# Patient Record
Sex: MALE | Race: WHITE | NOT HISPANIC OR LATINO | URBAN - METROPOLITAN AREA
[De-identification: names, ages, dates, MRNs, and addresses within clinical notes are randomized per-mention and may not be internally consistent; named-entity substitution may affect disease eponyms.]

---

## 2021-01-15 PROBLEM — Z00.00 ENCOUNTER FOR PREVENTIVE HEALTH EXAMINATION: Status: ACTIVE | Noted: 2021-01-15

## 2021-01-18 ENCOUNTER — OUTPATIENT (OUTPATIENT)
Dept: OUTPATIENT SERVICES | Facility: HOSPITAL | Age: 81
LOS: 1 days | End: 2021-01-18
Payer: MEDICARE

## 2021-01-18 ENCOUNTER — APPOINTMENT (OUTPATIENT)
Dept: ULTRASOUND IMAGING | Facility: HOSPITAL | Age: 81
End: 2021-01-18
Payer: MEDICARE

## 2021-01-18 DIAGNOSIS — Z00.8 ENCOUNTER FOR OTHER GENERAL EXAMINATION: ICD-10-CM

## 2021-01-18 PROCEDURE — 76770 US EXAM ABDO BACK WALL COMP: CPT | Mod: 26

## 2021-01-18 PROCEDURE — 76770 US EXAM ABDO BACK WALL COMP: CPT

## 2021-01-28 ENCOUNTER — OUTPATIENT (OUTPATIENT)
Dept: OUTPATIENT SERVICES | Facility: HOSPITAL | Age: 81
LOS: 1 days | End: 2021-01-28
Payer: MEDICARE

## 2021-01-28 ENCOUNTER — APPOINTMENT (OUTPATIENT)
Dept: CT IMAGING | Facility: HOSPITAL | Age: 81
End: 2021-01-28
Payer: MEDICARE

## 2021-01-28 DIAGNOSIS — Z00.8 ENCOUNTER FOR OTHER GENERAL EXAMINATION: ICD-10-CM

## 2021-01-28 PROCEDURE — 74176 CT ABD & PELVIS W/O CONTRAST: CPT | Mod: 26,MH

## 2021-01-28 PROCEDURE — 74176 CT ABD & PELVIS W/O CONTRAST: CPT

## 2023-02-16 ENCOUNTER — LABORATORY RESULT (OUTPATIENT)
Age: 83
End: 2023-02-16

## 2023-02-17 ENCOUNTER — INPATIENT (INPATIENT)
Facility: HOSPITAL | Age: 83
LOS: 6 days | Discharge: ANOTHER HOSPITAL | DRG: 871 | End: 2023-02-24
Attending: INTERNAL MEDICINE | Admitting: INTERNAL MEDICINE
Payer: MEDICARE

## 2023-02-17 VITALS — DIASTOLIC BLOOD PRESSURE: 117 MMHG | HEART RATE: 93 BPM | TEMPERATURE: 96 F | SYSTOLIC BLOOD PRESSURE: 147 MMHG

## 2023-02-17 DIAGNOSIS — J96.02 ACUTE RESPIRATORY FAILURE WITH HYPERCAPNIA: ICD-10-CM

## 2023-02-17 DIAGNOSIS — J96.01 ACUTE RESPIRATORY FAILURE WITH HYPOXIA: ICD-10-CM

## 2023-02-17 DIAGNOSIS — I10 ESSENTIAL (PRIMARY) HYPERTENSION: ICD-10-CM

## 2023-02-17 DIAGNOSIS — I48.91 UNSPECIFIED ATRIAL FIBRILLATION: ICD-10-CM

## 2023-02-17 DIAGNOSIS — Z29.9 ENCOUNTER FOR PROPHYLACTIC MEASURES, UNSPECIFIED: ICD-10-CM

## 2023-02-17 DIAGNOSIS — R33.9 RETENTION OF URINE, UNSPECIFIED: ICD-10-CM

## 2023-02-17 LAB
ALBUMIN SERPL ELPH-MCNC: 1.5 G/DL — LOW (ref 3.3–5)
ALP SERPL-CCNC: 106 U/L — SIGNIFICANT CHANGE UP (ref 40–120)
ALT FLD-CCNC: 71 U/L — HIGH (ref 10–45)
ANION GAP SERPL CALC-SCNC: 2 MMOL/L — LOW (ref 5–17)
ANISOCYTOSIS BLD QL: SLIGHT — SIGNIFICANT CHANGE UP
APTT BLD: 33.5 SEC — SIGNIFICANT CHANGE UP (ref 27.5–35.5)
AST SERPL-CCNC: 45 U/L — HIGH (ref 10–40)
BASE EXCESS BLDA CALC-SCNC: 5.5 MMOL/L — HIGH (ref -2–3)
BASOPHILS # BLD AUTO: 0 K/UL — SIGNIFICANT CHANGE UP (ref 0–0.2)
BASOPHILS NFR BLD AUTO: 0 % — SIGNIFICANT CHANGE UP (ref 0–2)
BILIRUB SERPL-MCNC: 0.6 MG/DL — SIGNIFICANT CHANGE UP (ref 0.2–1.2)
BLD GP AB SCN SERPL QL: SIGNIFICANT CHANGE UP
BUN SERPL-MCNC: 23 MG/DL — SIGNIFICANT CHANGE UP (ref 7–23)
CALCIUM SERPL-MCNC: 9 MG/DL — SIGNIFICANT CHANGE UP (ref 8.4–10.5)
CHLORIDE SERPL-SCNC: 101 MMOL/L — SIGNIFICANT CHANGE UP (ref 96–108)
CO2 BLDA-SCNC: 36 MMOL/L — HIGH (ref 19–24)
CO2 SERPL-SCNC: 34 MMOL/L — HIGH (ref 22–31)
CREAT SERPL-MCNC: 0.77 MG/DL — SIGNIFICANT CHANGE UP (ref 0.5–1.3)
EGFR: 89 ML/MIN/1.73M2 — SIGNIFICANT CHANGE UP
EOSINOPHIL # BLD AUTO: 0 K/UL — SIGNIFICANT CHANGE UP (ref 0–0.5)
EOSINOPHIL NFR BLD AUTO: 0 % — SIGNIFICANT CHANGE UP (ref 0–6)
FLUAV AG NPH QL: SIGNIFICANT CHANGE UP
FLUBV AG NPH QL: SIGNIFICANT CHANGE UP
GAS PNL BLDA: SIGNIFICANT CHANGE UP
GLUCOSE BLDC GLUCOMTR-MCNC: 126 MG/DL — HIGH (ref 70–99)
GLUCOSE SERPL-MCNC: 104 MG/DL — HIGH (ref 70–99)
HCO3 BLDA-SCNC: 33 MMOL/L — HIGH (ref 21–28)
HCT VFR BLD CALC: 42.9 % — SIGNIFICANT CHANGE UP (ref 39–50)
HGB BLD-MCNC: 12.9 G/DL — LOW (ref 13–17)
HOROWITZ INDEX BLDA+IHG-RTO: 100 — SIGNIFICANT CHANGE UP
INR BLD: 1.59 RATIO — HIGH (ref 0.88–1.16)
LACTATE SERPL-SCNC: 2 MMOL/L — SIGNIFICANT CHANGE UP (ref 0.7–2)
LDH SERPL L TO P-CCNC: 318 U/L — HIGH (ref 50–242)
LYMPHOCYTES # BLD AUTO: 1.5 K/UL — SIGNIFICANT CHANGE UP (ref 1–3.3)
LYMPHOCYTES # BLD AUTO: 4 % — LOW (ref 13–44)
MANUAL SMEAR VERIFICATION: SIGNIFICANT CHANGE UP
MCHC RBC-ENTMCNC: 28.5 PG — SIGNIFICANT CHANGE UP (ref 27–34)
MCHC RBC-ENTMCNC: 30.1 GM/DL — LOW (ref 32–36)
MCV RBC AUTO: 94.9 FL — SIGNIFICANT CHANGE UP (ref 80–100)
MONOCYTES # BLD AUTO: 2.62 K/UL — HIGH (ref 0–0.9)
MONOCYTES NFR BLD AUTO: 7 % — SIGNIFICANT CHANGE UP (ref 2–14)
MYELOCYTES NFR BLD: 1 % — HIGH (ref 0–0)
NEUTROPHILS # BLD AUTO: 32.95 K/UL — HIGH (ref 1.8–7.4)
NEUTROPHILS NFR BLD AUTO: 88 % — HIGH (ref 43–77)
NRBC # BLD: 0 /100 — SIGNIFICANT CHANGE UP (ref 0–0)
NT-PROBNP SERPL-SCNC: 1960 PG/ML — HIGH (ref 0–300)
PCO2 BLDA: 81 MMHG — CRITICAL HIGH (ref 35–48)
PH BLDA: 7.22 — LOW (ref 7.35–7.45)
PLAT MORPH BLD: NORMAL — SIGNIFICANT CHANGE UP
PLATELET # BLD AUTO: 318 K/UL — SIGNIFICANT CHANGE UP (ref 150–400)
PO2 BLDA: 164 MMHG — HIGH (ref 83–108)
POTASSIUM SERPL-MCNC: 5.4 MMOL/L — HIGH (ref 3.5–5.3)
POTASSIUM SERPL-SCNC: 5.4 MMOL/L — HIGH (ref 3.5–5.3)
PROT SERPL-MCNC: 6.2 G/DL — SIGNIFICANT CHANGE UP (ref 6–8.3)
PROTHROM AB SERPL-ACNC: 18.5 SEC — HIGH (ref 10.5–13.4)
RBC # BLD: 4.52 M/UL — SIGNIFICANT CHANGE UP (ref 4.2–5.8)
RBC # FLD: 17.2 % — HIGH (ref 10.3–14.5)
RBC BLD AUTO: ABNORMAL
ROULEAUX BLD QL SMEAR: PRESENT
RSV RNA NPH QL NAA+NON-PROBE: SIGNIFICANT CHANGE UP
SAO2 % BLDA: 98.9 % — HIGH (ref 94–98)
SARS-COV-2 RNA SPEC QL NAA+PROBE: SIGNIFICANT CHANGE UP
SODIUM SERPL-SCNC: 137 MMOL/L — SIGNIFICANT CHANGE UP (ref 135–145)
TRIGL SERPL-MCNC: 67 MG/DL — SIGNIFICANT CHANGE UP
TROPONIN I, HIGH SENSITIVITY RESULT: 20.5 NG/L — SIGNIFICANT CHANGE UP
WBC # BLD: 37.44 K/UL — HIGH (ref 3.8–10.5)
WBC # FLD AUTO: 37.44 K/UL — HIGH (ref 3.8–10.5)

## 2023-02-17 PROCEDURE — 71275 CT ANGIOGRAPHY CHEST: CPT | Mod: 26,MA

## 2023-02-17 PROCEDURE — 71045 X-RAY EXAM CHEST 1 VIEW: CPT | Mod: 26,76

## 2023-02-17 PROCEDURE — 93010 ELECTROCARDIOGRAM REPORT: CPT

## 2023-02-17 PROCEDURE — 70450 CT HEAD/BRAIN W/O DYE: CPT | Mod: 26,MA

## 2023-02-17 PROCEDURE — 99223 1ST HOSP IP/OBS HIGH 75: CPT

## 2023-02-17 PROCEDURE — 75635 CT ANGIO ABDOMINAL ARTERIES: CPT | Mod: 26,MA

## 2023-02-17 PROCEDURE — 99291 CRITICAL CARE FIRST HOUR: CPT | Mod: FS,CS

## 2023-02-17 RX ORDER — SIMVASTATIN 20 MG/1
40 TABLET, FILM COATED ORAL AT BEDTIME
Refills: 0 | Status: DISCONTINUED | OUTPATIENT
Start: 2023-02-17 | End: 2023-02-24

## 2023-02-17 RX ORDER — SODIUM CHLORIDE 9 MG/ML
500 INJECTION, SOLUTION INTRAVENOUS ONCE
Refills: 0 | Status: COMPLETED | OUTPATIENT
Start: 2023-02-17 | End: 2023-02-17

## 2023-02-17 RX ORDER — CEFEPIME 1 G/1
2000 INJECTION, POWDER, FOR SOLUTION INTRAMUSCULAR; INTRAVENOUS EVERY 8 HOURS
Refills: 0 | Status: COMPLETED | OUTPATIENT
Start: 2023-02-17 | End: 2023-02-22

## 2023-02-17 RX ORDER — CHLORHEXIDINE GLUCONATE 213 G/1000ML
1 SOLUTION TOPICAL
Refills: 0 | Status: DISCONTINUED | OUTPATIENT
Start: 2023-02-17 | End: 2023-02-24

## 2023-02-17 RX ORDER — CHLORHEXIDINE GLUCONATE 213 G/1000ML
15 SOLUTION TOPICAL EVERY 12 HOURS
Refills: 0 | Status: DISCONTINUED | OUTPATIENT
Start: 2023-02-17 | End: 2023-02-19

## 2023-02-17 RX ORDER — APIXABAN 2.5 MG/1
5 TABLET, FILM COATED ORAL
Refills: 0 | Status: DISCONTINUED | OUTPATIENT
Start: 2023-02-17 | End: 2023-02-17

## 2023-02-17 RX ORDER — FENTANYL CITRATE 50 UG/ML
50 INJECTION INTRAVENOUS EVERY 4 HOURS
Refills: 0 | Status: DISCONTINUED | OUTPATIENT
Start: 2023-02-17 | End: 2023-02-19

## 2023-02-17 RX ORDER — CEFEPIME 1 G/1
2000 INJECTION, POWDER, FOR SOLUTION INTRAMUSCULAR; INTRAVENOUS ONCE
Refills: 0 | Status: COMPLETED | OUTPATIENT
Start: 2023-02-17 | End: 2023-02-17

## 2023-02-17 RX ORDER — IPRATROPIUM BROMIDE 0.2 MG/ML
2 SOLUTION, NON-ORAL INHALATION EVERY 6 HOURS
Refills: 0 | Status: DISCONTINUED | OUTPATIENT
Start: 2023-02-17 | End: 2023-02-19

## 2023-02-17 RX ORDER — SODIUM CHLORIDE 9 MG/ML
1000 INJECTION, SOLUTION INTRAVENOUS
Refills: 0 | Status: DISCONTINUED | OUTPATIENT
Start: 2023-02-17 | End: 2023-02-19

## 2023-02-17 RX ORDER — ALBUTEROL 90 UG/1
2 AEROSOL, METERED ORAL EVERY 6 HOURS
Refills: 0 | Status: DISCONTINUED | OUTPATIENT
Start: 2023-02-17 | End: 2023-02-19

## 2023-02-17 RX ORDER — FENTANYL CITRATE 50 UG/ML
25 INJECTION INTRAVENOUS ONCE
Refills: 0 | Status: DISCONTINUED | OUTPATIENT
Start: 2023-02-17 | End: 2023-02-17

## 2023-02-17 RX ORDER — ALBUMIN HUMAN 25 %
100 VIAL (ML) INTRAVENOUS ONCE
Refills: 0 | Status: COMPLETED | OUTPATIENT
Start: 2023-02-17 | End: 2023-02-17

## 2023-02-17 RX ORDER — PROPOFOL 10 MG/ML
10 INJECTION, EMULSION INTRAVENOUS
Qty: 500 | Refills: 0 | Status: DISCONTINUED | OUTPATIENT
Start: 2023-02-17 | End: 2023-02-19

## 2023-02-17 RX ORDER — VANCOMYCIN HCL 1 G
1000 VIAL (EA) INTRAVENOUS ONCE
Refills: 0 | Status: COMPLETED | OUTPATIENT
Start: 2023-02-17 | End: 2023-02-17

## 2023-02-17 RX ORDER — PHENYLEPHRINE HYDROCHLORIDE 10 MG/ML
0.1 INJECTION INTRAVENOUS
Qty: 40 | Refills: 0 | Status: DISCONTINUED | OUTPATIENT
Start: 2023-02-17 | End: 2023-02-19

## 2023-02-17 RX ORDER — SODIUM CHLORIDE 9 MG/ML
2000 INJECTION INTRAMUSCULAR; INTRAVENOUS; SUBCUTANEOUS ONCE
Refills: 0 | Status: COMPLETED | OUTPATIENT
Start: 2023-02-17 | End: 2023-02-17

## 2023-02-17 RX ORDER — DEXMEDETOMIDINE HYDROCHLORIDE IN 0.9% SODIUM CHLORIDE 4 UG/ML
0.5 INJECTION INTRAVENOUS
Qty: 400 | Refills: 0 | Status: DISCONTINUED | OUTPATIENT
Start: 2023-02-17 | End: 2023-02-19

## 2023-02-17 RX ADMIN — Medication 250 MILLIGRAM(S): at 10:58

## 2023-02-17 RX ADMIN — SIMVASTATIN 40 MILLIGRAM(S): 20 TABLET, FILM COATED ORAL at 21:32

## 2023-02-17 RX ADMIN — DEXMEDETOMIDINE HYDROCHLORIDE IN 0.9% SODIUM CHLORIDE 11.6 MICROGRAM(S)/KG/HR: 4 INJECTION INTRAVENOUS at 21:33

## 2023-02-17 RX ADMIN — FENTANYL CITRATE 25 MICROGRAM(S): 50 INJECTION INTRAVENOUS at 15:00

## 2023-02-17 RX ADMIN — CEFEPIME 2000 MILLIGRAM(S): 1 INJECTION, POWDER, FOR SOLUTION INTRAMUSCULAR; INTRAVENOUS at 11:01

## 2023-02-17 RX ADMIN — FENTANYL CITRATE 50 MICROGRAM(S): 50 INJECTION INTRAVENOUS at 18:25

## 2023-02-17 RX ADMIN — FENTANYL CITRATE 50 MICROGRAM(S): 50 INJECTION INTRAVENOUS at 17:52

## 2023-02-17 RX ADMIN — Medication 2 PUFF(S): at 21:18

## 2023-02-17 RX ADMIN — PHENYLEPHRINE HYDROCHLORIDE 3.48 MICROGRAM(S)/KG/MIN: 10 INJECTION INTRAVENOUS at 16:51

## 2023-02-17 RX ADMIN — SODIUM CHLORIDE 500 MILLILITER(S): 9 INJECTION, SOLUTION INTRAVENOUS at 15:10

## 2023-02-17 RX ADMIN — Medication 50 MILLILITER(S): at 20:14

## 2023-02-17 RX ADMIN — PROPOFOL 5.25 MICROGRAM(S)/KG/MIN: 10 INJECTION, EMULSION INTRAVENOUS at 22:09

## 2023-02-17 RX ADMIN — SODIUM CHLORIDE 2000 MILLILITER(S): 9 INJECTION INTRAMUSCULAR; INTRAVENOUS; SUBCUTANEOUS at 10:48

## 2023-02-17 RX ADMIN — SODIUM CHLORIDE 125 MILLILITER(S): 9 INJECTION, SOLUTION INTRAVENOUS at 23:15

## 2023-02-17 RX ADMIN — PROPOFOL 5.25 MICROGRAM(S)/KG/MIN: 10 INJECTION, EMULSION INTRAVENOUS at 10:50

## 2023-02-17 RX ADMIN — FENTANYL CITRATE 50 MICROGRAM(S): 50 INJECTION INTRAVENOUS at 22:09

## 2023-02-17 RX ADMIN — FENTANYL CITRATE 50 MICROGRAM(S): 50 INJECTION INTRAVENOUS at 22:39

## 2023-02-17 RX ADMIN — CEFEPIME 100 MILLIGRAM(S): 1 INJECTION, POWDER, FOR SOLUTION INTRAMUSCULAR; INTRAVENOUS at 10:47

## 2023-02-17 RX ADMIN — CEFEPIME 100 MILLIGRAM(S): 1 INJECTION, POWDER, FOR SOLUTION INTRAMUSCULAR; INTRAVENOUS at 21:32

## 2023-02-17 RX ADMIN — Medication 40 MILLIGRAM(S): at 18:36

## 2023-02-17 RX ADMIN — FENTANYL CITRATE 25 MICROGRAM(S): 50 INJECTION INTRAVENOUS at 15:42

## 2023-02-17 RX ADMIN — CHLORHEXIDINE GLUCONATE 15 MILLILITER(S): 213 SOLUTION TOPICAL at 17:53

## 2023-02-17 RX ADMIN — Medication 40 MILLIGRAM(S): at 23:15

## 2023-02-17 RX ADMIN — ALBUTEROL 2 PUFF(S): 90 AEROSOL, METERED ORAL at 21:18

## 2023-02-17 NOTE — H&P ADULT - PROBLEM SELECTOR PLAN 1
With large right sided pleural effusion, now status post pigtail placement with >1L drainage.  Continue mechanical ventilation overnight, wean as tolerated in AM.  Monitor pigtail output. Follow up on fluid cytology, cultures and chemistries.  Start antibiotics for possible underlying pneumonia with recent admission to [facility], steroids. Significance of leukocytosis uncertain in the setting of possible underlying malignancy, no lactate on evaluation.  Has low dose vasopressor requirement, suspect sedation related.

## 2023-02-17 NOTE — H&P ADULT - PROBLEM SELECTOR PLAN 2
Rate controlled, hold AC until AM.  Continue cardiac monitoring.  Check echocardiogram with elevated BNP as family states it was normal at [facility] last week.

## 2023-02-17 NOTE — ED PROVIDER NOTE - PROGRESS NOTE DETAILS
Handy DO: I personally saw the patient with the PA, and completed the key components of the history and physical exam. I then discussed the management plan with the PA.    Handy MCCRAY: patient had originally be activated as code stroke but further hx confirms that patient with resp. distress at home-intubated by EMS; delay in CT scans as patient needed to be stabilized before going to CT. no TPA as last seen last night at 9pm. Handy MCCRAY: I spoke with patient's wife, sonAlok Horvath and daughter, Judith; patient is being worked up for hx concerning for lymphoma; recent hospitalization to [place] 2 weeks ago; off eliquis x yesterday for outpatient biospy; patient last seen normal at 9pm last night; found at 9am this morning with increase work of breathing and intubated in field for airway protection; originally activated as code stroke for EMS hx that was seen at 8:30AM normal but on further questioning from family that is not correct. I spoke to daughter- Judith who is a physician- FULL CODE at this time. Updated on plan of care; patient to be admitted to ICU and team at bedside who spoke to family at bedside and updated.

## 2023-02-17 NOTE — H&P ADULT - NSHPSOCIALHISTORY_GEN_ALL_CORE
Lives in private home with wife, nearby family active in patient care  No tobacco use for more than 60 years  No history of ETOH abuse, illicit drug use, or substance abuse

## 2023-02-17 NOTE — H&P ADULT - PROBLEM SELECTOR PLAN 4
Patient with chronic urinary retention, self catheterizes at home.  Choi now in place, continue to monitor urine output

## 2023-02-17 NOTE — H&P ADULT - CRITICAL CARE ATTENDING COMMENT
patient seen and examined    Assessment  acute hypoxic / hypercarbic respiratory failure  Recurrent Right pleural effusion  underlying malignancy suspected- enlarged lymph nodes  suspected septic shock     Plan  Chest tube drainage  mechanical ventilation  iv antibiotics  pressors to maintain bp  GI/dvt ppx  when stable surgery eval to remove lymph node

## 2023-02-17 NOTE — CONSULT NOTE ADULT - SUBJECTIVE AND OBJECTIVE BOX
ARIK DUMONT,  82y Male  MRN: [id number]  ATTENDING: Dr. Toño Crisostomo      HPI:      PAST MEDICAL & SURGICAL HISTORY:  Atrial fibrillation  Hypertension  Urinary retention  No significant past surgical history    MEDICATION:  albumin human 25% IVPB 100 milliLiter(s) IV Intermittent once  albuterol    90 MICROgram(s) HFA Inhaler 2 Puff(s) Inhalation every 6 hours  cefepime   IVPB 2000 milliGRAM(s) IV Intermittent every 8 hours  chlorhexidine 0.12% Liquid 15 milliLiter(s) Oral Mucosa every 12 hours  chlorhexidine 2% Cloths 1 Application(s) Topical <User Schedule>  dexMEDEtomidine Infusion 0.5 MICROgram(s)/kG/Hr IV Continuous <Continuous>  ipratropium 17 MICROgram(s) HFA Inhaler 2 Puff(s) Inhalation every 6 hours  lactated ringers. 1000 milliLiter(s) IV Continuous <Continuous>  methylPREDNISolone sodium succinate Injectable 40 milliGRAM(s) IV Push every 6 hours  phenylephrine    Infusion 0.1 MICROgram(s)/kG/Min IV Continuous <Continuous>  propofol Infusion 10 MICROgram(s)/kG/Min IV Continuous <Continuous>  simvastatin 40 milliGRAM(s) Oral at bedtime    ALLERGIES:  No Known Allergies    FAMILY HISTORY:  Reviewed, non-contributory: [ ]   Maternal-  Paternal-    SOCIAL HISTORY:  Tobacco: YES [ ]  ; NO [ ]; Former smoker [ ]  Alcohol:   YES [ ]  ; NO [ ]; Social alcohol user [ ]  Occupation/ marital status/ children:    REVIEW SYSTEMS:  Constitutional: no fever, chills, night sweats, no weight loss  HEENT: denies visual changes; no oral ulcers, dysphagia, no epistaxis;   Respiratory: no dyspnea , wheezing, cough, hemoptysis  Cardiovascular: denies acute chest pain, palpitations  GI: no loss of appetite, dark stools, or abdominal tenderness / pain; no change in bowel habits.  Musculoskeletal: no new back pain, bone/ joint pain ,no extremity swelling  Integumentary: denies pruritus; no skin rash, bruises, no  suspicious skin lesions  Neurologic: denies peripheral numbness, no dizziness, no gait problems.  Heme: no reported easy bruisability; no lymph node enlargement    VITALS:  T(C): 36.9, Max: 36.9 (02-17-23 @ 16:00)  T(F): 98.5, Max: 98.5 (02-17-23 @ 16:00)  HR: 67 (64 - 95)  BP: 95/68 (68/58 - 147/117)  SpO2: 100% (89% - 100%)    PHYSICAL EXAM:  Constitutional: alert, well developed  HEENT: normocephalic, anicteric sclerae, no mucositis or thrush  Respiratory: bilateral clear to auscultation anteriorly  Cardiovascular : S1, S2 regular, rhythmic, no murmurs, gallops or rubs  Abdomen: soft, distended, + normoactive BS, no palpable HS- megaly  Extremities: no tenderness;  -c/c/e, pulses equal bilaterally  Integumentary: no rashes, scars, or lesions suggestive of malignancy  Neurologic: no gross focal deficits    LABS:  (02-17) WBC: 37.44 K/uL,Hemoglobin: 12.9 g/dL, Hematocrit: 42.9 %,  Platelet: 318 K/uL  (02-17) Na: 137 mmol/L ; K: 5.4 mmol/L ; BUN: 23 mg/dL ; Cr: 0.77 mg/dL.  PT/INR - ( 17 Feb 2023 10:05 )   PT: 18.5 sec;   INR: 1.59 ratio    PTT - ( 17 Feb 2023 10:05 )  PTT:33.5 sec    RADIOLOGY:    ACC: [id number] EXAM:  CT ANGIO CHEST AORTA WAWIC   ORDERED BY: MO ROGER     ACC: [id number] EXAM:  CT ANGIO ABD AOR W RUN(W)AW IC   ORDERED BY:   MO ROGER     PROCEDURE DATE:  02/17/2023          INTERPRETATION:  CLINICAL INFORMATION: History of fall.    COMPARISON: CT abdomen and pelvis dated 01/28/2021.    CONTRAST/COMPLICATIONS:  IV Contrast: Omnipaque 350  90 cc administered   10 cc discarded  Oral Contrast: NONE  Complications: None reported at time of study completion    PROCEDURE:  CT Angiography of the Chest, Abdomen and Pelvis.  Precontrast imaging was performed through the chest followed by arterial   phase imaging of the chest, abdomen and pelvis.  Sagittal and coronal reformats were performed as well as 3D (MIP)  reconstructions.    FINDINGS:  CHEST:  LUNGS AND LARGE AIRWAYS: Endotracheal tube in satisfactory position.   Evidence of compression atelectasis involving the dependent right lung.   Atelectasis/infiltrate involving the right middle lobe. Nodular opacities   involving the right upper lobe. These may be related to airspace disease.   Subsegmental atelectasis dependent left lower lobe. Evidence of bilateral   septal lines. These may be related to pulmonary edema. Possibility of   lymphangitic carcinomatosis is also considered.  PLEURA: Moderate right pleural effusion.  VESSELS: No intramural hematoma or a dissection flap involving the   thoracic aorta. Unremarkable arch vessels. Atherosclerotic calcification   including the coronary arteries.  HEART: Borderline cardiomegaly. No pericardial effusion.  MEDIASTINUM AND SHANTELLE: Moderate mediastinal and bilateral hilar   lymphadenopathy. As a reference, a right precarinal node measures 3.9 cm   in short axis (3:39).  CHEST WALL AND LOWER NECK: Within normal limits.    ABDOMEN AND PELVIS:  LIVER: 4 cm sized left hepatic cyst.  BILE DUCTS: Normal caliber.  GALLBLADDER: Within normal limits.  SPLEEN: Within normal limits.  PANCREAS: Nonspecific lobulated contour of the tail of the pancreas.  ADRENALS: Within normal limits.  KIDNEYS/URETERS: Relatively smaller right kidney with cortical thinning.   No evidence of hydronephrosis.    BLADDER: Under distended, unremarkable.  REPRODUCTIVE ORGANS: Prostate is enlarged.    BOWEL: No bowel obstruction. Appendix is normal. Uncomplicated sigmoid   diverticula.  PERITONEUM: No ascites.  VESSELS: Atherosclerotic changes.  RETROPERITONEUM/LYMPH NODES: No lymphadenopathy.  ABDOMINAL WALL: Within normal limits.  BONES: Degenerative changes.    IMPRESSION:  No evidence of aortic dissection or any other posttraumatic abnormality.  Mediastinal and hilar lymphadenopathy, right pleural effusion, possible   right upper lobe airspace opacities and evidence of septal lines.    Additional findings as above.                   ARIK DUMONT,  82y Male  MRN: [id number]  ATTENDING: Dr. Toño Crisostomo      HPI:  82M, PMHx HTN, atrial fibrillation, urinary retention, admitted at [facility] with symptoms of progressive shortness of breath, which rapidly turned into respiratory failure requiring intubation.  Reportedly patient recently evaluated at [facility] for a large pleural effusion, s/p thoracentesis; cytopathology negative for malignancy reportedly.  Patient also had a supraclavicular lymph node biopsy for reportedly lymphadenopathy; pathology inconclusive.  Presently in ICU, mechanically ventilated but weanable.  CT chest consistent with mediastinal and hilar lymphadenopathy, right pleural effusion, possible right upper lobe airspace opacities and evidence of septal lines. No evidence of aortic dissection or any other posttraumatic abnormality.  Oncology consultation requested regarding lymphadenopathy and large pleural effusion.    PAST MEDICAL & SURGICAL HISTORY:  Atrial fibrillation  Hypertension  Urinary retention  No significant past surgical history    MEDICATION:  albumin human 25% IVPB 100 milliLiter(s) IV Intermittent once  albuterol    90 MICROgram(s) HFA Inhaler 2 Puff(s) Inhalation every 6 hours  cefepime   IVPB 2000 milliGRAM(s) IV Intermittent every 8 hours  chlorhexidine 0.12% Liquid 15 milliLiter(s) Oral Mucosa every 12 hours  chlorhexidine 2% Cloths 1 Application(s) Topical <User Schedule>  dexMEDEtomidine Infusion 0.5 MICROgram(s)/kG/Hr IV Continuous <Continuous>  ipratropium 17 MICROgram(s) HFA Inhaler 2 Puff(s) Inhalation every 6 hours  lactated ringers. 1000 milliLiter(s) IV Continuous <Continuous>  methylPREDNISolone sodium succinate Injectable 40 milliGRAM(s) IV Push every 6 hours  phenylephrine    Infusion 0.1 MICROgram(s)/kG/Min IV Continuous <Continuous>  propofol Infusion 10 MICROgram(s)/kG/Min IV Continuous <Continuous>  simvastatin 40 milliGRAM(s) Oral at bedtime    ALLERGIES:  No Known Allergies    FAMILY HISTORY:  Reviewed, non-contributory: [ ]   Maternal-  Paternal-    SOCIAL HISTORY:  Tobacco: YES [ ]  ; NO [ ]; Former smoker [ ]  Alcohol:   YES [ ]  ; NO [ ]; Social alcohol user [ ]  Occupation/ marital status/ children:    REVIEW SYSTEMS:  Currently intubated and sedated, cannot elicit further history from patient.      VITALS:  T(C): 36.9, Max: 36.9 (02-17-23 @ 16:00)  T(F): 98.5, Max: 98.5 (02-17-23 @ 16:00)  HR: 67 (64 - 95)  BP: 95/68 (68/58 - 147/117)  SpO2: 100% (89% - 100%)    PHYSICAL EXAM:  Constitutional: intubated, sedated, well developed  HEENT: normocephalic, anicteric sclerae, no mucositis or thrush  Respiratory: bilateral clear to auscultation anteriorly  Cardiovascular : S1, S2 regular, rhythmic, no murmurs, gallops or rubs  Abdomen: soft, distended, + normoactive BS, no palpable HS- megaly  Extremities: no tenderness;  -c/c/e, pulses equal bilaterally  Integumentary: no rashes, scars, or lesions suggestive of malignancy  Neurologic: no gross focal deficits    LABS:  (02-17) WBC: 37.44 K/uL,Hemoglobin: 12.9 g/dL, Hematocrit: 42.9 %,  Platelet: 318 K/uL  (02-17) Na: 137 mmol/L ; K: 5.4 mmol/L ; BUN: 23 mg/dL ; Cr: 0.77 mg/dL.  PT/INR - ( 17 Feb 2023 10:05 )   PT: 18.5 sec;   INR: 1.59 ratio    PTT - ( 17 Feb 2023 10:05 )  PTT:33.5 sec    RADIOLOGY:    ACC: [id number] EXAM:  CT ANGIO CHEST AORTA WAWIC   ORDERED BY: MO ROGER     ACC: [id number] EXAM:  CT ANGIO ABD AOR W RUN(W)AW IC   ORDERED BY:   MO ROGER     PROCEDURE DATE:  02/17/2023          INTERPRETATION:  CLINICAL INFORMATION: History of fall.    COMPARISON: CT abdomen and pelvis dated 01/28/2021.    CONTRAST/COMPLICATIONS:  IV Contrast: Omnipaque 350  90 cc administered   10 cc discarded  Oral Contrast: NONE  Complications: None reported at time of study completion    PROCEDURE:  CT Angiography of the Chest, Abdomen and Pelvis.  Precontrast imaging was performed through the chest followed by arterial   phase imaging of the chest, abdomen and pelvis.  Sagittal and coronal reformats were performed as well as 3D (MIP)  reconstructions.    FINDINGS:  CHEST:  LUNGS AND LARGE AIRWAYS: Endotracheal tube in satisfactory position.   Evidence of compression atelectasis involving the dependent right lung.   Atelectasis/infiltrate involving the right middle lobe. Nodular opacities   involving the right upper lobe. These may be related to airspace disease.   Subsegmental atelectasis dependent left lower lobe. Evidence of bilateral   septal lines. These may be related to pulmonary edema. Possibility of   lymphangitic carcinomatosis is also considered.  PLEURA: Moderate right pleural effusion.  VESSELS: No intramural hematoma or a dissection flap involving the   thoracic aorta. Unremarkable arch vessels. Atherosclerotic calcification   including the coronary arteries.  HEART: Borderline cardiomegaly. No pericardial effusion.  MEDIASTINUM AND SHANTELLE: Moderate mediastinal and bilateral hilar   lymphadenopathy. As a reference, a right precarinal node measures 3.9 cm   in short axis (3:39).  CHEST WALL AND LOWER NECK: Within normal limits.    ABDOMEN AND PELVIS:  LIVER: 4 cm sized left hepatic cyst.  BILE DUCTS: Normal caliber.  GALLBLADDER: Within normal limits.  SPLEEN: Within normal limits.  PANCREAS: Nonspecific lobulated contour of the tail of the pancreas.  ADRENALS: Within normal limits.  KIDNEYS/URETERS: Relatively smaller right kidney with cortical thinning.   No evidence of hydronephrosis.    BLADDER: Under distended, unremarkable.  REPRODUCTIVE ORGANS: Prostate is enlarged.    BOWEL: No bowel obstruction. Appendix is normal. Uncomplicated sigmoid   diverticula.  PERITONEUM: No ascites.  VESSELS: Atherosclerotic changes.  RETROPERITONEUM/LYMPH NODES: No lymphadenopathy.  ABDOMINAL WALL: Within normal limits.  BONES: Degenerative changes.    IMPRESSION:  No evidence of aortic dissection or any other posttraumatic abnormality.  Mediastinal and hilar lymphadenopathy, right pleural effusion, possible   right upper lobe airspace opacities and evidence of septal lines.    Additional findings as above.

## 2023-02-17 NOTE — PATIENT PROFILE ADULT - FALL HARM RISK - HARM RISK INTERVENTIONS

## 2023-02-17 NOTE — H&P ADULT - ASSESSMENT
82 year old male with PMH atrial fibrillation, HTN and chronic urinary retention presenting with acute hypercarbic respiratory failure

## 2023-02-17 NOTE — H&P ADULT - NSHPPHYSICALEXAM_GEN_ALL_CORE
Vital Signs Last 24 Hrs  T(C): 36.9 (17 Feb 2023 16:00), Max: 36.9 (17 Feb 2023 16:00)  T(F): 98.5 (17 Feb 2023 16:00), Max: 98.5 (17 Feb 2023 16:00)  HR: 67 (17 Feb 2023 18:30) (64 - 95)  BP: 95/68 (17 Feb 2023 18:30) (68/58 - 147/117)  BP(mean): 79 (17 Feb 2023 18:30) (60 - 116)  RR: 26 (17 Feb 2023 18:30) (10 - 30)  SpO2: 100% (17 Feb 2023 18:30) (89% - 100%)  Mode: AC/ CMV (Assist Control/ Continuous Mandatory Ventilation)  RR (machine): 26  TV (machine): 500  FiO2: 80  PEEP: 5  ITime: 1  MAP: 16  PIP: 26    Physical Exam  Gen:  WN/WD Male resting in bed, orally intubated and sedated  ENT:  NC/AT, no JVD noted.   Thorax:  Symmetric, no retractions  Lung:  Wheezing noted throughout, breath sounds diminished on left  CV:  S1, S2. RRR  Abd:  Soft, NT/ND.  BS normoactive, no masses to palp  Extrem:  Cool and mottled, DP/radial pulses +2  Neuro:  Withdraws all extremities to painful stimuli   Skin:  Intact

## 2023-02-17 NOTE — ED ADULT NURSE NOTE - SUICIDE SCREENING QUESTION 2
range of motion is not limited. +TTP to left rhomboid and let trapezius muscle. No spinal dmitry TTP. Patient unable to complete

## 2023-02-17 NOTE — ED PROVIDER NOTE - PHYSICAL EXAMINATION
General:     ill appearing  Eyes: PERRL  Head:     NC/AT, intubated  Neck:     trachea midline  Lungs:     no breath sounds right lung  CVS:     irregualr  Abd:     +bowel sounds  Ext:   poor cap refill  Neuro: biting tube

## 2023-02-17 NOTE — H&P ADULT - NSHPLABSRESULTS_GEN_ALL_CORE
< from: CT Angio Chest Aorta w/wo IV Cont (02.17.23 @ 10:36) >      ACC: [id number] EXAM:  CT ANGIO CHEST AORTA WAWIC   ORDERED BY: MO ROGER     ACC: [id number] EXAM:  CT ANGIO ABD AOR W RUN(W)AW IC   ORDERED BY:   MO ROGER     PROCEDURE DATE:  02/17/2023          INTERPRETATION:  CLINICAL INFORMATION: History of fall.    COMPARISON: CT abdomen and pelvis dated 01/28/2021.    CONTRAST/COMPLICATIONS:  IV Contrast: Omnipaque 350  90 cc administered   10 cc discarded  Oral Contrast: NONE  Complications: None reported at time of study completion    PROCEDURE:  CT Angiography of the Chest, Abdomen and Pelvis.  Precontrast imaging was performed through the chest followed by arterial   phase imaging of the chest, abdomen and pelvis.  Sagittal and coronal reformats were performed as well as 3D (MIP)  reconstructions.    FINDINGS:  CHEST:  LUNGS AND LARGE AIRWAYS: Endotracheal tube in satisfactory position.   Evidence of compression atelectasis involving the dependent right lung.   Atelectasis/infiltrate involving the right middle lobe. Nodular opacities   involving the right upper lobe. These may be related to airspace disease.   Subsegmental atelectasis dependent left lower lobe. Evidence of bilateral   septal lines. These may be related to pulmonary edema. Possibility of   lymphangitic carcinomatosis is also considered.  PLEURA: Moderate right pleural effusion.  VESSELS: No intramural hematoma or a dissection flap involving the   thoracic aorta. Unremarkable arch vessels. Atherosclerotic calcification   including the coronary arteries.  HEART: Borderline cardiomegaly. No pericardial effusion.  MEDIASTINUM AND SHANTELLE: Moderate mediastinal and bilateral hilar   lymphadenopathy. As a reference, a right precarinal node measures 3.9 cm   in short axis (3:39).  CHEST WALL AND LOWER NECK: Within normal limits.    ABDOMEN AND PELVIS:  LIVER: 4 cm sized left hepatic cyst.  BILE DUCTS: Normal caliber.  GALLBLADDER: Within normal limits.  SPLEEN: Within normal limits.  PANCREAS: Nonspecific lobulated contour of the tail of the pancreas.  ADRENALS: Within normal limits.  KIDNEYS/URETERS: Relatively smaller right kidney with cortical thinning.   No evidence of hydronephrosis.    BLADDER: Under distended, unremarkable.  REPRODUCTIVE ORGANS: Prostate is enlarged.    BOWEL: No bowel obstruction. Appendix is normal. Uncomplicated sigmoid   diverticula.  PERITONEUM: No ascites.  VESSELS: Atherosclerotic changes.  RETROPERITONEUM/LYMPH NODES: No lymphadenopathy.  ABDOMINAL WALL: Within normal limits.  BONES: Degenerative changes.    IMPRESSION:  No evidence of aortic dissection or any other posttraumatic abnormality.  Mediastinal and hilar lymphadenopathy, right pleural effusion, possible   right upper lobe airspace opacities and evidence of septal lines.    Additional findings as above.        --- End of Report ---            LORNA SANZ MD; Attending Radiologist  This document has been electronically signed. Feb 17 2023 12:17PM    < end of copied text >

## 2023-02-17 NOTE — ED PROVIDER NOTE - OBJECTIVE STATEMENT
pt 81 yo m brought in by EMS intubated after receiving etomidate. as per family pt went to bed at 9pm. at 9am they tried to wake him and he wouldn't wake up. when ems arrived he had a pulse but agonal respirations and was intubated. 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 stopped his eliquis yesterday for future procedure

## 2023-02-17 NOTE — PROGRESS NOTE ADULT - SUBJECTIVE AND OBJECTIVE BOX
Patient is a 82y old  Male who presents with a chief complaint of Change in mental status (17 Feb 2023 19:39)      BRIEF HOSPITAL COURSE:     -Admitted to  ICU with acute hypercapneic resp. failure, required intubation and full vent support. In setting of recently found/diagnosed supraclavicular lymph node and frequent large volume right sided pleural effusions. Patient has also developed a pressor requirment showing development of septic shock  -has right pigtail in place with ~1000 mL's of straw colored output     Events last 24 hours:   - remains on full vent support  -remains on vasopressor    PAST MEDICAL & SURGICAL HISTORY:  Atrial fibrillation      Hypertension      Urinary retention      No significant past surgical history          Review of Systems:  -unable to obtain, as patient intubated and sedated       Medications:  cefepime   IVPB 2000 milliGRAM(s) IV Intermittent every 8 hours    phenylephrine    Infusion 0.1 MICROgram(s)/kG/Min IV Continuous <Continuous>    albuterol    90 MICROgram(s) HFA Inhaler 2 Puff(s) Inhalation every 6 hours  ipratropium 17 MICROgram(s) HFA Inhaler 2 Puff(s) Inhalation every 6 hours    dexMEDEtomidine Infusion 0.5 MICROgram(s)/kG/Hr IV Continuous <Continuous>  fentaNYL    Injectable 50 MICROGram(s) IV Push every 4 hours PRN  propofol Infusion 10 MICROgram(s)/kG/Min IV Continuous <Continuous>            methylPREDNISolone sodium succinate Injectable 40 milliGRAM(s) IV Push every 6 hours  simvastatin 40 milliGRAM(s) Oral at bedtime    lactated ringers. 1000 milliLiter(s) IV Continuous <Continuous>      chlorhexidine 0.12% Liquid 15 milliLiter(s) Oral Mucosa every 12 hours  chlorhexidine 2% Cloths 1 Application(s) Topical <User Schedule>        Mode: AC/ CMV (Assist Control/ Continuous Mandatory Ventilation)  RR (machine): 26  TV (machine): 500  FiO2: 80  PEEP: 5  ITime: 1  MAP: 16  PIP: 26      ICU Vital Signs Last 24 Hrs  T(C): 36.9 (17 Feb 2023 16:00), Max: 36.9 (17 Feb 2023 16:00)  T(F): 98.5 (17 Feb 2023 16:00), Max: 98.5 (17 Feb 2023 16:00)  HR: 67 (17 Feb 2023 18:30) (64 - 95)  BP: 95/68 (17 Feb 2023 18:30) (68/58 - 147/117)  BP(mean): 79 (17 Feb 2023 18:30) (60 - 116)  RR: 26 (17 Feb 2023 18:30) (10 - 30)  SpO2: 100% (17 Feb 2023 18:30) (89% - 100%)    O2 Parameters below as of 17 Feb 2023 11:30  Patient On (Oxygen Delivery Method): ventilator    O2 Concentration (%): 80        ABG - ( 17 Feb 2023 10:48 )  pH, Arterial: 7.22  pH, Blood: x     /  pCO2: 81    /  pO2: 164   / HCO3: 33    / Base Excess: 5.5   /  SaO2: 98.9                I&O's Detail    17 Feb 2023 07:01  -  17 Feb 2023 20:18  --------------------------------------------------------  IN:    Dexmedetomidine: 55.8 mL    Lactated Ringers: 500 mL    Lactated Ringers Bolus: 500 mL    Phenylephrine: 12.2 mL    Propofol: 6.5 mL    Sodium Chloride 0.9% Bolus: 2000 mL  Total IN: 3074.5 mL    OUT:    Chest Tube (mL): 1525 mL    Indwelling Catheter - Urethral (mL): 810 mL  Total OUT: 2335 mL    Total NET: 739.5 mL            LABS:                        12.9   37.44 )-----------( 318      ( 17 Feb 2023 10:05 )             42.9     02-17    137  |  101  |  23  ----------------------------<  104<H>  5.4<H>   |  34<H>  |  0.77    Ca    9.0      17 Feb 2023 14:40    TPro  6.2  /  Alb  1.5<L>  /  TBili  0.6  /  DBili  x   /  AST  45<H>  /  ALT  71<H>  /  AlkPhos  106  02-17          CAPILLARY BLOOD GLUCOSE      POCT Blood Glucose.: 183 mg/dL (17 Feb 2023 10:01)    PT/INR - ( 17 Feb 2023 10:05 )   PT: 18.5 sec;   INR: 1.59 ratio         PTT - ( 17 Feb 2023 10:05 )  PTT:33.5 sec    CULTURES:      Physical Examination:    General: No acute distress.  Intubated, sedated, compliant with vent    HEENT: Pupils equal, reactive to light.  Symmetric.    PULM: diminished at bases bilaterally right more than left, no significant sputum production    CVS: (+) S1/S2, irregularly irregular, no MRG     ABD: Soft, nondistended, nontender, normoactive bowel sounds, no masses    EXT: No edema, nontender    SKIN: Warm and well perfused, no rashes noted.    RADIOLOGY: ***    CRITICAL CARE TIME SPENT: 35 minutes of critical care time spent providing medical care for patient's acute illness/conditions that impairs at least one vital organ system and/or poses a high risk of imminent or life threatening deterioration in the patient's condition. It includes time spent evaluating and treating the patient's acute illness as well as time spent reviewing labs, radiology, discussing goals of care with patient and/or patient's family, and discussing the case with a multidisciplinary team, including the eICU, in an effort to prevent further life threatening deterioration or end organ damage. This time is independent of any procedures performed.

## 2023-02-17 NOTE — H&P ADULT - PROBLEM SELECTOR PLAN 5
SDC's, consider full AC with afib in AM  Protonix 40mg NGT daily for GI  Bedrest, start TF tomorrow if not extubated  Full Code Status

## 2023-02-17 NOTE — ED ADULT NURSE NOTE - OBJECTIVE STATEMENT
Pt presents to ED from home for unresponsiveness. Pt found by aide at home unresponsive in bed, pt was having SOB last night and went to sleep at 9pm. On EMS arrival, pt agonal breathing, intubated in field with 7.5 ETT, initially 28 @ lip, retracted by Dr Murrell to 23 @ lip. Pt cool to touch, rectal temp of 94.5, ten hugger initiated. Pt placed on cardiac monitor. Pt with recent thoracentesis and elevated WBC, r/o lymphoma. Normal baseline mental status is A&Ox4 and ambulatory. Pt presents to ED from home for unresponsiveness. Pt found by aide at home unresponsive in bed, pt was having SOB last night and went to sleep at 9pm. On EMS arrival, pt agonal breathing, intubated in field with 7.5 ETT, initially 28 @ lip, retracted by Dr Murrell to 23 @ lip. Pt cool to touch, rectal temp of 94.5, ten hugger initiated. Pt placed on cardiac monitor. Pt with recent thoracentesis and elevated WBC, r/o lymphoma. Normal baseline mental status is A&Ox4 and ambulatory. Pt self straight caths at home.

## 2023-02-17 NOTE — H&P ADULT - HISTORY OF PRESENT ILLNESS
82 year old male with PMH atrial fibrillation, HTN and chronic urinary retention who presented today to [facility] from home.  Per family patient has been progressively SOB over the past few days and was last in his usual state of health last night.  This morning patient wife thought patient had "overslept," when she went to check on him found him agonally breathing and activated EMS.  Intubated in the field and brought to ED.  No reported fever, chills, chest pain, weakness, dizziness, nausea, or vomiting.    Of note, patient recently found to have supraclavicular lymph node enlargement with associated leukocytosis, is being evaluated for lymphoma.  Patient also recently admitted to [facility] where he was noted to have large pleural effusion that was drained.  According to family lymph node biopsy was inconclusive, had thoracentesis which revealed no infection or malignancy.  Since discharge from [facility] has been oxygen dependant at home which is also a new development.    Patient currently intubated and sedated, cannot elicit further history from patient.

## 2023-02-17 NOTE — PROVIDER CONTACT NOTE (EICU) - ASSESSMENT
79/55, HR 83, RR 26, 100%  Precedex on hold.  Patient has gotten 2L fluid bolus already and is currently getting LR @ 125cc/hr

## 2023-02-18 DIAGNOSIS — R59.1 GENERALIZED ENLARGED LYMPH NODES: ICD-10-CM

## 2023-02-18 LAB
ALBUMIN FLD-MCNC: 1.6 G/DL — SIGNIFICANT CHANGE UP
ALBUMIN SERPL ELPH-MCNC: 1.8 G/DL — LOW (ref 3.3–5)
ALP SERPL-CCNC: 97 U/L — SIGNIFICANT CHANGE UP (ref 40–120)
ALT FLD-CCNC: 56 U/L — HIGH (ref 10–45)
ANION GAP SERPL CALC-SCNC: 6 MMOL/L — SIGNIFICANT CHANGE UP (ref 5–17)
AST SERPL-CCNC: 32 U/L — SIGNIFICANT CHANGE UP (ref 10–40)
BASE EXCESS BLDA CALC-SCNC: 3.2 MMOL/L — HIGH (ref -2–3)
BASE EXCESS BLDA CALC-SCNC: 7.4 MMOL/L — HIGH (ref -2–3)
BASOPHILS # BLD AUTO: 0.05 K/UL — SIGNIFICANT CHANGE UP (ref 0–0.2)
BASOPHILS NFR BLD AUTO: 0.2 % — SIGNIFICANT CHANGE UP (ref 0–2)
BILIRUB SERPL-MCNC: 0.7 MG/DL — SIGNIFICANT CHANGE UP (ref 0.2–1.2)
BLOOD GAS COMMENTS ARTERIAL: SIGNIFICANT CHANGE UP
BUN SERPL-MCNC: 20 MG/DL — SIGNIFICANT CHANGE UP (ref 7–23)
CALCIUM SERPL-MCNC: 8.8 MG/DL — SIGNIFICANT CHANGE UP (ref 8.4–10.5)
CHLORIDE SERPL-SCNC: 103 MMOL/L — SIGNIFICANT CHANGE UP (ref 96–108)
CO2 BLDA-SCNC: 31 MMOL/L — HIGH (ref 19–24)
CO2 BLDA-SCNC: 32 MMOL/L — HIGH (ref 19–24)
CO2 SERPL-SCNC: 29 MMOL/L — SIGNIFICANT CHANGE UP (ref 22–31)
CREAT SERPL-MCNC: 0.76 MG/DL — SIGNIFICANT CHANGE UP (ref 0.5–1.3)
EGFR: 89 ML/MIN/1.73M2 — SIGNIFICANT CHANGE UP
EOSINOPHIL # BLD AUTO: 0 K/UL — SIGNIFICANT CHANGE UP (ref 0–0.5)
EOSINOPHIL NFR BLD AUTO: 0 % — SIGNIFICANT CHANGE UP (ref 0–6)
GAS PNL BLDA: SIGNIFICANT CHANGE UP
GAS PNL BLDA: SIGNIFICANT CHANGE UP
GLUCOSE FLD-MCNC: 115 MG/DL — SIGNIFICANT CHANGE UP
GLUCOSE SERPL-MCNC: 164 MG/DL — HIGH (ref 70–99)
GRAM STN FLD: SIGNIFICANT CHANGE UP
HCO3 BLDA-SCNC: 30 MMOL/L — HIGH (ref 21–28)
HCO3 BLDA-SCNC: 30 MMOL/L — HIGH (ref 21–28)
HCT VFR BLD CALC: 36.3 % — LOW (ref 39–50)
HGB BLD-MCNC: 11.7 G/DL — LOW (ref 13–17)
HOROWITZ INDEX BLDA+IHG-RTO: 40 — SIGNIFICANT CHANGE UP
HOROWITZ INDEX BLDA+IHG-RTO: 60 — SIGNIFICANT CHANGE UP
IMM GRANULOCYTES NFR BLD AUTO: 1.2 % — HIGH (ref 0–0.9)
LDH SERPL L TO P-CCNC: 162 U/L — SIGNIFICANT CHANGE UP
LYMPHOCYTES # BLD AUTO: 0.79 K/UL — LOW (ref 1–3.3)
LYMPHOCYTES # BLD AUTO: 3.9 % — LOW (ref 13–44)
MAGNESIUM SERPL-MCNC: 1.5 MG/DL — LOW (ref 1.6–2.6)
MCHC RBC-ENTMCNC: 28.6 PG — SIGNIFICANT CHANGE UP (ref 27–34)
MCHC RBC-ENTMCNC: 32.2 GM/DL — SIGNIFICANT CHANGE UP (ref 32–36)
MCV RBC AUTO: 88.8 FL — SIGNIFICANT CHANGE UP (ref 80–100)
MONOCYTES # BLD AUTO: 0.53 K/UL — SIGNIFICANT CHANGE UP (ref 0–0.9)
MONOCYTES NFR BLD AUTO: 2.6 % — SIGNIFICANT CHANGE UP (ref 2–14)
NEUTROPHILS # BLD AUTO: 18.67 K/UL — HIGH (ref 1.8–7.4)
NEUTROPHILS NFR BLD AUTO: 92.1 % — HIGH (ref 43–77)
NRBC # BLD: 0 /100 WBCS — SIGNIFICANT CHANGE UP (ref 0–0)
NT-PROBNP SERPL-SCNC: 1116 PG/ML — HIGH (ref 0–300)
PCO2 BLDA: 36 MMHG — HIGH (ref 32–35)
PCO2 BLDA: 62 MMHG — HIGH (ref 35–48)
PH BLDA: 7.29 — LOW (ref 7.35–7.45)
PH BLDA: 7.53 — HIGH (ref 7.35–7.45)
PH FLD: 7.6 — SIGNIFICANT CHANGE UP
PHOSPHATE SERPL-MCNC: 2.3 MG/DL — LOW (ref 2.5–4.5)
PLATELET # BLD AUTO: 236 K/UL — SIGNIFICANT CHANGE UP (ref 150–400)
PO2 BLDA: 105 MMHG — SIGNIFICANT CHANGE UP (ref 83–108)
PO2 BLDA: 66 MMHG — LOW (ref 83–108)
POTASSIUM SERPL-MCNC: 4.6 MMOL/L — SIGNIFICANT CHANGE UP (ref 3.5–5.3)
POTASSIUM SERPL-SCNC: 4.6 MMOL/L — SIGNIFICANT CHANGE UP (ref 3.5–5.3)
PROT FLD-MCNC: 3.7 G/DL — SIGNIFICANT CHANGE UP
PROT SERPL-MCNC: 6 G/DL — SIGNIFICANT CHANGE UP (ref 6–8.3)
RBC # BLD: 4.09 M/UL — LOW (ref 4.2–5.8)
RBC # FLD: 17.6 % — HIGH (ref 10.3–14.5)
SAO2 % BLDA: 90.8 % — LOW (ref 94–98)
SAO2 % BLDA: 99 % — HIGH (ref 94–98)
SODIUM SERPL-SCNC: 138 MMOL/L — SIGNIFICANT CHANGE UP (ref 135–145)
SPECIMEN SOURCE: SIGNIFICANT CHANGE UP
TROPONIN I, HIGH SENSITIVITY RESULT: 14.3 NG/L — SIGNIFICANT CHANGE UP
WBC # BLD: 20.28 K/UL — HIGH (ref 3.8–10.5)
WBC # FLD AUTO: 20.28 K/UL — HIGH (ref 3.8–10.5)

## 2023-02-18 PROCEDURE — 93306 TTE W/DOPPLER COMPLETE: CPT | Mod: 26

## 2023-02-18 PROCEDURE — 71045 X-RAY EXAM CHEST 1 VIEW: CPT | Mod: 26

## 2023-02-18 RX ORDER — MAGNESIUM SULFATE 500 MG/ML
1 VIAL (ML) INJECTION ONCE
Refills: 0 | Status: COMPLETED | OUTPATIENT
Start: 2023-02-18 | End: 2023-02-18

## 2023-02-18 RX ORDER — ACETAMINOPHEN 500 MG
650 TABLET ORAL ONCE
Refills: 0 | Status: COMPLETED | OUTPATIENT
Start: 2023-02-18 | End: 2023-02-18

## 2023-02-18 RX ORDER — POTASSIUM PHOSPHATE, MONOBASIC POTASSIUM PHOSPHATE, DIBASIC 236; 224 MG/ML; MG/ML
15 INJECTION, SOLUTION INTRAVENOUS ONCE
Refills: 0 | Status: COMPLETED | OUTPATIENT
Start: 2023-02-18 | End: 2023-02-18

## 2023-02-18 RX ORDER — APIXABAN 2.5 MG/1
5 TABLET, FILM COATED ORAL EVERY 12 HOURS
Refills: 0 | Status: DISCONTINUED | OUTPATIENT
Start: 2023-02-18 | End: 2023-02-22

## 2023-02-18 RX ADMIN — Medication 40 MILLIGRAM(S): at 18:18

## 2023-02-18 RX ADMIN — SIMVASTATIN 40 MILLIGRAM(S): 20 TABLET, FILM COATED ORAL at 21:12

## 2023-02-18 RX ADMIN — CHLORHEXIDINE GLUCONATE 1 APPLICATION(S): 213 SOLUTION TOPICAL at 05:48

## 2023-02-18 RX ADMIN — CHLORHEXIDINE GLUCONATE 15 MILLILITER(S): 213 SOLUTION TOPICAL at 05:47

## 2023-02-18 RX ADMIN — Medication 2 PUFF(S): at 09:34

## 2023-02-18 RX ADMIN — Medication 2 PUFF(S): at 16:05

## 2023-02-18 RX ADMIN — CHLORHEXIDINE GLUCONATE 15 MILLILITER(S): 213 SOLUTION TOPICAL at 18:18

## 2023-02-18 RX ADMIN — Medication 2 PUFF(S): at 21:53

## 2023-02-18 RX ADMIN — ALBUTEROL 2 PUFF(S): 90 AEROSOL, METERED ORAL at 09:34

## 2023-02-18 RX ADMIN — APIXABAN 5 MILLIGRAM(S): 2.5 TABLET, FILM COATED ORAL at 18:18

## 2023-02-18 RX ADMIN — Medication 100 GRAM(S): at 11:23

## 2023-02-18 RX ADMIN — Medication 650 MILLIGRAM(S): at 00:58

## 2023-02-18 RX ADMIN — DEXMEDETOMIDINE HYDROCHLORIDE IN 0.9% SODIUM CHLORIDE 11.6 MICROGRAM(S)/KG/HR: 4 INJECTION INTRAVENOUS at 03:44

## 2023-02-18 RX ADMIN — Medication 650 MILLIGRAM(S): at 00:28

## 2023-02-18 RX ADMIN — ALBUTEROL 2 PUFF(S): 90 AEROSOL, METERED ORAL at 04:12

## 2023-02-18 RX ADMIN — Medication 40 MILLIGRAM(S): at 05:48

## 2023-02-18 RX ADMIN — ALBUTEROL 2 PUFF(S): 90 AEROSOL, METERED ORAL at 21:52

## 2023-02-18 RX ADMIN — PROPOFOL 5.25 MICROGRAM(S)/KG/MIN: 10 INJECTION, EMULSION INTRAVENOUS at 15:09

## 2023-02-18 RX ADMIN — ALBUTEROL 2 PUFF(S): 90 AEROSOL, METERED ORAL at 16:04

## 2023-02-18 RX ADMIN — CEFEPIME 100 MILLIGRAM(S): 1 INJECTION, POWDER, FOR SOLUTION INTRAMUSCULAR; INTRAVENOUS at 15:09

## 2023-02-18 RX ADMIN — CEFEPIME 100 MILLIGRAM(S): 1 INJECTION, POWDER, FOR SOLUTION INTRAMUSCULAR; INTRAVENOUS at 21:12

## 2023-02-18 RX ADMIN — Medication 2 PUFF(S): at 04:12

## 2023-02-18 RX ADMIN — POTASSIUM PHOSPHATE, MONOBASIC POTASSIUM PHOSPHATE, DIBASIC 62.5 MILLIMOLE(S): 236; 224 INJECTION, SOLUTION INTRAVENOUS at 11:22

## 2023-02-18 RX ADMIN — Medication 40 MILLIGRAM(S): at 11:39

## 2023-02-18 RX ADMIN — CEFEPIME 100 MILLIGRAM(S): 1 INJECTION, POWDER, FOR SOLUTION INTRAMUSCULAR; INTRAVENOUS at 05:47

## 2023-02-18 NOTE — DIETITIAN INITIAL EVALUATION ADULT - PERTINENT LABORATORY DATA
02-18    138  |  103  |  20  ----------------------------<  164<H>  4.6   |  29  |  0.76    Ca    8.8      18 Feb 2023 05:00  Phos  2.3     02-18  Mg     1.5     02-18    TPro  6.0  /  Alb  1.8<L>  /  TBili  0.7  /  DBili  x   /  AST  32  /  ALT  56<H>  /  AlkPhos  97  02-18  POCT Blood Glucose.: 126 mg/dL (02-17-23 @ 23:10)

## 2023-02-18 NOTE — DIETITIAN INITIAL EVALUATION ADULT - NS FNS DIET ORDER
Diet, NPO with Tube Feed:   Tube Feeding Modality: Nasogastric  Jevity 1.5 Rahul  Total Volume for 24 Hours (mL): 1200  Continuous  Starting Tube Feed Rate {mL per Hour}: 30  Increase Tube Feed Rate by (mL): 5     Every 6 hours  Until Goal Tube Feed Rate (mL per Hour): 50  Tube Feed Duration (in Hours): 24  Tube Feed Start Time: 15:45  Free Water Flush  Bolus   Total Volume per Flush (mL): 200   Frequency: Every 8 Hours   Total Daily Volume of Flush (mL): 600    Start Time: 13:30 (02-18-23 @ 13:42)

## 2023-02-18 NOTE — DIETITIAN INITIAL EVALUATION ADULT - NSFNSGIIOFT_GEN_A_CORE
02-17-23 @ 07:01  -  02-18-23 @ 07:00  --------------------------------------------------------  OUT:    Chest Tube (mL): 1650 mL  Total OUT: 1650 mL    Total NET: -1650 mL

## 2023-02-18 NOTE — DIETITIAN NUTRITION RISK NOTIFICATION - TREATMENT: THE FOLLOWING DIET HAS BEEN RECOMMENDED
Diet, NPO with Tube Feed:   Tube Feeding Modality: Nasogastric  Jevity 1.5 Rahul  Total Volume for 24 Hours (mL): 1200  Continuous  Starting Tube Feed Rate {mL per Hour}: 30  Increase Tube Feed Rate by (mL): 5     Every 6 hours  Until Goal Tube Feed Rate (mL per Hour): 50  Tube Feed Duration (in Hours): 24  Tube Feed Start Time: 15:45  Free Water Flush  Bolus   Total Volume per Flush (mL): 200   Frequency: Every 8 Hours   Total Daily Volume of Flush (mL): 600    Start Time: 13:30 (02-18-23 @ 13:42) [Active]

## 2023-02-18 NOTE — DIETITIAN INITIAL EVALUATION ADULT - PERTINENT MEDS FT
MEDICATIONS  (STANDING):  albuterol    90 MICROgram(s) HFA Inhaler 2 Puff(s) Inhalation every 6 hours  apixaban 5 milliGRAM(s) Oral every 12 hours  cefepime   IVPB 2000 milliGRAM(s) IV Intermittent every 8 hours  chlorhexidine 0.12% Liquid 15 milliLiter(s) Oral Mucosa every 12 hours  chlorhexidine 2% Cloths 1 Application(s) Topical <User Schedule>  dexMEDEtomidine Infusion 0.5 MICROgram(s)/kG/Hr (11.6 mL/Hr) IV Continuous <Continuous>  ipratropium 17 MICROgram(s) HFA Inhaler 2 Puff(s) Inhalation every 6 hours  lactated ringers. 1000 milliLiter(s) (125 mL/Hr) IV Continuous <Continuous>  methylPREDNISolone sodium succinate Injectable 40 milliGRAM(s) IV Push every 6 hours  phenylephrine    Infusion 0.1 MICROgram(s)/kG/Min (3.48 mL/Hr) IV Continuous <Continuous>  propofol Infusion 10 MICROgram(s)/kG/Min (5.25 mL/Hr) IV Continuous <Continuous>  simvastatin 40 milliGRAM(s) Oral at bedtime    MEDICATIONS  (PRN):  fentaNYL    Injectable 50 MICROGram(s) IV Push every 4 hours PRN Moderate Pain (4 - 6)

## 2023-02-18 NOTE — DIETITIAN INITIAL EVALUATION ADULT - ADD RECOMMEND
1. Jevity 1.5 @ 75cc/hr x 18 hrs (10a-4a) as able   2. If extubated, swallow evaluation to assess for appropriate texture

## 2023-02-18 NOTE — PROVIDER CONTACT NOTE (EICU) - ACTION/TREATMENT ORDERED:
E-alerted by bedside RN pt for tylenol order for fevers, order placed as requested
Phenylephrine ordered for MAP>65. Further management as per primary team.

## 2023-02-18 NOTE — PROGRESS NOTE ADULT - SUBJECTIVE AND OBJECTIVE BOX
Follow-up Critical Care Progress Note  Chief Complaint : Acute respiratory failure with hypoxia      patient seen and examined  currently on sedation vacation  alert, agitated  started on cpap appears to be tolerating  no secretions           Allergies :No Known Allergies      PAST MEDICAL & SURGICAL HISTORY:  Atrial fibrillation    Hypertension    Urinary retention    No significant past surgical history        Medications:  MEDICATIONS  (STANDING):  albuterol    90 MICROgram(s) HFA Inhaler 2 Puff(s) Inhalation every 6 hours  cefepime   IVPB 2000 milliGRAM(s) IV Intermittent every 8 hours  chlorhexidine 0.12% Liquid 15 milliLiter(s) Oral Mucosa every 12 hours  chlorhexidine 2% Cloths 1 Application(s) Topical <User Schedule>  dexMEDEtomidine Infusion 0.5 MICROgram(s)/kG/Hr (11.6 mL/Hr) IV Continuous <Continuous>  ipratropium 17 MICROgram(s) HFA Inhaler 2 Puff(s) Inhalation every 6 hours  lactated ringers. 1000 milliLiter(s) (125 mL/Hr) IV Continuous <Continuous>  methylPREDNISolone sodium succinate Injectable 40 milliGRAM(s) IV Push every 6 hours  phenylephrine    Infusion 0.1 MICROgram(s)/kG/Min (3.48 mL/Hr) IV Continuous <Continuous>  propofol Infusion 10 MICROgram(s)/kG/Min (5.25 mL/Hr) IV Continuous <Continuous>  simvastatin 40 milliGRAM(s) Oral at bedtime    MEDICATIONS  (PRN):  fentaNYL    Injectable 50 MICROGram(s) IV Push every 4 hours PRN Moderate Pain (4 - 6)      Antibiotics History  cefepime   IVPB 2000 milliGRAM(s) IV Intermittent every 8 hours, 02-17-23 @ 17:05  cefepime   IVPB 2000 milliGRAM(s) IV Intermittent once, 02-17-23 @ 10:24, Stop order after: 1 Doses  vancomycin  IVPB. 1000 milliGRAM(s) IV Intermittent once, 02-17-23 @ 10:24, Stop order after: 1 Doses         COVID  02-17-23 @ 10:50  COVID -   NotDetec      COVID Biomarkers    02-17-23 @ 16:25 ESR --  ---  CRP --  ---  DDimer  --   ---   <H>   ---   Ferritin --            Trend Cardiac Enzymes  02-17-23 @ 14:40  [id number]-CPKMM/Trop I - -- - --  - --  -  --  /  20.5    Trend BNP  02-17-23 @ 10:05   -  1960<H>    Procalcitonin Trend    WBC Trend  02-17-23 @ 10:05   -  37.44<H>    H/H Trend  02-17-23 @ 10:05   -   12.9<L>/ 42.9    Stool Occult Blood    Platelet Trend  02-17-23 @ 10:05   -  318    Trend Sodium  02-17-23 @ 14:40   -  137    Trend Potassium  02-17-23 @ 14:40   -  5.4<H>    Trend Bun/Cr  02-17-23 @ 14:40  BUN/CR -  23 / 0.77    Lactic Acid Trend  02-17-23 @ 10:50   -   2.0    ABG Trend  02-18-23 @ 04:50   - 7.53<H>/36<H>/105/99.0<H>  02-17-23 @ 10:48   - 7.22<L>/81<HH>/164<H>/98.9<H>    Trend AST/ALT/ALK Phos/Bili  02-17-23 @ 14:40   45<H>/71<H>/106/0.6       Albumin Trend  02-17-23 @ 14:40   -   1.5<L>      PTT - PT - INR Trend  02-17-23 @ 10:05   -   33.5 - 18.5<H> - 1.59<H>    Glucose Trend  02-17-23 @ 23:10   -  -- -- 126<H>  02-17-23 @ 14:40   -  -- 104<H> --  02-17-23 @ 10:01   -  -- -- 183<H>        LABS:                        12.9   37.44 )-----------( 318      ( 17 Feb 2023 10:05 )             42.9     02-17    137  |  101  |  23  ----------------------------<  104<H>  5.4<H>   |  34<H>  |  0.77    Ca    9.0      17 Feb 2023 14:40    TPro  6.2  /  Alb  1.5<L>  /  TBili  0.6  /  DBili  x   /  AST  45<H>  /  ALT  71<H>  /  AlkPhos  106  02-17  Lights Criteria :    Trend LDH- Fluid  02-17-23 @ 14:20  162 -- --      Trend LDH - Serum  02-17-23 @ 16:25  318<H> [50 - 242]    _____________________  Trend Protein - Fluid   02-17-23 @ 14:20  3.7  --  --      Trend Protein - Serum  02-17-23 @ 14:40  6.2 [6.0 - 8.3]      _____________________ Exudative  The effusion is exudative if one of the Light’s criteria has been met:   1)  Pleural fluid protein/serum protein ratio > 0.5.   2)  Pleural fluid LDH/serum LDH ratio greater than > 0.6.   3) Pleural fluid LDH level greater than 2/3 of upper limit of normal LDH level in serum.  _____________________             CULTURES: (if applicable)    Culture - Body Fluid with Gram Stain (collected 02-17-23 @ 14:20)  Source: .Body Fluid Other, Pleural Fluid  Gram Stain (02-18-23 @ 02:54):    polymorphonuclear leukocytes seen    No organisms seen    by cytocentrifuge    Culture - Fungal, Body Fluid (collected 02-17-23 @ 14:20)  Source: Pleural Fl Pleural Fluid  Preliminary Report (02-18-23 @ 07:29):    Testing in progress     Am cxr pending      VITALS:  T(C): 37 (02-18-23 @ 05:00), Max: 38.2 (02-17-23 @ 23:00)  T(F): 98.6 (02-18-23 @ 05:00), Max: 100.8 (02-17-23 @ 23:00)  HR: 48 (02-18-23 @ 06:00) (48 - 105)  BP: 86/63 (02-18-23 @ 06:00) (68/58 - 153/80)  BP(mean): 72 (02-18-23 @ 06:00) (60 - 116)  ABP: --  ABP(mean): --  RR: 20 (02-18-23 @ 06:00) (10 - 31)  SpO2: 99% (02-18-23 @ 06:00) (89% - 100%)  CVP(mm Hg): --  CVP(cm H2O): --    Ins and Outs     02-17-23 @ 07:01  -  02-18-23 @ 07:00  --------------------------------------------------------  IN: 4775.8 mL / OUT: 3300 mL / NET: 1475.8 mL        Height (cm): 177.8 (02-17-23 @ 12:00)  Weight (kg): 92.9 (02-17-23 @ 12:00)  BMI (kg/m2): 29.4 (02-17-23 @ 12:00)    Device: Avea, Mode: AC/ CMV (Assist Control/ Continuous Mandatory Ventilation), RR (machine): 20, RR (patient): 20, TV (machine): 480, TV (patient): 440, FiO2: 60, PEEP: 5, ITime: 1, MAP: 14, PIP: 27    I&O's Detail    17 Feb 2023 07:01  -  18 Feb 2023 07:00  --------------------------------------------------------  IN:    Dexmedetomidine: 192.6 mL    Lactated Ringers: 2000 mL    Lactated Ringers Bolus: 500 mL    Phenylephrine: 12.2 mL    Propofol: 71 mL    Sodium Chloride 0.9% Bolus: 2000 mL  Total IN: 4775.8 mL    OUT:    Chest Tube (mL): 1650 mL    Indwelling Catheter - Urethral (mL): 1650 mL  Total OUT: 3300 mL    Total NET: 1475.8 mL

## 2023-02-18 NOTE — DIETITIAN INITIAL EVALUATION ADULT - OTHER INFO
82 year old male with PMH atrial fibrillation, HTN and chronic urinary retention presenting with acute hypercarbic respiratory failure.   Pt remains intubated, sedated. +pressor support. UBW ~211lbs, current weight 203.7lbs. Mild muscle wasting/fat loss observed in orbital/temporal regions. NGT right nare observed. Plan to initiate EN. Current order Jevity 1.5 @ 30 with goal goal of 50cc/hr x 24 hrs to provide 1800kcals, 76g protein, 912ml H2O. Would suggest transition to volume based feeds as able: Jevity 1.5 @ 75cc/hr x 18 hrs (10a -4a) to provide 2,025 kcals, 85g protein, 1,026ml H2O.

## 2023-02-19 LAB
ANION GAP SERPL CALC-SCNC: 5 MMOL/L — SIGNIFICANT CHANGE UP (ref 5–17)
BASE EXCESS BLDA CALC-SCNC: 7.1 MMOL/L — HIGH (ref -2–3)
BASE EXCESS BLDA CALC-SCNC: 7.8 MMOL/L — HIGH (ref -2–3)
BUN SERPL-MCNC: 24 MG/DL — HIGH (ref 7–23)
CALCIUM SERPL-MCNC: 8.8 MG/DL — SIGNIFICANT CHANGE UP (ref 8.4–10.5)
CHLORIDE SERPL-SCNC: 104 MMOL/L — SIGNIFICANT CHANGE UP (ref 96–108)
CO2 BLDA-SCNC: 33 MMOL/L — HIGH (ref 19–24)
CO2 BLDA-SCNC: 34 MMOL/L — HIGH (ref 19–24)
CO2 SERPL-SCNC: 30 MMOL/L — SIGNIFICANT CHANGE UP (ref 22–31)
CREAT SERPL-MCNC: 0.78 MG/DL — SIGNIFICANT CHANGE UP (ref 0.5–1.3)
EGFR: 88 ML/MIN/1.73M2 — SIGNIFICANT CHANGE UP
GAS PNL BLDA: SIGNIFICANT CHANGE UP
GAS PNL BLDA: SIGNIFICANT CHANGE UP
GLUCOSE SERPL-MCNC: 191 MG/DL — HIGH (ref 70–99)
HCO3 BLDA-SCNC: 31 MMOL/L — HIGH (ref 21–28)
HCO3 BLDA-SCNC: 32 MMOL/L — HIGH (ref 21–28)
HCT VFR BLD CALC: 36.4 % — LOW (ref 39–50)
HGB BLD-MCNC: 11.9 G/DL — LOW (ref 13–17)
HOROWITZ INDEX BLDA+IHG-RTO: 40 — SIGNIFICANT CHANGE UP
HOROWITZ INDEX BLDA+IHG-RTO: 40 — SIGNIFICANT CHANGE UP
MAGNESIUM SERPL-MCNC: 1.7 MG/DL — SIGNIFICANT CHANGE UP (ref 1.6–2.6)
MCHC RBC-ENTMCNC: 28.1 PG — SIGNIFICANT CHANGE UP (ref 27–34)
MCHC RBC-ENTMCNC: 32.7 GM/DL — SIGNIFICANT CHANGE UP (ref 32–36)
MCV RBC AUTO: 85.8 FL — SIGNIFICANT CHANGE UP (ref 80–100)
NRBC # BLD: 0 /100 WBCS — SIGNIFICANT CHANGE UP (ref 0–0)
PCO2 BLDA: 42 MMHG — SIGNIFICANT CHANGE UP (ref 35–48)
PCO2 BLDA: 56 MMHG — HIGH (ref 35–48)
PH BLDA: 7.37 — SIGNIFICANT CHANGE UP (ref 7.35–7.45)
PH BLDA: 7.48 — HIGH (ref 7.35–7.45)
PHOSPHATE SERPL-MCNC: 2.7 MG/DL — SIGNIFICANT CHANGE UP (ref 2.5–4.5)
PLATELET # BLD AUTO: 281 K/UL — SIGNIFICANT CHANGE UP (ref 150–400)
PO2 BLDA: 106 MMHG — SIGNIFICANT CHANGE UP (ref 83–108)
PO2 BLDA: 87 MMHG — SIGNIFICANT CHANGE UP (ref 83–108)
POTASSIUM SERPL-MCNC: 4.1 MMOL/L — SIGNIFICANT CHANGE UP (ref 3.5–5.3)
POTASSIUM SERPL-SCNC: 4.1 MMOL/L — SIGNIFICANT CHANGE UP (ref 3.5–5.3)
RBC # BLD: 4.24 M/UL — SIGNIFICANT CHANGE UP (ref 4.2–5.8)
RBC # FLD: 18 % — HIGH (ref 10.3–14.5)
SAO2 % BLDA: 97.7 % — SIGNIFICANT CHANGE UP (ref 94–98)
SAO2 % BLDA: 98.2 % — HIGH (ref 94–98)
SODIUM SERPL-SCNC: 139 MMOL/L — SIGNIFICANT CHANGE UP (ref 135–145)
WBC # BLD: 25.25 K/UL — HIGH (ref 3.8–10.5)
WBC # FLD AUTO: 25.25 K/UL — HIGH (ref 3.8–10.5)

## 2023-02-19 PROCEDURE — 99222 1ST HOSP IP/OBS MODERATE 55: CPT

## 2023-02-19 RX ORDER — IPRATROPIUM/ALBUTEROL SULFATE 18-103MCG
3 AEROSOL WITH ADAPTER (GRAM) INHALATION EVERY 6 HOURS
Refills: 0 | Status: DISCONTINUED | OUTPATIENT
Start: 2023-02-19 | End: 2023-02-24

## 2023-02-19 RX ADMIN — CEFEPIME 100 MILLIGRAM(S): 1 INJECTION, POWDER, FOR SOLUTION INTRAMUSCULAR; INTRAVENOUS at 15:01

## 2023-02-19 RX ADMIN — FENTANYL CITRATE 50 MICROGRAM(S): 50 INJECTION INTRAVENOUS at 09:44

## 2023-02-19 RX ADMIN — Medication 3 MILLILITER(S): at 21:09

## 2023-02-19 RX ADMIN — CEFEPIME 100 MILLIGRAM(S): 1 INJECTION, POWDER, FOR SOLUTION INTRAMUSCULAR; INTRAVENOUS at 05:13

## 2023-02-19 RX ADMIN — Medication 2 PUFF(S): at 05:04

## 2023-02-19 RX ADMIN — Medication 40 MILLIGRAM(S): at 17:52

## 2023-02-19 RX ADMIN — Medication 40 MILLIGRAM(S): at 05:14

## 2023-02-19 RX ADMIN — FENTANYL CITRATE 50 MICROGRAM(S): 50 INJECTION INTRAVENOUS at 09:31

## 2023-02-19 RX ADMIN — CHLORHEXIDINE GLUCONATE 1 APPLICATION(S): 213 SOLUTION TOPICAL at 05:14

## 2023-02-19 RX ADMIN — ALBUTEROL 2 PUFF(S): 90 AEROSOL, METERED ORAL at 05:04

## 2023-02-19 RX ADMIN — Medication 2 PUFF(S): at 09:13

## 2023-02-19 RX ADMIN — SIMVASTATIN 40 MILLIGRAM(S): 20 TABLET, FILM COATED ORAL at 21:07

## 2023-02-19 RX ADMIN — APIXABAN 5 MILLIGRAM(S): 2.5 TABLET, FILM COATED ORAL at 17:50

## 2023-02-19 RX ADMIN — CEFEPIME 100 MILLIGRAM(S): 1 INJECTION, POWDER, FOR SOLUTION INTRAMUSCULAR; INTRAVENOUS at 21:06

## 2023-02-19 RX ADMIN — Medication 40 MILLIGRAM(S): at 00:54

## 2023-02-19 RX ADMIN — PROPOFOL 5.25 MICROGRAM(S)/KG/MIN: 10 INJECTION, EMULSION INTRAVENOUS at 00:51

## 2023-02-19 RX ADMIN — Medication 40 MILLIGRAM(S): at 11:20

## 2023-02-19 RX ADMIN — ALBUTEROL 2 PUFF(S): 90 AEROSOL, METERED ORAL at 09:14

## 2023-02-19 RX ADMIN — PROPOFOL 5.25 MICROGRAM(S)/KG/MIN: 10 INJECTION, EMULSION INTRAVENOUS at 07:58

## 2023-02-19 RX ADMIN — Medication 3 MILLILITER(S): at 16:34

## 2023-02-19 RX ADMIN — DEXMEDETOMIDINE HYDROCHLORIDE IN 0.9% SODIUM CHLORIDE 11.6 MICROGRAM(S)/KG/HR: 4 INJECTION INTRAVENOUS at 08:51

## 2023-02-19 RX ADMIN — SODIUM CHLORIDE 125 MILLILITER(S): 9 INJECTION, SOLUTION INTRAVENOUS at 08:51

## 2023-02-19 RX ADMIN — CHLORHEXIDINE GLUCONATE 15 MILLILITER(S): 213 SOLUTION TOPICAL at 05:13

## 2023-02-19 RX ADMIN — APIXABAN 5 MILLIGRAM(S): 2.5 TABLET, FILM COATED ORAL at 05:13

## 2023-02-19 NOTE — CONSULT NOTE ADULT - SUBJECTIVE AND OBJECTIVE BOX
Intensivist note:    History of Present Illness:  Reason for Admission: Change in mental status  History of Present Illness:   82 year old male with PMH atrial fibrillation, HTN and chronic urinary retention who presented today to [facility] from home.  Per family patient has been progressively SOB over the past few days and was last in his usual state of health last night.  This morning patient wife thought patient had "overslept," when she went to check on him found him agonally breathing and activated EMS.  Intubated in the field and brought to ED.  No reported fever, chills, chest pain, weakness, dizziness, nausea, or vomiting.    Of note, patient recently found to have supraclavicular lymph node enlargement with associated leukocytosis, is being evaluated for lymphoma.  Patient also recently admitted to [facility] where he was noted to have large pleural effusion that was drained.  According to family lymph node biopsy was inconclusive, had thoracentesis which revealed no infection or malignancy.  Since discharge from [facility] has been oxygen dependant at home which is also a new development.    Patient currently intubated and sedated, cannot elicit further history from patient.        patient examined in CCU Bed#7    on rebreathing facemask and enteral feeds    somewhat responsive to commands    vitals stable    Heent-sclera anicteric    abdomen-benign        no cervical, supracalvicular, axillary or inguinal lymph nodes palpable    labs:    wbc 25  h/h  11/36  plt  281    k+ 4.1    creat  0.78    imaging(i personally reviewed):    < from: CT Angio Abd Aorta w/run-off w/ IV Cont (02.17.23 @ 10:36) >    IMPRESSION:  No evidence of aortic dissection or any other posttraumatic abnormality.  Mediastinal and hilar lymphadenopathy, right pleural effusion, possible   right upper lobe airspace opacities and evidence of septal lines.    Additional findings as above.      < end of copied text >

## 2023-02-19 NOTE — PROGRESS NOTE ADULT - SUBJECTIVE AND OBJECTIVE BOX
F/U Note:    -Admitted to  ICU with acute hypercapneic resp. failure, required intubation and full vent support. In setting of recently found/diagnosed supraclavicular lymph node and frequent large volume right sided pleural effusions. Patient has also developed a pressor requirment showing development of septic shock  -has right pigtail in place with ~1000 mL's of straw colored output     Interval Hx:  -extubated today, tolerating well  -still has right sided pigtail in place     Vital Signs Last 24 Hrs  T(C): 36.8 (19 Feb 2023 15:00), Max: 36.8 (19 Feb 2023 15:00)  T(F): 98.3 (19 Feb 2023 15:00), Max: 98.3 (19 Feb 2023 15:00)  HR: 88 (19 Feb 2023 19:00) (49 - 128)  BP: 133/84 (19 Feb 2023 19:00) (85/56 - 181/109)  BP(mean): 100 (19 Feb 2023 19:00) (66 - 130)  RR: 41 (19 Feb 2023 19:00) (17 - 41)  SpO2: 100% (19 Feb 2023 19:00) (83% - 100%)    Parameters below as of 19 Feb 2023 16:35  Patient On (Oxygen Delivery Method): mask, aerosol                                11.9   25.25 )-----------( 281      ( 19 Feb 2023 06:00 )             36.4         02-19    139  |  104  |  24<H>  ----------------------------<  191<H>  4.1   |  30  |  0.78    Ca    8.8      19 Feb 2023 06:00  Phos  2.7     02-19  Mg     1.7     02-19    TPro  6.0  /  Alb  1.8<L>  /  TBili  0.7  /  DBili  x   /  AST  32  /  ALT  56<H>  /  AlkPhos  97  02-18                 General: extubated, awake, protecting airway     HEENT: Pupils equal, reactive to light.  Symmetric.    PULM: diminished at bases bilaterally right more than left, no significant sputum production    CVS: (+) S1/S2, irregularly irregular, no MRG     ABD: Soft, nondistended, nontender, normoactive bowel sounds, no masses    EXT: No edema, nontender    SKIN: Warm and well perfused, no rashes noted..normalexam

## 2023-02-20 LAB
ANION GAP SERPL CALC-SCNC: 4 MMOL/L — LOW (ref 5–17)
BASE EXCESS BLDA CALC-SCNC: -10.3 MMOL/L — LOW (ref -2–3)
BLOOD GAS COMMENTS ARTERIAL: SIGNIFICANT CHANGE UP
BUN SERPL-MCNC: 26 MG/DL — HIGH (ref 7–23)
CALCIUM SERPL-MCNC: 9.2 MG/DL — SIGNIFICANT CHANGE UP (ref 8.4–10.5)
CHLORIDE SERPL-SCNC: 103 MMOL/L — SIGNIFICANT CHANGE UP (ref 96–108)
CO2 BLDA-SCNC: 16 MMOL/L — LOW (ref 19–24)
CO2 SERPL-SCNC: 34 MMOL/L — HIGH (ref 22–31)
CREAT SERPL-MCNC: 0.83 MG/DL — SIGNIFICANT CHANGE UP (ref 0.5–1.3)
EGFR: 87 ML/MIN/1.73M2 — SIGNIFICANT CHANGE UP
GAS PNL BLDA: SIGNIFICANT CHANGE UP
GLUCOSE SERPL-MCNC: 171 MG/DL — HIGH (ref 70–99)
HCO3 BLDA-SCNC: 15 MMOL/L — LOW (ref 21–28)
HCT VFR BLD CALC: 39.1 % — SIGNIFICANT CHANGE UP (ref 39–50)
HGB BLD-MCNC: 12.3 G/DL — LOW (ref 13–17)
HOROWITZ INDEX BLDA+IHG-RTO: 50 — SIGNIFICANT CHANGE UP
MAGNESIUM SERPL-MCNC: 1.9 MG/DL — SIGNIFICANT CHANGE UP (ref 1.6–2.6)
MCHC RBC-ENTMCNC: 28.1 PG — SIGNIFICANT CHANGE UP (ref 27–34)
MCHC RBC-ENTMCNC: 31.5 GM/DL — LOW (ref 32–36)
MCV RBC AUTO: 89.3 FL — SIGNIFICANT CHANGE UP (ref 80–100)
NRBC # BLD: 0 /100 WBCS — SIGNIFICANT CHANGE UP (ref 0–0)
PCO2 BLDA: 26 MMHG — LOW (ref 35–48)
PH BLDA: 7.37 — SIGNIFICANT CHANGE UP (ref 7.35–7.45)
PHOSPHATE SERPL-MCNC: 3.2 MG/DL — SIGNIFICANT CHANGE UP (ref 2.5–4.5)
PLATELET # BLD AUTO: 270 K/UL — SIGNIFICANT CHANGE UP (ref 150–400)
PO2 BLDA: 121 MMHG — HIGH (ref 83–108)
POTASSIUM SERPL-MCNC: 4.3 MMOL/L — SIGNIFICANT CHANGE UP (ref 3.5–5.3)
POTASSIUM SERPL-SCNC: 4.3 MMOL/L — SIGNIFICANT CHANGE UP (ref 3.5–5.3)
RBC # BLD: 4.38 M/UL — SIGNIFICANT CHANGE UP (ref 4.2–5.8)
RBC # FLD: 17.9 % — HIGH (ref 10.3–14.5)
SAO2 % BLDA: 99.5 % — HIGH (ref 94–98)
SODIUM SERPL-SCNC: 141 MMOL/L — SIGNIFICANT CHANGE UP (ref 135–145)
WBC # BLD: 32.4 K/UL — HIGH (ref 3.8–10.5)
WBC # FLD AUTO: 32.4 K/UL — HIGH (ref 3.8–10.5)

## 2023-02-20 PROCEDURE — 99232 SBSQ HOSP IP/OBS MODERATE 35: CPT

## 2023-02-20 PROCEDURE — 71045 X-RAY EXAM CHEST 1 VIEW: CPT | Mod: 26

## 2023-02-20 RX ORDER — LABETALOL HCL 100 MG
10 TABLET ORAL ONCE
Refills: 0 | Status: COMPLETED | OUTPATIENT
Start: 2023-02-20 | End: 2023-02-20

## 2023-02-20 RX ORDER — MORPHINE SULFATE 50 MG/1
4 CAPSULE, EXTENDED RELEASE ORAL ONCE
Refills: 0 | Status: DISCONTINUED | OUTPATIENT
Start: 2023-02-20 | End: 2023-02-20

## 2023-02-20 RX ORDER — ATENOLOL 25 MG/1
75 TABLET ORAL DAILY
Refills: 0 | Status: DISCONTINUED | OUTPATIENT
Start: 2023-02-20 | End: 2023-02-24

## 2023-02-20 RX ORDER — NICARDIPINE HYDROCHLORIDE 30 MG/1
5 CAPSULE, EXTENDED RELEASE ORAL
Qty: 40 | Refills: 0 | Status: DISCONTINUED | OUTPATIENT
Start: 2023-02-20 | End: 2023-02-20

## 2023-02-20 RX ORDER — FUROSEMIDE 40 MG
40 TABLET ORAL ONCE
Refills: 0 | Status: COMPLETED | OUTPATIENT
Start: 2023-02-20 | End: 2023-02-20

## 2023-02-20 RX ADMIN — CEFEPIME 100 MILLIGRAM(S): 1 INJECTION, POWDER, FOR SOLUTION INTRAMUSCULAR; INTRAVENOUS at 05:11

## 2023-02-20 RX ADMIN — NICARDIPINE HYDROCHLORIDE 25 MG/HR: 30 CAPSULE, EXTENDED RELEASE ORAL at 02:55

## 2023-02-20 RX ADMIN — APIXABAN 5 MILLIGRAM(S): 2.5 TABLET, FILM COATED ORAL at 05:03

## 2023-02-20 RX ADMIN — Medication 10 MILLIGRAM(S): at 02:18

## 2023-02-20 RX ADMIN — Medication 3 MILLILITER(S): at 08:44

## 2023-02-20 RX ADMIN — Medication 3 MILLILITER(S): at 03:52

## 2023-02-20 RX ADMIN — Medication 40 MILLIGRAM(S): at 02:18

## 2023-02-20 RX ADMIN — ATENOLOL 75 MILLIGRAM(S): 25 TABLET ORAL at 05:03

## 2023-02-20 RX ADMIN — Medication 40 MILLIGRAM(S): at 17:13

## 2023-02-20 RX ADMIN — CEFEPIME 100 MILLIGRAM(S): 1 INJECTION, POWDER, FOR SOLUTION INTRAMUSCULAR; INTRAVENOUS at 14:44

## 2023-02-20 RX ADMIN — CHLORHEXIDINE GLUCONATE 1 APPLICATION(S): 213 SOLUTION TOPICAL at 05:03

## 2023-02-20 RX ADMIN — CEFEPIME 100 MILLIGRAM(S): 1 INJECTION, POWDER, FOR SOLUTION INTRAMUSCULAR; INTRAVENOUS at 21:39

## 2023-02-20 RX ADMIN — SIMVASTATIN 40 MILLIGRAM(S): 20 TABLET, FILM COATED ORAL at 21:40

## 2023-02-20 RX ADMIN — Medication 3 MILLILITER(S): at 16:12

## 2023-02-20 RX ADMIN — Medication 3 MILLILITER(S): at 22:06

## 2023-02-20 RX ADMIN — Medication 40 MILLIGRAM(S): at 00:05

## 2023-02-20 RX ADMIN — MORPHINE SULFATE 4 MILLIGRAM(S): 50 CAPSULE, EXTENDED RELEASE ORAL at 02:36

## 2023-02-20 RX ADMIN — APIXABAN 5 MILLIGRAM(S): 2.5 TABLET, FILM COATED ORAL at 17:12

## 2023-02-20 RX ADMIN — MORPHINE SULFATE 4 MILLIGRAM(S): 50 CAPSULE, EXTENDED RELEASE ORAL at 02:06

## 2023-02-20 RX ADMIN — Medication 40 MILLIGRAM(S): at 05:04

## 2023-02-20 NOTE — CHART NOTE - NSCHARTNOTEFT_GEN_A_CORE
-called to bedsdie as aptient noted to acutely become SOb, slightly tachy and hypertensive (220/120)    -on eval patient appers to be in moderate resp. distress    -pigtail on right with minimal output    -have given 4 mg IVP morphine, without effect    -also have given 10 mg IVP labetalol with minimal effect on BP    -40 Mg of IVP lasix given as well    -SBP remains >200 and patient is tachypneic, seems likely he has acute pulmoanry edema      -will start BiPAP now, give lasix time to work  -start cardene drip for BP control now    -will titrate IPAP and EPAP to maintain pH >7.30 and SaO2 >92%, as well as improved WOB  -alarms reviewed  -NPO while on BiPAP   -when off BiPAP start patient's home dose atenolol    -if resp. status does not improve with above will re-evaluate for possible re-intubation        CRITICAL CARE TIME SPENT: 35 minutes of critical care time spent providing medical care for patient's acute illness/conditions that impairs at least one vital organ system and/or poses a high risk of imminent or life threatening deterioration in the patient's condition. It includes time spent evaluating and treating the patient's acute illness as well as time spent reviewing labs, radiology, discussing goals of care with patient and/or patient's family, and discussing the case with a multidisciplinary team, including the eICU, in an effort to prevent further life threatening deterioration or end organ damage. This time is independent of any procedures performed. -called to bedsdie as aptient noted to acutely become SOb, slightly tachy and hypertensive (220/120)    -on eval patient appers to be in moderate resp. distress    -pigtail on right with minimal output    -have given 4 mg IVP morphine, without effect    -also have given 10 mg IVP labetalol with minimal effect on BP    -40 Mg of IVP lasix given as well    -SBP remains >200 and patient is tachypneic, seems likely he has acute pulmoanry edema      -will start BiPAP now, give lasix time to work  -start cardene drip for BP control now    -will titrate IPAP and EPAP to maintain pH >7.30 and SaO2 >92%, as well as improved WOB  -alarms reviewed  -NPO while on BiPAP   -when off BiPAP start patient's home dose atenolol  -STAT CXR ordered    -if resp. status does not improve with above will re-evaluate for possible re-intubation        CRITICAL CARE TIME SPENT: 35 minutes of critical care time spent providing medical care for patient's acute illness/conditions that impairs at least one vital organ system and/or poses a high risk of imminent or life threatening deterioration in the patient's condition. It includes time spent evaluating and treating the patient's acute illness as well as time spent reviewing labs, radiology, discussing goals of care with patient and/or patient's family, and discussing the case with a multidisciplinary team, including the eICU, in an effort to prevent further life threatening deterioration or end organ damage. This time is independent of any procedures performed.

## 2023-02-20 NOTE — SWALLOW BEDSIDE ASSESSMENT ADULT - SWALLOW EVAL: DIAGNOSIS
Patient presents with dysphagia in setting of general deconditioning. Discussed with RN who reported immediate cough with thin when administered with her. Patient self fed/adminstered trials of ice chips. thin liquids (cup/straw), puree solids and minced/moist solids. Adequate anterior oral containment with timely mastication with complete bolus manipulation. Declined soft/bite size solid despite encouragement because "it might be too much for me now." Suspect timely AP transport, pharyngeal motor response appreciated via digital palpation. Presented with delayed throat clear following consecutive straw sips- none further when cued for single cup sip. Cued to decrease sip size/rate with thin- patient reports he is "very thirsty". Recommend initiate minced/moist with thin liquids at this time with assist with meals/supervision with close monitoring of VS throughout. Son present and made aware, MD made aware.

## 2023-02-20 NOTE — PROGRESS NOTE ADULT - PROBLEM SELECTOR PLAN 1
Diffuse mediastinal and hilar lymphadenopathy and recurrent right pleural effusion in the setting of acute hypoxic/hypercarbic respiratory failure and superimposed pneumonia.  Patient in ICU, treated for likely septic shock.  Patient extubated over the weekend, events last night noted.  Respiratory function slightly improved this AM.  As discussed on admission, no histologic diagnosis obtained from recent hospitalization at [facility].  Once infectious process under control, oncologic work-up should be resumed to rule out underlying lymphoproliferative disorder.

## 2023-02-20 NOTE — PROGRESS NOTE ADULT - SUBJECTIVE AND OBJECTIVE BOX
Follow-up Critical Care Progress Note  Chief Complaint : Acute respiratory failure with hypoxia    patient seen and examined  overnight required NIV for resp distress  and hypertensive was given nicardapine drip  now off nicardapine drip  feels well  off NIV        Allergies :pertussis vaccines (Rash)  shellfish (Rash)      PAST MEDICAL & SURGICAL HISTORY:  Atrial fibrillation    Hypertension    Urinary retention    No significant past surgical history        Medications:  MEDICATIONS  (STANDING):  albuterol/ipratropium for Nebulization 3 milliLiter(s) Nebulizer every 6 hours  apixaban 5 milliGRAM(s) Oral every 12 hours  atenolol  Tablet 75 milliGRAM(s) Oral daily  cefepime   IVPB 2000 milliGRAM(s) IV Intermittent every 8 hours  chlorhexidine 2% Cloths 1 Application(s) Topical <User Schedule>  methylPREDNISolone sodium succinate Injectable 40 milliGRAM(s) IV Push every 6 hours  niCARdipine Infusion 5 mG/Hr (25 mL/Hr) IV Continuous <Continuous>  simvastatin 40 milliGRAM(s) Oral at bedtime    MEDICATIONS  (PRN):      Antibiotics History  cefepime   IVPB 2000 milliGRAM(s) IV Intermittent every 8 hours, 02-17-23 @ 17:05  cefepime   IVPB 2000 milliGRAM(s) IV Intermittent once, 02-17-23 @ 10:24, Stop order after: 1 Doses  vancomycin  IVPB. 1000 milliGRAM(s) IV Intermittent once, 02-17-23 @ 10:24, Stop order after: 1 Doses      Heme Medications   apixaban 5 milliGRAM(s) Oral every 12 hours, 02-18-23 @ 08:20         Trend Cardiac Enzymes  02-18-23 @ 05:00  LQW-GGHMJ-CNGLX-CPKMM/Trop I - -- - --  - --  -  --  /  14.3  02-17-23 @ 14:40  KUV-JBYRI-BGEAQ-CPKMM/Trop I - -- - --  - --  -  --  /  20.5    Trend BNP  02-18-23 @ 05:00   -  1116<H>  02-17-23 @ 10:05   -  1960<H>    Procalcitonin Trend    WBC Trend  02-20-23 @ 06:15   -  32.40<H>  02-19-23 @ 06:00   -  25.25<H>  02-18-23 @ 05:00   -  20.28<H>  02-17-23 @ 10:05   -  37.44<H>    H/H Trend  02-20-23 @ 06:15   -   12.3<L>/ 39.1  02-19-23 @ 06:00   -   11.9<L>/ 36.4<L>  02-18-23 @ 05:00   -   11.7<L>/ 36.3<L>  02-17-23 @ 10:05   -   12.9<L>/ 42.9    Stool Occult Blood    Platelet Trend  02-20-23 @ 06:15   -  270  02-19-23 @ 06:00   -  281  02-18-23 @ 05:00   -  236  02-17-23 @ 10:05   -  318    Trend Sodium  02-20-23 @ 06:15   -  141  02-19-23 @ 06:00   -  139  02-18-23 @ 05:00   -  138  02-17-23 @ 14:40   -  137    Trend Potassium  02-20-23 @ 06:15   -  4.3  02-19-23 @ 06:00   -  4.1  02-18-23 @ 05:00   -  4.6  02-17-23 @ 14:40   -  5.4<H>    Trend Bun/Cr  02-20-23 @ 06:15  BUN/CR -  26<H> / 0.83  02-19-23 @ 06:00  BUN/CR -  24<H> / 0.78  02-18-23 @ 05:00  BUN/CR -  20 / 0.76  02-17-23 @ 14:40  BUN/CR -  23 / 0.77    Lactic Acid Trend  02-17-23 @ 10:50   -   2.0    ABG Trend  02-20-23 @ 04:35   - 7.37/26<L>/121<H>/99.5<H>  02-19-23 @ 12:20   - 7.48<H>/42/87/97.7  02-19-23 @ 10:05   - 7.37/56<H>/106/98.2<H>  02-18-23 @ 08:35   - 7.29<L>/62<H>/66<L>/90.8<L>  02-18-23 @ 04:50   - 7.53<H>/36/105/99.0<H>  02-17-23 @ 10:48   - 7.22<L>/81<HH>/164<H>/98.9<H>    Trend AST/ALT/ALK Phos/Bili  02-18-23 @ 05:00   32/56<H>/97/0.7  02-17-23 @ 14:40   45<H>/71<H>/106/0.6       Albumin Trend  02-18-23 @ 05:00   -   1.8<L>  02-17-23 @ 14:40   -   1.5<L>      PTT - PT - INR Trend  02-17-23 @ 10:05   -   33.5 - 18.5<H> - 1.59<H>    Glucose Trend  02-20-23 @ 06:15   -  -- 171<H> --  02-19-23 @ 06:00   -  -- 191<H> --  02-18-23 @ 05:00   -  -- 164<H> --  02-17-23 @ 23:10   -  -- -- 126<H>  02-17-23 @ 14:40   -  -- 104<H> --        LABS:                        12.3   32.40 )-----------( 270      ( 20 Feb 2023 06:15 )             39.1     02-20    141  |  103  |  26<H>  ----------------------------<  171<H>  4.3   |  34<H>  |  0.83    Ca    9.2      20 Feb 2023 06:15  Phos  3.2     02-20  Mg     1.9     02-20     CULTURES: (if applicable)    Culture - Body Fluid with Gram Stain (collected 02-17-23 @ 14:20)  Source: .Body Fluid Other, Pleural Fluid  Gram Stain (02-18-23 @ 02:54):    polymorphonuclear leukocytes seen    No organisms seen    by cytocentrifuge  Preliminary Report (02-18-23 @ 21:13):    No growth    Culture - Fungal, Body Fluid (collected 02-17-23 @ 14:20)  Source: Pleural Fl Pleural Fluid  Preliminary Report (02-18-23 @ 07:29):    Testing in progress    Culture - Blood (collected 02-17-23 @ 10:50)  Source: .Blood Blood-Peripheral  Preliminary Report (02-18-23 @ 14:02):    No growth to date.    Culture - Blood (collected 02-17-23 @ 10:50)  Source: .Blood Blood-Peripheral  Preliminary Report (02-18-23 @ 14:02):    No growth to date.       RADIOLOGY  CXR:  increased right base haziness       VITALS:  T(C): 36.2 (02-20-23 @ 05:00), Max: 36.8 (02-19-23 @ 15:00)  T(F): 97.1 (02-20-23 @ 05:00), Max: 98.3 (02-19-23 @ 15:00)  HR: 65 (02-20-23 @ 08:48) (63 - 125)  BP: 115/77 (02-20-23 @ 08:00) (115/77 - 203/126)  BP(mean): 89 (02-20-23 @ 08:00) (74 - 148)  ABP: --  ABP(mean): --  RR: 22 (02-20-23 @ 08:00) (18 - 42)  SpO2: 98% (02-20-23 @ 08:48) (95% - 100%)  CVP(mm Hg): --  CVP(cm H2O): --    Ins and Outs     02-19-23 @ 07:01  -  02-20-23 @ 07:00  --------------------------------------------------------  IN: 1332.8 mL / OUT: 2327 mL / NET: -994.2 mL    02-20-23 @ 07:01  -  02-20-23 @ 10:01  --------------------------------------------------------  IN: 100 mL / OUT: 0 mL / NET: 100 mL        Height (cm): 177.8 (02-17-23 @ 12:00)  Weight (kg): 92.9 (02-17-23 @ 12:00)  BMI (kg/m2): 29.4 (02-17-23 @ 12:00)        I&O's Detail    19 Feb 2023 07:01  -  20 Feb 2023 07:00  --------------------------------------------------------  IN:    Dexmedetomidine: 45.8 mL    Enteral Tube Flush: 200 mL    IV PiggyBack: 150 mL    Jevity: 810 mL    Lactated Ringers: 125 mL    NiCARdipine: 2 mL  Total IN: 1332.8 mL    OUT:    Chest Tube (mL): 282 mL    Indwelling Catheter - Urethral (mL): 2045 mL  Total OUT: 2327 mL    Total NET: -994.2 mL      20 Feb 2023 07:01  -  20 Feb 2023 10:01  --------------------------------------------------------  IN:    Jevity: 100 mL  Total IN: 100 mL    OUT:  Total OUT: 0 mL    Total NET: 100 mL

## 2023-02-20 NOTE — SWALLOW BEDSIDE ASSESSMENT ADULT - SWALLOW EVAL: RECOMMENDED FEEDING/EATING TECHNIQUES
supervision with intake/close VSS monitoring/maintain upright posture during/after eating for 30 mins/no straws/oral hygiene/position upright (90 degrees)/small sips/bites

## 2023-02-20 NOTE — SWALLOW BEDSIDE ASSESSMENT ADULT - COMMENTS
WBC 2/20 32.40  CXR 2/18 MPRESSION: The endotracheal tube is above the austyn. The nasogastric tube courses below the left hemidiaphragm, tip off edge of film. Pigtail catheter right base. Widening of the mediastinum likely related to the adenopathy seen on the prior CT scan. HEART:  Enlarged LUNGS: Interstitial edema, with patchy infiltrates in the right lung base. Similar to prior study BONES: degenerative changes  CT Brain stroke protocol 2/17 IMPRESSION: No hydrocephalus, acute intracranial hemorrhage, mass effect, or brain edema.

## 2023-02-20 NOTE — SWALLOW BEDSIDE ASSESSMENT ADULT - SWALLOW EVAL: RECOMMENDED DIET
Minced and moist solids with thin liquids with assist/supervision with meals, monitoring of VS throughout intake

## 2023-02-20 NOTE — SWALLOW BEDSIDE ASSESSMENT ADULT - SLP PERTINENT HISTORY OF CURRENT PROBLEM
Patient is an 81 y/o male admitted to  ICU with acute hypercapneic resp. failure, required intubation and full vent support. In setting of recently found/diagnosed supraclavicular lymph node and frequent large volume right sided pleural effusions. Patient has also developed a pressor requirment showing development of septic shock Extubated 2/19, tolerating O2 via NC at 3 l/min at this time. Consulted for swallow evaluation for potential initiation of PO diet s/p extubation.

## 2023-02-20 NOTE — SWALLOW BEDSIDE ASSESSMENT ADULT - SLP GENERAL OBSERVATIONS
Patient received bedside alert with NGT in right nostril and NC in left at 3 l/min- VSS pre assessment as follows- HR 73 BPM, Sp02 100%, RR 37. Patient oriented to self, general location and situation only. Reported he had been in the hospital last week "for the same thing." Followed simple commands, pleasant and cooperative throughout. Noted difficulty oriented to feeding with NGT placed- encouraged to continue to orient to the left as NGT placed in right nostril. Slight increase/fluctuation in VS throughout intake- VS postassessment as follows- HR 77 Sp02 97% RR 24- MD made aware of patient's clinical status at this time. Would recommend close monitoring during intake, Patient left at bedside without distress with son present.

## 2023-02-20 NOTE — PROGRESS NOTE ADULT - SUBJECTIVE AND OBJECTIVE BOX
ARIK DUMONT, 82y Male  MRN: [id number]  ATTENDING: Toño Crisostomo    HPI:  82M, PMHx HTN, atrial fibrillation, urinary retention, admitted at [facility] with symptoms of progressive shortness of breath, which rapidly turned into respiratory failure requiring intubation.  Reportedly patient recently evaluated at [facility] for a large pleural effusion, s/p thoracentesis; cytopathology negative for malignancy reportedly.  Patient also had a supraclavicular lymph node biopsy for reportedly lymphadenopathy; pathology inconclusive.  Presently in ICU, mechanically ventilated but weanable.  CT chest consistent with mediastinal and hilar lymphadenopathy, right pleural effusion, possible right upper lobe airspace opacities and evidence of septal lines. No evidence of aortic dissection or any other posttraumatic abnormality.  Oncology consultation requested regarding lymphadenopathy and large pleural effusion.    MEDICATIONS:  albuterol/ipratropium for Nebulization 3 milliLiter(s) Nebulizer every 6 hours  apixaban 5 milliGRAM(s) Oral every 12 hours  atenolol  Tablet 75 milliGRAM(s) Oral daily  cefepime   IVPB 2000 milliGRAM(s) IV Intermittent every 8 hours  chlorhexidine 2% Cloths 1 Application(s) Topical <User Schedule>  methylPREDNISolone sodium succinate Injectable 40 milliGRAM(s) IV Push every 12 hours  simvastatin 40 milliGRAM(s) Oral at bedtime    All other medications reviewed.    SUBJECTIVE:  extubated during the weekend  on BiPAPdue respiratory distress this AM    VITALS:  T(C): 36.2 (02-20-23 @ 05:00), Max: 36.8 (02-19-23 @ 15:00)  T(F): 97.1 (02-20-23 @ 05:00), Max: 98.3 (02-19-23 @ 15:00)  HR: 56 (02-20-23 @ 12:00) (56 - 125)  BP: 123/82 (02-20-23 @ 12:00) (115/77 - 203/126)    PHYSICAL EXAM:  Constitutional: alert, labored breathing  HEENT: normocephalic, anicteric sclerae, no mucositis or thrush  Respiratory: bilateral crackles anterirly  Cardiovascular : S1, S2 regular, rhythmic, no murmurs, gallops or rubs  Abdomen: soft, distended, + normoactive BS, no palpable HS- megaly  Extremities: no tenderness;  -c/c/e, pulses equal bilaterally    LABS:  (02-20) WBC: 32.40 K/uL,Hemoglobin: 12.3 g/dL, Hematocrit: 39.1 %,  Platelet: 270 K/uL  (02-20) Na: 141 mmol/L ; K: 4.3 mmol/L ; BUN: 26 mg/dL ; Cr: 0.83 mg/dL.    RADIOLOGY:    ACC: [id number] EXAM:  CT ANGIO CHEST AORTA WAWIC   ORDERED BY: MO ROGER     ACC: [id number] EXAM:  CT ANGIO ABD AOR W RUN(W)AW IC   ORDERED BY:   MO ROGER     PROCEDURE DATE:  02/17/2023          INTERPRETATION:  CLINICAL INFORMATION: History of fall.    COMPARISON: CT abdomen and pelvis dated 01/28/2021.    CONTRAST/COMPLICATIONS:  IV Contrast: Omnipaque 350  90 cc administered   10 cc discarded  Oral Contrast: NONE  Complications: None reported at time of study completion    PROCEDURE:  CT Angiography of the Chest, Abdomen and Pelvis.  Precontrast imaging was performed through the chest followed by arterial   phase imaging of the chest, abdomen and pelvis.  Sagittal and coronal reformats were performed as well as 3D (MIP)  reconstructions.    FINDINGS:  CHEST:  LUNGS AND LARGE AIRWAYS: Endotracheal tube in satisfactory position.   Evidence of compression atelectasis involving the dependent right lung.   Atelectasis/infiltrate involving the right middle lobe. Nodular opacities   involving the right upper lobe. These may be related to airspace disease.   Subsegmental atelectasis dependent left lower lobe. Evidence of bilateral   septal lines. These may be related to pulmonary edema. Possibility of   lymphangitic carcinomatosis is also considered.  PLEURA: Moderate right pleural effusion.  VESSELS: No intramural hematoma or a dissection flap involving the   thoracic aorta. Unremarkable arch vessels. Atherosclerotic calcification   including the coronary arteries.  HEART: Borderline cardiomegaly. No pericardial effusion.  MEDIASTINUM AND SHANTELLE: Moderate mediastinal and bilateral hilar   lymphadenopathy. As a reference, a right precarinal node measures 3.9 cm   in short axis (3:39).  CHEST WALL AND LOWER NECK: Within normal limits.    ABDOMEN AND PELVIS:  LIVER: 4 cm sized left hepatic cyst.  BILE DUCTS: Normal caliber.  GALLBLADDER: Within normal limits.  SPLEEN: Within normal limits.  PANCREAS: Nonspecific lobulated contour of the tail of the pancreas.  ADRENALS: Within normal limits.  KIDNEYS/URETERS: Relatively smaller right kidney with cortical thinning.   No evidence of hydronephrosis.    BLADDER: Under distended, unremarkable.  REPRODUCTIVE ORGANS: Prostate is enlarged.    BOWEL: No bowel obstruction. Appendix is normal. Uncomplicated sigmoid   diverticula.  PERITONEUM: No ascites.  VESSELS: Atherosclerotic changes.  RETROPERITONEUM/LYMPH NODES: No lymphadenopathy.  ABDOMINAL WALL: Within normal limits.  BONES: Degenerative changes.    IMPRESSION:  No evidence of aortic dissection or any other posttraumatic abnormality.  Mediastinal and hilar lymphadenopathy, right pleural effusion, possible   right upper lobe airspace opacities and evidence of septal lines.    Additional findings as above.

## 2023-02-21 LAB
ALBUMIN SERPL ELPH-MCNC: 1.8 G/DL — LOW (ref 3.3–5)
ALP SERPL-CCNC: 98 U/L — SIGNIFICANT CHANGE UP (ref 40–120)
ALT FLD-CCNC: 72 U/L — HIGH (ref 10–45)
ANION GAP SERPL CALC-SCNC: 4 MMOL/L — LOW (ref 5–17)
AST SERPL-CCNC: 25 U/L — SIGNIFICANT CHANGE UP (ref 10–40)
BASE EXCESS BLDA CALC-SCNC: 16.3 MMOL/L — HIGH (ref -2–3)
BASOPHILS # BLD AUTO: 0 K/UL — SIGNIFICANT CHANGE UP (ref 0–0.2)
BASOPHILS NFR BLD AUTO: 0 % — SIGNIFICANT CHANGE UP (ref 0–2)
BILIRUB SERPL-MCNC: 0.4 MG/DL — SIGNIFICANT CHANGE UP (ref 0.2–1.2)
BUN SERPL-MCNC: 29 MG/DL — HIGH (ref 7–23)
CALCIUM SERPL-MCNC: 8.9 MG/DL — SIGNIFICANT CHANGE UP (ref 8.4–10.5)
CHLORIDE SERPL-SCNC: 104 MMOL/L — SIGNIFICANT CHANGE UP (ref 96–108)
CO2 BLDA-SCNC: 41 MMOL/L — HIGH (ref 19–24)
CO2 SERPL-SCNC: 35 MMOL/L — HIGH (ref 22–31)
CREAT SERPL-MCNC: 0.84 MG/DL — SIGNIFICANT CHANGE UP (ref 0.5–1.3)
CRP SERPL-MCNC: 28 MG/L — HIGH
EGFR: 87 ML/MIN/1.73M2 — SIGNIFICANT CHANGE UP
EOSINOPHIL # BLD AUTO: 0 K/UL — SIGNIFICANT CHANGE UP (ref 0–0.5)
EOSINOPHIL NFR BLD AUTO: 0 % — SIGNIFICANT CHANGE UP (ref 0–6)
ERYTHROCYTE [SEDIMENTATION RATE] IN BLOOD: 53 MM/HR — HIGH (ref 0–20)
GAS PNL BLDA: SIGNIFICANT CHANGE UP
GLUCOSE SERPL-MCNC: 133 MG/DL — HIGH (ref 70–99)
HCO3 BLDA-SCNC: 40 MMOL/L — HIGH (ref 21–28)
HCT VFR BLD CALC: 37.2 % — LOW (ref 39–50)
HGB BLD-MCNC: 11.6 G/DL — LOW (ref 13–17)
HOROWITZ INDEX BLDA+IHG-RTO: 28 — SIGNIFICANT CHANGE UP
LG PLATELETS BLD QL AUTO: SLIGHT — SIGNIFICANT CHANGE UP
LYMPHOCYTES # BLD AUTO: 1.33 K/UL — SIGNIFICANT CHANGE UP (ref 1–3.3)
LYMPHOCYTES # BLD AUTO: 5 % — LOW (ref 13–44)
MAGNESIUM SERPL-MCNC: 2 MG/DL — SIGNIFICANT CHANGE UP (ref 1.6–2.6)
MANUAL SMEAR VERIFICATION: SIGNIFICANT CHANGE UP
MCHC RBC-ENTMCNC: 28.2 PG — SIGNIFICANT CHANGE UP (ref 27–34)
MCHC RBC-ENTMCNC: 31.2 GM/DL — LOW (ref 32–36)
MCV RBC AUTO: 90.5 FL — SIGNIFICANT CHANGE UP (ref 80–100)
MONOCYTES # BLD AUTO: 1.33 K/UL — HIGH (ref 0–0.9)
MONOCYTES NFR BLD AUTO: 5 % — SIGNIFICANT CHANGE UP (ref 2–14)
NEUTROPHILS # BLD AUTO: 23.61 K/UL — HIGH (ref 1.8–7.4)
NEUTROPHILS NFR BLD AUTO: 89 % — HIGH (ref 43–77)
NRBC # BLD: 0 /100 — SIGNIFICANT CHANGE UP (ref 0–0)
PCO2 BLDA: 52 MMHG — HIGH (ref 35–48)
PH BLDA: 7.49 — HIGH (ref 7.35–7.45)
PHOSPHATE SERPL-MCNC: 2.6 MG/DL — SIGNIFICANT CHANGE UP (ref 2.5–4.5)
PLAT MORPH BLD: NORMAL — SIGNIFICANT CHANGE UP
PLATELET # BLD AUTO: 217 K/UL — SIGNIFICANT CHANGE UP (ref 150–400)
PO2 BLDA: 98 MMHG — SIGNIFICANT CHANGE UP (ref 83–108)
POTASSIUM SERPL-MCNC: 4.5 MMOL/L — SIGNIFICANT CHANGE UP (ref 3.5–5.3)
POTASSIUM SERPL-SCNC: 4.5 MMOL/L — SIGNIFICANT CHANGE UP (ref 3.5–5.3)
PROT SERPL-MCNC: 6.3 G/DL — SIGNIFICANT CHANGE UP (ref 6–8.3)
RBC # BLD: 4.11 M/UL — LOW (ref 4.2–5.8)
RBC # FLD: 17.9 % — HIGH (ref 10.3–14.5)
RBC BLD AUTO: SIGNIFICANT CHANGE UP
SAO2 % BLDA: 99.2 % — HIGH (ref 94–98)
SODIUM SERPL-SCNC: 143 MMOL/L — SIGNIFICANT CHANGE UP (ref 135–145)
VARIANT LYMPHS # BLD: 1 % — SIGNIFICANT CHANGE UP (ref 0–6)
WBC # BLD: 26.53 K/UL — HIGH (ref 3.8–10.5)
WBC # FLD AUTO: 26.53 K/UL — HIGH (ref 3.8–10.5)

## 2023-02-21 PROCEDURE — 88112 CYTOPATH CELL ENHANCE TECH: CPT | Mod: 26

## 2023-02-21 PROCEDURE — 99232 SBSQ HOSP IP/OBS MODERATE 35: CPT

## 2023-02-21 PROCEDURE — 71045 X-RAY EXAM CHEST 1 VIEW: CPT | Mod: 26

## 2023-02-21 PROCEDURE — 88305 TISSUE EXAM BY PATHOLOGIST: CPT | Mod: 26

## 2023-02-21 RX ORDER — LANOLIN ALCOHOL/MO/W.PET/CERES
6 CREAM (GRAM) TOPICAL AT BEDTIME
Refills: 0 | Status: DISCONTINUED | OUTPATIENT
Start: 2023-02-21 | End: 2023-02-24

## 2023-02-21 RX ORDER — TAMSULOSIN HYDROCHLORIDE 0.4 MG/1
0.4 CAPSULE ORAL AT BEDTIME
Refills: 0 | Status: DISCONTINUED | OUTPATIENT
Start: 2023-02-21 | End: 2023-02-24

## 2023-02-21 RX ADMIN — SIMVASTATIN 40 MILLIGRAM(S): 20 TABLET, FILM COATED ORAL at 21:17

## 2023-02-21 RX ADMIN — Medication 40 MILLIGRAM(S): at 05:26

## 2023-02-21 RX ADMIN — TAMSULOSIN HYDROCHLORIDE 0.4 MILLIGRAM(S): 0.4 CAPSULE ORAL at 21:17

## 2023-02-21 RX ADMIN — Medication 6 MILLIGRAM(S): at 21:22

## 2023-02-21 RX ADMIN — Medication 3 MILLILITER(S): at 15:49

## 2023-02-21 RX ADMIN — Medication 3 MILLILITER(S): at 04:33

## 2023-02-21 RX ADMIN — CHLORHEXIDINE GLUCONATE 1 APPLICATION(S): 213 SOLUTION TOPICAL at 05:27

## 2023-02-21 RX ADMIN — APIXABAN 5 MILLIGRAM(S): 2.5 TABLET, FILM COATED ORAL at 17:35

## 2023-02-21 RX ADMIN — Medication 40 MILLIGRAM(S): at 17:35

## 2023-02-21 RX ADMIN — CEFEPIME 100 MILLIGRAM(S): 1 INJECTION, POWDER, FOR SOLUTION INTRAMUSCULAR; INTRAVENOUS at 05:27

## 2023-02-21 RX ADMIN — Medication 3 MILLILITER(S): at 21:20

## 2023-02-21 RX ADMIN — Medication 3 MILLILITER(S): at 09:29

## 2023-02-21 RX ADMIN — CEFEPIME 100 MILLIGRAM(S): 1 INJECTION, POWDER, FOR SOLUTION INTRAMUSCULAR; INTRAVENOUS at 21:17

## 2023-02-21 RX ADMIN — ATENOLOL 75 MILLIGRAM(S): 25 TABLET ORAL at 05:27

## 2023-02-21 RX ADMIN — APIXABAN 5 MILLIGRAM(S): 2.5 TABLET, FILM COATED ORAL at 05:27

## 2023-02-21 RX ADMIN — CEFEPIME 100 MILLIGRAM(S): 1 INJECTION, POWDER, FOR SOLUTION INTRAMUSCULAR; INTRAVENOUS at 14:07

## 2023-02-21 NOTE — PHYSICAL THERAPY INITIAL EVALUATION ADULT - ADDITIONAL COMMENTS
pt lives w/spouse in private house, 10 [address]ira w/rail, 1st floor setup. pt uses rw to ambulate, requires assist w/most ADL's, receives Home PT, has hha but unsure of frequency. pt was recently d/c'd from [facility], has hospital bed,  RW and Home 02 4L

## 2023-02-21 NOTE — CONSULT NOTE ADULT - SUBJECTIVE AND OBJECTIVE BOX
HPI:  82 year old male with PMH atrial fibrillation, HTN and chronic urinary retention who presented today to [facility] from home.  Per family patient has been progressively SOB over the past few days and was last in his usual state of health last night.  This morning patient wife thought patient had "overslept," when she went to check on him found him agonally breathing and activated EMS.  Intubated in the field and brought to ED.  No reported fever, chills, chest pain, weakness, dizziness, nausea, or vomiting.    Of note, patient recently found to have supraclavicular lymph node enlargement with associated leukocytosis, is being evaluated for lymphoma.  Patient also recently admitted to [facility] where he was noted to have large pleural effusion that was drained.  According to family lymph node biopsy was inconclusive, had thoracentesis which revealed no infection or malignancy.  Since discharge from [facility] has been oxygen dependant at home which is also a new development.    Patient had pleural effusion drainage tube placed. Now extubated and OOB to chair. Appears comfortable.    H/o atonic bladder and retention. Managed with self caths at home. He was not cathing himself as often at home recently. He had been managed in the past with a Plummer as well.    He now has plummer back in place and comfortable. Plan for eventual transfer to [facility] to undergo eventual lymph node biopsy.      PAST MEDICAL & SURGICAL HISTORY:  Atrial fibrillation      Hypertension      Urinary retention      No significant past surgical history          REVIEW OF SYSTEMS:    CONSTITUTIONAL:  no fevers or chills this AM  CARDIOVASCULAR: No chest pain  GASTROINTESTINAL: No abdominal or epigastric pain. No nausea, vomiting,  No diarrhea or constipation.         MEDICATIONS  (STANDING):  albuterol/ipratropium for Nebulization 3 milliLiter(s) Nebulizer every 6 hours  apixaban 5 milliGRAM(s) Oral every 12 hours  atenolol  Tablet 75 milliGRAM(s) Oral daily  cefepime   IVPB 2000 milliGRAM(s) IV Intermittent every 8 hours  chlorhexidine 2% Cloths 1 Application(s) Topical <User Schedule>  melatonin 6 milliGRAM(s) Oral at bedtime  methylPREDNISolone sodium succinate Injectable 40 milliGRAM(s) IV Push every 12 hours  simvastatin 40 milliGRAM(s) Oral at bedtime  tamsulosin 0.4 milliGRAM(s) Oral at bedtime    MEDICATIONS  (PRN):      Allergies    pertussis vaccines (Rash)  shellfish (Rash)    SOCIAL HISTORY: No illicit drug use      Vital Signs Last 24 Hrs  T(C): 36.4 (21 Feb 2023 05:00), Max: 36.7 (20 Feb 2023 21:00)  T(F): 97.5 (21 Feb 2023 05:00), Max: 98 (20 Feb 2023 21:00)  HR: 65 (21 Feb 2023 10:14) (56 - 110)  BP: 133/84       PHYSICAL EXAM:    Constitutional: NAD, well-developed  HEENT: EOMI  Abd: Soft, NT/ND  : Bladder NT. Plummer clear.  Neurological: A/O x 3  Psychiatric: Normal mood, normal affect          LABS:                        11.6   26.53 )-----------( 217      ( 21 Feb 2023 06:15 )             37.2     02-21    143  |  104  |  29<H>  ----------------------------<  133<H>  4.5   |  35<H>  |  0.84    Ca    8.9      21 Feb 2023 06:15  Phos  2.6     02-21  Mg     2.0     02-21    TPro  6.3  /  Alb  1.8<L>  /  TBili  0.4  /  DBili  x   /  AST  25  /  ALT  72<H>  /  AlkPhos  98  02-21          Culture - Body Fluid with Gram Stain (collected 02-17-23 @ 14:20)  Source: .Body Fluid Other, Pleural Fluid  Gram Stain (02-18-23 @ 02:54):    polymorphonuclear leukocytes seen    No organisms seen    by cytocentrifuge  Preliminary Report (02-18-23 @ 21:13):    No growth    Culture - Fungal, Body Fluid (collected 02-17-23 @ 14:20)  Source: Pleural Fl Pleural Fluid  Preliminary Report (02-18-23 @ 07:29):    Testing in progress    Culture - Blood (collected 02-17-23 @ 10:50)  Source: .Blood Blood-Peripheral  Preliminary Report (02-18-23 @ 14:02):    No growth to date.    Culture - Blood (collected 02-17-23 @ 10:50)  Source: .Blood Blood-Peripheral  Preliminary Report (02-18-23 @ 14:02):    No growth to date.

## 2023-02-21 NOTE — CONSULT NOTE ADULT - ASSESSMENT
a/p      No palpable adenopathy. Has hilar/mediastinal lymphadenopathy.  I recommend thoracic surgery consultation for necessary definitive diagnosis.    Thank you.    Dr. Tico Santillan  cell#[phone]
Admitted with hypoxia, respiratory failure requiring intubation. Will need eventual biopsy of lymph node at [facility] as percutaneous needle biopsy was inconclusive. Now extubated and improving.    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 On self-caths at home and now with Plummer. Plummer draining well and pt is comfortable. He is not ready to resume self-caths as yet.     I would recommend continuing his Plummer catheter as is draining well and he is tolerating well. There is no reason to attempt a void trial as he will be unable to void adequately due to atonic bladder. There is also no benefit to covering him to straight caths as he is recovering from recent intubation and awaiting lymph node biopsy. He can be managed with plummer until he is ready to resume his self-caths as an outpatient.    - continue Plummer  - ICU care  - follow labs

## 2023-02-21 NOTE — PROGRESS NOTE ADULT - SUBJECTIVE AND OBJECTIVE BOX
ARIK DUMONT, 82y Male  MRN: [id number]  ATTENDING: Toño Crisostomo    HPI:  82M, PMHx HTN, atrial fibrillation, urinary retention, admitted at [facility] with symptoms of progressive shortness of breath, which rapidly turned into respiratory failure requiring intubation.  Reportedly patient recently evaluated at [facility] for a large pleural effusion, s/p thoracentesis; cytopathology negative for malignancy reportedly.  Patient also had a supraclavicular lymph node biopsy for reportedly lymphadenopathy; pathology inconclusive.  Presently in ICU, mechanically ventilated but weanable.  CT chest consistent with mediastinal and hilar lymphadenopathy, right pleural effusion, possible right upper lobe airspace opacities and evidence of septal lines. No evidence of aortic dissection or any other posttraumatic abnormality.  Oncology consultation requested regarding lymphadenopathy and large pleural effusion.    MEDICATIONS:  albuterol/ipratropium for Nebulization 3 milliLiter(s) Nebulizer every 6 hours  apixaban 5 milliGRAM(s) Oral every 12 hours  atenolol  Tablet 75 milliGRAM(s) Oral daily  cefepime   IVPB 2000 milliGRAM(s) IV Intermittent every 8 hours  chlorhexidine 2% Cloths 1 Application(s) Topical <User Schedule>  methylPREDNISolone sodium succinate Injectable 40 milliGRAM(s) IV Push every 12 hours  simvastatin 40 milliGRAM(s) Oral at bedtime    All other medications reviewed.    SUBJECTIVE:  stable clinically; no respiratory issues at rest.  Appetite fair.    VITALS:  T(C): 36.2 (02-20-23 @ 05:00), Max: 36.8 (02-19-23 @ 15:00)  T(F): 97.1 (02-20-23 @ 05:00), Max: 98.3 (02-19-23 @ 15:00)  HR: 56 (02-20-23 @ 12:00) (56 - 125)  BP: 123/82 (02-20-23 @ 12:00) (115/77 - 203/126)    PHYSICAL EXAM:  Constitutional: alert,  breathing pattern unchanged  HEENT: normocephalic, anicteric sclerae, no mucositis or thrush  Respiratory: bilateral crackles anterirly  Cardiovascular : S1, S2 regular, rhythmic, no murmurs, gallops or rubs  Abdomen: soft, distended, + normoactive BS, no palpable HS- megaly  Extremities: no tenderness;  -c/c/e, pulses equal bilaterally    LABS:  (02-20) WBC: 32.40 K/uL,Hemoglobin: 12.3 g/dL, Hematocrit: 39.1 %,  Platelet: 270 K/uL  (02-20) Na: 141 mmol/L ; K: 4.3 mmol/L ; BUN: 26 mg/dL ; Cr: 0.83 mg/dL.    RADIOLOGY:    ACC: [id number] EXAM:  CT ANGIO CHEST AORTA WAWIC   ORDERED BY: MO ROGER     ACC: [id number] EXAM:  CT ANGIO ABD AOR W RUN(W)AW IC   ORDERED BY:   MO ROGER     PROCEDURE DATE:  02/17/2023          INTERPRETATION:  CLINICAL INFORMATION: History of fall.    COMPARISON: CT abdomen and pelvis dated 01/28/2021.    CONTRAST/COMPLICATIONS:  IV Contrast: Omnipaque 350  90 cc administered   10 cc discarded  Oral Contrast: NONE  Complications: None reported at time of study completion    PROCEDURE:  CT Angiography of the Chest, Abdomen and Pelvis.  Precontrast imaging was performed through the chest followed by arterial   phase imaging of the chest, abdomen and pelvis.  Sagittal and coronal reformats were performed as well as 3D (MIP)  reconstructions.    FINDINGS:  CHEST:  LUNGS AND LARGE AIRWAYS: Endotracheal tube in satisfactory position.   Evidence of compression atelectasis involving the dependent right lung.   Atelectasis/infiltrate involving the right middle lobe. Nodular opacities   involving the right upper lobe. These may be related to airspace disease.   Subsegmental atelectasis dependent left lower lobe. Evidence of bilateral   septal lines. These may be related to pulmonary edema. Possibility of   lymphangitic carcinomatosis is also considered.  PLEURA: Moderate right pleural effusion.  VESSELS: No intramural hematoma or a dissection flap involving the   thoracic aorta. Unremarkable arch vessels. Atherosclerotic calcification   including the coronary arteries.  HEART: Borderline cardiomegaly. No pericardial effusion.  MEDIASTINUM AND SHANTELLE: Moderate mediastinal and bilateral hilar   lymphadenopathy. As a reference, a right precarinal node measures 3.9 cm   in short axis (3:39).  CHEST WALL AND LOWER NECK: Within normal limits.    ABDOMEN AND PELVIS:  LIVER: 4 cm sized left hepatic cyst.  BILE DUCTS: Normal caliber.  GALLBLADDER: Within normal limits.  SPLEEN: Within normal limits.  PANCREAS: Nonspecific lobulated contour of the tail of the pancreas.  ADRENALS: Within normal limits.  KIDNEYS/URETERS: Relatively smaller right kidney with cortical thinning.   No evidence of hydronephrosis.    BLADDER: Under distended, unremarkable.  REPRODUCTIVE ORGANS: Prostate is enlarged.    BOWEL: No bowel obstruction. Appendix is normal. Uncomplicated sigmoid   diverticula.  PERITONEUM: No ascites.  VESSELS: Atherosclerotic changes.  RETROPERITONEUM/LYMPH NODES: No lymphadenopathy.  ABDOMINAL WALL: Within normal limits.  BONES: Degenerative changes.    IMPRESSION:  No evidence of aortic dissection or any other posttraumatic abnormality.  Mediastinal and hilar lymphadenopathy, right pleural effusion, possible   right upper lobe airspace opacities and evidence of septal lines.    Additional findings as above.

## 2023-02-21 NOTE — PROGRESS NOTE ADULT - PROBLEM SELECTOR PLAN 1
Diffuse mediastinal and hilar lymphadenopathy and recurrent right pleural effusion in the setting of acute hypoxic/hypercarbic respiratory failure and superimposed pneumonia.   Respiratory status unchanged in the past 24 hours; right chest tube to suction with no significant output.  Continues IV antibiotics.  Plan to stabilize clinically prior to a potential hospital transfer to [facility] for further hematologic investigation including excisional biopsy of mediastinoscopy with VATS Pleurx (Dr. Galvan called on the case), and a bone marrow biopsy for staging purposes, if lymphoma is proven.  In the meantime, continue further slow steroid taper.  Case discussed with  and patient's daughter, Judith.  Family in agreement with plan.

## 2023-02-21 NOTE — PROGRESS NOTE ADULT - SUBJECTIVE AND OBJECTIVE BOX
Follow-up Critical Care Progress Note  Chief Complaint : Acute respiratory failure with hypoxia      patient seen and examined  alert, verbal  mild confusioin  states did not sleep overnight on n/c o2       Allergies :pertussis vaccines (Rash)  shellfish (Rash)      PAST MEDICAL & SURGICAL HISTORY:  Atrial fibrillation    Hypertension    Urinary retention    No significant past surgical history        Medications:  MEDICATIONS  (STANDING):  albuterol/ipratropium for Nebulization 3 milliLiter(s) Nebulizer every 6 hours  apixaban 5 milliGRAM(s) Oral every 12 hours  atenolol  Tablet 75 milliGRAM(s) Oral daily  cefepime   IVPB 2000 milliGRAM(s) IV Intermittent every 8 hours  chlorhexidine 2% Cloths 1 Application(s) Topical <User Schedule>  melatonin 6 milliGRAM(s) Oral at bedtime  methylPREDNISolone sodium succinate Injectable 40 milliGRAM(s) IV Push every 12 hours  simvastatin 40 milliGRAM(s) Oral at bedtime    MEDICATIONS  (PRN):      Antibiotics History  cefepime   IVPB 2000 milliGRAM(s) IV Intermittent once, 02-17-23 @ 10:24, Stop order after: 1 Doses  cefepime   IVPB 2000 milliGRAM(s) IV Intermittent every 8 hours, 02-17-23 @ 17:05  vancomycin  IVPB. 1000 milliGRAM(s) IV Intermittent once, 02-17-23 @ 10:24, Stop order after: 1 Doses      Heme Medications   apixaban 5 milliGRAM(s) Oral every 12 hours, 02-18-23 @ 08:20    Home Medications:  atenolol: 75 milligram(s) orally once a day (17 Feb 2023 13:32)  Eliquis 5 mg oral tablet: 1 tab(s) orally once a day (17 Feb 2023 13:35)  simvastatin 40 mg oral tablet: 1 tab(s) orally once a day (at bedtime) (17 Feb 2023 13:34)  tamsulosin 0.4 mg oral capsule: 1 cap(s) orally once a day (17 Feb 2023 13:32)      COVID  02-17-23 @ 10:50  COVID -   NotDete      COVID Biomarkers    02-21-23 @ 06:15 ESR 53<H>  ---  CRP 28<H>  ---  DDimer  --   ---   LDH --   ---   Ferritin --    02-17-23 @ 16:25 ESR --  ---  CRP --  ---  DDimer  --   ---   <H>   ---   Ferritin --        Trend BNP  02-18-23 @ 05:00   -  1116<H>  02-17-23 @ 10:05   -  1960<H>    Procalcitonin Trend    WBC Trend  02-21-23 @ 06:15   -  26.53<H>  02-20-23 @ 06:15   -  32.40<H>  02-19-23 @ 06:00   -  25.25<H>    H/H Trend  02-21-23 @ 06:15   -   11.6<L>/ 37.2<L>  02-20-23 @ 06:15   -   12.3<L>/ 39.1  02-19-23 @ 06:00   -   11.9<L>/ 36.4<L>  02-18-23 @ 05:00   -   11.7<L>/ 36.3<L>  02-17-23 @ 10:05   -   12.9<L>/ 42.9    Stool Occult Blood    Platelet Trend  02-21-23 @ 06:15   -  217  02-20-23 @ 06:15   -  270  02-19-23 @ 06:00   -  281    Trend Sodium  02-21-23 @ 06:15   -  143  02-20-23 @ 06:15   -  141  02-19-23 @ 06:00   -  139    Trend Potassium  02-21-23 @ 06:15   -  4.5  02-20-23 @ 06:15   -  4.3  02-19-23 @ 06:00   -  4.1    Trend Bun/Cr  02-21-23 @ 06:15  BUN/CR -  29<H> / 0.84  02-20-23 @ 06:15  BUN/CR -  26<H> / 0.83  02-19-23 @ 06:00  BUN/CR -  24<H> / 0.78    Lactic Acid Trend  02-17-23 @ 10:50   -   2.0    ABG Trend  02-21-23 @ 05:43   - 7.49<H>/52<H>/98/99.2<H>  02-20-23 @ 04:35   - 7.37/26<L>/121<H>/99.5<H>  02-19-23 @ 12:20   - 7.48<H>/42/87/97.7  02-19-23 @ 10:05   - 7.37/56<H>/106/98.2<H>  02-18-23 @ 08:35   - 7.29<L>/62<H>/66<L>/90.8<L>  02-18-23 @ 04:50   - 7.53<H>/36/105/99.0<H>  02-17-23 @ 10:48   - 7.22<L>/81<HH>/164<H>/98.9<H>    Trend AST/ALT/ALK Phos/Bili  02-21-23 @ 06:15   25/72<H>/98/0.4  02-18-23 @ 05:00   32/56<H>/97/0.7  02-17-23 @ 14:40   45<H>/71<H>/106/0.6    Albumin Trend  02-21-23 @ 06:15   -   1.8<L>  02-18-23 @ 05:00   -   1.8<L>  02-17-23 @ 14:40   -   1.5<L>      PTT - PT - INR Trend  Lights Criteria :    Trend LDH- Fluid  02-17-23 @ 14:20  162 -- --  Trend LDH - Serum  02-17-23 @ 16:25  318<H> [50 - 242]    _____________________  Trend Protein - Fluid   02-17-23 @ 14:20  3.7  --  --    Trend Protein - Serum  02-21-23 @ 06:15  6.3 [6.0 - 8.3]  02-18-23 @ 05:00  6.0 [6.0 - 8.3]  02-17-23 @ 14:40  6.2 [6.0 - 8.3]    _____________________ Exudate  The effusion is exudative if one of the Light’s criteria has been met:   1)  Pleural fluid protein/serum protein ratio > 0.5.   2)  Pleural fluid LDH/serum LDH ratio greater than > 0.6.   3) Pleural fluid LDH level greater than 2/3 of upper limit of normal LDH level in serum.  _____________________  02-17-23 @ 10:05   -   33.5 - 18.5<H> - 1.59<H>    Glucose Trend  02-21-23 @ 06:15   -  -- 133<H> --  02-20-23 @ 06:15   -  -- 171<H> --  02-19-23 @ 06:00   -  -- 191<H> --        LABS:                        11.6   26.53 )-----------( 217      ( 21 Feb 2023 06:15 )             37.2     02-21    143  |  104  |  29<H>  ----------------------------<  133<H>  4.5   |  35<H>  |  0.84    Ca    8.9      21 Feb 2023 06:15  Phos  2.6     02-21  Mg     2.0     02-21    TPro  6.3  /  Alb  1.8<L>  /  TBili  0.4  /  DBili  x   /  AST  25  /  ALT  72<H>  /  AlkPhos  98  02-21      CULTURES: (if applicable)    Culture - Body Fluid with Gram Stain (collected 02-17-23 @ 14:20)  Source: .Body Fluid Other, Pleural Fluid  Gram Stain (02-18-23 @ 02:54):    polymorphonuclear leukocytes seen    No organisms seen    by cytocentrifuge  Preliminary Report (02-18-23 @ 21:13):    No growth    Culture - Fungal, Body Fluid (collected 02-17-23 @ 14:20)  Source: Pleural Fl Pleural Fluid  Preliminary Report (02-18-23 @ 07:29):    Testing in progress    Culture - Blood (collected 02-17-23 @ 10:50)  Source: .Blood Blood-Peripheral  Preliminary Report (02-18-23 @ 14:02):    No growth to date.    Culture - Blood (collected 02-17-23 @ 10:50)  Source: .Blood Blood-Peripheral  Preliminary Report (02-18-23 @ 14:02):    No growth to date.         RADIOLOGY  CXR:  increasing right effusion          VITALS:  T(C): 36.4 (02-21-23 @ 05:00), Max: 36.7 (02-20-23 @ 21:00)  T(F): 97.5 (02-21-23 @ 05:00), Max: 98 (02-20-23 @ 21:00)  HR: 68 (02-21-23 @ 09:35) (56 - 110)  BP: 156/91 (02-21-23 @ 06:00) (120/68 - 183/104)  BP(mean): 110 (02-21-23 @ 06:00) (84 - 129)  ABP: --  ABP(mean): --  RR: 29 (02-21-23 @ 06:00) (22 - 42)  SpO2: 93% (02-21-23 @ 09:35) (83% - 100%)  CVP(mm Hg): --  CVP(cm H2O): --    Ins and Outs     02-20-23 @ 07:01  -  02-21-23 @ 07:00  --------------------------------------------------------  IN: 900 mL / OUT: 1690 mL / NET: -790 mL                I&O's Detail    20 Feb 2023 07:01  -  21 Feb 2023 07:00  --------------------------------------------------------  IN:    Enteral Tube Flush: 200 mL    IV PiggyBack: 150 mL    Jevity: 550 mL  Total IN: 900 mL    OUT:    Chest Tube (mL): 490 mL    Indwelling Catheter - Urethral (mL): 1200 mL  Total OUT: 1690 mL    Total NET: -790 mL

## 2023-02-21 NOTE — PHYSICAL THERAPY INITIAL EVALUATION ADULT - PERTINENT HX OF CURRENT PROBLEM, REHAB EVAL
82 year old male with PMH atrial fibrillation, HTN and chronic urinary retention who presented today to [facility] from home.  Per family patient has been progressively SOB over the past few days and was last in his usual state of health last night.  This morning patient wife thought patient had "overslept," when she went to check on him found him agonally breathing and activated EMS.  Intubated in the field and brought to ED.  No reported fever, chills, chest pain, weakness, dizziness, nausea, or vomiting.

## 2023-02-21 NOTE — CHART NOTE - NSCHARTNOTEFT_GEN_A_CORE
Nutrition Follow Up Note  Hospital Course (Per Electronic Medical Record):   Source: Medical Record [X] Patient [X] Nursing Staff [X]     Diet: DASH/TLC , Minced Moist , no straws     Patient reports a good appetite , consumed ~ 50% of breakfast meal , requesting foods that are not allowed on current minced moist  consistency explained rational to patient , understands explanation . Patient reports consuming a Regular diet PTA , encouraged low sodium diet due to hx of HTN . (+) chest tube , receiving steroids at present , Interdisciplinary noted reviewed , possible transfer to [facility] . Will follow clinical course .     Current Weight: (2/21) 212.7/96.5kg                          (2/20) 211.4/95.9kg                          (2/18) 203.7/92.4kg     Pertinent Medications: MEDICATIONS  (STANDING):  albuterol/ipratropium for Nebulization 3 milliLiter(s) Nebulizer every 6 hours  apixaban 5 milliGRAM(s) Oral every 12 hours  atenolol  Tablet 75 milliGRAM(s) Oral daily  cefepime   IVPB 2000 milliGRAM(s) IV Intermittent every 8 hours  chlorhexidine 2% Cloths 1 Application(s) Topical <User Schedule>  melatonin 6 milliGRAM(s) Oral at bedtime  methylPREDNISolone sodium succinate Injectable 40 milliGRAM(s) IV Push every 12 hours  simvastatin 40 milliGRAM(s) Oral at bedtime    MEDICATIONS  (PRN):      Pertinent Labs:  02-21 Na143 mmol/L Glu 133 mg/dL<H> K+ 4.5 mmol/L Cr  0.84 mg/dL BUN 29 mg/dL<H> 02-21 Phos 2.6 mg/dL 02-21 Alb 1.8 g/dL<L> 02-17 Chol --    LDL --    HDL --    Trig 67 mg/dL        Skin: intact, ecchymotic    Edema: none     Last BM: (2/18)     Estimated Needs:   [X] No Change since Previous Assessment      Previous Nutrition Diagnosis: Moderate Protein Calorie Malnutrition     Nutrition Diagnosis is [X] Ongoing       New Nutrition Diagnosis: [X] Not Applicable      Interventions:   1. continue current diet , advance consistency as per SLP recommendation      Monitoring & Evaluation: will monitor:  [X] Weights   [X] PO Intake   [X] Follow Up (Per Protocol)  [X] Tolerance to Diet Prescription       RD to follow as per Nutrition protocol  Alida Moss RDN

## 2023-02-21 NOTE — PROGRESS NOTE ADULT - SUBJECTIVE AND OBJECTIVE BOX
F/U Note:    -Admitted to  ICU with acute hypercapneic resp. failure, required intubation and full vent support. In setting of recently found/diagnosed supraclavicular lymph node and frequent large volume right sided pleural effusions. Patient has also developed a pressor requirment showing development of septic shock  -has right pigtail in place with ~1000 mL's of straw colored output     Interval Hx:  -remains extubated, had issue two nights ago where he needed BiPAP  -pigtail had small kink in tubing  -improved now    Vital Signs Last 24 Hrs  T(C): 36.3 (21 Feb 2023 01:10), Max: 36.7 (20 Feb 2023 21:00)  T(F): 97.4 (21 Feb 2023 01:10), Max: 98 (20 Feb 2023 21:00)  HR: 81 (21 Feb 2023 04:00) (56 - 110)  BP: 136/93 (21 Feb 2023 04:00) (115/77 - 183/104)  BP(mean): 106 (21 Feb 2023 04:00) (84 - 129)  RR: 37 (21 Feb 2023 04:00) (22 - 37)  SpO2: 94% (21 Feb 2023 04:00) (83% - 100%)    Parameters below as of 20 Feb 2023 19:00  Patient On (Oxygen Delivery Method): nasal cannula  O2 Flow (L/min): 2                              12.3   32.40 )-----------( 270      ( 20 Feb 2023 06:15 )             39.1         02-20    141  |  103  |  26<H>  ----------------------------<  171<H>  4.3   |  34<H>  |  0.83    Ca    9.2      20 Feb 2023 06:15  Phos  3.2     02-20  Mg     1.9     02-20          NEURO: no headaches, blurry vision, tremors, depression, anxity  CV: no chest pain, palpitations, murmurs, orthopnea  Resp: no shortness of breath, cough, wheeze, sputum production  GI: no stomach pain,nausea, vomitting, flatulence, hematemesis, hematochezia  PV: no swelling of extremities, no hair loss, no coolness to extremities  ENDO: no polydypsia, polyphagia, polyuria, weight loss, night sweats          NEURO: awake and alert  CV: (+) S1/S2, rrr, no mrg  RESP: CTA b/l  GI: soft, non tender

## 2023-02-22 LAB
ALBUMIN SERPL ELPH-MCNC: 1.8 G/DL — LOW (ref 3.3–5)
ALP SERPL-CCNC: 94 U/L — SIGNIFICANT CHANGE UP (ref 40–120)
ALT FLD-CCNC: 98 U/L — HIGH (ref 10–45)
ANION GAP SERPL CALC-SCNC: 4 MMOL/L — LOW (ref 5–17)
AST SERPL-CCNC: 31 U/L — SIGNIFICANT CHANGE UP (ref 10–40)
BILIRUB SERPL-MCNC: 0.4 MG/DL — SIGNIFICANT CHANGE UP (ref 0.2–1.2)
BUN SERPL-MCNC: 32 MG/DL — HIGH (ref 7–23)
CALCIUM SERPL-MCNC: 8.8 MG/DL — SIGNIFICANT CHANGE UP (ref 8.4–10.5)
CHLORIDE SERPL-SCNC: 99 MMOL/L — SIGNIFICANT CHANGE UP (ref 96–108)
CO2 SERPL-SCNC: 34 MMOL/L — HIGH (ref 22–31)
CREAT SERPL-MCNC: 0.72 MG/DL — SIGNIFICANT CHANGE UP (ref 0.5–1.3)
CULTURE RESULTS: SIGNIFICANT CHANGE UP
EGFR: 80 ML/MIN/1.73M2 — SIGNIFICANT CHANGE UP
GLUCOSE SERPL-MCNC: 131 MG/DL — HIGH (ref 70–99)
HCT VFR BLD CALC: 37.5 % — SIGNIFICANT CHANGE UP (ref 34.5–45)
HGB BLD-MCNC: 11.6 G/DL — SIGNIFICANT CHANGE UP (ref 11.5–15.5)
MAGNESIUM SERPL-MCNC: 2 MG/DL — SIGNIFICANT CHANGE UP (ref 1.6–2.6)
MCHC RBC-ENTMCNC: 28.4 PG — SIGNIFICANT CHANGE UP (ref 27–34)
MCHC RBC-ENTMCNC: 30.9 GM/DL — LOW (ref 32–36)
MCV RBC AUTO: 91.7 FL — SIGNIFICANT CHANGE UP (ref 80–100)
NRBC # BLD: 0 /100 WBCS — SIGNIFICANT CHANGE UP (ref 0–0)
PHOSPHATE SERPL-MCNC: 3.3 MG/DL — SIGNIFICANT CHANGE UP (ref 2.5–4.5)
PLATELET # BLD AUTO: 197 K/UL — SIGNIFICANT CHANGE UP (ref 150–400)
POTASSIUM SERPL-MCNC: 4.4 MMOL/L — SIGNIFICANT CHANGE UP (ref 3.5–5.3)
POTASSIUM SERPL-SCNC: 4.4 MMOL/L — SIGNIFICANT CHANGE UP (ref 3.5–5.3)
PROT SERPL-MCNC: 6.2 G/DL — SIGNIFICANT CHANGE UP (ref 6–8.3)
RBC # BLD: 4.09 M/UL — SIGNIFICANT CHANGE UP (ref 3.8–5.2)
RBC # FLD: 17.6 % — HIGH (ref 10.3–14.5)
SODIUM SERPL-SCNC: 137 MMOL/L — SIGNIFICANT CHANGE UP (ref 135–145)
SPECIMEN SOURCE: SIGNIFICANT CHANGE UP
WBC # BLD: 24.13 K/UL — HIGH (ref 3.8–10.5)
WBC # FLD AUTO: 24.13 K/UL — HIGH (ref 3.8–10.5)

## 2023-02-22 PROCEDURE — 71045 X-RAY EXAM CHEST 1 VIEW: CPT | Mod: 26

## 2023-02-22 PROCEDURE — 99232 SBSQ HOSP IP/OBS MODERATE 35: CPT

## 2023-02-22 RX ORDER — APIXABAN 2.5 MG/1
1 TABLET, FILM COATED ORAL
Qty: 0 | Refills: 0 | DISCHARGE

## 2023-02-22 RX ADMIN — Medication 3 MILLILITER(S): at 21:22

## 2023-02-22 RX ADMIN — TAMSULOSIN HYDROCHLORIDE 0.4 MILLIGRAM(S): 0.4 CAPSULE ORAL at 21:51

## 2023-02-22 RX ADMIN — Medication 40 MILLIGRAM(S): at 06:08

## 2023-02-22 RX ADMIN — Medication 3 MILLILITER(S): at 09:32

## 2023-02-22 RX ADMIN — ATENOLOL 75 MILLIGRAM(S): 25 TABLET ORAL at 06:08

## 2023-02-22 RX ADMIN — CEFEPIME 100 MILLIGRAM(S): 1 INJECTION, POWDER, FOR SOLUTION INTRAMUSCULAR; INTRAVENOUS at 16:17

## 2023-02-22 RX ADMIN — Medication 3 MILLILITER(S): at 04:38

## 2023-02-22 RX ADMIN — APIXABAN 5 MILLIGRAM(S): 2.5 TABLET, FILM COATED ORAL at 06:08

## 2023-02-22 RX ADMIN — CEFEPIME 100 MILLIGRAM(S): 1 INJECTION, POWDER, FOR SOLUTION INTRAMUSCULAR; INTRAVENOUS at 21:50

## 2023-02-22 RX ADMIN — CEFEPIME 100 MILLIGRAM(S): 1 INJECTION, POWDER, FOR SOLUTION INTRAMUSCULAR; INTRAVENOUS at 06:07

## 2023-02-22 RX ADMIN — Medication 6 MILLIGRAM(S): at 21:51

## 2023-02-22 RX ADMIN — CHLORHEXIDINE GLUCONATE 1 APPLICATION(S): 213 SOLUTION TOPICAL at 06:07

## 2023-02-22 RX ADMIN — SIMVASTATIN 40 MILLIGRAM(S): 20 TABLET, FILM COATED ORAL at 21:51

## 2023-02-22 RX ADMIN — Medication 3 MILLILITER(S): at 16:11

## 2023-02-22 NOTE — PROGRESS NOTE ADULT - SUBJECTIVE AND OBJECTIVE BOX
ARIK DUMONT, 82y Male  MRN: [id number]  ATTENDING: Toño Crisostomo    HPI:  82M, PMHx HTN, atrial fibrillation, urinary retention, admitted at [facility] with symptoms of progressive shortness of breath, which rapidly turned into respiratory failure requiring intubation.  Reportedly patient recently evaluated at [facility] for a large pleural effusion, s/p thoracentesis; cytopathology negative for malignancy reportedly.  Patient also had a supraclavicular lymph node biopsy for reportedly lymphadenopathy; pathology inconclusive.  Presently in ICU, mechanically ventilated but weanable.  CT chest consistent with mediastinal and hilar lymphadenopathy, right pleural effusion, possible right upper lobe airspace opacities and evidence of septal lines. No evidence of aortic dissection or any other posttraumatic abnormality.  Oncology consultation requested regarding lymphadenopathy and large pleural effusion.    MEDICATIONS:  albuterol/ipratropium for Nebulization 3 milliLiter(s) Nebulizer every 6 hours  apixaban 5 milliGRAM(s) Oral every 12 hours  atenolol  Tablet 75 milliGRAM(s) Oral daily  cefepime   IVPB 2000 milliGRAM(s) IV Intermittent every 8 hours  chlorhexidine 2% Cloths 1 Application(s) Topical <User Schedule>  methylPREDNISolone sodium succinate Injectable 40 milliGRAM(s) IV Push every 12 hours  simvastatin 40 milliGRAM(s) Oral at bedtime    All other medications reviewed.    SUBJECTIVE:  OOB to chair; experiences no respiratory distress at rest.  For possible transfer to telemetry.    VITALS:  T(C): 36.2 (02-20-23 @ 05:00), Max: 36.8 (02-19-23 @ 15:00)  T(F): 97.1 (02-20-23 @ 05:00), Max: 98.3 (02-19-23 @ 15:00)  HR: 56 (02-20-23 @ 12:00) (56 - 125)  BP: 123/82 (02-20-23 @ 12:00) (115/77 - 203/126)    PHYSICAL EXAM:  Constitutional: alert,  breathing pattern unchanged  HEENT: normocephalic, anicteric sclerae, no mucositis or thrush  Respiratory: bilateral crackles anterirly  Cardiovascular : S1, S2 regular, rhythmic, no murmurs, gallops or rubs  Abdomen: soft, distended, + normoactive BS, no palpable HS- megaly  Extremities: no tenderness;  -c/c/e, pulses equal bilaterally    LABS:  (02-20) WBC: 32.40 K/uL,Hemoglobin: 12.3 g/dL, Hematocrit: 39.1 %,  Platelet: 270 K/uL  (02-20) Na: 141 mmol/L ; K: 4.3 mmol/L ; BUN: 26 mg/dL ; Cr: 0.83 mg/dL.    RADIOLOGY:    ACC: [id number] EXAM:  CT ANGIO CHEST AORTA WAWIC   ORDERED BY: MO ROGER     ACC: [id number] EXAM:  CT ANGIO ABD AOR W RUN(W)AW IC   ORDERED BY:   MO ROGER     PROCEDURE DATE:  02/17/2023          INTERPRETATION:  CLINICAL INFORMATION: History of fall.    COMPARISON: CT abdomen and pelvis dated 01/28/2021.    CONTRAST/COMPLICATIONS:  IV Contrast: Omnipaque 350  90 cc administered   10 cc discarded  Oral Contrast: NONE  Complications: None reported at time of study completion    PROCEDURE:  CT Angiography of the Chest, Abdomen and Pelvis.  Precontrast imaging was performed through the chest followed by arterial   phase imaging of the chest, abdomen and pelvis.  Sagittal and coronal reformats were performed as well as 3D (MIP)  reconstructions.    FINDINGS:  CHEST:  LUNGS AND LARGE AIRWAYS: Endotracheal tube in satisfactory position.   Evidence of compression atelectasis involving the dependent right lung.   Atelectasis/infiltrate involving the right middle lobe. Nodular opacities   involving the right upper lobe. These may be related to airspace disease.   Subsegmental atelectasis dependent left lower lobe. Evidence of bilateral   septal lines. These may be related to pulmonary edema. Possibility of   lymphangitic carcinomatosis is also considered.  PLEURA: Moderate right pleural effusion.  VESSELS: No intramural hematoma or a dissection flap involving the   thoracic aorta. Unremarkable arch vessels. Atherosclerotic calcification   including the coronary arteries.  HEART: Borderline cardiomegaly. No pericardial effusion.  MEDIASTINUM AND SHANTELLE: Moderate mediastinal and bilateral hilar   lymphadenopathy. As a reference, a right precarinal node measures 3.9 cm   in short axis (3:39).  CHEST WALL AND LOWER NECK: Within normal limits.    ABDOMEN AND PELVIS:  LIVER: 4 cm sized left hepatic cyst.  BILE DUCTS: Normal caliber.  GALLBLADDER: Within normal limits.  SPLEEN: Within normal limits.  PANCREAS: Nonspecific lobulated contour of the tail of the pancreas.  ADRENALS: Within normal limits.  KIDNEYS/URETERS: Relatively smaller right kidney with cortical thinning.   No evidence of hydronephrosis.    BLADDER: Under distended, unremarkable.  REPRODUCTIVE ORGANS: Prostate is enlarged.    BOWEL: No bowel obstruction. Appendix is normal. Uncomplicated sigmoid   diverticula.  PERITONEUM: No ascites.  VESSELS: Atherosclerotic changes.  RETROPERITONEUM/LYMPH NODES: No lymphadenopathy.  ABDOMINAL WALL: Within normal limits.  BONES: Degenerative changes.    IMPRESSION:  No evidence of aortic dissection or any other posttraumatic abnormality.  Mediastinal and hilar lymphadenopathy, right pleural effusion, possible   right upper lobe airspace opacities and evidence of septal lines.    Additional findings as above.

## 2023-02-22 NOTE — PROGRESS NOTE ADULT - SUBJECTIVE AND OBJECTIVE BOX
Follow-up Critical Care Progress Note  Chief Complaint : Acute respiratory failure with hypoxia    Patient seen and examined  comfortable  no cp, sob, palp, n/v        Allergies :pertussis vaccines (Rash)  shellfish (Rash)      PAST MEDICAL & SURGICAL HISTORY:  Atrial fibrillation  Hypertension  Urinary retention  No significant past surgical history    Medications:  MEDICATIONS  (STANDING):  albuterol/ipratropium for Nebulization 3 milliLiter(s) Nebulizer every 6 hours  apixaban 5 milliGRAM(s) Oral every 12 hours  atenolol  Tablet 75 milliGRAM(s) Oral daily  cefepime   IVPB 2000 milliGRAM(s) IV Intermittent every 8 hours  chlorhexidine 2% Cloths 1 Application(s) Topical <User Schedule>  melatonin 6 milliGRAM(s) Oral at bedtime  predniSONE   Tablet 40 milliGRAM(s) Oral daily  simvastatin 40 milliGRAM(s) Oral at bedtime  tamsulosin 0.4 milliGRAM(s) Oral at bedtime    MEDICATIONS  (PRN):      Antibiotics History  cefepime   IVPB 2000 milliGRAM(s) IV Intermittent every 8 hours, 02-17-23 @ 17:05  cefepime   IVPB 2000 milliGRAM(s) IV Intermittent once, 02-17-23 @ 10:24, Stop order after: 1 Doses  vancomycin  IVPB. 1000 milliGRAM(s) IV Intermittent once, 02-17-23 @ 10:24, Stop order after: 1 Doses      Heme Medications   apixaban 5 milliGRAM(s) Oral every 12 hours, 02-18-23 @ 08:20      GI Medications      COVID  02-17-23 @ 10:50  COVID -   [facility]    COVID Biomarkers    02-21-23 @ 06:15 ESR 53<H>  ---  CRP 28<H>  ---  DDimer  --   ---   LDH --   ---   Ferritin --    02-17-23 @ 16:25 ESR --  ---  CRP --  ---  DDimer  --   ---   <H>   ---   Ferritin --         Trend BNP  02-18-23 @ 05:00   -  1116<H>  02-17-23 @ 10:05   -  1960<H>       WBC Trend  02-22-23 @ 05:00   -  24.13<H>  02-21-23 @ 06:15   -  26.53<H>  02-20-23 @ 06:15   -  32.40<H>    H/H Trend  02-22-23 @ 05:00   -   11.6<L>/ 37.5<L>  02-21-23 @ 06:15   -   11.6<L>/ 37.2<L>  02-20-23 @ 06:15   -   12.3<L>/ 39.1  02-19-23 @ 06:00   -   11.9<L>/ 36.4<L>  02-18-23 @ 05:00   -   11.7<L>/ 36.3<L>  02-17-23 @ 10:05   -   12.9<L>/ 42.9    Stool Occult Blood    Platelet Trend  02-22-23 @ 05:00   -  197  02-21-23 @ 06:15   -  217  02-20-23 @ 06:15   -  270    Trend Sodium  02-22-23 @ 05:00   -  137  02-21-23 @ 06:15   -  143  02-20-23 @ 06:15   -  141    Trend Potassium  02-22-23 @ 05:00   -  4.4  02-21-23 @ 06:15   -  4.5  02-20-23 @ 06:15   -  4.3    Trend Bun/Cr  02-22-23 @ 05:00  BUN/CR -  32<H> / 0.72  02-21-23 @ 06:15  BUN/CR -  29<H> / 0.84  02-20-23 @ 06:15  BUN/CR -  26<H> / 0.83    Lactic Acid Trend  02-17-23 @ 10:50   -   2.0    ABG Trend  02-21-23 @ 05:43   - 7.49<H>/52<H>/98/99.2<H>  02-20-23 @ 04:35   - 7.37/26<L>/121<H>/99.5<H>  02-19-23 @ 12:20   - 7.48<H>/42/87/97.7  02-19-23 @ 10:05   - 7.37/56<H>/106/98.2<H>  02-18-23 @ 08:35   - 7.29<L>/62<H>/66<L>/90.8<L>  02-18-23 @ 04:50   - 7.53<H>/36/105/99.0<H>  02-17-23 @ 10:48   - 7.22<L>/81<HH>/164<H>/98.9<H>    Trend AST/ALT/ALK Phos/Bili  02-22-23 @ 05:00   31/98<H>/94/0.4  02-21-23 @ 06:15   25/72<H>/98/0.4  02-18-23 @ 05:00   32/56<H>/97/0.7  02-17-23 @ 14:40   45<H>/71<H>/106/0.6       Albumin Trend  02-22-23 @ 05:00   -   1.8<L>  02-21-23 @ 06:15   -   1.8<L>  02-18-23 @ 05:00   -   1.8<L>  02-17-23 @ 14:40   -   1.5<L>      PTT - PT - INR Trend  02-17-23 @ 10:05   -   33.5 - 18.5<H> - 1.59<H>    Glucose Trend  02-22-23 @ 05:00   -  -- 131<H> --  02-21-23 @ 06:15   -  -- 133<H> --  02-20-23 @ 06:15   -  -- 171<H> --        LABS:                        11.6   24.13 )-----------( 197      ( 22 Feb 2023 05:00 )             37.5     02-22    137  |  99  |  32<H>  ----------------------------<  131<H>  4.4   |  34<H>  |  0.72    Ca    8.8      22 Feb 2023 05:00  Phos  3.3     02-22  Mg     2.0     02-22    TPro  6.2  /  Alb  1.8<L>  /  TBili  0.4  /  DBili  x   /  AST  31  /  ALT  98<H>  /  AlkPhos  94  02-22         CULTURES: (if applicable)    Culture - Body Fluid with Gram Stain (collected 02-17-23 @ 14:20)  Source: .Body Fluid Other, Pleural Fluid  Gram Stain (02-18-23 @ 02:54):    polymorphonuclear leukocytes seen    No organisms seen    by cytocentrifuge  Preliminary Report (02-18-23 @ 21:13):    No growth    Culture - Fungal, Body Fluid (collected 02-17-23 @ 14:20)  Source: Pleural Fl Pleural Fluid  Preliminary Report (02-18-23 @ 07:29):    Testing in progress    Culture - Blood (collected 02-17-23 @ 10:50)  Source: .Blood Blood-Peripheral  Preliminary Report (02-18-23 @ 14:02):    No growth to date.    Culture - Blood (collected 02-17-23 @ 10:50)  Source: .Blood Blood-Peripheral  Preliminary Report (02-18-23 @ 14:02):    No growth to date.           RADIOLOGY  CXR:    expanded right lung,     VITALS:  T(C): 36.5 (02-22-23 @ 08:00), Max: 36.9 (02-21-23 @ 12:00)  T(F): 97.7 (02-22-23 @ 08:00), Max: 98.5 (02-21-23 @ 12:00)  HR: 59 (02-22-23 @ 09:35) (59 - 102)  BP: 146/90 (02-22-23 @ 09:00) (106/53 - 153/92)  BP(mean): 104 (02-22-23 @ 09:00) (70 - 109)  ABP: --  ABP(mean): --  RR: 24 (02-22-23 @ 09:00) (12 - 42)  SpO2: 91% (02-22-23 @ 09:35) (87% - 100%)  CVP(mm Hg): --  CVP(cm H2O): --    Ins and Outs     02-21-23 @ 07:01  -  02-22-23 @ 07:00  --------------------------------------------------------  IN: 100 mL / OUT: 1735 mL / NET: -1635 mL    02-22-23 @ 07:01  -  02-22-23 @ 11:56  --------------------------------------------------------  IN: 0 mL / OUT: 100 mL / NET: -100 mL                I&O's Detail    21 Feb 2023 07:01  -  22 Feb 2023 07:00  --------------------------------------------------------  IN:    IV PiggyBack: 100 mL  Total IN: 100 mL    OUT:    Chest Tube (mL): 195 mL    Indwelling Catheter - Urethral (mL): 1540 mL  Total OUT: 1735 mL    Total NET: -1635 mL      22 Feb 2023 07:01  -  22 Feb 2023 11:56  --------------------------------------------------------  IN:  Total IN: 0 mL    OUT:    Indwelling Catheter - Urethral (mL): 100 mL  Total OUT: 100 mL    Total NET: -100 mL

## 2023-02-22 NOTE — PROGRESS NOTE ADULT - PROBLEM SELECTOR PLAN 1
Above the diaphragm lymphadenopathy (mediastinal and hilar) described as diffuse together with recurrent right pleural effusion causing respiratory failure requiring mechanical ventilation, is a picture suggestive of a lymphoproliferative disorder.  Patient completing antibiotic treatment for superimposed pneumonia, awaiting transfer to [facility] for mediastinoscopy + VATS Pleurx (case discussed with Dr. Crisostomo and Dr. Galvan).  Patient's daughter, Judith, informed and in agreement with plan.

## 2023-02-23 ENCOUNTER — TRANSCRIPTION ENCOUNTER (OUTPATIENT)
Age: 83
End: 2023-02-23

## 2023-02-23 LAB — SARS-COV-2 RNA SPEC QL NAA+PROBE: SIGNIFICANT CHANGE UP

## 2023-02-23 PROCEDURE — 99232 SBSQ HOSP IP/OBS MODERATE 35: CPT

## 2023-02-23 PROCEDURE — 99233 SBSQ HOSP IP/OBS HIGH 50: CPT

## 2023-02-23 PROCEDURE — 71045 X-RAY EXAM CHEST 1 VIEW: CPT | Mod: 26

## 2023-02-23 RX ADMIN — ATENOLOL 75 MILLIGRAM(S): 25 TABLET ORAL at 05:08

## 2023-02-23 RX ADMIN — Medication 40 MILLIGRAM(S): at 05:08

## 2023-02-23 RX ADMIN — Medication 3 MILLILITER(S): at 21:10

## 2023-02-23 RX ADMIN — TAMSULOSIN HYDROCHLORIDE 0.4 MILLIGRAM(S): 0.4 CAPSULE ORAL at 21:05

## 2023-02-23 RX ADMIN — SIMVASTATIN 40 MILLIGRAM(S): 20 TABLET, FILM COATED ORAL at 21:05

## 2023-02-23 RX ADMIN — Medication 3 MILLILITER(S): at 15:11

## 2023-02-23 RX ADMIN — Medication 6 MILLIGRAM(S): at 21:05

## 2023-02-23 RX ADMIN — Medication 3 MILLILITER(S): at 06:03

## 2023-02-23 RX ADMIN — Medication 3 MILLILITER(S): at 08:27

## 2023-02-23 NOTE — PROGRESS NOTE ADULT - SUBJECTIVE AND OBJECTIVE BOX
ARIK DUMONT, 82y Male  MRN: [id number]  ATTENDING: Toño Crisostomo    HPI:  82M, PMHx HTN, atrial fibrillation, urinary retention, admitted at [facility] with symptoms of progressive shortness of breath, which rapidly turned into respiratory failure requiring intubation.  Reportedly patient recently evaluated at [facility] for a large pleural effusion, s/p thoracentesis; cytopathology negative for malignancy reportedly.  Patient also had a supraclavicular lymph node biopsy for reportedly lymphadenopathy; pathology inconclusive.  Presently in ICU, mechanically ventilated but weanable.  CT chest consistent with mediastinal and hilar lymphadenopathy, right pleural effusion, possible right upper lobe airspace opacities and evidence of septal lines. No evidence of aortic dissection or any other posttraumatic abnormality.  Oncology consultation requested regarding lymphadenopathy and large pleural effusion.    MEDICATIONS:  albuterol/ipratropium for Nebulization 3 milliLiter(s) Nebulizer every 6 hours  apixaban 5 milliGRAM(s) Oral every 12 hours  atenolol  Tablet 75 milliGRAM(s) Oral daily  cefepime   IVPB 2000 milliGRAM(s) IV Intermittent every 8 hours  chlorhexidine 2% Cloths 1 Application(s) Topical <User Schedule>  methylPREDNISolone sodium succinate Injectable 40 milliGRAM(s) IV Push every 12 hours  simvastatin 40 milliGRAM(s) Oral at bedtime    All other medications reviewed.    SUBJECTIVE:  transfered to telemetry; no events over night.  Stable hemodinamically    VITALS:  T(C): 36.2 (02-20-23 @ 05:00), Max: 36.8 (02-19-23 @ 15:00)  T(F): 97.1 (02-20-23 @ 05:00), Max: 98.3 (02-19-23 @ 15:00)  HR: 56 (02-20-23 @ 12:00) (56 - 125)  BP: 123/82 (02-20-23 @ 12:00) (115/77 - 203/126)    PHYSICAL EXAM:  Constitutional: alert,  breathing pattern unchanged  HEENT: normocephalic, anicteric sclerae, no mucositis or thrush  Respiratory: bilateral crackles anterirly  Cardiovascular : S1, S2 regular, rhythmic, no murmurs, gallops or rubs  Abdomen: soft, distended, + normoactive BS, no palpable HS- megaly  Extremities: no tenderness;  -c/c/e, pulses equal bilaterally    LABS:  (02-20) WBC: 32.40 K/uL,Hemoglobin: 12.3 g/dL, Hematocrit: 39.1 %,  Platelet: 270 K/uL  (02-20) Na: 141 mmol/L ; K: 4.3 mmol/L ; BUN: 26 mg/dL ; Cr: 0.83 mg/dL.    RADIOLOGY:    ACC: [id number] EXAM:  CT ANGIO CHEST AORTA WAWIC   ORDERED BY: MO ROGER     ACC: [id number] EXAM:  CT ANGIO ABD AOR W RUN(W)AW IC   ORDERED BY:   MO ROGER     PROCEDURE DATE:  02/17/2023          INTERPRETATION:  CLINICAL INFORMATION: History of fall.    COMPARISON: CT abdomen and pelvis dated 01/28/2021.    CONTRAST/COMPLICATIONS:  IV Contrast: Omnipaque 350  90 cc administered   10 cc discarded  Oral Contrast: NONE  Complications: None reported at time of study completion    PROCEDURE:  CT Angiography of the Chest, Abdomen and Pelvis.  Precontrast imaging was performed through the chest followed by arterial   phase imaging of the chest, abdomen and pelvis.  Sagittal and coronal reformats were performed as well as 3D (MIP)  reconstructions.    FINDINGS:  CHEST:  LUNGS AND LARGE AIRWAYS: Endotracheal tube in satisfactory position.   Evidence of compression atelectasis involving the dependent right lung.   Atelectasis/infiltrate involving the right middle lobe. Nodular opacities   involving the right upper lobe. These may be related to airspace disease.   Subsegmental atelectasis dependent left lower lobe. Evidence of bilateral   septal lines. These may be related to pulmonary edema. Possibility of   lymphangitic carcinomatosis is also considered.  PLEURA: Moderate right pleural effusion.  VESSELS: No intramural hematoma or a dissection flap involving the   thoracic aorta. Unremarkable arch vessels. Atherosclerotic calcification   including the coronary arteries.  HEART: Borderline cardiomegaly. No pericardial effusion.  MEDIASTINUM AND SHANTELLE: Moderate mediastinal and bilateral hilar   lymphadenopathy. As a reference, a right precarinal node measures 3.9 cm   in short axis (3:39).  CHEST WALL AND LOWER NECK: Within normal limits.    ABDOMEN AND PELVIS:  LIVER: 4 cm sized left hepatic cyst.  BILE DUCTS: Normal caliber.  GALLBLADDER: Within normal limits.  SPLEEN: Within normal limits.  PANCREAS: Nonspecific lobulated contour of the tail of the pancreas.  ADRENALS: Within normal limits.  KIDNEYS/URETERS: Relatively smaller right kidney with cortical thinning.   No evidence of hydronephrosis.    BLADDER: Under distended, unremarkable.  REPRODUCTIVE ORGANS: Prostate is enlarged.    BOWEL: No bowel obstruction. Appendix is normal. Uncomplicated sigmoid   diverticula.  PERITONEUM: No ascites.  VESSELS: Atherosclerotic changes.  RETROPERITONEUM/LYMPH NODES: No lymphadenopathy.  ABDOMINAL WALL: Within normal limits.  BONES: Degenerative changes.    IMPRESSION:  No evidence of aortic dissection or any other posttraumatic abnormality.  Mediastinal and hilar lymphadenopathy, right pleural effusion, possible   right upper lobe airspace opacities and evidence of septal lines.    Additional findings as above.

## 2023-02-23 NOTE — PROGRESS NOTE ADULT - PROBLEM SELECTOR PLAN 1
Generalized lymphadenopathy suspicious for lymphoproliferative disorder needs hematologic work-up.  Awaiting transfer to [facility] for mediastinoscopy + VATS Pleurx ( consulted).  Patient to be stabilized for transfer in the next few days.  Downgraded from CCU to telemetry; will continue monitoring CBC while patient completes antibiotic treatment.

## 2023-02-23 NOTE — DISCHARGE NOTE PROVIDER - HOSPITAL COURSE
Hospital Course  HPI:  82 year old male with PMH atrial fibrillation, HTN and chronic urinary retention who presented today to [facility] from home.  Per family patient has been progressively SOB over the past few days and was last in his usual state of health last night.  This morning patient wife thought patient had "overslept," when she went to check on him found him agonally breathing and activated EMS.  Intubated in the field and brought to ED.  No reported fever, chills, chest pain, weakness, dizziness, nausea, or vomiting.    Of note, patient recently found to have supraclavicular lymph node enlargement with associated leukocytosis, is being evaluated for lymphoma. Patient also recently admitted to [facility] where he was noted to have large pleural effusion that was drained.  According to family lymph node biopsy was inconclusive, had thoracentesis which revealed no infection or malignancy.  Since discharge from [facility] has been oxygen dependant at home which is also a new development.    Patient currently intubated and sedated, cannot elicit further history from patient. (17 Feb 2023 18:42)    Patient was admitted to ICU for acute respiratory failure with hypercapnia. Patient was successfully extubated and started on CPAP in ICU.     You were admitted for   You were diagnosed with   You were treated with   You were prescribed the following new medications:    You will need to follow up with your primary care physician.    Source of Infection:  Antibiotic / Last Day:    Palliative Care / Advanced Care Planning  Code Status:  Patient/Family agreeable to Hospice/Palliative (Y/N)?  Summary of Goals of Care Conversation:    Discharging Provider:  SARAVANAN Bailon  Contact Info: [phone] - Please call with any questions or concerns.    Outpatient Provider:     SNF Provider: Hospital Course  HPI:  82 year old male with PMH atrial fibrillation, HTN and chronic urinary retention who presented today to [facility] from home.  Per family patient has been progressively SOB over the past few days and was last in his usual state of health last night.  This morning patient wife thought patient had "overslept," when she went to check on him found him agonally breathing and activated EMS.  Intubated in the field and brought to ED.  No reported fever, chills, chest pain, weakness, dizziness, nausea, or vomiting.    Of note, patient recently found to have supraclavicular lymph node enlargement with associated leukocytosis, is being evaluated for lymphoma. Patient also recently admitted to [facility] where he was noted to have large pleural effusion that was drained.  According to family lymph node biopsy was inconclusive, had thoracentesis which revealed no infection or malignancy.  Since discharge from [facility] has been oxygen dependant at home which is also a new development.    Patient currently intubated and sedated, cannot elicit further history from patient. (17 Feb 2023 18:42)    Patient was admitted to ICU for acute respiratory failure with hypercapnia. CTA chest negative for PE, showed large R pleural effusion with   Patient was successfully extubated and started on CPAP in ICU.     You were admitted for   You were diagnosed with   You were treated with   You were prescribed the following new medications:    You will need to follow up with your primary care physician.    Source of Infection:  Antibiotic / Last Day:    Palliative Care / Advanced Care Planning  Code Status:  Patient/Family agreeable to Hospice/Palliative (Y/N)?  Summary of Goals of Care Conversation:    Discharging Provider:  SARAVANAN Bailon  Contact Info: [phone] - Please call with any questions or concerns.    Outpatient Provider:     SNF Provider: Hospital Course  HPI:  82 year old male with PMH atrial fibrillation, HTN and chronic urinary retention who presented today to [facility] from home.  Per family patient has been progressively SOB over the past few days and was last in his usual state of health last night.  This morning patient wife thought patient had "overslept," when she went to check on him found him agonally breathing and activated EMS.  Intubated in the field and brought to ED.  No reported fever, chills, chest pain, weakness, dizziness, nausea, or vomiting.    Of note, patient recently found to have supraclavicular lymph node enlargement with associated leukocytosis, is being evaluated for lymphoma. Patient also recently admitted to [facility] where he was noted to have large pleural effusion that was drained.  According to family lymph node biopsy was inconclusive, had thoracentesis which revealed no infection or malignancy.  Since discharge from [facility] has been oxygen dependant at home which is also a new development.  Patient currently intubated and sedated, cannot elicit further history from patient. (17 Feb 2023 18:42)    Patient was admitted to ICU for acute respiratory failure with hypercapnia. CTA chest negative for PE, showed large R pleural effusion with mediastinal and hilar lymphadenopathy. A pigtail was placed and drained over 3 L of fluid. Patient was started on empiric antibiotics. Blood cultures 2/17 NGTD, Pleural fluid no growth currently, pending final cytology. With presence of lymphadenopathy and persistent leukocytosis, underlying malignancy is suspected. Hematology/oncology was consulted. Patient has chronic urinary retention, urology was consulted, and a plummer was placed. Family did not want trial of void and agreed with continued plummer use.   Patient was successfully extubated and started on CPAP in ICU. Patient was downgraded to medical floor and was titrated down to nasal canula.    You were admitted for   You were diagnosed with   You were treated with   You were prescribed the following new medications:    You will need to follow up with your primary care physician.    Source of Infection:  Antibiotic / Last Day:    Palliative Care / Advanced Care Planning  Code Status:  Patient/Family agreeable to Hospice/Palliative (Y/N)?  Summary of Goals of Care Conversation:    Discharging Provider:  SARAVANAN Bailon  Contact Info: [phone] - Please call with any questions or concerns.    Outpatient Provider:     SNF Provider: Hospital Course  HPI:  82 year old male with PMH atrial fibrillation, HTN and chronic urinary retention who presented today to [facility] from home.  Per family patient has been progressively SOB over the past few days and was last in his usual state of health last night.  This morning patient wife thought patient had "overslept," when she went to check on him found him agonally breathing and activated EMS.  Intubated in the field and brought to ED.  No reported fever, chills, chest pain, weakness, dizziness, nausea, or vomiting.    Of note, patient recently found to have supraclavicular lymph node enlargement with associated leukocytosis, is being evaluated for lymphoma. Patient also recently admitted to [facility] where he was noted to have large pleural effusion that was drained.  According to family lymph node biopsy was inconclusive, had thoracentesis which revealed no infection or malignancy.  Since discharge from [facility] has been oxygen dependant at home which is also a new development.  Patient currently intubated and sedated, cannot elicit further history from patient. (17 Feb 2023 18:42)    Patient was admitted to ICU for acute respiratory failure with hypercapnia. CTA chest negative for PE, showed large R pleural effusion with mediastinal and hilar lymphadenopathy. A pigtail was placed and drained over 3 L of fluid. Patient was started on empiric antibiotics. Blood cultures 2/17 NGTD, Pleural fluid no growth currently, pending final cytology. With presence of lymphadenopathy and persistent leukocytosis, underlying malignancy is suspected. Hematology/oncology was consulted. Recommended transfer to [facility] for further investigation. Thoracic surgery was consulted, Dr. Galvan is accepting physician at [facility] for patient to have pleurex and mediastinoscopy to evaluate lymph nodes. Patient has chronic urinary retention, urology was consulted, and a plummer was placed. Family did not want trial of void and agreed with continued plummer use.   Patient was successfully extubated and started on CPAP in ICU. Patient was downgraded to medical floor and was titrated down to nasal canula. Physical therapy evaluated patient and MORALES is recommended.     You were admitted for shortness of breath and respiratory failure  You were diagnosed with   You were treated with   You were prescribed the following new medications:    You will need to follow up with your primary care physician.    Source of Infection:  Antibiotic / Last Day:    Palliative Care / Advanced Care Planning  Code Status:  Patient/Family agreeable to Hospice/Palliative (Y/N)?  Summary of Goals of Care Conversation:    Discharging Provider:  SARAVANAN Bailon  Contact Info: [phone] - Please call with any questions or concerns.    Outpatient Provider:     SNF Provider: Hospital Course  HPI:  82 year old male with PMH atrial fibrillation, HTN and chronic urinary retention who presented today to [facility] from home.  Per family patient has been progressively SOB over the past few days and was last in his usual state of health last night.  This morning patient wife thought patient had "overslept," when she went to check on him found him agonally breathing and activated EMS.  Intubated in the field and brought to ED.  No reported fever, chills, chest pain, weakness, dizziness, nausea, or vomiting.    Of note, patient recently found to have supraclavicular lymph node enlargement with associated leukocytosis, is being evaluated for lymphoma. Patient also recently admitted to [facility] where he was noted to have large pleural effusion that was drained.  According to family lymph node biopsy was inconclusive, had thoracentesis which revealed no infection or malignancy.  Since discharge from [facility] has been oxygen dependant at home which is also a new development.  Patient currently intubated and sedated, cannot elicit further history from patient. (17 Feb 2023 18:42)    Patient was admitted to ICU for acute respiratory failure with hypercapnia. CTA chest negative for PE, showed large R pleural effusion with mediastinal and hilar lymphadenopathy. A pigtail was placed and drained over 3 L of fluid. Patient was started on empiric antibiotics. Blood cultures 2/17 NGTD, Pleural fluid no growth currently, pending final cytology. With presence of lymphadenopathy and persistent leukocytosis, underlying malignancy is suspected. Hematology/oncology was consulted. Recommended transfer to [facility] for further investigation. Thoracic surgery was consulted, Dr. Galvan is accepting physician at [facility] for patient to have pleurex and mediastinoscopy to evaluate lymph nodes. Patient has chronic urinary retention, urology was consulted, and a plummer was placed. Family did not want trial of void and agreed with continued plummer use.   Patient was successfully extubated and started on CPAP in ICU. Patient was downgraded to medical floor and was titrated down to nasal canula. Physical therapy evaluated patient and MORALES is recommended.     You were admitted for shortness of breath and respiratory failure  You were diagnosed with right pleural effusion  You were treated with iv antibiotics, steroids, and chest tube  You are being transferred to [facility] for a pleurex and mediastinoscopy    You will need to follow up with your primary care physician.    Discharging Provider:  SARAVANAN Bailon  Contact Info: [phone] - Please call with any questions or concerns.    Accepting Physician: Dr. Galvan

## 2023-02-23 NOTE — DISCHARGE NOTE PROVIDER - NSDCMRMEDTOKEN_GEN_ALL_CORE_FT
atenolol: 75 milligram(s) orally once a day  Eliquis 5 mg oral tablet: 1 tab(s) orally 2 times a day  Lexapro 5 mg oral tablet: 1 tab(s) orally once a day  simvastatin 40 mg oral tablet: 1 tab(s) orally once a day (at bedtime)  tamsulosin 0.4 mg oral capsule: 1 cap(s) orally once a day  Xanax 0.25 mg oral tablet: 1 tab(s) orally once a day, As Needed   atenolol: 75 milligram(s) orally once a day  Eliquis 5 mg oral tablet: 1 tab(s) orally 2 times a day, but CURRENTLY BEING HELD FOR PROCEDURE  enoxaparin: 90 milligram(s) subcutaneous 2 times a day, while off eliquis  ipratropium-albuterol 0.5 mg-2.5 mg/3 mL inhalation solution: 3 milliliter(s) inhaled every 6 hours  Lexapro 5 mg oral tablet: 1 tab(s) orally once a day  melatonin 3 mg oral tablet: 2 tab(s) orally once a day (at bedtime)  predniSONE: 10 milligram(s) orally once a day for 2/26  simvastatin 40 mg oral tablet: 1 tab(s) orally once a day (at bedtime)  tamsulosin 0.4 mg oral capsule: 1 cap(s) orally once a day  Xanax 0.25 mg oral tablet: 1 tab(s) orally once a day, As Needed   atenolol: 75 milligram(s) orally once a day  Eliquis 5 mg oral tablet: 1 tab(s) orally 2 times a day, but CURRENTLY BEING HELD FOR PROCEDURE  enoxaparin: 90 milligram(s) subcutaneous 2 times a day, while off eliquis  ipratropium-albuterol 0.5 mg-2.5 mg/3 mL inhalation solution: 3 milliliter(s) inhaled every 6 hours  Lexapro 5 mg oral tablet: 1 tab(s) orally once a day  melatonin 3 mg oral tablet: 2 tab(s) orally once a day (at bedtime)  predniSONE: 20 milligram(s) orally once a day for 2/25, then 10mg once a day for 2/26, then stop  simvastatin 40 mg oral tablet: 1 tab(s) orally once a day (at bedtime)  tamsulosin 0.4 mg oral capsule: 1 cap(s) orally once a day  Xanax 0.25 mg oral tablet: 1 tab(s) orally once a day, As Needed

## 2023-02-23 NOTE — PROGRESS NOTE ADULT - SUBJECTIVE AND OBJECTIVE BOX
Follow-up Critical Care Progress Note  Chief Complaint : Acute respiratory failure with hypoxia    patients seen and examined   comfortable  on 3 L n/c sat 94%, tapred to 2 L        Allergies :pertussis vaccines (Rash)  shellfish (Rash)      PAST MEDICAL & SURGICAL HISTORY:  Atrial fibrillation    Hypertension    Urinary retention    No significant past surgical history        Medications:  MEDICATIONS  (STANDING):  albuterol/ipratropium for Nebulization 3 milliLiter(s) Nebulizer every 6 hours  atenolol  Tablet 75 milliGRAM(s) Oral daily  chlorhexidine 2% Cloths 1 Application(s) Topical <User Schedule>  melatonin 6 milliGRAM(s) Oral at bedtime  predniSONE   Tablet   Oral   simvastatin 40 milliGRAM(s) Oral at bedtime  tamsulosin 0.4 milliGRAM(s) Oral at bedtime    MEDICATIONS  (PRN):      Antibiotics History  cefepime   IVPB 2000 milliGRAM(s) IV Intermittent every 8 hours, 02-17-23 @ 17:05  cefepime   IVPB 2000 milliGRAM(s) IV Intermittent once, 02-17-23 @ 10:24, Stop order after: 1 Doses  vancomycin  IVPB. 1000 milliGRAM(s) IV Intermittent once, 02-17-23 @ 10:24, Stop order after: 1 Doses       02-21-23 @ 06:15 ESR 53<H>  ---  CRP 28<H>  ---  DDimer  --   ---   LDH --   ---   Ferritin --    02-17-23 @ 16:25 ESR --  ---  CRP --  ---  DDimer  --   ---   <H>   ---   Ferritin --           Trend BNP  02-18-23 @ 05:00   -  1116<H>  02-17-23 @ 10:05   -  1960<H>    Procalcitonin Trend    WBC Trend  02-22-23 @ 05:00   -  24.13<H>  02-21-23 @ 06:15   -  26.53<H>    H/H Trend  02-22-23 @ 05:00   -   11.6<L>/ 37.5<L>  02-21-23 @ 06:15   -   11.6<L>/ 37.2<L>  02-20-23 @ 06:15   -   12.3<L>/ 39.1  02-19-23 @ 06:00   -   11.9<L>/ 36.4<L>  02-18-23 @ 05:00   -   11.7<L>/ 36.3<L>  02-17-23 @ 10:05   -   12.9<L>/ 42.9    Stool Occult Blood    Platelet Trend  02-22-23 @ 05:00   -  197  02-21-23 @ 06:15   -  217    Trend Sodium  02-22-23 @ 05:00   -  137  02-21-23 @ 06:15   -  143    Trend Potassium  02-22-23 @ 05:00   -  4.4  02-21-23 @ 06:15   -  4.5    Trend Bun/Cr  02-22-23 @ 05:00  BUN/CR -  32<H> / 0.72  02-21-23 @ 06:15  BUN/CR -  29<H> / 0.84    Lactic Acid Trend  02-17-23 @ 10:50   -   2.0    ABG Trend  02-21-23 @ 05:43   - 7.49<H>/52<H>/98/99.2<H>  02-20-23 @ 04:35   - 7.37/26<L>/121<H>/99.5<H>  02-19-23 @ 12:20   - 7.48<H>/42/87/97.7  02-19-23 @ 10:05   - 7.37/56<H>/106/98.2<H>  02-18-23 @ 08:35   - 7.29<L>/62<H>/66<L>/90.8<L>  02-18-23 @ 04:50   - 7.53<H>/36/105/99.0<H>  02-17-23 @ 10:48   - 7.22<L>/81<HH>/164<H>/98.9<H>    Trend AST/ALT/ALK Phos/Bili  02-22-23 @ 05:00   31/98<H>/94/0.4  02-21-23 @ 06:15   25/72<H>/98/0.4  02-18-23 @ 05:00   32/56<H>/97/0.7  02-17-23 @ 14:40   45<H>/71<H>/106/0.6       Albumin Trend  02-22-23 @ 05:00   -   1.8<L>  02-21-23 @ 06:15   -   1.8<L>  02-18-23 @ 05:00   -   1.8<L>  02-17-23 @ 14:40   -   1.5<L>      PTT - PT - INR Trend  02-17-23 @ 10:05   -   33.5 - 18.5<H> - 1.59<H>    Glucose Trend  02-22-23 @ 05:00   -  -- 131<H> --  02-21-23 @ 06:15   -  -- 133<H> --        LABS:                        11.6   24.13 )-----------( 197      ( 22 Feb 2023 05:00 )             37.5     02-22    137  |  99  |  32<H>  ----------------------------<  131<H>  4.4   |  34<H>  |  0.72    Ca    8.8      22 Feb 2023 05:00  Phos  3.3     02-22  Mg     2.0     02-22    TPro  6.2  /  Alb  1.8<L>  /  TBili  0.4  /  DBili  x   /  AST  31  /  ALT  98<H>  /  AlkPhos  94  02-22       CULTURES: (if applicable)    Culture - Body Fluid with Gram Stain (collected 02-17-23 @ 14:20)  Source: .Body Fluid Other, Pleural Fluid  Gram Stain (02-18-23 @ 02:54):    polymorphonuclear leukocytes seen    No organisms seen    by cytocentrifuge  Final Report (02-22-23 @ 15:44):    No growth at 5 days    Culture - Fungal, Body Fluid (collected 02-17-23 @ 14:20)  Source: Pleural Fl Pleural Fluid  Preliminary Report (02-22-23 @ 15:03):    Culture is being performed. Fungal cultures are held for 4 weeks.    Culture - Blood (collected 02-17-23 @ 10:50)  Source: .Blood Blood-Peripheral  Final Report (02-22-23 @ 14:01):    No Growth Final    Culture - Blood (collected 02-17-23 @ 10:50)  Source: .Blood Blood-Peripheral  Final Report (02-22-23 @ 14:01):    No Growth Final         VITALS:  T(C): 36.3 (02-23-23 @ 05:12), Max: 37.1 (02-22-23 @ 12:00)  T(F): 97.3 (02-23-23 @ 05:12), Max: 98.7 (02-22-23 @ 12:00)  HR: 78 (02-23-23 @ 06:00) (62 - 106)  BP: 150/91 (02-23-23 @ 05:12) (94/71 - 150/91)  BP(mean): 93 (02-22-23 @ 18:00) (77 - 93)  ABP: --  ABP(mean): --  RR: 20 (02-23-23 @ 05:12) (20 - 31)  SpO2: 96% (02-23-23 @ 09:57) (92% - 97%)  CVP(mm Hg): --  CVP(cm H2O): --    Ins and Outs     02-22-23 @ 07:01  -  02-23-23 @ 07:00  --------------------------------------------------------  IN: 375 mL / OUT: 950 mL / NET: -575 mL    02-23-23 @ 07:01  -  02-23-23 @ 10:06  --------------------------------------------------------  IN: 240 mL / OUT: 0 mL / NET: 240 mL         I&O's Detail    22 Feb 2023 07:01  -  23 Feb 2023 07:00  --------------------------------------------------------  IN:    Oral Fluid: 375 mL  Total IN: 375 mL    OUT:    Chest Tube (mL): 50 mL    Indwelling Catheter - Urethral (mL): 900 mL  Total OUT: 950 mL    Total NET: -575 mL      23 Feb 2023 07:01  -  23 Feb 2023 10:06  --------------------------------------------------------  IN:    Oral Fluid: 240 mL  Total IN: 240 mL    OUT:  Total OUT: 0 mL    Total NET: 240 mL

## 2023-02-23 NOTE — DISCHARGE NOTE PROVIDER - NSDCCPCAREPLAN_GEN_ALL_CORE_FT
PRINCIPAL DISCHARGE DIAGNOSIS  Diagnosis: Acute respiratory failure with hypoxia  Assessment and Plan of Treatment: You were admitted for shortness of breath and respiratory failure  You were diagnosed with right pleural effusion  You were treated with iv antibiotics, steroids, and chest tube  You are being transferred to [facility] for a pleurex and mediastinoscopy       PRINCIPAL DISCHARGE DIAGNOSIS  Diagnosis: Acute respiratory failure with hypoxia  Assessment and Plan of Treatment: You were admitted for shortness of breath and respiratory failure  You were diagnosed with right pleural effusion  You were treated with iv antibiotics, steroids, and chest tube  Continue steroid taper for 2 more days  You are being transferred to [facility] for a pleurex and mediastinoscopy

## 2023-02-23 NOTE — PROGRESS NOTE ADULT - SUBJECTIVE AND OBJECTIVE BOX
Records Requested  09/11/20 @ 4PM    Facility  [facility]    Outcome Sent a request for 7/28/2020 note from Dr Tiara Lantigua     9/14/2020 9:43AM received records from [facility], sent to scan and notified MD Cynthia Vigil    Patient is a 82y old Male who presents with a chief complaint of Change in mental status (23 Feb 2023 10:05)      Patient seen and examined at bedside. No overnight events reported. Patient states he is feeling well. Denies pain, sob, nausea, vomiting.    ALLERGIES:  pertussis vaccines (Rash)  shellfish (Rash)    MEDICATIONS  (STANDING):  albuterol/ipratropium for Nebulization 3 milliLiter(s) Nebulizer every 6 hours  atenolol  Tablet 75 milliGRAM(s) Oral daily  chlorhexidine 2% Cloths 1 Application(s) Topical <User Schedule>  melatonin 6 milliGRAM(s) Oral at bedtime  predniSONE   Tablet   Oral   simvastatin 40 milliGRAM(s) Oral at bedtime  tamsulosin 0.4 milliGRAM(s) Oral at bedtime    MEDICATIONS  (PRN):    Vital Signs Last 24 Hrs  T(F): 97 (23 Feb 2023 12:28), Max: 98.6 (22 Feb 2023 16:00)  HR: 56 (23 Feb 2023 12:28) (56 - 106)  BP: 136/97 (23 Feb 2023 12:28) (113/70 - 150/91)  RR: 18 (23 Feb 2023 12:28) (18 - 28)  SpO2: 98% (23 Feb 2023 12:28) (92% - 98%)  I&O's Summary    22 Feb 2023 07:01  -  23 Feb 2023 07:00  --------------------------------------------------------  IN: 375 mL / OUT: 950 mL / NET: -575 mL    23 Feb 2023 07:01  -  23 Feb 2023 15:41  --------------------------------------------------------  IN: 480 mL / OUT: 500 mL / NET: -20 mL      PHYSICAL EXAM:  General: NAD, Alert and interactive, 2L NC  ENT: No gross hearing impairment, Moist mucous membranes, no thrush  Neck: Supple, No JVD  Lungs: Course breath sounds to auscultation bilaterally, good air entry, non-labored breathing  Cardio: irregular RR, S1/S2, No murmur  Abdomen: Soft, Nontender, Nondistended; Bowel sounds present  Extremities: No calf tenderness, No cyanosis, No pitting edema  Psych: Appropriate mood and affect    LABS:                        11.6   24.13 )-----------( 197      ( 22 Feb 2023 05:00 )             37.5     02-22    137  |  99  |  32  ----------------------------<  131  4.4   |  34  |  0.72    Ca    8.8      22 Feb 2023 05:00  Phos  3.3     02-22  Mg     2.0     02-22    TPro  6.2  /  Alb  1.8  /  TBili  0.4  /  DBili  x   /  AST  31  /  ALT  98  /  AlkPhos  94  02-22 02-17 Chol -- LDL -- HDL -- Trig 67 mg/dL      ABG - ( 21 Feb 2023 05:43 )  pH, Arterial: 7.49  pH, Blood: x     /  pCO2: 52    /  pO2: 98    / HCO3: 40    / Base Excess: 16.3  /  SaO2: 99.2                            Culture - Body Fluid with Gram Stain (collected 17 Feb 2023 14:20)  Source: .Body Fluid Other, Pleural Fluid  Gram Stain (18 Feb 2023 02:54):    polymorphonuclear leukocytes seen    No organisms seen    by cytocentrifuge  Final Report (22 Feb 2023 15:44):    No growth at 5 days    Culture - Fungal, Body Fluid (collected 17 Feb 2023 14:20)  Source: Pleural Fl Pleural Fluid  Preliminary Report (22 Feb 2023 15:03):    Culture is being performed. Fungal cultures are held for 4 weeks.    Culture - Blood (collected 17 Feb 2023 10:50)  Source: .Blood Blood-Peripheral  Final Report (22 Feb 2023 14:01):    No Growth Final    Culture - Blood (collected 17 Feb 2023 10:50)  Source: .Blood Blood-Peripheral  Final Report (22 Feb 2023 14:01):    No Growth Final        RADIOLOGY & ADDITIONAL TESTS:    Care Discussed with Consultants/Other Providers: Discussed plan with Dr. Crisostomo

## 2023-02-23 NOTE — DISCHARGE NOTE PROVIDER - CARE PROVIDER_API CALL
Nathaniel Galvan)  Surgery; Thoracic Surgery  [address] [place] [address] [facility]  [address]rd [place] [address]  Phone: [phone]  Fax: [phone]  Follow Up Time:

## 2023-02-24 ENCOUNTER — INPATIENT (INPATIENT)
Facility: HOSPITAL | Age: 83
LOS: 18 days | Discharge: NURSING, REHAB OR LONG-TERM CARE | End: 2023-03-15
Attending: INTERNAL MEDICINE | Admitting: INTERNAL MEDICINE
Payer: MEDICARE

## 2023-02-24 ENCOUNTER — TRANSCRIPTION ENCOUNTER (OUTPATIENT)
Age: 83
End: 2023-02-24

## 2023-02-24 VITALS
HEART RATE: 79 BPM | SYSTOLIC BLOOD PRESSURE: 129 MMHG | DIASTOLIC BLOOD PRESSURE: 72 MMHG | OXYGEN SATURATION: 98 % | TEMPERATURE: 98 F | RESPIRATION RATE: 18 BRPM

## 2023-02-24 VITALS
SYSTOLIC BLOOD PRESSURE: 121 MMHG | TEMPERATURE: 99 F | HEART RATE: 74 BPM | DIASTOLIC BLOOD PRESSURE: 70 MMHG | OXYGEN SATURATION: 94 % | RESPIRATION RATE: 18 BRPM

## 2023-02-24 DIAGNOSIS — R59.0 LOCALIZED ENLARGED LYMPH NODES: ICD-10-CM

## 2023-02-24 DIAGNOSIS — J90 PLEURAL EFFUSION, NOT ELSEWHERE CLASSIFIED: ICD-10-CM

## 2023-02-24 LAB
ALBUMIN SERPL ELPH-MCNC: 1.8 G/DL — LOW (ref 3.3–5)
ALP SERPL-CCNC: 92 U/L — SIGNIFICANT CHANGE UP (ref 40–120)
ALT FLD-CCNC: 103 U/L — HIGH (ref 10–45)
ANION GAP SERPL CALC-SCNC: 2 MMOL/L — LOW (ref 5–17)
AST SERPL-CCNC: 31 U/L — SIGNIFICANT CHANGE UP (ref 10–40)
BILIRUB SERPL-MCNC: 0.6 MG/DL — SIGNIFICANT CHANGE UP (ref 0.2–1.2)
BUN SERPL-MCNC: 22 MG/DL — SIGNIFICANT CHANGE UP (ref 7–23)
CALCIUM SERPL-MCNC: 8.6 MG/DL — SIGNIFICANT CHANGE UP (ref 8.4–10.5)
CHLORIDE SERPL-SCNC: 102 MMOL/L — SIGNIFICANT CHANGE UP (ref 96–108)
CO2 SERPL-SCNC: 37 MMOL/L — HIGH (ref 22–31)
CREAT SERPL-MCNC: 0.65 MG/DL — SIGNIFICANT CHANGE UP (ref 0.5–1.3)
EGFR: 94 ML/MIN/1.73M2 — SIGNIFICANT CHANGE UP
GLUCOSE SERPL-MCNC: 124 MG/DL — HIGH (ref 70–99)
HCT VFR BLD CALC: 37.2 % — LOW (ref 39–50)
HGB BLD-MCNC: 11.7 G/DL — LOW (ref 13–17)
MCHC RBC-ENTMCNC: 28.5 PG — SIGNIFICANT CHANGE UP (ref 27–34)
MCHC RBC-ENTMCNC: 31.5 GM/DL — LOW (ref 32–36)
MCV RBC AUTO: 90.7 FL — SIGNIFICANT CHANGE UP (ref 80–100)
NRBC # BLD: 0 /100 WBCS — SIGNIFICANT CHANGE UP (ref 0–0)
PLATELET # BLD AUTO: 170 K/UL — SIGNIFICANT CHANGE UP (ref 150–400)
POTASSIUM SERPL-MCNC: 3.9 MMOL/L — SIGNIFICANT CHANGE UP (ref 3.5–5.3)
POTASSIUM SERPL-SCNC: 3.9 MMOL/L — SIGNIFICANT CHANGE UP (ref 3.5–5.3)
PROT SERPL-MCNC: 5.4 G/DL — LOW (ref 6–8.3)
RBC # BLD: 4.1 M/UL — LOW (ref 4.2–5.8)
RBC # FLD: 17.7 % — HIGH (ref 10.3–14.5)
SODIUM SERPL-SCNC: 141 MMOL/L — SIGNIFICANT CHANGE UP (ref 135–145)
WBC # BLD: 21.14 K/UL — HIGH (ref 3.8–10.5)
WBC # FLD AUTO: 21.14 K/UL — HIGH (ref 3.8–10.5)

## 2023-02-24 PROCEDURE — 85730 THROMBOPLASTIN TIME PARTIAL: CPT

## 2023-02-24 PROCEDURE — 96374 THER/PROPH/DIAG INJ IV PUSH: CPT

## 2023-02-24 PROCEDURE — 89051 BODY FLUID CELL COUNT: CPT

## 2023-02-24 PROCEDURE — P9047: CPT

## 2023-02-24 PROCEDURE — 94002 VENT MGMT INPAT INIT DAY: CPT

## 2023-02-24 PROCEDURE — 96375 TX/PRO/DX INJ NEW DRUG ADDON: CPT

## 2023-02-24 PROCEDURE — 36415 COLL VENOUS BLD VENIPUNCTURE: CPT

## 2023-02-24 PROCEDURE — 70450 CT HEAD/BRAIN W/O DYE: CPT | Mod: MA

## 2023-02-24 PROCEDURE — U0005: CPT

## 2023-02-24 PROCEDURE — 93005 ELECTROCARDIOGRAM TRACING: CPT

## 2023-02-24 PROCEDURE — 99291 CRITICAL CARE FIRST HOUR: CPT

## 2023-02-24 PROCEDURE — 36600 WITHDRAWAL OF ARTERIAL BLOOD: CPT

## 2023-02-24 PROCEDURE — 82803 BLOOD GASES ANY COMBINATION: CPT

## 2023-02-24 PROCEDURE — 87102 FUNGUS ISOLATION CULTURE: CPT

## 2023-02-24 PROCEDURE — 93306 TTE W/DOPPLER COMPLETE: CPT

## 2023-02-24 PROCEDURE — 83986 ASSAY PH BODY FLUID NOS: CPT

## 2023-02-24 PROCEDURE — 85025 COMPLETE CBC W/AUTO DIFF WBC: CPT

## 2023-02-24 PROCEDURE — 99233 SBSQ HOSP IP/OBS HIGH 50: CPT

## 2023-02-24 PROCEDURE — 71275 CT ANGIOGRAPHY CHEST: CPT | Mod: MA

## 2023-02-24 PROCEDURE — 99232 SBSQ HOSP IP/OBS MODERATE 35: CPT

## 2023-02-24 PROCEDURE — 86140 C-REACTIVE PROTEIN: CPT

## 2023-02-24 PROCEDURE — 87070 CULTURE OTHR SPECIMN AEROBIC: CPT

## 2023-02-24 PROCEDURE — 82962 GLUCOSE BLOOD TEST: CPT

## 2023-02-24 PROCEDURE — 84478 ASSAY OF TRIGLYCERIDES: CPT

## 2023-02-24 PROCEDURE — 85027 COMPLETE CBC AUTOMATED: CPT

## 2023-02-24 PROCEDURE — 83605 ASSAY OF LACTIC ACID: CPT

## 2023-02-24 PROCEDURE — 85610 PROTHROMBIN TIME: CPT

## 2023-02-24 PROCEDURE — 82042 OTHER SOURCE ALBUMIN QUAN EA: CPT

## 2023-02-24 PROCEDURE — 94003 VENT MGMT INPAT SUBQ DAY: CPT

## 2023-02-24 PROCEDURE — 83880 ASSAY OF NATRIURETIC PEPTIDE: CPT

## 2023-02-24 PROCEDURE — 82945 GLUCOSE OTHER FLUID: CPT

## 2023-02-24 PROCEDURE — 85652 RBC SED RATE AUTOMATED: CPT

## 2023-02-24 PROCEDURE — 86900 BLOOD TYPING SEROLOGIC ABO: CPT

## 2023-02-24 PROCEDURE — 94760 N-INVAS EAR/PLS OXIMETRY 1: CPT

## 2023-02-24 PROCEDURE — 80048 BASIC METABOLIC PNL TOTAL CA: CPT

## 2023-02-24 PROCEDURE — 92526 ORAL FUNCTION THERAPY: CPT

## 2023-02-24 PROCEDURE — 94660 CPAP INITIATION&MGMT: CPT

## 2023-02-24 PROCEDURE — 94640 AIRWAY INHALATION TREATMENT: CPT

## 2023-02-24 PROCEDURE — 87637 SARSCOV2&INF A&B&RSV AMP PRB: CPT

## 2023-02-24 PROCEDURE — 83735 ASSAY OF MAGNESIUM: CPT

## 2023-02-24 PROCEDURE — 84157 ASSAY OF PROTEIN OTHER: CPT

## 2023-02-24 PROCEDURE — 86901 BLOOD TYPING SEROLOGIC RH(D): CPT

## 2023-02-24 PROCEDURE — 80053 COMPREHEN METABOLIC PANEL: CPT

## 2023-02-24 PROCEDURE — 97162 PT EVAL MOD COMPLEX 30 MIN: CPT

## 2023-02-24 PROCEDURE — 83615 LACTATE (LD) (LDH) ENZYME: CPT

## 2023-02-24 PROCEDURE — 84100 ASSAY OF PHOSPHORUS: CPT

## 2023-02-24 PROCEDURE — U0003: CPT

## 2023-02-24 PROCEDURE — 84484 ASSAY OF TROPONIN QUANT: CPT

## 2023-02-24 PROCEDURE — 87075 CULTR BACTERIA EXCEPT BLOOD: CPT

## 2023-02-24 PROCEDURE — 92610 EVALUATE SWALLOWING FUNCTION: CPT

## 2023-02-24 PROCEDURE — 88112 CYTOPATH CELL ENHANCE TECH: CPT

## 2023-02-24 PROCEDURE — 97530 THERAPEUTIC ACTIVITIES: CPT

## 2023-02-24 PROCEDURE — 71045 X-RAY EXAM CHEST 1 VIEW: CPT

## 2023-02-24 PROCEDURE — 75635 CT ANGIO ABDOMINAL ARTERIES: CPT | Mod: MA

## 2023-02-24 PROCEDURE — 88305 TISSUE EXAM BY PATHOLOGIST: CPT

## 2023-02-24 PROCEDURE — 87205 SMEAR GRAM STAIN: CPT

## 2023-02-24 PROCEDURE — 71045 X-RAY EXAM CHEST 1 VIEW: CPT | Mod: 26

## 2023-02-24 PROCEDURE — 86850 RBC ANTIBODY SCREEN: CPT

## 2023-02-24 PROCEDURE — 87040 BLOOD CULTURE FOR BACTERIA: CPT

## 2023-02-24 RX ORDER — ENOXAPARIN SODIUM 100 MG/ML
90 INJECTION SUBCUTANEOUS
Qty: 0 | Refills: 0 | DISCHARGE
Start: 2023-02-24

## 2023-02-24 RX ORDER — IPRATROPIUM/ALBUTEROL SULFATE 18-103MCG
3 AEROSOL WITH ADAPTER (GRAM) INHALATION EVERY 6 HOURS
Refills: 0 | Status: DISCONTINUED | OUTPATIENT
Start: 2023-02-24 | End: 2023-02-24

## 2023-02-24 RX ORDER — ESCITALOPRAM OXALATE 10 MG/1
5 TABLET, FILM COATED ORAL DAILY
Refills: 0 | Status: DISCONTINUED | OUTPATIENT
Start: 2023-02-24 | End: 2023-02-27

## 2023-02-24 RX ORDER — SIMVASTATIN 20 MG/1
40 TABLET, FILM COATED ORAL AT BEDTIME
Refills: 0 | Status: DISCONTINUED | OUTPATIENT
Start: 2023-02-24 | End: 2023-03-15

## 2023-02-24 RX ORDER — ALPRAZOLAM 0.25 MG
0.25 TABLET ORAL DAILY
Refills: 0 | Status: DISCONTINUED | OUTPATIENT
Start: 2023-02-24 | End: 2023-02-24

## 2023-02-24 RX ORDER — ATENOLOL 25 MG/1
75 TABLET ORAL DAILY
Refills: 0 | Status: DISCONTINUED | OUTPATIENT
Start: 2023-02-24 | End: 2023-02-27

## 2023-02-24 RX ORDER — LANOLIN ALCOHOL/MO/W.PET/CERES
2 CREAM (GRAM) TOPICAL
Qty: 0 | Refills: 0 | DISCHARGE
Start: 2023-02-24

## 2023-02-24 RX ORDER — ALBUTEROL 90 UG/1
2.5 AEROSOL, METERED ORAL EVERY 6 HOURS
Refills: 0 | Status: DISCONTINUED | OUTPATIENT
Start: 2023-02-24 | End: 2023-03-01

## 2023-02-24 RX ORDER — APIXABAN 2.5 MG/1
1 TABLET, FILM COATED ORAL
Qty: 0 | Refills: 0 | DISCHARGE

## 2023-02-24 RX ORDER — ENOXAPARIN SODIUM 100 MG/ML
90 INJECTION SUBCUTANEOUS EVERY 12 HOURS
Refills: 0 | Status: DISCONTINUED | OUTPATIENT
Start: 2023-02-24 | End: 2023-02-25

## 2023-02-24 RX ORDER — IPRATROPIUM/ALBUTEROL SULFATE 18-103MCG
3 AEROSOL WITH ADAPTER (GRAM) INHALATION
Qty: 0 | Refills: 0 | DISCHARGE
Start: 2023-02-24

## 2023-02-24 RX ORDER — TAMSULOSIN HYDROCHLORIDE 0.4 MG/1
0.4 CAPSULE ORAL AT BEDTIME
Refills: 0 | Status: DISCONTINUED | OUTPATIENT
Start: 2023-02-24 | End: 2023-03-15

## 2023-02-24 RX ORDER — LANOLIN ALCOHOL/MO/W.PET/CERES
6 CREAM (GRAM) TOPICAL AT BEDTIME
Refills: 0 | Status: DISCONTINUED | OUTPATIENT
Start: 2023-02-24 | End: 2023-03-15

## 2023-02-24 RX ORDER — ENOXAPARIN SODIUM 100 MG/ML
90 INJECTION SUBCUTANEOUS EVERY 12 HOURS
Refills: 0 | Status: DISCONTINUED | OUTPATIENT
Start: 2023-02-24 | End: 2023-02-24

## 2023-02-24 RX ADMIN — SIMVASTATIN 40 MILLIGRAM(S): 20 TABLET, FILM COATED ORAL at 20:05

## 2023-02-24 RX ADMIN — Medication 30 MILLIGRAM(S): at 05:08

## 2023-02-24 RX ADMIN — Medication 3 MILLILITER(S): at 15:05

## 2023-02-24 RX ADMIN — ENOXAPARIN SODIUM 90 MILLIGRAM(S): 100 INJECTION SUBCUTANEOUS at 20:03

## 2023-02-24 RX ADMIN — CHLORHEXIDINE GLUCONATE 1 APPLICATION(S): 213 SOLUTION TOPICAL at 07:22

## 2023-02-24 RX ADMIN — Medication 3 MILLILITER(S): at 08:39

## 2023-02-24 RX ADMIN — ATENOLOL 75 MILLIGRAM(S): 25 TABLET ORAL at 05:08

## 2023-02-24 RX ADMIN — Medication 6 MILLIGRAM(S): at 20:04

## 2023-02-24 RX ADMIN — TAMSULOSIN HYDROCHLORIDE 0.4 MILLIGRAM(S): 0.4 CAPSULE ORAL at 20:04

## 2023-02-24 NOTE — PROGRESS NOTE ADULT - SUBJECTIVE AND OBJECTIVE BOX
Patient is a 82y old  Male who presents with a chief complaint of Change in mental status (24 Feb 2023 09:51)      Patient seen and examined at bedside. No overnight events reported. Patient states he is feeling great. Denies chest pain, sob, nausea, vomiting, dizziness. Chest tube with 10mL of fluid drainage overnight.    ALLERGIES:  pertussis vaccines (Rash)  shellfish (Rash)    MEDICATIONS  (STANDING):  albuterol/ipratropium for Nebulization 3 milliLiter(s) Nebulizer every 6 hours  atenolol  Tablet 75 milliGRAM(s) Oral daily  chlorhexidine 2% Cloths 1 Application(s) Topical <User Schedule>  enoxaparin Injectable 90 milliGRAM(s) SubCutaneous every 12 hours  melatonin 6 milliGRAM(s) Oral at bedtime  predniSONE   Tablet   Oral   simvastatin 40 milliGRAM(s) Oral at bedtime  tamsulosin 0.4 milliGRAM(s) Oral at bedtime    MEDICATIONS  (PRN):    Vital Signs Last 24 Hrs  T(F): 97.3 (24 Feb 2023 12:49), Max: 97.6 (24 Feb 2023 05:13)  HR: 63 (24 Feb 2023 12:49) (63 - 80)  BP: 99/60 (24 Feb 2023 12:49) (99/60 - 105/68)  RR: 18 (24 Feb 2023 12:49) (17 - 18)  SpO2: 98% (24 Feb 2023 12:49) (94% - 98%)  I&O's Summary    23 Feb 2023 07:01  -  24 Feb 2023 07:00  --------------------------------------------------------  IN: 480 mL / OUT: 1570 mL / NET: -1090 mL    24 Feb 2023 07:01  -  24 Feb 2023 13:12  --------------------------------------------------------  IN: 120 mL / OUT: 0 mL / NET: 120 mL      PHYSICAL EXAM:  General: NAD, A/O x 3  ENT: No gross hearing impairment, Moist mucous membranes, no thrush  Neck: Supple, No JVD  Lungs: Dressing R back C/D/I, Diminished breath sounds at R base, good air entry, non-labored breathing  Cardio: RRR, S1/S2, No murmur  Abdomen: Soft, Nontender, Nondistended; Bowel sounds present  Extremities: No calf tenderness, No cyanosis, No pitting edema  Psych: Appropriate mood and affect    LABS:                        11.7   21.14 )-----------( 170      ( 24 Feb 2023 06:45 )             37.2     02-24    141  |  102  |  22  ----------------------------<  124  3.9   |  37  |  0.65    Ca    8.6      24 Feb 2023 06:45  Phos  3.3     02-22  Mg     2.0     02-22    TPro  5.4  /  Alb  1.8  /  TBili  0.6  /  DBili  x   /  AST  31  /  ALT  103  /  AlkPhos  92  02-24 02-17 Chol -- LDL -- HDL -- Trig 67 mg/dL                      Culture - Body Fluid with Gram Stain (collected 17 Feb 2023 14:20)  Source: .Body Fluid Other, Pleural Fluid  Gram Stain (18 Feb 2023 02:54):    polymorphonuclear leukocytes seen    No organisms seen    by cytocentrifuge  Final Report (22 Feb 2023 15:44):    No growth at 5 days    Culture - Fungal, Body Fluid (collected 17 Feb 2023 14:20)  Source: Pleural Fl Pleural Fluid  Preliminary Report (22 Feb 2023 15:03):    Culture is being performed. Fungal cultures are held for 4 weeks.      COVID-19 PCR: NotDetec (02-23-23 @ 10:28)    RADIOLOGY & ADDITIONAL TESTS:    Care Discussed with Consultants/Other Providers:

## 2023-02-24 NOTE — PROGRESS NOTE ADULT - SUBJECTIVE AND OBJECTIVE BOX
Follow-up Pulmonary Progress Note  Chief Complaint : Acute respiratory failure with hypoxia      patient seen and examined  comfortable,   chest tube with decreased output  no cp, sob, palp, n/v        Allergies :pertussis vaccines (Rash)  shellfish (Rash)      PAST MEDICAL & SURGICAL HISTORY:  Atrial fibrillation    Hypertension    Urinary retention    No significant past surgical history        Medications:  MEDICATIONS  (STANDING):  albuterol/ipratropium for Nebulization 3 milliLiter(s) Nebulizer every 6 hours  atenolol  Tablet 75 milliGRAM(s) Oral daily  chlorhexidine 2% Cloths 1 Application(s) Topical <User Schedule>  melatonin 6 milliGRAM(s) Oral at bedtime  predniSONE   Tablet   Oral   simvastatin 40 milliGRAM(s) Oral at bedtime  tamsulosin 0.4 milliGRAM(s) Oral at bedtime    MEDICATIONS  (PRN):      Antibiotics History  cefepime   IVPB 2000 milliGRAM(s) IV Intermittent every 8 hours, 02-17-23 @ 17:05  cefepime   IVPB 2000 milliGRAM(s) IV Intermittent once, 02-17-23 @ 10:24, Stop order after: 1 Doses  vancomycin  IVPB. 1000 milliGRAM(s) IV Intermittent once, 02-17-23 @ 10:24, Stop order after: 1 Doses      Heme Medications       GI Medications        LABS:                        11.7   21.14 )-----------( 170      ( 24 Feb 2023 06:45 )             37.2     02-24    141  |  102  |  22  ----------------------------<  124<H>  3.9   |  37<H>  |  0.65    Ca    8.6      24 Feb 2023 06:45    TPro  5.4<L>  /  Alb  1.8<L>  /  TBili  0.6  /  DBili  x   /  AST  31  /  ALT  103<H>  /  AlkPhos  92  02-24    Fluid characteristics  -- 02-17 @ 14:20  pH 7.6    tprot 3.7    Cell count  Appearance --  Fluid type --  BF lymph --  color --  eosinophil --  PMN --  Mesothelial --  Monocyte --  Other body cells --                      CULTURES: (if applicable)    Culture - Body Fluid with Gram Stain (collected 02-17-23 @ 14:20)  Source: .Body Fluid Other, Pleural Fluid  Gram Stain (02-18-23 @ 02:54):    polymorphonuclear leukocytes seen    No organisms seen    by cytocentrifuge  Final Report (02-22-23 @ 15:44):    No growth at 5 days    Culture - Fungal, Body Fluid (collected 02-17-23 @ 14:20)  Source: Pleural Fl Pleural Fluid  Preliminary Report (02-22-23 @ 15:03):    Culture is being performed. Fungal cultures are held for 4 weeks.    Culture - Blood (collected 02-17-23 @ 10:50)  Source: .Blood Blood-Peripheral  Final Report (02-22-23 @ 14:01):    No Growth Final    Culture - Blood (collected 02-17-23 @ 10:50)  Source: .Blood Blood-Peripheral  Final Report (02-22-23 @ 14:01):    No Growth Final         VITALS:  T(C): 36.4 (02-24-23 @ 05:13), Max: 36.4 (02-24-23 @ 05:13)  T(F): 97.6 (02-24-23 @ 05:13), Max: 97.6 (02-24-23 @ 05:13)  HR: 76 (02-24-23 @ 08:42) (56 - 80)  BP: 105/68 (02-24-23 @ 05:13) (105/68 - 136/97)  BP(mean): 81 (02-24-23 @ 05:13) (81 - 81)  ABP: --  ABP(mean): --  RR: 17 (02-24-23 @ 05:13) (17 - 18)  SpO2: 96% (02-24-23 @ 08:42) (94% - 98%)  CVP(mm Hg): --  CVP(cm H2O): --    Ins and Outs     02-23-23 @ 07:01  -  02-24-23 @ 07:00  --------------------------------------------------------  IN: 480 mL / OUT: 1570 mL / NET: -1090 mL    02-24-23 @ 07:01  -  02-24-23 @ 09:52  --------------------------------------------------------  IN: 120 mL / OUT: 0 mL / NET: 120 mL                I&O's Detail    23 Feb 2023 07:01  -  24 Feb 2023 07:00  --------------------------------------------------------  IN:    Oral Fluid: 480 mL  Total IN: 480 mL    OUT:    Chest Tube (mL): 70 mL    Indwelling Catheter - Urethral (mL): 1500 mL  Total OUT: 1570 mL    Total NET: -1090 mL      24 Feb 2023 07:01  -  24 Feb 2023 09:52  --------------------------------------------------------  IN:    Oral Fluid: 120 mL  Total IN: 120 mL    OUT:  Total OUT: 0 mL    Total NET: 120 mL

## 2023-02-24 NOTE — PROGRESS NOTE ADULT - REASON FOR ADMISSION
Change in mental status
lymphadenopathy
Change in mental status
lymphadenopathy

## 2023-02-24 NOTE — H&P ADULT - PROBLEM SELECTOR PLAN 1
continue R PTC to suction documenting output and monitoring for air leak  continue current medications  steroid taper  continue plummer catheter  lovenox BID for DVT ppx  PT evaluation

## 2023-02-24 NOTE — H&P ADULT - ASSESSMENT
82 year old male with PMH atrial fibrillation, HTN and chronic urinary retention found to have R pleural effusion and had PTC placed at [place]. Pt transferred to [facility] for evaluation of mediastinal LAD and w/u for EBUS/mediastinoscopy, possible RVATS pleurX placement with Dr Galvan.

## 2023-02-24 NOTE — PATIENT PROFILE ADULT - FALL HARM RISK - HARM RISK INTERVENTIONS

## 2023-02-24 NOTE — DISCHARGE NOTE NURSING/CASE MANAGEMENT/SOCIAL WORK - NSDCPEFALRISK_GEN_ALL_CORE
For information on Fall & Injury Prevention, visit: [email or web address] OR  [email or web address] OR  https://www.cdc.gov/steadi/patient.html

## 2023-02-24 NOTE — H&P ADULT - NSHPPHYSICALEXAM_GEN_ALL_CORE
PHYSICAL EXAM:  General: NAD, A/O x 3  ENT: No gross hearing impairment, Moist mucous membranes, no thrush  Neck: Supple, No JVD  Lungs: Dressing R back C/D/I, Diminished breath sounds at R base, good air entry, non-labored breathing  Cardio: RRR, S1/S2, No murmur  Abdomen: Soft, Nontender, Nondistended; Bowel sounds present  BUCKLEY in place  Extremities: No calf tenderness, No cyanosis, No pitting edema  Psych: Appropriate mood and PHYSICAL EXAM:  General: NAD, A/O x 3  ENT: No gross hearing impairment, Moist mucous membranes, no thrush  Neck: Supple, No JVD  Lungs: Dressing R back C/D/I, Diminished breath sounds at R base, good air entry, non-labored breathing  R PTC on suction draining serous fluid to pleurovac, no AL appreciated  Cardio: RRR, S1/S2, No murmur  Abdomen: Soft, Nontender, Nondistended; Bowel sounds present  BUCKLEY in place  Extremities: No calf tenderness, No cyanosis, No pitting edema  Psych: Appropriate mood and

## 2023-02-24 NOTE — DISCHARGE NOTE NURSING/CASE MANAGEMENT/SOCIAL WORK - PATIENT PORTAL LINK FT
You can access the FollowMyHealth Patient Portal offered by [facility] by registering at the following website: [email or web address]. By joining [facility]’s FollowMyHealth portal, you will also be able to view your health information using other applications (apps) compatible with our system.

## 2023-02-24 NOTE — H&P ADULT - HISTORY OF PRESENT ILLNESS
82 year old male with PMH atrial fibrillation, HTN and chronic urinary retention who presented today to [facility] from home.  Per family patient has been progressively SOB over the past few days and was last in his usual state of health last night.  This morning patient wife thought patient had "overslept," when she went to check on him found him agonally breathing and activated EMS.  Intubated in the field and brought to ED.  No reported fever, chills, chest pain, weakness, dizziness, nausea, or vomiting.    Of note, patient recently found to have supraclavicular lymph node enlargement with associated leukocytosis, is being evaluated for lymphoma. Patient also recently admitted to [facility] where he was noted to have large pleural effusion that was drained.  According to family lymph node biopsy was inconclusive, had thoracentesis which revealed no infection or malignancy.  Since discharge from [facility] has been oxygen dependant at home which is also a new development.  Patient currently intubated and sedated, cannot elicit further history from patient. (17 Feb 2023 18:42)    Patient was admitted to ICU for acute respiratory failure with hypercapnia. CTA chest negative for PE, showed large R pleural effusion with mediastinal and hilar lymphadenopathy. A pigtail was placed and drained over 3 L of fluid. Patient was started on empiric antibiotics. Blood cultures 2/17 NGTD, Pleural fluid no growth currently, pending final cytology. With presence of lymphadenopathy and persistent leukocytosis, underlying malignancy is suspected. Hematology/oncology was consulted. Recommended transfer to [facility] for further investigation. Thoracic surgery was consulted, Dr. Galvan is accepting physician at [facility] for patient to have pleurex and mediastinoscopy to evaluate lymph nodes. Patient has chronic urinary retention, urology was consulted, and a plummer was placed. Family did not want trial of void and agreed with continued plummer use.   Patient was successfully extubated and started on CPAP in ICU. Patient was downgraded to medical floor and was titrated down to nasal canula. Physical therapy evaluated patient and MORALES is recommended.     You were admitted for shortness of breath and respiratory failure  You were diagnosed with right pleural effusion  You were treated with iv antibiotics, steroids, and chest tube  You are being transferred to [facility] for a pleurex and mediastinoscopy 82 year old male with PMH atrial fibrillation, HTN and chronic urinary retention who customarily self catheterizes at home for the last year, presented to [facility] by ambulance from home on 2/17/2023 with c/o progressive shortness of breath.    Pt was intubated in the field and brought to ED.    Of note, patient recently found to have supraclavicular lymph node enlargement with associated leukocytosis, is being evaluated for lymphoma. Patient also recently admitted to [facility] where he was noted to have large pleural effusion that was drained.  According to family lymph node biopsy was inconclusive and patient had thoracentesis which revealed no infection or malignancy.  Since discharge from [facility] has been oxygen dependant at home which is also a new development.  At [place], patient was admitted to ICU for acute respiratory failure with hypercapnia. CTA chest negative for PE, showed large R pleural effusion with mediastinal and hilar lymphadenopathy. A pigtail was placed and drained over 3 L of fluid. Patient was started on empiric antibiotics and completed 5d course on 2/22/2023.  Pleural fluid cs negative and final. Blood cultures negative and final.  Given presence of lymphadenopathy and persistent leukocytosis, underlying malignancy is suspected. Hematology/oncology was consulted. Recommended transfer to [facility] for further investigation.   Pt was accepted by thoracic surgeon, Dr Nathaniel Galvan and is being evaluated by EBUS/mediastinoscopy and possible pleurX placement.   At [place], urology was consulted for pt's chronic urinary retention and a plummer was placed. Family did not want trial of void and agreed with continued plummer use.   Patient was successfully extubated and started on CPAP in [place] ICU. Patient was downgraded to medical floor and was titrated down to nasal canula. Physical therapy evaluated patient and MORALES is recommended. Pt was then transferred to [facility]. PTC intact on suction with no air leak and pt completes steroid taper 2/26/2023.   Last dose of eliquis 2/22/2023 at 6am, pt is on Lovenox 90mg BID for DVT ppx.

## 2023-02-24 NOTE — PROGRESS NOTE ADULT - PROVIDER SPECIALTY LIST ADULT
Critical Care
Heme/Onc
Hospitalist
Urology
Critical Care
Critical Care
Heme/Onc
Heme/Onc
Hospitalist
Pulmonology
Critical Care
Heme/Onc
Critical Care
Heme/Onc

## 2023-02-24 NOTE — PROGRESS NOTE ADULT - ASSESSMENT
82 year old male with PMH atrial fibrillation, HTN and chronic urinary retention who presented to [facility] from home with acute hypoxic respiratory failure with hypercapnia. Patient was admitted to ICU, now downgraded to medicine.    #Acute hypoxic respiratory failure with hypercapnia  #Recurrent R pleural effusion-exudative  #Lymphadenopathy and leukocytosis-suspect underlying malignancy  -With large right sided pleural effusion, now status post pigtail placement with >3L drainage per Dr. Crisostomo  -Pt was intubated and was successfully extubated, started on CPAP in ICU  -Pt currently tolerating 2L NC, O2 sat 98%  -Continue steroid taper  -Patient was started on empiric abx cefepime and vanco  -Blood cultures 2/17 NGTD, Pleural fluid no growth currently, f/u final culture  -Thoracic surgery was consulted-Dr. Galvan accepting physician for patient transfer to [facility] for pleurex and mediastinoscopy to evaluate for lymph nodes. Transfer for 2/25  -Heme/onc following  -PT Eval recs MORALES    #Afib  #HTN  -Eliquis- holding for now for procedure  -Continue Atenolol    #Urinary Retention  -Urology consulted  -Continue plummer, per previous notes patient   -Continue flomax    #DVT prophylaxis   -Holding eliquis  -SCDs    Updated daughter Judith  
82 year old male with PMH atrial fibrillation, HTN and chronic urinary retention who presented to [facility] from home with acute hypoxic respiratory failure with hypercapnia. Patient was admitted to ICU, now downgraded to medicine.    #Acute hypoxic respiratory failure with hypercapnia  #Recurrent R pleural effusion-exudative  #Lymphadenopathy and leukocytosis-suspect underlying malignancy  -With large right sided pleural effusion, now status post pigtail placement with >3L drainage per Dr. Crisostomo  -Pt was intubated and was successfully extubated, started on CPAP in ICU  -Pt currently tolerating 2L NC, O2 sat 98%  -Continue steroid taper  -Patient was treated with course of empiric cefepime and dose of vanco  -Blood cultures 2/17 NGTD, Pleural fluid no growth currently, f/u final culture/cytology  -Thoracic surgery was consulted-Dr. Galvan accepting physician for patient transfer to [facility] for pleurex and mediastinoscopy to evaluate for lymph nodes. Transfer for 2/25  -Heme/onc following  -PT Eval recs MORALES    #Afib  #HTN  -Eliquis- holding for now for procedure (pt on therapeutic dose of lovenox for now)  -Continue Atenolol    #Urinary Retention  -Urology consulted  -Continue plummer, per previous notes patient's family did not want trial of void  -Continue flomax    #DVT prophylaxis   -Holding eliquis  -Lovenox    Updated daughter Judith  
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 Stable Urologically for transfer to [facility] for LN biopsy.    - continue Choi - No void trial  - outpt  f/u
Physical Examination:  GENERAL:               Alert, interactive   HEENT:                     No JVD, dry MM  PULM:                     Bilateral air entry, course to auscultation bilaterally, no significant sputum production, No Rales, No Rhonchi, No Wheezing  CVS:                         S1, S2,  + Murmur  ABD:                        Soft, nondistended, nontender, normoactive bowel sounds,   EXT:                         No edema, nontender, No Cyanosis or Clubbing   SKIN:                       Warm and well perfused, no rashes noted.   NEURO:                  Alert,  nonfocal, follows commands  PSYC:                      Calm, + Insight.    Assessment  AMS on presentation suspect due to  Acute hypoxic/hypercarbic Respiratory failure due to   Recurrent Right pleural Effusion s/p Pig tail chest tube 2/17 - Exudative  Lymphadenopathy and leukocytosis suspect underlying malignancy  Possible pneumonia - on Empiric abx  Shock Likely septic on admission  Underlying Hypertension, Atrial fibrillation, Urinary retention          Plan  n/c o2 to maintain sat,   CXR stable, decreased output noted.   will contiue chest tube to suction pending CT surg eval  Called Cyto dept - no results yet - investigating location of sample   awaiting cyto results- states received by performing dept.     Tolerating diet  Transfer center called, and plan to transfer to [facility] for planned procedure Monday    likely pleurex with EBUS +/- [place]    Urology recommends chronic plummer  Taper steroids to off  s/p abx  s/p antibiotics eliquis pending procedure , will place on levnox pending procedure  heme onc f/u   PT/OOB f/u       PMD:	Dr Caputo 			                   Notified(Date):  Family Updated: 	Judith [phone]	                                 Date: 2/22/23    d/w floor team  continue care on medical floor  will f/u for pulm
Physical Examination:  GENERAL:               Alert, interactive   HEENT:                     No JVD, dry MM  PULM:                     Bilateral air entry, course to auscultation bilaterally, no significant sputum production, No Rales, No Rhonchi, No Wheezing  CVS:                         S1, S2,  + Murmur  ABD:                        Soft, nondistended, nontender, normoactive bowel sounds,   EXT:                         No edema, nontender, No Cyanosis or Clubbing   SKIN:                       Warm and well perfused, no rashes noted.   NEURO:                  Alert,  nonfocal, follows commands  PSYC:                      Calm, + Insight.    Assessment  AMS on presentation suspect due to  Acute hypoxic/hypercarbic Respiratory failure due to   Recurrent Right pleural Effusion s/p Pig tail chest tube 2/17 - Exudative  Lymphadenopathy and leukocytosis suspect underlying malignancy  Possible pneumonia - on Empiric abx  Shock Likely septic on admission  Underlying Hypertension, Atrial fibrillation, Urinary retention        Plan  On Room air  Tolerating diet  Chest tube to suction  D/w Dr. Galvan today - images reviewed, he recommends pleurex and mediastinoscopy to evaluate for lymph nodes    he recommends to transfer to [facility] when on medical floor   Urology recommends chronic plummer  Taper steroids to off  Finish course of antibiotics today after 5 days.   Will hold eliquis pending procedure     heme onc f/u   PT/OOB f/u       PMD:	Dr Caputo 			                   Notified(Date):  Family Updated: 	Judith [phone]	                                 Date: 2/22/23    Patient wants to go to [facility] over doing procedure here. as [facility] has more rescores   Sedation & Analgesia:	as ablve  Diet/Nutrition:		 tube feeding based on status  GI PPx:			PPI  DVT Ppx:	Eliquis    Activity:		  advanced as tolerated  Head of Bed:               35-45 Deg  Glycemic Control:        n/a    Lines: PIV     Restraints were deemed necessary to prevent removal of life-sustaining devices [ x ] YES   [    ]  NO    Disposition: transfer to medical floor     Goals of Care: Full code 
Physical Examination:  GENERAL:               Alert, interactive   HEENT:                     No JVD, dry MM  PULM:                     Bilateral air entry, course to auscultation bilaterally, no significant sputum production, No Rales, No Rhonchi, No Wheezing  CVS:                         S1, S2,  + Murmur  ABD:                        Soft, nondistended, nontender, normoactive bowel sounds,   EXT:                         No edema, nontender, No Cyanosis or Clubbing   Vascular:                Warm Extremities, Normal Capillary refill, Normal Distal Pulses  SKIN:                       Warm and well perfused, no rashes noted.   NEURO:                  Alert,  nonfocal, follows commands  PSYC:                      Calm, + Insight.    Assessment  AMS on presentation suspect due to  Acute hypoxic/hypercarbic Respiratory failure due to   Recurrent Right pleural Effusion s/p Pig tail chest tube 2/17 - Exudative  Lymphadenopathy and leukocytosis suspect underlying malignancy  Possible pneumonia - on Empiric abx  Shock Likely septic on admission  Underlying Hypertension, Atrial fibrillation, Urinary retention        Plan  Off NIV   Abg improved this am  s&S eval for advancing diet     Minimize sedation  Right chest tube to suction- pleura vac changed today , will flush to see if drainage improves as cxr abnormal    Empiric antibiotics, pending cultures  f/u Cyto from pleural effusion, as fluid re accumulated in 1 week, may need Pleurex  Taper steroids     heme onc eval  surgery consult called for excisional lymph node biopsy     in [facility] got supraclavicular bx            PMD:	Dr Caputo 			                   Notified(Date):  Family Updated: 	Judith [phone]	                                 Date: 2/19-20/23      Sedation & Analgesia:	  Diet/Nutrition:		 tube feeding based on status    GI PPx:			PPI      DVT Ppx:	Eliquis        Activity:		  advanced as tolerated  Head of Bed:               35-45 Deg  Glycemic Control:        n/a      Lines: PIV     Restraints were deemed necessary to prevent removal of life-sustaining devices [ x ] YES   [    ]  NO    Disposition: ICU Care    Goals of Care: Full code   
Physical Examination:  GENERAL:               Alert, interactive   HEENT:                     No JVD, dry MM  PULM:                     Bilateral air entry, course to auscultation bilaterally, no significant sputum production, No Rales, No Rhonchi, No Wheezing  CVS:                         S1, S2,  + Murmur  ABD:                        Soft, nondistended, nontender, normoactive bowel sounds,   EXT:                         No edema, nontender, No Cyanosis or Clubbing   Vascular:                Warm Extremities, Normal Capillary refill, Normal Distal Pulses  SKIN:                       Warm and well perfused, no rashes noted.   NEURO:                  Alert,  nonfocal, follows commands  PSYC:                      Calm,suspected Insight.    Assessment  AMS on presentation suspect due t  Acute hypoxic/hypercarbic Respiratory failure due to   Recurrent Right pleural Effusion s/p Pig tail chest tube 2/17 - Exudative  Lymphadenopathy and leukocytosis suspect underlying malignancy  Possible pneumonia - on Empiric abx  Shock Likely septic on admission  Underlying Hypertension, Atrial fibrillation, Urinary retention        Plan  patient seen and examined multiple times to help wean  anxiety likely limiting factor to weaning   will check ABG post extubation   possible role in NIV support - if needed will remove NGT as well     Minimize sedation  Right chest tube to suction- pleura vac changed today   Empiric antibiotics, pending cultures    f/u Cyto from pleural effusion, as fluid re accumulated in 1 week, may need Pleurex  will continue steroids today,     heme onc eval  surgery consult called for excisional lymph node biopsy     in [facility] got supraclavicular bx            PMD:	Dr Caputo 			                   Notified(Date):  Family Updated: 	Judith [phone]	                                 Date: 2/18/23      Sedation & Analgesia:	  Diet/Nutrition:		 tube feeding based on status    GI PPx:			PPI      DVT Ppx:	Eliquis        Activity:		    Head of Bed:               35-45 Deg  Glycemic Control:        methylPREDNISolone sodium succinate Injectable 40 milliGRAM(s) IV Push every 6 hours        Lines: PIV     Restraints were deemed necessary to prevent removal of life-sustaining devices [ x ] YES   [    ]  NO    Disposition: ICU Care    Goals of Care: Full code 
PLAN:    -30 cc/kg fluid challenge without improvement in blood pressure  -patient currently on Levophed, will titrate for MAP 65-70, add midodrine via NGT to help wean  -repeat lactate  -blood/urine/sputum cultures, then initiate broad spectrum antibiotics (currently on cefepime)  -follow cultures and narrow antibitoics as able  -goal UOP >0.5 cc/kg/hr   -Patient currently on Full vent support  -titrate settings to maintain SaO2 >90%, or pH >7.25  -consider low tidal volume ventilation strategy w/ goal Tv 4-6 cc/kg of ideal body weight  -plateu pressure goal <30  -Peridex oral care  -aggressive pulmonary toilet  -daily sedation vacation with spontaneous breathing trial if clinical condition warrants, discuss with respiratory therapy   -send pleural fluid for study, heme onc is on board  -if pleural effusions continue to accumulate may consider pleurex catheter placement   -K+ slightly high at 5.4 will trend and repeat with hydration, renal function currently WNL          CRITICAL CARE TIME SPENT:  35 minutes of critical care time spent providing medical care for patient's acute illness/conditions that impairs at least one vital organ system and/or poses a high risk of imminent or life threatening deterioration in the patient's condition. It includes time spent evaluating and treating the patient's acute illness as well as time spent reviewing labs, radiology, discussing goals of care with patient and/or patient's family, and discussing the case with a multidisciplinary team, including the eICU, in an effort to prevent further life threatening deterioration or end organ damage. This time is independent of any procedures performed. 
PLAN:    -finish course of cefepime  -nba lockhart, consider pleurex catheter  -nebs and steroids  -eliquis for afib  -AM labs 
Physical Examination:  GENERAL:               Alert, interactive   HEENT:                     No JVD, dry MM  PULM:                     Bilateral air entry, course to auscultation bilaterally, no significant sputum production, No Rales, No Rhonchi, No Wheezing  CVS:                         S1, S2,  + Murmur  ABD:                        Soft, nondistended, nontender, normoactive bowel sounds,   EXT:                         No edema, nontender, No Cyanosis or Clubbing   SKIN:                       Warm and well perfused, no rashes noted.   NEURO:                  Alert,  nonfocal, follows commands  PSYC:                      Calm, + Insight.    Assessment  AMS on presentation suspect due to  Acute hypoxic/hypercarbic Respiratory failure due to   Recurrent Right pleural Effusion s/p Pig tail chest tube 2/17 - Exudative  Lymphadenopathy and leukocytosis suspect underlying malignancy  Possible pneumonia - on Empiric abx  Shock Likely septic on admission  Underlying Hypertension, Atrial fibrillation, Urinary retention        Plan  n/c o2 to maintain sat,   noted decreased CT drainage, await cxr to see if fluid accumulation    Tolerating diet  Chest tube to suction  awaiting cyto   Transfer center called, and plan to transfer to [facility] for planned procedure Monday    likely pleurex with EBUS +/- [place]  Urology recommends chronic plummer  Taper steroids to off  s/p abx  Will hold eliquis pending procedure   heme onc f/u   PT/OOB f/u       PMD:	Dr Caputo 			                   Notified(Date):  Family Updated: 	Judith [phone]	                                 Date: 2/22/23    d/w floor team  continue care on medical floor  will f/u for pulm
PLAN:    -finish course of cefepime  -nba lockhart, consider pleurex catheter  -nebs and steroids  -PRN BiPAP  -daily lasix, follow francis  -eliquis for afib  -AM labs   
Physical Examination:  GENERAL:               Alert, interactive   HEENT:                     No JVD, dry MM  PULM:                     Bilateral air entry, course to auscultation bilaterally, no significant sputum production, No Rales, No Rhonchi, No Wheezing  CVS:                         S1, S2,  + Murmur  ABD:                        Soft, nondistended, nontender, normoactive bowel sounds,   EXT:                         No edema, nontender, No Cyanosis or Clubbing   Vascular:                Warm Extremities, Normal Capillary refill, Normal Distal Pulses  SKIN:                       Warm and well perfused, no rashes noted.   NEURO:                  Alert,  nonfocal, follows commands  PSYC:                      Calm, + Insight.    Assessment  AMS on presentation suspect due to  Acute hypoxic/hypercarbic Respiratory failure due to   Recurrent Right pleural Effusion s/p Pig tail chest tube 2/17 - Exudative  Lymphadenopathy and leukocytosis suspect underlying malignancy  Possible pneumonia - on Empiric abx  Shock Likely septic on admission  Underlying Hypertension, Atrial fibrillation, Urinary retention        Plan  on n/c o2 to mainain sat   Diet as per Speech and swallow  Right chest tube to suction- flushed no new output noted  Case d/w Dr. Matthew today - would need tissue biopsy to evaluate source  case d/w Dr. Galvan (thoracic at [facility]) will review images and get abck to me     ideally when medically cleared transfer to [facility] for VATS pleurex and mediastinal lymph node biopsy     await call back  Family does not want voiding trial/ straight cath, want to continue plummer, - Urology will be evaluated.   f/u cyto from right chest tube  plan for 5 day antibiotics course   as confused will Further taper steroids     heme onc f/u   PT/OOB f/u       PMD:	Dr aCputo 			                   Notified(Date):  Family Updated: 	Judith [phone]	                                 Date: 2/21/23    Sedation & Analgesia:	as ablve  Diet/Nutrition:		 tube feeding based on status  GI PPx:			PPI  DVT Ppx:	Eliquis    Activity:		  advanced as tolerated  Head of Bed:               35-45 Deg  Glycemic Control:        n/a    Lines: PIV     Restraints were deemed necessary to prevent removal of life-sustaining devices [ x ] YES   [    ]  NO    Disposition: ICU Care    Goals of Care: Full code   
Physical Examination:  GENERAL:               Alert, interactive   HEENT:                     No JVD, dry MM  PULM:                     Bilateral air entry, course to auscultation bilaterally, no significant sputum production, No Rales, No Rhonchi, No Wheezing  CVS:                         S1, S2,  + Murmur  ABD:                        Soft, nondistended, nontender, normoactive bowel sounds,   EXT:                         No edema, nontender, No Cyanosis or Clubbing   Vascular:                Warm Extremities, Normal Capillary refill, Normal Distal Pulses  SKIN:                       Warm and well perfused, no rashes noted.   NEURO:                  Alert,  nonfocal, follows commands  PSYC:                      Calm,suspected Insight.    Assessment  AMS on presentation suspect due t  Acute hypoxic/hypercarbic Respiratory failure due to   Recurrent Right pleural Effusion s/p Pig tail chest tube 2/17 - Exudative  Lymphadenopathy and leukocytosis suspect underlying malignancy  Possible pneumonia - on Empiric abx  Shock Likely septic on admission  Underlying Hypertension, Atrial fibrillation, Urinary retention        Plan  Mechanical Ventilation, CPAP trial, Plan to extubate if tolerated  sedation vacation started  Right chest tube to suction  Empiric antibiotics, pending cultures  ID f/u  f/u Cyto from pleural effusion, as fluid re accumulated in 1 week, may need Pleurex  Steroids to be tapered in am          PMD:	Dr Caputo 			                   Notified(Date):  Family Updated: 	Judith [phone]	                                 Date: 2/18/23      Sedation & Analgesia:	  Diet/Nutrition:		 Diet, NPO (02-17-23 @ 09:59) [Active]        GI PPx:			      DVT Ppx:	Eliquis        Activity:		    Head of Bed:               35-45 Deg  Glycemic Control:        methylPREDNISolone sodium succinate Injectable 40 milliGRAM(s) IV Push every 6 hours        Lines: PIV  CENTRAL LINE: 	[ ] YES [ ] NO	                    LOCATION:   	                       DATE INSERTED:   	                    REMOVE:  [ ] YES [ ] NO    A-LINE:  	                [ ] YES [ ] NO                      LOCATION:   	                       DATE INSERTED: 		            REMOVE:  [ ] YES [ ] NO    [place]: 		        [ ] YES [ ] NO  		                                       DATE INSERTED:		            REMOVE:  [ ] YES [ ] NO      Restraints were deemed necessary to prevent removal of life-sustaining devices [ x ] YES   [    ]  NO    Disposition: ICU Care    Goals of Care: Full code

## 2023-02-24 NOTE — PATIENT PROFILE ADULT - IS THERE A SUSPICION OF ABUSE/NEGLIGENCE?
Physical Therapy  Daily Treatment Note  Date: 2021  Patient Name: Vish Mckay  MRN: [id number]     :   1941    Subjective:   General  Chart Reviewed: Yes  Response To Previous Treatment: Not applicable  Family / Caregiver Present: No  Referring Practitioner: Franklin Quiñones MD  PT Visit Information  Total # of Visits Approved: 12  Total # of Visits to Date: 5  Progress Note Due Date: 21  Subjective  Subjective: Patient states that she is able to breathe better now and has noticed she has more wind to able to sing with a little less difficulty at [employer]. No other new developments since she was last seen for PT, no complaints of pain. Pain Screening  Patient Currently in Pain: Denies  Vital Signs  Patient Currently in Pain: Denies       Treatment Activities:                                    Exercises  Exercise 1: ambulation in fox (timed, up to 6 mins.)--~700'' in 6 mins  Exercise 2: repeated sit to stand (higher surface to lower)-- use of chair in hallway  1 X 10 (pt does c/o of B knees bothering her ocassionally)  Exercise 3: sitting LAQ's (small weights, high reps)--1.5#  3 X 10  Exercise 4: h/s curls with red tband 2 X 10  Exercise 5: leg ergometer (LBE)- not today  Exercise 6: box steps- 10cc/10cw  Exercise 7: sidestepping-- X 2  Exercise 8: cone weaves--X 4  Exercise 9: stepping over curbs--x 4 (foward)  Exercise 10: step ups and side steps (4\" to 6\")--10/10 bilateral--  Exercise 11: heel raises--20 reps  Exercise 12: marching in place--15 bilaterally-  Exercise 13: standing hip abduction--2/10-  Exercise 18: add additional exercises as endurance permits                               Assessment:   Conditions Requiring Skilled Therapeutic Intervention  Body structures, Functions, Activity limitations: Decreased functional mobility ; Decreased balance;Decreased strength;Decreased endurance  Assessment: Mrs. Sulma Lieberman continues to give her best effort with her therapy program and readily accepts new challenges to her program. She still appears winded with walking, but this appears to be slowly improving. Some additional reps were added to her program today with intermittent rest breaks as needed. Prognosis: Good  REQUIRES PT FOLLOW UP: Yes  Discharge Recommendations: Continue to assess pending progress    Goals:  Short term goals  Time Frame for Short term goals: 3-4 weeks  Short term goal 1: Patient to be independent with home exercise program.  Short term goal 2: Patient able to perform sit to stand 13 times in 30 seconds. Short term goal 3: Patient able to tolerate >= 45 minutes of exercise during her therapy sessions. Long term goals  Time Frame for Long term goals : 4-6 weeks  Long term goal 1: Patient to have >= 4+/5 for right LE knee flexion, extension, and hip flexion. Long term goal 2: Patient to be able to ambulate 1100' in 6 minutes. Long term goal 3: Patient to report being able to perform basic home chores with minimal rest breaks. Patient Goals   Patient goals : Increased strength and endurance, back to the way I was before getting sick.   Plan:    Plan  Times per week: 2x per week  Plan weeks: 4-6 weeks  Current Treatment Recommendations: Strengthening, Home Exercise Program, Patient/Caregiver Education & Training, Neuromuscular Re-education, Endurance Training, Balance Training  Timed Code Treatment Minutes: 47 Minutes     Therapy Time   Individual Concurrent Group Co-treatment   Time In 1504         Time Out 1600         Minutes 56         Timed Code Treatment Minutes: 54 Minutes  Electronically signed by Yolanda Pal PT on 6/25/2021 at 5:01 PM no

## 2023-02-24 NOTE — PROGRESS NOTE ADULT - NS ATTEND AMEND GEN_ALL_CORE FT
#Recurrent R pleural effusion-exudative  #Lymphadenopathy and leukocytosis-suspect underlying malignancy  - currently has pigtail in placed  - transferred to CT surgery for pleurx and mediastinoscopy
#Acute hypoxic respiratory failure with hypercapnia  #Recurrent R pleural effusion-exudative  #Lymphadenopathy and leukocytosis  - r/o lymphoproliferative disorder  - s/p pigtail placement. monitor   - Awaiting transfer to [facility] for mediastinoscopy + VATS Pleurx  - pulm following   - eliquis on hold pending procedure   - s/p IV vanco and cefepime

## 2023-02-24 NOTE — PROGRESS NOTE ADULT - SUBJECTIVE AND OBJECTIVE BOX
ARIK DUMONT, 82y Male  MRN: [id number]  ATTENDING: Toño Crisostomo    HPI:  82M, PMHx HTN, atrial fibrillation, urinary retention, admitted at [facility] with symptoms of progressive shortness of breath, which rapidly turned into respiratory failure requiring intubation.  Reportedly patient recently evaluated at [facility] for a large pleural effusion, s/p thoracentesis; cytopathology negative for malignancy reportedly.  Patient also had a supraclavicular lymph node biopsy for reportedly lymphadenopathy; pathology inconclusive.  Presently in ICU, mechanically ventilated but weanable.  CT chest consistent with mediastinal and hilar lymphadenopathy, right pleural effusion, possible right upper lobe airspace opacities and evidence of septal lines. No evidence of aortic dissection or any other posttraumatic abnormality.  Oncology consultation requested regarding lymphadenopathy and large pleural effusion.    MEDICATIONS:  albuterol/ipratropium for Nebulization 3 milliLiter(s) Nebulizer every 6 hours  apixaban 5 milliGRAM(s) Oral every 12 hours  atenolol  Tablet 75 milliGRAM(s) Oral daily  cefepime   IVPB 2000 milliGRAM(s) IV Intermittent every 8 hours  chlorhexidine 2% Cloths 1 Application(s) Topical <User Schedule>  methylPREDNISolone sodium succinate Injectable 40 milliGRAM(s) IV Push every 12 hours  simvastatin 40 milliGRAM(s) Oral at bedtime    All other medications reviewed.    SUBJECTIVE:    VITALS:  T(C): 36.2 (02-20-23 @ 05:00), Max: 36.8 (02-19-23 @ 15:00)  T(F): 97.1 (02-20-23 @ 05:00), Max: 98.3 (02-19-23 @ 15:00)  HR: 56 (02-20-23 @ 12:00) (56 - 125)  BP: 123/82 (02-20-23 @ 12:00) (115/77 - 203/126)    PHYSICAL EXAM:  Constitutional: alert,  breathing pattern unchanged  HEENT: normocephalic, anicteric sclerae, no mucositis or thrush  Respiratory: bilateral crackles anterirly  Cardiovascular : S1, S2 regular, rhythmic, no murmurs, gallops or rubs  Abdomen: soft, distended, + normoactive BS, no palpable HS- megaly  Extremities: no tenderness;  -c/c/e, pulses equal bilaterally    LABS:  (02-20) WBC: 32.40 K/uL,Hemoglobin: 12.3 g/dL, Hematocrit: 39.1 %,  Platelet: 270 K/uL  (02-20) Na: 141 mmol/L ; K: 4.3 mmol/L ; BUN: 26 mg/dL ; Cr: 0.83 mg/dL.    RADIOLOGY:    ACC: [id number] EXAM:  CT ANGIO CHEST AORTA WAWIC   ORDERED BY: MO ROGER     ACC: [id number] EXAM:  CT ANGIO ABD AOR W RUN(W)AW IC   ORDERED BY:   MO ROGER     PROCEDURE DATE:  02/17/2023          INTERPRETATION:  CLINICAL INFORMATION: History of fall.    COMPARISON: CT abdomen and pelvis dated 01/28/2021.    CONTRAST/COMPLICATIONS:  IV Contrast: Omnipaque 350  90 cc administered   10 cc discarded  Oral Contrast: NONE  Complications: None reported at time of study completion    PROCEDURE:  CT Angiography of the Chest, Abdomen and Pelvis.  Precontrast imaging was performed through the chest followed by arterial   phase imaging of the chest, abdomen and pelvis.  Sagittal and coronal reformats were performed as well as 3D (MIP)  reconstructions.    FINDINGS:  CHEST:  LUNGS AND LARGE AIRWAYS: Endotracheal tube in satisfactory position.   Evidence of compression atelectasis involving the dependent right lung.   Atelectasis/infiltrate involving the right middle lobe. Nodular opacities   involving the right upper lobe. These may be related to airspace disease.   Subsegmental atelectasis dependent left lower lobe. Evidence of bilateral   septal lines. These may be related to pulmonary edema. Possibility of   lymphangitic carcinomatosis is also considered.  PLEURA: Moderate right pleural effusion.  VESSELS: No intramural hematoma or a dissection flap involving the   thoracic aorta. Unremarkable arch vessels. Atherosclerotic calcification   including the coronary arteries.  HEART: Borderline cardiomegaly. No pericardial effusion.  MEDIASTINUM AND SHANTELLE: Moderate mediastinal and bilateral hilar   lymphadenopathy. As a reference, a right precarinal node measures 3.9 cm   in short axis (3:39).  CHEST WALL AND LOWER NECK: Within normal limits.    ABDOMEN AND PELVIS:  LIVER: 4 cm sized left hepatic cyst.  BILE DUCTS: Normal caliber.  GALLBLADDER: Within normal limits.  SPLEEN: Within normal limits.  PANCREAS: Nonspecific lobulated contour of the tail of the pancreas.  ADRENALS: Within normal limits.  KIDNEYS/URETERS: Relatively smaller right kidney with cortical thinning.   No evidence of hydronephrosis.    BLADDER: Under distended, unremarkable.  REPRODUCTIVE ORGANS: Prostate is enlarged.    BOWEL: No bowel obstruction. Appendix is normal. Uncomplicated sigmoid   diverticula.  PERITONEUM: No ascites.  VESSELS: Atherosclerotic changes.  RETROPERITONEUM/LYMPH NODES: No lymphadenopathy.  ABDOMINAL WALL: Within normal limits.  BONES: Degenerative changes.    IMPRESSION:  No evidence of aortic dissection or any other posttraumatic abnormality.  Mediastinal and hilar lymphadenopathy, right pleural effusion, possible   right upper lobe airspace opacities and evidence of septal lines.    Additional findings as above.         ARIK DUMONT, 82y Male  MRN: [id number]  ATTENDING: Toño Crisostomo    HPI:  82M, PMHx HTN, atrial fibrillation, urinary retention, admitted at [facility] with symptoms of progressive shortness of breath, which rapidly turned into respiratory failure requiring intubation.  Reportedly patient recently evaluated at [facility] for a large pleural effusion, s/p thoracentesis; cytopathology negative for malignancy reportedly.  Patient also had a supraclavicular lymph node biopsy for reportedly lymphadenopathy; pathology inconclusive.  Presently in ICU, mechanically ventilated but weanable.  CT chest consistent with mediastinal and hilar lymphadenopathy, right pleural effusion, possible right upper lobe airspace opacities and evidence of septal lines. No evidence of aortic dissection or any other posttraumatic abnormality.  Oncology consultation requested regarding lymphadenopathy and large pleural effusion.    MEDICATIONS:  albuterol/ipratropium for Nebulization 3 milliLiter(s) Nebulizer every 6 hours  apixaban 5 milliGRAM(s) Oral every 12 hours  atenolol  Tablet 75 milliGRAM(s) Oral daily  cefepime   IVPB 2000 milliGRAM(s) IV Intermittent every 8 hours  chlorhexidine 2% Cloths 1 Application(s) Topical <User Schedule>  methylPREDNISolone sodium succinate Injectable 40 milliGRAM(s) IV Push every 12 hours  simvastatin 40 milliGRAM(s) Oral at bedtime    All other medications reviewed.    SUBJECTIVE:  Feels fine; denies SOB.  Awaiting transfer at [facility]    VITALS:  T(C): 36.2 (02-20-23 @ 05:00), Max: 36.8 (02-19-23 @ 15:00)  T(F): 97.1 (02-20-23 @ 05:00), Max: 98.3 (02-19-23 @ 15:00)  HR: 56 (02-20-23 @ 12:00) (56 - 125)  BP: 123/82 (02-20-23 @ 12:00) (115/77 - 203/126)    PHYSICAL EXAM:  Constitutional: alert,  breathing pattern unchanged  HEENT: normocephalic, anicteric sclerae, no mucositis or thrush  Respiratory: bilateral crackles anterirly  Cardiovascular : S1, S2 regular, rhythmic, no murmurs, gallops or rubs  Abdomen: soft, distended, + normoactive BS, no palpable HS- megaly  Extremities: no tenderness;  -c/c/e, pulses equal bilaterally    LABS:  (02-20) WBC: 32.40 K/uL,Hemoglobin: 12.3 g/dL, Hematocrit: 39.1 %,  Platelet: 270 K/uL  (02-20) Na: 141 mmol/L ; K: 4.3 mmol/L ; BUN: 26 mg/dL ; Cr: 0.83 mg/dL.    RADIOLOGY:    ACC: [id number] EXAM:  CT ANGIO CHEST AORTA WAWIC   ORDERED BY: MO ROGER     ACC: [id number] EXAM:  CT ANGIO ABD AOR W RUN(W)AW IC   ORDERED BY:   MO ROGER     PROCEDURE DATE:  02/17/2023          INTERPRETATION:  CLINICAL INFORMATION: History of fall.    COMPARISON: CT abdomen and pelvis dated 01/28/2021.    CONTRAST/COMPLICATIONS:  IV Contrast: Omnipaque 350  90 cc administered   10 cc discarded  Oral Contrast: NONE  Complications: None reported at time of study completion    PROCEDURE:  CT Angiography of the Chest, Abdomen and Pelvis.  Precontrast imaging was performed through the chest followed by arterial   phase imaging of the chest, abdomen and pelvis.  Sagittal and coronal reformats were performed as well as 3D (MIP)  reconstructions.    FINDINGS:  CHEST:  LUNGS AND LARGE AIRWAYS: Endotracheal tube in satisfactory position.   Evidence of compression atelectasis involving the dependent right lung.   Atelectasis/infiltrate involving the right middle lobe. Nodular opacities   involving the right upper lobe. These may be related to airspace disease.   Subsegmental atelectasis dependent left lower lobe. Evidence of bilateral   septal lines. These may be related to pulmonary edema. Possibility of   lymphangitic carcinomatosis is also considered.  PLEURA: Moderate right pleural effusion.  VESSELS: No intramural hematoma or a dissection flap involving the   thoracic aorta. Unremarkable arch vessels. Atherosclerotic calcification   including the coronary arteries.  HEART: Borderline cardiomegaly. No pericardial effusion.  MEDIASTINUM AND SHANTELLE: Moderate mediastinal and bilateral hilar   lymphadenopathy. As a reference, a right precarinal node measures 3.9 cm   in short axis (3:39).  CHEST WALL AND LOWER NECK: Within normal limits.    ABDOMEN AND PELVIS:  LIVER: 4 cm sized left hepatic cyst.  BILE DUCTS: Normal caliber.  GALLBLADDER: Within normal limits.  SPLEEN: Within normal limits.  PANCREAS: Nonspecific lobulated contour of the tail of the pancreas.  ADRENALS: Within normal limits.  KIDNEYS/URETERS: Relatively smaller right kidney with cortical thinning.   No evidence of hydronephrosis.    BLADDER: Under distended, unremarkable.  REPRODUCTIVE ORGANS: Prostate is enlarged.    BOWEL: No bowel obstruction. Appendix is normal. Uncomplicated sigmoid   diverticula.  PERITONEUM: No ascites.  VESSELS: Atherosclerotic changes.  RETROPERITONEUM/LYMPH NODES: No lymphadenopathy.  ABDOMINAL WALL: Within normal limits.  BONES: Degenerative changes.    IMPRESSION:  No evidence of aortic dissection or any other posttraumatic abnormality.  Mediastinal and hilar lymphadenopathy, right pleural effusion, possible   right upper lobe airspace opacities and evidence of septal lines.    Additional findings as above.

## 2023-02-24 NOTE — PROGRESS NOTE ADULT - PROBLEM SELECTOR PLAN 1
Generalized lymphadenopathy suspicious for lymphoproliferative disorder needs hematologic work-up.  Awaiting transfer to [facility] for mediastinoscopy + VATS Pleurx ( consulted).  Patient to be stabilized for transfer in the next few days.  Downgraded from CCU to telemetry; will continue monitoring CBC while patient completes antibiotic treatment. 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  Patient recently intubated due to respiratory failure.  For tissue diagnosis will need VATS Pleurx + mediastinoscopy at tertiary care center.  Awaiting transfer to either [facility] or [facility].  Stable from hematology perspective for transfer.

## 2023-02-24 NOTE — H&P ADULT - NSHPREVIEWOFSYSTEMS_GEN_ALL_CORE
REVIEW OF SYSTEMS:    CONSTITUTIONAL: No weakness, fevers or chills  EYES/ENT: No visual changes;  No vertigo or throat pain   NECK: No pain or stiffness  RESPIRATORY: see HPI  CARDIOVASCULAR: No chest pain or palpitations  GASTROINTESTINAL: No abdominal or epigastric pain. No nausea, vomiting, or hematemesis; No diarrhea or constipation. No melena or hematochezia.  GENITOURINARY: see HPI  NEUROLOGICAL: No numbness or weakness  SKIN: No itching, rashes

## 2023-02-24 NOTE — PROGRESS NOTE ADULT - NUTRITIONAL ASSESSMENT
This patient has been assessed with a concern for Malnutrition and has been determined to have a diagnosis/diagnoses of Moderate protein-calorie malnutrition.    This patient is being managed with:   Diet Minced and Moist-  DASH/TLC {Sodium & Cholesterol Restricted}  No Straws  Entered: Feb 20 2023 12:49PM    
This patient has been assessed with a concern for Malnutrition and has been determined to have a diagnosis/diagnoses of Moderate protein-calorie malnutrition.    This patient is being managed with:   Diet NPO with Tube Feed-  Tube Feeding Modality: Nasogastric  Jevity 1.5 Rahul  Total Volume for 24 Hours (mL): 1200  Continuous  Starting Tube Feed Rate {mL per Hour}: 30  Increase Tube Feed Rate by (mL): 5     Every 6 hours  Until Goal Tube Feed Rate (mL per Hour): 50  Tube Feed Duration (in Hours): 24  Tube Feed Start Time: 15:45  Free Water Flush  Bolus   Total Volume per Flush (mL): 200   Frequency: Every 8 Hours   Total Daily Volume of Flush (mL): 600    Start Time: 13:30  Entered: Feb 18 2023  1:42PM    
This patient has been assessed with a concern for Malnutrition and has been determined to have a diagnosis/diagnoses of Moderate protein-calorie malnutrition.    This patient is being managed with:   Diet Regular-  DASH/TLC {Sodium & Cholesterol Restricted}  No Straws  Entered: Feb 22 2023  4:02PM    
This patient has been assessed with a concern for Malnutrition and has been determined to have a diagnosis/diagnoses of Moderate protein-calorie malnutrition.    This patient is being managed with:   Diet Regular-  DASH/TLC {Sodium & Cholesterol Restricted}  No Straws  Entered: Feb 22 2023  4:02PM    
This patient has been assessed with a concern for Malnutrition and has been determined to have a diagnosis/diagnoses of Moderate protein-calorie malnutrition.    This patient is being managed with:   Diet NPO with Tube Feed-  Tube Feeding Modality: Nasogastric  Jevity 1.5 Rahul  Total Volume for 24 Hours (mL): 1200  Continuous  Starting Tube Feed Rate {mL per Hour}: 30  Increase Tube Feed Rate by (mL): 5     Every 6 hours  Until Goal Tube Feed Rate (mL per Hour): 50  Tube Feed Duration (in Hours): 24  Tube Feed Start Time: 15:45  Free Water Flush  Bolus   Total Volume per Flush (mL): 200   Frequency: Every 8 Hours   Total Daily Volume of Flush (mL): 600    Start Time: 13:30  Entered: Feb 18 2023  1:42PM    
This patient has been assessed with a concern for Malnutrition and has been determined to have a diagnosis/diagnoses of Moderate protein-calorie malnutrition.    This patient is being managed with:   Diet Regular-  DASH/TLC {Sodium & Cholesterol Restricted}  No Straws  Entered: Feb 22 2023  4:02PM    
This patient has been assessed with a concern for Malnutrition and has been determined to have a diagnosis/diagnoses of Moderate protein-calorie malnutrition.    This patient is being managed with:   Diet Minced and Moist-  DASH/TLC {Sodium & Cholesterol Restricted}  No Straws  Entered: Feb 20 2023 12:49PM    
This patient has been assessed with a concern for Malnutrition and has been determined to have a diagnosis/diagnoses of Moderate protein-calorie malnutrition.    This patient is being managed with:   Diet Regular-  DASH/TLC {Sodium & Cholesterol Restricted}  No Straws  Entered: Feb 22 2023  4:02PM

## 2023-02-24 NOTE — PROGRESS NOTE ADULT - SUBJECTIVE AND OBJECTIVE BOX
Choi clear    Awaiting transfer to [facility] for LN biopsy      ROS  General: no fever    VITAL SIGNS  Vital Signs Last 24 Hrs  T(C): 36.4 (24 Feb 2023 05:13), Max: 36.4 (24 Feb 2023 05:13)  T(F): 97.6 (24 Feb 2023 05:13), Max: 97.6 (24 Feb 2023 05:13)  HR: 80 (24 Feb 2023 05:13) (56 - 80)  BP: 105/68         PHYSICAL EXAM:  General: comfortable        LABS:                        11.7   21.14 )-----------( 170      ( 24 Feb 2023 06:45 )             37.2     02-24    141  |  102  |  22  ----------------------------<  124<H>  3.9   |  37<H>  |  0.65      Prior notes/chart reviewed.

## 2023-02-25 PROBLEM — R33.9 RETENTION OF URINE, UNSPECIFIED: Chronic | Status: ACTIVE | Noted: 2023-02-17

## 2023-02-25 PROBLEM — I48.91 UNSPECIFIED ATRIAL FIBRILLATION: Chronic | Status: ACTIVE | Noted: 2023-02-17

## 2023-02-25 PROBLEM — I10 ESSENTIAL (PRIMARY) HYPERTENSION: Chronic | Status: ACTIVE | Noted: 2023-02-17

## 2023-02-25 LAB
A1C WITH ESTIMATED AVERAGE GLUCOSE RESULT: 6 % — HIGH (ref 4–5.6)
ANION GAP SERPL CALC-SCNC: 10 MMOL/L — SIGNIFICANT CHANGE UP (ref 7–14)
BUN SERPL-MCNC: 20 MG/DL — SIGNIFICANT CHANGE UP (ref 7–23)
CALCIUM SERPL-MCNC: 8.5 MG/DL — SIGNIFICANT CHANGE UP (ref 8.4–10.5)
CHLORIDE SERPL-SCNC: 98 MMOL/L — SIGNIFICANT CHANGE UP (ref 98–107)
CO2 SERPL-SCNC: 28 MMOL/L — SIGNIFICANT CHANGE UP (ref 22–31)
CREAT SERPL-MCNC: 0.46 MG/DL — LOW (ref 0.5–1.3)
EGFR: 104 ML/MIN/1.73M2 — SIGNIFICANT CHANGE UP
ESTIMATED AVERAGE GLUCOSE: 126 — SIGNIFICANT CHANGE UP
GLUCOSE SERPL-MCNC: 122 MG/DL — HIGH (ref 70–99)
HCT VFR BLD CALC: 37.8 % — LOW (ref 39–50)
HGB BLD-MCNC: 12 G/DL — LOW (ref 13–17)
MCHC RBC-ENTMCNC: 27.5 PG — SIGNIFICANT CHANGE UP (ref 27–34)
MCHC RBC-ENTMCNC: 31.7 GM/DL — LOW (ref 32–36)
MCV RBC AUTO: 86.5 FL — SIGNIFICANT CHANGE UP (ref 80–100)
NRBC # BLD: 0 /100 WBCS — SIGNIFICANT CHANGE UP (ref 0–0)
NRBC # FLD: 0 K/UL — SIGNIFICANT CHANGE UP (ref 0–0)
PLATELET # BLD AUTO: 183 K/UL — SIGNIFICANT CHANGE UP (ref 150–400)
POTASSIUM SERPL-MCNC: 4.3 MMOL/L — SIGNIFICANT CHANGE UP (ref 3.5–5.3)
POTASSIUM SERPL-SCNC: 4.3 MMOL/L — SIGNIFICANT CHANGE UP (ref 3.5–5.3)
RBC # BLD: 4.37 M/UL — SIGNIFICANT CHANGE UP (ref 4.2–5.8)
RBC # FLD: 17.5 % — HIGH (ref 10.3–14.5)
SODIUM SERPL-SCNC: 136 MMOL/L — SIGNIFICANT CHANGE UP (ref 135–145)
WBC # BLD: 19.52 K/UL — HIGH (ref 3.8–10.5)
WBC # FLD AUTO: 19.52 K/UL — HIGH (ref 3.8–10.5)

## 2023-02-25 PROCEDURE — 99222 1ST HOSP IP/OBS MODERATE 55: CPT | Mod: GC

## 2023-02-25 PROCEDURE — 99232 SBSQ HOSP IP/OBS MODERATE 35: CPT

## 2023-02-25 PROCEDURE — 99223 1ST HOSP IP/OBS HIGH 75: CPT

## 2023-02-25 PROCEDURE — 71045 X-RAY EXAM CHEST 1 VIEW: CPT | Mod: 26

## 2023-02-25 RX ORDER — CHLORHEXIDINE GLUCONATE 213 G/1000ML
1 SOLUTION TOPICAL
Refills: 0 | Status: DISCONTINUED | OUTPATIENT
Start: 2023-02-25 | End: 2023-03-15

## 2023-02-25 RX ORDER — SENNA PLUS 8.6 MG/1
2 TABLET ORAL AT BEDTIME
Refills: 0 | Status: DISCONTINUED | OUTPATIENT
Start: 2023-02-25 | End: 2023-03-15

## 2023-02-25 RX ORDER — ENOXAPARIN SODIUM 100 MG/ML
90 INJECTION SUBCUTANEOUS ONCE
Refills: 0 | Status: COMPLETED | OUTPATIENT
Start: 2023-02-25 | End: 2023-02-25

## 2023-02-25 RX ORDER — POLYETHYLENE GLYCOL 3350 17 G/17G
17 POWDER, FOR SOLUTION ORAL DAILY
Refills: 0 | Status: DISCONTINUED | OUTPATIENT
Start: 2023-02-25 | End: 2023-03-15

## 2023-02-25 RX ADMIN — ENOXAPARIN SODIUM 90 MILLIGRAM(S): 100 INJECTION SUBCUTANEOUS at 21:37

## 2023-02-25 RX ADMIN — ATENOLOL 75 MILLIGRAM(S): 25 TABLET ORAL at 06:16

## 2023-02-25 RX ADMIN — Medication 6 MILLIGRAM(S): at 21:39

## 2023-02-25 RX ADMIN — ESCITALOPRAM OXALATE 5 MILLIGRAM(S): 10 TABLET, FILM COATED ORAL at 12:39

## 2023-02-25 RX ADMIN — ALBUTEROL 2.5 MILLIGRAM(S): 90 AEROSOL, METERED ORAL at 15:37

## 2023-02-25 RX ADMIN — ALBUTEROL 2.5 MILLIGRAM(S): 90 AEROSOL, METERED ORAL at 03:47

## 2023-02-25 RX ADMIN — ALBUTEROL 2.5 MILLIGRAM(S): 90 AEROSOL, METERED ORAL at 22:14

## 2023-02-25 RX ADMIN — SIMVASTATIN 40 MILLIGRAM(S): 20 TABLET, FILM COATED ORAL at 21:37

## 2023-02-25 RX ADMIN — TAMSULOSIN HYDROCHLORIDE 0.4 MILLIGRAM(S): 0.4 CAPSULE ORAL at 21:38

## 2023-02-25 RX ADMIN — SENNA PLUS 2 TABLET(S): 8.6 TABLET ORAL at 21:39

## 2023-02-25 RX ADMIN — Medication 20 MILLIGRAM(S): at 06:16

## 2023-02-25 RX ADMIN — ALBUTEROL 2.5 MILLIGRAM(S): 90 AEROSOL, METERED ORAL at 10:44

## 2023-02-25 RX ADMIN — ENOXAPARIN SODIUM 90 MILLIGRAM(S): 100 INJECTION SUBCUTANEOUS at 06:57

## 2023-02-25 NOTE — PROGRESS NOTE ADULT - SUBJECTIVE AND OBJECTIVE BOX
Subjective: "I can't sleep in this bed at night, I'm tired" Pt states frustration on long hospital course at , lack of diagnosis and being "In and out" of multiple hospitals. No current CP or SOB. Seen by NICOLASA this am. On NC O2.     Vital Signs:  Vital Signs Last 24 Hrs  T(C): 36.5 (02-25-23 @ 12:17), Max: 37.1 (02-24-23 @ 21:36)  T(F): 97.7 (02-25-23 @ 12:17), Max: 98.7 (02-24-23 @ 21:36)  HR: 67 (02-25-23 @ 12:17) (60 - 102)  BP: 94/55 (02-25-23 @ 12:17) (94/55 - 129/72)  RR: 19 (02-25-23 @ 12:17) (18 - 19)  SpO2: 91% (02-25-23 @ 12:17) (91% - 98%) on (O2)    Telemetry/Alarms: Afib rate controlled 80s.   General: WN/WD NAD  Neurology: Awake, nonfocal, BAUTISTA x 4  Eyes: Scleras clear, PERRLA/ EOMI, Gross vision intact  ENT:Gross hearing intact, grossly patent pharynx, no stridor  Neck: Neck supple, trachea midline, No JVD,   Respiratory: CTA B/L, No wheezing, rales, rhonchi  CV: RRR, S1S2, no murmurs, rubs or gallops  Abdominal: Soft, NT, ND +BS,   Extremities: No edema, + peripheral pulses  Skin: No Rashes, Hematoma, Ecchymosis  Lymphatic: No Neck, axilla, groin LAD  Psych: Oriented x 3, normal affect  Incisions:   Tubes:  Relevant labs, radiology and Medications reviewed                        12.0   19.52 )-----------( 183      ( 25 Feb 2023 06:28 )             37.8     02-25    136  |  98  |  20  ----------------------------<  122<H>  4.3   |  28  |  0.46<L>    Ca    8.5      25 Feb 2023 06:28    TPro  5.4<L>  /  Alb  1.8<L>  /  TBili  0.6  /  DBili  x   /  AST  31  /  ALT  103<H>  /  AlkPhos  92  02-24      MEDICATIONS  (STANDING):  albuterol    0.083% 2.5 milliGRAM(s) Nebulizer every 6 hours  atenolol  Tablet 75 milliGRAM(s) Oral daily  chlorhexidine 2% Cloths 1 Application(s) Topical <User Schedule>  enoxaparin Injectable 90 milliGRAM(s) SubCutaneous every 12 hours  escitalopram 5 milliGRAM(s) Oral daily  melatonin 6 milliGRAM(s) Oral at bedtime  polyethylene glycol 3350 17 Gram(s) Oral daily  senna 2 Tablet(s) Oral at bedtime  simvastatin 40 milliGRAM(s) Oral at bedtime  tamsulosin 0.4 milliGRAM(s) Oral at bedtime    MEDICATIONS  (PRN):  ALPRAZolam 0.25 milliGRAM(s) Oral daily PRN anxiety    Pertinent Physical Exam  I&O's Summary    24 Feb 2023 07:01  -  25 Feb 2023 07:00  --------------------------------------------------------  IN: 200 mL / OUT: 800 mL / NET: -600 mL    25 Feb 2023 07:01  -  25 Feb 2023 13:04  --------------------------------------------------------  IN: 600 mL / OUT: 240 mL / NET: 360 mL      Marital Status:  (   )    (   ) Single    (   )    (  )   Lives with: (  ) alone  (  ) children   (  ) spouse   (  ) parents  (  ) other  Recent Travel: No recent travel  Occupation:    Substance Use (street drugs): ( x ) never used  (  ) other:  Tobacco Usage:  ( x  ) never smoked   (   ) former smoker   (   ) current smoker  (     ) pack year  Alcohol Usage: None     Assessment  82y Male  w/ PAST MEDICAL & SURGICAL HISTORY:  Atrial fibrillation      Hypertension      Urinary retention      No significant past surgical history      admitted with complaints of Patient is a 82y old  Male who presents with a chief complaint of .  On (Date), patient underwent . Postoperative course/issues:    PLAN  Neuro: Pain management  Pulm: Encourage coughing, deep breathing and use of incentive spirometry. Wean off supplemental oxygen as able. Daily CXR.   Cardio: Monitor telemetry/alarms  GI: Tolerating diet. Continue stool softeners.  Renal: monitor urine output, supplement electrolytes as needed  Vasc: Heparin SC/SCDs for DVT prophylaxis  Heme: Stable H/H. .   ID: Off antibiotics. Stable.  Therapy: OOB/ambulate  Tubes: Monitor Chest tube output  Disposition: Aim to D/C to home on  Discussed with Cardiothoracic Team at AM rounds.      Subjective: "I can't sleep in this bed at night, I'm tired" Pt states frustration on long hospital course at , lack of diagnosis and being "In and out" of multiple hospitals. No current CP or SOB. Seen by NICOLASA this am. On NC O2.     Vital Signs:  Vital Signs Last 24 Hrs  T(C): 36.5 (02-25-23 @ 12:17), Max: 37.1 (02-24-23 @ 21:36)  T(F): 97.7 (02-25-23 @ 12:17), Max: 98.7 (02-24-23 @ 21:36)  HR: 67 (02-25-23 @ 12:17) (60 - 102)  BP: 94/55 (02-25-23 @ 12:17) (94/55 - 129/72)  RR: 19 (02-25-23 @ 12:17) (18 - 19)  SpO2: 91% (02-25-23 @ 12:17) (91% - 98%) on (O2)    Telemetry/Alarms: Afib rate controlled 80s.   General: WN/WD NAD  Neurology: Awake, nonfocal, BAUTISTA x 4  Eyes: Scleras clear, PERRLA/ EOMI, Gross vision intact  ENT:Gross hearing intact, grossly patent pharynx, no stridor  Neck: Neck supple, trachea midline, No JVD,   Respiratory: Coarse BS. Dec BS bilat LL  CV: Irreg, S1S2, no murmurs, rubs or gallops + Afib on Tele  Abdominal: Soft, NT, ND +BS, states + BM yesterday. Plummer in place for yellow urine  Extremities: Trace LE edema, + peripheral pulses  Skin: No Rashes, Hematoma, Ecchymosis  Lymphatic: No Neck, axilla, groin LAD  Psych: Oriented x 3, normal affect  Incisions: rt PTC site with saturated dressing. RN to change.   Tubes: rt PTC- 50cc/24hrs on Sxn, PTC stripped for more fluid. No air leak. Murky yellow serous  Relevant labs, radiology and Medications reviewed           CXR essentially stable.              12.0   19.52 )-----------( 183      ( 25 Feb 2023 06:28 )             37.8     02-25    136  |  98  |  20  ----------------------------<  122<H>  4.3   |  28  |  0.46<L>    Ca    8.5      25 Feb 2023 06:28    TPro  5.4<L>  /  Alb  1.8<L>  /  TBili  0.6  /  DBili  x   /  AST  31  /  ALT  103<H>  /  AlkPhos  92  02-24      MEDICATIONS  (STANDING):  albuterol    0.083% 2.5 milliGRAM(s) Nebulizer every 6 hours  atenolol  Tablet 75 milliGRAM(s) Oral daily  chlorhexidine 2% Cloths 1 Application(s) Topical <User Schedule>  enoxaparin Injectable 90 milliGRAM(s) SubCutaneous every 12 hours  escitalopram 5 milliGRAM(s) Oral daily  melatonin 6 milliGRAM(s) Oral at bedtime  polyethylene glycol 3350 17 Gram(s) Oral daily  senna 2 Tablet(s) Oral at bedtime  simvastatin 40 milliGRAM(s) Oral at bedtime  tamsulosin 0.4 milliGRAM(s) Oral at bedtime    MEDICATIONS  (PRN):  ALPRAZolam 0.25 milliGRAM(s) Oral daily PRN anxiety    Pertinent Physical Exam  I&O's Summary    24 Feb 2023 07:01  -  25 Feb 2023 07:00  --------------------------------------------------------  IN: 200 mL / OUT: 800 mL / NET: -600 mL    25 Feb 2023 07:01  -  25 Feb 2023 13:04  --------------------------------------------------------  IN: 600 mL / OUT: 240 mL / NET: 360 mL    · Social History (marital status, living situation, occupation, and sexual history)	Lives in private home with wife, nearby family active in patient care  No tobacco use for more than 60 years  No history of ETOH abuse, illicit drug use, or substance abuse      Assessment  82y Male  w/ PAST MEDICAL & SURGICAL HISTORY:  Atrial fibrillation      Hypertension      Urinary retention      No significant past surgical history    82 year old male with PMH atrial fibrillation, HTN and chronic urinary retention who customarily self catheterizes at home for the last year, presented to [facility] by ambulance from home on 2/17/2023 with c/o progressive shortness of breath.    Pt was intubated in the field and brought to ED.    Of note, patient recently found to have supraclavicular lymph node enlargement with associated leukocytosis, is being evaluated for lymphoma. Patient also recently admitted to [facility] where he was noted to have large pleural effusion that was drained.  According to family lymph node biopsy was inconclusive and patient had thoracentesis which revealed no infection or malignancy.  Since discharge from [facility] has been oxygen dependant at home which is also a new development.  At [place], patient was admitted to ICU for acute respiratory failure with hypercapnia. CTA chest negative for PE, showed large R pleural effusion with mediastinal and hilar lymphadenopathy. A pigtail was placed and drained over 3 L of fluid. Patient was started on empiric antibiotics and completed 5d course on 2/22/2023.  Pleural fluid cs negative and final. Blood cultures negative and final.  Given presence of lymphadenopathy and persistent leukocytosis, underlying malignancy is suspected. Hematology/oncology was consulted. Recommended transfer to [facility] for further investigation.   Pt was accepted by thoracic surgeon, Dr Nathaniel Galvan and is being evaluated by EBUS/mediastinoscopy and possible pleurX placement.   At [place], urology was consulted for pt's chronic urinary retention and a plummer was placed. Family did not want trial of void and agreed with continued plummer use.   Patient was successfully extubated and started on CPAP in [place] ICU. Patient was downgraded to medical floor and was titrated down to nasal canula. Physical therapy evaluated patient and MORALES is recommended. Pt was then transferred to [facility]. PTC intact on suction with no air leak and pt completes steroid taper 2/26/2023.   Last dose of eliquis 2/22/2023 at 6am, pt is on Lovenox 90mg BID for a/c for afib.         PLAN  Neuro: Pain management  Pulm: Encourage coughing, deep breathing and use of incentive spirometry. cont O2.  Daily CXR.   Cardio: Monitor telemetry/alarms. Cont tele. Lovenox for a/c for afib. Will begin to hold after today for OR on Tuesday. Cardiology consult for Clearance and optimization prior to OR.   GI: Tolerating diet. Continue stool softeners. Encourage po intake.   : Continue Plummer cath long term.   Renal: monitor urine output, supplement electrolytes as needed  Vasc: On  Lovenox  Heme: Stable H/H. . Per daughter's request, will ask Oncology to follow pt here at [facility].   ID: Off antibiotics. Stable. Completed course at . Leukocytosis improving.   Therapy: OOB/ambulate. PT consult  Tubes: Monitor Chest tube output, will keep to suction. No AL  Medicine consult to assist with management of ongoing medical issues/history/optimization  Disposition: Plan for Bronch/EBUS, VATs, lymphnode biopsy with Dr Galvan on Tuesday 2/28 pending clearance and optimization  Discussed above, clinical status and pt's plan extensively with pt's daughter Judith.   Discussed with Cardiothoracic Team at AM rounds.

## 2023-02-25 NOTE — PHYSICAL THERAPY INITIAL EVALUATION ADULT - PERTINENT HX OF CURRENT PROBLEM, REHAB EVAL
82 year old male found to have right pleural effusion and had PTC placed at [place]. Pt transferred to [facility] for evaluation of mediastinal LAD and workup for EBUS/mediastinoscopy.

## 2023-02-25 NOTE — PROGRESS NOTE ADULT - SUBJECTIVE AND OBJECTIVE BOX
Name of Patient : ARIK DUMONT  MRN: [id number]  Date of visit: 02-25-23       Subjective: Patient seen and examined. No new events except as noted.   + weakness    REVIEW OF SYSTEMS:    CONSTITUTIONAL: + weakness  EYES/ENT: No visual changes;  No vertigo or throat pain   NECK: No pain or stiffness  RESPIRATORY: No cough, wheezing, hemoptysis; No shortness of breath  CARDIOVASCULAR: No chest pain or palpitations  GASTROINTESTINAL: No abdominal or epigastric pain. No nausea, vomiting, or hematemesis; No diarrhea or constipation. No melena or hematochezia.  GENITOURINARY: No dysuria, frequency or hematuria  NEUROLOGICAL: No numbness or weakness  SKIN: No itching, burning, rashes, or lesions   All other review of systems is negative unless indicated above.    MEDICATIONS:  MEDICATIONS  (STANDING):  albuterol    0.083% 2.5 milliGRAM(s) Nebulizer every 6 hours  atenolol  Tablet 75 milliGRAM(s) Oral daily  chlorhexidine 2% Cloths 1 Application(s) Topical <User Schedule>  escitalopram 5 milliGRAM(s) Oral daily  melatonin 6 milliGRAM(s) Oral at bedtime  polyethylene glycol 3350 17 Gram(s) Oral daily  senna 2 Tablet(s) Oral at bedtime  simvastatin 40 milliGRAM(s) Oral at bedtime  tamsulosin 0.4 milliGRAM(s) Oral at bedtime      PHYSICAL EXAM:  T(C): 36.7 (02-25-23 @ 20:00), Max: 37 (02-25-23 @ 01:08)  HR: 78 (02-25-23 @ 22:14) (60 - 102)  BP: 115/61 (02-25-23 @ 20:00) (94/55 - 116/77)  RR: 19 (02-25-23 @ 20:00) (18 - 19)  SpO2: 100% (02-25-23 @ 22:14) (91% - 100%)  Wt(kg): --  I&O's Summary    24 Feb 2023 07:01  -  25 Feb 2023 07:00  --------------------------------------------------------  IN: 200 mL / OUT: 800 mL / NET: -600 mL    25 Feb 2023 07:01  -  25 Feb 2023 22:47  --------------------------------------------------------  IN: 1200 mL / OUT: 620 mL / NET: 580 mL      Height (cm): 177.8 (02-25 @ 12:17)    Appearance: Normal	  HEENT:  PERRLA   Lymphatic: No lymphadenopathy   Cardiovascular: Normal S1 S2, no JVD  Respiratory: normal effort , clear, CT intact   Gastrointestinal:  Soft, Non-tender  Skin: No rashes,  warm to touch  Psychiatry:  Mood & affect appropriate  Musculuskeletal: No edema    recent labs, Imaging and EKGs personally reviewed     02-24-23 @ 07:01  -  02-25-23 @ 07:00  --------------------------------------------------------  IN: 200 mL / OUT: 800 mL / NET: -600 mL    02-25-23 @ 07:01  -  02-25-23 @ 22:47  --------------------------------------------------------  IN: 1200 mL / OUT: 620 mL / NET: 580 mL                          12.0   19.52 )-----------( 183      ( 25 Feb 2023 06:28 )             37.8               02-25    136  |  98  |  20  ----------------------------<  122<H>  4.3   |  28  |  0.46<L>    Ca    8.5      25 Feb 2023 06:28    TPro  5.4<L>  /  Alb  1.8<L>  /  TBili  0.6  /  DBili  x   /  AST  31  /  ALT  103<H>  /  AlkPhos  92  02-24

## 2023-02-25 NOTE — PHYSICAL THERAPY INITIAL EVALUATION ADULT - PATIENT PROFILE REVIEW, REHAB EVAL
Activity - Ambulate with assistance. Pt cleared for PT evaluation prior to session by MARVIN Chacon./yes

## 2023-02-25 NOTE — PROGRESS NOTE ADULT - ASSESSMENT
82 year old male with PMH atrial fibrillation, HTN and chronic urinary retention who presented to [facility] from home with acute hypoxic respiratory failure with hypercapnia. Patient was admitted to ICU an [facility], now downgraded and transferred to  [facility] for continuation of his care     #Acute hypoxic respiratory failure with hypercapnia  #Recurrent R pleural effusion-exudative  #Lymphadenopathy and leukocytosis-suspect underlying malignancy  -With large right sided pleural effusion, now status post pigtail placement, monitor output   -O 2 supplement PRN   -Continue steroid taper  -Patient was treated with course of empiric cefepime and dose of vanco at [place]   -Blood cultures 2/17 NGTD, Pleural fluid no growth currently, f/u final culture/cytology  -thoracic surg care appreciated, plan for pleurex and mediastinoscopy to evaluate for lymph nodes  -Heme/onc follow up appreciated     #Afib/ HTN  rate control   not on AC   telemonitoring   Previously on Eliquis- holding for now for procedure , can do lovenox till plan for surg, defer to TS team   -Atenolol can be resumed if elevatde BP     #Urinary Retention  -Continue plummer, per previous notes patient's family did not want trial of void  -Continue flomax    #DVT prophylaxis     Discussed with patient son

## 2023-02-25 NOTE — PHYSICAL THERAPY INITIAL EVALUATION ADULT - ADDITIONAL COMMENTS
Pt states he lives in a house with his wife with 5 steps to enter and a flight of 10 steps once inside. Prior to admission pt reports ambulating independently without a device and was independent in ADLs.    Following evaluation, pt was left sitting in chair in no distress, all lines in tact, call vigil in reach. MARVIN davies.

## 2023-02-25 NOTE — CONSULT NOTE ADULT - ASSESSMENT
ARIK DUMONT is a 82y Male with a past medical history as described above who presented for evaluation of shortness of breath and was subsequently intubated for hypoxic respiratory failure and treated in the ICU at [facility]. There, he was discovered to have hilar and supraclavicular lymphadenopathy. He had a these biopsied and the pathology returned inconclusive. Previously, at [facility], he was discovered to have a large pleural effusion and underwent thoracentesis. Fluid returned negative for malignancy.     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 Almeida. Transferred here, and daughter whom is a pediatrician, requested continuity of specialty oncology care.   - Recommend lymph node excisional biopsy if plan is to undergo VATS by thoracic surgery as previous samples have not been diagnostic.    - Send TLS panel - LDH, uric acid, BMP with iPhos and calcium, magnesium.  - Nutrition eval for hypoalbuminemia   - Transfuse to maintain Hb >7, platelets > 10K, >20K if bleeding, and >50K if bleeding.      Del Green MD, PGY-4  Hematology/Medical Oncology Fellow  Pager: [phone]  Available on [employer] Teams  After 5pm or on weekends please contact  to page on-call fellow     ARIK DUMONT is a 82y Male with a past medical history as described above who presented for evaluation of shortness of breath and was subsequently intubated for hypoxic respiratory failure and treated in the ICU at [facility]. There, he was discovered to have hilar and supraclavicular lymphadenopathy. He had a these biopsied and the pathology returned inconclusive. Previously, at [facility], he was discovered to have a large pleural effusion and underwent thoracentesis. Fluid returned negative for malignancy.     # Lymphadenopathy, suspected lymphoproliferative disorder  # Leukocytosis   - Was being followed at [facility] by hematology/oncology Dr. Almeida. Transferred here, and daughter whom is a pediatrician, requested continuity of specialty oncology care.   - Recommend lymph node excisional biopsy if plan is to undergo VATS by thoracic surgery as previous samples have not been diagnostic.    - Send TLS panel - LDH, uric acid, BMP with iPhos and calcium, magnesium.  - Hepatitis panel  - Send G6PD   - Nutrition eval for hypoalbuminemia   - Transfuse to maintain Hb >7, platelets > 10K, >20K if bleeding, and >50K if bleeding.      Del Green MD, PGY-4  Hematology/Medical Oncology Fellow  Pager: [phone]  Available on Microsoft Teams  After 5pm or on weekends please contact  to page on-call fellow     ARIK DUMONT is a 82y Male with a past medical history as described above who presented for evaluation of shortness of breath and was subsequently intubated for hypoxic respiratory failure and treated in the ICU at [facility]. There, he was discovered to have hilar and supraclavicular lymphadenopathy. He had a these biopsied and the pathology returned inconclusive. Previously, at [facility], he was discovered to have a large pleural effusion and underwent thoracentesis. Fluid returned negative for malignancy.     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 Almeida. Transferred here, and daughter whom is a pediatrician, requested continuity of specialty oncology care.   - Recommend lymph node excisional biopsy if plan is to undergo VATS by thoracic surgery as previous samples have not been diagnostic.    - Send TLS panel - LDH, uric acid, BMP with iPhos and calcium, magnesium.  - Hepatitis panel  - Send G6PD   - Nutrition eval for hypoalbuminemia   - Smear reviewed: normocytic normochromic red cells, rare ovalocytes, no schistocytes seen. Occasional large platelets, with most within normal limits. No clumping. White cell series demonstrates neutrophil predominant. PMNs with vacuolization and granulation, suggesting activation and inflammation. No blasts, no immature cells.    - Transfuse to maintain Hb >7, platelets > 10K, >20K if bleeding, and >50K if bleeding.      Del Green MD, PGY-4  Hematology/Medical Oncology Fellow  Pager: [phone]  Available on Microsoft Teams  After 5pm or on weekends please contact  to page on-call fellow

## 2023-02-26 LAB
ALBUMIN SERPL ELPH-MCNC: 2.4 G/DL — LOW (ref 3.3–5)
ALP SERPL-CCNC: 81 U/L — SIGNIFICANT CHANGE UP (ref 40–120)
ALT FLD-CCNC: 78 U/L — HIGH (ref 4–41)
ANION GAP SERPL CALC-SCNC: 5 MMOL/L — LOW (ref 7–14)
AST SERPL-CCNC: 25 U/L — SIGNIFICANT CHANGE UP (ref 4–40)
BILIRUB SERPL-MCNC: 0.5 MG/DL — SIGNIFICANT CHANGE UP (ref 0.2–1.2)
BUN SERPL-MCNC: 16 MG/DL — SIGNIFICANT CHANGE UP (ref 7–23)
CALCIUM SERPL-MCNC: 8.5 MG/DL — SIGNIFICANT CHANGE UP (ref 8.4–10.5)
CHLORIDE SERPL-SCNC: 99 MMOL/L — SIGNIFICANT CHANGE UP (ref 98–107)
CO2 SERPL-SCNC: 33 MMOL/L — HIGH (ref 22–31)
CREAT SERPL-MCNC: 0.46 MG/DL — LOW (ref 0.5–1.3)
EGFR: 104 ML/MIN/1.73M2 — SIGNIFICANT CHANGE UP
GLUCOSE SERPL-MCNC: 91 MG/DL — SIGNIFICANT CHANGE UP (ref 70–99)
HAV IGM SER-ACNC: SIGNIFICANT CHANGE UP
HBV CORE IGM SER-ACNC: SIGNIFICANT CHANGE UP
HBV SURFACE AG SER-ACNC: SIGNIFICANT CHANGE UP
HCT VFR BLD CALC: 37.7 % — LOW (ref 39–50)
HCV AB S/CO SERPL IA: 0.11 S/CO — SIGNIFICANT CHANGE UP (ref 0–0.99)
HCV AB SERPL-IMP: SIGNIFICANT CHANGE UP
HGB BLD-MCNC: 11.5 G/DL — LOW (ref 13–17)
LDH SERPL L TO P-CCNC: 252 U/L — HIGH (ref 135–225)
MAGNESIUM SERPL-MCNC: 1.7 MG/DL — SIGNIFICANT CHANGE UP (ref 1.6–2.6)
MCHC RBC-ENTMCNC: 27.1 PG — SIGNIFICANT CHANGE UP (ref 27–34)
MCHC RBC-ENTMCNC: 30.5 GM/DL — LOW (ref 32–36)
MCV RBC AUTO: 88.9 FL — SIGNIFICANT CHANGE UP (ref 80–100)
NRBC # BLD: 0 /100 WBCS — SIGNIFICANT CHANGE UP (ref 0–0)
NRBC # FLD: 0 K/UL — SIGNIFICANT CHANGE UP (ref 0–0)
PHOSPHATE SERPL-MCNC: 2.1 MG/DL — LOW (ref 2.5–4.5)
PLATELET # BLD AUTO: 178 K/UL — SIGNIFICANT CHANGE UP (ref 150–400)
POTASSIUM SERPL-MCNC: 3.9 MMOL/L — SIGNIFICANT CHANGE UP (ref 3.5–5.3)
POTASSIUM SERPL-SCNC: 3.9 MMOL/L — SIGNIFICANT CHANGE UP (ref 3.5–5.3)
PROT SERPL-MCNC: 5.6 G/DL — LOW (ref 6–8.3)
RBC # BLD: 4.24 M/UL — SIGNIFICANT CHANGE UP (ref 4.2–5.8)
RBC # FLD: 17.9 % — HIGH (ref 10.3–14.5)
SODIUM SERPL-SCNC: 137 MMOL/L — SIGNIFICANT CHANGE UP (ref 135–145)
URATE SERPL-MCNC: 2.7 MG/DL — LOW (ref 3.4–8.8)
WBC # BLD: 15.71 K/UL — HIGH (ref 3.8–10.5)
WBC # FLD AUTO: 15.71 K/UL — HIGH (ref 3.8–10.5)

## 2023-02-26 PROCEDURE — 99231 SBSQ HOSP IP/OBS SF/LOW 25: CPT

## 2023-02-26 PROCEDURE — 71045 X-RAY EXAM CHEST 1 VIEW: CPT | Mod: 26

## 2023-02-26 RX ORDER — HEPARIN SODIUM 5000 [USP'U]/ML
5000 INJECTION INTRAVENOUS; SUBCUTANEOUS EVERY 8 HOURS
Refills: 0 | Status: DISCONTINUED | OUTPATIENT
Start: 2023-02-26 | End: 2023-02-27

## 2023-02-26 RX ORDER — POTASSIUM PHOSPHATE, MONOBASIC POTASSIUM PHOSPHATE, DIBASIC 236; 224 MG/ML; MG/ML
30 INJECTION, SOLUTION INTRAVENOUS ONCE
Refills: 0 | Status: COMPLETED | OUTPATIENT
Start: 2023-02-26 | End: 2023-02-26

## 2023-02-26 RX ADMIN — ALBUTEROL 2.5 MILLIGRAM(S): 90 AEROSOL, METERED ORAL at 11:16

## 2023-02-26 RX ADMIN — Medication 6 MILLIGRAM(S): at 21:10

## 2023-02-26 RX ADMIN — POTASSIUM PHOSPHATE, MONOBASIC POTASSIUM PHOSPHATE, DIBASIC 83.33 MILLIMOLE(S): 236; 224 INJECTION, SOLUTION INTRAVENOUS at 09:38

## 2023-02-26 RX ADMIN — HEPARIN SODIUM 5000 UNIT(S): 5000 INJECTION INTRAVENOUS; SUBCUTANEOUS at 21:09

## 2023-02-26 RX ADMIN — SENNA PLUS 2 TABLET(S): 8.6 TABLET ORAL at 21:10

## 2023-02-26 RX ADMIN — HEPARIN SODIUM 5000 UNIT(S): 5000 INJECTION INTRAVENOUS; SUBCUTANEOUS at 12:59

## 2023-02-26 RX ADMIN — ATENOLOL 75 MILLIGRAM(S): 25 TABLET ORAL at 06:52

## 2023-02-26 RX ADMIN — TAMSULOSIN HYDROCHLORIDE 0.4 MILLIGRAM(S): 0.4 CAPSULE ORAL at 21:09

## 2023-02-26 RX ADMIN — ESCITALOPRAM OXALATE 5 MILLIGRAM(S): 10 TABLET, FILM COATED ORAL at 12:59

## 2023-02-26 RX ADMIN — ALBUTEROL 2.5 MILLIGRAM(S): 90 AEROSOL, METERED ORAL at 03:33

## 2023-02-26 RX ADMIN — ALBUTEROL 2.5 MILLIGRAM(S): 90 AEROSOL, METERED ORAL at 17:45

## 2023-02-26 RX ADMIN — ALBUTEROL 2.5 MILLIGRAM(S): 90 AEROSOL, METERED ORAL at 21:57

## 2023-02-26 RX ADMIN — Medication 10 MILLIGRAM(S): at 05:50

## 2023-02-26 RX ADMIN — SIMVASTATIN 40 MILLIGRAM(S): 20 TABLET, FILM COATED ORAL at 21:09

## 2023-02-26 RX ADMIN — CHLORHEXIDINE GLUCONATE 1 APPLICATION(S): 213 SOLUTION TOPICAL at 05:49

## 2023-02-26 NOTE — PROGRESS NOTE ADULT - SUBJECTIVE AND OBJECTIVE BOX
Subjective: NAD    Vital Signs:  Vital Signs Last 24 Hrs  T(C): 36.4 (02-26-23 @ 04:40), Max: 36.9 (02-25-23 @ 08:20)  T(F): 97.6 (02-26-23 @ 04:40), Max: 98.4 (02-25-23 @ 08:20)  HR: 95 (02-26-23 @ 05:44) (62 - 95)  BP: 96/46 (02-26-23 @ 05:44) (94/55 - 120/59)  RR: 18 (02-26-23 @ 04:40) (18 - 19)  SpO2: 100% (02-26-23 @ 04:40) (91% - 100%) on (O2)    Telemetry/Alarms:  General: NAD  Neurology: Awake, nonfocal, BAUTISTA x 4  Eyes: Scleras clear, PERRLA/ EOMI, Gross vision intact  ENT: Gross hearing intact, grossly patent pharynx, no stridor  Neck: Neck supple, trachea midline, No JVD,   Respiratory: CTA B/L, No wheezing, rales, rhonchi  CV: RRR, S1S2, no murmurs, rubs or gallops  Abdominal: Soft, NT, ND +BS,   Extremities: No edema, + peripheral pulses  Skin: No Rashes, Hematoma, Ecchymosis  Lymphatic: No Neck, axilla, groin LAD  Psych: Oriented x 3, normal affect    Relevant labs, radiology and Medications reviewed                        11.5   15.71 )-----------( 178      ( 26 Feb 2023 05:58 )             37.7     02-26    137  |  99  |  16  ----------------------------<  91  3.9   |  33<H>  |  0.46<L>    Ca    8.5      26 Feb 2023 05:58  Phos  2.1     02-26  Mg     1.70     02-26    TPro  5.6<L>  /  Alb  2.4<L>  /  TBili  0.5  /  DBili  x   /  AST  25  /  ALT  78<H>  /  AlkPhos  81  02-26      ABG:  CXR:  MEDICATIONS  (STANDING):  albuterol    0.083% 2.5 milliGRAM(s) Nebulizer every 6 hours  atenolol  Tablet 75 milliGRAM(s) Oral daily  chlorhexidine 2% Cloths 1 Application(s) Topical <User Schedule>  escitalopram 5 milliGRAM(s) Oral daily  heparin   Injectable 5000 Unit(s) SubCutaneous every 8 hours  melatonin 6 milliGRAM(s) Oral at bedtime  polyethylene glycol 3350 17 Gram(s) Oral daily  potassium phosphate IVPB 30 milliMole(s) IV Intermittent once  senna 2 Tablet(s) Oral at bedtime  simvastatin 40 milliGRAM(s) Oral at bedtime  tamsulosin 0.4 milliGRAM(s) Oral at bedtime    MEDICATIONS  (PRN):  ALPRAZolam 0.25 milliGRAM(s) Oral daily PRN anxiety    Pertinent Physical Exam  I&O's Summary    25 Feb 2023 07:01  -  26 Feb 2023 07:00  --------------------------------------------------------  IN: 1450 mL / OUT: 1720 mL / NET: -270 mL        Assessment  82y Male  w/ PAST MEDICAL & SURGICAL HISTORY:  Atrial fibrillation  Hypertension1  Urinary retention  No significant past surgical history  admitted with complaints of Patient is a 82y old  Male who presents with a chi.    Vasc: Heparin SC/SCDs for DVT prophylaxis  Heme: Stable H/H.  Cardiac: Cardiology  reccomend stress test. Family not sure.  ID: Off antibiotics. Stable.  Therapy: OOB/ambulate  Tubes: Monitor Chest tube output  Discussed with Cardiothoracic Team at AM rounds.

## 2023-02-26 NOTE — CONSULT NOTE ADULT - ASSESSMENT
Assessment: 82y Male with supraclavicular lymphadenopathy. IR consulted for biopsy.    Plan:   - Please place order for IR Procedure, approving attending Dr. Peter. Will plan for procedure later this week.  - NPO past midnight prior to procedure  - hold therapeutic/prophylactic anticoagulants  - AM CBC, BMP, and coags  - COVID PCR within 5 days of procedure    Michael Hastings MD  PGY-V, Interventional Radiology    For EMERGENT inquiries/questions:  [facility]-[phone]  [facility]-[phone] ([phone])    Available on [employer] TEAMS for all non-urgent questions  Non-emergent consults: Please place a sunrise order "Consult-Interventional Radiology" with an appropriate callback number.    For questions about scheduling during appropriate work hours, call IR :  [facility]: [phone]  [facility]: [phone]    For outpatient IR booking:  [facility]: [phone]  [facility]: [phone]

## 2023-02-26 NOTE — CONSULT NOTE ADULT - SUBJECTIVE AND OBJECTIVE BOX
Reason for Consult:   History of Present Illness:   82 year old male with PMH atrial fibrillation, HTN and chronic urinary retention who customarily self catheterizes at home for the last year, presented to [facility] by ambulance from home on 2/17/2023 with c/o progressive shortness of breath.    Pt was intubated in the field and brought to ED.  Of note, patient recently found to have supraclavicular lymph node enlargement with associated leukocytosis, is being evaluated for lymphoma. According to family lymph node biopsy was inconclusive and patient had thoracentesis which revealed no infection or malignancy.  Since discharge from [facility] has been oxygen dependant at home which is also a new development.  Given presence of lymphadenopathy and persistent leukocytosis, underlying malignancy is suspected. Hematology/oncology was consulted. Recommended transfer to [facility] for further investigation.   Pt was accepted by thoracic surgeon, Dr Nathaniel Galvan and is being evaluated by EBUS/mediastinoscopy and possible pleurX placement.     Pertinent PMH/PSH:   Atrial fibrillation  Hypertension  Urinary retention  No significant past surgical history    Allergies:  pertussis vaccines (Rash)  shellfish (Rash)    Medications:   heparin   Injectable  albuterol    0.083%  tamsulosin  melatonin  atenolol  Tablet  ALPRAZolam  simvastatin  escitalopram    Vital Signs:  T(C): 36.3 (02-26-23 @ 19:50), Max: 36.8 (02-26-23 @ 09:16)  HR: 75 (02-26-23 @ 22:02) (59 - 95)  BP: 99/49 (02-26-23 @ 19:50) (90/50 - 120/59)  RR: 17 (02-26-23 @ 19:50) (17 - 19)  SpO2: 100% (02-26-23 @ 22:02) (97% - 100%)    Relevant Lab Results:          11.5  15.71)-----(178     (02-26-23 @ 05:58)         37.7     137 | 99 | 16  --------------------< 91     (02-26-23 @ 05:58)  3.9 | 33 | 0.46       Imaging: Extensive mediastinal lymphadenopathy and right supraclavicular lymphadenopathy noted on prior CT 2/27.

## 2023-02-26 NOTE — PROGRESS NOTE ADULT - ASSESSMENT
82 year old male with PMH atrial fibrillation, HTN and chronic urinary retention who presented to [facility] from home with acute hypoxic respiratory failure with hypercapnia. Patient was admitted to ICU an [facility], now downgraded and transferred to  [facility] for continuation of his care     #Acute hypoxic respiratory failure with hypercapnia  #Recurrent R pleural effusion-exudative  #Lymphadenopathy and leukocytosis-suspect underlying malignancy  -With large right sided pleural effusion, now status post pigtail placement, monitor output   -O 2 supplement PRN   -Continue steroid taper  -Patient was treated with course of empiric cefepime and dose of vanco at [place]   -Blood cultures 2/17 NGTD, Pleural fluid no growth currently, f/u final culture/cytology  -thoracic surg care appreciated, plan for pleurex and mediastinoscopy to evaluate for lymph nodes. VATS  -Heme/onc follow up appreciated     #Afib/ HTN  rate control   not on AC   telemonitoring   Previously on Eliquis- holding for now for procedure , can do lovenox till plan for surg, defer to TS team   -Atenolol can be resumed if elevatde BP     #Urinary Retention  -Continue plummer, per previous notes patient's family did not want trial of void  -Continue flomax    #DVT prophylaxis     Discussed with patient son

## 2023-02-26 NOTE — CONSULT NOTE ADULT - SUBJECTIVE AND OBJECTIVE BOX
CHIEF COMPLAINT:Patient is a 82y old  Male who presents with a chief complaint of Pleural effusion, LAD (2023 13:03)      HISTORY OF PRESENT ILLNESS:    82 male with history of chronic afib on a/c , HTN , urinary retention   presented to [facility] from home with acute hypoxic respiratory failure with hypercapnia. Patient was admitted to ICU an [facility] Recurrent Right pleural Effusion s/p Pig tail chest tube  - Exudative  Lymphadenopathy and leukocytosis concern for underlying malignancy  now transferred to  [facility] for continuation of his care , plan for VATS   cardiology is called for Pre-Operative Cardiac Risk Stratification and Optimization   pt states he is more comfortable   no cp   no significant sob       PAST MEDICAL & SURGICAL HISTORY:  Atrial fibrillation      Hypertension      Urinary retention      No significant past surgical history              MEDICATIONS:  atenolol  Tablet 75 milliGRAM(s) Oral daily      albuterol    0.083% 2.5 milliGRAM(s) Nebulizer every 6 hours    ALPRAZolam 0.25 milliGRAM(s) Oral daily PRN  escitalopram 5 milliGRAM(s) Oral daily  melatonin 6 milliGRAM(s) Oral at bedtime    polyethylene glycol 3350 17 Gram(s) Oral daily  senna 2 Tablet(s) Oral at bedtime    simvastatin 40 milliGRAM(s) Oral at bedtime    chlorhexidine 2% Cloths 1 Application(s) Topical <User Schedule>  tamsulosin 0.4 milliGRAM(s) Oral at bedtime      FAMILY HISTORY:      Non-contributory    SOCIAL HISTORY:    No tobacco, drugs or etoh    Allergies    pertussis vaccines (Rash)  shellfish (Rash)    Intolerances    	    REVIEW OF SYSTEMS:  as above  The rest of the 14 points ROS reviewed and except above they are unremarkable.        PHYSICAL EXAM:  T(C): 36.4 (23 @ 04:40), Max: 36.9 (23 @ 08:20)  HR: 95 (23 @ 05:44) (62 - 95)  BP: 96/46 (23 @ 05:44) (94/55 - 120/59)  RR: 18 (23 @ 04:40) (18 - 19)  SpO2: 100% (23 @ 04:40) (91% - 100%)  Wt(kg): --  I&O's Summary    2023 07:01  -  2023 07:00  --------------------------------------------------------  IN: 1450 mL / OUT: 1720 mL / NET: -270 mL      JVP: Normal  Neck: supple  Lung: clear   CV: S1 S2 , Murmur:  Abd: soft  Ext: No edema  neuro: Awake / alert  Psych: flat affect  Skin: normal      LABS/DATA:    TELEMETRY: 	  afib  ECG:  	   	  CARDIAC MARKERS:      < from: TTE Echo Complete w/o Contrast w/ Doppler (23 @ 08:03) >    ACC: [id number] EXAM:  ECHO TTE WO CON COMP W DOPP                          PROCEDURE DATE:  2023          INTERPRETATION:  TRANSTHORACIC ECHOCARDIOGRAM REPORT        Patient Name:   ARIK DUMONT Patient Location: [address] Rec #:  [id number]       Accession #:      [id number]  Account #:      [id number]        Height:           69.7 in 177.0 cm  YOB: 1940      Weight:           202.8 lb 92.00 kg  Patient Age:    82 years       BSA:              2.09 m²  Patient Gender: M          BP:               101/77 mmHg      Date of Exam:        2023 8:03:19 AM  Sonographer:         DIANA  Referring Physician: LAKSHMI    Procedure:     2D Echo/Doppler/Color Doppler Complete.  Indications:   I50.9 - Heart failure, unspecified  Diagnosis:     I50.9 - Heart failure, unspecified  Study Details: Technically adequate study. Study quality was adversely   affected                 due to the patient being uncooperative.        2D AND M-MODE MEASUREMENTS (normal ranges within parentheses):  Left Ventricle:                  Normal   Aorta/Left Atrium:              Normal  IVSd (2D):              1.19 cm (0.7-1.1) Aortic Root (Mmode): 3.78 cm   (2.4-3.7)  LVPWd (2D):             0.99 cm (0.7-1.1) AoV Cusp Separation: 1.69 cm   (1.5-2.6)  LVIDd (2D):             4.68 cm (3.4-5.7)  LVIDs (2D):             3.48 cm  LV FS (2D):             25.6 %   (>25%)  LV EF (2D):              50 %    (>55%)  Relative Wall Thickness  0.42    (<0.42)    SPECTRAL DOPPLER ANALYSIS (where applicable):  Aortic Valve: AoV Max Sebastian: 1.27 m/s AoV Peak P.4 mmHg AoV Mean PG:   3.2 mmHg    LVOT Vmax: 0.85 m/s LVOT VTI: 0.139 m LVOT Diameter: 2.20 cm    AoV Area, Vmax: 2.56 cm² AoV Area, VTI: 2.58 cm² AoV Area, Vmn: 2.31 cm²  Ao VTI: 0.205  Aortic Insufficiency:  AI Half-time:  994 msec  AI Decel Rate: 0.69 m/s²    Tricuspid Valve and PA/RV Systolic Pressure: TR Max Velocity: 1.20 m/s RA   Pressure: 10 mmHg RVSP/PASP: 15.7 mmHg      PHYSICIAN INTERPRETATION:  Left Ventricle: The left ventricular internal cavity size is normal. Left   ventricular wall thickness is normal.  Global LV systolic function was normal. Left ventricular ejection   fraction, by visual estimation, is 50 to 55%. The left ventricular   diastolic function could not be assessed in this study.  Right Ventricle: The right ventricular size is normal.  Left Atrium: Moderately enlarged left atrium.  Right Atrium: Moderately enlarged right atrium.  Pericardium: Trivial pericardial effusion is present. The pericardial   effusion is globally located around the entire heart.  Mitral Valve: Thickening of the anterior and posterior mitral valve   leaflets. There is mild mitral annular calcification. Mild mitral valve   regurgitation is seen.  Aortic Valve: The aortic valveis trileaflet. Trivial aortic valve   regurgitation is seen.  Pulmonic Valve: The pulmonic valve is normal. Trace pulmonic valve   regurgitation.  Aorta: The aortic root is normal in size and structure.      Summary:   1. Left ventricular ejection fraction, by visual estimation, is 50 to   55%.   2. Normal global left ventricular systolic function.   3. The left ventricular diastolic function could not be assessed in this   study.   4. Moderately enlarged left atrium.   5. Moderately enlarged right atrium.   6. Mild mitral annular calcification.   7. Mild mitral valve regurgitation.   8. Thickening of the anterior and posterior mitral valve leaflets.    [id number] Gibran Sahu MD, [facility] , Electronically signed on   2023 at 2:31:51 PM      < end of copied text >                            12.0   19.52 )-----------( 183      ( 2023 06:28 )             37.8         136  |  98  |  20  ----------------------------<  122<H>  4.3   |  28  |  0.46<L>    Ca    8.5      2023 06:28      proBNP:   Lipid Profile:   HgA1c:   TSH:

## 2023-02-26 NOTE — PROGRESS NOTE ADULT - SUBJECTIVE AND OBJECTIVE BOX
Name of Patient : ARIK DUMONT  MRN: [id number]  Date of visit: 02-26-23 @ 15:21      Subjective: Patient seen and examined. No new events except as noted.     REVIEW OF SYSTEMS:    CONSTITUTIONAL: No weakness, fevers or chills  EYES/ENT: No visual changes;  No vertigo or throat pain   NECK: No pain or stiffness  RESPIRATORY: No cough, wheezing, hemoptysis; No shortness of breath  CARDIOVASCULAR: No chest pain or palpitations  GASTROINTESTINAL: No abdominal or epigastric pain. No nausea, vomiting, or hematemesis; No diarrhea or constipation. No melena or hematochezia.  GENITOURINARY: No dysuria, frequency or hematuria  NEUROLOGICAL: No numbness or weakness  SKIN: No itching, burning, rashes, or lesions   All other review of systems is negative unless indicated above.    MEDICATIONS:  MEDICATIONS  (STANDING):  albuterol    0.083% 2.5 milliGRAM(s) Nebulizer every 6 hours  atenolol  Tablet 75 milliGRAM(s) Oral daily  chlorhexidine 2% Cloths 1 Application(s) Topical <User Schedule>  escitalopram 5 milliGRAM(s) Oral daily  heparin   Injectable 5000 Unit(s) SubCutaneous every 8 hours  melatonin 6 milliGRAM(s) Oral at bedtime  polyethylene glycol 3350 17 Gram(s) Oral daily  senna 2 Tablet(s) Oral at bedtime  simvastatin 40 milliGRAM(s) Oral at bedtime  tamsulosin 0.4 milliGRAM(s) Oral at bedtime      PHYSICAL EXAM:  T(C): 36.3 (02-26-23 @ 12:42), Max: 36.8 (02-26-23 @ 09:16)  HR: 59 (02-26-23 @ 12:42) (59 - 95)  BP: 91/68 (02-26-23 @ 12:42) (90/50 - 120/59)  RR: 18 (02-26-23 @ 12:42) (18 - 19)  SpO2: 97% (02-26-23 @ 12:42) (95% - 100%)  Wt(kg): --  I&O's Summary    25 Feb 2023 07:01  -  26 Feb 2023 07:00  --------------------------------------------------------  IN: 1450 mL / OUT: 1720 mL / NET: -270 mL    26 Feb 2023 07:01 - 26 Feb 2023 15:21  --------------------------------------------------------  IN: 0 mL / OUT: 120 mL / NET: -120 mL          Appearance: Normal	  HEENT:  PERRLA   Lymphatic: No lymphadenopathy   Cardiovascular: Normal S1 S2, no JVD  Respiratory: normal effort , ct  Gastrointestinal:  Soft, Non-tender  Skin: No rashes,  warm to touch  Psychiatry:  Mood & affect appropriate  Musculuskeletal: No edema    recent labs, Imaging and EKGs personally reviewed       02-25-23 @ 07:01  -  02-26-23 @ 07:00  --------------------------------------------------------  IN: 1450 mL / OUT: 1720 mL / NET: -270 mL    02-26-23 @ 07:01 - 02-26-23 @ 15:21  --------------------------------------------------------  IN: 0 mL / OUT: 120 mL / NET: -120 mL

## 2023-02-26 NOTE — CONSULT NOTE ADULT - ASSESSMENT
Pl effusion   plan for vats  suspicion for malignancy     afib  HR stable   a/c to be resumed after surgery     Pre-Operative Cardiac Risk Stratification and Optimization   Based on patient history and physical exam, the patient is considered to have elevated risk   EKG shows st depression in lateral and nadya lateral leads   recommend pharm nuclear stress test     discussed with daughter over the phone and primary team  Pl effusion   plan for vats  suspicion for malignancy     afib  HR stable   a/c to be resumed after surgery     Pre-Operative Cardiac Risk Stratification and Optimization   Based on patient history and physical exam, the patient is considered to have elevated risk   EKG shows st depression in lateral and nadya lateral leads   recommend pharm nuclear stress test       discussed with daughter over the phone and primary team  Pl effusion   plan for vats  suspicion for malignancy     afib  HR stable   a/c to be resumed after surgery     Pre-Operative Cardiac Risk Stratification and Optimization   Based on patient history and physical exam, the patient is considered to have elevated risk   EKG shows st depression in lateral and nadya lateral leads   recommend pharm nuclear stress test   family wants to think about it going forward with stress test       discussed with daughter over the phone and primary team

## 2023-02-27 LAB
ANION GAP SERPL CALC-SCNC: 5 MMOL/L — LOW (ref 7–14)
APTT BLD: 30.7 SEC — SIGNIFICANT CHANGE UP (ref 27–36.3)
APTT BLD: 43.2 SEC — HIGH (ref 27–36.3)
BUN SERPL-MCNC: 13 MG/DL — SIGNIFICANT CHANGE UP (ref 7–23)
CALCIUM SERPL-MCNC: 8.2 MG/DL — LOW (ref 8.4–10.5)
CHLORIDE SERPL-SCNC: 102 MMOL/L — SIGNIFICANT CHANGE UP (ref 98–107)
CO2 SERPL-SCNC: 32 MMOL/L — HIGH (ref 22–31)
CREAT SERPL-MCNC: 0.45 MG/DL — LOW (ref 0.5–1.3)
EGFR: 104 ML/MIN/1.73M2 — SIGNIFICANT CHANGE UP
GLUCOSE SERPL-MCNC: 110 MG/DL — HIGH (ref 70–99)
HCT VFR BLD CALC: 35.2 % — LOW (ref 39–50)
HCT VFR BLD CALC: 37.4 % — LOW (ref 39–50)
HGB BLD-MCNC: 10.9 G/DL — LOW (ref 13–17)
HGB BLD-MCNC: 11.6 G/DL — LOW (ref 13–17)
INR BLD: 1.26 RATIO — HIGH (ref 0.88–1.16)
MCHC RBC-ENTMCNC: 27.6 PG — SIGNIFICANT CHANGE UP (ref 27–34)
MCHC RBC-ENTMCNC: 27.7 PG — SIGNIFICANT CHANGE UP (ref 27–34)
MCHC RBC-ENTMCNC: 31 GM/DL — LOW (ref 32–36)
MCHC RBC-ENTMCNC: 31 GM/DL — LOW (ref 32–36)
MCV RBC AUTO: 89 FL — SIGNIFICANT CHANGE UP (ref 80–100)
MCV RBC AUTO: 89.6 FL — SIGNIFICANT CHANGE UP (ref 80–100)
NON-GYNECOLOGICAL CYTOLOGY STUDY: SIGNIFICANT CHANGE UP
NRBC # BLD: 0 /100 WBCS — SIGNIFICANT CHANGE UP (ref 0–0)
NRBC # BLD: 0 /100 WBCS — SIGNIFICANT CHANGE UP (ref 0–0)
NRBC # FLD: 0 K/UL — SIGNIFICANT CHANGE UP (ref 0–0)
NRBC # FLD: 0 K/UL — SIGNIFICANT CHANGE UP (ref 0–0)
PLATELET # BLD AUTO: 176 K/UL — SIGNIFICANT CHANGE UP (ref 150–400)
PLATELET # BLD AUTO: 189 K/UL — SIGNIFICANT CHANGE UP (ref 150–400)
POTASSIUM SERPL-MCNC: 3.8 MMOL/L — SIGNIFICANT CHANGE UP (ref 3.5–5.3)
POTASSIUM SERPL-SCNC: 3.8 MMOL/L — SIGNIFICANT CHANGE UP (ref 3.5–5.3)
PROTHROM AB SERPL-ACNC: 14.7 SEC — HIGH (ref 10.5–13.4)
RBC # BLD: 3.93 M/UL — LOW (ref 4.2–5.8)
RBC # BLD: 4.2 M/UL — SIGNIFICANT CHANGE UP (ref 4.2–5.8)
RBC # FLD: 18 % — HIGH (ref 10.3–14.5)
RBC # FLD: 18.1 % — HIGH (ref 10.3–14.5)
SODIUM SERPL-SCNC: 139 MMOL/L — SIGNIFICANT CHANGE UP (ref 135–145)
WBC # BLD: 11.87 K/UL — HIGH (ref 3.8–10.5)
WBC # BLD: 13 K/UL — HIGH (ref 3.8–10.5)
WBC # FLD AUTO: 11.87 K/UL — HIGH (ref 3.8–10.5)
WBC # FLD AUTO: 13 K/UL — HIGH (ref 3.8–10.5)

## 2023-02-27 PROCEDURE — 71045 X-RAY EXAM CHEST 1 VIEW: CPT | Mod: 26

## 2023-02-27 PROCEDURE — 99232 SBSQ HOSP IP/OBS MODERATE 35: CPT | Mod: GC

## 2023-02-27 PROCEDURE — 99232 SBSQ HOSP IP/OBS MODERATE 35: CPT

## 2023-02-27 RX ORDER — ALBUMIN HUMAN 25 %
250 VIAL (ML) INTRAVENOUS ONCE
Refills: 0 | Status: COMPLETED | OUTPATIENT
Start: 2023-02-27 | End: 2023-02-27

## 2023-02-27 RX ORDER — FAMOTIDINE 10 MG/ML
20 INJECTION INTRAVENOUS DAILY
Refills: 0 | Status: DISCONTINUED | OUTPATIENT
Start: 2023-02-27 | End: 2023-03-15

## 2023-02-27 RX ORDER — HEPARIN SODIUM 5000 [USP'U]/ML
INJECTION INTRAVENOUS; SUBCUTANEOUS
Qty: 25000 | Refills: 0 | Status: DISCONTINUED | OUTPATIENT
Start: 2023-02-27 | End: 2023-02-28

## 2023-02-27 RX ADMIN — ALBUTEROL 2.5 MILLIGRAM(S): 90 AEROSOL, METERED ORAL at 22:38

## 2023-02-27 RX ADMIN — FAMOTIDINE 20 MILLIGRAM(S): 10 INJECTION INTRAVENOUS at 21:45

## 2023-02-27 RX ADMIN — SIMVASTATIN 40 MILLIGRAM(S): 20 TABLET, FILM COATED ORAL at 21:47

## 2023-02-27 RX ADMIN — Medication 250 MILLILITER(S): at 13:48

## 2023-02-27 RX ADMIN — ATENOLOL 75 MILLIGRAM(S): 25 TABLET ORAL at 05:49

## 2023-02-27 RX ADMIN — SENNA PLUS 2 TABLET(S): 8.6 TABLET ORAL at 21:46

## 2023-02-27 RX ADMIN — HEPARIN SODIUM 880 UNIT(S)/HR: 5000 INJECTION INTRAVENOUS; SUBCUTANEOUS at 15:07

## 2023-02-27 RX ADMIN — Medication 6 MILLIGRAM(S): at 21:46

## 2023-02-27 RX ADMIN — HEPARIN SODIUM 980 UNIT(S)/HR: 5000 INJECTION INTRAVENOUS; SUBCUTANEOUS at 22:06

## 2023-02-27 RX ADMIN — TAMSULOSIN HYDROCHLORIDE 0.4 MILLIGRAM(S): 0.4 CAPSULE ORAL at 21:46

## 2023-02-27 RX ADMIN — HEPARIN SODIUM 880 UNIT(S)/HR: 5000 INJECTION INTRAVENOUS; SUBCUTANEOUS at 19:22

## 2023-02-27 RX ADMIN — ALBUTEROL 2.5 MILLIGRAM(S): 90 AEROSOL, METERED ORAL at 10:16

## 2023-02-27 RX ADMIN — HEPARIN SODIUM 5000 UNIT(S): 5000 INJECTION INTRAVENOUS; SUBCUTANEOUS at 05:50

## 2023-02-27 RX ADMIN — CHLORHEXIDINE GLUCONATE 1 APPLICATION(S): 213 SOLUTION TOPICAL at 05:49

## 2023-02-27 RX ADMIN — ALBUTEROL 2.5 MILLIGRAM(S): 90 AEROSOL, METERED ORAL at 03:09

## 2023-02-27 NOTE — PROGRESS NOTE ADULT - ASSESSMENT
ARIK DUMONT is a 82y Male with a past medical history as described above who presented for evaluation of shortness of breath and was subsequently intubated for hypoxic respiratory failure and treated in the ICU at [facility]. There, he was discovered to have hilar and supraclavicular lymphadenopathy. He had a these biopsied and the pathology returned inconclusive. Previously, at [facility], he was discovered to have a large pleural effusion and underwent thoracentesis. Fluid returned negative for malignancy.     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 Almeida. Transferred here, and daughter whom is a pediatrician, requested continuity of specialty oncology care.   - Recommend lymph node excisional biopsy if plan is to undergo VATS by thoracic surgery as previous samples have not been diagnostic.    - Send TLS panel - LDH, uric acid, BMP with iPhos and calcium, magnesium.  - Hepatitis panel  - Send G6PD   - Nutrition eval for hypoalbuminemia   - Smear reviewed: normocytic normochromic red cells, rare ovalocytes, no schistocytes seen. Occasional large platelets, with most within normal limits. No clumping. White cell series demonstrates neutrophil predominant. PMNs with vacuolization and granulation, suggesting activation and inflammation. No blasts, no immature cells.    - Transfuse to maintain Hb >7, platelets > 10K, >20K if bleeding, and >50K if bleeding. ARIK DUMONT is a 82y Male with a past medical history as described above who presented for evaluation of shortness of breath and was subsequently intubated for hypoxic respiratory failure and treated in the ICU at [facility]. There, he was discovered to have hilar and supraclavicular lymphadenopathy. He had a these biopsied and the pathology returned inconclusive. Previously, at [facility], he was discovered to have a large pleural effusion and underwent thoracentesis. Fluid returned negative for malignancy.     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 Almeida. Transferred here, and daughter whom is a pediatrician, requested continuity of specialty oncology care.   - while ideally, excisional lymph node excisional biopsy or VATS by thoracic surgery as previous samples have not been diagnostic, however discussed w/ daughter who would prefer a less invasive approach, so would do supraclavicular core needle biopsy w/ IR planned 2/28  - Send TLS panel daily - LDH, uric acid, phos, CMP  - Hepatitis negative, HIV pending  - G6PD pending   - Smear reviewed by prior heme team: normocytic normochromic red cells, rare ovalocytes, no schistocytes seen. Occasional large platelets, with most within normal limits. No clumping. White cell series demonstrates neutrophil predominant. PMNs with vacuolization and granulation, suggesting activation and inflammation. No blasts, no immature cells.    - Transfuse to maintain Hb >7, platelets > 10K, >20K if bleeding, and >50K if bleeding.

## 2023-02-27 NOTE — DIETITIAN NUTRITION RISK NOTIFICATION - TREATMENT: THE FOLLOWING DIET HAS BEEN RECOMMENDED
Diet, Regular:   DASH/TLC {Sodium & Cholesterol Restricted}  No Straws  No Carb Prosource (1pkg = 15gms Protein)     Qty per Day:  1  Supplement Feeding Modality:  Oral  Two Rahul HN Cans or Servings Per Day:  1       Frequency:  Two Times a day (02-26-23 @ 07:38) [Active]

## 2023-02-27 NOTE — DIETITIAN INITIAL EVALUATION ADULT - OTHER INFO
As per chart, Pt is an 82 year old male with PMH atrial fibrillation, HTN and chronic urinary retention who customarily self catheterizes at home for the last year, presented to [facility] by ambulance from home on 2/17/2023 with c/o progressive shortness of breath.    Pt report having a poor appetite and po intake. He has no complaint of GI distress (nausea, vomiting, diarrhea, constipation). He had no difficulty chewing or swallowing. Pt has an allergy to shellfish.  Pt states his usual weight is 210 lbs. His current weight is 197 lb/89.4 kg. Pt has a 13 lb weight loss in about 4 weeks.   Discussed food choices and preferences.  Pt is amenable to trying Ensure supplement.

## 2023-02-27 NOTE — CHART NOTE - NSCHARTNOTEFT_GEN_A_CORE
PRE-INTERVENTIONAL RADIOLOGY PROCEDURE NOTE      Patient Age:     Patient Gender: male    Procedure: Supraclavicular LN biopsy    Diagnosis/Indication: LAD    Interventional Radiology Attending Physician: Dr. Peter    Ordering Attending Physician: Dr. Galvan    Pertinent Medical History:LAD, afib, HTn, Pleural effusion    Pertinent labs:                      10.9   11.87 )-----------( 176      ( 27 Feb 2023 05:46 )             35.2       02-27    139  |  102  |  13  ----------------------------<  110<H>  3.8   |  32<H>  |  0.45<L>    Ca    8.2<L>      27 Feb 2023 05:46  Phos  2.1     02-26  Mg     1.70     02-26    TPro  5.6<L>  /  Alb  2.4<L>  /  TBili  0.5  /  DBili  x   /  AST  25  /  ALT  78<H>  /  AlkPhos  81  02-26      PT/INR - ( 27 Feb 2023 11:58 )   PT: 14.7 sec;   INR: 1.26 ratio         PTT - ( 27 Feb 2023 11:58 )  PTT:30.7 sec        Patient and Family Aware ? Yes

## 2023-02-27 NOTE — DIETITIAN INITIAL EVALUATION ADULT - PERTINENT MEDS FT
MEDICATIONS  (STANDING):  albuterol    0.083% 2.5 milliGRAM(s) Nebulizer every 6 hours  chlorhexidine 2% Cloths 1 Application(s) Topical <User Schedule>  escitalopram 5 milliGRAM(s) Oral daily  famotidine    Tablet 20 milliGRAM(s) Oral daily  heparin   Injectable 5000 Unit(s) SubCutaneous every 8 hours  melatonin 6 milliGRAM(s) Oral at bedtime  polyethylene glycol 3350 17 Gram(s) Oral daily  senna 2 Tablet(s) Oral at bedtime  simvastatin 40 milliGRAM(s) Oral at bedtime  tamsulosin 0.4 milliGRAM(s) Oral at bedtime    MEDICATIONS  (PRN):  ALPRAZolam 0.25 milliGRAM(s) Oral daily PRN anxiety

## 2023-02-27 NOTE — DIETITIAN INITIAL EVALUATION ADULT - NSFNSGIIOFT_GEN_A_CORE
02-26-23 @ 07:01  -  02-27-23 @ 07:00  --------------------------------------------------------  OUT:    Chest Tube (mL): 100 mL  Total OUT: 100 mL    Total NET: -100 mL      02-27-23 @ 07:01  -  02-27-23 @ 14:02  --------------------------------------------------------  OUT:    Chest Tube (mL): 60 mL  Total OUT: 60 mL    Total NET: -60 mL

## 2023-02-27 NOTE — DIETITIAN INITIAL EVALUATION ADULT - NS FNS DIET ORDER
Diet, Regular:   DASH/TLC {Sodium & Cholesterol Restricted}  No Straws  No Carb Prosource (1pkg = 15gms Protein)     Qty per Day:  1  Supplement Feeding Modality:  Oral  Two Rahul HN Cans or Servings Per Day:  1       Frequency:  Two Times a day (02-26-23 @ 07:38)

## 2023-02-27 NOTE — PROGRESS NOTE ADULT - SUBJECTIVE AND OBJECTIVE BOX
Nathaniel Stewart MD PGY-4 Hematology Oncology  Reachable on TEAMS    INTERVAL HPI/OVERNIGHT EVENTS:  Patient S&E at bedside. No acute events, no active complaints    VITAL SIGNS:  T(F): 97.5 (02-27-23 @ 08:44)  HR: 52 (02-27-23 @ 10:16)  BP: 93/45 (02-27-23 @ 08:44)  RR: 17 (02-27-23 @ 08:44)  SpO2: 100% (02-27-23 @ 10:16)  Wt(kg): --    PHYSICAL EXAM:    Constitutional: NAD  Eyes: EOMI, sclera non-icteric  Neck: supple, no masses, no JVD  Respiratory: CTA b/l, good air entry b/l  Cardiovascular: RRR, no M/R/G  Gastrointestinal: soft, NTND, no masses palpable, + BS, no hepatosplenomegaly  Extremities: no c/c/e  Neurological: AAOx3      MEDICATIONS  (STANDING):  albuterol    0.083% 2.5 milliGRAM(s) Nebulizer every 6 hours  atenolol  Tablet 75 milliGRAM(s) Oral daily  chlorhexidine 2% Cloths 1 Application(s) Topical <User Schedule>  escitalopram 5 milliGRAM(s) Oral daily  heparin   Injectable 5000 Unit(s) SubCutaneous every 8 hours  melatonin 6 milliGRAM(s) Oral at bedtime  polyethylene glycol 3350 17 Gram(s) Oral daily  senna 2 Tablet(s) Oral at bedtime  simvastatin 40 milliGRAM(s) Oral at bedtime  tamsulosin 0.4 milliGRAM(s) Oral at bedtime    MEDICATIONS  (PRN):  ALPRAZolam 0.25 milliGRAM(s) Oral daily PRN anxiety      Allergies    pertussis vaccines (Rash)  shellfish (Rash)    Intolerances        LABS:                        10.9   11.87 )-----------( 176      ( 27 Feb 2023 05:46 )             35.2     02-27    139  |  102  |  13  ----------------------------<  110<H>  3.8   |  32<H>  |  0.45<L>    Ca    8.2<L>      27 Feb 2023 05:46  Phos  2.1     02-26  Mg     1.70     02-26    TPro  5.6<L>  /  Alb  2.4<L>  /  TBili  0.5  /  DBili  x   /  AST  25  /  ALT  78<H>  /  AlkPhos  81  02-26          RADIOLOGY & ADDITIONAL TESTS:  Studies reviewed.   Nathaniel Stewart MD PGY-4 Hematology Oncology  Reachable on TEAMS    INTERVAL HPI/OVERNIGHT EVENTS:  Patient S&E at bedside. +wet cough    VITAL SIGNS:  T(F): 97.5 (02-27-23 @ 08:44)  HR: 52 (02-27-23 @ 10:16)  BP: 93/45 (02-27-23 @ 08:44)  RR: 17 (02-27-23 @ 08:44)  SpO2: 100% (02-27-23 @ 10:16)  Wt(kg): --    PHYSICAL EXAM:    Constitutional: NAD  Eyes: EOMI, sclera non-icteric  Neck: supple, no masses, no JVD  Respiratory: right chest tube w/ appropriate drainage, CTA b/l, good air entry b/l  Cardiovascular: RRR, no M/R/G  Gastrointestinal: soft, NTND, no masses palpable, + BS, no hepatosplenomegaly  : Choi noted  Extremities: no c/c/e  Neurological: AAOx3      MEDICATIONS  (STANDING):  albuterol    0.083% 2.5 milliGRAM(s) Nebulizer every 6 hours  atenolol  Tablet 75 milliGRAM(s) Oral daily  chlorhexidine 2% Cloths 1 Application(s) Topical <User Schedule>  escitalopram 5 milliGRAM(s) Oral daily  heparin   Injectable 5000 Unit(s) SubCutaneous every 8 hours  melatonin 6 milliGRAM(s) Oral at bedtime  polyethylene glycol 3350 17 Gram(s) Oral daily  senna 2 Tablet(s) Oral at bedtime  simvastatin 40 milliGRAM(s) Oral at bedtime  tamsulosin 0.4 milliGRAM(s) Oral at bedtime    MEDICATIONS  (PRN):  ALPRAZolam 0.25 milliGRAM(s) Oral daily PRN anxiety      Allergies    pertussis vaccines (Rash)  shellfish (Rash)    Intolerances        LABS:                        10.9   11.87 )-----------( 176      ( 27 Feb 2023 05:46 )             35.2     02-27    139  |  102  |  13  ----------------------------<  110<H>  3.8   |  32<H>  |  0.45<L>    Ca    8.2<L>      27 Feb 2023 05:46  Phos  2.1     02-26  Mg     1.70     02-26    TPro  5.6<L>  /  Alb  2.4<L>  /  TBili  0.5  /  DBili  x   /  AST  25  /  ALT  78<H>  /  AlkPhos  81  02-26          RADIOLOGY & ADDITIONAL TESTS:  Studies reviewed.

## 2023-02-27 NOTE — PROGRESS NOTE ADULT - SUBJECTIVE AND OBJECTIVE BOX
Name of Patient : ARIK DUMONT  MRN: [id number]  Date of visit: 02-27-23       Subjective: Patient seen and examined. No new events except as noted.   low BP   meds adjusted     REVIEW OF SYSTEMS:    CONSTITUTIONAL: No weakness, fevers or chills  EYES/ENT: No visual changes;  No vertigo or throat pain   NECK: No pain or stiffness  RESPIRATORY: No cough, wheezing, hemoptysis; No shortness of breath  CARDIOVASCULAR: No chest pain or palpitations  GASTROINTESTINAL: No abdominal or epigastric pain. No nausea, vomiting, or hematemesis; No diarrhea or constipation. No melena or hematochezia.  GENITOURINARY: No dysuria, frequency or hematuria  NEUROLOGICAL: No numbness or weakness  SKIN: No itching, burning, rashes, or lesions   All other review of systems is negative unless indicated above.    MEDICATIONS:  MEDICATIONS  (STANDING):  albuterol    0.083% 2.5 milliGRAM(s) Nebulizer every 6 hours  chlorhexidine 2% Cloths 1 Application(s) Topical <User Schedule>  famotidine    Tablet 20 milliGRAM(s) Oral daily  heparin  Infusion.  Unit(s)/Hr (8.8 mL/Hr) IV Continuous <Continuous>  melatonin 6 milliGRAM(s) Oral at bedtime  polyethylene glycol 3350 17 Gram(s) Oral daily  senna 2 Tablet(s) Oral at bedtime  simvastatin 40 milliGRAM(s) Oral at bedtime  tamsulosin 0.4 milliGRAM(s) Oral at bedtime      PHYSICAL EXAM:  T(C): 36.7 (02-27-23 @ 19:59), Max: 36.8 (02-27-23 @ 17:06)  HR: 60 (02-27-23 @ 22:33) (52 - 79)  BP: 120/55 (02-27-23 @ 22:33) (89/57 - 120/55)  RR: 18 (02-27-23 @ 19:59) (16 - 18)  SpO2: 96% (02-27-23 @ 19:59) (96% - 100%)  Wt(kg): --  I&O's Summary    26 Feb 2023 07:01  -  27 Feb 2023 07:00  --------------------------------------------------------  IN: 760 mL / OUT: 1125 mL / NET: -365 mL    27 Feb 2023 07:01  -  27 Feb 2023 23:08  --------------------------------------------------------  IN: 791.4 mL / OUT: 555 mL / NET: 236.4 mL          Appearance: Normal	  HEENT:  PERRLA   Lymphatic: No lymphadenopathy   Cardiovascular: Normal S1 S2, no JVD  Respiratory: normal effort , CT noted   Gastrointestinal:  Soft, Non-tender  Skin: No rashes,  warm to touch  Psychiatry:  Mood & affect appropriate  Musculuskeletal: No edema    recent labs, Imaging and EKGs personally reviewed     02-26-23 @ 07:01  -  02-27-23 @ 07:00  --------------------------------------------------------  IN: 760 mL / OUT: 1125 mL / NET: -365 mL    02-27-23 @ 07:01  -  02-27-23 @ 23:08  --------------------------------------------------------  IN: 791.4 mL / OUT: 555 mL / NET: 236.4 mL                            11.6   13.00 )-----------( 189      ( 27 Feb 2023 21:00 )             37.4               02-27    139  |  102  |  13  ----------------------------<  110<H>  3.8   |  32<H>  |  0.45<L>    Ca    8.2<L>      27 Feb 2023 05:46  Phos  2.1     02-26  Mg     1.70     02-26    TPro  5.6<L>  /  Alb  2.4<L>  /  TBili  0.5  /  DBili  x   /  AST  25  /  ALT  78<H>  /  AlkPhos  81  02-26    PT/INR - ( 27 Feb 2023 11:58 )   PT: 14.7 sec;   INR: 1.26 ratio         PTT - ( 27 Feb 2023 21:00 )  PTT:43.2 sec

## 2023-02-27 NOTE — PROGRESS NOTE ADULT - ASSESSMENT
82 year old male with PMH atrial fibrillation, HTN and chronic urinary retention who presented to [facility] from home with acute hypoxic respiratory failure with hypercapnia. Patient was admitted to ICU an [facility], now downgraded and transferred to  [facility] for continuation of his care     #Acute hypoxic respiratory failure with hypercapnia  #Recurrent R pleural effusion-exudative  #Lymphadenopathy and leukocytosis-suspect underlying malignancy  -With large right sided pleural effusion, now status post pigtail placement, monitor output   -O 2 supplement PRN   -Continue steroid taper  -Patient was treated with course of empiric cefepime and dose of vanco at [place]   -Blood cultures 2/17 NGTD, Pleural fluid no growth currently, f/u final culture/cytology  -thoracic surg care appreciated, plan for pleurex and mediastinoscopy to evaluate for lymph nodes. VATS  -Heme/onc follow up appreciated     #Afib/ HTN  rate control   not on AC   telemonitoring   Previously on Eliquis- holding for now for procedure , can do lovenox till plan for surg, defer to TS team   -Atenolol on hold in view of low BP     #Urinary Retention  -Continue plummer, per previous notes patient's family did not want trial of void  -Continue flomax    #DVT prophylaxis     Discussed with patient son

## 2023-02-27 NOTE — PROGRESS NOTE ADULT - ATTENDING COMMENTS
82y Male recently admitted with hypoxic respiratory failure, transferred for further evaluation of lymphadenopathy. s/p chest tube placement, plan for repeat biopsy of supraclavicular LN   -trend TLS labs   -follow up biopsy results   -plan to obtain slides from [facility]

## 2023-02-27 NOTE — DIETITIAN INITIAL EVALUATION ADULT - PERTINENT LABORATORY DATA
02-27    139  |  102  |  13  ----------------------------<  110<H>  3.8   |  32<H>  |  0.45<L>    Ca    8.2<L>      27 Feb 2023 05:46  Phos  2.1     02-26  Mg     1.70     02-26    TPro  5.6<L>  /  Alb  2.4<L>  /  TBili  0.5  /  DBili  x   /  AST  25  /  ALT  78<H>  /  AlkPhos  81  02-26  A1C with Estimated Average Glucose Result: 6.0 % (02-25-23 @ 06:28)

## 2023-02-27 NOTE — PROGRESS NOTE ADULT - SUBJECTIVE AND OBJECTIVE BOX
Subjective: "I'm really frustrated that I'm just sitting here and deteriorating and nothing is getting done" Pt aware that OR was scheduled for tomorrow but because of lack of Cardiac Clearance, unable to proceed with procedure. Dr. Galvan discussed w pt's daughter Judith need for Cardiac Clearance to proceed with OR/general anesthesia. Pt was seen by Cards yesterday who was able to arrange for nuclear stress test yesterday but pt's daughter did not want to proceed with test. She prefers to have repeat IR biopsy of LN instead. Daughter made aware that IR biopsy may not be able to done until later this week and that it may again yield inconclusive. Daughter Judith aware of above, still refusing stress test and wants to proceed w IR. Pt made aware. Pt states poor night's sleep. Denies CP or SOB. OOB with assist, being seen by PT.     Vital Signs:  Vital Signs Last 24 Hrs  T(C): 36.4 (02-27-23 @ 08:44), Max: 36.6 (02-27-23 @ 01:05)  T(F): 97.5 (02-27-23 @ 08:44), Max: 97.8 (02-27-23 @ 01:05)  HR: 52 (02-27-23 @ 10:16) (52 - 82)  BP: 93/45 (02-27-23 @ 08:44) (91/68 - 106/50)  RR: 17 (02-27-23 @ 08:44) (16 - 18)  SpO2: 100% (02-27-23 @ 10:16) (97% - 100%) on (O2)    Telemetry/Alarms: Afib on tele, rate controlled  General: WN/WD NAD  Neurology: Awake, nonfocal, BAUTISTA x 4  Eyes: Scleras clear, PERRLA/ EOMI, Gross vision intact  ENT:Gross hearing intact, grossly patent pharynx, no stridor  Neck: Neck supple, trachea midline, No JVD,   Respiratory: Dec;'d BS bilat  CV: Irreg S1S2, no murmurs, rubs or gallops  Abdominal: Soft, NT, ND +BS, + BM x 2 yesterday. Plummer in place to SBD  Extremities: traceLE edema, + peripheral pulses  Skin: No Rashes, Hematoma, Ecchymosis  Lymphatic: No Neck, axilla, groin LAD  Psych: Oriented x 3, normal affect  Incisions: none  Tubes: Rt PTC in place for 150cc/24hrs on suction, serous fluid. No air leak.   Relevant labs, radiology and Medications reviewed         CXR stable, improved effusion.                10.9   11.87 )-----------( 176      ( 27 Feb 2023 05:46 )             35.2     02-27    139  |  102  |  13  ----------------------------<  110<H>  3.8   |  32<H>  |  0.45<L>    Ca    8.2<L>      27 Feb 2023 05:46  Phos  2.1     02-26  Mg     1.70     02-26    TPro  5.6<L>  /  Alb  2.4<L>  /  TBili  0.5  /  DBili  x   /  AST  25  /  ALT  78<H>  /  AlkPhos  81  02-26      MEDICATIONS  (STANDING):  albuterol    0.083% 2.5 milliGRAM(s) Nebulizer every 6 hours  atenolol  Tablet 75 milliGRAM(s) Oral daily  chlorhexidine 2% Cloths 1 Application(s) Topical <User Schedule>  escitalopram 5 milliGRAM(s) Oral daily  heparin   Injectable 5000 Unit(s) SubCutaneous every 8 hours  melatonin 6 milliGRAM(s) Oral at bedtime  polyethylene glycol 3350 17 Gram(s) Oral daily  senna 2 Tablet(s) Oral at bedtime  simvastatin 40 milliGRAM(s) Oral at bedtime  tamsulosin 0.4 milliGRAM(s) Oral at bedtime    MEDICATIONS  (PRN):  ALPRAZolam 0.25 milliGRAM(s) Oral daily PRN anxiety    Pertinent Physical Exam  I&O's Summary    26 Feb 2023 07:01  -  27 Feb 2023 07:00  --------------------------------------------------------  IN: 760 mL / OUT: 1125 mL / NET: -365 mL    27 Feb 2023 07:01  -  27 Feb 2023 11:40  --------------------------------------------------------  IN: 240 mL / OUT: 0 mL / NET: 240 mL     Social History:  · Substance use	No  · Social History (marital status, living situation, occupation, and sexual history)	Lives in private home with wife, nearby family active in patient care  No tobacco use for more than 60 years  No history of ETOH abuse, illicit drug use, or substance abuse        Assessment  82y Male  w/ PAST MEDICAL & SURGICAL HISTORY:  Atrial fibrillation      Hypertension      Urinary retention      No significant past surgical history      admitted with complaints of Patient is a 82y old  Male who presents with a chief complaint of Pleural effusion, LAD (25 Feb 2023 13:03)  .    82 year old male with PMH atrial fibrillation, HTN and chronic urinary retention who customarily self catheterizes at home for the last year, presented to [facility] by ambulance from home on 2/17/2023 with c/o progressive shortness of breath.    Pt was intubated in the field and brought to ED.    Of note, patient recently found to have supraclavicular lymph node enlargement with associated leukocytosis, is being evaluated for lymphoma. Patient also recently admitted to [facility] where he was noted to have large pleural effusion that was drained.  According to family lymph node biopsy was inconclusive and patient had thoracentesis which revealed no infection or malignancy.  Since discharge from [facility] has been oxygen dependant at home which is also a new development.  At [place], patient was admitted to ICU for acute respiratory failure with hypercapnia. CTA chest negative for PE, showed large R pleural effusion with mediastinal and hilar lymphadenopathy. A pigtail was placed and drained over 3 L of fluid. Patient was started on empiric antibiotics and completed 5d course on 2/22/2023.  Pleural fluid cs negative and final. Blood cultures negative and final.  Given presence of lymphadenopathy and persistent leukocytosis, underlying malignancy is suspected. Hematology/oncology was consulted. Recommended transfer to [facility] for further investigation.   Pt was accepted by thoracic surgeon, Dr Nathaniel Galvan and is being evaluated by EBUS/mediastinoscopy and possible pleurX placement.   At [place], urology was consulted for pt's chronic urinary retention and a plummer was placed. Family did not want trial of void and agreed with continued plummer use.   Patient was successfully extubated and started on CPAP in [place] ICU. Patient was downgraded to medical floor and was titrated down to nasal canula. Physical therapy evaluated patient and [facility]ORAL[facility] is recommended. Pt was then transferred to [facility]. PTC intact on suction with no air leak and pt completes steroid taper 2/26/2023.   Last dose of eliquis 2/22/2023 at 6am, pt is on Lovenox 90mg BID for a/c for afib. 2/25- Medicine and Cardiology consulted for optimization and clearance for OR. 2/26- Cardiology recommending stress test prior to OR, pt's daugher refusing. Lovenox on hold for possible surgical intervention. Per daughter request, IR consulted for possible LN biopsy.       PLAN  Neuro: Pain management  Pulm: Encourage coughing, deep breathing and use of incentive spirometry. Wean off supplemental oxygen as able. Daily CXR.   Cardio: Monitor telemetry/alarms. Cont Cardiac meds. Daughter refusing stress test. OR on hold due to lack of clearance. Will start Heparin gtt for a/c with anticipation of eventual IR bx this week.   GI: Tolerating diet. Continue stool softeners.  Renal: monitor urine output, supplement electrolytes as needed  : continue Plummer  Vasc: Heparin gtt for DVT prophylaxis  Heme: Stable H/H. . Oncology aware that pt will not be able to proceed with EBUS/Bx tomorrow with Thoracic surgery  ID: Off antibiotics. Stable.  Therapy: OOB/ambulate. Plan MORALES  Tubes: Monitor Chest tube output, will keep to suction. Pt will also eventually need Pleurx cath placement.   Disposition: Aim to D/C to [facility] when medically ready  Discussed with Cardiothoracic Team at AM rounds.

## 2023-02-28 ENCOUNTER — RESULT REVIEW (OUTPATIENT)
Age: 83
End: 2023-02-28

## 2023-02-28 LAB
APTT BLD: 26.8 SEC — LOW (ref 27–36.3)
APTT BLD: 60.4 SEC — HIGH (ref 27–36.3)
HCT VFR BLD CALC: 35.2 % — LOW (ref 39–50)
HGB BLD-MCNC: 10.9 G/DL — LOW (ref 13–17)
HIV 1+2 AB+HIV1 P24 AG SERPL QL IA: SIGNIFICANT CHANGE UP
MCHC RBC-ENTMCNC: 28.2 PG — SIGNIFICANT CHANGE UP (ref 27–34)
MCHC RBC-ENTMCNC: 31 GM/DL — LOW (ref 32–36)
MCV RBC AUTO: 91 FL — SIGNIFICANT CHANGE UP (ref 80–100)
NRBC # BLD: 0 /100 WBCS — SIGNIFICANT CHANGE UP (ref 0–0)
NRBC # FLD: 0 K/UL — SIGNIFICANT CHANGE UP (ref 0–0)
PLATELET # BLD AUTO: 163 K/UL — SIGNIFICANT CHANGE UP (ref 150–400)
RBC # BLD: 3.87 M/UL — LOW (ref 4.2–5.8)
RBC # FLD: 18.3 % — HIGH (ref 10.3–14.5)
WBC # BLD: 11.79 K/UL — HIGH (ref 3.8–10.5)
WBC # FLD AUTO: 11.79 K/UL — HIGH (ref 3.8–10.5)

## 2023-02-28 PROCEDURE — 88305 TISSUE EXAM BY PATHOLOGIST: CPT | Mod: 26

## 2023-02-28 PROCEDURE — 76942 ECHO GUIDE FOR BIOPSY: CPT | Mod: 26

## 2023-02-28 PROCEDURE — 71045 X-RAY EXAM CHEST 1 VIEW: CPT | Mod: 26

## 2023-02-28 PROCEDURE — 99232 SBSQ HOSP IP/OBS MODERATE 35: CPT

## 2023-02-28 PROCEDURE — 99222 1ST HOSP IP/OBS MODERATE 55: CPT

## 2023-02-28 PROCEDURE — 88173 CYTOPATH EVAL FNA REPORT: CPT | Mod: 26

## 2023-02-28 PROCEDURE — 38505 NEEDLE BIOPSY LYMPH NODES: CPT

## 2023-02-28 RX ORDER — HEPARIN SODIUM 5000 [USP'U]/ML
980 INJECTION INTRAVENOUS; SUBCUTANEOUS
Qty: 25000 | Refills: 0 | Status: DISCONTINUED | OUTPATIENT
Start: 2023-02-28 | End: 2023-03-03

## 2023-02-28 RX ORDER — ESCITALOPRAM OXALATE 10 MG/1
5 TABLET, FILM COATED ORAL DAILY
Refills: 0 | Status: DISCONTINUED | OUTPATIENT
Start: 2023-02-28 | End: 2023-03-15

## 2023-02-28 RX ADMIN — HEPARIN SODIUM 980 UNIT(S)/HR: 5000 INJECTION INTRAVENOUS; SUBCUTANEOUS at 04:10

## 2023-02-28 RX ADMIN — SENNA PLUS 2 TABLET(S): 8.6 TABLET ORAL at 21:08

## 2023-02-28 RX ADMIN — SIMVASTATIN 40 MILLIGRAM(S): 20 TABLET, FILM COATED ORAL at 21:08

## 2023-02-28 RX ADMIN — ALBUTEROL 2.5 MILLIGRAM(S): 90 AEROSOL, METERED ORAL at 21:08

## 2023-02-28 RX ADMIN — CHLORHEXIDINE GLUCONATE 1 APPLICATION(S): 213 SOLUTION TOPICAL at 06:28

## 2023-02-28 RX ADMIN — HEPARIN SODIUM 980 UNIT(S)/HR: 5000 INJECTION INTRAVENOUS; SUBCUTANEOUS at 19:42

## 2023-02-28 RX ADMIN — ALBUTEROL 2.5 MILLIGRAM(S): 90 AEROSOL, METERED ORAL at 03:54

## 2023-02-28 RX ADMIN — ALBUTEROL 2.5 MILLIGRAM(S): 90 AEROSOL, METERED ORAL at 16:23

## 2023-02-28 RX ADMIN — ESCITALOPRAM OXALATE 5 MILLIGRAM(S): 10 TABLET, FILM COATED ORAL at 22:27

## 2023-02-28 RX ADMIN — Medication 6 MILLIGRAM(S): at 21:08

## 2023-02-28 RX ADMIN — HEPARIN SODIUM 980 UNIT(S)/HR: 5000 INJECTION INTRAVENOUS; SUBCUTANEOUS at 18:26

## 2023-02-28 RX ADMIN — TAMSULOSIN HYDROCHLORIDE 0.4 MILLIGRAM(S): 0.4 CAPSULE ORAL at 21:08

## 2023-02-28 RX ADMIN — FAMOTIDINE 20 MILLIGRAM(S): 10 INJECTION INTRAVENOUS at 12:56

## 2023-02-28 NOTE — PROGRESS NOTE ADULT - SUBJECTIVE AND OBJECTIVE BOX
Subjective:"I'm actually feeling very good today" Pt states decent night's sleep. Aware of IR biopsy today. No CP or SOb. B/p improved.     Vital Signs:  Vital Signs Last 24 Hrs  T(C): 36.6 (02-28-23 @ 08:20), Max: 36.8 (02-27-23 @ 17:06)  T(F): 97.9 (02-28-23 @ 08:20), Max: 98.2 (02-27-23 @ 17:06)  HR: 65 (02-28-23 @ 08:20) (59 - 76)  BP: 102/51 (02-28-23 @ 08:20) (89/57 - 120/55)  RR: 16 (02-28-23 @ 08:20) (16 - 20)  SpO2: 98% (02-28-23 @ 08:20) (96% - 100%) on (O2)    Telemetry/Alarms: Tele Afib, 80s,   General: WN/WD NAD  Neurology: Awake, nonfocal, BAUTISTA x 4  Eyes: Scleras clear, PERRLA/ EOMI, Gross vision intact  ENT:Gross hearing intact, grossly patent pharynx, no stridor  Neck: Neck supple, trachea midline, No JVD,   Respiratory: Dec'd BS bilat, mid to base  CV: Irreg HR, S1S2, no murmurs, rubs or gallops  Abdominal: Soft, NT, ND +BS, +BM yesterday. Plummer cath in place.   Extremities: trace ankle/foot edema, + peripheral pulses  Skin: No Rashes, Hematoma, Ecchymosis  Lymphatic: No Neck, axilla, groin LAD  Psych: Oriented x 3, normal affect  Incisions: none  Tubes: Rt PTC 100cc/24hrs on Sxn, no AL. Serous fluid  Relevant labs, radiology and Medications reviewed           CXR stable.              10.9   11.79 )-----------( 163      ( 28 Feb 2023 07:08 )             35.2     02-27    139  |  102  |  13  ----------------------------<  110<H>  3.8   |  32<H>  |  0.45<L>    Ca    8.2<L>      27 Feb 2023 05:46      PT/INR - ( 27 Feb 2023 11:58 )   PT: 14.7 sec;   INR: 1.26 ratio         PTT - ( 28 Feb 2023 03:51 )  PTT:60.4 sec  MEDICATIONS  (STANDING):  albuterol    0.083% 2.5 milliGRAM(s) Nebulizer every 6 hours  chlorhexidine 2% Cloths 1 Application(s) Topical <User Schedule>  famotidine    Tablet 20 milliGRAM(s) Oral daily  melatonin 6 milliGRAM(s) Oral at bedtime  polyethylene glycol 3350 17 Gram(s) Oral daily  senna 2 Tablet(s) Oral at bedtime  simvastatin 40 milliGRAM(s) Oral at bedtime  tamsulosin 0.4 milliGRAM(s) Oral at bedtime    MEDICATIONS  (PRN):  ALPRAZolam 0.25 milliGRAM(s) Oral daily PRN anxiety    Pertinent Physical Exam  I&O's Summary    27 Feb 2023 07:01  -  28 Feb 2023 07:00  --------------------------------------------------------  IN: 1065.4 mL / OUT: 875 mL / NET: 190.4 mL    28 Feb 2023 07:01  -  28 Feb 2023 11:18  --------------------------------------------------------  IN: 250 mL / OUT: 400 mL / NET: -150 mL         Social History:  · Substance use	No  · Social History (marital status, living situation, occupation, and sexual history)	Lives in private home with wife, nearby family active in patient care  No tobacco use for more than 60 years  No history of ETOH abuse, illicit drug use, or substance abuse        Assessment  82y Male  w/ PAST MEDICAL & SURGICAL HISTORY:  Atrial fibrillation      Hypertension      Urinary retention      No significant past surgical history      admitted with complaints of Patient is a 82y old  Male who presents with a chief complaint of Pleural effusion, LAD (25 Feb 2023 13:03)  .    82 year old male with PMH atrial fibrillation, HTN and chronic urinary retention who customarily self catheterizes at home for the last year, presented to [facility] by ambulance from home on 2/17/2023 with c/o progressive shortness of breath.    Pt was intubated in the field and brought to ED.    Of note, patient recently found to have supraclavicular lymph node enlargement with associated leukocytosis, is being evaluated for lymphoma. Patient also recently admitted to [facility] where he was noted to have large pleural effusion that was drained.  According to family lymph node biopsy was inconclusive and patient had thoracentesis which revealed no infection or malignancy.  Since discharge from [facility] has been oxygen dependant at home which is also a new development.  At [place], patient was admitted to ICU for acute respiratory failure with hypercapnia. CTA chest negative for PE, showed large R pleural effusion with mediastinal and hilar lymphadenopathy. A pigtail was placed and drained over 3 L of fluid. Patient was started on empiric antibiotics and completed 5d course on 2/22/2023.  Pleural fluid cs negative and final. Blood cultures negative and final.  Given presence of lymphadenopathy and persistent leukocytosis, underlying malignancy is suspected. Hematology/oncology was consulted. Recommended transfer to [facility] for further investigation.   Pt was accepted by thoracic surgeon, Dr Nathaniel Galvan and is being evaluated by EBUS/mediastinoscopy and possible pleurX placement.   At [place], urology was consulted for pt's chronic urinary retention and a plummer was placed. Family did not want trial of void and agreed with continued plummer use.   Patient was successfully extubated and started on CPAP in [place] ICU. Patient was downgraded to medical floor and was titrated down to nasal canula. Physical therapy evaluated patient and [facility]ORALES is recommended. Pt was then transferred to [facility]. PTC intact on suction with no air leak and pt completes steroid taper 2/26/2023.   Last dose of eliquis 2/22/2023 at 6am, pt is on Lovenox 90mg BID for a/c for afib. 2/25- Medicine and Cardiology consulted for optimization and clearance for OR. 2/26- Cardiology recommending stress test prior to OR, pt's daugher refusing. Lovenox on hold for possible surgical intervention. Per daughter request, IR consulted for possible LN biopsy.       PLAN  Neuro: Pain management  Pulm: Encourage coughing, deep breathing and use of incentive spirometry. Wean off supplemental oxygen as able. Daily CXR.   Cardio: Monitor telemetry/alarms. Cont Cardiac meds. Daughter refusing stress test. OR on hold due to lack of clearance. Will start Heparin gtt for a/c. Heparin gtt on hold for IR bx this am. Atenolol d/c'd yesterday hypotension  GI: Tolerating diet. Continue stool softeners.  Renal: monitor urine output, supplement electrolytes as needed  : continue Plummer  Vasc: Heparin gtt for DVT prophylaxis  Heme: Stable H/H. . Oncology aware that pt will not be able to proceed with EBUS/Bx tomorrow with Thoracic surgery  ID: Off antibiotics. Stable.  Therapy: OOB/ambulate. Plan [facility]  Tubes: Monitor Chest tube output, will place to waterseal. Pt will also eventually need Pleurx cath placement.   Disposition: Aim to D/C to [facility] when medically ready  Discussed with Cardiothoracic Team at AM rounds.

## 2023-02-28 NOTE — PROCEDURE NOTE - PROCEDURE FINDINGS AND DETAILS
Right supraclavicular mass/abnormal LN identified. FNA x4 and Core x3 obtained; specimens handed to the cytopathology technician in attendance.     Official report to follow.

## 2023-02-28 NOTE — OCCUPATIONAL THERAPY INITIAL EVALUATION ADULT - DIAGNOSIS, OT EVAL
Acute respiratory failure; Right pleural effusion with mediastinal and hilar lymphadenopathy; r/o malignancy; Decreased standing balance; Decreased functional mobility; Decreased ADL management

## 2023-02-28 NOTE — OCCUPATIONAL THERAPY INITIAL EVALUATION ADULT - GENERAL OBSERVATIONS, REHAB EVAL
Pt. received sitting in chair next to bed. No acute distress. HR: 70 bpm, O2 97%. Patient agreed to evaluation from Occupational Therapist. +Heplock, +Tele, +Chest Tube, +O2 via nasal cannula, +Urethral Catheter.

## 2023-02-28 NOTE — PROGRESS NOTE ADULT - ASSESSMENT
Pl effusion   s/p IR biopsy   fu with path   suspicion for malignancy     afib  HR stable   off atenolol due to low BP   IF HR goes up , we can consider digoxin  resume a/c once deemed safe

## 2023-02-28 NOTE — OCCUPATIONAL THERAPY INITIAL EVALUATION ADULT - MD ORDER
Occupational Therapy (OT) to evaluate and treat. Ambulate with assistance. Per MARVIN Khan, pt is okay to participate in OT evaluation and perform activity as tolerated.

## 2023-02-28 NOTE — PROGRESS NOTE ADULT - SUBJECTIVE AND OBJECTIVE BOX
DATE OF SERVICE: 02-28-23 @ 07:13    Subjective: Patient seen and examined. No new events except as noted.     SUBJECTIVE/ROS:  s/p IR biopsy   No chest pain, dyspnea, palpitation, or dizziness       MEDICATIONS:  MEDICATIONS  (STANDING):  albuterol    0.083% 2.5 milliGRAM(s) Nebulizer every 6 hours  chlorhexidine 2% Cloths 1 Application(s) Topical <User Schedule>  famotidine    Tablet 20 milliGRAM(s) Oral daily  melatonin 6 milliGRAM(s) Oral at bedtime  polyethylene glycol 3350 17 Gram(s) Oral daily  senna 2 Tablet(s) Oral at bedtime  simvastatin 40 milliGRAM(s) Oral at bedtime  tamsulosin 0.4 milliGRAM(s) Oral at bedtime      PHYSICAL EXAM:  T(C): 36.8 (02-28-23 @ 04:23), Max: 36.8 (02-27-23 @ 17:06)  HR: 65 (02-28-23 @ 04:23) (52 - 76)  BP: 115/51 (02-28-23 @ 04:23) (89/57 - 120/55)  RR: 20 (02-28-23 @ 04:23) (17 - 20)  SpO2: 99% (02-28-23 @ 04:23) (96% - 100%)  Wt(kg): --  I&O's Summary    27 Feb 2023 07:01  -  28 Feb 2023 07:00  --------------------------------------------------------  IN: 1065.4 mL / OUT: 875 mL / NET: 190.4 mL            JVP: Normal  Neck: supple  Lung: clear   CV: S1 S2 , Murmur:  Abd: soft  Ext: No edema  neuro: Awake / alert  Psych: flat affect  Skin: normal``    LABS/DATA:    CARDIAC MARKERS:                                11.6   13.00 )-----------( 189      ( 27 Feb 2023 21:00 )             37.4     02-27    139  |  102  |  13  ----------------------------<  110<H>  3.8   |  32<H>  |  0.45<L>    Ca    8.2<L>      27 Feb 2023 05:46      proBNP:   Lipid Profile:   HgA1c:   TSH:     TELE:  EKG:

## 2023-02-28 NOTE — OCCUPATIONAL THERAPY INITIAL EVALUATION ADULT - ADDITIONAL COMMENTS
Pt. reports he was receiving HHA coverage 5 days/week x 8 hours/day to assist him with ADL's as needed.

## 2023-02-28 NOTE — PROGRESS NOTE ADULT - ASSESSMENT
82 year old male with PMH atrial fibrillation, HTN and chronic urinary retention who presented to [facility] from home with acute hypoxic respiratory failure with hypercapnia. Patient was admitted to ICU an [facility], now downgraded and transferred to  [facility] for continuation of his care     #Acute hypoxic respiratory failure with hypercapnia  #Recurrent R pleural effusion-exudative  #Lymphadenopathy and leukocytosis-suspect underlying malignancy  -With large right sided pleural effusion, now status post pigtail placement, monitor output   -O 2 supplement PRN   -Continue steroid taper  -Patient was treated with course of empiric cefepime and dose of vanco at [place]   -Blood cultures 2/17 NGTD, Pleural fluid no growth currently, f/u final culture/cytology  -thoracic surg care appreciated, plan for pleurex and mediastinoscopy to evaluate for lymph nodes. VATS  -Heme/onc follow up appreciated   - patient will benefit form pleurx placement   - IR guided LN biopsy     #Afib/ HTN  rate control   heparin for AC as tolerated   telemonitoring   -Atenolol on hold in view of low BP     #Urinary Retention  -Continue plummer, per previous notes patient's family did not want trial of void  -Continue flomax    #DVT prophylaxis     Discussed with patient son

## 2023-02-28 NOTE — OCCUPATIONAL THERAPY INITIAL EVALUATION ADULT - PERTINENT HX OF CURRENT PROBLEM, REHAB EVAL
Pt is an 82 year old male with hx of atrial fibrillation, HTN, chronic urinary retention who customarily self catheterizes at home for the last year, recently found to have supraclavicular lymph node enlargement with associated leukocytosis, being evaluated for lymphoma, & with recent admission to [facility] where he was noted to have large pleural effusion that was drained (since discharge has been oxygen dependent) , who presented to [facility] on 2/17/23 with c/o progressive shortness of breath. Pt was intubated in the field and brought to ED. At [place], pt was admitted to ICU for acute respiratory failure with hypercapnia. CTA chest negative for PE, showed large Right pleural effusion with mediastinal and hilar lymphadenopathy. A pigtail was placed and drained over 3L of fluid. Pt was started on empiric antibiotics and completed 5 day course on 2/22/2023. Pleural fluid cs negative and final. Blood cultures negative and final. Pt was successfully extubated and started on CPAP in [place] ICU. Pt was downgraded to medical floor and was titrated down to nasal canula. Given presence of lymphadenopathy and persistent leukocytosis, underlying malignancy is suspected. Hematology/oncology was consulted. Pt was transferred to [facility] for further work-up. Pt was accepted by thoracic surgeon, Dr Nathaniel Galvan and is being evaluated by EBUS/mediastinoscopy and possible pleurX placement.

## 2023-02-28 NOTE — OCCUPATIONAL THERAPY INITIAL EVALUATION ADULT - WEIGHT-BEARING RESTRICTIONS: SIT/STAND, REHAB EVAL
Last seen on 5/23/17 with .  Refill given and reminder to establish care with new PCP.  Ame   weight-bearing as tolerated

## 2023-02-28 NOTE — PROGRESS NOTE ADULT - SUBJECTIVE AND OBJECTIVE BOX
Name of Patient : ARIK DUMONT  MRN: [id number]  Date of visit: 02-28-23       Subjective: Patient seen and examined. No new events except as noted.   IR for LN biopsy   CT care       REVIEW OF SYSTEMS:    CONSTITUTIONAL: No weakness, fevers or chills  EYES/ENT: No visual changes;  No vertigo or throat pain   NECK: No pain or stiffness  RESPIRATORY: No cough, wheezing, hemoptysis; No shortness of breath  CARDIOVASCULAR: No chest pain or palpitations  GASTROINTESTINAL: No abdominal or epigastric pain. No nausea, vomiting, or hematemesis; No diarrhea or constipation. No melena or hematochezia.  GENITOURINARY: No dysuria, frequency or hematuria  NEUROLOGICAL: No numbness or weakness  SKIN: No itching, burning, rashes, or lesions   All other review of systems is negative unless indicated above.    MEDICATIONS:  MEDICATIONS  (STANDING):  albuterol    0.083% 2.5 milliGRAM(s) Nebulizer every 6 hours  chlorhexidine 2% Cloths 1 Application(s) Topical <User Schedule>  escitalopram 5 milliGRAM(s) Oral daily  famotidine    Tablet 20 milliGRAM(s) Oral daily  heparin  Infusion. 980 Unit(s)/Hr (9.8 mL/Hr) IV Continuous <Continuous>  melatonin 6 milliGRAM(s) Oral at bedtime  polyethylene glycol 3350 17 Gram(s) Oral daily  senna 2 Tablet(s) Oral at bedtime  simvastatin 40 milliGRAM(s) Oral at bedtime  tamsulosin 0.4 milliGRAM(s) Oral at bedtime      PHYSICAL EXAM:  T(C): 36.6 (02-28-23 @ 19:30), Max: 37 (02-28-23 @ 16:58)  HR: 99 (02-28-23 @ 19:30) (59 - 99)  BP: 126/64 (02-28-23 @ 19:30) (97/56 - 126/64)  RR: 18 (02-28-23 @ 19:30) (16 - 20)  SpO2: 95% (02-28-23 @ 19:30) (94% - 100%)  Wt(kg): --  I&O's Summary    27 Feb 2023 07:01  -  28 Feb 2023 07:00  --------------------------------------------------------  IN: 1065.4 mL / OUT: 875 mL / NET: 190.4 mL    28 Feb 2023 07:01  -  28 Feb 2023 22:16  --------------------------------------------------------  IN: 600 mL / OUT: 800 mL / NET: -200 mL          Appearance: Normal	  HEENT:  PERRLA   Lymphatic: No lymphadenopathy   Cardiovascular: Normal S1 S2, no JVD  Respiratory: normal effort , CT intact  Gastrointestinal:  Soft, Non-tender  Skin: No rashes,  warm to touch  Psychiatry:  Mood & affect appropriate  Musculuskeletal: No edema    recent labs, Imaging and EKGs personally reviewed     02-27-23 @ 07:01  -  02-28-23 @ 07:00  --------------------------------------------------------  IN: 1065.4 mL / OUT: 875 mL / NET: 190.4 mL    02-28-23 @ 07:01  -  02-28-23 @ 22:16  --------------------------------------------------------  IN: 600 mL / OUT: 800 mL / NET: -200 mL                          10.9   11.79 )-----------( 163      ( 28 Feb 2023 07:08 )             35.2               02-27    139  |  102  |  13  ----------------------------<  110<H>  3.8   |  32<H>  |  0.45<L>    Ca    8.2<L>      27 Feb 2023 05:46      PT/INR - ( 27 Feb 2023 11:58 )   PT: 14.7 sec;   INR: 1.26 ratio         PTT - ( 28 Feb 2023 14:16 )  PTT:26.8 sec

## 2023-02-28 NOTE — OCCUPATIONAL THERAPY INITIAL EVALUATION ADULT - LEVEL OF INDEPENDENCE: SIT/SUPINE, REHAB EVAL
Not assessed. Pt left sitting on toilet with pt. requesting privacy for bowel movement, +O2 via nasal cannula, +Chest Tube, +Tele. No acute distress. Call bell in reach. All lines intact and all precautions maintained. MARVIN Khan made aware and acknowledged.

## 2023-02-28 NOTE — OCCUPATIONAL THERAPY INITIAL EVALUATION ADULT - LIVES WITH, PROFILE
Pt. reports he lives with his wife in a house with 5 steps to enter. Once inside, pt. reports bedroom and bathroom are located on the main level. Per pt., he has a shower stall in his bathroom, +grab bar.

## 2023-02-28 NOTE — CONSULT NOTE ADULT - SUBJECTIVE AND OBJECTIVE BOX
Patient is a 82y old  Male who presents with a chief complaint of LAD, pleural effusion (28 Feb 2023 11:18)      HPI:  82 year old male with PMH atrial fibrillation, HTN and chronic urinary retention who customarily self catheterizes at home for the last year, presented to [facility] by ambulance from home on 2/17/2023 with c/o progressive shortness of breath.    Pt was intubated in the field and brought to ED.    Of note, patient recently found to have supraclavicular lymph node enlargement with associated leukocytosis, is being evaluated for lymphoma. Patient also recently admitted to [facility] where he was noted to have large pleural effusion that was drained.  According to family lymph node biopsy was inconclusive and patient had thoracentesis which revealed no infection or malignancy.  Since discharge from [facility] has been oxygen dependant at home which is also a new development.  At [place], patient was admitted to ICU for acute respiratory failure with hypercapnia. CTA chest negative for PE, showed large R pleural effusion with mediastinal and hilar lymphadenopathy. A pigtail was placed and drained over 3 L of fluid. Patient was started on empiric antibiotics and completed 5d course on 2/22/2023.  Pleural fluid cs negative and final. Blood cultures negative and final.  Given presence of lymphadenopathy and persistent leukocytosis, underlying malignancy is suspected. Hematology/oncology was consulted. Recommended transfer to [facility] for further investigation.   Pt was accepted by thoracic surgeon, Dr Nathaniel Galvan and is being evaluated by EBUS/mediastinoscopy and possible pleurX placement.   At [place], urology was consulted for pt's chronic urinary retention and a plummer was placed. Family did not want trial of void and agreed with continued plummer use.   Patient was successfully extubated and started on CPAP in [place] ICU. Patient was downgraded to medical floor and was titrated down to nasal canula. Physical therapy evaluated patient and MORALES is recommended. Pt was then transferred to [facility]. PTC intact on suction with no air leak and pt completes steroid taper 2/26/2023.   Last dose of eliquis 2/22/2023 at 6am, pt is on Lovenox 90mg BID for DVT ppx.     (24 Feb 2023 23:24)    He was evaluated for EBUS mediastinoscopy and possible pleurx placement    REVIEW OF SYSTEMS  weakness    PAST MEDICAL & SURGICAL HISTORY  No pertinent past medical history    Atrial fibrillation    Hypertension    Urinary retention    No significant past surgical history        SOCIAL HISTORY  Smoking - Denied  EtOH - Denied   Drugs - Denied    FUNCTIONAL HISTORY  Lives with wife in house with 5 steps to enter, flight of 10 steps inside  Independent    CURRENT FUNCTIONAL STATUS  2/28   Sit-Stand Transfer Training  Sit-to-Stand Transfer Training Rehab Potential: good, to achieve stated therapy goals  Sit-to-Stand Transfer Training Symptoms Noted During/After Treatment: none  Transfer Training Sit-to-Stand Transfer: contact guard;  verbal cues;  1 person assist;  rolling walker  Transfer Training Stand-to-Sit Transfer: contact guard;  verbal cues;  1 person assist;  rolling walker  Sit-to-Stand Transfer Training Transfer Safety Analysis: decreased strength    Gait Training  Gait Training Rehab Potential: good, to achieve stated therapy goals  Gait Training Symptoms Noted During/After Treatment: none  Gait Training: contact guard;  verbal cues;  1 person assist;  rolling walker;  20 feet;  with I.V., plummer, chest tube  Gait Analysis: decreased velocity of limb motion;  decreased hip/knee flexion    Therapeutic Exercise  Therapeutic Exercise Rehab Effort: good  Therapeutic Exercise Symptoms Noted During/After Treatment: none  Therapeutic Exercise Detail: Patient performed active ROM of both LE with 10 repetition.        RECENT LABS/IMAGING  CBC Full  -  ( 28 Feb 2023 07:08 )  WBC Count : 11.79 K/uL  RBC Count : 3.87 M/uL  Hemoglobin : 10.9 g/dL  Hematocrit : 35.2 %  Platelet Count - Automated : 163 K/uL  Mean Cell Volume : 91.0 fL  Mean Cell Hemoglobin : 28.2 pg  Mean Cell Hemoglobin Concentration : 31.0 gm/dL  Auto Neutrophil # : x  Auto Lymphocyte # : x  Auto Monocyte # : x  Auto Eosinophil # : x  Auto Basophil # : x  Auto Neutrophil % : x  Auto Lymphocyte % : x  Auto Monocyte % : x  Auto Eosinophil % : x  Auto Basophil % : x    02-27    139  |  102  |  13  ----------------------------<  110<H>  3.8   |  32<H>  |  0.45<L>    Ca    8.2<L>      27 Feb 2023 05:46          VITALS  T(C): 36.3 (02-28-23 @ 14:31), Max: 36.8 (02-27-23 @ 17:06)  HR: 75 (02-28-23 @ 14:31) (59 - 76)  BP: 97/56 (02-28-23 @ 14:31) (96/50 - 120/55)  RR: 18 (02-28-23 @ 14:31) (16 - 20)  SpO2: 94% (02-28-23 @ 14:31) (94% - 100%)  Wt(kg): --    ALLERGIES  pertussis vaccines (Rash)  shellfish (Rash)      MEDICATIONS   albuterol    0.083% 2.5 milliGRAM(s) Nebulizer every 6 hours  ALPRAZolam 0.25 milliGRAM(s) Oral daily PRN  chlorhexidine 2% Cloths 1 Application(s) Topical <User Schedule>  famotidine    Tablet 20 milliGRAM(s) Oral daily  melatonin 6 milliGRAM(s) Oral at bedtime  polyethylene glycol 3350 17 Gram(s) Oral daily  senna 2 Tablet(s) Oral at bedtime  simvastatin 40 milliGRAM(s) Oral at bedtime  tamsulosin 0.4 milliGRAM(s) Oral at bedtime      ----------------------------------------------------------------------------------------  PHYSICAL EXAM  Constitutional - NAD, Comfortable  HEENT - NCAT, EOMI  Neck - Supple, No limited ROM  Chest - CTA bilaterally, No wheeze, No rhonchi, No crackles  Cardiovascular - RRR, S1S2, No murmurs  Abdomen - BS+, Soft, NTND  Extremities - No C/C/E, No calf tenderness   Neurologic Exam -                    Cognitive - Awake, Alert, AAO to self, place, date, year, situation     Communication - Fluent, No dysarthria     Cranial Nerves - CN 2-12 intact     Motor - No focal deficits                    LEFT    UE - ShAB 5/5, EF 5/5, EE 5/5, WE 5/5,  5/5                    RIGHT UE - ShAB 5/5, EF 5/5, EE 5/5, WE 5/5,  5/5                    LEFT    LE - HF 5/5, KE 5/5, DF 5/5, PF 5/5                    RIGHT LE - HF 5/5, KE 5/5, DF 5/5, PF 5/5        Sensory - Intact to LT     Reflexes - DTR Intact, No primitive reflexive     Coordination - FTN intact     OculoVestibular - No saccades, No nystagmus, VOR         Balance - WNL Static  Psychiatric - Mood stable, Affect WNL  ----------------------------------------------------------------------------------------  ASSESSMENT/PLAN     continue bedside PT and OT  plummer  diet: regular dash/tlc no straws   xanax 0.25mg prn for anxiety  Rehab -     incomplete note, consult in progress Patient is a 82y old  Male who presents with a chief complaint of LAD, pleural effusion (28 Feb 2023 11:18)      HPI:  82 year old male with PMH atrial fibrillation, HTN and chronic urinary retention who customarily self catheterizes at home for the last year, presented to [facility] by ambulance from home on 2/17/2023 with c/o progressive shortness of breath.    Pt was intubated in the field and brought to ED.    Of note, patient recently found to have supraclavicular lymph node enlargement with associated leukocytosis, is being evaluated for lymphoma. Patient also recently admitted to [facility] where he was noted to have large pleural effusion that was drained.  According to family lymph node biopsy was inconclusive and patient had thoracentesis which revealed no infection or malignancy.  Since discharge from [facility] has been oxygen dependant at home which is also a new development.  At [place], patient was admitted to ICU for acute respiratory failure with hypercapnia. CTA chest negative for PE, showed large R pleural effusion with mediastinal and hilar lymphadenopathy. A pigtail was placed and drained over 3 L of fluid. Patient was started on empiric antibiotics and completed 5d course on 2/22/2023.  Pleural fluid cs negative and final. Blood cultures negative and final.  Given presence of lymphadenopathy and persistent leukocytosis, underlying malignancy is suspected. Hematology/oncology was consulted. Recommended transfer to [facility] for further investigation.   Pt was accepted by thoracic surgeon, Dr Nathaniel Galvan and is being evaluated by EBUS/mediastinoscopy and possible pleurX placement.   At [place], urology was consulted for pt's chronic urinary retention and a plummer was placed. Family did not want trial of void and agreed with continued plummer use.   Patient was successfully extubated and started on CPAP in [place] ICU. Patient was downgraded to medical floor and was titrated down to nasal canula. Physical therapy evaluated patient and MORALES is recommended. Pt was then transferred to [facility]. PTC intact on suction with no air leak and pt completes steroid taper 2/26/2023.   Last dose of eliquis 2/22/2023 at 6am, pt is on Lovenox 90mg BID for DVT ppx.     (24 Feb 2023 23:24)    He was evaluated for EBUS mediastinoscopy and possible pleurx placement by IR.  s/p biopsy, pathology pending    REVIEW OF SYSTEMS  weakness    PAST MEDICAL & SURGICAL HISTORY   Atrial fibrillation  Hypertension  Urinary retention  No significant past surgical history      FUNCTIONAL HISTORY  Lives with wife in house with 5 steps to enter, flight of 10 steps inside  Independent    CURRENT FUNCTIONAL STATUS  2/28   Sit-Stand Transfer Training  Sit-to-Stand Transfer Training Rehab Potential: good, to achieve stated therapy goals  Sit-to-Stand Transfer Training Symptoms Noted During/After Treatment: none  Transfer Training Sit-to-Stand Transfer: contact guard;  verbal cues;  1 person assist;  rolling walker  Transfer Training Stand-to-Sit Transfer: contact guard;  verbal cues;  1 person assist;  rolling walker  Sit-to-Stand Transfer Training Transfer Safety Analysis: decreased strength    Gait Training  Gait Training Rehab Potential: good, to achieve stated therapy goals  Gait Training Symptoms Noted During/After Treatment: none  Gait Training: contact guard;  verbal cues;  1 person assist;  rolling walker;  20 feet;  with I.V., plummer, chest tube  Gait Analysis: decreased velocity of limb motion;  decreased hip/knee flexion    Therapeutic Exercise  Therapeutic Exercise Rehab Effort: good  Therapeutic Exercise Symptoms Noted During/After Treatment: none  Therapeutic Exercise Detail: Patient performed active ROM of both LE with 10 repetition.        RECENT LABS/IMAGING  CBC Full  -  ( 28 Feb 2023 07:08 )  WBC Count : 11.79 K/uL  RBC Count : 3.87 M/uL  Hemoglobin : 10.9 g/dL  Hematocrit : 35.2 %  Platelet Count - Automated : 163 K/uL  Mean Cell Volume : 91.0 fL  Mean Cell Hemoglobin : 28.2 pg  Mean Cell Hemoglobin Concentration : 31.0 gm/dL  Auto Neutrophil # : x  Auto Lymphocyte # : x  Auto Monocyte # : x  Auto Eosinophil # : x  Auto Basophil # : x  Auto Neutrophil % : x  Auto Lymphocyte % : x  Auto Monocyte % : x  Auto Eosinophil % : x  Auto Basophil % : x    02-27    139  |  102  |  13  ----------------------------<  110<H>  3.8   |  32<H>  |  0.45<L>    Ca    8.2<L>      27 Feb 2023 05:46          VITALS  T(C): 36.3 (02-28-23 @ 14:31), Max: 36.8 (02-27-23 @ 17:06)  HR: 75 (02-28-23 @ 14:31) (59 - 76)  BP: 97/56 (02-28-23 @ 14:31) (96/50 - 120/55)  RR: 18 (02-28-23 @ 14:31) (16 - 20)  SpO2: 94% (02-28-23 @ 14:31) (94% - 100%)  Wt(kg): --    ALLERGIES  pertussis vaccines (Rash)  shellfish (Rash)      MEDICATIONS   albuterol    0.083% 2.5 milliGRAM(s) Nebulizer every 6 hours  ALPRAZolam 0.25 milliGRAM(s) Oral daily PRN  chlorhexidine 2% Cloths 1 Application(s) Topical <User Schedule>  famotidine    Tablet 20 milliGRAM(s) Oral daily  melatonin 6 milliGRAM(s) Oral at bedtime  polyethylene glycol 3350 17 Gram(s) Oral daily  senna 2 Tablet(s) Oral at bedtime  simvastatin 40 milliGRAM(s) Oral at bedtime  tamsulosin 0.4 milliGRAM(s) Oral at bedtime      ----------------------------------------------------------------------------------------  PHYSICAL EXAM  Constitutional - NAD, Comfortable  HEENT - NCAT, EOMI   Chest - chest tube in place, no resp distress  Cardiovascular - RRR, S1S2   Abdomen - BS+, Soft, NTND  Extremities - No C/C/E, No calf tenderness   Neurologic Exam -                    Cognitive - Awake, Alert, AAO to self, place, date, year, situation     Communication - Fluent, No dysarthria     Cranial Nerves - CN 2-12 intact     Motor - No focal deficits                    LEFT    UE - 4+/5                    RIGHT UE - 4+/5                    LEFT    LE - 4+/5                    RIGHT LE - 4+/5        Sensory - Intact to LT      Balance - WNL Static  Psychiatric - Mood stable, Affect WNL  ----------------------------------------------------------------------------------------  ASSESSMENT/PLAN    81 yo m pmh afib, htn, urinary retention, p/w hypoxic resp failure, recurrent R pleural effusion, lymphadenopathy suspicious of underlying malignancy  s/p steroid taper  s/p ln biopsy   continue bedside PT and OT  plummer  diet: regular dash/tlc no straws   xanax 0.25mg prn for anxiety  Rehab -   currently patient would benefit from acute rehab, however biopsy pending and planning for possible pleurx  patient can tolerate 3 hours per day of therapy with medical supervision.  will monitor for improvement as patient has been improving functionally.  Patient is a 82y old  Male who presents with a chief complaint of LAD, pleural effusion (28 Feb 2023 11:18)      HPI:  82 year old male with PMH atrial fibrillation, HTN and chronic urinary retention who customarily self catheterizes at home for the last year, presented to [facility] by ambulance from home on 2/17/2023 with c/o progressive shortness of breath.    Pt was intubated in the field and brought to ED.    Of note, patient recently found to have supraclavicular lymph node enlargement with associated leukocytosis, is being evaluated for lymphoma. Patient also recently admitted to [facility] where he was noted to have large pleural effusion that was drained.  According to family lymph node biopsy was inconclusive and patient had thoracentesis which revealed no infection or malignancy.  Since discharge from [facility] has been oxygen dependant at home which is also a new development.  At [place], patient was admitted to ICU for acute respiratory failure with hypercapnia. CTA chest negative for PE, showed large R pleural effusion with mediastinal and hilar lymphadenopathy. A pigtail was placed and drained over 3 L of fluid. Patient was started on empiric antibiotics and completed 5d course on 2/22/2023.  Pleural fluid cs negative and final. Blood cultures negative and final.  Given presence of lymphadenopathy and persistent leukocytosis, underlying malignancy is suspected. Hematology/oncology was consulted. Recommended transfer to [facility] for further investigation.   Pt was accepted by thoracic surgeon, Dr Nathaniel Galvan and is being evaluated by EBUS/mediastinoscopy and possible pleurX placement.   At [place], urology was consulted for pt's chronic urinary retention and a plummer was placed. Family did not want trial of void and agreed with continued plummer use.   Patient was successfully extubated and started on CPAP in [place] ICU. Patient was downgraded to medical floor and was titrated down to nasal canula. Physical therapy evaluated patient and MORALES is recommended. Pt was then transferred to [facility]. PTC intact on suction with no air leak and pt completes steroid taper 2/26/2023.   Last dose of eliquis 2/22/2023 at 6am, pt is on Lovenox 90mg BID for DVT ppx.     (24 Feb 2023 23:24)    He was evaluated for EBUS mediastinoscopy and possible pleurx placement by IR.  s/p biopsy, pathology pending    REVIEW OF SYSTEMS  weakness    PAST MEDICAL & SURGICAL HISTORY   Atrial fibrillation  Hypertension  Urinary retention  No significant past surgical history      FUNCTIONAL HISTORY  Lives with wife in house with 5 steps to enter, flight of 10 steps inside  Independent    CURRENT FUNCTIONAL STATUS  2/28   Sit-Stand Transfer Training  Sit-to-Stand Transfer Training Rehab Potential: good, to achieve stated therapy goals  Sit-to-Stand Transfer Training Symptoms Noted During/After Treatment: none  Transfer Training Sit-to-Stand Transfer: contact guard;  verbal cues;  1 person assist;  rolling walker  Transfer Training Stand-to-Sit Transfer: contact guard;  verbal cues;  1 person assist;  rolling walker  Sit-to-Stand Transfer Training Transfer Safety Analysis: decreased strength    Gait Training  Gait Training Rehab Potential: good, to achieve stated therapy goals  Gait Training Symptoms Noted During/After Treatment: none  Gait Training: contact guard;  verbal cues;  1 person assist;  rolling walker;  20 feet;  with I.V., plummer, chest tube  Gait Analysis: decreased velocity of limb motion;  decreased hip/knee flexion    Therapeutic Exercise  Therapeutic Exercise Rehab Effort: good  Therapeutic Exercise Symptoms Noted During/After Treatment: none  Therapeutic Exercise Detail: Patient performed active ROM of both LE with 10 repetition.        RECENT LABS/IMAGING  CBC Full  -  ( 28 Feb 2023 07:08 )  WBC Count : 11.79 K/uL  RBC Count : 3.87 M/uL  Hemoglobin : 10.9 g/dL  Hematocrit : 35.2 %  Platelet Count - Automated : 163 K/uL  Mean Cell Volume : 91.0 fL  Mean Cell Hemoglobin : 28.2 pg  Mean Cell Hemoglobin Concentration : 31.0 gm/dL  Auto Neutrophil # : x  Auto Lymphocyte # : x  Auto Monocyte # : x  Auto Eosinophil # : x  Auto Basophil # : x  Auto Neutrophil % : x  Auto Lymphocyte % : x  Auto Monocyte % : x  Auto Eosinophil % : x  Auto Basophil % : x    02-27    139  |  102  |  13  ----------------------------<  110<H>  3.8   |  32<H>  |  0.45<L>    Ca    8.2<L>      27 Feb 2023 05:46          VITALS  T(C): 36.3 (02-28-23 @ 14:31), Max: 36.8 (02-27-23 @ 17:06)  HR: 75 (02-28-23 @ 14:31) (59 - 76)  BP: 97/56 (02-28-23 @ 14:31) (96/50 - 120/55)  RR: 18 (02-28-23 @ 14:31) (16 - 20)  SpO2: 94% (02-28-23 @ 14:31) (94% - 100%)  Wt(kg): --    ALLERGIES  pertussis vaccines (Rash)  shellfish (Rash)      MEDICATIONS   albuterol    0.083% 2.5 milliGRAM(s) Nebulizer every 6 hours  ALPRAZolam 0.25 milliGRAM(s) Oral daily PRN  chlorhexidine 2% Cloths 1 Application(s) Topical <User Schedule>  famotidine    Tablet 20 milliGRAM(s) Oral daily  melatonin 6 milliGRAM(s) Oral at bedtime  polyethylene glycol 3350 17 Gram(s) Oral daily  senna 2 Tablet(s) Oral at bedtime  simvastatin 40 milliGRAM(s) Oral at bedtime  tamsulosin 0.4 milliGRAM(s) Oral at bedtime      ----------------------------------------------------------------------------------------  PHYSICAL EXAM  Constitutional - NAD, Comfortable  HEENT - NCAT, EOMI   Chest - chest tube in place, no resp distress  Cardiovascular - RRR, S1S2   Abdomen - BS+, Soft, NTND  Extremities - No C/C/E, No calf tenderness   Neurologic Exam -                    Cognitive - Awake, Alert, AAO to self, place, date, year, situation     Communication - Fluent, No dysarthria     Cranial Nerves - CN 2-12 intact     Motor - No focal deficits                    LEFT    UE - 4+/5                    RIGHT UE - 4+/5                    LEFT    LE - 4+/5                    RIGHT LE - 4+/5        Sensory - Intact to LT      Balance - WNL Static  Psychiatric - Mood stable, Affect WNL  ----------------------------------------------------------------------------------------  ASSESSMENT/PLAN    81 yo m pmh afib, htn, urinary retention, p/w hypoxic resp failure, recurrent R pleural effusion, lymphadenopathy suspicious of underlying malignancy  s/p steroid taper  s/p ln biopsy   continue bedside PT and OT  plummer  diet: regular dash/tlc no straws   xanax 0.25mg prn for anxiety  Rehab -   currently patient would benefit from acute rehab, however biopsy pending and planning for possible pleurx.  pending oncologic plan.   patient can tolerate 3 hours per day of therapy with medical supervision.  will monitor for improvement as patient has been improving functionally.

## 2023-03-01 LAB
ANION GAP SERPL CALC-SCNC: 3 MMOL/L — LOW (ref 7–14)
APTT BLD: 42 SEC — HIGH (ref 27–36.3)
APTT BLD: 48.2 SEC — HIGH (ref 27–36.3)
APTT BLD: 51.7 SEC — HIGH (ref 27–36.3)
APTT BLD: 55.8 SEC — HIGH (ref 27–36.3)
BUN SERPL-MCNC: 10 MG/DL — SIGNIFICANT CHANGE UP (ref 7–23)
CALCIUM SERPL-MCNC: 8.4 MG/DL — SIGNIFICANT CHANGE UP (ref 8.4–10.5)
CHLORIDE SERPL-SCNC: 101 MMOL/L — SIGNIFICANT CHANGE UP (ref 98–107)
CO2 SERPL-SCNC: 34 MMOL/L — HIGH (ref 22–31)
CREAT SERPL-MCNC: 0.51 MG/DL — SIGNIFICANT CHANGE UP (ref 0.5–1.3)
EGFR: 101 ML/MIN/1.73M2 — SIGNIFICANT CHANGE UP
G6PD RBC-CCNC: 21.9 U/G HGB — HIGH (ref 7–20.5)
GLUCOSE SERPL-MCNC: 123 MG/DL — HIGH (ref 70–99)
HCT VFR BLD CALC: 35.5 % — LOW (ref 39–50)
HCT VFR BLD CALC: 36.7 % — LOW (ref 39–50)
HGB BLD-MCNC: 11.1 G/DL — LOW (ref 13–17)
HGB BLD-MCNC: 11.2 G/DL — LOW (ref 13–17)
MCHC RBC-ENTMCNC: 27.5 PG — SIGNIFICANT CHANGE UP (ref 27–34)
MCHC RBC-ENTMCNC: 28.3 PG — SIGNIFICANT CHANGE UP (ref 27–34)
MCHC RBC-ENTMCNC: 30.2 GM/DL — LOW (ref 32–36)
MCHC RBC-ENTMCNC: 31.5 GM/DL — LOW (ref 32–36)
MCV RBC AUTO: 89.6 FL — SIGNIFICANT CHANGE UP (ref 80–100)
MCV RBC AUTO: 91.1 FL — SIGNIFICANT CHANGE UP (ref 80–100)
NON-GYNECOLOGICAL CYTOLOGY STUDY: SIGNIFICANT CHANGE UP
NRBC # BLD: 0 /100 WBCS — SIGNIFICANT CHANGE UP (ref 0–0)
NRBC # BLD: 0 /100 WBCS — SIGNIFICANT CHANGE UP (ref 0–0)
NRBC # FLD: 0 K/UL — SIGNIFICANT CHANGE UP (ref 0–0)
NRBC # FLD: 0 K/UL — SIGNIFICANT CHANGE UP (ref 0–0)
PLATELET # BLD AUTO: 166 K/UL — SIGNIFICANT CHANGE UP (ref 150–400)
PLATELET # BLD AUTO: 176 K/UL — SIGNIFICANT CHANGE UP (ref 150–400)
POTASSIUM SERPL-MCNC: 3.8 MMOL/L — SIGNIFICANT CHANGE UP (ref 3.5–5.3)
POTASSIUM SERPL-SCNC: 3.8 MMOL/L — SIGNIFICANT CHANGE UP (ref 3.5–5.3)
RBC # BLD: 3.96 M/UL — LOW (ref 4.2–5.8)
RBC # BLD: 4.03 M/UL — LOW (ref 4.2–5.8)
RBC # FLD: 18.4 % — HIGH (ref 10.3–14.5)
RBC # FLD: 18.6 % — HIGH (ref 10.3–14.5)
SODIUM SERPL-SCNC: 138 MMOL/L — SIGNIFICANT CHANGE UP (ref 135–145)
WBC # BLD: 10.8 K/UL — HIGH (ref 3.8–10.5)
WBC # BLD: 11.24 K/UL — HIGH (ref 3.8–10.5)
WBC # FLD AUTO: 10.8 K/UL — HIGH (ref 3.8–10.5)
WBC # FLD AUTO: 11.24 K/UL — HIGH (ref 3.8–10.5)

## 2023-03-01 PROCEDURE — 71045 X-RAY EXAM CHEST 1 VIEW: CPT | Mod: 26

## 2023-03-01 PROCEDURE — 93016 CV STRESS TEST SUPVJ ONLY: CPT | Mod: GC

## 2023-03-01 PROCEDURE — 99232 SBSQ HOSP IP/OBS MODERATE 35: CPT

## 2023-03-01 PROCEDURE — 78452 HT MUSCLE IMAGE SPECT MULT: CPT | Mod: 26

## 2023-03-01 PROCEDURE — 93018 CV STRESS TEST I&R ONLY: CPT | Mod: GC

## 2023-03-01 RX ORDER — ALBUTEROL 90 UG/1
2.5 AEROSOL, METERED ORAL EVERY 12 HOURS
Refills: 0 | Status: DISCONTINUED | OUTPATIENT
Start: 2023-03-01 | End: 2023-03-04

## 2023-03-01 RX ORDER — BENZOCAINE AND MENTHOL 5; 1 G/100ML; G/100ML
1 LIQUID ORAL EVERY 6 HOURS
Refills: 0 | Status: DISCONTINUED | OUTPATIENT
Start: 2023-03-01 | End: 2023-03-01

## 2023-03-01 RX ADMIN — HEPARIN SODIUM 1080 UNIT(S)/HR: 5000 INJECTION INTRAVENOUS; SUBCUTANEOUS at 07:32

## 2023-03-01 RX ADMIN — HEPARIN SODIUM 1180 UNIT(S)/HR: 5000 INJECTION INTRAVENOUS; SUBCUTANEOUS at 08:11

## 2023-03-01 RX ADMIN — HEPARIN SODIUM 1280 UNIT(S)/HR: 5000 INJECTION INTRAVENOUS; SUBCUTANEOUS at 19:43

## 2023-03-01 RX ADMIN — ALBUTEROL 2.5 MILLIGRAM(S): 90 AEROSOL, METERED ORAL at 04:56

## 2023-03-01 RX ADMIN — ALBUTEROL 2.5 MILLIGRAM(S): 90 AEROSOL, METERED ORAL at 22:13

## 2023-03-01 RX ADMIN — HEPARIN SODIUM 1280 UNIT(S)/HR: 5000 INJECTION INTRAVENOUS; SUBCUTANEOUS at 16:47

## 2023-03-01 RX ADMIN — Medication 6 MILLIGRAM(S): at 21:21

## 2023-03-01 RX ADMIN — SIMVASTATIN 40 MILLIGRAM(S): 20 TABLET, FILM COATED ORAL at 21:21

## 2023-03-01 RX ADMIN — FAMOTIDINE 20 MILLIGRAM(S): 10 INJECTION INTRAVENOUS at 12:19

## 2023-03-01 RX ADMIN — CHLORHEXIDINE GLUCONATE 1 APPLICATION(S): 213 SOLUTION TOPICAL at 05:19

## 2023-03-01 RX ADMIN — TAMSULOSIN HYDROCHLORIDE 0.4 MILLIGRAM(S): 0.4 CAPSULE ORAL at 21:21

## 2023-03-01 RX ADMIN — ALBUTEROL 2.5 MILLIGRAM(S): 90 AEROSOL, METERED ORAL at 09:48

## 2023-03-01 RX ADMIN — SENNA PLUS 2 TABLET(S): 8.6 TABLET ORAL at 21:21

## 2023-03-01 RX ADMIN — HEPARIN SODIUM 1080 UNIT(S)/HR: 5000 INJECTION INTRAVENOUS; SUBCUTANEOUS at 01:12

## 2023-03-01 RX ADMIN — HEPARIN SODIUM 1380 UNIT(S)/HR: 5000 INJECTION INTRAVENOUS; SUBCUTANEOUS at 23:47

## 2023-03-01 NOTE — PROGRESS NOTE ADULT - ASSESSMENT
82 year old male with PMH atrial fibrillation, HTN and chronic urinary retention who presented to [facility] from home with acute hypoxic respiratory failure with hypercapnia. Patient was admitted to ICU an [facility], now downgraded and transferred to  [facility] for continuation of his care     #Acute hypoxic respiratory failure with hypercapnia  #Recurrent R pleural effusion-exudative  #Lymphadenopathy and leukocytosis-suspect underlying malignancy  -With large right sided pleural effusion, now status post pigtail placement, monitor output   -O 2 supplement PRN   -Continue steroid taper  -Patient was treated with course of empiric cefepime and dose of vanco at [place]   -Blood cultures 2/17 NGTD, Pleural fluid no growth currently, f/u final culture/cytology  -thoracic surg care appreciated, plan for pleurex and mediastinoscopy to evaluate for lymph nodes. VATS  -Heme/onc follow up appreciated   - patient will benefit form pleurx placement   - IR guided LN biopsy     #Afib/ HTN  rate control   heparin for AC as tolerated   telemonitoring   -Atenolol on hold in view of low BP     #Urinary Retention  -Continue plummer, per previous notes patient's family did not want trial of void  -Continue flomax    #DVT prophylaxis     Discussed with patient son

## 2023-03-01 NOTE — PROGRESS NOTE ADULT - SUBJECTIVE AND OBJECTIVE BOX
Patient is a 82y old  Male who presents with a chief complaint of LAD, pleural effusion (28 Feb 2023 11:18)      HPI:  82 year old male with PMH atrial fibrillation, HTN and chronic urinary retention who customarily self catheterizes at home for the last year, presented to [facility] by ambulance from home on 2/17/2023 with c/o progressive shortness of breath.    Pt was intubated in the field and brought to ED.    Of note, patient recently found to have supraclavicular lymph node enlargement with associated leukocytosis, is being evaluated for lymphoma. Patient also recently admitted to [facility] where he was noted to have large pleural effusion that was drained.  According to family lymph node biopsy was inconclusive and patient had thoracentesis which revealed no infection or malignancy.  Since discharge from [facility] has been oxygen dependant at home which is also a new development.  At [place], patient was admitted to ICU for acute respiratory failure with hypercapnia. CTA chest negative for PE, showed large R pleural effusion with mediastinal and hilar lymphadenopathy. A pigtail was placed and drained over 3 L of fluid. Patient was started on empiric antibiotics and completed 5d course on 2/22/2023.  Pleural fluid cs negative and final. Blood cultures negative and final.  Given presence of lymphadenopathy and persistent leukocytosis, underlying malignancy is suspected. Hematology/oncology was consulted. Recommended transfer to [facility] for further investigation.   Pt was accepted by thoracic surgeon, Dr Nathaniel Galvan and is being evaluated by EBUS/mediastinoscopy and possible pleurX placement.   At [place], urology was consulted for pt's chronic urinary retention and a plummer was placed. Family did not want trial of void and agreed with continued plummer use.   Patient was successfully extubated and started on CPAP in [place] ICU. Patient was downgraded to medical floor and was titrated down to nasal canula. Physical therapy evaluated patient and MORALES is recommended. Pt was then transferred to [facility]. PTC intact on suction with no air leak and pt completes steroid taper 2/26/2023.   Last dose of eliquis 2/22/2023 at 6am, pt is on Lovenox 90mg BID for DVT ppx.     (24 Feb 2023 23:24)    planned for nuclear stress test recommended per cardiology in planning for possible vats/pleurex  functionally improving.  ambulating with PT    REVIEW OF SYSTEMS  weakness      PAST MEDICAL & SURGICAL HISTORY  No pertinent past medical history    Atrial fibrillation    Hypertension    Urinary retention    No significant past surgical history         CURRENT FUNCTIONAL STATUS  2/28     Sit-Stand Transfer Training  Sit-to-Stand Transfer Training Rehab Potential: good, to achieve stated therapy goals  Sit-to-Stand Transfer Training Symptoms Noted During/After Treatment: none  Transfer Training Sit-to-Stand Transfer: stand-by assist;  verbal cues;  rolling walker  Transfer Training Stand-to-Sit Transfer: stand-by assist;  verbal cues;  rolling walker    Gait Training  Gait Training Rehab Potential: good, to achieve stated therapy goals  Gait Training Symptoms Noted During/After Treatment: none  Gait Training: stand-by assist;  verbal cues;  rolling walker;  75 feet  Gait Analysis: decreased velocity of limb motion;  decreased hip/knee flexion    Therapeutic Exercise  Therapeutic Exercise Rehab Effort: good  Therapeutic Exercise Symptoms Noted During/After Treatment: none  Therapeutic Exercise Detail: Patient performed active ROM of both UE / LE in functional plains with 10 repetition.           RECENT LABS/IMAGING  CBC Full  -  ( 01 Mar 2023 07:30 )  WBC Count : 10.80 K/uL  RBC Count : 4.03 M/uL  Hemoglobin : 11.1 g/dL  Hematocrit : 36.7 %  Platelet Count - Automated : 166 K/uL  Mean Cell Volume : 91.1 fL  Mean Cell Hemoglobin : 27.5 pg  Mean Cell Hemoglobin Concentration : 30.2 gm/dL  Auto Neutrophil # : x  Auto Lymphocyte # : x  Auto Monocyte # : x  Auto Eosinophil # : x  Auto Basophil # : x  Auto Neutrophil % : x  Auto Lymphocyte % : x  Auto Monocyte % : x  Auto Eosinophil % : x  Auto Basophil % : x    03-01    138  |  101  |  10  ----------------------------<  123<H>  3.8   |  34<H>  |  0.51    Ca    8.4      01 Mar 2023 07:30          VITALS  T(C): 36.8 (03-01-23 @ 08:15), Max: 37 (02-28-23 @ 16:58)  HR: 90 (03-01-23 @ 09:48) (75 - 99)  BP: 121/66 (03-01-23 @ 08:15) (97/56 - 126/64)  RR: 16 (03-01-23 @ 08:15) (16 - 18)  SpO2: 70% (03-01-23 @ 09:48) (70% - 100%)  Wt(kg): --    ALLERGIES  pertussis vaccines (Rash)  shellfish (Rash)      MEDICATIONS   albuterol    0.083% 2.5 milliGRAM(s) Nebulizer every 6 hours  ALPRAZolam 0.25 milliGRAM(s) Oral daily PRN  chlorhexidine 2% Cloths 1 Application(s) Topical <User Schedule>  escitalopram 5 milliGRAM(s) Oral daily  famotidine    Tablet 20 milliGRAM(s) Oral daily  heparin  Infusion. 980 Unit(s)/Hr IV Continuous <Continuous>  melatonin 6 milliGRAM(s) Oral at bedtime  polyethylene glycol 3350 17 Gram(s) Oral daily  senna 2 Tablet(s) Oral at bedtime  simvastatin 40 milliGRAM(s) Oral at bedtime  tamsulosin 0.4 milliGRAM(s) Oral at bedtime      ----------------------------------------------------------------------------------------  PHYSICAL EXAM  Constitutional - NAD, Comfortable  HEENT - NCAT, EOMI   Chest - chest tube in place, no resp distress  Cardiovascular - RRR, S1S2   Abdomen - BS+, Soft, NTND  Extremities - No C/C/E, No calf tenderness   Neurologic Exam -                    Cognitive - Awake, Alert, AAO to self, place, date, year, situation     Communication - Fluent, No dysarthria     Cranial Nerves - CN 2-12 intact     Motor - No focal deficits                    LEFT    UE - 4+/5                    RIGHT UE - 4+/5                    LEFT    LE - 4+/5                    RIGHT LE - 4+/5        Sensory - Intact to LT      Balance - WNL Static  Psychiatric - Mood stable, Affect WNL  ----------------------------------------------------------------------------------------  ASSESSMENT/PLAN    83 yo m pmh afib, htn, urinary retention, p/w hypoxic resp failure, recurrent R pleural effusion, lymphadenopathy suspicious of underlying malignancy  s/p steroid taper  s/p ln biopsy   continue bedside PT and OT  plummer  diet: regular dash/tlc no straws   xanax 0.25mg prn for anxiety  Rehab - improving with bedside therapy, ambulated 75' with stand by assist.   pending medical course and progress with bedside therapy, anticipate he may no longer qualify for acute rehab, if unable to climb stair can consider subacute. Patient states his goal is for home discharge or short stay in rehab. will monitor.

## 2023-03-01 NOTE — PROGRESS NOTE ADULT - SUBJECTIVE AND OBJECTIVE BOX
Name of Patient : ARIK DUMONT  MRN: [id number]  Date of visit: 03-01-23       Subjective: Patient seen and examined. No new events except as noted.   stress test today     REVIEW OF SYSTEMS:    CONSTITUTIONAL: No weakness, fevers or chills  EYES/ENT: No visual changes;  No vertigo or throat pain   NECK: No pain or stiffness  RESPIRATORY: No cough, wheezing, hemoptysis; No shortness of breath  CARDIOVASCULAR: No chest pain or palpitations  GASTROINTESTINAL: No abdominal or epigastric pain. No nausea, vomiting, or hematemesis; No diarrhea or constipation. No melena or hematochezia.  GENITOURINARY: No dysuria, frequency or hematuria  NEUROLOGICAL: No numbness or weakness  SKIN: No itching, burning, rashes, or lesions   All other review of systems is negative unless indicated above.    MEDICATIONS:  MEDICATIONS  (STANDING):  albuterol    0.083% 2.5 milliGRAM(s) Nebulizer every 12 hours  chlorhexidine 2% Cloths 1 Application(s) Topical <User Schedule>  escitalopram 5 milliGRAM(s) Oral daily  famotidine    Tablet 20 milliGRAM(s) Oral daily  heparin  Infusion. 980 Unit(s)/Hr (9.8 mL/Hr) IV Continuous <Continuous>  melatonin 6 milliGRAM(s) Oral at bedtime  polyethylene glycol 3350 17 Gram(s) Oral daily  senna 2 Tablet(s) Oral at bedtime  simvastatin 40 milliGRAM(s) Oral at bedtime  tamsulosin 0.4 milliGRAM(s) Oral at bedtime      PHYSICAL EXAM:  T(C): 36.9 (03-01-23 @ 20:00), Max: 36.9 (03-01-23 @ 20:00)  HR: 100 (03-01-23 @ 22:13) (88 - 101)  BP: 113/45 (03-01-23 @ 20:00) (113/45 - 132/68)  RR: 18 (03-01-23 @ 20:00) (16 - 18)  SpO2: 97% (03-01-23 @ 22:13) (70% - 100%)  Wt(kg): --  I&O's Summary    28 Feb 2023 07:01  -  01 Mar 2023 07:00  --------------------------------------------------------  IN: 780 mL / OUT: 1410 mL / NET: -630 mL    01 Mar 2023 07:01  -  01 Mar 2023 23:44  --------------------------------------------------------  IN: 458.2 mL / OUT: 660 mL / NET: -201.8 mL          Appearance: Normal	  HEENT:  PERRLA   Lymphatic: No lymphadenopathy   Cardiovascular: Normal S1 S2, no JVD  Respiratory: normal effort , clear  Gastrointestinal:  Soft, Non-tender  Skin: No rashes,  warm to touch  Psychiatry:  Mood & affect appropriate  Musculuskeletal: No edema    recent labs, Imaging and EKGs personally reviewed     02-28-23 @ 07:01  -  03-01-23 @ 07:00  --------------------------------------------------------  IN: 780 mL / OUT: 1410 mL / NET: -630 mL    03-01-23 @ 07:01  -  03-01-23 @ 23:44  --------------------------------------------------------  IN: 458.2 mL / OUT: 660 mL / NET: -201.8 mL                            11.1   10.80 )-----------( 166      ( 01 Mar 2023 07:30 )             36.7               03-01    138  |  101  |  10  ----------------------------<  123<H>  3.8   |  34<H>  |  0.51    Ca    8.4      01 Mar 2023 07:30      PTT - ( 01 Mar 2023 22:50 )  PTT:55.8 sec

## 2023-03-01 NOTE — PROGRESS NOTE ADULT - NS ATTEND AMEND GEN_ALL_CORE FT
Patient seen and examined agree with above note as modified, where appropriate, by me. Stress test done today negative, will plan for pleurX catheter placement.

## 2023-03-01 NOTE — PROGRESS NOTE ADULT - ASSESSMENT
Pl effusion   s/p IR biopsy   fu with path   suspicion for malignancy     afib  HR stable   off atenolol due to low BP   IF HR goes up , we can consider digoxin  resume a/c once deemed safe     Pre-Operative Cardiac Risk Stratification and Optimization   Based on patient history and physical exam, the patient is considered to have elevated risk   EKG shows st depression in lateral and nadya lateral leads   recommend pharm nuclear stress test

## 2023-03-01 NOTE — PROGRESS NOTE ADULT - SUBJECTIVE AND OBJECTIVE BOX
DATE OF SERVICE: 03-01-23 @ 06:47    Subjective: Patient seen and examined. No new events except as noted.     SUBJECTIVE/ROS:  nad    MEDICATIONS:  MEDICATIONS  (STANDING):  albuterol    0.083% 2.5 milliGRAM(s) Nebulizer every 6 hours  chlorhexidine 2% Cloths 1 Application(s) Topical <User Schedule>  escitalopram 5 milliGRAM(s) Oral daily  famotidine    Tablet 20 milliGRAM(s) Oral daily  heparin  Infusion. 980 Unit(s)/Hr (9.8 mL/Hr) IV Continuous <Continuous>  melatonin 6 milliGRAM(s) Oral at bedtime  polyethylene glycol 3350 17 Gram(s) Oral daily  senna 2 Tablet(s) Oral at bedtime  simvastatin 40 milliGRAM(s) Oral at bedtime  tamsulosin 0.4 milliGRAM(s) Oral at bedtime      PHYSICAL EXAM:  T(C): 36.7 (03-01-23 @ 06:08), Max: 37 (02-28-23 @ 16:58)  HR: 92 (03-01-23 @ 06:08) (65 - 99)  BP: 122/67 (03-01-23 @ 06:08) (97/56 - 126/64)  RR: 18 (03-01-23 @ 06:08) (16 - 18)  SpO2: 100% (03-01-23 @ 06:08) (94% - 100%)  Wt(kg): --  I&O's Summary    27 Feb 2023 07:01  -  28 Feb 2023 07:00  --------------------------------------------------------  IN: 1065.4 mL / OUT: 875 mL / NET: 190.4 mL    28 Feb 2023 07:01  -  01 Mar 2023 06:47  --------------------------------------------------------  IN: 780 mL / OUT: 1050 mL / NET: -270 mL            JVP: Normal  Neck: supple  Lung: clear   CV: S1 S2 , Murmur:  Abd: soft  Ext: No edema  neuro: Awake / alert  Psych: flat affect  Skin: normal``    LABS/DATA:    CARDIAC MARKERS:                                11.2   11.24 )-----------( 176      ( 01 Mar 2023 00:42 )             35.5           proBNP:   Lipid Profile:   HgA1c:   TSH:     TELE:  EKG:

## 2023-03-01 NOTE — PROGRESS NOTE ADULT - SUBJECTIVE AND OBJECTIVE BOX
pt seen and examined on morning rounds  presented at morning report  Subjective: no acute complaints  pt for NST today    Vital Signs:  Vital Signs Last 24 Hrs  T(C): 36.8 (03-01-23 @ 12:41), Max: 37 (02-28-23 @ 16:58)  T(F): 98.2 (03-01-23 @ 12:41), Max: 98.6 (02-28-23 @ 16:58)  HR: 90 (03-01-23 @ 12:41) (75 - 99)  BP: 132/68 (03-01-23 @ 12:41) (97/56 - 132/68)  RR: 18 (03-01-23 @ 12:41) (16 - 18)  SpO2: 99% (03-01-23 @ 12:41) (70% - 100%) on (O2)    PE  General: WN/WD NAD  Neurology: Awake, nonfocal, BAUTISTA x 4  Eyes: Scleras clear, PERRLA/ EOMI, Gross vision intact  ENT:Gross hearing intact, grossly patent pharynx, no stridor  Neck: Neck supple, trachea midline, No JVD,   Respiratory: CTA B/L, No wheezing, rales, rhonchi  CV: RRR, S1S2, no murmurs, rubs or gallops  Abdominal: Soft, NT, ND +BS,   Extremities: No edema, + peripheral pulses  Skin: No Rashes, Hematoma, Ecchymosis  Lymphatic: No Neck, axilla, groin LAD  Psych: Oriented x 3, normal affect  plummer intact  R PTC on ws drained 10cc/24h, no air leak  Relevant labs, radiology and Medications reviewed                        11.1   10.80 )-----------( 166      ( 01 Mar 2023 07:30 )             36.7     03-01    138  |  101  |  10  ----------------------------<  123<H>  3.8   |  34<H>  |  0.51    Ca    8.4      01 Mar 2023 07:30      PTT - ( 01 Mar 2023 07:30 )  PTT:51.7 sec  MEDICATIONS  (STANDING):  albuterol    0.083% 2.5 milliGRAM(s) Nebulizer every 12 hours  chlorhexidine 2% Cloths 1 Application(s) Topical <User Schedule>  escitalopram 5 milliGRAM(s) Oral daily  famotidine    Tablet 20 milliGRAM(s) Oral daily  heparin  Infusion. 980 Unit(s)/Hr (9.8 mL/Hr) IV Continuous <Continuous>  melatonin 6 milliGRAM(s) Oral at bedtime  polyethylene glycol 3350 17 Gram(s) Oral daily  senna 2 Tablet(s) Oral at bedtime  simvastatin 40 milliGRAM(s) Oral at bedtime  tamsulosin 0.4 milliGRAM(s) Oral at bedtime    MEDICATIONS  (PRN):  ALPRAZolam 0.25 milliGRAM(s) Oral daily PRN anxiety    Pertinent Physical Exam  I&O's Summary    28 Feb 2023 07:01  -  01 Mar 2023 07:00  --------------------------------------------------------  IN: 780 mL / OUT: 1410 mL / NET: -630 mL    01 Mar 2023 07:01  -  01 Mar 2023 14:19  --------------------------------------------------------  IN: 70.8 mL / OUT: 360 mL / NET: -289.2 mL        Assessment  82 year old male with PMH atrial fibrillation, HTN and chronic urinary retention who customarily self catheterizes at home for the last year, presented to [facility] by ambulance from home on 2/17/2023 with c/o progressive shortness of breath.    Pt was intubated in the field and brought to ED.    Of note, patient recently found to have supraclavicular lymph node enlargement with associated leukocytosis, is being evaluated for lymphoma. Patient also recently admitted to [facility] where he was noted to have large pleural effusion that was drained.  According to family lymph node biopsy was inconclusive and patient had thoracentesis which revealed no infection or malignancy.  Since discharge from [facility] has been oxygen dependant at home which is also a new development.  At [place], patient was admitted to ICU for acute respiratory failure with hypercapnia. CTA chest negative for PE, showed large R pleural effusion with mediastinal and hilar lymphadenopathy. A pigtail was placed and drained over 3 L of fluid. Patient was started on empiric antibiotics and completed 5d course on 2/22/2023.  Pleural fluid cs negative and final. Blood cultures negative and final.  Given presence of lymphadenopathy and persistent leukocytosis, underlying malignancy is suspected. Hematology/oncology was consulted. Recommended transfer to [facility] for further investigation.   Pt was accepted by thoracic surgeon, Dr Nathaniel Galvan and is being evaluated by EBUS/mediastinoscopy and possible pleurX placement.   At [place], urology was consulted for pt's chronic urinary retention and a plummer was placed. Family did not want trial of void and agreed with continued plummer use.   Patient was successfully extubated and started on CPAP in [place] ICU. Patient was downgraded to medical floor and was titrated down to nasal canula. Physical therapy evaluated patient and MORALES is recommended. Pt was then transferred to [facility]. PTC intact on suction with no air leak and pt completes steroid taper 2/26/2023.   Last dose of eliquis 2/22/2023 at 6am, pt is on Lovenox 90mg BID for a/c for afib. 2/25- Medicine and Cardiology consulted for optimization and clearance for OR. 2/26- Cardiology recommending stress test prior to OR. Pt daughter now in agreement.  pt s/p IR LN biopsy yesterday and for NST today.       PLAN  Neuro: Pain management  Pulm: Encourage coughing, deep breathing and use of incentive spirometry. Wean off supplemental oxygen as able. Daily CXR.   Cardio: Monitor telemetry/alarms. Pt for NST today  GI: Tolerating diet. Continue stool softeners.  Renal: monitor urine output, supplement electrolytes as needed  : continue Plummer  Vasc: eliquis on hold, Heparin gtt for Afib/ DVT prophylaxis  Heme: Stable H/H.   ID: Off antibiotics. Stable.  Therapy: OOB/ambulate. Plan [facility]  Tubes: Monitor Chest tube output, will place to waterseal. Pt will also eventually need Pleurx cath placement.   Disposition: Aim to D/C to [facility] when medically ready  Discussed with Cardiothoracic Team at AM rounds.

## 2023-03-02 ENCOUNTER — TRANSCRIPTION ENCOUNTER (OUTPATIENT)
Age: 83
End: 2023-03-02

## 2023-03-02 LAB
ANION GAP SERPL CALC-SCNC: 11 MMOL/L — SIGNIFICANT CHANGE UP (ref 7–14)
ANION GAP SERPL CALC-SCNC: 8 MMOL/L — SIGNIFICANT CHANGE UP (ref 7–14)
APTT BLD: 56.5 SEC — HIGH (ref 27–36.3)
APTT BLD: 65.4 SEC — HIGH (ref 27–36.3)
APTT BLD: 77.3 SEC — HIGH (ref 27–36.3)
BLD GP AB SCN SERPL QL: NEGATIVE — SIGNIFICANT CHANGE UP
BUN SERPL-MCNC: 11 MG/DL — SIGNIFICANT CHANGE UP (ref 7–23)
BUN SERPL-MCNC: 9 MG/DL — SIGNIFICANT CHANGE UP (ref 7–23)
CALCIUM SERPL-MCNC: 8.6 MG/DL — SIGNIFICANT CHANGE UP (ref 8.4–10.5)
CALCIUM SERPL-MCNC: 8.6 MG/DL — SIGNIFICANT CHANGE UP (ref 8.4–10.5)
CHLORIDE SERPL-SCNC: 101 MMOL/L — SIGNIFICANT CHANGE UP (ref 98–107)
CHLORIDE SERPL-SCNC: 101 MMOL/L — SIGNIFICANT CHANGE UP (ref 98–107)
CO2 SERPL-SCNC: 28 MMOL/L — SIGNIFICANT CHANGE UP (ref 22–31)
CO2 SERPL-SCNC: 28 MMOL/L — SIGNIFICANT CHANGE UP (ref 22–31)
CREAT SERPL-MCNC: 0.54 MG/DL — SIGNIFICANT CHANGE UP (ref 0.5–1.3)
CREAT SERPL-MCNC: 0.56 MG/DL — SIGNIFICANT CHANGE UP (ref 0.5–1.3)
EGFR: 98 ML/MIN/1.73M2 — SIGNIFICANT CHANGE UP
EGFR: 99 ML/MIN/1.73M2 — SIGNIFICANT CHANGE UP
GLUCOSE SERPL-MCNC: 155 MG/DL — HIGH (ref 70–99)
GLUCOSE SERPL-MCNC: 99 MG/DL — SIGNIFICANT CHANGE UP (ref 70–99)
HBV CORE AB SER-ACNC: SIGNIFICANT CHANGE UP
HCT VFR BLD CALC: 34.2 % — LOW (ref 39–50)
HCT VFR BLD CALC: 37.6 % — LOW (ref 39–50)
HGB BLD-MCNC: 10.9 G/DL — LOW (ref 13–17)
HGB BLD-MCNC: 11.6 G/DL — LOW (ref 13–17)
INR BLD: 1.27 RATIO — HIGH (ref 0.88–1.16)
LDH SERPL L TO P-CCNC: 179 U/L — SIGNIFICANT CHANGE UP (ref 135–225)
MCHC RBC-ENTMCNC: 27.1 PG — SIGNIFICANT CHANGE UP (ref 27–34)
MCHC RBC-ENTMCNC: 27.4 PG — SIGNIFICANT CHANGE UP (ref 27–34)
MCHC RBC-ENTMCNC: 30.9 GM/DL — LOW (ref 32–36)
MCHC RBC-ENTMCNC: 31.9 GM/DL — LOW (ref 32–36)
MCV RBC AUTO: 85.9 FL — SIGNIFICANT CHANGE UP (ref 80–100)
MCV RBC AUTO: 87.9 FL — SIGNIFICANT CHANGE UP (ref 80–100)
NRBC # BLD: 0 /100 WBCS — SIGNIFICANT CHANGE UP (ref 0–0)
NRBC # BLD: 0 /100 WBCS — SIGNIFICANT CHANGE UP (ref 0–0)
NRBC # FLD: 0 K/UL — SIGNIFICANT CHANGE UP (ref 0–0)
NRBC # FLD: 0 K/UL — SIGNIFICANT CHANGE UP (ref 0–0)
PLATELET # BLD AUTO: 191 K/UL — SIGNIFICANT CHANGE UP (ref 150–400)
PLATELET # BLD AUTO: 192 K/UL — SIGNIFICANT CHANGE UP (ref 150–400)
POTASSIUM SERPL-MCNC: 3.6 MMOL/L — SIGNIFICANT CHANGE UP (ref 3.5–5.3)
POTASSIUM SERPL-MCNC: 3.8 MMOL/L — SIGNIFICANT CHANGE UP (ref 3.5–5.3)
POTASSIUM SERPL-SCNC: 3.6 MMOL/L — SIGNIFICANT CHANGE UP (ref 3.5–5.3)
POTASSIUM SERPL-SCNC: 3.8 MMOL/L — SIGNIFICANT CHANGE UP (ref 3.5–5.3)
PROTHROM AB SERPL-ACNC: 14.8 SEC — HIGH (ref 10.5–13.4)
RBC # BLD: 3.98 M/UL — LOW (ref 4.2–5.8)
RBC # BLD: 4.28 M/UL — SIGNIFICANT CHANGE UP (ref 4.2–5.8)
RBC # FLD: 18.5 % — HIGH (ref 10.3–14.5)
RBC # FLD: 18.7 % — HIGH (ref 10.3–14.5)
RH IG SCN BLD-IMP: POSITIVE — SIGNIFICANT CHANGE UP
SARS-COV-2 RNA SPEC QL NAA+PROBE: SIGNIFICANT CHANGE UP
SODIUM SERPL-SCNC: 137 MMOL/L — SIGNIFICANT CHANGE UP (ref 135–145)
SODIUM SERPL-SCNC: 140 MMOL/L — SIGNIFICANT CHANGE UP (ref 135–145)
TM INTERPRETATION: SIGNIFICANT CHANGE UP
URATE SERPL-MCNC: 2.8 MG/DL — LOW (ref 3.4–8.8)
WBC # BLD: 12.27 K/UL — HIGH (ref 3.8–10.5)
WBC # BLD: 15.44 K/UL — HIGH (ref 3.8–10.5)
WBC # FLD AUTO: 12.27 K/UL — HIGH (ref 3.8–10.5)
WBC # FLD AUTO: 15.44 K/UL — HIGH (ref 3.8–10.5)

## 2023-03-02 PROCEDURE — 99232 SBSQ HOSP IP/OBS MODERATE 35: CPT

## 2023-03-02 PROCEDURE — 99232 SBSQ HOSP IP/OBS MODERATE 35: CPT | Mod: GC

## 2023-03-02 RX ORDER — ATENOLOL 25 MG/1
25 TABLET ORAL DAILY
Refills: 0 | Status: DISCONTINUED | OUTPATIENT
Start: 2023-03-02 | End: 2023-03-03

## 2023-03-02 RX ORDER — METOPROLOL TARTRATE 50 MG
2.5 TABLET ORAL ONCE
Refills: 0 | Status: COMPLETED | OUTPATIENT
Start: 2023-03-02 | End: 2023-03-02

## 2023-03-02 RX ADMIN — HEPARIN SODIUM 1480 UNIT(S)/HR: 5000 INJECTION INTRAVENOUS; SUBCUTANEOUS at 15:58

## 2023-03-02 RX ADMIN — ATENOLOL 25 MILLIGRAM(S): 25 TABLET ORAL at 14:44

## 2023-03-02 RX ADMIN — ESCITALOPRAM OXALATE 5 MILLIGRAM(S): 10 TABLET, FILM COATED ORAL at 21:04

## 2023-03-02 RX ADMIN — CHLORHEXIDINE GLUCONATE 1 APPLICATION(S): 213 SOLUTION TOPICAL at 05:42

## 2023-03-02 RX ADMIN — ALBUTEROL 2.5 MILLIGRAM(S): 90 AEROSOL, METERED ORAL at 21:44

## 2023-03-02 RX ADMIN — TAMSULOSIN HYDROCHLORIDE 0.4 MILLIGRAM(S): 0.4 CAPSULE ORAL at 21:07

## 2023-03-02 RX ADMIN — POLYETHYLENE GLYCOL 3350 17 GRAM(S): 17 POWDER, FOR SOLUTION ORAL at 12:23

## 2023-03-02 RX ADMIN — ALBUTEROL 2.5 MILLIGRAM(S): 90 AEROSOL, METERED ORAL at 09:59

## 2023-03-02 RX ADMIN — SIMVASTATIN 40 MILLIGRAM(S): 20 TABLET, FILM COATED ORAL at 21:07

## 2023-03-02 RX ADMIN — SENNA PLUS 2 TABLET(S): 8.6 TABLET ORAL at 21:07

## 2023-03-02 RX ADMIN — HEPARIN SODIUM 1380 UNIT(S)/HR: 5000 INJECTION INTRAVENOUS; SUBCUTANEOUS at 10:00

## 2023-03-02 RX ADMIN — HEPARIN SODIUM 1480 UNIT(S)/HR: 5000 INJECTION INTRAVENOUS; SUBCUTANEOUS at 19:21

## 2023-03-02 RX ADMIN — Medication 6 MILLIGRAM(S): at 21:07

## 2023-03-02 RX ADMIN — HEPARIN SODIUM 1480 UNIT(S)/HR: 5000 INJECTION INTRAVENOUS; SUBCUTANEOUS at 23:53

## 2023-03-02 RX ADMIN — FAMOTIDINE 20 MILLIGRAM(S): 10 INJECTION INTRAVENOUS at 12:20

## 2023-03-02 RX ADMIN — HEPARIN SODIUM 1380 UNIT(S)/HR: 5000 INJECTION INTRAVENOUS; SUBCUTANEOUS at 07:26

## 2023-03-02 RX ADMIN — Medication 2.5 MILLIGRAM(S): at 14:45

## 2023-03-02 NOTE — PROGRESS NOTE ADULT - ASSESSMENT
ARIK DUMONT is a 82y Male with a past medical history as described above who presented for evaluation of shortness of breath and was subsequently intubated for hypoxic respiratory failure and treated in the ICU at [facility]. There, he was discovered to have hilar and supraclavicular lymphadenopathy. He had a these biopsied and the pathology returned inconclusive. Previously, at [facility], he was discovered to have a large pleural effusion and underwent thoracentesis. Fluid returned negative for malignancy.     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 Almeida. Transferred here, and daughter whom is a pediatrician, requested continuity of specialty oncology care.   - previous samples have not been diagnostic, and supraclavicular core needle biopsy w/ IR 2/28 also benign, pretest probability still high for malignancy however cannot also r/o an occult infxn or other pulmonary process, would consult pulm and ID  - Send TLS panel daily - LDH, uric acid, phos, CMP  - Hepatitis negative, HIV negative  - G6PD elevated  - consider sending fungitell, beta-2-glucan, galactomannon  - pending VATS, bronch, pleural bx w/ thoracic 3/3  - updated daughter on plan of care  - Smear reviewed by prior heme team: normocytic normochromic red cells, rare ovalocytes, no schistocytes seen. Occasional large platelets, with most within normal limits. No clumping. White cell series demonstrates neutrophil predominant. PMNs with vacuolization and granulation, suggesting activation and inflammation. No blasts, no immature cells.    - Transfuse to maintain Hb >7, platelets > 10K, >20K if bleeding, and >50K if bleeding.

## 2023-03-02 NOTE — PROGRESS NOTE ADULT - SUBJECTIVE AND OBJECTIVE BOX
pt evaluated on AM rounds w thoracic team  presented at TEAM meeting in AM  Subjective: pt without acute complaints  today he had an episode of feeling very cold and was assisted to the bed  vitals assessed  pt given atenolol and symptoms resolved    Vital Signs:  Vital Signs Last 24 Hrs  T(C): 36.5 (03-02-23 @ 16:00), Max: 37.1 (03-02-23 @ 09:32)  T(F): 97.7 (03-02-23 @ 16:00), Max: 98.7 (03-02-23 @ 09:32)  HR: 92 (03-02-23 @ 16:00) (82 - 130)  BP: 111/62 (03-02-23 @ 16:00) (104/59 - 146/101)  RR: 18 (03-02-23 @ 16:00) (18 - 18)  SpO2: 100% (03-02-23 @ 16:00) (93% - 100%) on (O2)    PE  Telemetry/Alarms: SR w PVC  General: WN/WD NAD  Neurology: Awake, nonfocal, BAUTISTA x 4  Eyes: Scleras clear, PERRLA/ EOMI, Gross vision intact  ENT:Gross hearing intact, grossly patent pharynx, no stridor  Neck: Neck supple, trachea midline, No JVD,   Respiratory: CTA B/L, No wheezing, rales, rhonchi  CV: RRR, S1S2, no murmurs, rubs or gallops  Abdominal: Soft, NT, ND +BS,   Extremities: No edema, + peripheral pulses  Skin: No Rashes, Hematoma, Ecchymosis  Lymphatic: No Neck, axilla, groin LAD  Psych: Oriented x 3, normal affect  Incisions: c,d,i  Tubes: R PTC ws  Relevant labs, radiology and Medications reviewed                        11.6 12.27 )-----------( 192      ( 02 Mar 2023 06:30 )             37.6     03-02    140  |  101  |  9   ----------------------------<  99  3.8   |  28  |  0.54    Ca    8.6      02 Mar 2023 06:30      PT/INR - ( 02 Mar 2023 14:20 )   PT: 14.8 sec;   INR: 1.27 ratio         PTT - ( 02 Mar 2023 14:20 )  PTT:56.5 sec  MEDICATIONS  (STANDING):  albuterol    0.083% 2.5 milliGRAM(s) Nebulizer every 12 hours  atenolol  Tablet 25 milliGRAM(s) Oral daily  chlorhexidine 2% Cloths 1 Application(s) Topical <User Schedule>  escitalopram 5 milliGRAM(s) Oral daily  famotidine    Tablet 20 milliGRAM(s) Oral daily  heparin  Infusion. 980 Unit(s)/Hr (9.8 mL/Hr) IV Continuous <Continuous>  melatonin 6 milliGRAM(s) Oral at bedtime  polyethylene glycol 3350 17 Gram(s) Oral daily  senna 2 Tablet(s) Oral at bedtime  simvastatin 40 milliGRAM(s) Oral at bedtime  tamsulosin 0.4 milliGRAM(s) Oral at bedtime    MEDICATIONS  (PRN):  ALPRAZolam 0.25 milliGRAM(s) Oral daily PRN anxiety    Pertinent Physical Exam  I&O's Summary    01 Mar 2023 07:01  -  02 Mar 2023 07:00  --------------------------------------------------------  IN: 1178.2 mL / OUT: 1310 mL / NET: -131.8 mL    02 Mar 2023 07:01  -  02 Mar 2023 18:08  --------------------------------------------------------  IN: 875.8 mL / OUT: 201 mL / NET: 674.8 mL        Assessment  82 year old male with PMH atrial fibrillation, HTN and chronic urinary retention who customarily self catheterizes at home for the last year, presented to [facility] by ambulance from home on 2/17/2023 with c/o progressive shortness of breath.    Pt was intubated in the field and brought to ED.    Of note, patient recently found to have supraclavicular lymph node enlargement with associated leukocytosis, is being evaluated for lymphoma. Patient also recently admitted to [facility] where he was noted to have large pleural effusion that was drained.  According to family lymph node biopsy was inconclusive and patient had thoracentesis which revealed no infection or malignancy.  Since discharge from [facility] has been oxygen dependant at home which is also a new development.  At [place], patient was admitted to ICU for acute respiratory failure with hypercapnia. CTA chest negative for PE, showed large R pleural effusion with mediastinal and hilar lymphadenopathy. A pigtail was placed and drained over 3 L of fluid. Patient was started on empiric antibiotics and completed 5d course on 2/22/2023.  Pleural fluid cs negative and final. Blood cultures negative and final.  Given presence of lymphadenopathy and persistent leukocytosis, underlying malignancy is suspected. Hematology/oncology was consulted. Recommended transfer to [facility] for further investigation.   Pt was accepted by thoracic surgeon, Dr Nathaniel Galvan and is being evaluated by EBUS/mediastinoscopy and possible pleurX placement.   At [place], urology was consulted for pt's chronic urinary retention and a plummer was placed. Family did not want trial of void and agreed with continued plummer use.   Patient was successfully extubated and started on CPAP in [place] ICU. Patient was downgraded to medical floor and was titrated down to nasal canula. Physical therapy evaluated patient and MORALES is recommended. Pt was then transferred to [facility]. PTC intact on suction with no air leak and pt completes steroid taper 2/26/2023.   Last dose of eliquis 2/22/2023 at 6am, pt is on Lovenox 90mg BID for a/c for afib. 2/25- Medicine and Cardiology consulted for optimization and clearance for OR. 2/26- Cardiology recommending stress test prior to OR. Pt daughter now in agreement.  pt s/p IR LN biopsy yesterday and for NST yesterday.       PLAN  Neuro: Pain management  Pulm: Encourage coughing, deep breathing and use of incentive spirometry. Wean off supplemental oxygen as able. Daily CXR.   Cardio: Monitor telemetry/alarms. Pt s/p NST yesterday  GI: Tolerating diet. Continue stool softeners.  Renal: monitor urine output, supplement electrolytes as needed  : continue Plummer  Vasc: eliquis on hold, Heparin gtt for Afib/ DVT prophylaxis  Heme: Stable H/H.   ID: Off antibiotics. Stable.  Therapy: OOB/ambulate. Plan MORALES  Tubes: Monitor Chest tube output, will place to waterseal.   Disposition: OR tomorrow for RVATS pleurX MLND  Discussed with Cardiothoracic Team at AM rounds.

## 2023-03-02 NOTE — PROGRESS NOTE ADULT - SUBJECTIVE AND OBJECTIVE BOX
Nathaniel Stewart MD PGY-4 Hematology Oncology  Reachable on TEAMS    INTERVAL HPI/OVERNIGHT EVENTS:  Patient S&E at bedside. S/p R. supraclavicular bcx IR-> benign.     VITAL SIGNS:  T(F): 98.7 (03-02-23 @ 09:32)  HR: 100 (03-02-23 @ 10:15)  BP: 104/59 (03-02-23 @ 09:32)  RR: 18 (03-02-23 @ 09:32)  SpO2: 100% (03-02-23 @ 09:32)  Wt(kg): --    PHYSICAL EXAM:    Constitutional: NAD  Eyes: EOMI, sclera non-icteric  Neck: supple, no masses, no JVD  Respiratory: CTA b/l, good air entry b/l  Cardiovascular: RRR, no M/R/G  Gastrointestinal: soft, NTND, no masses palpable, + BS, no hepatosplenomegaly  Extremities: no c/c/e  Neurological: AAOx3      MEDICATIONS  (STANDING):  albuterol    0.083% 2.5 milliGRAM(s) Nebulizer every 12 hours  chlorhexidine 2% Cloths 1 Application(s) Topical <User Schedule>  escitalopram 5 milliGRAM(s) Oral daily  famotidine    Tablet 20 milliGRAM(s) Oral daily  heparin  Infusion. 980 Unit(s)/Hr (9.8 mL/Hr) IV Continuous <Continuous>  melatonin 6 milliGRAM(s) Oral at bedtime  polyethylene glycol 3350 17 Gram(s) Oral daily  senna 2 Tablet(s) Oral at bedtime  simvastatin 40 milliGRAM(s) Oral at bedtime  tamsulosin 0.4 milliGRAM(s) Oral at bedtime    MEDICATIONS  (PRN):  ALPRAZolam 0.25 milliGRAM(s) Oral daily PRN anxiety      Allergies    pertussis vaccines (Rash)  shellfish (Rash)    Intolerances        LABS:                        11.6   12.27 )-----------( 192      ( 02 Mar 2023 06:30 )             37.6     03-01    138  |  101  |  10  ----------------------------<  123<H>  3.8   |  34<H>  |  0.51    Ca    8.4      01 Mar 2023 07:30      PTT - ( 02 Mar 2023 06:30 )  PTT:65.4 sec      RADIOLOGY & ADDITIONAL TESTS:  Studies reviewed.

## 2023-03-02 NOTE — PROGRESS NOTE ADULT - ASSESSMENT
82 year old male with PMH atrial fibrillation, HTN and chronic urinary retention who presented to [facility] from home with acute hypoxic respiratory failure with hypercapnia. Patient was admitted to ICU an [facility], now downgraded and transferred to  [facility] for continuation of his care     #Acute hypoxic respiratory failure with hypercapnia  #Recurrent R pleural effusion-exudative  #Lymphadenopathy and leukocytosis-suspect underlying malignancy  -With large right sided pleural effusion, now status post pigtail placement, monitor output   -O 2 supplement PRN   -Continue steroid taper  -Patient was treated with course of empiric cefepime and dose of vanco at [place]   -Blood cultures 2/17 NGTD, Pleural fluid no growth currently, f/u final culture/cytology  -thoracic surg care appreciated, plan for pleurex and mediastinoscopy to evaluate for lymph nodes. VATS  -Heme/onc follow up appreciated   - patient will benefit form pleurx placement   - IR guided LN biopsy   - plan for vats and pleurx per surg team     #Afib/ HTN  rate control   heparin for AC as tolerated   telemonitoring   low dose atenolol for HR and BP control     #Urinary Retention  -Continue plummer, per previous notes patient's family did not want trial of void  -Continue flomax    #DVT prophylaxis     Discussed with patient son

## 2023-03-02 NOTE — PROGRESS NOTE ADULT - ASSESSMENT
Pl effusion   plan for vats/biopsy   suspicion for malignancy     afib  HR overall stable in 90s to 100s   off atenolol due to low BP   IF HR goes up , we can consider digoxin or atenolol 12.5 daily   on a/c    Pre-Operative Cardiac Risk Stratification and Optimization   Based on patient history and physical exam, the patient is considered to have elevated risk   normal stress test   normal LV function   can proceed to planned surgery

## 2023-03-02 NOTE — PROGRESS NOTE ADULT - SUBJECTIVE AND OBJECTIVE BOX
Name of Patient : ARIK DUMONT  MRN: [id number]  Date of visit: 03-02-23    Subjective: Patient seen and examined. No new events except as noted.   Doing okay       REVIEW OF SYSTEMS:    CONSTITUTIONAL: No weakness, fevers or chills  EYES/ENT: No visual changes;  No vertigo or throat pain   NECK: No pain or stiffness  RESPIRATORY: No cough, wheezing, hemoptysis; No shortness of breath  CARDIOVASCULAR: No chest pain or palpitations  GASTROINTESTINAL: No abdominal or epigastric pain. No nausea, vomiting, or hematemesis; No diarrhea or constipation. No melena or hematochezia.  GENITOURINARY: No dysuria, frequency or hematuria  NEUROLOGICAL: No numbness or weakness  SKIN: No itching, burning, rashes, or lesions   All other review of systems is negative unless indicated above.    MEDICATIONS:  MEDICATIONS  (STANDING):  albuterol    0.083% 2.5 milliGRAM(s) Nebulizer every 12 hours  atenolol  Tablet 25 milliGRAM(s) Oral daily  chlorhexidine 2% Cloths 1 Application(s) Topical <User Schedule>  escitalopram 5 milliGRAM(s) Oral daily  famotidine    Tablet 20 milliGRAM(s) Oral daily  heparin  Infusion. 980 Unit(s)/Hr (9.8 mL/Hr) IV Continuous <Continuous>  melatonin 6 milliGRAM(s) Oral at bedtime  polyethylene glycol 3350 17 Gram(s) Oral daily  senna 2 Tablet(s) Oral at bedtime  simvastatin 40 milliGRAM(s) Oral at bedtime  tamsulosin 0.4 milliGRAM(s) Oral at bedtime      PHYSICAL EXAM:  T(C): 36.8 (03-02-23 @ 18:40), Max: 37.1 (03-02-23 @ 09:32)  HR: 99 (03-02-23 @ 18:40) (82 - 130)  BP: 97/66 (03-02-23 @ 18:40) (97/66 - 146/101)  RR: 17 (03-02-23 @ 18:40) (17 - 18)  SpO2: 97% (03-02-23 @ 18:40) (93% - 100%)  Wt(kg): --  I&O's Summary    01 Mar 2023 07:01  -  02 Mar 2023 07:00  --------------------------------------------------------  IN: 1178.2 mL / OUT: 1310 mL / NET: -131.8 mL    02 Mar 2023 07:01  -  02 Mar 2023 19:51  --------------------------------------------------------  IN: 1130.6 mL / OUT: 201 mL / NET: 929.6 mL          Appearance: Normal	  HEENT:  PERRLA   Lymphatic: No lymphadenopathy   Cardiovascular: Normal S1 S2, no JVD  Respiratory: normal effort , clear  Gastrointestinal:  Soft, Non-tender  Skin: No rashes,  warm to touch  Psychiatry:  Mood & affect appropriate  Musculuskeletal: No edema    recent labs, Imaging and EKGs personally reviewed     03-01-23 @ 07:01  -  03-02-23 @ 07:00  --------------------------------------------------------  IN: 1178.2 mL / OUT: 1310 mL / NET: -131.8 mL    03-02-23 @ 07:01  -  03-02-23 @ 19:51  --------------------------------------------------------  IN: 1130.6 mL / OUT: 201 mL / NET: 929.6 mL                          10.9   15.44 )-----------( 191      ( 02 Mar 2023 17:40 )             34.2               03-02    137  |  101  |  11  ----------------------------<  155<H>  3.6   |  28  |  0.56    Ca    8.6      02 Mar 2023 17:40      PT/INR - ( 02 Mar 2023 14:20 )   PT: 14.8 sec;   INR: 1.27 ratio         PTT - ( 02 Mar 2023 14:20 )  PTT:56.5 sec

## 2023-03-02 NOTE — PROGRESS NOTE ADULT - ATTENDING COMMENTS
82y Male recently admitted with hypoxic respiratory failure, transferred for further evaluation of lymphadenopathy. s/p chest tube placement, plan for repeat biopsy of supraclavicular LN which was consistent with reactive LN.   -trend TLS labs   -will send slides to [facility] path   -plan for VATS tomorrow  -ID consult   -send IgG subclasses   -follow up results of VATS biopsy, agree with pleurex catheter

## 2023-03-02 NOTE — PROGRESS NOTE ADULT - SUBJECTIVE AND OBJECTIVE BOX
DATE OF SERVICE: 03-02-23 @ 07:25    Subjective: Patient seen and examined. No new events except as noted.     SUBJECTIVE/ROS:  nad      MEDICATIONS:  MEDICATIONS  (STANDING):  albuterol    0.083% 2.5 milliGRAM(s) Nebulizer every 12 hours  chlorhexidine 2% Cloths 1 Application(s) Topical <User Schedule>  escitalopram 5 milliGRAM(s) Oral daily  famotidine    Tablet 20 milliGRAM(s) Oral daily  heparin  Infusion. 980 Unit(s)/Hr (9.8 mL/Hr) IV Continuous <Continuous>  melatonin 6 milliGRAM(s) Oral at bedtime  polyethylene glycol 3350 17 Gram(s) Oral daily  senna 2 Tablet(s) Oral at bedtime  simvastatin 40 milliGRAM(s) Oral at bedtime  tamsulosin 0.4 milliGRAM(s) Oral at bedtime      PHYSICAL EXAM:  T(C): 36.4 (03-02-23 @ 05:37), Max: 36.9 (03-01-23 @ 20:00)  HR: 111 (03-02-23 @ 05:37) (88 - 111)  BP: 123/54 (03-02-23 @ 05:37) (111/44 - 132/68)  RR: 18 (03-02-23 @ 05:37) (16 - 18)  SpO2: 100% (03-02-23 @ 05:37) (70% - 100%)  Wt(kg): --  I&O's Summary    01 Mar 2023 07:01  -  02 Mar 2023 07:00  --------------------------------------------------------  IN: 1178.2 mL / OUT: 1310 mL / NET: -131.8 mL            JVP: Normal  Neck: supple  Lung: clear   CV: S1 S2 , Murmur:  Abd: soft  Ext: No edema  neuro: Awake / alert  Psych: flat affect  Skin: normal``    LABS/DATA:    CARDIAC MARKERS:      < from: Nuclear Stress Test-Pharmacologic (Nuclear Stress Test-Pharmacologic .) (03.01.23 @ 15:33) >  ------------------------------------------------------------------------  IMPRESSIONS:Normal Study  * The left ventricle was normal in size.  * Tracer uptake was homogeneous throughout the left  ventricle.  * Normal study; no evidence for myocardial infarction or  ischemia.  * Gated wall motion analysis was performed, and shows  normal wall motion.  *** No previous Nuclear/Stress exam.  ------------------------------------------------------------------------  Confirmed on  3/1/2023 - 16:30:25 by Osiris Pa MD  ------------------------------------------------------------------------    < end of copied text >                            11.6   12.27 )-----------( 192      ( 02 Mar 2023 06:30 )             37.6     03-01    138  |  101  |  10  ----------------------------<  123<H>  3.8   |  34<H>  |  0.51    Ca    8.4      01 Mar 2023 07:30      proBNP:   Lipid Profile:   HgA1c:   TSH:     TELE:  EKG:

## 2023-03-03 ENCOUNTER — TRANSCRIPTION ENCOUNTER (OUTPATIENT)
Age: 83
End: 2023-03-03

## 2023-03-03 ENCOUNTER — APPOINTMENT (OUTPATIENT)
Dept: THORACIC SURGERY | Facility: HOSPITAL | Age: 83
End: 2023-03-03
Payer: MEDICARE

## 2023-03-03 ENCOUNTER — RESULT REVIEW (OUTPATIENT)
Age: 83
End: 2023-03-03

## 2023-03-03 LAB
ALBUMIN SERPL ELPH-MCNC: 2.6 G/DL — LOW (ref 3.3–5)
ALP SERPL-CCNC: 63 U/L — SIGNIFICANT CHANGE UP (ref 40–120)
ALT FLD-CCNC: 26 U/L — SIGNIFICANT CHANGE UP (ref 4–41)
ANION GAP SERPL CALC-SCNC: 12 MMOL/L — SIGNIFICANT CHANGE UP (ref 7–14)
ANION GAP SERPL CALC-SCNC: 8 MMOL/L — SIGNIFICANT CHANGE UP (ref 7–14)
APPEARANCE UR: ABNORMAL
APTT BLD: 29.6 SEC — SIGNIFICANT CHANGE UP (ref 27–36.3)
AST SERPL-CCNC: 10 U/L — SIGNIFICANT CHANGE UP (ref 4–40)
BILIRUB SERPL-MCNC: 0.6 MG/DL — SIGNIFICANT CHANGE UP (ref 0.2–1.2)
BILIRUB UR-MCNC: NEGATIVE — SIGNIFICANT CHANGE UP
BLD GP AB SCN SERPL QL: NEGATIVE — SIGNIFICANT CHANGE UP
BLOOD GAS ARTERIAL - LYTES,HGB,ICA,LACT RESULT: SIGNIFICANT CHANGE UP
BLOOD GAS ARTERIAL COMPREHENSIVE RESULT: SIGNIFICANT CHANGE UP
BUN SERPL-MCNC: 11 MG/DL — SIGNIFICANT CHANGE UP (ref 7–23)
BUN SERPL-MCNC: 12 MG/DL — SIGNIFICANT CHANGE UP (ref 7–23)
CALCIUM SERPL-MCNC: 8 MG/DL — LOW (ref 8.4–10.5)
CALCIUM SERPL-MCNC: 8.5 MG/DL — SIGNIFICANT CHANGE UP (ref 8.4–10.5)
CHLORIDE SERPL-SCNC: 100 MMOL/L — SIGNIFICANT CHANGE UP (ref 98–107)
CHLORIDE SERPL-SCNC: 103 MMOL/L — SIGNIFICANT CHANGE UP (ref 98–107)
CO2 SERPL-SCNC: 25 MMOL/L — SIGNIFICANT CHANGE UP (ref 22–31)
CO2 SERPL-SCNC: 31 MMOL/L — SIGNIFICANT CHANGE UP (ref 22–31)
COLOR SPEC: YELLOW — SIGNIFICANT CHANGE UP
CREAT SERPL-MCNC: 0.55 MG/DL — SIGNIFICANT CHANGE UP (ref 0.5–1.3)
CREAT SERPL-MCNC: 0.56 MG/DL — SIGNIFICANT CHANGE UP (ref 0.5–1.3)
DIFF PNL FLD: ABNORMAL
EGFR: 98 ML/MIN/1.73M2 — SIGNIFICANT CHANGE UP
EGFR: 98 ML/MIN/1.73M2 — SIGNIFICANT CHANGE UP
GLUCOSE SERPL-MCNC: 104 MG/DL — HIGH (ref 70–99)
GLUCOSE SERPL-MCNC: 114 MG/DL — HIGH (ref 70–99)
GLUCOSE UR QL: NEGATIVE — SIGNIFICANT CHANGE UP
HCT VFR BLD CALC: 30.4 % — LOW (ref 39–50)
HCT VFR BLD CALC: 36.5 % — LOW (ref 39–50)
HGB BLD-MCNC: 11.3 G/DL — LOW (ref 13–17)
HGB BLD-MCNC: 9.5 G/DL — LOW (ref 13–17)
KETONES UR-MCNC: NEGATIVE — SIGNIFICANT CHANGE UP
LEUKOCYTE ESTERASE UR-ACNC: NEGATIVE — SIGNIFICANT CHANGE UP
MAGNESIUM SERPL-MCNC: 1.6 MG/DL — SIGNIFICANT CHANGE UP (ref 1.6–2.6)
MCHC RBC-ENTMCNC: 27.3 PG — SIGNIFICANT CHANGE UP (ref 27–34)
MCHC RBC-ENTMCNC: 27.6 PG — SIGNIFICANT CHANGE UP (ref 27–34)
MCHC RBC-ENTMCNC: 31 GM/DL — LOW (ref 32–36)
MCHC RBC-ENTMCNC: 31.3 GM/DL — LOW (ref 32–36)
MCV RBC AUTO: 88.2 FL — SIGNIFICANT CHANGE UP (ref 80–100)
MCV RBC AUTO: 88.4 FL — SIGNIFICANT CHANGE UP (ref 80–100)
NITRITE UR-MCNC: POSITIVE
NRBC # BLD: 0 /100 WBCS — SIGNIFICANT CHANGE UP (ref 0–0)
NRBC # BLD: 0 /100 WBCS — SIGNIFICANT CHANGE UP (ref 0–0)
NRBC # FLD: 0 K/UL — SIGNIFICANT CHANGE UP (ref 0–0)
NRBC # FLD: 0 K/UL — SIGNIFICANT CHANGE UP (ref 0–0)
PH UR: 8.5 — HIGH (ref 5–8)
PHOSPHATE SERPL-MCNC: 2.6 MG/DL — SIGNIFICANT CHANGE UP (ref 2.5–4.5)
PLATELET # BLD AUTO: 161 K/UL — SIGNIFICANT CHANGE UP (ref 150–400)
PLATELET # BLD AUTO: 186 K/UL — SIGNIFICANT CHANGE UP (ref 150–400)
POTASSIUM SERPL-MCNC: 3.1 MMOL/L — LOW (ref 3.5–5.3)
POTASSIUM SERPL-MCNC: 3.6 MMOL/L — SIGNIFICANT CHANGE UP (ref 3.5–5.3)
POTASSIUM SERPL-SCNC: 3.1 MMOL/L — LOW (ref 3.5–5.3)
POTASSIUM SERPL-SCNC: 3.6 MMOL/L — SIGNIFICANT CHANGE UP (ref 3.5–5.3)
PROT SERPL-MCNC: 5.6 G/DL — LOW (ref 6–8.3)
PROT UR-MCNC: ABNORMAL
RBC # BLD: 3.44 M/UL — LOW (ref 4.2–5.8)
RBC # BLD: 4.14 M/UL — LOW (ref 4.2–5.8)
RBC # FLD: 18.7 % — HIGH (ref 10.3–14.5)
RBC # FLD: 19 % — HIGH (ref 10.3–14.5)
RH IG SCN BLD-IMP: POSITIVE — SIGNIFICANT CHANGE UP
SODIUM SERPL-SCNC: 139 MMOL/L — SIGNIFICANT CHANGE UP (ref 135–145)
SODIUM SERPL-SCNC: 140 MMOL/L — SIGNIFICANT CHANGE UP (ref 135–145)
SP GR SPEC: 1.02 — SIGNIFICANT CHANGE UP (ref 1.01–1.05)
UROBILINOGEN FLD QL: SIGNIFICANT CHANGE UP
WBC # BLD: 11.78 K/UL — HIGH (ref 3.8–10.5)
WBC # BLD: 14.95 K/UL — HIGH (ref 3.8–10.5)
WBC # FLD AUTO: 11.78 K/UL — HIGH (ref 3.8–10.5)
WBC # FLD AUTO: 14.95 K/UL — HIGH (ref 3.8–10.5)

## 2023-03-03 PROCEDURE — 32674 THORACOSCOPY LYMPH NODE EXC: CPT | Mod: 80

## 2023-03-03 PROCEDURE — 88333 PATH CONSLTJ SURG CYTO XM 1: CPT | Mod: 26

## 2023-03-03 PROCEDURE — 99291 CRITICAL CARE FIRST HOUR: CPT

## 2023-03-03 PROCEDURE — 32674 THORACOSCOPY LYMPH NODE EXC: CPT

## 2023-03-03 PROCEDURE — 88364 INSITU HYBRIDIZATION (FISH): CPT | Mod: 26

## 2023-03-03 PROCEDURE — 71045 X-RAY EXAM CHEST 1 VIEW: CPT | Mod: 26

## 2023-03-03 PROCEDURE — 88365 INSITU HYBRIDIZATION (FISH): CPT | Mod: 26

## 2023-03-03 PROCEDURE — 31622 DX BRONCHOSCOPE/WASH: CPT

## 2023-03-03 PROCEDURE — 32609 THORACOSCOPY W/BX PLEURA: CPT

## 2023-03-03 PROCEDURE — 32550 INSERT PLEURAL CATH: CPT

## 2023-03-03 PROCEDURE — 32609 THORACOSCOPY W/BX PLEURA: CPT | Mod: 80

## 2023-03-03 PROCEDURE — 88307 TISSUE EXAM BY PATHOLOGIST: CPT | Mod: 26

## 2023-03-03 PROCEDURE — 88189 FLOWCYTOMETRY/READ 16 & >: CPT

## 2023-03-03 DEVICE — PLEURX CATHETER KIT: Type: IMPLANTABLE DEVICE | Status: FUNCTIONAL

## 2023-03-03 DEVICE — SURGICEL FIBRILLAR 2 X 4": Type: IMPLANTABLE DEVICE | Status: FUNCTIONAL

## 2023-03-03 RX ORDER — MIDODRINE HYDROCHLORIDE 2.5 MG/1
10 TABLET ORAL ONCE
Refills: 0 | Status: COMPLETED | OUTPATIENT
Start: 2023-03-03 | End: 2023-03-03

## 2023-03-03 RX ORDER — ALBUMIN HUMAN 25 %
250 VIAL (ML) INTRAVENOUS ONCE
Refills: 0 | Status: COMPLETED | OUTPATIENT
Start: 2023-03-03 | End: 2023-03-03

## 2023-03-03 RX ORDER — PHENYLEPHRINE HYDROCHLORIDE 10 MG/ML
0.5 INJECTION INTRAVENOUS
Qty: 40 | Refills: 0 | Status: DISCONTINUED | OUTPATIENT
Start: 2023-03-03 | End: 2023-03-04

## 2023-03-03 RX ORDER — SODIUM CHLORIDE 9 MG/ML
1000 INJECTION INTRAMUSCULAR; INTRAVENOUS; SUBCUTANEOUS
Refills: 0 | Status: COMPLETED | OUTPATIENT
Start: 2023-03-03 | End: 2023-03-04

## 2023-03-03 RX ORDER — HYDROMORPHONE HYDROCHLORIDE 2 MG/ML
0.5 INJECTION INTRAMUSCULAR; INTRAVENOUS; SUBCUTANEOUS
Refills: 0 | Status: DISCONTINUED | OUTPATIENT
Start: 2023-03-03 | End: 2023-03-03

## 2023-03-03 RX ORDER — FENTANYL CITRATE 50 UG/ML
25 INJECTION INTRAVENOUS
Refills: 0 | Status: DISCONTINUED | OUTPATIENT
Start: 2023-03-03 | End: 2023-03-04

## 2023-03-03 RX ORDER — HEPARIN SODIUM 5000 [USP'U]/ML
5000 INJECTION INTRAVENOUS; SUBCUTANEOUS EVERY 8 HOURS
Refills: 0 | Status: DISCONTINUED | OUTPATIENT
Start: 2023-03-04 | End: 2023-03-05

## 2023-03-03 RX ORDER — NALOXONE HYDROCHLORIDE 4 MG/.1ML
0.1 SPRAY NASAL
Refills: 0 | Status: DISCONTINUED | OUTPATIENT
Start: 2023-03-03 | End: 2023-03-03

## 2023-03-03 RX ORDER — MIDODRINE HYDROCHLORIDE 2.5 MG/1
5 TABLET ORAL THREE TIMES A DAY
Refills: 0 | Status: DISCONTINUED | OUTPATIENT
Start: 2023-03-03 | End: 2023-03-15

## 2023-03-03 RX ORDER — ONDANSETRON 8 MG/1
4 TABLET, FILM COATED ORAL EVERY 6 HOURS
Refills: 0 | Status: DISCONTINUED | OUTPATIENT
Start: 2023-03-03 | End: 2023-03-03

## 2023-03-03 RX ORDER — SODIUM CHLORIDE 9 MG/ML
250 INJECTION INTRAMUSCULAR; INTRAVENOUS; SUBCUTANEOUS ONCE
Refills: 0 | Status: COMPLETED | OUTPATIENT
Start: 2023-03-03 | End: 2023-03-03

## 2023-03-03 RX ORDER — MIDODRINE HYDROCHLORIDE 2.5 MG/1
5 TABLET ORAL THREE TIMES A DAY
Refills: 0 | Status: DISCONTINUED | OUTPATIENT
Start: 2023-03-03 | End: 2023-03-03

## 2023-03-03 RX ORDER — HYDROMORPHONE HYDROCHLORIDE 2 MG/ML
30 INJECTION INTRAMUSCULAR; INTRAVENOUS; SUBCUTANEOUS
Refills: 0 | Status: DISCONTINUED | OUTPATIENT
Start: 2023-03-03 | End: 2023-03-03

## 2023-03-03 RX ORDER — DIPHENHYDRAMINE HCL 50 MG
25 CAPSULE ORAL EVERY 4 HOURS
Refills: 0 | Status: DISCONTINUED | OUTPATIENT
Start: 2023-03-03 | End: 2023-03-03

## 2023-03-03 RX ORDER — OXYCODONE HYDROCHLORIDE 5 MG/1
5 TABLET ORAL ONCE
Refills: 0 | Status: DISCONTINUED | OUTPATIENT
Start: 2023-03-03 | End: 2023-03-09

## 2023-03-03 RX ORDER — POTASSIUM CHLORIDE 20 MEQ
10 PACKET (EA) ORAL
Refills: 0 | Status: COMPLETED | OUTPATIENT
Start: 2023-03-03 | End: 2023-03-04

## 2023-03-03 RX ORDER — SODIUM CHLORIDE 9 MG/ML
1000 INJECTION INTRAMUSCULAR; INTRAVENOUS; SUBCUTANEOUS
Refills: 0 | Status: DISCONTINUED | OUTPATIENT
Start: 2023-03-03 | End: 2023-03-03

## 2023-03-03 RX ORDER — ONDANSETRON 8 MG/1
4 TABLET, FILM COATED ORAL ONCE
Refills: 0 | Status: DISCONTINUED | OUTPATIENT
Start: 2023-03-03 | End: 2023-03-03

## 2023-03-03 RX ADMIN — FENTANYL CITRATE 25 MICROGRAM(S): 50 INJECTION INTRAVENOUS at 19:17

## 2023-03-03 RX ADMIN — ALBUTEROL 2.5 MILLIGRAM(S): 90 AEROSOL, METERED ORAL at 10:33

## 2023-03-03 RX ADMIN — CHLORHEXIDINE GLUCONATE 1 APPLICATION(S): 213 SOLUTION TOPICAL at 06:43

## 2023-03-03 RX ADMIN — PHENYLEPHRINE HYDROCHLORIDE 16.8 MICROGRAM(S)/KG/MIN: 10 INJECTION INTRAVENOUS at 21:44

## 2023-03-03 RX ADMIN — FENTANYL CITRATE 25 MICROGRAM(S): 50 INJECTION INTRAVENOUS at 19:03

## 2023-03-03 RX ADMIN — ATENOLOL 25 MILLIGRAM(S): 25 TABLET ORAL at 06:43

## 2023-03-03 RX ADMIN — SODIUM CHLORIDE 1000 MILLILITER(S): 9 INJECTION INTRAMUSCULAR; INTRAVENOUS; SUBCUTANEOUS at 20:30

## 2023-03-03 RX ADMIN — Medication 100 MILLIEQUIVALENT(S): at 23:11

## 2023-03-03 RX ADMIN — SODIUM CHLORIDE 250 MILLILITER(S): 9 INJECTION INTRAMUSCULAR; INTRAVENOUS; SUBCUTANEOUS at 08:46

## 2023-03-03 RX ADMIN — MIDODRINE HYDROCHLORIDE 5 MILLIGRAM(S): 2.5 TABLET ORAL at 13:37

## 2023-03-03 RX ADMIN — SODIUM CHLORIDE 40 MILLILITER(S): 9 INJECTION INTRAMUSCULAR; INTRAVENOUS; SUBCUTANEOUS at 19:05

## 2023-03-03 RX ADMIN — Medication 125 MILLILITER(S): at 21:41

## 2023-03-03 RX ADMIN — SENNA PLUS 2 TABLET(S): 8.6 TABLET ORAL at 23:23

## 2023-03-03 RX ADMIN — SIMVASTATIN 40 MILLIGRAM(S): 20 TABLET, FILM COATED ORAL at 23:22

## 2023-03-03 RX ADMIN — FAMOTIDINE 20 MILLIGRAM(S): 10 INJECTION INTRAVENOUS at 13:34

## 2023-03-03 RX ADMIN — MIDODRINE HYDROCHLORIDE 10 MILLIGRAM(S): 2.5 TABLET ORAL at 20:47

## 2023-03-03 RX ADMIN — Medication 125 MILLILITER(S): at 21:00

## 2023-03-03 RX ADMIN — SODIUM CHLORIDE 75 MILLILITER(S): 9 INJECTION INTRAMUSCULAR; INTRAVENOUS; SUBCUTANEOUS at 10:08

## 2023-03-03 RX ADMIN — TAMSULOSIN HYDROCHLORIDE 0.4 MILLIGRAM(S): 0.4 CAPSULE ORAL at 23:22

## 2023-03-03 NOTE — PROGRESS NOTE ADULT - ASSESSMENT
82 year old male with PMH atrial fibrillation, HTN and chronic urinary retention who presented to [facility] from home with acute hypoxic respiratory failure with hypercapnia. Patient was admitted to ICU an [facility], now downgraded and transferred to  [facility] for continuation of his care     #Acute hypoxic respiratory failure with hypercapnia  #Recurrent R pleural effusion-exudative  #Lymphadenopathy and leukocytosis-suspect underlying malignancy  -With large right sided pleural effusion, now status post pigtail placement, monitor output   -O 2 supplement PRN   -Continue steroid taper  -Patient was treated with course of empiric cefepime and dose of vanco at [place]   -Blood cultures 2/17 NGTD, Pleural fluid no growth currently, f/u final culture/cytology  -thoracic surg care appreciated, plan for pleurex and mediastinoscopy to evaluate for lymph nodes. VATS  -Heme/onc follow up appreciated   - patient will benefit form pleurx placement   - IR guided LN biopsy   - plan for vats and pleurx per surg team     #Afib/ HTN  rate control   heparin for AC as tolerated   telemonitoring   low dose atenolol for HR and BP control     #Urinary Retention  -Continue plummer, per previous notes patient's family did not want trial of void  -Continue flomax    #DVT prophylaxis     Discussed with patient son

## 2023-03-03 NOTE — CHART NOTE - NSCHARTNOTEFT_GEN_A_CORE
Thoracic Surgery  Pt accidentally pulled out his chest tube about 00:30.  An occlusive dressing was placed,  He denied any SOB. A CXR was negative for PTX.  The heparin gtt was stopped.  This was discussed with the intensivist.  Pt is for a PleurX today. AM labs have been ordered.

## 2023-03-03 NOTE — PROVIDER CONTACT NOTE (OTHER) - SITUATION
Patient has sudden onset of shaking/shivering   Patient says he's feeling cold
patient dislodged chest tube inadvertently while attempting to sit up. VSS, HR Spo2 stable on telemetry monitoring.
Patient had 6 beats of Vtach.
patient has plummer catheter, minimal output noted.

## 2023-03-03 NOTE — CHART NOTE - NSCHARTNOTEFT_GEN_A_CORE
Source: Patient A&Ox 4  Family [ ]     RN [x ]    Chart [x ]    Reason for Follow-Up Assessment:     Pt seen for nutrition follow up severe malnutrition..    Pt is an 82 year old male with PMH atrial fibrillation, HTN and chronic urinary retention who presented to [facility] from home with acute hypoxic respiratory failure with hypercapnia. Patient was admitted to ICU  in [place], now downgraded and transferred to  [facility] for continuation of his care     Pt appeared very uncomfortable at time of visit. He answered only a few questions. He reported he is not eating well. He had no complaints of nausea or vomiting.      Diet, NPO after Midnight:      NPO Start Date: 02-Mar-2023,   NPO Start Time: 23:59  Except Medications (03-02-23 @ 21:26)  Diet, Regular:   DASH/TLC {Sodium & Cholesterol Restricted}  No Straws  No Carb Prosource (1pkg = 15gms Protein)     Qty per Day:  1  Supplement Feeding Modality:  Oral  Two Rahul HN Cans or Servings Per Day:  1       Frequency:  Two Times a day (03-01-23 @ 16:57)    GI: WDL. Last BM  3/2/23    PO intake:  < 50% [x] 50-75% [ ]   % [ ]  other :    Anthropometrics: Height (cm): 177.8 (02-25)  Weight (kg): 89.4 (02-26)  BMI (kg/m2): 28.3 (02-26    Edema: No edema noted    Skin: Surgical incision right CT sight    __________________ Pertinent Medications__________________   MEDICATIONS  (STANDING):  albuterol    0.083% 2.5 milliGRAM(s) Nebulizer every 12 hours  atenolol  Tablet 25 milliGRAM(s) Oral daily  chlorhexidine 2% Cloths 1 Application(s) Topical <User Schedule>  escitalopram 5 milliGRAM(s) Oral daily  famotidine    Tablet 20 milliGRAM(s) Oral daily  melatonin 6 milliGRAM(s) Oral at bedtime  midodrine. 5 milliGRAM(s) Oral three times a day  polyethylene glycol 3350 17 Gram(s) Oral daily  senna 2 Tablet(s) Oral at bedtime  simvastatin 40 milliGRAM(s) Oral at bedtime  sodium chloride 0.9%. 1000 milliLiter(s) (75 mL/Hr) IV Continuous <Continuous>  tamsulosin 0.4 milliGRAM(s) Oral at bedtime    MEDICATIONS  (PRN):  ALPRAZolam 0.25 milliGRAM(s) Oral daily PRN anxiety      __________________ Pertinent Labs__________________   03-03 Na139 mmol/L Glu 114 mg/dL<H> K+ 3.6 mmol/L Cr  0.56 mg/dL BUN 11 mg/dL 02-26 Phos 2.1 mg/dL<L> 02-26 Alb 2.4 g/dL<L> 02-17 Chol --    LDL --    HDL --    Trig 67 mg/dL      Estimated Needs:   rauhl/d - 1880 to 2256 rahul  gm pro/d - 82.72 to 97.76 gm    [x ] no change since previous assessment  [ ] recalculated:       Previous Nutrition Diagnosis: Severe Malnutrition in Chronic Illness    Nutrition Diagnosis is [x ] ongoing  [ ] resolved [ ] not applicable       Recommendations:   1. Discontinue Two Rahul and NO carb Pro Source supplement   2. Oder Ensure Plus HP 2x/day     Monitoring and Evaluation:   [ x] Tolerance to diet prescription [x ] weights [x ] follow up per protocol  [ ] other:

## 2023-03-03 NOTE — PROGRESS NOTE ADULT - SUBJECTIVE AND OBJECTIVE BOX
Name of Patient : ARIK DUMONT  MRN: [id number]  Date of visit: 23       Subjective: Patient seen and examined. No new events except as noted.   Doing okay     REVIEW OF SYSTEMS:    CONSTITUTIONAL: No weakness, fevers or chills  EYES/ENT: No visual changes;  No vertigo or throat pain   NECK: No pain or stiffness  RESPIRATORY: No cough, wheezing, hemoptysis; No shortness of breath  CARDIOVASCULAR: No chest pain or palpitations  GASTROINTESTINAL: No abdominal or epigastric pain. No nausea, vomiting, or hematemesis; No diarrhea or constipation. No melena or hematochezia.  GENITOURINARY: No dysuria, frequency or hematuria  NEUROLOGICAL: No numbness or weakness  SKIN: No itching, burning, rashes, or lesions   All other review of systems is negative unless indicated above.    MEDICATIONS:  MEDICATIONS  (STANDING):  albuterol    0.083% 2.5 milliGRAM(s) Nebulizer every 12 hours  chlorhexidine 2% Cloths 1 Application(s) Topical <User Schedule>  escitalopram 5 milliGRAM(s) Oral daily  famotidine    Tablet 20 milliGRAM(s) Oral daily  melatonin 6 milliGRAM(s) Oral at bedtime  midodrine. 5 milliGRAM(s) Oral three times a day  phenylephrine    Infusion 0.5 MICROgram(s)/kG/Min (16.8 mL/Hr) IV Continuous <Continuous>  polyethylene glycol 3350 17 Gram(s) Oral daily  senna 2 Tablet(s) Oral at bedtime  simvastatin 40 milliGRAM(s) Oral at bedtime  sodium chloride 0.9%. 1000 milliLiter(s) (40 mL/Hr) IV Continuous <Continuous>  tamsulosin 0.4 milliGRAM(s) Oral at bedtime      PHYSICAL EXAM:  T(C): 36.4 (23 @ 22:00), Max: 37 (23 @ 08:30)  HR: 70 (23 @ 22:00) (65 - 100)  BP: 101/50 (23 @ 22:00) (73/47 - 114/54)  RR: 28 (23 @ 22:00) (17 - 35)  SpO2: 100% (23 @ 22:00) (91% - 100%)  Wt(kg): --  I&O's Summary    02 Mar 2023 07:  -  03 Mar 2023 07:00  --------------------------------------------------------  IN: 1369.4 mL / OUT: 291 mL / NET: 1078.4 mL    03 Mar 2023 07:  -  03 Mar 2023 22:22  --------------------------------------------------------  IN: .2 mL / OUT: 1594 mL / NET: 456.2 mL      Height (cm): 177.8 ( @ 15:59)  Weight (kg): 89.4 ( @ 15:59)  BMI (kg/m2): 28.3 ( @ 15:59)  BSA (m2): 2.07 ( @ 15:59)    Appearance: Normal	  HEENT:  PERRLA   Lymphatic: No lymphadenopathy   Cardiovascular: Normal S1 S2, no JVD  Respiratory: normal effort , clear  Gastrointestinal:  Soft, Non-tender  Skin: No rashes,  warm to touch  Psychiatry:  Mood & affect appropriate  Musculuskeletal: No edema    recent labs, Imaging and EKGs personally reviewed       23 @ 07:  -  23 @ 07:00  --------------------------------------------------------  IN: 1369.4 mL / OUT: 291 mL / NET: 1078.4 mL    23 @ 07:01  -  23 @ 22:22  --------------------------------------------------------  IN: 2050.2 mL / OUT: 1594 mL / NET: 456.2 mL                            11.3   11.78 )-----------( 186      ( 03 Mar 2023 06:34 )             36.5                   139  |  100  |  11  ----------------------------<  114<H>  3.6   |  31  |  0.56    Ca    8.5      03 Mar 2023 06:34      PT/INR - ( 02 Mar 2023 14:20 )   PT: 14.8 sec;   INR: 1.27 ratio         PTT - ( 03 Mar 2023 06:34 )  PTT:29.6 sec                 ABG - ( 03 Mar 2023 18:12 )  pH, Arterial: 7.34  pH, Blood: x     /  pCO2: 54    /  pO2: 58    / HCO3: 29    / Base Excess: 2.4   /  SaO2: 87.8              Urinalysis Basic - ( 03 Mar 2023 08:50 )    Color: Yellow / Appearance: Slightly Turbid / S.016 / pH: x  Gluc: x / Ketone: Negative  / Bili: Negative / Urobili: <2 mg/dL   Blood: x / Protein: 300 mg/dL / Nitrite: Positive   Leuk Esterase: Negative / RBC: 79 /HPF / WBC 3 /HPF   Sq Epi: x / Non Sq Epi: 3 /HPF / Bacteria: Moderate

## 2023-03-03 NOTE — PROGRESS NOTE ADULT - SUBJECTIVE AND OBJECTIVE BOX
DATE OF SERVICE: 03-03-23 @ 09:59    Subjective: Patient seen and examined. No new events except as noted.     SUBJECTIVE/ROS:  nad      MEDICATIONS:  MEDICATIONS  (STANDING):  albuterol    0.083% 2.5 milliGRAM(s) Nebulizer every 12 hours  atenolol  Tablet 25 milliGRAM(s) Oral daily  chlorhexidine 2% Cloths 1 Application(s) Topical <User Schedule>  escitalopram 5 milliGRAM(s) Oral daily  famotidine    Tablet 20 milliGRAM(s) Oral daily  melatonin 6 milliGRAM(s) Oral at bedtime  polyethylene glycol 3350 17 Gram(s) Oral daily  senna 2 Tablet(s) Oral at bedtime  simvastatin 40 milliGRAM(s) Oral at bedtime  sodium chloride 0.9%. 1000 milliLiter(s) (75 mL/Hr) IV Continuous <Continuous>  tamsulosin 0.4 milliGRAM(s) Oral at bedtime      PHYSICAL EXAM:  T(C): 36.6 (03-03-23 @ 04:15), Max: 36.9 (03-02-23 @ 22:07)  HR: 74 (03-03-23 @ 09:34) (73 - 130)  BP: 90/42 (03-03-23 @ 09:34) (90/42 - 146/101)  RR: 18 (03-03-23 @ 09:34) (17 - 18)  SpO2: 98% (03-03-23 @ 09:34) (93% - 100%)  Wt(kg): --  I&O's Summary    02 Mar 2023 07:01  -  03 Mar 2023 07:00  --------------------------------------------------------  IN: 1369.4 mL / OUT: 291 mL / NET: 1078.4 mL            JVP: Normal  Neck: supple  Lung: clear   CV: S1 S2 , Murmur:  Abd: soft  Ext: No edema  neuro: Awake / alert  Psych: flat affect  Skin: normal``    LABS/DATA:    CARDIAC MARKERS:                                11.3   11.78 )-----------( 186      ( 03 Mar 2023 06:34 )             36.5     03-03    139  |  100  |  11  ----------------------------<  114<H>  3.6   |  31  |  0.56    Ca    8.5      03 Mar 2023 06:34      proBNP:   Lipid Profile:   HgA1c:   TSH:     TELE:  EKG:

## 2023-03-03 NOTE — PROGRESS NOTE ADULT - ASSESSMENT
Pl effusion   plan for vats/biopsy   suspicion for malignancy     afib  HR stable on atenolol   however BP on low side  will add midodrine  a/c    Pre-Operative Cardiac Risk Stratification and Optimization   Based on patient history and physical exam, the patient is considered to have elevated risk   normal stress test   normal LV function   can proceed to planned surgery

## 2023-03-03 NOTE — CHART NOTE - NSCHARTNOTEFT_GEN_A_CORE
Patient s/p Right Vats, MLND, Placement of Pleurex cath.  Patient awake and alert, no complaints offered.  Right Pleurx cath to suction, no air leak noted.  Plummer to bedside drainage with no urine output noted.  Bladder scan showing 1L in bladder, plummer irrigated and adjusted and 1400 cc of urine drained.  Patient with hypotension, SBP in the 70's , normal saline bolus, albumin and midodrine 10 mg given.    Vital Signs Last 24 Hrs  T(C): 36.5 (03 Mar 2023 21:00), Max: 37 (03 Mar 2023 08:30)  T(F): 97.7 (03 Mar 2023 21:00), Max: 98.6 (03 Mar 2023 08:30)  HR: 71 (03 Mar 2023 21:45) (65 - 100)  BP: 86/55 (03 Mar 2023 21:45) (73/47 - 114/54)  BP(mean): 62 (03 Mar 2023 21:45) (53 - 99)  RR: 27 (03 Mar 2023 21:45) (17 - 35)  SpO2: 100% (03 Mar 2023 21:45) (91% - 100%)    Parameters below as of 03 Mar 2023 21:45  Patient On (Oxygen Delivery Method): nasal cannula  O2 Flow (L/min): 3    I&O's Detail    02 Mar 2023 07:01  -  03 Mar 2023 07:00  --------------------------------------------------------  IN:    Heparin Infusion: 259.4 mL    Oral Fluid: 1110 mL  Total IN: 1369.4 mL    OUT:    Chest Tube (mL): 1 mL    Indwelling Catheter - Urethral (mL): 290 mL  Total OUT: 291 mL    Total NET: 1078.4 mL      03 Mar 2023 07:01  -  03 Mar 2023 21:49  --------------------------------------------------------  IN:    IV PiggyBack: 250 mL    Oral Fluid: 150 mL    sodium chloride 0.9%: 750 mL    sodium chloride 0.9%: 80 mL    Sodium Chloride 0.9% Bolus: 500 mL  Total IN: 1730 mL    OUT:    Chest Tube (mL): 14 mL    Indwelling Catheter - Urethral (mL): 1550 mL  Total OUT: 1564 mL    Total NET: 166 mL      MEDICATIONS  (STANDING):  albuterol    0.083% 2.5 milliGRAM(s) Nebulizer every 12 hours  chlorhexidine 2% Cloths 1 Application(s) Topical <User Schedule>  escitalopram 5 milliGRAM(s) Oral daily  famotidine    Tablet 20 milliGRAM(s) Oral daily  melatonin 6 milliGRAM(s) Oral at bedtime  midodrine. 5 milliGRAM(s) Oral three times a day  phenylephrine    Infusion 0.5 MICROgram(s)/kG/Min (16.8 mL/Hr) IV Continuous <Continuous>  polyethylene glycol 3350 17 Gram(s) Oral daily  senna 2 Tablet(s) Oral at bedtime  simvastatin 40 milliGRAM(s) Oral at bedtime  sodium chloride 0.9%. 1000 milliLiter(s) (40 mL/Hr) IV Continuous <Continuous>  tamsulosin 0.4 milliGRAM(s) Oral at bedtime    A/P: S/P Right Vats, MLND, Placement of Pleurx cath   Continue Pleurx cath to suction   Follow up labs and CXR   Above discussed with Dr. Saldana, will start mellissa gtt  Will monitor if patient needs to be in CTICU

## 2023-03-03 NOTE — CHART NOTE - NSCHARTNOTEFT_GEN_A_CORE
Pt recovering  in PACU s/p flex bronch, Rt VATS with PleurX placement and mediastinal lymph node bx. Pt recovering  in PACU s/p flex bronch, Rt VATS with PleurX placement and mediastinal lymph node bx. Initial /56, however dropped to 70s/40s Pt received scheduled  dose of midodrine 10mg PO x 1. Given albumin 250ml x 2 and  without improvement. Phenylphrine 100mcg IVP x 2 administered and Phenylephrine 40/250 gtt started with appropriate response. Of note, patent's plummer noted to be occluded and after flushing had UO 1400cc of concentrated urine. Rt pleurX to suction width minimal output.   On exam pt is drowsy but arousable  and responds appropriately when prompted. PleurX insertion site is clean dry and intact without hematoma formation., currently draining to Pleuravac with suction.  + B/L BS, diminished at bases, no W/R/R.     Labs drawn and pendin year old male with PMH Atrial fibrillation, HTN and chronic urinary retention requiring self catheterization found to have recurrent  R pleural effusion.  Pt transferred from  to [facility] for evaluation of mediastinal LAD and malignancy workup in setting of acute hypoxic respiratory failure, recurrent Rt pleural effusions, and LAD. Pt  underwent Rt VATS, Rt PleurX placement, and mediastinal LN bx by Dr. Galvan and noted to be hypotension in PACU, non responsive to fluid resuscitation. On Phenylephrine gtt 40/250 @ 0.9mcg/kg/min. D/W CTS PA Laexsandra and need for CT ICU admission. Pt recovering  in PACU s/p flex bronch, Rt VATS with PleurX placement and mediastinal lymph node bx. Initial /56, however dropped to 70s/40s Pt received scheduled  dose of midodrine 10mg PO x 1. Given albumin 250ml x 2 and  without improvement. Phenylephrine 100mcg IVP x 2 administered and Phenylephrine 40/250 gtt started with appropriate response. Of note, patent's plummer noted to be occluded and after flushing had UO 1400cc of concentrated urine. Rt pleurX to suction width minimal output.     On exam pt is drowsy but arousable  and responds appropriately when prompted. PleurX insertion site is clean dry and intact without hematoma formation., currently draining to Pleuravac with suction.  + B/L BS, diminished at bases, no W/R/R.     Labs drawn and pending:                 ABG - ( 03 Mar 2023 22:30 )  pH, Arterial: 7.37  pH, Blood: x     /  pCO2: 44    /  pO2: 119   / HCO3: 25    / Base Excess: -0.1  /  SaO2: 99.6                        82 year old male with PMH Atrial fibrillation, HTN and chronic urinary retention requiring self catheterization found to have recurrent  R pleural effusion.  Pt transferred from  to [facility] for evaluation of mediastinal LAD and malignancy workup in setting of acute hypoxic respiratory failure, recurrent Rt pleural effusions, and LAD. Pt  underwent Rt VATS, Rt PleurX placement, and mediastinal LN bx by Dr. Galvan and noted to be hypotension in PACU, non responsive to fluid resuscitation. On Phenylephrine gtt 40/250 @ 0.9mcg/kg/min. D/W CTS PA Alexsandra and need for CT ICU admission. Pt recovering  in PACU s/p flex bronch, Rt VATS with PleurX placement and mediastinal lymph node bx. Initial /56, however dropped to 70s/40s Pt received scheduled  dose of midodrine 10mg PO x 1. Given albumin 250ml x 2 and  without improvement. Phenylephrine 100mcg IVP x 2 administered and Phenylephrine 40/250 gtt started with appropriate response. Of note, patent's plummer noted to be occluded and after flushing had UO 1400cc of concentrated urine. Rt pleurX to suction width minimal output.     On exam pt is drowsy but arousable  and responds appropriately when prompted. PleurX insertion site is clean dry and intact without hematoma formation., currently draining to Pleuravac with suction.  + B/L BS, diminished at bases, no W/R/R.     Labs drawn and pendin.5    14.95 )-----------( 161      ( 03 Mar 2023 22:30 )    03-    140  |  103  |  12  ----------------------------<  104<H>  3.1<L>   |  25  |  0.55    Ca    8.0<L>      03 Mar 2023 22:30  Phos  2.6     03-03  Mg     1.60     03-03    TPro  5.6<L>  /  Alb  2.6<L>  /  TBili  0.6  /  DBili  x   /  AST  10  /  ALT  26  /  AlkPhos  63  03-03               30.4     ABG - ( 03 Mar 2023 22:30 )  pH, Arterial: 7.37  pH, Blood: x     /  pCO2: 44    /  pO2: 119   / HCO3: 25    / Base Excess: -0.1  /  SaO2: 99.6                        82 year old male with PMH Atrial fibrillation, HTN and chronic urinary retention requiring self catheterization found to have recurrent  R pleural effusion.  Pt transferred from  to [facility] for evaluation of mediastinal LAD and malignancy workup in setting of acute hypoxic respiratory failure, recurrent Rt pleural effusions, and LAD. Pt  underwent Rt VATS, Rt PleurX placement, and mediastinal LN bx by Dr. Galvan and noted to be hypotension in PACU, non responsive to fluid resuscitation. On Phenylephrine gtt 40/250 @ 0.9mcg/kg/min. D/W CTS PA Alexsandra and need for CT ICU admission. Pt recovering  in PACU s/p flex bronch, Rt VATS with PleurX placement and mediastinal lymph node bx. Initial /56, however dropped to 70s/40s Pt received scheduled  dose of midodrine 10mg PO x 1. Given albumin 250ml x 2 and  without improvement. Phenylephrine 100mcg IVP x 2 administered and Phenylephrine 40/250 gtt started with appropriate response. Of note, patent's plummer noted to be occluded and after flushing had UO 1400cc of concentrated urine. Rt pleurX to suction width minimal output.     On exam pt is drowsy but arousable  and responds appropriately when prompted. PleurX insertion site is clean dry and intact without hematoma formation., currently draining to Pleuravac with suction.  + B/L BS, diminished at bases, no W/R/R.     Labs drawn and pendin.5    14.95 )-----------( 161      ( 03 Mar 2023 22:30 )    03-    140  |  103  |  12  ----------------------------<  104<H>  3.1<L>   |  25  |  0.55    Ca    8.0<L>      03 Mar 2023 22:30  Phos  2.6     03-03  Mg     1.60     03-03    TPro  5.6<L>  /  Alb  2.6<L>  /  TBili  0.6  /  DBili  x   /  AST  10  /  ALT  26  /  AlkPhos  63  03-03               30.4     ABG - ( 03 Mar 2023 22:30 )  pH, Arterial: 7.37  pH, Blood: x     /  pCO2: 44    /  pO2: 119   / HCO3: 25    / Base Excess: -0.1  /  SaO2: 99.6        A/P  ;82 year old male with PMH Atrial fibrillation, HTN and chronic urinary retention requiring self catheterization found to have recurrent  R pleural effusion.  Pt transferred from  to [facility] for evaluation of mediastinal LAD and malignancy workup in setting of acute hypoxic respiratory failure, recurrent Rt pleural effusions, and LAD. Pt  underwent Rt VATS, Rt PleurX placement, and mediastinal LN bx by Dr. Galvan and noted to be hypotension in PACU, non responsive to fluid resuscitation. On Phenylephrine gtt 40/250 @ 0.9mcg/kg/min. D/W CTS SARAVANAN Upton and need for CT ICU admission. CTI accepted and to be transferred.

## 2023-03-03 NOTE — PROGRESS NOTE ADULT - SUBJECTIVE AND OBJECTIVE BOX
ARIK DUMONT      82y   Male   MRN-[id number]         pertussis vaccines (Rash)  shellfish (Rash)             Daily Height in cm: 177.8 (03 Mar 2023 15:59)    Daily Drug Dosing Weight  Height (cm): 177.8 (03 Mar 2023 15:59)  Weight (kg): 89.4 (03 Mar 2023 15:59)  BMI (kg/m2): 28.3 (03 Mar 2023 15:59)  BSA (m2): 2.07 (03 Mar 2023 15:59)    HPI:  82 year old male with PMH atrial fibrillation, HTN and chronic urinary retention who customarily self catheterizes at home for the last year, presented to [facility] by ambulance from home on 2023 with c/o progressive shortness of breath.    Pt was intubated in the field and brought to ED.    Of note, patient recently found to have supraclavicular lymph node enlargement with associated leukocytosis, is being evaluated for lymphoma. Patient also recently admitted to [facility] where he was noted to have large pleural effusion that was drained.  According to family lymph node biopsy was inconclusive and patient had thoracentesis which revealed no infection or malignancy.  Since discharge from [facility] has been oxygen dependant at home which is also a new development.  At [place], patient was admitted to ICU for acute respiratory failure with hypercapnia. CTA chest negative for PE, showed large R pleural effusion with mediastinal and hilar lymphadenopathy. A pigtail was placed and drained over 3 L of fluid. Patient was started on empiric antibiotics and completed 5d course on 2023.  Pleural fluid cs negative and final. Blood cultures negative and final.  Given presence of lymphadenopathy and persistent leukocytosis, underlying malignancy is suspected. Hematology/oncology was consulted. Recommended transfer to [facility] for further investigation.   Pt was accepted by thoracic surgeon, Dr Nathaniel Galvan and is being evaluated by EBUS/mediastinoscopy and possible pleurX placement.   At [place], urology was consulted for pt's chronic urinary retention and a plummer was placed. Family did not want trial of void and agreed with continued plumemr use.   Patient was successfully extubated and started on CPAP in [place] ICU. Patient was downgraded to medical floor and was titrated down to nasal canula. Physical therapy evaluated patient and MORALES is recommended. Pt was then transferred to [facility]. PTC intact on suction with no air leak and pt completes steroid taper 2023.   Last dose of eliquis 2023 at 6am, pt is on Lovenox 90mg BID for DVT ppx.     (2023 23:24)      CHIEF COMPLAINT: Follow up in ICU  for postoperative care of patient who is s/p Flexible Bronchoscopy, Right VATS, Mediastinal Lymph Node Dissection, PleurX Catheter Placement     Procedure: Flexible Bronchoscopy, Right VATS, Mediastinal Lymph Node Dissection, PleurX Catheter Placement 3/3/2023                       Issues:  Hypotension without shock requiring pressors              Malignant Pleural effusion              Postop pain              Chest tube in place  Atrial fibrillation  Urinary retention  Hypokalemia      Postop course:     Patient reports moderate pain at chest wall incision sites which is worse with coughing and deep breathing without associated fever or dyspnea. Pain is improved with use of  pain meds.         Home Medications:  atenolol: 75 milligram(s) orally once a day (2023 14:45)  Eliquis 5 mg oral tablet: 1 tab(s) orally 2 times a day, but CURRENTLY BEING HELD FOR PROCEDURE (2023 14:51)  enoxaparin: 90 milligram(s) subcutaneous 2 times a day, while off eliquis (2023 15:00)  ipratropium-albuterol 0.5 mg-2.5 mg/3 mL inhalation solution: 3 milliliter(s) inhaled every 6 hours (2023 14:51)  Lexapro 5 mg oral tablet: 1 tab(s) orally once a day (2023 14:45)  melatonin 3 mg oral tablet: 2 tab(s) orally once a day (at bedtime) (2023 14:51)  predniSONE: 20 milligram(s) orally once a day for , then 10mg once a day for , then stop (2023 18:42)  simvastatin 40 mg oral tablet: 1 tab(s) orally once a day (at bedtime) (2023 14:45)  tamsulosin 0.4 mg oral capsule: 1 cap(s) orally once a day (2023 14:45)  Xanax 0.25 mg oral tablet: 1 tab(s) orally once a day, As Needed (2023 14:45)    PAST MEDICAL & SURGICAL HISTORY:  Atrial fibrillation      Hypertension      Urinary retention      No significant past surgical history        Vital Signs Last 24 Hrs  T(C): 36.3 (03 Mar 2023 23:15), Max: 37 (03 Mar 2023 08:30)  T(F): 97.3 (03 Mar 2023 23:15), Max: 98.6 (03 Mar 2023 08:30)  HR: 81 (03 Mar 2023 23:30) (65 - 100)  BP: 108/59 (03 Mar 2023 23:30) (73/47 - 114/54)  BP(mean): 71 (03 Mar 2023 23:30) (53 - 99)  RR: 29 (03 Mar 2023 23:30) (17 - 35)  SpO2: 99% (03 Mar 2023 23:30) (91% - 100%)    Parameters below as of 03 Mar 2023 23:30  Patient On (Oxygen Delivery Method): nasal cannula  O2 Flow (L/min): 2    I&O's Detail    02 Mar 2023 07:01  -  03 Mar 2023 07:00  --------------------------------------------------------  IN:    Heparin Infusion: 259.4 mL    Oral Fluid: 1110 mL  Total IN: 1369.4 mL    OUT:    Chest Tube (mL): 1 mL    Indwelling Catheter - Urethral (mL): 290 mL  Total OUT: 291 mL    Total NET: 1078.4 mL      03 Mar 2023 07:01  -  03 Mar 2023 23:36  --------------------------------------------------------  IN:    IV PiggyBack: 500 mL    Oral Fluid: 150 mL    Phenylephrine: 30.2 mL    sodium chloride 0.9%: 750 mL    sodium chloride 0.9%: 120 mL    Sodium Chloride 0.9% Bolus: 500 mL  Total IN: 2050.2 mL    OUT:    Chest Tube (mL): 14 mL    Indwelling Catheter - Urethral (mL): 1580 mL  Total OUT: 1594 mL    Total NET: 456.2 mL        CAPILLARY BLOOD GLUCOSE        Home Medications:  atenolol: 75 milligram(s) orally once a day (2023 14:45)  Eliquis 5 mg oral tablet: 1 tab(s) orally 2 times a day, but CURRENTLY BEING HELD FOR PROCEDURE (2023 14:51)  enoxaparin: 90 milligram(s) subcutaneous 2 times a day, while off eliquis (2023 15:00)  ipratropium-albuterol 0.5 mg-2.5 mg/3 mL inhalation solution: 3 milliliter(s) inhaled every 6 hours (2023 14:51)  Lexapro 5 mg oral tablet: 1 tab(s) orally once a day (2023 14:45)  melatonin 3 mg oral tablet: 2 tab(s) orally once a day (at bedtime) (2023 14:51)  predniSONE: 20 milligram(s) orally once a day for , then 10mg once a day for , then stop (2023 18:42)  simvastatin 40 mg oral tablet: 1 tab(s) orally once a day (at bedtime) (2023 14:45)  tamsulosin 0.4 mg oral capsule: 1 cap(s) orally once a day (2023 14:45)  Xanax 0.25 mg oral tablet: 1 tab(s) orally once a day, As Needed (2023 14:45)    MEDICATIONS  (STANDING):  albuterol    0.083% 2.5 milliGRAM(s) Nebulizer every 12 hours  chlorhexidine 2% Cloths 1 Application(s) Topical <User Schedule>  escitalopram 5 milliGRAM(s) Oral daily  famotidine    Tablet 20 milliGRAM(s) Oral daily  melatonin 6 milliGRAM(s) Oral at bedtime  midodrine. 5 milliGRAM(s) Oral three times a day  phenylephrine    Infusion 0.5 MICROgram(s)/kG/Min (16.8 mL/Hr) IV Continuous <Continuous>  polyethylene glycol 3350 17 Gram(s) Oral daily  potassium chloride  10 mEq/100 mL IVPB 10 milliEquivalent(s) IV Intermittent every 1 hour  senna 2 Tablet(s) Oral at bedtime  simvastatin 40 milliGRAM(s) Oral at bedtime  sodium chloride 0.9%. 1000 milliLiter(s) (40 mL/Hr) IV Continuous <Continuous>  tamsulosin 0.4 milliGRAM(s) Oral at bedtime    MEDICATIONS  (PRN):  ALPRAZolam 0.25 milliGRAM(s) Oral daily PRN anxiety  fentaNYL    Injectable 25 MICROGram(s) IV Push every 5 minutes PRN Moderate Pain (4 - 6)  oxyCODONE    IR 5 milliGRAM(s) Oral once PRN Moderate Pain (4 - 6)        Physical exam:                             General:               Pt is awake, alert,  appears to be in pain but not in distress                                                  Neuro:                  Nonfocal                             Psych:                   A&Ox3                          Cardiovascular:   S1 & S2, regular                           Respiratory:         Air entry is fair and equal on both sides, has bilateral conducted sounds R>L                           GI:                          Soft, nondistended and nontender, Bowel sounds active                            Ext:                        No cyanosis or edema     Labs:                                                                           9.5    14.95 )-----------( 161      ( 03 Mar 2023 22:30 )             30.4             03-03    140  |  103  |  12  ----------------------------<  104<H>  3.1<L>   |  25  |  0.55    Ca    8.0<L>      03 Mar 2023 22:30  Phos  2.6     03-03  Mg     1.60     -03    TPro  5.6<L>  /  Alb  2.6<L>  /  TBili  0.6  /  DBili  x   /  AST  10  /  ALT  26  /  AlkPhos  63  03-03                  PT/INR - ( 02 Mar 2023 14:20 )   PT: 14.8 sec;   INR: 1.27 ratio         PTT - ( 03 Mar 2023 06:34 )  PTT:29.6 sec  LIVER FUNCTIONS - ( 03 Mar 2023 22:30 )  Alb: 2.6 g/dL / Pro: 5.6 g/dL / ALK PHOS: 63 U/L / ALT: 26 U/L / AST: 10 U/L / GGT: x           Urinalysis Basic - ( 03 Mar 2023 08:50 )    Color: Yellow / Appearance: Slightly Turbid / S.016 / pH: x  Gluc: x / Ketone: Negative  / Bili: Negative / Urobili: <2 mg/dL   Blood: x / Protein: 300 mg/dL / Nitrite: Positive   Leuk Esterase: Negative / RBC: 79 /HPF / WBC 3 /HPF   Sq Epi: x / Non Sq Epi: 3 /HPF / Bacteria: Moderate        CXR:  Right pleurX catheter in place. Lungs are clear.       Plan:  General: 82yMale s/p  , experiencing  pain with deep breathing.                             Neuro:                                         Pain control with Oxy  /  Tylenol PRN                            Cardiovascular:      Postop hypotension - probably due to surgery, Anesthesia or missing Midodrine dose  Continue hemodynamic monitoring  Titrate Ming to MAP >65                                        Telemetry (medical test) - Reviewed by me today independently, in  A-fib.     A-Fib: Rate controlled. Restart Atenolol when off Ming  On Lovenox pre-op. May switch back to Eliquis if no bleeding.                                          Continue hemodynamic monitoring to prevent decompensation.                            Respiratory:                                         Postop hypoxemia requiring O2 via nasal cannula probably due to postop pain - Wean nasal cannula for goal O2sat above 92%.                                              CXR is clear. Encourage incentive spirometry.                                                   Chest PT and frequent suctioning. Continue bronchodilators, Pulmozyme and inhaled 3% saline inhalations.                                                      Continuous pulse oximetry for support & to prevent decompensation.                                         Monitor chest tube output                                         Chest tube to water seal                                                                                            GI                                         On puree diet, advance to DASH  diet as tolerated                                         Continue Zofran / Reglan for nausea - PRN                                         Continue bowel regimen	                                                                 Renal:     Chronic urinary retention - Has Plummer in Place     Hypokalemia - Being replaced                                         Continue LR  30cc/hr                                         Monitor I/Os and electrolytes                                                                                        Hem/ Onc:                                         DVT prophylaxis with SQ Heparin and SCDs                                         Monitor chest tube output &  signs of bleeding.                                          Follow CBC in AM                           Infectious disease:                                            Monitor for fever / leukocytosis.                                          All surgical incision / chest tube  sites look clean                            Endocrine                                             Continue Accu-Checks with coverage                                                Pertinent clinical, laboratory, radiographic, hemodynamic, echocardiographic, respiratory data, microbiologic data and chart were reviewed and analyzed frequently throughout the course of the day and night.     Patient seen, examined and plan discussed with CT Surgeon   / CTICU team during rounds.    OOB to chair and ambulate with physical therapy as tolerated.     Status discussed with patient and updated plan of care.     I have spent  40   minutes of critical care time with this pt between  10.45 pm and 11.59pm monitoring hemodynamic status, managing fluid resuscitation /  pressors to prevent shock.           Naren Saldana MD

## 2023-03-04 LAB
ANION GAP SERPL CALC-SCNC: 11 MMOL/L — SIGNIFICANT CHANGE UP (ref 7–14)
BUN SERPL-MCNC: 12 MG/DL — SIGNIFICANT CHANGE UP (ref 7–23)
CALCIUM SERPL-MCNC: 8.2 MG/DL — LOW (ref 8.4–10.5)
CHLORIDE SERPL-SCNC: 104 MMOL/L — SIGNIFICANT CHANGE UP (ref 98–107)
CO2 SERPL-SCNC: 25 MMOL/L — SIGNIFICANT CHANGE UP (ref 22–31)
CREAT SERPL-MCNC: 0.55 MG/DL — SIGNIFICANT CHANGE UP (ref 0.5–1.3)
EGFR: 98 ML/MIN/1.73M2 — SIGNIFICANT CHANGE UP
GLUCOSE SERPL-MCNC: 98 MG/DL — SIGNIFICANT CHANGE UP (ref 70–99)
GRAM STN FLD: SIGNIFICANT CHANGE UP
HCT VFR BLD CALC: 34.2 % — LOW (ref 39–50)
HGB BLD-MCNC: 10.6 G/DL — LOW (ref 13–17)
MAGNESIUM SERPL-MCNC: 1.5 MG/DL — LOW (ref 1.6–2.6)
MCHC RBC-ENTMCNC: 27.6 PG — SIGNIFICANT CHANGE UP (ref 27–34)
MCHC RBC-ENTMCNC: 31 GM/DL — LOW (ref 32–36)
MCV RBC AUTO: 89.1 FL — SIGNIFICANT CHANGE UP (ref 80–100)
NIGHT BLUE STAIN TISS: SIGNIFICANT CHANGE UP
NRBC # BLD: 0 /100 WBCS — SIGNIFICANT CHANGE UP (ref 0–0)
NRBC # FLD: 0 K/UL — SIGNIFICANT CHANGE UP (ref 0–0)
PHOSPHATE SERPL-MCNC: 2.7 MG/DL — SIGNIFICANT CHANGE UP (ref 2.5–4.5)
PLATELET # BLD AUTO: 169 K/UL — SIGNIFICANT CHANGE UP (ref 150–400)
POTASSIUM SERPL-MCNC: 3.4 MMOL/L — LOW (ref 3.5–5.3)
POTASSIUM SERPL-SCNC: 3.4 MMOL/L — LOW (ref 3.5–5.3)
RBC # BLD: 3.84 M/UL — LOW (ref 4.2–5.8)
RBC # FLD: 19.1 % — HIGH (ref 10.3–14.5)
SODIUM SERPL-SCNC: 140 MMOL/L — SIGNIFICANT CHANGE UP (ref 135–145)
SPECIMEN SOURCE: SIGNIFICANT CHANGE UP
SPECIMEN SOURCE: SIGNIFICANT CHANGE UP
WBC # BLD: 12.33 K/UL — HIGH (ref 3.8–10.5)
WBC # FLD AUTO: 12.33 K/UL — HIGH (ref 3.8–10.5)

## 2023-03-04 PROCEDURE — 99233 SBSQ HOSP IP/OBS HIGH 50: CPT

## 2023-03-04 PROCEDURE — 36569 INSJ PICC 5 YR+ W/O IMAGING: CPT | Mod: LT

## 2023-03-04 PROCEDURE — 71045 X-RAY EXAM CHEST 1 VIEW: CPT | Mod: 26

## 2023-03-04 RX ORDER — SODIUM CHLORIDE 9 MG/ML
1000 INJECTION INTRAMUSCULAR; INTRAVENOUS; SUBCUTANEOUS
Refills: 0 | Status: DISCONTINUED | OUTPATIENT
Start: 2023-03-04 | End: 2023-03-04

## 2023-03-04 RX ORDER — ACETAMINOPHEN 500 MG
1000 TABLET ORAL ONCE
Refills: 0 | Status: COMPLETED | OUTPATIENT
Start: 2023-03-04 | End: 2023-03-04

## 2023-03-04 RX ORDER — ALBUTEROL 90 UG/1
2.5 AEROSOL, METERED ORAL EVERY 6 HOURS
Refills: 0 | Status: DISCONTINUED | OUTPATIENT
Start: 2023-03-04 | End: 2023-03-15

## 2023-03-04 RX ORDER — MAGNESIUM SULFATE 500 MG/ML
2 VIAL (ML) INJECTION ONCE
Refills: 0 | Status: COMPLETED | OUTPATIENT
Start: 2023-03-04 | End: 2023-03-04

## 2023-03-04 RX ORDER — POTASSIUM CHLORIDE 20 MEQ
10 PACKET (EA) ORAL
Refills: 0 | Status: COMPLETED | OUTPATIENT
Start: 2023-03-04 | End: 2023-03-04

## 2023-03-04 RX ORDER — SODIUM CHLORIDE 9 MG/ML
4 INJECTION INTRAMUSCULAR; INTRAVENOUS; SUBCUTANEOUS EVERY 6 HOURS
Refills: 0 | Status: DISCONTINUED | OUTPATIENT
Start: 2023-03-04 | End: 2023-03-15

## 2023-03-04 RX ORDER — ACETAMINOPHEN 500 MG
1000 TABLET ORAL ONCE
Refills: 0 | Status: DISCONTINUED | OUTPATIENT
Start: 2023-03-04 | End: 2023-03-15

## 2023-03-04 RX ADMIN — HEPARIN SODIUM 5000 UNIT(S): 5000 INJECTION INTRAVENOUS; SUBCUTANEOUS at 05:09

## 2023-03-04 RX ADMIN — Medication 6 MILLIGRAM(S): at 21:15

## 2023-03-04 RX ADMIN — Medication 100 MILLIEQUIVALENT(S): at 02:58

## 2023-03-04 RX ADMIN — SIMVASTATIN 40 MILLIGRAM(S): 20 TABLET, FILM COATED ORAL at 21:37

## 2023-03-04 RX ADMIN — MIDODRINE HYDROCHLORIDE 5 MILLIGRAM(S): 2.5 TABLET ORAL at 13:04

## 2023-03-04 RX ADMIN — CHLORHEXIDINE GLUCONATE 1 APPLICATION(S): 213 SOLUTION TOPICAL at 05:15

## 2023-03-04 RX ADMIN — Medication 100 MILLIEQUIVALENT(S): at 00:09

## 2023-03-04 RX ADMIN — TAMSULOSIN HYDROCHLORIDE 0.4 MILLIGRAM(S): 0.4 CAPSULE ORAL at 21:16

## 2023-03-04 RX ADMIN — Medication 100 MILLIEQUIVALENT(S): at 06:34

## 2023-03-04 RX ADMIN — SODIUM CHLORIDE 4 MILLILITER(S): 9 INJECTION INTRAMUSCULAR; INTRAVENOUS; SUBCUTANEOUS at 21:33

## 2023-03-04 RX ADMIN — ALBUTEROL 2.5 MILLIGRAM(S): 90 AEROSOL, METERED ORAL at 10:29

## 2023-03-04 RX ADMIN — MIDODRINE HYDROCHLORIDE 5 MILLIGRAM(S): 2.5 TABLET ORAL at 05:10

## 2023-03-04 RX ADMIN — FAMOTIDINE 20 MILLIGRAM(S): 10 INJECTION INTRAVENOUS at 13:04

## 2023-03-04 RX ADMIN — Medication 25 GRAM(S): at 08:53

## 2023-03-04 RX ADMIN — SODIUM CHLORIDE 50 MILLILITER(S): 9 INJECTION INTRAMUSCULAR; INTRAVENOUS; SUBCUTANEOUS at 05:08

## 2023-03-04 RX ADMIN — ALBUTEROL 2.5 MILLIGRAM(S): 90 AEROSOL, METERED ORAL at 21:33

## 2023-03-04 RX ADMIN — Medication 400 MILLIGRAM(S): at 13:30

## 2023-03-04 RX ADMIN — Medication 100 MILLIEQUIVALENT(S): at 05:33

## 2023-03-04 RX ADMIN — Medication 1000 MILLIGRAM(S): at 05:20

## 2023-03-04 RX ADMIN — Medication 400 MILLIGRAM(S): at 05:08

## 2023-03-04 RX ADMIN — MIDODRINE HYDROCHLORIDE 5 MILLIGRAM(S): 2.5 TABLET ORAL at 21:15

## 2023-03-04 RX ADMIN — Medication 1000 MILLIGRAM(S): at 14:00

## 2023-03-04 RX ADMIN — POLYETHYLENE GLYCOL 3350 17 GRAM(S): 17 POWDER, FOR SOLUTION ORAL at 13:05

## 2023-03-04 RX ADMIN — ESCITALOPRAM OXALATE 5 MILLIGRAM(S): 10 TABLET, FILM COATED ORAL at 22:48

## 2023-03-04 RX ADMIN — ALBUTEROL 2.5 MILLIGRAM(S): 90 AEROSOL, METERED ORAL at 15:36

## 2023-03-04 RX ADMIN — PHENYLEPHRINE HYDROCHLORIDE 16.8 MICROGRAM(S)/KG/MIN: 10 INJECTION INTRAVENOUS at 02:55

## 2023-03-04 RX ADMIN — HEPARIN SODIUM 5000 UNIT(S): 5000 INJECTION INTRAVENOUS; SUBCUTANEOUS at 13:04

## 2023-03-04 RX ADMIN — HEPARIN SODIUM 5000 UNIT(S): 5000 INJECTION INTRAVENOUS; SUBCUTANEOUS at 21:16

## 2023-03-04 RX ADMIN — SODIUM CHLORIDE 40 MILLILITER(S): 9 INJECTION INTRAMUSCULAR; INTRAVENOUS; SUBCUTANEOUS at 02:55

## 2023-03-04 RX ADMIN — Medication 100 MILLIEQUIVALENT(S): at 08:54

## 2023-03-04 NOTE — PROGRESS NOTE ADULT - SUBJECTIVE AND OBJECTIVE BOX
CHIEF COMPLAINT: FOLLOW UP IN ICU FOR POSTOPERATIVE CARE OF PATIENT WHO IS S/P LUNG RESECTION      PROCEDURES: Right VATS, Mediastinal Lymph Node Dissection, PleurX Catheter Placement 03-Mar-2023       ISSUES:   Mediastinal and hilar adenopathy  Large R pleural effusion  Post op pain  Chest tube in place  Chronic atrial fibrillation (on Apixaban)  Hx of recent intubation (, extubated 23)  BPH    INTERVAL EVENTS:   Chest tube with no airleak.      HISTORY:   Patient reports moderate pain at chest wall incision sites which is worse with coughing and deep breathing without associated fever or dyspnea. Pain is improved with use of pain meds.     PHYSICAL EXAM:   Gen: Comfortable, No acute distress  Eyes: Sclera white, Conjunctiva normal, Eyelids normal, Pupils symmetrical   ENT: Mucous membranes moist,  ,  ,    Neck: Trachea midline,  ,  ,  ,  ,  ,    CV: Rate regular, Rhythm regular,  ,  ,    Resp: Breath sounds clear, No accessory muscles use, R chest tube in place,  ,    Abd: Soft, Non-distended, Non-tender,   ,  ,  ,    Skin: Warm, No peripheral edema of lower extremities,  ,    : plummer  Neuro: Moving all 4 extremities,    Psych: A&Ox3      ASSESSMENT AND PLAN:     NEURO:  Post-operative Pain - Stable. Pain control with oxycodone PO     Depression - stable. continue psych meds.            RESPIRATORY:  Hypoxia - Wean nasal cannula for goal O2sat above 92. Obtain CXR. Incentive spirometry. Chest PT and frequent suctioning. Continue bronchodilators. OOB to chair & ambulate w/ assistance. Continuous pulse oximetry for support & to prevent decompensation.        R Pleural effusion - improved s/p PleurX. Monitor chest tube output.   Chest tube – Pleurevac regulated suctioning. Monitor chest tube output.      CARDIOVASCULAR:  Hypotension without shock requiring PO pressors - Midodrine PO and wean pressors for MAP goal of 65.  Telemetry (medical test) - Reviewed by me today independently. Normal sinus rhythm.  HTN - currently hypotensive. hold home antihypertensives.     Chronic Afib - stable. Hold anticoagulation       RENAL:  Stable - Monitor IOs and electrolytes. Keep K above 4.0 and Mg above 2.0.  BPH with chronic urinary retention - Stable. Flomax qday. Continue plummer for now. Self catheterizes.          GASTROINTESTINAL:  GI prophylaxis not indicated  Zofran and Reglan IV PRN for nausea  Regular consistency diet          HEMATOLOGIC:  No signs of active bleeding. Monitor Hgb in CBC in AM  DVT prophylaxis with heparin subQ and SCDs.        INFECTIOUS DISEASE:  All surgical sites appear clean. Will monitor for fever and leukocytosis.           ENDOCRINE:  Stable – Monitor glucose fingersticks for goal 120-180.             ONCOLOGY:  Mediastinal and hilar lymphadenopathy - s/p biopsy. stable. follow up final pathology.           Pertinent clinical, laboratory, radiographic, hemodynamic, echocardiographic, respiratory data, microbiologic data and chart were reviewed by myself and analyzed frequently throughout the course of the day and night by myself.    Plan discussed at length with the CTICU staff and Attending CT Surgeon -   Dr Anselmo Romo.      Patient's status was discussed with patient at bedside.     	      ________________________________________________      _________________________  VITAL SIGNS:  Vital Signs Last 24 Hrs  T(C): 36.6 (04 Mar 2023 12:00), Max: 37 (03 Mar 2023 16:00)  T(F): 97.8 (04 Mar 2023 12:00), Max: 98.6 (03 Mar 2023 16:00)  HR: 76 (04 Mar 2023 12:00) (60 - 100)  BP: 127/49 (04 Mar 2023 12:00) (73/47 - 142/82)  BP(mean): 65 (04 Mar 2023 12:00) (53 - 99)  RR: 21 (04 Mar 2023 12:00) (17 - 35)  SpO2: 96% (04 Mar 2023 12:00) (91% - 100%)    Parameters below as of 04 Mar 2023 12:00  Patient On (Oxygen Delivery Method): nasal cannula w/ humidification  O2 Flow (L/min): 2    I/Os:   I&O's Detail    03 Mar 2023 07:01  -  04 Mar 2023 07:00  --------------------------------------------------------  IN:    IV PiggyBack: 1100 mL    Oral Fluid: 300 mL    Phenylephrine: 177.6 mL    sodium chloride 0.9%: 360 mL    sodium chloride 0.9%: 150 mL    sodium chloride 0.9%: 750 mL    Sodium Chloride 0.9% Bolus: 500 mL  Total IN: 3337.6 mL    OUT:    Chest Tube (mL): 34 mL    Indwelling Catheter - Urethral (mL): 1851 mL  Total OUT: 1885 mL    Total NET: 1452.6 mL      04 Mar 2023 07:01  -  04 Mar 2023 12:25  --------------------------------------------------------  IN:    IV PiggyBack: 150 mL    Oral Fluid: 480 mL  Total IN: 630 mL    OUT:    Chest Tube (mL): 10 mL    Indwelling Catheter - Urethral (mL): 120 mL  Total OUT: 130 mL    Total NET: 500 mL              MEDICATIONS:  MEDICATIONS  (STANDING):  albuterol    0.083% 2.5 milliGRAM(s) Nebulizer every 6 hours  chlorhexidine 2% Cloths 1 Application(s) Topical <User Schedule>  escitalopram 5 milliGRAM(s) Oral daily  famotidine    Tablet 20 milliGRAM(s) Oral daily  heparin   Injectable 5000 Unit(s) SubCutaneous every 8 hours  melatonin 6 milliGRAM(s) Oral at bedtime  midodrine. 5 milliGRAM(s) Oral three times a day  polyethylene glycol 3350 17 Gram(s) Oral daily  senna 2 Tablet(s) Oral at bedtime  simvastatin 40 milliGRAM(s) Oral at bedtime  sodium chloride 3%  Inhalation 4 milliLiter(s) Inhalation every 6 hours  tamsulosin 0.4 milliGRAM(s) Oral at bedtime    MEDICATIONS  (PRN):  oxyCODONE    IR 5 milliGRAM(s) Oral once PRN Moderate Pain (4 - 6)      LABS:  Laboratory data was independently reviewed by me today.                           10. )-----------( 169      ( 04 Mar 2023 03:00 )             34.2     03-04    140  |  104  |  12  ----------------------------<  98  3.4<L>   |  25  |  0.55    Ca    8.2<L>      04 Mar 2023 03:00  Phos  2.7     03-04  Mg     1.50     03-04    TPro  5.6<L>  /  Alb  2.6<L>  /  TBili  0.6  /  DBili  x   /  AST  10  /  ALT  26  /  AlkPhos  63  03-03    LIVER FUNCTIONS - ( 03 Mar 2023 22:30 )  Alb: 2.6 g/dL / Pro: 5.6 g/dL / ALK PHOS: 63 U/L / ALT: 26 U/L / AST: 10 U/L / GGT: x           PT/INR - ( 02 Mar 2023 14:20 )   PT: 14.8 sec;   INR: 1.27 ratio         PTT - ( 03 Mar 2023 06:34 )  PTT:29.6 sec  ABG - ( 03 Mar 2023 22:30 )  pH, Arterial: 7.37  pH, Blood: x     /  pCO2: 44    /  pO2: 119   / HCO3: 25    / Base Excess: -0.1  /  SaO2: 99.6              Urinalysis Basic - ( 03 Mar 2023 08:50 )    Color: Yellow / Appearance: Slightly Turbid / S.016 / pH: x  Gluc: x / Ketone: Negative  / Bili: Negative / Urobili: <2 mg/dL   Blood: x / Protein: 300 mg/dL / Nitrite: Positive   Leuk Esterase: Negative / RBC: 79 /HPF / WBC 3 /HPF   Sq Epi: x / Non Sq Epi: 3 /HPF / Bacteria: Moderate        RADIOLOGY:   Radiology images were independently reviewed by me today. Reports were reviewed by me today.    Xray Chest 1 View- PORTABLE-Urgent:   ******PRELIMINARY REPORT******      ******PRELIMINARY REPORT******         ACC: [id number] EXAM:  XR CHEST PORTABLE URGENT 1V   ORDERED BY: ROSIE SPENCE     PROCEDURE DATE:  2023    ******PRELIMINARY REPORT******      ******PRELIMINARY REPORT******           INTERPRETATION:  no acute findings.        ******PRELIMINARY REPORT******      ******PRELIMINARY REPORT******       SHAE STEWART DO; Resident Radiologist  This document is a PRELIMINARY interpretation and is pending final   attending approval. Mar  3 2023  8:56PM (23 @ 20:39)  Xray Chest 1 View-PORTABLE IMMEDIATE:   ACC: [id number] EXAM:  XR CHEST PORTABLE IMMED 1V   ORDERED BY: ZEENAT DRIVER     PROCEDURE DATE:  2023          INTERPRETATION:  CLINICAL INFORMATION: Chest tube removal    TIME OF EXAMINATION: March 3, 2023 at 3:06 AM    EXAM: Chest    FINDINGS:  Since the last exam, the right-sided pigtail catheter has been removed.   No complications associated with this maneuver.    Bilateral predominantly diffuse interstitial opacities again seen. No   pneumothorax or effusion.        COMPARISON:         IMPRESSION: Status post chest tube removal.    --- End of Report ---            AMIRAH EARLY MD; Attending Radiologist  This document has been electronically signed. Mar  3 2023  6:20AM (23 @ 03:22)  Xray Chest 1 View- PORTABLE-Routine:   ACC: [id number] EXAM:  XR CHEST PORTABLE ROUTINE 1V   ORDERED BY: ITA BAEZA     PROCEDURE DATE:  2023          INTERPRETATION:  CLINICAL INFORMATION: Pigtail catheter placement for   effusion    TIME OF EXAMINATION: 2023 at 7:06 AM    EXAM: Portable chest    FINDINGS:  Right-sided pigtail catheter in place. There is bilateral advanced   interstitial edema but no right effusion identified.    No focal consolidations or pneumothorax. Heart size is stable.        COMPARISON:         IMPRESSION: Follow-up with right pigtail catheter in place and no   effusion.    --- End of Report ---            AMIRAH EARLY MD; Attending Radiologist  This document has been electronically signed. Mar  1 2023 11:13AM (23 @ 08:02)   	    CHIEF COMPLAINT: FOLLOW UP IN ICU FOR POSTOPERATIVE CARE OF PATIENT WHO IS S/P DRAINAGE OF PLEURAL EFFUSION, MEDIASTINAL LYMPH NODE BIOPSY, PLEURX PLACEMENT      PROCEDURES: Right VATS, Mediastinal Lymph Node Dissection, PleurX Catheter Placement 03-Mar-2023       ISSUES:   Mediastinal and hilar adenopathy  Large R pleural effusion  Post op pain  Chest tube in place  Chronic atrial fibrillation (on Apixaban)  Hx of recent intubation (, extubated 23)  BPH    INTERVAL EVENTS:   Chest tube with no airleak.      HISTORY:   Patient reports moderate pain at chest wall incision sites which is worse with coughing and deep breathing without associated fever or dyspnea. Pain is improved with use of pain meds.     PHYSICAL EXAM:   Gen: Comfortable, No acute distress  Eyes: Sclera white, Conjunctiva normal, Eyelids normal, Pupils symmetrical   ENT: Mucous membranes moist,  ,  ,    Neck: Trachea midline,  ,  ,  ,  ,  ,    CV: Rate regular, Rhythm regular,  ,  ,    Resp: Breath sounds clear, No accessory muscles use, R chest tube in place,  ,    Abd: Soft, Non-distended, Non-tender,   ,  ,  ,    Skin: Warm, No peripheral edema of lower extremities,  ,    : plummer  Neuro: Moving all 4 extremities,    Psych: A&Ox3      ASSESSMENT AND PLAN:     NEURO:  Post-operative Pain - Stable. Pain control with oxycodone PO     Depression - stable. continue psych meds.            RESPIRATORY:  Hypoxia - Wean nasal cannula for goal O2sat above 92. Obtain CXR. Incentive spirometry. Chest PT and frequent suctioning. Continue bronchodilators. OOB to chair & ambulate w/ assistance. Continuous pulse oximetry for support & to prevent decompensation.        R Pleural effusion - improved s/p PleurX. Monitor chest tube output.   Chest tube – Pleurevac regulated suctioning. Monitor chest tube output.      CARDIOVASCULAR:  Hypotension without shock requiring PO pressors - Midodrine PO and wean pressors for MAP goal of 65.  Telemetry (medical test) - Reviewed by me today independently. Normal sinus rhythm.  HTN - currently hypotensive. hold home antihypertensives.     Chronic Afib - stable. Hold anticoagulation       RENAL:  Stable - Monitor IOs and electrolytes. Keep K above 4.0 and Mg above 2.0.  BPH with chronic urinary retention - Stable. Flomax qday. Continue plummer for now. Self catheterizes.          GASTROINTESTINAL:  GI prophylaxis not indicated  Zofran and Reglan IV PRN for nausea  Regular consistency diet          HEMATOLOGIC:  No signs of active bleeding. Monitor Hgb in CBC in AM  DVT prophylaxis with heparin subQ and SCDs.        INFECTIOUS DISEASE:  All surgical sites appear clean. Will monitor for fever and leukocytosis.           ENDOCRINE:  Stable – Monitor glucose fingersticks for goal 120-180.             ONCOLOGY:  Mediastinal and hilar lymphadenopathy - s/p biopsy. stable. follow up final pathology.           Pertinent clinical, laboratory, radiographic, hemodynamic, echocardiographic, respiratory data, microbiologic data and chart were reviewed by myself and analyzed frequently throughout the course of the day and night by myself.    Plan discussed at length with the CTICU staff and Attending CT Surgeon -   Dr Anselmo Romo.      Patient's status was discussed with patient at bedside.     	      ________________________________________________      _________________________  VITAL SIGNS:  Vital Signs Last 24 Hrs  T(C): 36.6 (04 Mar 2023 12:00), Max: 37 (03 Mar 2023 16:00)  T(F): 97.8 (04 Mar 2023 12:00), Max: 98.6 (03 Mar 2023 16:00)  HR: 76 (04 Mar 2023 12:00) (60 - 100)  BP: 127/49 (04 Mar 2023 12:00) (73/47 - 142/82)  BP(mean): 65 (04 Mar 2023 12:00) (53 - 99)  RR: 21 (04 Mar 2023 12:00) (17 - 35)  SpO2: 96% (04 Mar 2023 12:00) (91% - 100%)    Parameters below as of 04 Mar 2023 12:00  Patient On (Oxygen Delivery Method): nasal cannula w/ humidification  O2 Flow (L/min): 2    I/Os:   I&O's Detail    03 Mar 2023 07:01  -  04 Mar 2023 07:00  --------------------------------------------------------  IN:    IV PiggyBack: 1100 mL    Oral Fluid: 300 mL    Phenylephrine: 177.6 mL    sodium chloride 0.9%: 360 mL    sodium chloride 0.9%: 150 mL    sodium chloride 0.9%: 750 mL    Sodium Chloride 0.9% Bolus: 500 mL  Total IN: 3337.6 mL    OUT:    Chest Tube (mL): 34 mL    Indwelling Catheter - Urethral (mL): 1851 mL  Total OUT: 1885 mL    Total NET: 1452.6 mL      04 Mar 2023 07:01  -  04 Mar 2023 12:25  --------------------------------------------------------  IN:    IV PiggyBack: 150 mL    Oral Fluid: 480 mL  Total IN: 630 mL    OUT:    Chest Tube (mL): 10 mL    Indwelling Catheter - Urethral (mL): 120 mL  Total OUT: 130 mL    Total NET: 500 mL              MEDICATIONS:  MEDICATIONS  (STANDING):  albuterol    0.083% 2.5 milliGRAM(s) Nebulizer every 6 hours  chlorhexidine 2% Cloths 1 Application(s) Topical <User Schedule>  escitalopram 5 milliGRAM(s) Oral daily  famotidine    Tablet 20 milliGRAM(s) Oral daily  heparin   Injectable 5000 Unit(s) SubCutaneous every 8 hours  melatonin 6 milliGRAM(s) Oral at bedtime  midodrine. 5 milliGRAM(s) Oral three times a day  polyethylene glycol 3350 17 Gram(s) Oral daily  senna 2 Tablet(s) Oral at bedtime  simvastatin 40 milliGRAM(s) Oral at bedtime  sodium chloride 3%  Inhalation 4 milliLiter(s) Inhalation every 6 hours  tamsulosin 0.4 milliGRAM(s) Oral at bedtime    MEDICATIONS  (PRN):  oxyCODONE    IR 5 milliGRAM(s) Oral once PRN Moderate Pain (4 - 6)      LABS:  Laboratory data was independently reviewed by me today.                           10. )-----------( 169      ( 04 Mar 2023 03:00 )             34.2     03-04    140  |  104  |  12  ----------------------------<  98  3.4<L>   |  25  |  0.55    Ca    8.2<L>      04 Mar 2023 03:00  Phos  2.7     -  Mg     1.50     -    TPro  5.6<L>  /  Alb  2.6<L>  /  TBili  0.6  /  DBili  x   /  AST  10  /  ALT  26  /  AlkPhos  63  -    LIVER FUNCTIONS - ( 03 Mar 2023 22:30 )  Alb: 2.6 g/dL / Pro: 5.6 g/dL / ALK PHOS: 63 U/L / ALT: 26 U/L / AST: 10 U/L / GGT: x           PT/INR - ( 02 Mar 2023 14:20 )   PT: 14.8 sec;   INR: 1.27 ratio         PTT - ( 03 Mar 2023 06:34 )  PTT:29.6 sec  ABG - ( 03 Mar 2023 22:30 )  pH, Arterial: 7.37  pH, Blood: x     /  pCO2: 44    /  pO2: 119   / HCO3: 25    / Base Excess: -0.1  /  SaO2: 99.6              Urinalysis Basic - ( 03 Mar 2023 08:50 )    Color: Yellow / Appearance: Slightly Turbid / S.016 / pH: x  Gluc: x / Ketone: Negative  / Bili: Negative / Urobili: <2 mg/dL   Blood: x / Protein: 300 mg/dL / Nitrite: Positive   Leuk Esterase: Negative / RBC: 79 /HPF / WBC 3 /HPF   Sq Epi: x / Non Sq Epi: 3 /HPF / Bacteria: Moderate        RADIOLOGY:   Radiology images were independently reviewed by me today. Reports were reviewed by me today.    Xray Chest 1 View- PORTABLE-Urgent:   ******PRELIMINARY REPORT******      ******PRELIMINARY REPORT******         ACC: [id number] EXAM:  XR CHEST PORTABLE URGENT 1V   ORDERED BY: ROSIE SPENCE     PROCEDURE DATE:  2023    ******PRELIMINARY REPORT******      ******PRELIMINARY REPORT******           INTERPRETATION:  no acute findings.        ******PRELIMINARY REPORT******      ******PRELIMINARY REPORT******       NEGINDER TERRY DO; Resident Radiologist  This document is a PRELIMINARY interpretation and is pending final   attending approval. Mar  3 2023  8:56PM (23 @ 20:39)  Xray Chest 1 View-PORTABLE IMMEDIATE:   ACC: [id number] EXAM:  XR CHEST PORTABLE IMMED 1V   ORDERED BY: ZEENAT DRIVER     PROCEDURE DATE:  2023          INTERPRETATION:  CLINICAL INFORMATION: Chest tube removal    TIME OF EXAMINATION: March 3, 2023 at 3:06 AM    EXAM: Chest    FINDINGS:  Since the last exam, the right-sided pigtail catheter has been removed.   No complications associated with this maneuver.    Bilateral predominantly diffuse interstitial opacities again seen. No   pneumothorax or effusion.        COMPARISON:         IMPRESSION: Status post chest tube removal.    --- End of Report ---            AMIRAH EARLY MD; Attending Radiologist  This document has been electronically signed. Mar  3 2023  6:20AM (23 @ 03:22)  Xray Chest 1 View- PORTABLE-Routine:   ACC: [id number] EXAM:  XR CHEST PORTABLE ROUTINE 1V   ORDERED BY: ITA BEAZA     PROCEDURE DATE:  2023          INTERPRETATION:  CLINICAL INFORMATION: Pigtail catheter placement for   effusion    TIME OF EXAMINATION: 2023 at 7:06 AM    EXAM: Portable chest    FINDINGS:  Right-sided pigtail catheter in place. There is bilateral advanced   interstitial edema but no right effusion identified.    No focal consolidations or pneumothorax. Heart size is stable.        COMPARISON:         IMPRESSION: Follow-up with right pigtail catheter in place and no   effusion.    --- End of Report ---            AMIRAH EARLY MD; Attending Radiologist  This document has been electronically signed. Mar  1 2023 11:13AM (23 @ 08:02)   	    CHIEF COMPLAINT: FOLLOW UP IN ICU FOR POSTOPERATIVE CARE OF PATIENT WHO IS S/P DRAINAGE OF PLEURAL EFFUSION, MEDIASTINAL LYMPH NODE BIOPSY, PLEURX PLACEMENT      PROCEDURES: Right VATS, Mediastinal Lymph Node Dissection, PleurX Catheter Placement 03-Mar-2023       ISSUES:   Post operative hypotension requiring PO midodrine  Mediastinal and hilar adenopathy  Large R pleural effusion  Post op pain  Chest tube in place  Chronic atrial fibrillation (on Apixaban)  Hx of recent intubation (, extubated 23)  BPH    INTERVAL EVENTS:   Chest tube with no airleak.      HISTORY:   Patient reports moderate pain at chest wall incision sites which is worse with coughing and deep breathing without associated fever or dyspnea. Pain is improved with use of pain meds.     PHYSICAL EXAM:   Gen: Comfortable, No acute distress  Eyes: Sclera white, Conjunctiva normal, Eyelids normal, Pupils symmetrical   ENT: Mucous membranes moist,  ,  ,    Neck: Trachea midline,  ,  ,  ,  ,  ,    CV: Rate regular, Rhythm regular,  ,  ,    Resp: Breath sounds clear, No accessory muscles use, R chest tube in place,  ,    Abd: Soft, Non-distended, Non-tender,   ,  ,  ,    Skin: Warm, No peripheral edema of lower extremities,  ,    : plummer  Neuro: Moving all 4 extremities,    Psych: A&Ox3      ASSESSMENT AND PLAN:     NEURO:  Post-operative Pain - Stable. Pain control with oxycodone PO     Depression - stable. continue psych meds.            RESPIRATORY:  Hypoxia - Wean nasal cannula for goal O2sat above 92. Obtain CXR. Incentive spirometry. Chest PT and frequent suctioning. Continue bronchodilators. OOB to chair & ambulate w/ assistance. Continuous pulse oximetry for support & to prevent decompensation.        R Pleural effusion - improved s/p PleurX. Monitor chest tube output.   Chest tube – Pleurevac regulated suctioning. Monitor chest tube output.      CARDIOVASCULAR:  Hypotension without shock requiring PO pressors - Midodrine PO and wean pressors for MAP goal of 65.  Telemetry (medical test) - Reviewed by me today independently. Normal sinus rhythm.  HTN - currently hypotensive. hold home antihypertensives.     Chronic Afib - stable. Hold anticoagulation       RENAL:  Stable - Monitor IOs and electrolytes. Keep K above 4.0 and Mg above 2.0.  BPH with chronic urinary retention - Stable. Flomax qday. Continue plummer for now. Self catheterizes.          GASTROINTESTINAL:  GI prophylaxis not indicated  Zofran and Reglan IV PRN for nausea  Regular consistency diet          HEMATOLOGIC:  No signs of active bleeding. Monitor Hgb in CBC in AM  DVT prophylaxis with heparin subQ and SCDs.        INFECTIOUS DISEASE:  All surgical sites appear clean. Will monitor for fever and leukocytosis.           ENDOCRINE:  Stable – Monitor glucose fingersticks for goal 120-180.             ONCOLOGY:  Mediastinal and hilar lymphadenopathy - s/p biopsy. stable. follow up final pathology.           Pertinent clinical, laboratory, radiographic, hemodynamic, echocardiographic, respiratory data, microbiologic data and chart were reviewed by myself and analyzed frequently throughout the course of the day and night by myself.    Plan discussed at length with the CTICU staff and Attending CT Surgeon -   Dr Anselmo Romo.      Patient's status was discussed with patient at bedside.     	      ________________________________________________      _________________________  VITAL SIGNS:  Vital Signs Last 24 Hrs  T(C): 36.6 (04 Mar 2023 12:00), Max: 37 (03 Mar 2023 16:00)  T(F): 97.8 (04 Mar 2023 12:00), Max: 98.6 (03 Mar 2023 16:00)  HR: 76 (04 Mar 2023 12:00) (60 - 100)  BP: 127/49 (04 Mar 2023 12:00) (73/47 - 142/82)  BP(mean): 65 (04 Mar 2023 12:00) (53 - 99)  RR: 21 (04 Mar 2023 12:00) (17 - 35)  SpO2: 96% (04 Mar 2023 12:00) (91% - 100%)    Parameters below as of 04 Mar 2023 12:00  Patient On (Oxygen Delivery Method): nasal cannula w/ humidification  O2 Flow (L/min): 2    I/Os:   I&O's Detail    03 Mar 2023 07:01  -  04 Mar 2023 07:00  --------------------------------------------------------  IN:    IV PiggyBack: 1100 mL    Oral Fluid: 300 mL    Phenylephrine: 177.6 mL    sodium chloride 0.9%: 360 mL    sodium chloride 0.9%: 150 mL    sodium chloride 0.9%: 750 mL    Sodium Chloride 0.9% Bolus: 500 mL  Total IN: 3337.6 mL    OUT:    Chest Tube (mL): 34 mL    Indwelling Catheter - Urethral (mL): 1851 mL  Total OUT: 1885 mL    Total NET: 1452.6 mL      04 Mar 2023 07:01  -  04 Mar 2023 12:25  --------------------------------------------------------  IN:    IV PiggyBack: 150 mL    Oral Fluid: 480 mL  Total IN: 630 mL    OUT:    Chest Tube (mL): 10 mL    Indwelling Catheter - Urethral (mL): 120 mL  Total OUT: 130 mL    Total NET: 500 mL              MEDICATIONS:  MEDICATIONS  (STANDING):  albuterol    0.083% 2.5 milliGRAM(s) Nebulizer every 6 hours  chlorhexidine 2% Cloths 1 Application(s) Topical <User Schedule>  escitalopram 5 milliGRAM(s) Oral daily  famotidine    Tablet 20 milliGRAM(s) Oral daily  heparin   Injectable 5000 Unit(s) SubCutaneous every 8 hours  melatonin 6 milliGRAM(s) Oral at bedtime  midodrine. 5 milliGRAM(s) Oral three times a day  polyethylene glycol 3350 17 Gram(s) Oral daily  senna 2 Tablet(s) Oral at bedtime  simvastatin 40 milliGRAM(s) Oral at bedtime  sodium chloride 3%  Inhalation 4 milliLiter(s) Inhalation every 6 hours  tamsulosin 0.4 milliGRAM(s) Oral at bedtime    MEDICATIONS  (PRN):  oxyCODONE    IR 5 milliGRAM(s) Oral once PRN Moderate Pain (4 - 6)      LABS:  Laboratory data was independently reviewed by me today.                           10.6   12.33 )-----------( 169      ( 04 Mar 2023 03:00 )             34.2     03-04    140  |  104  |  12  ----------------------------<  98  3.4<L>   |  25  |  0.55    Ca    8.2<L>      04 Mar 2023 03:00  Phos  2.7     -  Mg     1.50     -    TPro  5.6<L>  /  Alb  2.6<L>  /  TBili  0.6  /  DBili  x   /  AST  10  /  ALT  26  /  AlkPhos  63  -03    LIVER FUNCTIONS - ( 03 Mar 2023 22:30 )  Alb: 2.6 g/dL / Pro: 5.6 g/dL / ALK PHOS: 63 U/L / ALT: 26 U/L / AST: 10 U/L / GGT: x           PT/INR - ( 02 Mar 2023 14:20 )   PT: 14.8 sec;   INR: 1.27 ratio         PTT - ( 03 Mar 2023 06:34 )  PTT:29.6 sec  ABG - ( 03 Mar 2023 22:30 )  pH, Arterial: 7.37  pH, Blood: x     /  pCO2: 44    /  pO2: 119   / HCO3: 25    / Base Excess: -0.1  /  SaO2: 99.6              Urinalysis Basic - ( 03 Mar 2023 08:50 )    Color: Yellow / Appearance: Slightly Turbid / S.016 / pH: x  Gluc: x / Ketone: Negative  / Bili: Negative / Urobili: <2 mg/dL   Blood: x / Protein: 300 mg/dL / Nitrite: Positive   Leuk Esterase: Negative / RBC: 79 /HPF / WBC 3 /HPF   Sq Epi: x / Non Sq Epi: 3 /HPF / Bacteria: Moderate        RADIOLOGY:   Radiology images were independently reviewed by me today. Reports were reviewed by me today.    Xray Chest 1 View- PORTABLE-Urgent:   ******PRELIMINARY REPORT******      ******PRELIMINARY REPORT******         ACC: [id number] EXAM:  XR CHEST PORTABLE URGENT 1V   ORDERED BY: ROSIE SPENCE     PROCEDURE DATE:  2023    ******PRELIMINARY REPORT******      ******PRELIMINARY REPORT******           INTERPRETATION:  no acute findings.        ******PRELIMINARY REPORT******      ******PRELIMINARY REPORT******       SHAE STEWART DO; Resident Radiologist  This document is a PRELIMINARY interpretation and is pending final   attending approval. Mar  3 2023  8:56PM (23 @ 20:39)  Xray Chest 1 View-PORTABLE IMMEDIATE:   ACC: [id number] EXAM:  XR CHEST PORTABLE IMMED 1V   ORDERED BY: ZEENAT DRIVER     PROCEDURE DATE:  2023          INTERPRETATION:  CLINICAL INFORMATION: Chest tube removal    TIME OF EXAMINATION: March 3, 2023 at 3:06 AM    EXAM: Chest    FINDINGS:  Since the last exam, the right-sided pigtail catheter has been removed.   No complications associated with this maneuver.    Bilateral predominantly diffuse interstitial opacities again seen. No   pneumothorax or effusion.        COMPARISON:         IMPRESSION: Status post chest tube removal.    --- End of Report ---            AMIRAH EARLY MD; Attending Radiologist  This document has been electronically signed. Mar  3 2023  6:20AM (23 @ 03:22)  Xray Chest 1 View- PORTABLE-Routine:   ACC: [id number] EXAM:  XR CHEST PORTABLE ROUTINE 1V   ORDERED BY: ITA BAEZA     PROCEDURE DATE:  2023          INTERPRETATION:  CLINICAL INFORMATION: Pigtail catheter placement for   effusion    TIME OF EXAMINATION: 2023 at 7:06 AM    EXAM: Portable chest    FINDINGS:  Right-sided pigtail catheter in place. There is bilateral advanced   interstitial edema but no right effusion identified.    No focal consolidations or pneumothorax. Heart size is stable.        COMPARISON:         IMPRESSION: Follow-up with right pigtail catheter in place and no   effusion.    --- End of Report ---            AMIRAH EARLY MD; Attending Radiologist  This document has been electronically signed. Mar  1 2023 11:13AM (23 @ 08:02)   	    CHIEF COMPLAINT: FOLLOW UP IN ICU FOR POSTOPERATIVE CARE OF PATIENT WHO IS S/P DRAINAGE OF PLEURAL EFFUSION, MEDIASTINAL LYMPH NODE BIOPSY, PLEURX PLACEMENT      PROCEDURES: Right VATS, Mediastinal Lymph Node Dissection, PleurX Catheter Placement 03-Mar-2023       ISSUES:   Post operative hypotension requiring PO midodrine  Mediastinal and hilar adenopathy  Large R pleural effusion  Post op pain  Chest tube in place  Chronic atrial fibrillation (on Apixaban)  Hx of recent intubation (, extubated 23)  BPH    INTERVAL EVENTS:   Chest tube with no airleak.      HISTORY:   Patient reports moderate pain at chest wall incision sites which is worse with coughing and deep breathing without associated fever or dyspnea. Pain is improved with use of pain meds.     PHYSICAL EXAM:   Gen: Comfortable, No acute distress  Eyes: Sclera white, Conjunctiva normal, Eyelids normal, Pupils symmetrical   ENT: Mucous membranes moist,  ,  ,    Neck: Trachea midline,  ,  ,  ,  ,  ,    CV: Rate regular, Rhythm irregular,  ,  ,    Resp: Breath sounds clear, No accessory muscles use, R chest tube in place,  ,    Abd: Soft, Non-distended, Non-tender,   ,  ,  ,    Skin: Warm, No peripheral edema of lower extremities,  ,    : plummer  Neuro: Moving all 4 extremities,    Psych: A&Ox3      ASSESSMENT AND PLAN:     NEURO:  Post-operative Pain - Stable. Pain control with oxycodone PO     Depression - stable. continue psych meds.            RESPIRATORY:  Hypoxia - Wean nasal cannula for goal O2sat above 92. Obtain CXR. Incentive spirometry. Chest PT and frequent suctioning. Continue bronchodilators. OOB to chair & ambulate w/ assistance. Continuous pulse oximetry for support & to prevent decompensation.        R Pleural effusion - improved s/p PleurX. Monitor chest tube output.   Chest tube – Pleurevac regulated suctioning. Monitor chest tube output.      CARDIOVASCULAR:  Hypotension without shock requiring PO pressors - Midodrine PO and wean pressors for MAP goal of 65.  Telemetry (medical test) - Reviewed by me today independently. Atrial fibrillation rate controlled  HTN - currently hypotensive. hold home antihypertensives.     Chronic Afib - stable. Hold anticoagulation       RENAL:  Stable - Monitor IOs and electrolytes. Keep K above 4.0 and Mg above 2.0.  BPH with chronic urinary retention - Stable. Flomax qday. Continue plummer for now. Self catheterizes.          GASTROINTESTINAL:  GI prophylaxis not indicated  Zofran and Reglan IV PRN for nausea  Regular consistency diet          HEMATOLOGIC:  No signs of active bleeding. Monitor Hgb in CBC in AM  DVT prophylaxis with heparin subQ and SCDs.        INFECTIOUS DISEASE:  All surgical sites appear clean. Will monitor for fever and leukocytosis.           ENDOCRINE:  Stable – Monitor glucose fingersticks for goal 120-180.             ONCOLOGY:  Mediastinal and hilar lymphadenopathy - s/p biopsy. stable. follow up final pathology.           Pertinent clinical, laboratory, radiographic, hemodynamic, echocardiographic, respiratory data, microbiologic data and chart were reviewed by myself and analyzed frequently throughout the course of the day and night by myself.    Plan discussed at length with the CTICU staff and Attending CT Surgeon -   Dr Anselmo Romo.      Patient's status was discussed with patient at bedside.     	      ________________________________________________      _________________________  VITAL SIGNS:  Vital Signs Last 24 Hrs  T(C): 36.6 (04 Mar 2023 12:00), Max: 37 (03 Mar 2023 16:00)  T(F): 97.8 (04 Mar 2023 12:00), Max: 98.6 (03 Mar 2023 16:00)  HR: 76 (04 Mar 2023 12:00) (60 - 100)  BP: 127/49 (04 Mar 2023 12:00) (73/47 - 142/82)  BP(mean): 65 (04 Mar 2023 12:00) (53 - 99)  RR: 21 (04 Mar 2023 12:00) (17 - 35)  SpO2: 96% (04 Mar 2023 12:00) (91% - 100%)    Parameters below as of 04 Mar 2023 12:00  Patient On (Oxygen Delivery Method): nasal cannula w/ humidification  O2 Flow (L/min): 2    I/Os:   I&O's Detail    03 Mar 2023 07:01  -  04 Mar 2023 07:00  --------------------------------------------------------  IN:    IV PiggyBack: 1100 mL    Oral Fluid: 300 mL    Phenylephrine: 177.6 mL    sodium chloride 0.9%: 360 mL    sodium chloride 0.9%: 150 mL    sodium chloride 0.9%: 750 mL    Sodium Chloride 0.9% Bolus: 500 mL  Total IN: 3337.6 mL    OUT:    Chest Tube (mL): 34 mL    Indwelling Catheter - Urethral (mL): 1851 mL  Total OUT: 1885 mL    Total NET: 1452.6 mL      04 Mar 2023 07:01  -  04 Mar 2023 12:25  --------------------------------------------------------  IN:    IV PiggyBack: 150 mL    Oral Fluid: 480 mL  Total IN: 630 mL    OUT:    Chest Tube (mL): 10 mL    Indwelling Catheter - Urethral (mL): 120 mL  Total OUT: 130 mL    Total NET: 500 mL              MEDICATIONS:  MEDICATIONS  (STANDING):  albuterol    0.083% 2.5 milliGRAM(s) Nebulizer every 6 hours  chlorhexidine 2% Cloths 1 Application(s) Topical <User Schedule>  escitalopram 5 milliGRAM(s) Oral daily  famotidine    Tablet 20 milliGRAM(s) Oral daily  heparin   Injectable 5000 Unit(s) SubCutaneous every 8 hours  melatonin 6 milliGRAM(s) Oral at bedtime  midodrine. 5 milliGRAM(s) Oral three times a day  polyethylene glycol 3350 17 Gram(s) Oral daily  senna 2 Tablet(s) Oral at bedtime  simvastatin 40 milliGRAM(s) Oral at bedtime  sodium chloride 3%  Inhalation 4 milliLiter(s) Inhalation every 6 hours  tamsulosin 0.4 milliGRAM(s) Oral at bedtime    MEDICATIONS  (PRN):  oxyCODONE    IR 5 milliGRAM(s) Oral once PRN Moderate Pain (4 - 6)      LABS:  Laboratory data was independently reviewed by me today.                           10.6   12.33 )-----------( 169      ( 04 Mar 2023 03:00 )             34.2     03-04    140  |  104  |  12  ----------------------------<  98  3.4<L>   |  25  |  0.55    Ca    8.2<L>      04 Mar 2023 03:00  Phos  2.7     -  Mg     1.50     -    TPro  5.6<L>  /  Alb  2.6<L>  /  TBili  0.6  /  DBili  x   /  AST  10  /  ALT  26  /  AlkPhos  63  -03    LIVER FUNCTIONS - ( 03 Mar 2023 22:30 )  Alb: 2.6 g/dL / Pro: 5.6 g/dL / ALK PHOS: 63 U/L / ALT: 26 U/L / AST: 10 U/L / GGT: x           PT/INR - ( 02 Mar 2023 14:20 )   PT: 14.8 sec;   INR: 1.27 ratio         PTT - ( 03 Mar 2023 06:34 )  PTT:29.6 sec  ABG - ( 03 Mar 2023 22:30 )  pH, Arterial: 7.37  pH, Blood: x     /  pCO2: 44    /  pO2: 119   / HCO3: 25    / Base Excess: -0.1  /  SaO2: 99.6              Urinalysis Basic - ( 03 Mar 2023 08:50 )    Color: Yellow / Appearance: Slightly Turbid / S.016 / pH: x  Gluc: x / Ketone: Negative  / Bili: Negative / Urobili: <2 mg/dL   Blood: x / Protein: 300 mg/dL / Nitrite: Positive   Leuk Esterase: Negative / RBC: 79 /HPF / WBC 3 /HPF   Sq Epi: x / Non Sq Epi: 3 /HPF / Bacteria: Moderate        RADIOLOGY:   Radiology images were independently reviewed by me today. Reports were reviewed by me today.    Xray Chest 1 View- PORTABLE-Urgent:   ******PRELIMINARY REPORT******      ******PRELIMINARY REPORT******         ACC: [id number] EXAM:  XR CHEST PORTABLE URGENT 1V   ORDERED BY: ROSIE SPENCE     PROCEDURE DATE:  2023    ******PRELIMINARY REPORT******      ******PRELIMINARY REPORT******           INTERPRETATION:  no acute findings.        ******PRELIMINARY REPORT******      ******PRELIMINARY REPORT******       SHAE STEWART DO; Resident Radiologist  This document is a PRELIMINARY interpretation and is pending final   attending approval. Mar  3 2023  8:56PM (23 @ 20:39)  Xray Chest 1 View-PORTABLE IMMEDIATE:   ACC: [id number] EXAM:  XR CHEST PORTABLE IMMED 1V   ORDERED BY: ZEENAT DRIVER     PROCEDURE DATE:  2023          INTERPRETATION:  CLINICAL INFORMATION: Chest tube removal    TIME OF EXAMINATION: March 3, 2023 at 3:06 AM    EXAM: Chest    FINDINGS:  Since the last exam, the right-sided pigtail catheter has been removed.   No complications associated with this maneuver.    Bilateral predominantly diffuse interstitial opacities again seen. No   pneumothorax or effusion.        COMPARISON:         IMPRESSION: Status post chest tube removal.    --- End of Report ---            AMIRAH EARLY MD; Attending Radiologist  This document has been electronically signed. Mar  3 2023  6:20AM (23 @ 03:22)  Xray Chest 1 View- PORTABLE-Routine:   ACC: [id number] EXAM:  XR CHEST PORTABLE ROUTINE 1V   ORDERED BY: ITA BAEZA     PROCEDURE DATE:  2023          INTERPRETATION:  CLINICAL INFORMATION: Pigtail catheter placement for   effusion    TIME OF EXAMINATION: 2023 at 7:06 AM    EXAM: Portable chest    FINDINGS:  Right-sided pigtail catheter in place. There is bilateral advanced   interstitial edema but no right effusion identified.    No focal consolidations or pneumothorax. Heart size is stable.        COMPARISON:         IMPRESSION: Follow-up with right pigtail catheter in place and no   effusion.    --- End of Report ---            AMIRAH EARLY MD; Attending Radiologist  This document has been electronically signed. Mar  1 2023 11:13AM (23 @ 08:02)

## 2023-03-04 NOTE — PROGRESS NOTE ADULT - ASSESSMENT
82 year old male with PMH atrial fibrillation, HTN and chronic urinary retention who presented to [facility] from home with acute hypoxic respiratory failure with hypercapnia. Patient was admitted to ICU an [facility], now downgraded and transferred to  [facility] for continuation of his care     #Acute hypoxic respiratory failure with hypercapnia  #Recurrent R pleural effusion-exudative  #Lymphadenopathy and leukocytosis-suspect underlying malignancy  -With large right sided pleural effusion, now status post pigtail placement, monitor output   -O 2 supplement PRN   -Continue steroid taper  -Patient was treated with course of empiric cefepime and dose of vanco at [facility]   -Blood cultures 2/17 NGTD, Pleural fluid no growth currently, f/u final culture/cytology  -thoracic surg care appreciated, plan for pleurex and mediastinoscopy to evaluate for lymph nodes. VATS  -Heme/onc follow up appreciated   - patient will benefit form pleurx placement   - IR guided LN biopsy   - S/P vats and pleurx per surg team   - SICU care    #Afib/ HTN  rate control   heparin for AC as tolerated   telemonitoring       #Urinary Retention  -Continue plummer, per previous notes patient's family did not want trial of void  -Continue flomax    #DVT prophylaxis     Discussed with patient son

## 2023-03-04 NOTE — PROGRESS NOTE ADULT - SUBJECTIVE AND OBJECTIVE BOX
Name of Patient : ARIK DUMONT  MRN: [id number]  Date of visit: 23       Subjective: Patient seen and examined. No new events except as noted.   SICU care      MEDICATIONS:  MEDICATIONS  (STANDING):  albuterol    0.083% 2.5 milliGRAM(s) Nebulizer every 6 hours  chlorhexidine 2% Cloths 1 Application(s) Topical <User Schedule>  escitalopram 5 milliGRAM(s) Oral daily  famotidine    Tablet 20 milliGRAM(s) Oral daily  heparin   Injectable 5000 Unit(s) SubCutaneous every 8 hours  melatonin 6 milliGRAM(s) Oral at bedtime  midodrine. 5 milliGRAM(s) Oral three times a day  polyethylene glycol 3350 17 Gram(s) Oral daily  senna 2 Tablet(s) Oral at bedtime  simvastatin 40 milliGRAM(s) Oral at bedtime  sodium chloride 3%  Inhalation 4 milliLiter(s) Inhalation every 6 hours  tamsulosin 0.4 milliGRAM(s) Oral at bedtime      PHYSICAL EXAM:  T(C): 36.7 (23 @ 16:00), Max: 36.7 (23 @ 01:30)  HR: 75 (23 @ 16:00) (60 - 100)  BP: 125/60 (23 @ 16:00) (73/47 - 142/82)  RR: 24 (23 @ 16:00) (17 - 35)  SpO2: 97% (23 @ 16:00) (91% - 100%)  Wt(kg): --  I&O's Summary    03 Mar 2023 07:01  -  04 Mar 2023 07:00  --------------------------------------------------------  IN: 3337.6 mL / OUT: 1885 mL / NET: 1452.6 mL    04 Mar 2023 07:  -  04 Mar 2023 18:10  --------------------------------------------------------  IN: 970 mL / OUT: 330 mL / NET: 640 mL          Appearance: Normal	  HEENT:  PERRLA   Lymphatic: No lymphadenopathy   Cardiovascular: Normal S1 S2, no JVD  Respiratory: normal effort , clear  Gastrointestinal:  Soft, Non-tender  Skin: No rashes,  warm to touch  Psychiatry:  Mood & affect appropriate  Musculuskeletal: No edema    recent labs, Imaging and EKGs personally reviewed     23 @ 07:01  -  23 @ 07:00  --------------------------------------------------------  IN: 3337.6 mL / OUT: 1885 mL / NET: 1452.6 mL    23 @ 07:01  -  23 @ 18:10  --------------------------------------------------------  IN: 970 mL / OUT: 330 mL / NET: 640 mL                          10.6   12.33 )-----------( 169      ( 04 Mar 2023 03:00 )             34.2               03-04    140  |  104  |  12  ----------------------------<  98  3.4<L>   |  25  |  0.55    Ca    8.2<L>      04 Mar 2023 03:00  Phos  2.7     03-04  Mg     1.50     03-04    TPro  5.6<L>  /  Alb  2.6<L>  /  TBili  0.6  /  DBili  x   /  AST  10  /  ALT  26  /  AlkPhos  63  03-03    PTT - ( 03 Mar 2023 06:34 )  PTT:29.6 sec                 ABG - ( 03 Mar 2023 22:30 )  pH, Arterial: 7.37  pH, Blood: x     /  pCO2: 44    /  pO2: 119   / HCO3: 25    / Base Excess: -0.1  /  SaO2: 99.6              Urinalysis Basic - ( 03 Mar 2023 08:50 )    Color: Yellow / Appearance: Slightly Turbid / S.016 / pH: x  Gluc: x / Ketone: Negative  / Bili: Negative / Urobili: <2 mg/dL   Blood: x / Protein: 300 mg/dL / Nitrite: Positive   Leuk Esterase: Negative / RBC: 79 /HPF / WBC 3 /HPF   Sq Epi: x / Non Sq Epi: 3 /HPF / Bacteria: Moderate

## 2023-03-04 NOTE — PROGRESS NOTE ADULT - SUBJECTIVE AND OBJECTIVE BOX
DATE OF SERVICE: 03-04-23 @ 15:39    Subjective: Patient seen and examined. No new events except as noted.     SUBJECTIVE/ROS:  feels ok       MEDICATIONS:  MEDICATIONS  (STANDING):  albuterol    0.083% 2.5 milliGRAM(s) Nebulizer every 6 hours  chlorhexidine 2% Cloths 1 Application(s) Topical <User Schedule>  escitalopram 5 milliGRAM(s) Oral daily  famotidine    Tablet 20 milliGRAM(s) Oral daily  heparin   Injectable 5000 Unit(s) SubCutaneous every 8 hours  melatonin 6 milliGRAM(s) Oral at bedtime  midodrine. 5 milliGRAM(s) Oral three times a day  polyethylene glycol 3350 17 Gram(s) Oral daily  senna 2 Tablet(s) Oral at bedtime  simvastatin 40 milliGRAM(s) Oral at bedtime  sodium chloride 3%  Inhalation 4 milliLiter(s) Inhalation every 6 hours  tamsulosin 0.4 milliGRAM(s) Oral at bedtime      PHYSICAL EXAM:  T(C): 36.6 (03-04-23 @ 12:00), Max: 37 (03-03-23 @ 16:00)  HR: 76 (03-04-23 @ 12:00) (60 - 100)  BP: 127/49 (03-04-23 @ 12:00) (73/47 - 142/82)  RR: 21 (03-04-23 @ 12:00) (17 - 35)  SpO2: 96% (03-04-23 @ 12:00) (91% - 100%)  Wt(kg): --  I&O's Summary    03 Mar 2023 07:01  -  04 Mar 2023 07:00  --------------------------------------------------------  IN: 3337.6 mL / OUT: 1885 mL / NET: 1452.6 mL    04 Mar 2023 07:01  -  04 Mar 2023 15:39  --------------------------------------------------------  IN: 730 mL / OUT: 220 mL / NET: 510 mL      Height (cm): 177.8 (03-03 @ 15:59)  Weight (kg): 89.4 (03-03 @ 15:59)  BMI (kg/m2): 28.3 (03-03 @ 15:59)  BSA (m2): 2.07 (03-03 @ 15:59)      JVP: Normal  Neck: supple  Lung: clear   CV: S1 S2 , Murmur:  Abd: soft  Ext: No edema  neuro: Awake / alert  Psych: flat affect  Skin: normal``    LABS/DATA:    CARDIAC MARKERS:                                10.6   12.33 )-----------( 169      ( 04 Mar 2023 03:00 )             34.2     03-04    140  |  104  |  12  ----------------------------<  98  3.4<L>   |  25  |  0.55    Ca    8.2<L>      04 Mar 2023 03:00  Phos  2.7     03-04  Mg     1.50     03-04    TPro  5.6<L>  /  Alb  2.6<L>  /  TBili  0.6  /  DBili  x   /  AST  10  /  ALT  26  /  AlkPhos  63  03-03    proBNP:   Lipid Profile:   HgA1c:   TSH:     TELE:  EKG:

## 2023-03-04 NOTE — PROGRESS NOTE ADULT - ASSESSMENT
afib  HR stable   off atenolol given low BP   can use dig if needed  a/c once deemed safe

## 2023-03-05 ENCOUNTER — TRANSCRIPTION ENCOUNTER (OUTPATIENT)
Age: 83
End: 2023-03-05

## 2023-03-05 ENCOUNTER — RESULT REVIEW (OUTPATIENT)
Age: 83
End: 2023-03-05

## 2023-03-05 LAB
ANION GAP SERPL CALC-SCNC: 9 MMOL/L — SIGNIFICANT CHANGE UP (ref 7–14)
APTT BLD: 32.4 SEC — SIGNIFICANT CHANGE UP (ref 27–36.3)
BUN SERPL-MCNC: 10 MG/DL — SIGNIFICANT CHANGE UP (ref 7–23)
CALCIUM SERPL-MCNC: 8.1 MG/DL — LOW (ref 8.4–10.5)
CHLORIDE SERPL-SCNC: 104 MMOL/L — SIGNIFICANT CHANGE UP (ref 98–107)
CO2 SERPL-SCNC: 24 MMOL/L — SIGNIFICANT CHANGE UP (ref 22–31)
CREAT SERPL-MCNC: 0.44 MG/DL — LOW (ref 0.5–1.3)
EGFR: 105 ML/MIN/1.73M2 — SIGNIFICANT CHANGE UP
GLUCOSE SERPL-MCNC: 104 MG/DL — HIGH (ref 70–99)
HCT VFR BLD CALC: 33.3 % — LOW (ref 39–50)
HGB BLD-MCNC: 10.2 G/DL — LOW (ref 13–17)
INR BLD: 1.28 RATIO — HIGH (ref 0.88–1.16)
MAGNESIUM SERPL-MCNC: 1.7 MG/DL — SIGNIFICANT CHANGE UP (ref 1.6–2.6)
MCHC RBC-ENTMCNC: 27.3 PG — SIGNIFICANT CHANGE UP (ref 27–34)
MCHC RBC-ENTMCNC: 30.6 GM/DL — LOW (ref 32–36)
MCV RBC AUTO: 89 FL — SIGNIFICANT CHANGE UP (ref 80–100)
NRBC # BLD: 0 /100 WBCS — SIGNIFICANT CHANGE UP (ref 0–0)
NRBC # FLD: 0 K/UL — SIGNIFICANT CHANGE UP (ref 0–0)
PHOSPHATE SERPL-MCNC: 1.5 MG/DL — LOW (ref 2.5–4.5)
PLATELET # BLD AUTO: 149 K/UL — LOW (ref 150–400)
POTASSIUM SERPL-MCNC: 3.8 MMOL/L — SIGNIFICANT CHANGE UP (ref 3.5–5.3)
POTASSIUM SERPL-SCNC: 3.8 MMOL/L — SIGNIFICANT CHANGE UP (ref 3.5–5.3)
PROTHROM AB SERPL-ACNC: 14.9 SEC — HIGH (ref 10.5–13.4)
RBC # BLD: 3.74 M/UL — LOW (ref 4.2–5.8)
RBC # FLD: 18.9 % — HIGH (ref 10.3–14.5)
SODIUM SERPL-SCNC: 137 MMOL/L — SIGNIFICANT CHANGE UP (ref 135–145)
WBC # BLD: 10.63 K/UL — HIGH (ref 3.8–10.5)
WBC # FLD AUTO: 10.63 K/UL — HIGH (ref 3.8–10.5)

## 2023-03-05 PROCEDURE — 99233 SBSQ HOSP IP/OBS HIGH 50: CPT

## 2023-03-05 PROCEDURE — 71045 X-RAY EXAM CHEST 1 VIEW: CPT | Mod: 26

## 2023-03-05 RX ORDER — MAGNESIUM SULFATE 500 MG/ML
2 VIAL (ML) INJECTION ONCE
Refills: 0 | Status: COMPLETED | OUTPATIENT
Start: 2023-03-05 | End: 2023-03-05

## 2023-03-05 RX ORDER — APIXABAN 2.5 MG/1
5 TABLET, FILM COATED ORAL
Refills: 0 | Status: DISCONTINUED | OUTPATIENT
Start: 2023-03-06 | End: 2023-03-07

## 2023-03-05 RX ORDER — POTASSIUM PHOSPHATE, MONOBASIC POTASSIUM PHOSPHATE, DIBASIC 236; 224 MG/ML; MG/ML
15 INJECTION, SOLUTION INTRAVENOUS ONCE
Refills: 0 | Status: COMPLETED | OUTPATIENT
Start: 2023-03-05 | End: 2023-03-05

## 2023-03-05 RX ORDER — POTASSIUM CHLORIDE 20 MEQ
20 PACKET (EA) ORAL ONCE
Refills: 0 | Status: COMPLETED | OUTPATIENT
Start: 2023-03-05 | End: 2023-03-05

## 2023-03-05 RX ADMIN — ALBUTEROL 2.5 MILLIGRAM(S): 90 AEROSOL, METERED ORAL at 09:52

## 2023-03-05 RX ADMIN — MIDODRINE HYDROCHLORIDE 5 MILLIGRAM(S): 2.5 TABLET ORAL at 22:24

## 2023-03-05 RX ADMIN — Medication 20 MILLIEQUIVALENT(S): at 06:52

## 2023-03-05 RX ADMIN — SENNA PLUS 2 TABLET(S): 8.6 TABLET ORAL at 22:22

## 2023-03-05 RX ADMIN — SODIUM CHLORIDE 4 MILLILITER(S): 9 INJECTION INTRAMUSCULAR; INTRAVENOUS; SUBCUTANEOUS at 20:12

## 2023-03-05 RX ADMIN — ALBUTEROL 2.5 MILLIGRAM(S): 90 AEROSOL, METERED ORAL at 20:12

## 2023-03-05 RX ADMIN — MIDODRINE HYDROCHLORIDE 5 MILLIGRAM(S): 2.5 TABLET ORAL at 06:13

## 2023-03-05 RX ADMIN — HEPARIN SODIUM 5000 UNIT(S): 5000 INJECTION INTRAVENOUS; SUBCUTANEOUS at 06:13

## 2023-03-05 RX ADMIN — SODIUM CHLORIDE 4 MILLILITER(S): 9 INJECTION INTRAMUSCULAR; INTRAVENOUS; SUBCUTANEOUS at 16:13

## 2023-03-05 RX ADMIN — CHLORHEXIDINE GLUCONATE 1 APPLICATION(S): 213 SOLUTION TOPICAL at 06:12

## 2023-03-05 RX ADMIN — SIMVASTATIN 40 MILLIGRAM(S): 20 TABLET, FILM COATED ORAL at 22:25

## 2023-03-05 RX ADMIN — Medication 25 GRAM(S): at 06:52

## 2023-03-05 RX ADMIN — ESCITALOPRAM OXALATE 5 MILLIGRAM(S): 10 TABLET, FILM COATED ORAL at 22:22

## 2023-03-05 RX ADMIN — MIDODRINE HYDROCHLORIDE 5 MILLIGRAM(S): 2.5 TABLET ORAL at 13:52

## 2023-03-05 RX ADMIN — ALBUTEROL 2.5 MILLIGRAM(S): 90 AEROSOL, METERED ORAL at 16:13

## 2023-03-05 RX ADMIN — TAMSULOSIN HYDROCHLORIDE 0.4 MILLIGRAM(S): 0.4 CAPSULE ORAL at 22:24

## 2023-03-05 RX ADMIN — SODIUM CHLORIDE 4 MILLILITER(S): 9 INJECTION INTRAMUSCULAR; INTRAVENOUS; SUBCUTANEOUS at 09:52

## 2023-03-05 RX ADMIN — Medication 6 MILLIGRAM(S): at 22:25

## 2023-03-05 RX ADMIN — SODIUM CHLORIDE 4 MILLILITER(S): 9 INJECTION INTRAMUSCULAR; INTRAVENOUS; SUBCUTANEOUS at 03:48

## 2023-03-05 RX ADMIN — FAMOTIDINE 20 MILLIGRAM(S): 10 INJECTION INTRAVENOUS at 13:51

## 2023-03-05 RX ADMIN — HEPARIN SODIUM 5000 UNIT(S): 5000 INJECTION INTRAVENOUS; SUBCUTANEOUS at 13:51

## 2023-03-05 RX ADMIN — POTASSIUM PHOSPHATE, MONOBASIC POTASSIUM PHOSPHATE, DIBASIC 62.5 MILLIMOLE(S): 236; 224 INJECTION, SOLUTION INTRAVENOUS at 07:01

## 2023-03-05 RX ADMIN — ALBUTEROL 2.5 MILLIGRAM(S): 90 AEROSOL, METERED ORAL at 03:48

## 2023-03-05 NOTE — PROGRESS NOTE ADULT - ASSESSMENT
afib  HR stable   off atenolol given low BP   off pressers now   can use dig if HR goes up   a/c once deemed safe        Complex Repair And Burow's Graft Text: The defect edges were debeveled with a #15 scalpel blade.  The primary defect was closed partially with a complex linear closure.  Given the location of the defect, shape of the defect, the proximity to free margins and the presence of a standing cone deformity a Burow's graft was deemed most appropriate to repair the remaining defect.  The graft was trimmed to fit the size of the remaining defect.  The graft was then placed in the primary defect, oriented appropriately, and sutured into place.

## 2023-03-05 NOTE — DISCHARGE NOTE PROVIDER - NSDCCPTREATMENT_GEN_ALL_CORE_FT
PRINCIPAL PROCEDURE  Procedure: Thoracoscopic biopsy of mediastinal lymph node  Findings and Treatment: and placement of a PleurX catheter

## 2023-03-05 NOTE — DISCHARGE NOTE PROVIDER - NSDCCPCAREPLAN_GEN_ALL_CORE_FT
PRINCIPAL DISCHARGE DIAGNOSIS  Diagnosis: Pleural effusion, right  Assessment and Plan of Treatment:        PRINCIPAL DISCHARGE DIAGNOSIS  Diagnosis: Pleural effusion, right  Assessment and Plan of Treatment: Recurrent Right pleural effusion-exudative  Thoracic surgery followed. You underwent pleurex and mediastinoscopy to evaluate for lymph nodes. Recent VATS, bronch 3/3  Hematology followed -next chemo in 3 weeks   resume Eliquis 3/16/23        SECONDARY DISCHARGE DIAGNOSES  Diagnosis: Non Hodgkin's lymphoma  Assessment and Plan of Treatment: BV given on 3/10 (cycle 1). Next cycle will be as outpatient after 3 weeks. WILL NOT PLAN TO GIVE CHEMOTHERAPY WHILE IN SKILLED NURSING FACILITY/REHAB.  - Referral sent to [facility]  -port placement on 3/15     PRINCIPAL DISCHARGE DIAGNOSIS  Diagnosis: Pleural effusion, right  Assessment and Plan of Treatment: Recurrent Right pleural effusion-exudative  Thoracic surgery followed. You underwent pleurex and mediastinoscopy to evaluate for lymph nodes. Recent VATS, bronch 3/3  Hematology followed -next chemo in 3 weeks   resume Eliquis 3/16/23        SECONDARY DISCHARGE DIAGNOSES  Diagnosis: Non Hodgkin's lymphoma  Assessment and Plan of Treatment: BV given on 3/10 (cycle 1). Next cycle will be as outpatient after 3 weeks. WILL NOT PLAN TO GIVE CHEMOTHERAPY WHILE IN SKILLED NURSING FACILITY/REHAB.  Referral sent to [facility]  Port placement on 3/15    Diagnosis: Afib  Assessment and Plan of Treatment: Resume eliquis 3/16/23

## 2023-03-05 NOTE — DISCHARGE NOTE PROVIDER - HOSPITAL COURSE
82 year old male with PMH atrial fibrillation, HTN and chronic urinary retention who customarily self catheterizes at home for the last year, presented to [facility] by ambulance from home on 2/17/2023 with c/o progressive shortness of breath.  He was intubated in the field and brought to ED.  He was recently found to have supraclavicular lymph node enlargement with associated leukocytosis and is being evaluated for lymphoma. He was also recently admitted to [facility] where he was noted to have large pleural effusion that was drained.  A lymph node biopsy was inconclusive but the thoracentesis revealed neither infection nor malignancy.  Since discharge from [facility] he has been newly oxygen dependant at home. At [facility], the patient was admitted to ICU for acute respiratory failure with hypercapnia.  A CTA chest was negative for PE but showed a large R pleural effusion with mediastinal and hilar lymphadenopathy. A pigtail catheter was placed and drained over 3 L of fluid. The patient was started on empiric antibiotics and completed a 5d course on 2/22/2023.  Pleural fluid cultures were negative and final. Blood cultures were negative and final. Given the presence of lymphadenopathy and persistent leukocytosis, an underlying malignancy was suspected. Hematology/oncology was consulted and  recommended a transfer to [facility] for further investigation.  The pt was accepted by Dr Galvan of  Thoracic surgery with a plan for an EBUS/mediastinoscopy and possible pleurX placement.  While still at [facility], urology was consulted for chronic urinary retention and a Choi catheter was placed.  The pt was successfully extubated and started on CPAP in [place] ICU prior to being downgraded to a medical floor on O2 via nasal cannula. Physical therapy recommended MORALES. Pt was ransferred to [facility] on 2/25/23.  The PTC was kept to suction with no air leak and a steroid taper was continued until 2/26/2023.   After cardiology clearance was obtained, the pt underwent a R VATS, Mediastinal LN Dissection, and placement of a PleurX catheter.  Post-op the BP was low and the pt was started on Midodrine.  He was stable for trasnfer to 8T on 3/5 and his PleurX was capped upon arrival.  He was  for drainage TIW and a rehab facility was located.  Eliquis was resumed prior to discharge.  He was also discharged with his Choi catheter in situ for follow up with Urology and Oncology. 82 year old male with PMH atrial fibrillation, HTN and chronic urinary retention who customarily self catheterizes at home for the last year, presented to [facility] by ambulance from home on 2/17/2023 with c/o progressive shortness of breath.  He was intubated in the field and brought to ED.  He was recently found to have supraclavicular lymph node enlargement with associated leukocytosis and is being evaluated for lymphoma. He was also recently admitted to [facility] where he was noted to have large pleural effusion that was drained.  A lymph node biopsy was inconclusive but the thoracentesis revealed neither infection nor malignancy.  Since discharge from [facility] he has been newly oxygen dependant at home. At [facility], the patient was admitted to ICU for acute respiratory failure with hypercapnia.  A CTA chest was negative for PE but showed a large R pleural effusion with mediastinal and hilar lymphadenopathy. A pigtail catheter was placed and drained over 3 L of fluid. The patient was started on empiric antibiotics and completed a 5d course on 2/22/2023.  Pleural fluid cultures were negative and final. Blood cultures were negative and final. Given the presence of lymphadenopathy and persistent leukocytosis, an underlying malignancy was suspected. Hematology/oncology was consulted and  recommended a transfer to [facility] for further investigation.  The pt was accepted by Dr Galvan of  Thoracic surgery with a plan for an EBUS/mediastinoscopy and possible pleurX placement.  While still at [facility], urology was consulted for chronic urinary retention and a Choi catheter was placed.  The pt was successfully extubated and started on CPAP in [place] ICU prior to being downgraded to a medical floor on O2 via nasal cannula. Physical therapy recommended MORALES. Pt was ransferred to [facility] on 2/25/23.  The PTC was kept to suction with no air leak and a steroid taper was continued until 2/26/2023.   After cardiology clearance was obtained, the pt underwent a R VATS, Mediastinal LN Dissection, and placement of a PleurX catheter.  Post-op the BP was low and the pt was started on Midodrine.  He was stable for trasnfer to 8T on 3/5 and his PleurX was capped upon arrival.  He was  for drainage TIW and a rehab facility was located.  Eliquis was resumed prior to discharge.  He was also discharged with his Choi catheter in situ for follow up with Urology and Oncology.    Pt evaluated on morning rounds by thoracic team and presented on morning report. Pt deemed stable for discharge to follow up as an outpatient by Dr Galvan.    Vital Signs Last 24 Hrs  T(C): 36.7 (06 Mar 2023 08:15), Max: 36.9 (05 Mar 2023 12:00)  T(F): 98 (06 Mar 2023 08:15), Max: 98.5 (05 Mar 2023 12:00)  HR: 97 (06 Mar 2023 08:15) (80 - 109)  BP: 115/55 (06 Mar 2023 08:15) (102/84 - 151/81)  BP(mean): 81 (05 Mar 2023 16:00) (81 - 91)  RR: 16 (06 Mar 2023 08:15) (16 - 21)  SpO2: 94% (06 Mar 2023 08:15) (92% - 100%)    Parameters below as of 06 Mar 2023 08:15  Patient On (Oxygen Delivery Method): room air    T(C): 36.7 (03-06-23 @ 08:15), Max: 36.9 (03-05-23 @ 12:00)  HR: 97 (03-06-23 @ 08:15) (80 - 109)  BP: 115/55 (03-06-23 @ 08:15) (102/84 - 151/81)  RR: 16 (03-06-23 @ 08:15) (16 - 21)  SpO2: 94% (03-06-23 @ 08:15) (92% - 100%)    General: WN/WD NAD  Neurology: A&Ox3, nonfocal, BAUTISTA x 4  Respiratory: CTA B/L  CV: RRR, S1S2, no murmurs, rubs or gallops  Abdominal: Soft, NT, ND +BS, Last BM  Extremities: No edema, + peripheral pulses  Incisions: c,d,i; pleurX capped  Tubes: Choi intact 82 year old male with PMH atrial fibrillation, HTN and chronic urinary retention who customarily self catheterizes at home for the last year, presented to [facility] by ambulance from home on 2/17/2023 with c/o progressive shortness of breath.  He was intubated in the field and brought to ED.  He was recently found to have supraclavicular lymph node enlargement with associated leukocytosis and is being evaluated for lymphoma. He was also recently admitted to [facility] where he was noted to have large pleural effusion that was drained.  A lymph node biopsy was inconclusive but the thoracentesis revealed neither infection nor malignancy.  Since discharge from [facility] he has been newly oxygen dependant at home. At [facility], the patient was admitted to ICU for acute respiratory failure with hypercapnia.  A CTA chest was negative for PE but showed a large R pleural effusion with mediastinal and hilar lymphadenopathy. A pigtail catheter was placed and drained over 3 L of fluid. The patient was started on empiric antibiotics and completed a 5d course on 2/22/2023.  Pleural fluid cultures were negative and final. Blood cultures were negative and final. Given the presence of lymphadenopathy and persistent leukocytosis, an underlying malignancy was suspected. Hematology/oncology was consulted and  recommended a transfer to [facility] for further investigation.  The pt was accepted by Dr Galvan of  Thoracic surgery with a plan for an EBUS/mediastinoscopy and possible pleurX placement.  While still at [facility], urology was consulted for chronic urinary retention and a Choi catheter was placed.  The pt was successfully extubated and started on CPAP in [place] ICU prior to being downgraded to a medical floor on O2 via nasal cannula. Physical therapy recommended MORALES. Pt was ransferred to [facility] on 2/25/23.  The PTC was kept to suction with no air leak and a steroid taper was continued until 2/26/2023.   After cardiology clearance was obtained, the pt underwent a R VATS, Mediastinal LN Dissection, and placement of a PleurX catheter.  Post-op the BP was low and the pt was started on Midodrine.  He was stable for trasnfer to 8T on 3/5 and his PleurX was capped upon arrival.  He was  for drainage TIW and a rehab facility was located.  Eliquis was resumed prior to discharge.  He was also discharged with his Choi catheter in situ for follow up with Urology and Oncology.    Pt evaluated on morning rounds by thoracic team and presented on morning report. Pt deemed stable for discharge to follow up as an outpatient by Dr Galvan.     82 year old male with PMH atrial fibrillation, HTN and chronic urinary retention who customarily self catheterizes at home for the last year, presented to [facility] by ambulance from home on 2/17/2023 with c/o progressive shortness of breath.  He was intubated in the field and brought to ED.  He was recently found to have supraclavicular lymph node enlargement with associated leukocytosis and is being evaluated for lymphoma. He was also recently admitted to [facility] where he was noted to have large pleural effusion that was drained.  A lymph node biopsy was inconclusive but the thoracentesis revealed neither infection nor malignancy.  Since discharge from [facility] he has been newly oxygen dependant at home. At [facility], the patient was admitted to ICU for acute respiratory failure with hypercapnia.  A CTA chest was negative for PE but showed a large R pleural effusion with mediastinal and hilar lymphadenopathy. A pigtail catheter was placed and drained over 3 L of fluid. The patient was started on empiric antibiotics and completed a 5d course on 2/22/2023.  Pleural fluid cultures were negative and final. Blood cultures were negative and final. Given the presence of lymphadenopathy and persistent leukocytosis, an underlying malignancy was suspected. Hematology/oncology was consulted and  recommended a transfer to [facility] for further investigation.  The pt was accepted by Dr Galvan of  Thoracic surgery with a plan for an EBUS/mediastinoscopy and possible pleurX placement.  While still at [facility], urology was consulted for chronic urinary retention and a Plummer catheter was placed.  The pt was successfully extubated and started on CPAP in [place] ICU prior to being downgraded to a medical floor on O2 via nasal cannula. Physical therapy recommended MORALES. Pt was ransferred to [facility] on 2/25/23.  The PTC was kept to suction with no air leak and a steroid taper was continued until 2/26/2023.   After cardiology clearance was obtained, the pt underwent a R VATS, Mediastinal LN Dissection, and placement of a PleurX catheter.  Post-op the BP was low and the pt was started on Midodrine.  He was stable for trasnfer to 8T on 3/5 and his PleurX was capped upon arrival.  He was  for drainage TIW and a rehab facility was located.  Eliquis was resumed prior to discharge.  He was also discharged with his Plummer catheter in situ for follow up with Urology and Oncology.    Pt evaluated on morning rounds by thoracic team and presented on morning report. Pt deemed stable for discharge to follow up as an outpatient by Dr Galvan.        Chemo per heme.  next chemo in 3 weeks     Afib/ HTN  Eliquis  switched to heparin for mediport placement    Urinary Retention  Continue plummer  Continue flomax    Hospital course discussed with medical attending. Patient stable for discharge to rehab

## 2023-03-05 NOTE — DISCHARGE NOTE PROVIDER - NSDCFUADDAPPT_GEN_ALL_CORE_FT
See Dr Galvan in 2 weeks- call for an appointment and bring a new chest X-ray with you when you come.    The visiting nurse will come to drain your PleurX- initially 2 x per week  Follow up with Urology regarding your Choi catheter and with Oncology upon discharge.

## 2023-03-05 NOTE — DISCHARGE NOTE PROVIDER - DETAILS OF MALNUTRITION DIAGNOSIS/DIAGNOSES
This patient has been assessed with a concern for Malnutrition and was treated during this hospitalization for the following Nutrition diagnosis/diagnoses:     -  02/27/2023: Severe protein-calorie malnutrition

## 2023-03-05 NOTE — DISCHARGE NOTE PROVIDER - NSDCMRMEDTOKEN_GEN_ALL_CORE_FT
atenolol: 75 milligram(s) orally once a day  Eliquis 5 mg oral tablet: 1 tab(s) orally 2 times a day, but CURRENTLY BEING HELD FOR PROCEDURE  enoxaparin: 90 milligram(s) subcutaneous 2 times a day, while off eliquis  ipratropium-albuterol 0.5 mg-2.5 mg/3 mL inhalation solution: 3 milliliter(s) inhaled every 6 hours  Lexapro 5 mg oral tablet: 1 tab(s) orally once a day  melatonin 3 mg oral tablet: 2 tab(s) orally once a day (at bedtime)  predniSONE: 20 milligram(s) orally once a day for 2/25, then 10mg once a day for 2/26, then stop  simvastatin 40 mg oral tablet: 1 tab(s) orally once a day (at bedtime)  tamsulosin 0.4 mg oral capsule: 1 cap(s) orally once a day  Xanax 0.25 mg oral tablet: 1 tab(s) orally once a day, As Needed   atenolol: 75 milligram(s) orally once a day  Eliquis 5 mg oral tablet: 1 tab(s) orally 2 times a day, but CURRENTLY BEING HELD FOR PROCEDURE  ipratropium-albuterol 0.5 mg-2.5 mg/3 mL inhalation solution: 3 milliliter(s) inhaled every 6 hours  Lexapro 5 mg oral tablet: 1 tab(s) orally once a day  melatonin 3 mg oral tablet: 2 tab(s) orally once a day (at bedtime)  oxyCODONE 5 mg oral tablet: 1 tab(s) orally once, As needed, Moderate Pain (4 - 6)  simvastatin 40 mg oral tablet: 1 tab(s) orally once a day (at bedtime)  tamsulosin 0.4 mg oral capsule: 1 cap(s) orally once a day  Xanax 0.25 mg oral tablet: 1 tab(s) orally once a day, As Needed   albuterol 2.5 mg/3 mL (0.083%) inhalation solution: 2.5 milligram(s) inhaled every 6 hours  aspirin 81 mg oral tablet, chewable: 1 tab(s) orally once a day  atenolol: 12.5 milligram(s) orally once a day  Eliquis 5 mg oral tablet: 1 tab(s) orally 2 times a day  famotidine 20 mg oral tablet: 1 tab(s) orally once a day  Lexapro 5 mg oral tablet: 1 tab(s) orally once a day  melatonin 3 mg oral tablet: 2 tab(s) orally once a day (at bedtime)  oxyCODONE 5 mg oral tablet: 1 tab(s) orally once, As needed, Moderate Pain (4 - 6)  polyethylene glycol 3350 oral powder for reconstitution: 17 gram(s) orally once a day  senna leaf extract oral tablet: 2 tab(s) orally once a day (at bedtime)  simvastatin 40 mg oral tablet: 1 tab(s) orally once a day (at bedtime)  tamsulosin 0.4 mg oral capsule: 1 cap(s) orally once a day  Xanax 0.25 mg oral tablet: 1 tab(s) orally once a day, As Needed

## 2023-03-05 NOTE — PROGRESS NOTE ADULT - SUBJECTIVE AND OBJECTIVE BOX
CHIEF COMPLAINT: FOLLOW UP IN ICU FOR POSTOPERATIVE CARE OF PATIENT WHO IS S/P DRAINAGE OF PLEURAL EFFUSION, MEDIASTINAL LYMPH NODE BIOPSY, PLEURX PLACEMENT      PROCEDURES: Right VATS, Mediastinal Lymph Node Dissection, PleurX Catheter Placement 03-Mar-2023       ISSUES:   Post operative hypotension requiring PO midodrine  Mediastinal and hilar adenopathy  Large R pleural effusion  Post op pain  Chest tube in place  Chronic atrial fibrillation (on Apixaban)  Hx of recent intubation (2/17, extubated 2/19/23)  BPH  Chronic urinary retention    INTERVAL EVENTS:   Chest tube with no airleak.      HISTORY:   Patient reports moderate pain at chest wall incision sites which is worse with coughing and deep breathing without associated fever or dyspnea. Pain is improved with use of pain meds.     PHYSICAL EXAM:   Gen: Comfortable, No acute distress  Eyes: Sclera white, Conjunctiva normal, Eyelids normal, Pupils symmetrical   ENT: Mucous membranes moist,  ,  ,    Neck: Trachea midline,  ,  ,  ,  ,  ,    CV: Rate regular, Rhythm irregular,  ,  ,    Resp: Breath sounds clear, No accessory muscles use, R chest tube in place,  ,    Abd: Soft, Non-distended, Non-tender,   ,  ,  ,    Skin: Warm, No peripheral edema of lower extremities,  ,    : plummer  Neuro: Moving all 4 extremities,    Psych: A&Ox3      ASSESSMENT AND PLAN:     NEURO:  Post-operative Pain - Stable. Pain control with oxycodone PO     Depression - stable. continue psych meds.            RESPIRATORY:  Hypoxia - Wean nasal cannula for goal O2sat above 92. Obtain CXR. Incentive spirometry. Chest PT and frequent suctioning. Continue bronchodilators. OOB to chair & ambulate w/ assistance. Continuous pulse oximetry for support & to prevent decompensation.    R Pleural effusion - improved s/p PleurX. Monitor chest tube output.  PleurX to be capped today.      CARDIOVASCULAR:  Hypotension without shock - Midodrine PO and wean pressors for MAP goal of 65.   Telemetry (medical test) - Reviewed by me today independently. Atrial fibrillation rate controlled  HTN - currently hypotensive. hold home antihypertensives.     Chronic Afib - stable. Hold anticoagulation, eliquis to be restarted tomorrow. Atenolol being held with hypotension requiring oral midodrine. Rate controlled with HR 60-90. Digoxin if RVR, being followed by cardiology as well.       RENAL:  Stable - Monitor IOs and electrolytes. Keep K above 4.0 and Mg above 2.0.  BPH with chronic urinary retention - Stable. Flomax qday. Continue plummer for now. Self catheterizes.          GASTROINTESTINAL:  GI prophylaxis not indicated  Zofran and Reglan IV PRN for nausea  Regular consistency diet          HEMATOLOGIC:  No signs of active bleeding. Monitor Hgb in CBC in AM  DVT prophylaxis with heparin subQ and SCDs.        INFECTIOUS DISEASE:  All surgical sites appear clean. Will monitor for fever and leukocytosis.           ENDOCRINE:  Stable – Monitor glucose fingersticks for goal 120-180.             ONCOLOGY:  Mediastinal and hilar lymphadenopathy - s/p biopsy. stable. follow up final pathology.           Pertinent clinical, laboratory, radiographic, hemodynamic, echocardiographic, respiratory data, microbiologic data and chart were reviewed by myself and analyzed frequently throughout the course of the day and night by myself.    Plan discussed at length with the CTICU staff and Attending CT Surgeon -   Dr Anselmo Romo.      Patient's status was discussed with patient at bedside.       ________________________________________________  _________________________  VITAL SIGNS:  Vital Signs Last 24 Hrs  T(C): 36.7 (05 Mar 2023 08:00), Max: 36.7 (04 Mar 2023 16:00)  T(F): 98.1 (05 Mar 2023 08:00), Max: 98.1 (05 Mar 2023 08:00)  HR: 97 (05 Mar 2023 08:00) (60 - 97)  BP: 121/84 (05 Mar 2023 08:00) (100/50 - 127/49)  BP(mean): 97 (05 Mar 2023 08:00) (65 - 97)  RR: 20 (05 Mar 2023 08:00) (20 - 28)  SpO2: 97% (05 Mar 2023 08:00) (91% - 97%)    Parameters below as of 05 Mar 2023 08:00  Patient On (Oxygen Delivery Method): room air      I/Os:   I&O's Detail    04 Mar 2023 07:01  -  05 Mar 2023 07:00  --------------------------------------------------------  IN:    IV PiggyBack: 550 mL    Oral Fluid: 1120 mL  Total IN: 1670 mL    OUT:    Chest Tube (mL): 17 mL    Indwelling Catheter - Urethral (mL): 990 mL  Total OUT: 1007 mL    Total NET: 663 mL              MEDICATIONS:  MEDICATIONS  (STANDING):  acetaminophen   IVPB .. 1000 milliGRAM(s) IV Intermittent once  albuterol    0.083% 2.5 milliGRAM(s) Nebulizer every 6 hours  chlorhexidine 2% Cloths 1 Application(s) Topical <User Schedule>  escitalopram 5 milliGRAM(s) Oral daily  famotidine    Tablet 20 milliGRAM(s) Oral daily  heparin   Injectable 5000 Unit(s) SubCutaneous every 8 hours  melatonin 6 milliGRAM(s) Oral at bedtime  midodrine. 5 milliGRAM(s) Oral three times a day  polyethylene glycol 3350 17 Gram(s) Oral daily  senna 2 Tablet(s) Oral at bedtime  simvastatin 40 milliGRAM(s) Oral at bedtime  sodium chloride 3%  Inhalation 4 milliLiter(s) Inhalation every 6 hours  tamsulosin 0.4 milliGRAM(s) Oral at bedtime    MEDICATIONS  (PRN):  oxyCODONE    IR 5 milliGRAM(s) Oral once PRN Moderate Pain (4 - 6)      LABS:  Laboratory data was independently reviewed by me today.                           10.2   10.63 )-----------( 149      ( 05 Mar 2023 05:00 )             33.3     03-05    137  |  104  |  10  ----------------------------<  104<H>  3.8   |  24  |  0.44<L>    Ca    8.1<L>      05 Mar 2023 05:00  Phos  1.5     03-05  Mg     1.70     03-05    TPro  5.6<L>  /  Alb  2.6<L>  /  TBili  0.6  /  DBili  x   /  AST  10  /  ALT  26  /  AlkPhos  63  03-03    LIVER FUNCTIONS - ( 03 Mar 2023 22:30 )  Alb: 2.6 g/dL / Pro: 5.6 g/dL / ALK PHOS: 63 U/L / ALT: 26 U/L / AST: 10 U/L / GGT: x           PT/INR - ( 05 Mar 2023 05:00 )   PT: 14.9 sec;   INR: 1.28 ratio         PTT - ( 05 Mar 2023 05:00 )  PTT:32.4 sec  ABG - ( 03 Mar 2023 22:30 )  pH, Arterial: 7.37  pH, Blood: x     /  pCO2: 44    /  pO2: 119   / HCO3: 25    / Base Excess: -0.1  /  SaO2: 99.6                  RADIOLOGY:   Radiology images were independently reviewed by me today. Reports were reviewed by me today.    Xray Chest 1 View- PORTABLE-Routine:   ACC: [id number] EXAM:  XR CHEST PORTABLE ROUTINE 1V   ORDERED BY: ROSIE SPENCE     ACC: [id number] EXAM:  XR CHEST PORTABLE URGENT 1V   ORDERED BY: ROSIE SPENCE     PROCEDURE DATE:  03/03/2023          INTERPRETATION:  CLINICAL INFORMATION: Postop    TIME OF EXAMINATION: March 3, 2023 at 7:57 PM and March 4 at 5:31 AM.    EXAM: Portable chest    FINDINGS:  March 3 at 7:57 PM:  Right-sided Pleurx chest tube is seen. Loss of lung volume on this side.   Left lung is clear. No pneumothorax or effusion. Heart size is stable.    March 4 at 5:31 AM:  Improved aeration of the lung postop with a Pleurx tube in place.   Vascular congestion but no pneumothorax.        COMPARISON: March 3 at 3:06 AM        IMPRESSION: Status post right thoracotomy withchest tube.    --- End of Report ---            AMIRAH EARLY MD; Attending Radiologist  This document has been electronically signed. Mar  4 2023 12:51PM (03-04-23 @ 06:10)  Xray Chest 1 View- PORTABLE-Urgent:   ACC: [id number] EXAM:  XR CHEST PORTABLE ROUTINE 1V   ORDERED BY: ROSIE SPENCE     ACC: [id number] EXAM:  XR CHEST PORTABLE URGENT 1V   ORDERED BY: ROSIE SPENCE     PROCEDURE DATE:  03/03/2023          INTERPRETATION:  CLINICAL INFORMATION: Postop    TIME OF EXAMINATION: March 3, 2023 at 7:57 PM and March 4 at 5:31 AM.    EXAM: Portable chest    FINDINGS:  March 3 at 7:57 PM:  Right-sided Pleurx chest tube is seen. Loss of lung volume on this side.   Left lung is clear. No pneumothorax or effusion. Heart size is stable.    March 4 at 5:31 AM:  Improved aeration of the lung postop with a Pleurx tube in place.   Vascular congestion but no pneumothorax.        COMPARISON: March 3 at 3:06 AM        IMPRESSION: Status post right thoracotomy withchest tube.    --- End of Report ---            AMIRAH EARLY MD; Attending Radiologist  This document has been electronically signed. Mar  4 2023 12:51PM (03-03-23 @ 20:39)  Xray Chest 1 View-PORTABLE IMMEDIATE:   ACC: [id number] EXAM:  XR CHEST PORTABLE IMMED 1V   ORDERED BY: ZEENAT DRIVER     PROCEDURE DATE:  03/03/2023          INTERPRETATION:  CLINICAL INFORMATION: Chest tube removal    TIME OF EXAMINATION: March 3, 2023 at 3:06 AM    EXAM: Chest    FINDINGS:  Since the last exam, the right-sided pigtail catheter has been removed.   No complications associated with this maneuver.    Bilateral predominantly diffuse interstitial opacities again seen. No   pneumothorax or effusion.        COMPARISON: March 1        IMPRESSION: Status post chest tube removal.    --- End of Report ---            AMIRAH EARLY MD; Attending Radiologist  This document has been electronically signed. Mar  3 2023  6:20AM (03-03-23 @ 03:22)

## 2023-03-05 NOTE — PROGRESS NOTE ADULT - SUBJECTIVE AND OBJECTIVE BOX
DATE OF SERVICE: 03-05-23 @ 07:44    Subjective: Patient seen and examined. No new events except as noted.     SUBJECTIVE/ROS:  feels ok       MEDICATIONS:  MEDICATIONS  (STANDING):  acetaminophen   IVPB .. 1000 milliGRAM(s) IV Intermittent once  albuterol    0.083% 2.5 milliGRAM(s) Nebulizer every 6 hours  chlorhexidine 2% Cloths 1 Application(s) Topical <User Schedule>  escitalopram 5 milliGRAM(s) Oral daily  famotidine    Tablet 20 milliGRAM(s) Oral daily  heparin   Injectable 5000 Unit(s) SubCutaneous every 8 hours  melatonin 6 milliGRAM(s) Oral at bedtime  midodrine. 5 milliGRAM(s) Oral three times a day  polyethylene glycol 3350 17 Gram(s) Oral daily  senna 2 Tablet(s) Oral at bedtime  simvastatin 40 milliGRAM(s) Oral at bedtime  sodium chloride 3%  Inhalation 4 milliLiter(s) Inhalation every 6 hours  tamsulosin 0.4 milliGRAM(s) Oral at bedtime      PHYSICAL EXAM:  T(C): 36.7 (03-05-23 @ 00:00), Max: 36.7 (03-04-23 @ 16:00)  HR: 95 (03-05-23 @ 04:00) (60 - 95)  BP: 118/66 (03-05-23 @ 04:00) (93/55 - 127/49)  RR: 25 (03-05-23 @ 04:00) (20 - 28)  SpO2: 96% (03-05-23 @ 04:00) (91% - 99%)  Wt(kg): --  I&O's Summary    04 Mar 2023 07:01  -  05 Mar 2023 07:00  --------------------------------------------------------  IN: 1670 mL / OUT: 1007 mL / NET: 663 mL            JVP: Normal  Neck: supple  Lung: clear   CV: S1 S2 , Murmur:  Abd: soft  Ext: No edema  neuro: Awake / alert  Psych: flat affect  Skin: normal``    LABS/DATA:    CARDIAC MARKERS:                                10.2   10.63 )-----------( 149      ( 05 Mar 2023 05:00 )             33.3     03-05    137  |  104  |  10  ----------------------------<  104<H>  3.8   |  24  |  0.44<L>    Ca    8.1<L>      05 Mar 2023 05:00  Phos  1.5     03-05  Mg     1.70     03-05    TPro  5.6<L>  /  Alb  2.6<L>  /  TBili  0.6  /  DBili  x   /  AST  10  /  ALT  26  /  AlkPhos  63  03-03    proBNP:   Lipid Profile:   HgA1c:   TSH:     TELE:  EKG:

## 2023-03-05 NOTE — PROGRESS NOTE ADULT - ASSESSMENT
82 year old male with PMH atrial fibrillation, HTN and chronic urinary retention who presented to [facility] from home with acute hypoxic respiratory failure with hypercapnia. Patient was admitted to ICU an [facility], now downgraded and transferred to  [facility] for continuation of his care     #Acute hypoxic respiratory failure with hypercapnia  #Recurrent R pleural effusion-exudative  #Lymphadenopathy and leukocytosis-suspect underlying malignancy  -With large right sided pleural effusion, now status post pigtail placement, monitor output   -O 2 supplement PRN   -Continue steroid taper  -Patient was treated with course of empiric cefepime and dose of vanco at [place]   -Blood cultures 2/17 NGTD, Pleural fluid no growth currently, f/u final culture/cytology  -thoracic surg care appreciated, plan for pleurex and mediastinoscopy to evaluate for lymph nodes. VATS  -Heme/onc follow up appreciated   - patient will benefit form pleurx placement   - IR guided LN biopsy   - S/P vats and pleurx per surg team   - SICU care    #Afib/ HTN  rate control   heparin for AC as tolerated   telemonitoring       #Urinary Retention  -Continue plummer, per previous notes patient's family did not want trial of void  -Continue flomax    #DVT prophylaxis     Discussed with patient son

## 2023-03-05 NOTE — PROGRESS NOTE ADULT - SUBJECTIVE AND OBJECTIVE BOX
Name of Patient : ARIK DUMONT  MRN: [id number]  Date of visit: 03-05-23    Subjective: Patient seen and examined. No new events except as noted.   Doing okay     REVIEW OF SYSTEMS:    CONSTITUTIONAL: No weakness, fevers or chills  EYES/ENT: No visual changes;  No vertigo or throat pain   NECK: No pain or stiffness  RESPIRATORY: No cough, wheezing, hemoptysis; No shortness of breath  CARDIOVASCULAR: No chest pain or palpitations  GASTROINTESTINAL: No abdominal or epigastric pain. No nausea, vomiting, or hematemesis; No diarrhea or constipation. No melena or hematochezia.  GENITOURINARY: No dysuria, frequency or hematuria  NEUROLOGICAL: No numbness or weakness  SKIN: No itching, burning, rashes, or lesions   All other review of systems is negative unless indicated above.    MEDICATIONS:  MEDICATIONS  (STANDING):  acetaminophen   IVPB .. 1000 milliGRAM(s) IV Intermittent once  albuterol    0.083% 2.5 milliGRAM(s) Nebulizer every 6 hours  chlorhexidine 2% Cloths 1 Application(s) Topical <User Schedule>  escitalopram 5 milliGRAM(s) Oral daily  famotidine    Tablet 20 milliGRAM(s) Oral daily  heparin   Injectable 5000 Unit(s) SubCutaneous every 8 hours  melatonin 6 milliGRAM(s) Oral at bedtime  midodrine. 5 milliGRAM(s) Oral three times a day  polyethylene glycol 3350 17 Gram(s) Oral daily  senna 2 Tablet(s) Oral at bedtime  simvastatin 40 milliGRAM(s) Oral at bedtime  sodium chloride 3%  Inhalation 4 milliLiter(s) Inhalation every 6 hours  tamsulosin 0.4 milliGRAM(s) Oral at bedtime      PHYSICAL EXAM:  T(C): 36.9 (03-05-23 @ 12:00), Max: 36.9 (03-05-23 @ 12:00)  HR: 82 (03-05-23 @ 16:16) (82 - 97)  BP: 116/65 (03-05-23 @ 16:00) (102/84 - 125/70)  RR: 21 (03-05-23 @ 16:00) (20 - 28)  SpO2: 99% (03-05-23 @ 16:16) (91% - 99%)  Wt(kg): --  I&O's Summary    04 Mar 2023 07:01  -  05 Mar 2023 07:00  --------------------------------------------------------  IN: 1670 mL / OUT: 1007 mL / NET: 663 mL    05 Mar 2023 07:01  -  05 Mar 2023 16:25  --------------------------------------------------------  IN: 480 mL / OUT: 310 mL / NET: 170 mL          Appearance: Normal	  HEENT:  PERRLA   Lymphatic: No lymphadenopathy   Cardiovascular: Normal S1 S2, no JVD  Respiratory: normal effort , clear  Gastrointestinal:  Soft, Non-tender  Skin: No rashes,  warm to touch  Psychiatry:  Mood & affect appropriate  Musculuskeletal: No edema    recent labs, Imaging and EKGs personally reviewed     03-04-23 @ 07:01  -  03-05-23 @ 07:00  --------------------------------------------------------  IN: 1670 mL / OUT: 1007 mL / NET: 663 mL    03-05-23 @ 07:01  -  03-05-23 @ 16:25  --------------------------------------------------------  IN: 480 mL / OUT: 310 mL / NET: 170 mL                          10.2   10.63 )-----------( 149      ( 05 Mar 2023 05:00 )             33.3               03-05    137  |  104  |  10  ----------------------------<  104<H>  3.8   |  24  |  0.44<L>    Ca    8.1<L>      05 Mar 2023 05:00  Phos  1.5     03-05  Mg     1.70     03-05    TPro  5.6<L>  /  Alb  2.6<L>  /  TBili  0.6  /  DBili  x   /  AST  10  /  ALT  26  /  AlkPhos  63  03-03    PT/INR - ( 05 Mar 2023 05:00 )   PT: 14.9 sec;   INR: 1.28 ratio         PTT - ( 05 Mar 2023 05:00 )  PTT:32.4 sec                 ABG - ( 03 Mar 2023 22:30 )  pH, Arterial: 7.37  pH, Blood: x     /  pCO2: 44    /  pO2: 119   / HCO3: 25    / Base Excess: -0.1  /  SaO2: 99.6

## 2023-03-05 NOTE — DISCHARGE NOTE PROVIDER - NSDCQMSTAIRS_GEN_ALL_CORE
Called patient and left a message to schedule follow up appointment  A letter will be mailed out  No Yes

## 2023-03-05 NOTE — DISCHARGE NOTE PROVIDER - CARE PROVIDER_API CALL
Nathaniel Galvan)  Surgery; Thoracic Surgery  [phone] [place] [address] [facility]cology [facility]  3rd [place] [address]  Phone: [phone]  Fax: [phone]  Established Patient  Follow Up Time: 2 weeks

## 2023-03-06 LAB
ANION GAP SERPL CALC-SCNC: 7 MMOL/L — SIGNIFICANT CHANGE UP (ref 7–14)
BUN SERPL-MCNC: 9 MG/DL — SIGNIFICANT CHANGE UP (ref 7–23)
CALCIUM SERPL-MCNC: 8.4 MG/DL — SIGNIFICANT CHANGE UP (ref 8.4–10.5)
CHLORIDE SERPL-SCNC: 105 MMOL/L — SIGNIFICANT CHANGE UP (ref 98–107)
CO2 SERPL-SCNC: 26 MMOL/L — SIGNIFICANT CHANGE UP (ref 22–31)
CREAT SERPL-MCNC: 0.47 MG/DL — LOW (ref 0.5–1.3)
EGFR: 103 ML/MIN/1.73M2 — SIGNIFICANT CHANGE UP
GLUCOSE BLDC GLUCOMTR-MCNC: 180 MG/DL — HIGH (ref 70–99)
GLUCOSE SERPL-MCNC: 101 MG/DL — HIGH (ref 70–99)
HCT VFR BLD CALC: 32.2 % — LOW (ref 39–50)
HGB BLD-MCNC: 10.1 G/DL — LOW (ref 13–17)
MAGNESIUM SERPL-MCNC: 1.8 MG/DL — SIGNIFICANT CHANGE UP (ref 1.6–2.6)
MCHC RBC-ENTMCNC: 28.1 PG — SIGNIFICANT CHANGE UP (ref 27–34)
MCHC RBC-ENTMCNC: 31.4 GM/DL — LOW (ref 32–36)
MCV RBC AUTO: 89.7 FL — SIGNIFICANT CHANGE UP (ref 80–100)
NRBC # BLD: 0 /100 WBCS — SIGNIFICANT CHANGE UP (ref 0–0)
NRBC # FLD: 0 K/UL — SIGNIFICANT CHANGE UP (ref 0–0)
PHOSPHATE SERPL-MCNC: 2.2 MG/DL — LOW (ref 2.5–4.5)
PLATELET # BLD AUTO: 141 K/UL — LOW (ref 150–400)
POTASSIUM SERPL-MCNC: 4 MMOL/L — SIGNIFICANT CHANGE UP (ref 3.5–5.3)
POTASSIUM SERPL-SCNC: 4 MMOL/L — SIGNIFICANT CHANGE UP (ref 3.5–5.3)
RBC # BLD: 3.59 M/UL — LOW (ref 4.2–5.8)
RBC # FLD: 19.4 % — HIGH (ref 10.3–14.5)
SARS-COV-2 RNA SPEC QL NAA+PROBE: SIGNIFICANT CHANGE UP
SODIUM SERPL-SCNC: 138 MMOL/L — SIGNIFICANT CHANGE UP (ref 135–145)
WBC # BLD: 8.56 K/UL — SIGNIFICANT CHANGE UP (ref 3.8–10.5)
WBC # FLD AUTO: 8.56 K/UL — SIGNIFICANT CHANGE UP (ref 3.8–10.5)

## 2023-03-06 PROCEDURE — 99232 SBSQ HOSP IP/OBS MODERATE 35: CPT | Mod: GC

## 2023-03-06 PROCEDURE — 71045 X-RAY EXAM CHEST 1 VIEW: CPT | Mod: 26

## 2023-03-06 PROCEDURE — 99232 SBSQ HOSP IP/OBS MODERATE 35: CPT

## 2023-03-06 RX ADMIN — MIDODRINE HYDROCHLORIDE 5 MILLIGRAM(S): 2.5 TABLET ORAL at 21:52

## 2023-03-06 RX ADMIN — APIXABAN 5 MILLIGRAM(S): 2.5 TABLET, FILM COATED ORAL at 17:13

## 2023-03-06 RX ADMIN — SODIUM CHLORIDE 4 MILLILITER(S): 9 INJECTION INTRAMUSCULAR; INTRAVENOUS; SUBCUTANEOUS at 07:47

## 2023-03-06 RX ADMIN — SODIUM CHLORIDE 4 MILLILITER(S): 9 INJECTION INTRAMUSCULAR; INTRAVENOUS; SUBCUTANEOUS at 21:26

## 2023-03-06 RX ADMIN — CHLORHEXIDINE GLUCONATE 1 APPLICATION(S): 213 SOLUTION TOPICAL at 06:49

## 2023-03-06 RX ADMIN — MIDODRINE HYDROCHLORIDE 5 MILLIGRAM(S): 2.5 TABLET ORAL at 14:18

## 2023-03-06 RX ADMIN — Medication 6 MILLIGRAM(S): at 21:49

## 2023-03-06 RX ADMIN — TAMSULOSIN HYDROCHLORIDE 0.4 MILLIGRAM(S): 0.4 CAPSULE ORAL at 21:50

## 2023-03-06 RX ADMIN — FAMOTIDINE 20 MILLIGRAM(S): 10 INJECTION INTRAVENOUS at 14:18

## 2023-03-06 RX ADMIN — MIDODRINE HYDROCHLORIDE 5 MILLIGRAM(S): 2.5 TABLET ORAL at 06:48

## 2023-03-06 RX ADMIN — APIXABAN 5 MILLIGRAM(S): 2.5 TABLET, FILM COATED ORAL at 06:47

## 2023-03-06 RX ADMIN — ALBUTEROL 2.5 MILLIGRAM(S): 90 AEROSOL, METERED ORAL at 21:26

## 2023-03-06 RX ADMIN — ESCITALOPRAM OXALATE 5 MILLIGRAM(S): 10 TABLET, FILM COATED ORAL at 21:48

## 2023-03-06 RX ADMIN — ALBUTEROL 2.5 MILLIGRAM(S): 90 AEROSOL, METERED ORAL at 07:47

## 2023-03-06 RX ADMIN — SENNA PLUS 2 TABLET(S): 8.6 TABLET ORAL at 21:51

## 2023-03-06 RX ADMIN — SIMVASTATIN 40 MILLIGRAM(S): 20 TABLET, FILM COATED ORAL at 21:50

## 2023-03-06 NOTE — PROGRESS NOTE ADULT - SUBJECTIVE AND OBJECTIVE BOX
pt seen and examined by thoracic staff on AM rounds  Subjective: no acute complaints  presented on AM rounds    Vital Signs:  Vital Signs Last 24 Hrs  T(C): 36.8 (03-06-23 @ 04:14), Max: 36.9 (03-05-23 @ 12:00)  T(F): 98.3 (03-06-23 @ 04:14), Max: 98.5 (03-05-23 @ 12:00)  HR: 103 (03-06-23 @ 04:14) (80 - 109)  BP: 131/57 (03-06-23 @ 04:14) (102/84 - 151/81)  RR: 20 (03-06-23 @ 04:14) (20 - 21)  SpO2: 95% (03-06-23 @ 04:14) (92% - 100%) on (O2)    PE  Telemetry/Alarms: AFib  General: WN/WD NAD  Neurology: Awake, nonfocal, BAUTISTA x 4  Eyes: Scleras clear, PERRLA/ EOMI, Gross vision intact  ENT:Gross hearing intact, grossly patent pharynx, no stridor  Neck: Neck supple, trachea midline, No JVD,   Respiratory: CTA B/L, No wheezing, rales, rhonchi  CV: RRR, S1S2, no murmurs, rubs or gallops  Abdominal: Soft, NT, ND +BS,   Extremities: No edema, + peripheral pulses  Skin: No Rashes, Hematoma, Ecchymosis  Lymphatic: No Neck, axilla, groin LAD  Psych: Oriented x 3, normal affect  Incisions: c,d,i  Tubes: pleurX capped, plummer intact  Relevant labs, radiology and Medications reviewed                        10.1   8.56  )-----------( 141      ( 06 Mar 2023 07:41 )             32.2     03-06    138  |  105  |  9   ----------------------------<  101<H>  4.0   |  26  |  0.47<L>    Ca    8.4      06 Mar 2023 07:41  Phos  1.5     03-05  Mg     1.80     03-06      PT/INR - ( 05 Mar 2023 05:00 )   PT: 14.9 sec;   INR: 1.28 ratio         PTT - ( 05 Mar 2023 05:00 )  PTT:32.4 sec  MEDICATIONS  (STANDING):  acetaminophen   IVPB .. 1000 milliGRAM(s) IV Intermittent once  albuterol    0.083% 2.5 milliGRAM(s) Nebulizer every 6 hours  apixaban 5 milliGRAM(s) Oral two times a day  chlorhexidine 2% Cloths 1 Application(s) Topical <User Schedule>  escitalopram 5 milliGRAM(s) Oral daily  famotidine    Tablet 20 milliGRAM(s) Oral daily  melatonin 6 milliGRAM(s) Oral at bedtime  midodrine. 5 milliGRAM(s) Oral three times a day  polyethylene glycol 3350 17 Gram(s) Oral daily  senna 2 Tablet(s) Oral at bedtime  simvastatin 40 milliGRAM(s) Oral at bedtime  sodium chloride 3%  Inhalation 4 milliLiter(s) Inhalation every 6 hours  tamsulosin 0.4 milliGRAM(s) Oral at bedtime    MEDICATIONS  (PRN):  oxyCODONE    IR 5 milliGRAM(s) Oral once PRN Moderate Pain (4 - 6)    Pertinent Physical Exam  I&O's Summary    05 Mar 2023 07:01  -  06 Mar 2023 07:00  --------------------------------------------------------  IN: 945 mL / OUT: 890 mL / NET: 55 mL    06 Mar 2023 07:01  -  06 Mar 2023 08:45  --------------------------------------------------------  IN: 200 mL / OUT: 300 mL / NET: -100 mL        Assessment  82y Male  w/ PAST MEDICAL & SURGICAL HISTORY:  Atrial fibrillation  Hypertension  Urinary retention  No significant past surgical history  recurrent Right pleural effusion  chronic urinary retention and a plummer  Medicine and Cardiology consulted for optimization  pt s/p IR LN biopsy and NST preop  pt is now s/p RVATS, MLND, R pleurX placement     PLAN  Neuro: Pain management  Pulm: Encourage coughing, deep breathing and use of incentive spirometry. Wean off supplemental oxygen as able. Daily CXR.   Cardio: Monitor telemetry/alarms  GI: Tolerating diet. Continue stool softeners.  Renal: monitor urine output, supplement electrolytes as needed  Vasc: Heparin SC/SCDs for DVT prophylaxis  Heme: Stable H/H. .   ID: Off antibiotics. Stable.  Therapy: OOB/ambulate  Tubes: R pleurX capped   Disposition: pending rehab placement   Discussed with Cardiothoracic Team at AM rounds.

## 2023-03-06 NOTE — PROGRESS NOTE ADULT - ASSESSMENT
afib  HR stable in 90-10  off atenolol given low BP   off pressers now   can use dig if HR goes up   on a/c  can make albuterol PRN if possible

## 2023-03-06 NOTE — PROGRESS NOTE ADULT - SUBJECTIVE AND OBJECTIVE BOX
Patient is a 82y old  Male who presents with a chief complaint of R pleural effusion with chest tube (05 Mar 2023 20:41)      HPI:  82 year old male with PMH atrial fibrillation, HTN and chronic urinary retention who customarily self catheterizes at home for the last year, presented to [facility] by ambulance from home on 2/17/2023 with c/o progressive shortness of breath.    Pt was intubated in the field and brought to ED.    Of note, patient recently found to have supraclavicular lymph node enlargement with associated leukocytosis, is being evaluated for lymphoma. Patient also recently admitted to [facility] where he was noted to have large pleural effusion that was drained.  According to family lymph node biopsy was inconclusive and patient had thoracentesis which revealed no infection or malignancy.  Since discharge from [facility] has been oxygen dependant at home which is also a new development.  At [place], patient was admitted to ICU for acute respiratory failure with hypercapnia. CTA chest negative for PE, showed large R pleural effusion with mediastinal and hilar lymphadenopathy. A pigtail was placed and drained over 3 L of fluid. Patient was started on empiric antibiotics and completed 5d course on 2/22/2023.  Pleural fluid cs negative and final. Blood cultures negative and final.  Given presence of lymphadenopathy and persistent leukocytosis, underlying malignancy is suspected. Hematology/oncology was consulted. Recommended transfer to [facility] for further investigation.   Pt was accepted by thoracic surgeon, Dr Nathaniel Galvan and is being evaluated by EBUS/mediastinoscopy and possible pleurX placement.   At [place], urology was consulted for pt's chronic urinary retention and a plummer was placed. Family did not want trial of void and agreed with continued plummer use.   Patient was successfully extubated and started on CPAP in [place] ICU. Patient was downgraded to medical floor and was titrated down to nasal canula. Physical therapy evaluated patient and MORALES is recommended. Pt was then transferred to [facility]. PTC intact on suction with no air leak and pt completes steroid taper 2/26/2023.   Last dose of eliquis 2/22/2023 at 6am, pt is on Lovenox 90mg BID for DVT ppx.     (24 Feb 2023 23:24)    now s/p pleurex, capped. plan is for it to be drained 3x/week    REVIEW OF SYSTEMS  weakness      PAST MEDICAL & SURGICAL HISTORY   Atrial fibrillation    Hypertension    Urinary retention    No significant past surgical history      CURRENT FUNCTIONAL STATUS  3/4 Bed Mobility  Bed Mobility Training Rehab Potential: good, to achieve stated therapy goals  Bed Mobility Training Supine-to-Sit: minimum assist (75% patient effort);  1 person assist;  nonverbal cues (demo/gestures);  verbal cues;  set-up required;  hand over hand  Bed Mobility Training Limitations: impaired ability to control trunk for mobility;  decreased ability to use legs for bridging/pushing;  decreased strength;  impaired balance;  impaired postural control    Sit-Stand Transfer Training  Sit-to-Stand Transfer Training Rehab Potential: good, to achieve stated therapy goals  Transfer Training Sit-to-Stand Transfer: minimum assist (75% patient effort);  verbal cues;  nonverbal cues (demo/gestures);  1 person assist;  full weight-bearing   hand held assist  Transfer Training Stand-to-Sit Transfer: minimum assist (75% patient effort);  verbal cues;  nonverbal cues (demo/gestures);  1 person assist;  full weight-bearing   hand held assist  Sit-to-Stand Transfer Training Transfer Safety Analysis: decreased step length;  decreased balance;  decreased strength;  impaired balance;  impaired postural control    Gait Training  Gait Training Rehab Potential: good, to achieve stated therapy goals  Gait Training: minimum assist (75% patient effort);  verbal cues;  nonverbal cues (demo/gestures);  1 person assist;  full weight-bearing   hand held assist;  5 feet;  bed to chair  Gait Analysis: 2-point gait   decreased isabella;  decreased step length;  impaired balance;  decreased strength;  impaired postural control;  Bed to Chair;  5 feet;  hand held assist    Therapeutic Exercise  Therapeutic Exercise Rehab Effort: good  Therapeutic Exercise Detail: Ther ex of LE included ankle pumps, knee extension, hip flexion. Pt instructed to perform as able throughout day. Pt verbalized understanding of therapeutic exercises.          RECENT LABS/IMAGING  CBC Full  -  ( 06 Mar 2023 07:41 )  WBC Count : 8.56 K/uL  RBC Count : 3.59 M/uL  Hemoglobin : 10.1 g/dL  Hematocrit : 32.2 %  Platelet Count - Automated : 141 K/uL  Mean Cell Volume : 89.7 fL  Mean Cell Hemoglobin : 28.1 pg  Mean Cell Hemoglobin Concentration : 31.4 gm/dL  Auto Neutrophil # : x  Auto Lymphocyte # : x  Auto Monocyte # : x  Auto Eosinophil # : x  Auto Basophil # : x  Auto Neutrophil % : x  Auto Lymphocyte % : x  Auto Monocyte % : x  Auto Eosinophil % : x  Auto Basophil % : x    03-06    138  |  105  |  9   ----------------------------<  101<H>  4.0   |  26  |  0.47<L>    Ca    8.4      06 Mar 2023 07:41  Phos  2.2     03-06  Mg     1.80     03-06      VITALS  T(C): 36.7 (03-06-23 @ 08:15), Max: 36.9 (03-05-23 @ 12:00)  HR: 97 (03-06-23 @ 08:15) (80 - 109)  BP: 115/55 (03-06-23 @ 08:15) (102/84 - 151/81)  RR: 16 (03-06-23 @ 08:15) (16 - 21)  SpO2: 94% (03-06-23 @ 08:15) (92% - 100%)  Wt(kg): --    ALLERGIES  pertussis vaccines (Rash)  shellfish (Rash)      MEDICATIONS   acetaminophen   IVPB .. 1000 milliGRAM(s) IV Intermittent once  albuterol    0.083% 2.5 milliGRAM(s) Nebulizer every 6 hours  apixaban 5 milliGRAM(s) Oral two times a day  chlorhexidine 2% Cloths 1 Application(s) Topical <User Schedule>  escitalopram 5 milliGRAM(s) Oral daily  famotidine    Tablet 20 milliGRAM(s) Oral daily  melatonin 6 milliGRAM(s) Oral at bedtime  midodrine. 5 milliGRAM(s) Oral three times a day  oxyCODONE    IR 5 milliGRAM(s) Oral once PRN  polyethylene glycol 3350 17 Gram(s) Oral daily  senna 2 Tablet(s) Oral at bedtime  simvastatin 40 milliGRAM(s) Oral at bedtime  sodium chloride 3%  Inhalation 4 milliLiter(s) Inhalation every 6 hours  tamsulosin 0.4 milliGRAM(s) Oral at bedtime      ----------------------------------------------------------------------------------------  PHYSICAL EXAM  Constitutional - NAD, Comfortable  HEENT - NCAT, EOMI   Chest - pleurex  Cardiovascular - RRR, S1S2   Abdomen - BS+, Soft, NTND  Extremities - No C/C/E, No calf tenderness   Neurologic Exam -                    Cognitive - Awake, Alert, AAO to self, place, date, year, situation     Communication - Fluent, No dysarthria     Cranial Nerves - CN 2-12 intact     Motor - No focal deficits                    LEFT    UE - 4+/5                    RIGHT UE - 4+/5                    LEFT    LE - 4+/5                    RIGHT LE - 4+/5        Sensory - Intact to LT      Balance - WNL Static  Psychiatric - Mood stable, Affect WNL  ----------------------------------------------------------------------------------------  ASSESSMENT/PLAN    81 yo m pmh afib, htn, urinary retention, p/w hypoxic resp failure, recurrent R pleural effusion, lymphadenopathy suspicious of underlying malignancy  s/p steroid taper  s/p ln biopsy   continue bedside PT and OT  plummer  diet: regular dash/tlc no straws   Rehab -incomplete note, in progress Patient is a 82y old  Male who presents with a chief complaint of R pleural effusion with chest tube (05 Mar 2023 20:41)      HPI:  82 year old male with PMH atrial fibrillation, HTN and chronic urinary retention who customarily self catheterizes at home for the last year, presented to [facility] by ambulance from home on 2/17/2023 with c/o progressive shortness of breath.    Pt was intubated in the field and brought to ED.    Of note, patient recently found to have supraclavicular lymph node enlargement with associated leukocytosis, is being evaluated for lymphoma. Patient also recently admitted to  where he was noted to have large pleural effusion that was drained.  According to family lymph node biopsy was inconclusive and patient had thoracentesis which revealed no infection or malignancy.  Since discharge from  has been oxygen dependant at home which is also a new development.  At [place], patient was admitted to ICU for acute respiratory failure with hypercapnia. CTA chest negative for PE, showed large R pleural effusion with mediastinal and hilar lymphadenopathy. A pigtail was placed and drained over 3 L of fluid. Patient was started on empiric antibiotics and completed 5d course on 2/22/2023.  Pleural fluid cs negative and final. Blood cultures negative and final.  Given presence of lymphadenopathy and persistent leukocytosis, underlying malignancy is suspected. Hematology/oncology was consulted. Recommended transfer to [facility] for further investigation.   Pt was accepted by thoracic surgeon, Dr Nathaniel Galvan and is being evaluated by EBUS/mediastinoscopy and possible pleurX placement.   At [place], urology was consulted for pt's chronic urinary retention and a plummer was placed. Family did not want trial of void and agreed with continued plummer use.   Patient was successfully extubated and started on CPAP in [place] ICU. Patient was downgraded to medical floor and was titrated down to nasal canula. Physical therapy evaluated patient and MORALES is recommended. Pt was then transferred to [facility]. PTC intact on suction with no air leak and pt completes steroid taper 2/26/2023.   Last dose of eliquis 2/22/2023 at 6am, pt is on Lovenox 90mg BID for DVT ppx.     (24 Feb 2023 23:24)    now s/p pleurex, vats. pleurex capped. plan is for it to be drained 3x/week    REVIEW OF SYSTEMS  weakness      PAST MEDICAL & SURGICAL HISTORY   Atrial fibrillation  Hypertension  Urinary retention  No significant past surgical history      CURRENT FUNCTIONAL STATUS  3/4 Bed Mobility  Bed Mobility Training Rehab Potential: good, to achieve stated therapy goals  Bed Mobility Training Supine-to-Sit: minimum assist (75% patient effort);  1 person assist;  nonverbal cues (demo/gestures);  verbal cues;  set-up required;  hand over hand  Bed Mobility Training Limitations: impaired ability to control trunk for mobility;  decreased ability to use legs for bridging/pushing;  decreased strength;  impaired balance;  impaired postural control    Sit-Stand Transfer Training  Sit-to-Stand Transfer Training Rehab Potential: good, to achieve stated therapy goals  Transfer Training Sit-to-Stand Transfer: minimum assist (75% patient effort);  verbal cues;  nonverbal cues (demo/gestures);  1 person assist;  full weight-bearing   hand held assist  Transfer Training Stand-to-Sit Transfer: minimum assist (75% patient effort);  verbal cues;  nonverbal cues (demo/gestures);  1 person assist;  full weight-bearing   hand held assist  Sit-to-Stand Transfer Training Transfer Safety Analysis: decreased step length;  decreased balance;  decreased strength;  impaired balance;  impaired postural control    Gait Training  Gait Training Rehab Potential: good, to achieve stated therapy goals  Gait Training: minimum assist (75% patient effort);  verbal cues;  nonverbal cues (demo/gestures);  1 person assist;  full weight-bearing   hand held assist;  5 feet;  bed to chair  Gait Analysis: 2-point gait   decreased isabella;  decreased step length;  impaired balance;  decreased strength;  impaired postural control;  Bed to Chair;  5 feet;  hand held assist    Therapeutic Exercise  Therapeutic Exercise Rehab Effort: good  Therapeutic Exercise Detail: Ther ex of LE included ankle pumps, knee extension, hip flexion. Pt instructed to perform as able throughout day. Pt verbalized understanding of therapeutic exercises.          RECENT LABS/IMAGING  CBC Full  -  ( 06 Mar 2023 07:41 )  WBC Count : 8.56 K/uL  RBC Count : 3.59 M/uL  Hemoglobin : 10.1 g/dL  Hematocrit : 32.2 %  Platelet Count - Automated : 141 K/uL  Mean Cell Volume : 89.7 fL  Mean Cell Hemoglobin : 28.1 pg  Mean Cell Hemoglobin Concentration : 31.4 gm/dL  Auto Neutrophil # : x  Auto Lymphocyte # : x  Auto Monocyte # : x  Auto Eosinophil # : x  Auto Basophil # : x  Auto Neutrophil % : x  Auto Lymphocyte % : x  Auto Monocyte % : x  Auto Eosinophil % : x  Auto Basophil % : x    03-06    138  |  105  |  9   ----------------------------<  101<H>  4.0   |  26  |  0.47<L>    Ca    8.4      06 Mar 2023 07:41  Phos  2.2     03-06  Mg     1.80     03-06      VITALS  T(C): 36.7 (03-06-23 @ 08:15), Max: 36.9 (03-05-23 @ 12:00)  HR: 97 (03-06-23 @ 08:15) (80 - 109)  BP: 115/55 (03-06-23 @ 08:15) (102/84 - 151/81)  RR: 16 (03-06-23 @ 08:15) (16 - 21)  SpO2: 94% (03-06-23 @ 08:15) (92% - 100%)  Wt(kg): --    ALLERGIES  pertussis vaccines (Rash)  shellfish (Rash)      MEDICATIONS   acetaminophen   IVPB .. 1000 milliGRAM(s) IV Intermittent once  albuterol    0.083% 2.5 milliGRAM(s) Nebulizer every 6 hours  apixaban 5 milliGRAM(s) Oral two times a day  chlorhexidine 2% Cloths 1 Application(s) Topical <User Schedule>  escitalopram 5 milliGRAM(s) Oral daily  famotidine    Tablet 20 milliGRAM(s) Oral daily  melatonin 6 milliGRAM(s) Oral at bedtime  midodrine. 5 milliGRAM(s) Oral three times a day  oxyCODONE    IR 5 milliGRAM(s) Oral once PRN  polyethylene glycol 3350 17 Gram(s) Oral daily  senna 2 Tablet(s) Oral at bedtime  simvastatin 40 milliGRAM(s) Oral at bedtime  sodium chloride 3%  Inhalation 4 milliLiter(s) Inhalation every 6 hours  tamsulosin 0.4 milliGRAM(s) Oral at bedtime      ----------------------------------------------------------------------------------------  PHYSICAL EXAM  Constitutional - NAD, Comfortable  HEENT - NCAT, EOMI   Chest - pleurex  Cardiovascular - RRR, S1S2   Abdomen - Soft, NTND  Extremities - No C/C/E, No calf tenderness   Neurologic Exam -                    Cognitive - Awake, Alert, AAO to self, place, date, year, situation     Communication - Fluent, No dysarthria     Cranial Nerves - CN 2-12 intact     Motor - No focal deficits                    LEFT    UE - 4+/5                    RIGHT UE - 4+/5                    LEFT    LE - 4+/5                    RIGHT LE - 4+/5        Sensory - Intact to LT      Balance - WNL Static  Psychiatric - Mood stable, Affect WNL  ----------------------------------------------------------------------------------------  ASSESSMENT/PLAN    83 yo m pmh afib, htn, urinary retention, p/w hypoxic resp failure, recurrent R pleural effusion, lymphadenopathy suspicious of underlying malignancy  s/p steroid taper  s/p ln biopsy  continue bedside PT and OT  plummer  diet: regular dash/tlc no straws   Rehab - recommend acute inpatient rehab when medically cleared, patient can tolerate 3 hours per day of therapy with medical supervision.  awaiting biopsy results, recommend acute rehab if it would not interfere with oncologic treatment.     can stay with daughter in home without stairs if needed, states family can assist if needed

## 2023-03-06 NOTE — PROGRESS NOTE ADULT - ASSESSMENT
ARIK DUMONT is a 82y Male with a past medical history as described above who presented for evaluation of shortness of breath and was subsequently intubated for hypoxic respiratory failure and treated in the ICU at [facility]. There, he was discovered to have hilar and supraclavicular lymphadenopathy. He had a these biopsied and the pathology returned inconclusive. Previously, at [facility], he was discovered to have a large pleural effusion and underwent thoracentesis. Fluid returned negative for malignancy.     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 Almeida. Transferred here, and daughter whom is a pediatrician, requested continuity of specialty oncology care.   - previous samples have not been diagnostic, and supraclavicular core needle biopsy w/ IR 2/28 also benign, pretest probability still high for malignancy however cannot also r/o an occult infxn or other pulmonary process, would consult pulm and ID  - Send TLS panel daily - LDH, uric acid, phos, CMP  - Hepatitis negative, HIV negative  - G6PD elevated  - consider sending fungitell, beta-2-glucan, galactomannon  - s/p VATS, bronch, pleural bx w/ thoracic 3/3, expedited biopsy  - updated daughter on plan of care  - Smear reviewed by prior heme team: normocytic normochromic red cells, rare ovalocytes, no schistocytes seen. Occasional large platelets, with most within normal limits. No clumping. White cell series demonstrates neutrophil predominant. PMNs with vacuolization and granulation, suggesting activation and inflammation. No blasts, no immature cells.    - Transfuse to maintain Hb >7, platelets > 10K, >20K if bleeding, and >50K if bleeding. ARIK DUMONT is a 82y Male with a past medical history as described above who presented for evaluation of shortness of breath and was subsequently intubated for hypoxic respiratory failure and treated in the ICU at [facility]. There, he was discovered to have hilar and supraclavicular lymphadenopathy. He had a these biopsied and the pathology returned inconclusive. Previously, at [facility], he was discovered to have a large pleural effusion and underwent thoracentesis. Fluid returned negative for malignancy.     # suspicion for lymphoma, Hodgkin?   - sectioning possibly suggestive of Hodgkin Lymphoma  - TTE normal, send hepatitis B surface Ab (rest of hepatitis B and C and HIV w/u negative)  - place PICC line  - as patient would likely need 1st cycle of chemotherapy inpatient, would transfer patient to medicine floor 8N and to medicine service  - Was being followed at [facility] by hematology/oncology Dr. Almeida. Transferred here, and daughter whom is a pediatrician, requested continuity of specialty oncology care.   - previous samples have not been diagnostic, and supraclavicular core needle biopsy w/ IR 2/28 also benign, pretest probability still high for malignancy however cannot also r/o an occult infxn or other pulmonary process, would consult pulm and ID  - Send TLS panel daily - LDH, uric acid, phos, CMP  - G6PD elevated  - s/p VATS, bronch, pleural bx w/ thoracic 3/3, expedited biopsy  - updated daughter on plan of care  - Smear reviewed by prior heme team: normocytic normochromic red cells, rare ovalocytes, no schistocytes seen. Occasional large platelets, with most within normal limits. No clumping. White cell series demonstrates neutrophil predominant. PMNs with vacuolization and granulation, suggesting activation and inflammation. No blasts, no immature cells.    - Transfuse to maintain Hb >7, platelets > 10K, >20K if bleeding, and >50K if bleeding.

## 2023-03-06 NOTE — PROGRESS NOTE ADULT - SUBJECTIVE AND OBJECTIVE BOX
DATE OF SERVICE: 03-06-23 @ 11:27    Subjective: Patient seen and examined. No new events except as noted.     SUBJECTIVE/ROS:  nad      MEDICATIONS:  MEDICATIONS  (STANDING):  acetaminophen   IVPB .. 1000 milliGRAM(s) IV Intermittent once  albuterol    0.083% 2.5 milliGRAM(s) Nebulizer every 6 hours  apixaban 5 milliGRAM(s) Oral two times a day  chlorhexidine 2% Cloths 1 Application(s) Topical <User Schedule>  escitalopram 5 milliGRAM(s) Oral daily  famotidine    Tablet 20 milliGRAM(s) Oral daily  melatonin 6 milliGRAM(s) Oral at bedtime  midodrine. 5 milliGRAM(s) Oral three times a day  polyethylene glycol 3350 17 Gram(s) Oral daily  senna 2 Tablet(s) Oral at bedtime  simvastatin 40 milliGRAM(s) Oral at bedtime  sodium chloride 3%  Inhalation 4 milliLiter(s) Inhalation every 6 hours  tamsulosin 0.4 milliGRAM(s) Oral at bedtime      PHYSICAL EXAM:  T(C): 36.7 (03-06-23 @ 08:15), Max: 36.9 (03-05-23 @ 12:00)  HR: 97 (03-06-23 @ 08:15) (80 - 109)  BP: 115/55 (03-06-23 @ 08:15) (102/84 - 151/81)  RR: 16 (03-06-23 @ 08:15) (16 - 21)  SpO2: 94% (03-06-23 @ 08:15) (92% - 100%)  Wt(kg): --  I&O's Summary    05 Mar 2023 07:01  -  06 Mar 2023 07:00  --------------------------------------------------------  IN: 945 mL / OUT: 890 mL / NET: 55 mL    06 Mar 2023 07:01  -  06 Mar 2023 11:27  --------------------------------------------------------  IN: 200 mL / OUT: 300 mL / NET: -100 mL            JVP: Normal  Neck: supple  Lung: clear   CV: S1 S2 , Murmur:  Abd: soft  Ext: No edema  neuro: Awake / alert  Psych: flat affect  Skin: normal``    LABS/DATA:    CARDIAC MARKERS:                                10.1   8.56  )-----------( 141      ( 06 Mar 2023 07:41 )             32.2     03-06    138  |  105  |  9   ----------------------------<  101<H>  4.0   |  26  |  0.47<L>    Ca    8.4      06 Mar 2023 07:41  Phos  2.2     03-06  Mg     1.80     03-06      proBNP:   Lipid Profile:   HgA1c:   TSH:     TELE:  EKG:

## 2023-03-06 NOTE — PROGRESS NOTE ADULT - ATTENDING COMMENTS
82y Male recently admitted with hypoxic respiratory failure, transferred for further evaluation of lymphadenopathy. s/p chest tube placement, plan for repeat biopsy of supraclavicular LN which was consistent with reactive LN.   -trend TLS labs   -follow up results of VATS biopsy, ?prelim c/w lymphoma, await full path   -will need PET/CT and marrow   -ok to start steroids post-PET/CT   -continue pleurex drainage  -PT/OT

## 2023-03-06 NOTE — PROGRESS NOTE ADULT - ASSESSMENT
82 year old male with PMH atrial fibrillation, HTN and chronic urinary retention who presented to [facility] from home with acute hypoxic respiratory failure with hypercapnia. Patient was admitted to ICU an [facility], now downgraded and transferred to  [facility] for continuation of his care     #Acute hypoxic respiratory failure with hypercapnia  #Recurrent R pleural effusion-exudative  #Lymphadenopathy and leukocytosis-suspect underlying malignancy  -With large right sided pleural effusion, now status post pigtail placement, monitor output   -O 2 supplement PRN   -Continue steroid taper  -Patient was treated with course of empiric cefepime and dose of vanco at [place]   -Blood cultures 2/17 NGTD, Pleural fluid no growth currently, f/u final culture/cytology  -thoracic surg care appreciated, plan for pleurex and mediastinoscopy to evaluate for lymph nodes. VATS  -Heme/onc follow up appreciated   - patient will benefit form pleurx placement   - IR guided LN biopsy   - S/P vats and pleurx per surg team   - SICU care    #Afib/ HTN  rate control   heparin for AC as tolerated   telemonitoring       #Urinary Retention  -Continue plummer, per previous notes patient's family did not want trial of void  -Continue flomax    #DVT prophylaxis     Discussed with patient son

## 2023-03-06 NOTE — PROGRESS NOTE ADULT - SUBJECTIVE AND OBJECTIVE BOX
Name of Patient : ARIK DUMONT  MRN: [id number]  Date of visit: 03-06-23       Subjective: Patient seen and examined. No new events except as noted.   Doing okay     REVIEW OF SYSTEMS:    CONSTITUTIONAL: No weakness, fevers or chills  EYES/ENT: No visual changes;  No vertigo or throat pain   NECK: No pain or stiffness  RESPIRATORY: No cough, wheezing, hemoptysis; No shortness of breath  CARDIOVASCULAR: No chest pain or palpitations  GASTROINTESTINAL: No abdominal or epigastric pain. No nausea, vomiting, or hematemesis; No diarrhea or constipation. No melena or hematochezia.  GENITOURINARY: No dysuria, frequency or hematuria  NEUROLOGICAL: No numbness or weakness  SKIN: No itching, burning, rashes, or lesions   All other review of systems is negative unless indicated above.    MEDICATIONS:  MEDICATIONS  (STANDING):  acetaminophen   IVPB .. 1000 milliGRAM(s) IV Intermittent once  albuterol    0.083% 2.5 milliGRAM(s) Nebulizer every 6 hours  apixaban 5 milliGRAM(s) Oral two times a day  chlorhexidine 2% Cloths 1 Application(s) Topical <User Schedule>  escitalopram 5 milliGRAM(s) Oral daily  famotidine    Tablet 20 milliGRAM(s) Oral daily  melatonin 6 milliGRAM(s) Oral at bedtime  midodrine. 5 milliGRAM(s) Oral three times a day  polyethylene glycol 3350 17 Gram(s) Oral daily  senna 2 Tablet(s) Oral at bedtime  simvastatin 40 milliGRAM(s) Oral at bedtime  sodium chloride 3%  Inhalation 4 milliLiter(s) Inhalation every 6 hours  tamsulosin 0.4 milliGRAM(s) Oral at bedtime      PHYSICAL EXAM:  T(C): 36.7 (03-06-23 @ 20:01), Max: 36.9 (03-06-23 @ 00:39)  HR: 83 (03-06-23 @ 21:26) (83 - 103)  BP: 111/61 (03-06-23 @ 20:01) (110/68 - 131/57)  RR: 16 (03-06-23 @ 20:01) (16 - 20)  SpO2: 98% (03-06-23 @ 21:26) (94% - 100%)  Wt(kg): --  I&O's Summary    05 Mar 2023 07:01  -  06 Mar 2023 07:00  --------------------------------------------------------  IN: 945 mL / OUT: 890 mL / NET: 55 mL    06 Mar 2023 07:01  -  06 Mar 2023 22:55  --------------------------------------------------------  IN: 700 mL / OUT: 575 mL / NET: 125 mL          Appearance: Normal	  HEENT:  PERRLA   Lymphatic: No lymphadenopathy   Cardiovascular: Normal S1 S2, no JVD  Respiratory: normal effort , clear  Gastrointestinal:  Soft, Non-tender  Skin: No rashes,  warm to touch  Psychiatry:  Mood & affect appropriate  Musculuskeletal: No edema    recent labs, Imaging and EKGs personally reviewed     03-05-23 @ 07:01  -  03-06-23 @ 07:00  --------------------------------------------------------  IN: 945 mL / OUT: 890 mL / NET: 55 mL    03-06-23 @ 07:01  -  03-06-23 @ 22:55  --------------------------------------------------------  IN: 700 mL / OUT: 575 mL / NET: 125 mL                          10.1   8.56  )-----------( 141      ( 06 Mar 2023 07:41 )             32.2               03-06    138  |  105  |  9   ----------------------------<  101<H>  4.0   |  26  |  0.47<L>    Ca    8.4      06 Mar 2023 07:41  Phos  2.2     03-06  Mg     1.80     03-06      PT/INR - ( 05 Mar 2023 05:00 )   PT: 14.9 sec;   INR: 1.28 ratio         PTT - ( 05 Mar 2023 05:00 )  PTT:32.4 sec

## 2023-03-06 NOTE — PROGRESS NOTE ADULT - SUBJECTIVE AND OBJECTIVE BOX
Nathaniel Stewart MD PGY-4 Hematology Oncology  Reachable on TEAMS    INTERVAL HPI/OVERNIGHT EVENTS:  Patient S&E at bedside. No acute events, no active complaints    VITAL SIGNS:  T(F): 97.5 (03-06-23 @ 12:00)  HR: 99 (03-06-23 @ 12:00)  BP: 119/67 (03-06-23 @ 12:00)  RR: 18 (03-06-23 @ 12:00)  SpO2: 95% (03-06-23 @ 12:00)  Wt(kg): --    PHYSICAL EXAM:    Constitutional: NAD  Eyes: EOMI, sclera non-icteric  Neck: supple, no masses, no JVD  Respiratory: CTA b/l, good air entry b/l  Cardiovascular: RRR, no M/R/G  Gastrointestinal: soft, NTND, no masses palpable, + BS, no hepatosplenomegaly  Extremities: no c/c/e  Neurological: AAOx3      MEDICATIONS  (STANDING):  acetaminophen   IVPB .. 1000 milliGRAM(s) IV Intermittent once  albuterol    0.083% 2.5 milliGRAM(s) Nebulizer every 6 hours  apixaban 5 milliGRAM(s) Oral two times a day  chlorhexidine 2% Cloths 1 Application(s) Topical <User Schedule>  escitalopram 5 milliGRAM(s) Oral daily  famotidine    Tablet 20 milliGRAM(s) Oral daily  melatonin 6 milliGRAM(s) Oral at bedtime  midodrine. 5 milliGRAM(s) Oral three times a day  polyethylene glycol 3350 17 Gram(s) Oral daily  senna 2 Tablet(s) Oral at bedtime  simvastatin 40 milliGRAM(s) Oral at bedtime  sodium chloride 3%  Inhalation 4 milliLiter(s) Inhalation every 6 hours  tamsulosin 0.4 milliGRAM(s) Oral at bedtime    MEDICATIONS  (PRN):  oxyCODONE    IR 5 milliGRAM(s) Oral once PRN Moderate Pain (4 - 6)      Allergies    pertussis vaccines (Rash)  shellfish (Rash)    Intolerances        LABS:                        10.1   8.56  )-----------( 141      ( 06 Mar 2023 07:41 )             32.2     03-06    138  |  105  |  9   ----------------------------<  101<H>  4.0   |  26  |  0.47<L>    Ca    8.4      06 Mar 2023 07:41  Phos  2.2     03-06  Mg     1.80     03-06      PT/INR - ( 05 Mar 2023 05:00 )   PT: 14.9 sec;   INR: 1.28 ratio         PTT - ( 05 Mar 2023 05:00 )  PTT:32.4 sec      RADIOLOGY & ADDITIONAL TESTS:  Studies reviewed.

## 2023-03-07 LAB
ALBUMIN SERPL ELPH-MCNC: 2.4 G/DL — LOW (ref 3.3–5)
ALP SERPL-CCNC: 73 U/L — SIGNIFICANT CHANGE UP (ref 40–120)
ALT FLD-CCNC: 17 U/L — SIGNIFICANT CHANGE UP (ref 4–41)
ANION GAP SERPL CALC-SCNC: 7 MMOL/L — SIGNIFICANT CHANGE UP (ref 7–14)
AST SERPL-CCNC: 12 U/L — SIGNIFICANT CHANGE UP (ref 4–40)
BILIRUB SERPL-MCNC: 0.6 MG/DL — SIGNIFICANT CHANGE UP (ref 0.2–1.2)
BUN SERPL-MCNC: 9 MG/DL — SIGNIFICANT CHANGE UP (ref 7–23)
CALCIUM SERPL-MCNC: 8.6 MG/DL — SIGNIFICANT CHANGE UP (ref 8.4–10.5)
CHLORIDE SERPL-SCNC: 102 MMOL/L — SIGNIFICANT CHANGE UP (ref 98–107)
CO2 SERPL-SCNC: 29 MMOL/L — SIGNIFICANT CHANGE UP (ref 22–31)
CREAT SERPL-MCNC: 0.49 MG/DL — LOW (ref 0.5–1.3)
EGFR: 102 ML/MIN/1.73M2 — SIGNIFICANT CHANGE UP
GLUCOSE SERPL-MCNC: 104 MG/DL — HIGH (ref 70–99)
HBV SURFACE AB SER-ACNC: <3 MIU/ML — LOW
LDH SERPL L TO P-CCNC: 169 U/L — SIGNIFICANT CHANGE UP (ref 135–225)
PHOSPHATE SERPL-MCNC: 2.1 MG/DL — LOW (ref 2.5–4.5)
POTASSIUM SERPL-MCNC: 3.8 MMOL/L — SIGNIFICANT CHANGE UP (ref 3.5–5.3)
POTASSIUM SERPL-SCNC: 3.8 MMOL/L — SIGNIFICANT CHANGE UP (ref 3.5–5.3)
PROT SERPL-MCNC: 6.1 G/DL — SIGNIFICANT CHANGE UP (ref 6–8.3)
SODIUM SERPL-SCNC: 138 MMOL/L — SIGNIFICANT CHANGE UP (ref 135–145)
TM INTERPRETATION: SIGNIFICANT CHANGE UP
URATE SERPL-MCNC: 2.7 MG/DL — LOW (ref 3.4–8.8)

## 2023-03-07 PROCEDURE — 88305 TISSUE EXAM BY PATHOLOGIST: CPT | Mod: 26

## 2023-03-07 PROCEDURE — 71045 X-RAY EXAM CHEST 1 VIEW: CPT | Mod: 26

## 2023-03-07 PROCEDURE — 99232 SBSQ HOSP IP/OBS MODERATE 35: CPT

## 2023-03-07 PROCEDURE — 88360 TUMOR IMMUNOHISTOCHEM/MANUAL: CPT | Mod: 26,59

## 2023-03-07 PROCEDURE — 88341 IMHCHEM/IMCYTCHM EA ADD ANTB: CPT | Mod: 26

## 2023-03-07 PROCEDURE — 85097 BONE MARROW INTERPRETATION: CPT

## 2023-03-07 PROCEDURE — 88342 IMHCHEM/IMCYTCHM 1ST ANTB: CPT | Mod: 26,59

## 2023-03-07 PROCEDURE — 88313 SPECIAL STAINS GROUP 2: CPT | Mod: 26

## 2023-03-07 PROCEDURE — 88189 FLOWCYTOMETRY/READ 16 & >: CPT

## 2023-03-07 RX ADMIN — FAMOTIDINE 20 MILLIGRAM(S): 10 INJECTION INTRAVENOUS at 14:04

## 2023-03-07 RX ADMIN — SIMVASTATIN 40 MILLIGRAM(S): 20 TABLET, FILM COATED ORAL at 21:07

## 2023-03-07 RX ADMIN — MIDODRINE HYDROCHLORIDE 5 MILLIGRAM(S): 2.5 TABLET ORAL at 21:10

## 2023-03-07 RX ADMIN — APIXABAN 5 MILLIGRAM(S): 2.5 TABLET, FILM COATED ORAL at 06:19

## 2023-03-07 RX ADMIN — CHLORHEXIDINE GLUCONATE 1 APPLICATION(S): 213 SOLUTION TOPICAL at 06:23

## 2023-03-07 RX ADMIN — Medication 6 MILLIGRAM(S): at 21:08

## 2023-03-07 RX ADMIN — SODIUM CHLORIDE 4 MILLILITER(S): 9 INJECTION INTRAMUSCULAR; INTRAVENOUS; SUBCUTANEOUS at 21:16

## 2023-03-07 RX ADMIN — SENNA PLUS 2 TABLET(S): 8.6 TABLET ORAL at 21:09

## 2023-03-07 RX ADMIN — ALBUTEROL 2.5 MILLIGRAM(S): 90 AEROSOL, METERED ORAL at 21:15

## 2023-03-07 RX ADMIN — MIDODRINE HYDROCHLORIDE 5 MILLIGRAM(S): 2.5 TABLET ORAL at 06:19

## 2023-03-07 RX ADMIN — MIDODRINE HYDROCHLORIDE 5 MILLIGRAM(S): 2.5 TABLET ORAL at 14:05

## 2023-03-07 RX ADMIN — ALBUTEROL 2.5 MILLIGRAM(S): 90 AEROSOL, METERED ORAL at 15:44

## 2023-03-07 RX ADMIN — TAMSULOSIN HYDROCHLORIDE 0.4 MILLIGRAM(S): 0.4 CAPSULE ORAL at 21:06

## 2023-03-07 RX ADMIN — SODIUM CHLORIDE 4 MILLILITER(S): 9 INJECTION INTRAMUSCULAR; INTRAVENOUS; SUBCUTANEOUS at 15:44

## 2023-03-07 RX ADMIN — ESCITALOPRAM OXALATE 5 MILLIGRAM(S): 10 TABLET, FILM COATED ORAL at 21:07

## 2023-03-07 NOTE — PROGRESS NOTE ADULT - SUBJECTIVE AND OBJECTIVE BOX
Patient is a 82y old  Male who presents with a chief complaint of R pleural effusion with chest tube (05 Mar 2023 20:41)     s/p bone marrow biopsy, planning for PET tomorrow per son.      REVIEW OF SYSTEMS  weakness    PAST MEDICAL & SURGICAL HISTORY  No pertinent past medical history    Atrial fibrillation    Hypertension    Urinary retention    No significant past surgical history       CURRENT FUNCTIONAL STATUS  3/7   Sit-Stand Transfer Training  Sit-to-Stand Transfer Training Rehab Potential: good, to achieve stated therapy goals  Sit-to-Stand Transfer Training Symptoms Noted During/After Treatment: none  Transfer Training Sit-to-Stand Transfer: moderate assist (50% patient effort);  1 person assist;  verbal cues;  weight-bearing as tolerated   rolling walker  Transfer Training Stand-to-Sit Transfer: minimum assist (75% patient effort);  1 person assist;  verbal cues;  weight-bearing as tolerated   rolling walker  Sit-to-Stand Transfer Training Transfer Safety Analysis: decreased step length;  decreased weight-shifting ability;  decreased strength;  decreased flexibility;  impaired postural control;  impaired balance;  rolling walker    Gait Training  Gait Training Rehab Potential: good, to achieve stated therapy goals  Gait Training Symptoms Noted During/After Treatment: fatigue;  shortness of breath  Gait Training: minimum assist (75% patient effort);  1 person assist;  verbal cues;  weight-bearing as tolerated   rolling walker;  30  Gait Analysis: 2-point gait   decreased isabella;  decreased stride length;  crouch;  increased time in double stance;  decreased hip/knee flexion;  decreased velocity of limb motion;  impaired balance;  decreased strength;  decreased flexibility;  30;  rolling walker  Gait Number of Times:: x 2  Type of Rest Type of Rest: sitting  Duration of Rest Duration of Rest: 10 minutes     Therapeutic Exercise  Therapeutic Exercise Rehab Effort: good  Therapeutic Exercise Detail: long arc quads, marches, clam shells, ankle pumps performed as warm up. Encouraged Home exercise program to maintain strength and prevent DVT while in the hospital            RECENT LABS/IMAGING  CBC Full  -  ( 06 Mar 2023 07:41 )  WBC Count : 8.56 K/uL  RBC Count : 3.59 M/uL  Hemoglobin : 10.1 g/dL  Hematocrit : 32.2 %  Platelet Count - Automated : 141 K/uL  Mean Cell Volume : 89.7 fL  Mean Cell Hemoglobin : 28.1 pg  Mean Cell Hemoglobin Concentration : 31.4 gm/dL  Auto Neutrophil # : x  Auto Lymphocyte # : x  Auto Monocyte # : x  Auto Eosinophil # : x  Auto Basophil # : x  Auto Neutrophil % : x  Auto Lymphocyte % : x  Auto Monocyte % : x  Auto Eosinophil % : x  Auto Basophil % : x    03-07    138  |  102  |  9   ----------------------------<  104<H>  3.8   |  29  |  0.49<L>    Ca    8.6      07 Mar 2023 06:11  Phos  2.1     03-07  Mg     1.80     03-06    TPro  6.1  /  Alb  2.4<L>  /  TBili  0.6  /  DBili  x   /  AST  12  /  ALT  17  /  AlkPhos  73  03-07        VITALS  T(C): 36.8 (03-07-23 @ 12:00), Max: 37 (03-07-23 @ 04:06)  HR: 100 (03-07-23 @ 12:00) (83 - 100)  BP: 100/56 (03-07-23 @ 12:00) (100/56 - 138/65)  RR: 18 (03-07-23 @ 12:00) (16 - 18)  SpO2: 97% (03-07-23 @ 12:00) (97% - 100%)  Wt(kg): --    ALLERGIES  pertussis vaccines (Rash)  shellfish (Rash)      MEDICATIONS   acetaminophen   IVPB .. 1000 milliGRAM(s) IV Intermittent once  albuterol    0.083% 2.5 milliGRAM(s) Nebulizer every 6 hours  chlorhexidine 2% Cloths 1 Application(s) Topical <User Schedule>  escitalopram 5 milliGRAM(s) Oral daily  famotidine    Tablet 20 milliGRAM(s) Oral daily  melatonin 6 milliGRAM(s) Oral at bedtime  midodrine. 5 milliGRAM(s) Oral three times a day  oxyCODONE    IR 5 milliGRAM(s) Oral once PRN  polyethylene glycol 3350 17 Gram(s) Oral daily  senna 2 Tablet(s) Oral at bedtime  simvastatin 40 milliGRAM(s) Oral at bedtime  sodium chloride 3%  Inhalation 4 milliLiter(s) Inhalation every 6 hours  tamsulosin 0.4 milliGRAM(s) Oral at bedtime      ----------------------------------------------------------------------------------------  PHYSICAL EXAM  Constitutional - NAD, Comfortable  HEENT - NCAT, EOMI   Chest - pleurex  Cardiovascular - RRR, S1S2   Abdomen - Soft, NTND  Extremities - No C/C/E, No calf tenderness   Neurologic Exam -                    Cognitive - Awake, Alert, AAO to self, place, date, year, situation     Communication - Fluent, No dysarthria     Cranial Nerves - CN 2-12 intact     Motor - No focal deficits                    LEFT    UE - 4+/5                    RIGHT UE - 4+/5                    LEFT    LE - 4+/5                    RIGHT LE - 4+/5        Sensory - Intact to LT      Balance - WNL Static  Psychiatric - Mood stable, Affect WNL  ----------------------------------------------------------------------------------------  ASSESSMENT/PLAN    83 yo m pmh afib, htn, urinary retention, p/w hypoxic resp failure, recurrent R pleural effusion, lymphadenopathy suspicious of underlying malignancy  s/p steroid taper  s/p ln biopsy  planned for PET scan tomorrow  bone marrow biopsy done 3/7  continue bedside PT and OT  plummer  diet: regular dash/tlc no straws   Rehab - recommend acute inpatient rehab when medically cleared, patient can tolerate 3 hours per day of therapy with medical supervision.  awaiting biopsy results, recommend acute rehab if it would not interfere with oncologic treatment.

## 2023-03-07 NOTE — CHART NOTE - NSCHARTNOTEFT_GEN_A_CORE
Patient's PET scan scheduled as follows. Nurse manager to ensure  is arranged in a timely manner. PET prep needs to be started 24 hrs prior and has been emailed to Dr. Nikolas Liu 03/08/2023  Arrival: 12:15PM    Location: [address] [place] [address]      Please call with questions.       Fallon Esposito, PGY-6  Hematology-Oncology Fellow  [phone] ([place]) [id number] ([facility])  I can also be reached on [employer] Teams  Please page fellow on call after 5pm and on weekends

## 2023-03-07 NOTE — PROGRESS NOTE ADULT - SUBJECTIVE AND OBJECTIVE BOX
Subjective: Patient seen and examined. No new events except as noted.     SUBJECTIVE/ROS:  No chest pain, dyspnea, palpitation, or dizziness.       MEDICATIONS:  MEDICATIONS  (STANDING):  acetaminophen   IVPB .. 1000 milliGRAM(s) IV Intermittent once  albuterol    0.083% 2.5 milliGRAM(s) Nebulizer every 6 hours  chlorhexidine 2% Cloths 1 Application(s) Topical <User Schedule>  escitalopram 5 milliGRAM(s) Oral daily  famotidine    Tablet 20 milliGRAM(s) Oral daily  melatonin 6 milliGRAM(s) Oral at bedtime  midodrine. 5 milliGRAM(s) Oral three times a day  polyethylene glycol 3350 17 Gram(s) Oral daily  senna 2 Tablet(s) Oral at bedtime  simvastatin 40 milliGRAM(s) Oral at bedtime  sodium chloride 3%  Inhalation 4 milliLiter(s) Inhalation every 6 hours  tamsulosin 0.4 milliGRAM(s) Oral at bedtime      PHYSICAL EXAM:  T(C): 36.8 (03-07-23 @ 12:00), Max: 37 (03-07-23 @ 04:06)  HR: 100 (03-07-23 @ 12:00) (83 - 100)  BP: 100/56 (03-07-23 @ 12:00) (100/56 - 138/65)  RR: 18 (03-07-23 @ 12:00) (16 - 18)  SpO2: 97% (03-07-23 @ 12:00) (97% - 100%)  Wt(kg): --  I&O's Summary    06 Mar 2023 07:01  -  07 Mar 2023 07:00  --------------------------------------------------------  IN: 1050 mL / OUT: 1425 mL / NET: -375 mL            JVP: Normal  Neck: supple  Lung: clear   CV: S1 S2 , Murmur:  Abd: soft  Ext: No edema  neuro: Awake / alert  Psych: flat affect  Skin: normal``    LABS/DATA:    CARDIAC MARKERS:                                10.1   8.56  )-----------( 141      ( 06 Mar 2023 07:41 )             32.2     03-07    138  |  102  |  9   ----------------------------<  104<H>  3.8   |  29  |  0.49<L>    Ca    8.6      07 Mar 2023 06:11  Phos  2.1     03-07  Mg     1.80     03-06    TPro  6.1  /  Alb  2.4<L>  /  TBili  0.6  /  DBili  x   /  AST  12  /  ALT  17  /  AlkPhos  73  03-07    proBNP:   Lipid Profile:   HgA1c:   TSH:     TELE:  EKG:

## 2023-03-07 NOTE — PROGRESS NOTE ADULT - SUBJECTIVE AND OBJECTIVE BOX
Name of Patient : ARIK DUMONT  MRN: [id number]  Date of visit: 03-08-23       Subjective: Patient seen and examined. No new events except as noted.   doing okay  drain    REVIEW OF SYSTEMS:    CONSTITUTIONAL: No weakness, fevers or chills  EYES/ENT: No visual changes;  No vertigo or throat pain   NECK: No pain or stiffness  RESPIRATORY: No cough, wheezing, hemoptysis; No shortness of breath  CARDIOVASCULAR: No chest pain or palpitations  GASTROINTESTINAL: No abdominal or epigastric pain. No nausea, vomiting, or hematemesis; No diarrhea or constipation. No melena or hematochezia.  GENITOURINARY: No dysuria, frequency or hematuria  NEUROLOGICAL: No numbness or weakness  SKIN: No itching, burning, rashes, or lesions   All other review of systems is negative unless indicated above.    MEDICATIONS:  MEDICATIONS  (STANDING):  acetaminophen   IVPB .. 1000 milliGRAM(s) IV Intermittent once  albuterol    0.083% 2.5 milliGRAM(s) Nebulizer every 6 hours  chlorhexidine 2% Cloths 1 Application(s) Topical <User Schedule>  escitalopram 5 milliGRAM(s) Oral daily  famotidine    Tablet 20 milliGRAM(s) Oral daily  melatonin 6 milliGRAM(s) Oral at bedtime  midodrine. 5 milliGRAM(s) Oral three times a day  polyethylene glycol 3350 17 Gram(s) Oral daily  senna 2 Tablet(s) Oral at bedtime  simvastatin 40 milliGRAM(s) Oral at bedtime  sodium chloride 3%  Inhalation 4 milliLiter(s) Inhalation every 6 hours  tamsulosin 0.4 milliGRAM(s) Oral at bedtime      PHYSICAL EXAM:  T(C): 36.6 (03-08-23 @ 05:40), Max: 36.9 (03-07-23 @ 16:24)  HR: 82 (03-08-23 @ 05:40) (82 - 108)  BP: 109/64 (03-08-23 @ 05:40) (100/56 - 140/93)  RR: 18 (03-08-23 @ 05:40) (18 - 19)  SpO2: 98% (03-08-23 @ 05:40) (93% - 100%)  Wt(kg): --  I&O's Summary    07 Mar 2023 07:01  -  08 Mar 2023 07:00  --------------------------------------------------------  IN: 290 mL / OUT: 1075 mL / NET: -785 mL          Appearance: Normal	  HEENT:  PERRLA   Lymphatic: No lymphadenopathy   Cardiovascular: Normal S1 S2, no JVD  Respiratory: normal effort , clear  Gastrointestinal:  Soft, Non-tender  Skin: No rashes,  warm to touch  Psychiatry:  Mood & affect appropriate  Musculuskeletal: No edema    recent labs, Imaging and EKGs personally reviewed                           10.2   9.41  )-----------( 133      ( 08 Mar 2023 04:41 )             33.0               03-08    139  |  104  |  9   ----------------------------<  116<H>  3.7   |  27  |  0.48<L>    Ca    8.4      08 Mar 2023 04:41  Phos  2.7     03-08  Mg     1.70     03-08    TPro  5.8<L>  /  Alb  2.5<L>  /  TBili  0.6  /  DBili  x   /  AST  11  /  ALT  20  /  AlkPhos  71  03-08

## 2023-03-07 NOTE — PROCEDURE NOTE - NSPROCNAME_GEN_A_CORE
Patient called in and wanted to let Dr Greyson Flores know his bp has been on the lower side according to patient (107/61, 105/58). Patient is not symptomatic at this time. Patient is concerned he is taking too much medications now. Patient has upcoming appt on 1/16/23 with Dr Greyson Flores. Patient will keep track of blood pressure until appt and will call office or go to ER if he becomes symptomatic.
Spoke with patient and he verbalized understanding. Patient will keep log of BP's because he feels like his bp's are too low. Patient has no symptoms at this time and will call back if he has any issues between now and his appt on 1/16/23.
General
Interventional Radiology
Midline Insertion

## 2023-03-07 NOTE — PROGRESS NOTE ADULT - ASSESSMENT
82 year old male with PMH atrial fibrillation, HTN and chronic urinary retention who presented to [facility] from home with acute hypoxic respiratory failure with hypercapnia. Patient was admitted to ICU an [facility], now downgraded and transferred to  [facility] for continuation of his care     #Acute hypoxic respiratory failure with hypercapnia  #Recurrent R pleural effusion-exudative  #Lymphadenopathy and leukocytosis-suspect underlying malignancy  -With large right sided pleural effusion, now status post pigtail placement, monitor output   -O 2 supplement PRN   -Continue steroid taper  -Patient was treated with course of empiric cefepime and dose of vanco at [place]   -Blood cultures 2/17 NGTD, Pleural fluid no growth currently, f/u final culture/cytology  -thoracic surg care appreciated, plan for pleurex and mediastinoscopy to evaluate for lymph nodes. VATS  -Heme/onc follow up appreciated   - patient will benefit form pleurx placement   - IR guided LN biopsy   - S/P vats and pleurx per surg team   - follow up biopsy results     #Afib/ HTN  rate control   heparin for AC as tolerated   telemonitoring       #Urinary Retention  -Continue plummer, per previous notes patient's family did not want trial of void  -Continue flomax    #DVT prophylaxis     Discussed with patient son

## 2023-03-07 NOTE — PROGRESS NOTE ADULT - SUBJECTIVE AND OBJECTIVE BOX
Pt seen this am. Resting OOB  NO complaints    Vital Signs Last 24 Hrs  T(C): 37 (07 Mar 2023 04:06), Max: 37 (07 Mar 2023 04:06)  T(F): 98.6 (07 Mar 2023 04:06), Max: 98.6 (07 Mar 2023 04:06)  HR: 94 (07 Mar 2023 04:06) (83 - 99)  BP: 123/56 (07 Mar 2023 04:06) (110/68 - 138/65)  BP(mean): --  RR: 18 (07 Mar 2023 04:06) (16 - 18)  SpO2: 97% (07 Mar 2023 04:06) (95% - 100%)    Parameters below as of 07 Mar 2023 04:06  Patient On (Oxygen Delivery Method): nasal cannula  O2 Flow (L/min): 2      A+O x 3  Rt Pleurx capped  Pt transferred to Medicine service this am  Planned for inpt Chemo/treatment  Bedside RN to drain Rt Pleurx every Monday, Wednesday and Friday  Pt to be d/c'd to home/Rehab on same regiment  Orders placed in [place]  PICC ordered per Onc reccs.  Will follow as needed  Call with any questions or concerns.  Pt seen this am. Resting OOB  NO complaints    Vital Signs Last 24 Hrs  T(C): 37 (07 Mar 2023 04:06), Max: 37 (07 Mar 2023 04:06)  T(F): 98.6 (07 Mar 2023 04:06), Max: 98.6 (07 Mar 2023 04:06)  HR: 94 (07 Mar 2023 04:06) (83 - 99)  BP: 123/56 (07 Mar 2023 04:06) (110/68 - 138/65)  BP(mean): --  RR: 18 (07 Mar 2023 04:06) (16 - 18)  SpO2: 97% (07 Mar 2023 04:06) (95% - 100%)    Parameters below as of 07 Mar 2023 04:06  Patient On (Oxygen Delivery Method): nasal cannula  O2 Flow (L/min): 2      A+O x 3  Rt Pleurx capped  Pt transferred to Medicine service this am  Planned for inpt Chemo/treatment  Bedside RN to drain Rt Pleurx every Monday, Wednesday and Friday  Pt to be d/c'd to home/Rehab on same regiment  Orders placed in [place]  PICC ordered per Onc reccs to start treatment this week  If family in agreement, will plan for Infusaport placement with Dr. Romo next Wednesday March 15th per Hematology Dr. Esposito request to have port in place prior to pt discharge.  Dr. Connor aware of plan above and asked that I discontinue Eliquis today. If pt started on Heparin gtt instead for a/c, gtt will have to be held 6hr prior to OR time next  Wednesday.   Will follow as needed  Call with any questions or concerns.

## 2023-03-08 ENCOUNTER — OUTPATIENT (OUTPATIENT)
Dept: OUTPATIENT SERVICES | Facility: HOSPITAL | Age: 83
LOS: 1 days | End: 2023-03-08
Payer: COMMERCIAL

## 2023-03-08 ENCOUNTER — APPOINTMENT (OUTPATIENT)
Dept: NUCLEAR MEDICINE | Facility: IMAGING CENTER | Age: 83
End: 2023-03-08
Payer: MEDICARE

## 2023-03-08 DIAGNOSIS — C85.90 NON-HODGKIN LYMPHOMA, UNSPECIFIED, UNSPECIFIED SITE: ICD-10-CM

## 2023-03-08 LAB
ALBUMIN SERPL ELPH-MCNC: 2.5 G/DL — LOW (ref 3.3–5)
ALP SERPL-CCNC: 71 U/L — SIGNIFICANT CHANGE UP (ref 40–120)
ALT FLD-CCNC: 20 U/L — SIGNIFICANT CHANGE UP (ref 4–41)
ANION GAP SERPL CALC-SCNC: 8 MMOL/L — SIGNIFICANT CHANGE UP (ref 7–14)
APTT BLD: 31.4 SEC — SIGNIFICANT CHANGE UP (ref 27–36.3)
AST SERPL-CCNC: 11 U/L — SIGNIFICANT CHANGE UP (ref 4–40)
BASOPHILS # BLD AUTO: 0.02 K/UL — SIGNIFICANT CHANGE UP (ref 0–0.2)
BASOPHILS NFR BLD AUTO: 0.2 % — SIGNIFICANT CHANGE UP (ref 0–2)
BILIRUB SERPL-MCNC: 0.6 MG/DL — SIGNIFICANT CHANGE UP (ref 0.2–1.2)
BUN SERPL-MCNC: 9 MG/DL — SIGNIFICANT CHANGE UP (ref 7–23)
CALCIUM SERPL-MCNC: 8.4 MG/DL — SIGNIFICANT CHANGE UP (ref 8.4–10.5)
CHLORIDE SERPL-SCNC: 104 MMOL/L — SIGNIFICANT CHANGE UP (ref 98–107)
CO2 SERPL-SCNC: 27 MMOL/L — SIGNIFICANT CHANGE UP (ref 22–31)
CREAT SERPL-MCNC: 0.48 MG/DL — LOW (ref 0.5–1.3)
CULTURE RESULTS: SIGNIFICANT CHANGE UP
EGFR: 102 ML/MIN/1.73M2 — SIGNIFICANT CHANGE UP
EOSINOPHIL # BLD AUTO: 0.07 K/UL — SIGNIFICANT CHANGE UP (ref 0–0.5)
EOSINOPHIL NFR BLD AUTO: 0.7 % — SIGNIFICANT CHANGE UP (ref 0–6)
FUNGITELL: <31 PG/ML — SIGNIFICANT CHANGE UP
GLUCOSE SERPL-MCNC: 116 MG/DL — HIGH (ref 70–99)
HCT VFR BLD CALC: 33 % — LOW (ref 39–50)
HGB BLD-MCNC: 10.2 G/DL — LOW (ref 13–17)
IANC: 7.01 K/UL — SIGNIFICANT CHANGE UP (ref 1.8–7.4)
IMM GRANULOCYTES NFR BLD AUTO: 0.6 % — SIGNIFICANT CHANGE UP (ref 0–0.9)
LDH SERPL L TO P-CCNC: 187 U/L — SIGNIFICANT CHANGE UP (ref 135–225)
LYMPHOCYTES # BLD AUTO: 1.27 K/UL — SIGNIFICANT CHANGE UP (ref 1–3.3)
LYMPHOCYTES # BLD AUTO: 13.5 % — SIGNIFICANT CHANGE UP (ref 13–44)
MAGNESIUM SERPL-MCNC: 1.7 MG/DL — SIGNIFICANT CHANGE UP (ref 1.6–2.6)
MCHC RBC-ENTMCNC: 28 PG — SIGNIFICANT CHANGE UP (ref 27–34)
MCHC RBC-ENTMCNC: 30.9 GM/DL — LOW (ref 32–36)
MCV RBC AUTO: 90.7 FL — SIGNIFICANT CHANGE UP (ref 80–100)
MONOCYTES # BLD AUTO: 0.98 K/UL — HIGH (ref 0–0.9)
MONOCYTES NFR BLD AUTO: 10.4 % — SIGNIFICANT CHANGE UP (ref 2–14)
NEUTROPHILS # BLD AUTO: 7.01 K/UL — SIGNIFICANT CHANGE UP (ref 1.8–7.4)
NEUTROPHILS NFR BLD AUTO: 74.6 % — SIGNIFICANT CHANGE UP (ref 43–77)
NRBC # BLD: 0 /100 WBCS — SIGNIFICANT CHANGE UP (ref 0–0)
NRBC # FLD: 0 K/UL — SIGNIFICANT CHANGE UP (ref 0–0)
PHOSPHATE SERPL-MCNC: 2.7 MG/DL — SIGNIFICANT CHANGE UP (ref 2.5–4.5)
PLATELET # BLD AUTO: 133 K/UL — LOW (ref 150–400)
POTASSIUM SERPL-MCNC: 3.7 MMOL/L — SIGNIFICANT CHANGE UP (ref 3.5–5.3)
POTASSIUM SERPL-SCNC: 3.7 MMOL/L — SIGNIFICANT CHANGE UP (ref 3.5–5.3)
PROT SERPL-MCNC: 5.8 G/DL — LOW (ref 6–8.3)
RBC # BLD: 3.64 M/UL — LOW (ref 4.2–5.8)
RBC # FLD: 19.6 % — HIGH (ref 10.3–14.5)
SODIUM SERPL-SCNC: 139 MMOL/L — SIGNIFICANT CHANGE UP (ref 135–145)
SPECIMEN SOURCE: SIGNIFICANT CHANGE UP
URATE SERPL-MCNC: 3.1 MG/DL — LOW (ref 3.4–8.8)
WBC # BLD: 9.41 K/UL — SIGNIFICANT CHANGE UP (ref 3.8–10.5)
WBC # FLD AUTO: 9.41 K/UL — SIGNIFICANT CHANGE UP (ref 3.8–10.5)

## 2023-03-08 PROCEDURE — 78815 PET IMAGE W/CT SKULL-THIGH: CPT | Mod: 26,PI,MH

## 2023-03-08 PROCEDURE — 99222 1ST HOSP IP/OBS MODERATE 55: CPT | Mod: FS

## 2023-03-08 PROCEDURE — A9552: CPT

## 2023-03-08 PROCEDURE — 78815 PET IMAGE W/CT SKULL-THIGH: CPT

## 2023-03-08 RX ORDER — HEPARIN SODIUM 5000 [USP'U]/ML
INJECTION INTRAVENOUS; SUBCUTANEOUS
Qty: 25000 | Refills: 0 | Status: DISCONTINUED | OUTPATIENT
Start: 2023-03-08 | End: 2023-03-09

## 2023-03-08 RX ORDER — HEPARIN SODIUM 5000 [USP'U]/ML
3500 INJECTION INTRAVENOUS; SUBCUTANEOUS EVERY 6 HOURS
Refills: 0 | Status: DISCONTINUED | OUTPATIENT
Start: 2023-03-08 | End: 2023-03-09

## 2023-03-08 RX ORDER — APIXABAN 2.5 MG/1
5 TABLET, FILM COATED ORAL
Refills: 0 | Status: DISCONTINUED | OUTPATIENT
Start: 2023-03-08 | End: 2023-03-08

## 2023-03-08 RX ORDER — HEPARIN SODIUM 5000 [USP'U]/ML
7000 INJECTION INTRAVENOUS; SUBCUTANEOUS EVERY 6 HOURS
Refills: 0 | Status: DISCONTINUED | OUTPATIENT
Start: 2023-03-08 | End: 2023-03-09

## 2023-03-08 RX ADMIN — ALBUTEROL 2.5 MILLIGRAM(S): 90 AEROSOL, METERED ORAL at 04:31

## 2023-03-08 RX ADMIN — SODIUM CHLORIDE 4 MILLILITER(S): 9 INJECTION INTRAMUSCULAR; INTRAVENOUS; SUBCUTANEOUS at 10:30

## 2023-03-08 RX ADMIN — Medication 6 MILLIGRAM(S): at 21:51

## 2023-03-08 RX ADMIN — SODIUM CHLORIDE 4 MILLILITER(S): 9 INJECTION INTRAMUSCULAR; INTRAVENOUS; SUBCUTANEOUS at 22:09

## 2023-03-08 RX ADMIN — SODIUM CHLORIDE 4 MILLILITER(S): 9 INJECTION INTRAMUSCULAR; INTRAVENOUS; SUBCUTANEOUS at 16:53

## 2023-03-08 RX ADMIN — SODIUM CHLORIDE 4 MILLILITER(S): 9 INJECTION INTRAMUSCULAR; INTRAVENOUS; SUBCUTANEOUS at 04:26

## 2023-03-08 RX ADMIN — HEPARIN SODIUM 1600 UNIT(S)/HR: 5000 INJECTION INTRAVENOUS; SUBCUTANEOUS at 20:32

## 2023-03-08 RX ADMIN — MIDODRINE HYDROCHLORIDE 5 MILLIGRAM(S): 2.5 TABLET ORAL at 21:51

## 2023-03-08 RX ADMIN — ALBUTEROL 2.5 MILLIGRAM(S): 90 AEROSOL, METERED ORAL at 16:53

## 2023-03-08 RX ADMIN — MIDODRINE HYDROCHLORIDE 5 MILLIGRAM(S): 2.5 TABLET ORAL at 05:41

## 2023-03-08 RX ADMIN — ESCITALOPRAM OXALATE 5 MILLIGRAM(S): 10 TABLET, FILM COATED ORAL at 21:51

## 2023-03-08 RX ADMIN — CHLORHEXIDINE GLUCONATE 1 APPLICATION(S): 213 SOLUTION TOPICAL at 05:41

## 2023-03-08 RX ADMIN — MIDODRINE HYDROCHLORIDE 5 MILLIGRAM(S): 2.5 TABLET ORAL at 14:54

## 2023-03-08 RX ADMIN — TAMSULOSIN HYDROCHLORIDE 0.4 MILLIGRAM(S): 0.4 CAPSULE ORAL at 21:50

## 2023-03-08 RX ADMIN — SIMVASTATIN 40 MILLIGRAM(S): 20 TABLET, FILM COATED ORAL at 21:50

## 2023-03-08 RX ADMIN — ALBUTEROL 2.5 MILLIGRAM(S): 90 AEROSOL, METERED ORAL at 22:09

## 2023-03-08 RX ADMIN — FAMOTIDINE 20 MILLIGRAM(S): 10 INJECTION INTRAVENOUS at 14:54

## 2023-03-08 NOTE — PROGRESS NOTE ADULT - ASSESSMENT
afib  HR stable   off atenolol given low BP   off pressers now   can use dig if HR goes up   a/c once deemed safe

## 2023-03-08 NOTE — PROGRESS NOTE ADULT - ASSESSMENT
82 year old male with PMH atrial fibrillation, HTN and chronic urinary retention who presented to [facility] from home with acute hypoxic respiratory failure with hypercapnia. Patient was admitted to ICU an [facility], now downgraded and transferred to  [facility] for continuation of his care     #Acute hypoxic respiratory failure with hypercapnia  #Recurrent R pleural effusion-exudative  #Lymphadenopathy and leukocytosis-suspect underlying malignancy  -With large right sided pleural effusion, now status post pigtail placement, monitor output   -O 2 supplement PRN   -Continue steroid taper  -Patient was treated with course of empiric cefepime and dose of vanco at [place]   -Blood cultures 2/17 NGTD, Pleural fluid no growth currently, f/u final culture/cytology  -thoracic surg care appreciated, plan for pleurex and mediastinoscopy to evaluate for lymph nodes. VATS  -Heme/onc follow up appreciated   - patient will benefit form pleurx placement   - IR guided LN biopsy   - S/P vats and pleurx per surg team   - follow up biopsy results   - ? lymphoma, awaiting LN biopsy  - PET   - TS for pleurx placement     #Afib/ HTN  rate control   heparin for AC as tolerated , hold eliquis for pleurx next week   telemonitoring       #Urinary Retention  -Continue plummer, per previous notes patient's family did not want trial of void  -Continue flomax    #DVT prophylaxis     Discussed with patient son

## 2023-03-08 NOTE — PROGRESS NOTE ADULT - SUBJECTIVE AND OBJECTIVE BOX
DATE OF SERVICE: 03-08-23 @ 09:20    Subjective: Patient seen and examined. No new events except as noted.     SUBJECTIVE/ROS:        MEDICATIONS:  MEDICATIONS  (STANDING):  acetaminophen   IVPB .. 1000 milliGRAM(s) IV Intermittent once  albuterol    0.083% 2.5 milliGRAM(s) Nebulizer every 6 hours  chlorhexidine 2% Cloths 1 Application(s) Topical <User Schedule>  escitalopram 5 milliGRAM(s) Oral daily  famotidine    Tablet 20 milliGRAM(s) Oral daily  melatonin 6 milliGRAM(s) Oral at bedtime  midodrine. 5 milliGRAM(s) Oral three times a day  polyethylene glycol 3350 17 Gram(s) Oral daily  senna 2 Tablet(s) Oral at bedtime  simvastatin 40 milliGRAM(s) Oral at bedtime  sodium chloride 3%  Inhalation 4 milliLiter(s) Inhalation every 6 hours  tamsulosin 0.4 milliGRAM(s) Oral at bedtime      PHYSICAL EXAM:  T(C): 36.6 (03-08-23 @ 05:40), Max: 36.9 (03-07-23 @ 16:24)  HR: 82 (03-08-23 @ 05:40) (82 - 108)  BP: 109/64 (03-08-23 @ 05:40) (100/56 - 140/93)  RR: 18 (03-08-23 @ 05:40) (18 - 19)  SpO2: 98% (03-08-23 @ 05:40) (93% - 100%)  Wt(kg): --  I&O's Summary    07 Mar 2023 07:01  -  08 Mar 2023 07:00  --------------------------------------------------------  IN: 290 mL / OUT: 1075 mL / NET: -785 mL            JVP: Normal  Neck: supple  Lung: clear   CV: S1 S2 , Murmur:  Abd: soft  Ext: No edema  neuro: Awake / alert  Psych: flat affect  Skin: normal``    LABS/DATA:    CARDIAC MARKERS:                                10.2   9.41  )-----------( 133      ( 08 Mar 2023 04:41 )             33.0     03-08    139  |  104  |  9   ----------------------------<  116<H>  3.7   |  27  |  0.48<L>    Ca    8.4      08 Mar 2023 04:41  Phos  2.7     03-08  Mg     1.70     03-08    TPro  5.8<L>  /  Alb  2.5<L>  /  TBili  0.6  /  DBili  x   /  AST  11  /  ALT  20  /  AlkPhos  71  03-08    proBNP:   Lipid Profile:   HgA1c:   TSH:     TELE:  EKG:

## 2023-03-08 NOTE — CHART NOTE - NSCHARTNOTEFT_GEN_A_CORE
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 There is   an associated hematoma/pseudoaneurysm. Vascular surgery consult by ATTG

## 2023-03-08 NOTE — CONSULT NOTE ADULT - SUBJECTIVE AND OBJECTIVE BOX
VASCULAR SURGERY CONSULT  ========================    HPI:  82 year old male with PMH atrial fibrillation, HTN and chronic urinary retention who presented to [facility] with hypoxic respiratory failure. Patient was intubated, found to have widespread lymphadenopathy and a R pleural effusion. During hospital course, patient was extubated and then transferred to [facility] for further workup due to concern for lymphoma. He had a lymph node biopsy which was inconclusive. He underwent a R VATS with mediastinal lymphadenectomy and PleurX placement on 3/3, path results are pending. Also underwent a bone marrow biopsy. Heme/onc planning for possible chemotherapy inpatient.    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 Patient had a PET scan today which showed a mass in the distal aorta and on review of CTA from 2/17, it was noted that this "mass" is actually an ulcer in the distal aorta with associated hematoma/pseudoaneurysm, which was not picked up until today. A CT scan from 2021 notes a distal 2.9cm infrarenal aortic aneurysm.    Patient has a good functional baseline status. Lives with wife, is AOx3, independent with ADLs. Never smoker. Occasionally uses walker to ambulate.    PAST MEDICAL & SURGICAL HISTORY:  Atrial fibrillation    Hypertension    Urinary retention    No significant past surgical history      MEDICATIONS  (STANDING):  acetaminophen   IVPB .. 1000 milliGRAM(s) IV Intermittent once  albuterol    0.083% 2.5 milliGRAM(s) Nebulizer every 6 hours  chlorhexidine 2% Cloths 1 Application(s) Topical <User Schedule>  escitalopram 5 milliGRAM(s) Oral daily  famotidine    Tablet 20 milliGRAM(s) Oral daily  heparin  Infusion.  Unit(s)/Hr (16 mL/Hr) IV Continuous <Continuous>  melatonin 6 milliGRAM(s) Oral at bedtime  midodrine. 5 milliGRAM(s) Oral three times a day  polyethylene glycol 3350 17 Gram(s) Oral daily  senna 2 Tablet(s) Oral at bedtime  simvastatin 40 milliGRAM(s) Oral at bedtime  sodium chloride 3%  Inhalation 4 milliLiter(s) Inhalation every 6 hours  tamsulosin 0.4 milliGRAM(s) Oral at bedtime      ALLERGIES:  PCN  ___________________________________________  REVIEW OF SYSTEMS:  All ROS negative except as per HPI.    ___________________________________________  VITALS:  Vital Signs Last 24 Hrs  T(C): 36.8 (08 Mar 2023 14:50), Max: 36.8 (08 Mar 2023 01:45)  T(F): 98.2 (08 Mar 2023 14:50), Max: 98.3 (08 Mar 2023 01:45)  HR: 105 (08 Mar 2023 16:53) (82 - 108)  BP: 119/80 (08 Mar 2023 14:50) (109/64 - 140/93)  BP(mean): --  RR: 18 (08 Mar 2023 14:50) (18 - 19)  SpO2: 96% (08 Mar 2023 16:53) (93% - 98%)    Parameters below as of 08 Mar 2023 16:53  Patient On (Oxygen Delivery Method): nasal cannula      CAPILLARY BLOOD GLUCOSE  POCT Blood Glucose.: 180 mg/dL (06 Mar 2023 22:27)      I&O's Detail    07 Mar 2023 07:01  -  08 Mar 2023 07:00  --------------------------------------------------------  IN:    Oral Fluid: 290 mL  Total IN: 290 mL    OUT:    Indwelling Catheter - Urethral (mL): 1075 mL  Total OUT: 1075 mL    Total NET: -785 mL      08 Mar 2023 07:01  -  08 Mar 2023 20:41  --------------------------------------------------------  IN:  Total IN: 0 mL    OUT:    Indwelling Catheter - Urethral (mL): 300 mL  Total OUT: 300 mL    Total NET: -300 mL      PHYSICAL EXAM:  General: AAOx3, no acute distress.  Respiratory: breathing comfortably, no increased WOB   Abdomen: soft, nontender, nondistended, no rebound, no guarding  Extremities: Moves all four.   - RLE palpable DP/PT/femoral pulse  - LLE palpable DP/PT/femoral pulse    ____________________________________________  LABS:  CBC Full  -  ( 08 Mar 2023 04:41 )  WBC Count : 9.41 K/uL  RBC Count : 3.64 M/uL  Hemoglobin : 10.2 g/dL  Hematocrit : 33.0 %  Platelet Count - Automated : 133 K/uL  Mean Cell Volume : 90.7 fL  Mean Cell Hemoglobin : 28.0 pg  Mean Cell Hemoglobin Concentration : 30.9 gm/dL  Auto Neutrophil # : 7.01 K/uL  Auto Lymphocyte # : 1.27 K/uL  Auto Monocyte # : 0.98 K/uL  Auto Eosinophil # : 0.07 K/uL  Auto Basophil # : 0.02 K/uL  Auto Neutrophil % : 74.6 %  Auto Lymphocyte % : 13.5 %  Auto Monocyte % : 10.4 %  Auto Eosinophil % : 0.7 %  Auto Basophil % : 0.2 %    03-08    139  |  104  |  9   ----------------------------<  116<H>  3.7   |  27  |  0.48<L>    Ca    8.4      08 Mar 2023 04:41  Phos  2.7     03-08  Mg     1.70     03-08    TPro  5.8<L>  /  Alb  2.5<L>  /  TBili  0.6  /  DBili  x   /  AST  11  /  ALT  20  /  AlkPhos  71  03-08    LIVER FUNCTIONS - ( 08 Mar 2023 04:41 )  Alb: 2.5 g/dL / Pro: 5.8 g/dL / ALK PHOS: 71 U/L / ALT: 20 U/L / AST: 11 U/L / GGT: x           PTT - ( 08 Mar 2023 19:31 )  PTT:31.4 sec      ____________________________________________  RADIOLOGY & ADDITIONAL STUDIES:  < from: NM PET/CT Onc FDG Skull to Thigh, Inital (03.08.23 @ 14:22) >  EXAM: [id number] - PETCT KIRIT ONC FDG INIT  - ORDERED BY: MAHOGANY QUILES      PROCEDURE DATE:  03/08/2023           INTERPRETATION:  PROCEDURE: PET SKULL TO THIGH ONC FDG INIT    CLINICAL INFORMATION: 82 years-old hospitalized Male with newly diagnosed   lymphoma.    TREATMENT STRATEGY EVALUATION: Initial  FASTING BLOOD SUGAR: 86 mg/dl  RADIOPHARMACEUTICAL:  11.95 mCi F-18, FDG, I.V.  UPTAKE PERIOD: 57 minutes  SCANNER: [email or web address]  ORAL CONTRAST: Patient drank OMNIPAQUE contrast during the uptake period.    TECHNIQUE: Following intravenous injection of radiopharmaceutical and   above uptake period, PET/CT was obtained from base of neck to mid-thigh   followed by dedicated images of the head and neck. CT protocol was   optimized for PET attenuation correction and anatomic localization and   was not designed to produce and cannot replace state-of-the-art   diagnostic CT images with specific imaging protocols for different body   parts and indications. Images were reconstructed and reviewed in axial,   coronal and sagittal views and three-dimensional MIP.    Standardized uptake values ("SUV") are normalized to patient body weight   and indicate the highest activity concentration (SUVmax) in a given   disease site. All image numbers refer to axial image numbers.    COMPARISON: No prior FDG PET/CT available for review.    OTHER STUDIES USED FOR CORRELATION: CT head, chest, abdomen to 17 2023    FINDINGS:    HEAD/NECK: Evaluation is limited by heterogeneous activity and patient   positioning. Activity seen in the RIGHT neck (SUV 10.2, image 34 head and   neck series and adjacent images is difficult to delineate on low-dose CT,   but is suspicious for lymphadenopathy. Otherwise physiologic FDG activity   in visualized brain.  There is carotid atherosclerosis.    CHEST: Hypermetabolic nodes are seen at the thoracic inlet/high RIGHT   paratracheal region (2.8 x 2.1 cm, SUV 7.3), prevascular station (2.2 x   1.6 cm, SUV 9.2, image 72), proximal RIGHT peribronchial/perihilar (SUV   7.6, image 78), as well as additional locations.    LUNGS: Similar to recent CT, there is again a RIGHT pleural effusion with   areas of possible consolidation/opacity seen throughout the RIGHT lung   fields, with heterogeneous uptake. This is most notable at the posterior   RIGHT LOWER lobe (SUV 5.4, image 75).    PLEURA/PERICARDIUM: As above. No pericardial or LEFT pleural effusion.    ESOPHAGUS/STOMACH: No abnormal FDG activity.    HEPATOBILIARY/PANCREAS: Physiologic hepatobiliary FDG activity. For   reference, liver SUV mean is 2.6.    SPLEEN: Diffusely increased activity seen throughout the spleen relative   to liver, nonspecific but abnormal.    ADRENAL GLANDS: No abnormal FDG activity. No nodule(s).    KIDNEYS/URINARY BLADDER: Physiologic excreted FDG activity.    REPRODUCTIVE ORGANS: The adjacent to activity along the urethra were a   Choi catheter is noted, is a small area of asymmetric activity on the   LEFT (SUV 3.9, image 218).    ABDOMINOPELVIC NODES: Right retrocrural node (1.2 x 1.0 cm, SUV 3.9,   image 125). 1.3 cm aortocaval node (SUV 3.5, image 126). Additional   RIGHT-sided node anterior to the great vessels to the RIGHT of midline   (1.3 x 1.1 cm, SUV 5.9, image 144).    BOWEL/PERITONEUM/MESENTERY: No abnormal FDG activity. Oral contrast has   reached terminal ileum.    ABDOMINAL WALL: No abnormal FDG activity.    BONES/SOFT TISSUES: Area of suspected focal uptake in the RIGHT   hemisacrum just adjacent to the midline (SUV 4.6, image 187), without   clear low-dose CT correlate. There is heterogeneous marrow uptake   otherwise seen. There are some areas of relatively greater uptake which   largely appear to be related to endplate/degenerative change. Scoliosis   is seen in the spine along with mild loss of height at multiple vertebral   bodies without evidence of acute compression deformity.    VESSELS: Nonavid eccentric soft tissue at distal abdominal aorta (image   157). There is otherwise atherosclerotic change seen throughout the   abdominal aorta and runoff vessels..    IMPRESSION:  1.  Upper metabolic lymph nodes above and below the diaphragm.  2.  Suspected osseous focus in the RIGHT hemisacrum.  3.  Diffusely increased splenic uptake relative to liver, abnormal but   nonspecific finding which may be seen in the setting of malignancy as   well as other systemic processes such as infection or inflammatory   disorders.  4.  Right pleural effusion with additional pulmonary findings; additional   malignant involvement versus inflammatory or infectious process not   excluded. If it will change patient management, thoracentesis may be   diagnostic and therapeutic.  5.  Area of soft tissue adjacent to distal abdominal aorta without   abnormal uptake. Further evaluation as warranted clinically.    < end of copied text >

## 2023-03-08 NOTE — CONSULT NOTE ADULT - ATTENDING COMMENTS
small IWONA  follow up CTA after medically optimized
-------------------------------------------------------------------------------  81 yo gentleman with 3 week history of dyspnea, pleural effusion (non-malignant), systemic lymphadenopathy concerning for lymphoproliferative disorder, non-diagnostic lymph node biopsy, transferred from [facility] to [facility] for further evaluation. Planned for VATS procedure as per CT surgery and will await excisional lymph node biopsy for further evaluation of underlying disease  - discussed with patient and his son that without pathologic identification, treatment cannot be planned  - noted leukocytosis; will review peripheral smear  - tumor lysis labs including LDH and uric acid to be ordered  - to check hep B panel; HIV low suspicion but would check top complete work-up  - will follow

## 2023-03-08 NOTE — CONSULT NOTE ADULT - ASSESSMENT
82 year old male with PMH atrial fibrillation, HTN and chronic urinary retention who presented to [facility] with hypoxic respiratory failure requiring intubation, now extubated but found to have widespread lymphadenopathy, currently undergoing workup for lymphoma. 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    Plan/Recommendations:  - No acute surgical intervention as findings today appear stable compared to CTA from 2/17. On review of CT from 2021, there is an infrarenal 2.9cm aortic aneurysm, which today measures 3.4cm approximately and does not warrant urgent surgical intervention  - Recommend aspirin  - Continue statin  - Will need outpatient interval imaging and follow-up with Dr. Gibbons from vascular surgery    D/w vascular surgery fellow on behalf of attending    EMELI Sesay, PGY3  C Team Surgery   [id number]

## 2023-03-08 NOTE — CONSULT NOTE ADULT - CONSULT REASON
eval for rehab
Lymph node bx
Pre-Operative Cardiac Risk Stratification and Optimization
Concern for lymphoproliferative neoplasm, lymphadenopathy - hilar and supraclavicular
Penetrating atherosclerotic aortic ulcer

## 2023-03-08 NOTE — PROGRESS NOTE ADULT - SUBJECTIVE AND OBJECTIVE BOX
Name of Patient : ARIK DUMONT  MRN: [id number]  Date of visit: 03-08-23       Subjective: Patient seen and examined. No new events except as noted.   doing okay  son at the bedside    REVIEW OF SYSTEMS:    CONSTITUTIONAL: No weakness, fevers or chills  EYES/ENT: No visual changes;  No vertigo or throat pain   NECK: No pain or stiffness  RESPIRATORY: No cough, wheezing, hemoptysis; No shortness of breath  CARDIOVASCULAR: No chest pain or palpitations  GASTROINTESTINAL: No abdominal or epigastric pain. No nausea, vomiting, or hematemesis; No diarrhea or constipation. No melena or hematochezia.  GENITOURINARY: No dysuria, frequency or hematuria  NEUROLOGICAL: No numbness or weakness  SKIN: No itching, burning, rashes, or lesions   All other review of systems is negative unless indicated above.    MEDICATIONS:  MEDICATIONS  (STANDING):  acetaminophen   IVPB .. 1000 milliGRAM(s) IV Intermittent once  albuterol    0.083% 2.5 milliGRAM(s) Nebulizer every 6 hours  chlorhexidine 2% Cloths 1 Application(s) Topical <User Schedule>  escitalopram 5 milliGRAM(s) Oral daily  famotidine    Tablet 20 milliGRAM(s) Oral daily  heparin  Infusion.  Unit(s)/Hr (16 mL/Hr) IV Continuous <Continuous>  melatonin 6 milliGRAM(s) Oral at bedtime  midodrine. 5 milliGRAM(s) Oral three times a day  polyethylene glycol 3350 17 Gram(s) Oral daily  senna 2 Tablet(s) Oral at bedtime  simvastatin 40 milliGRAM(s) Oral at bedtime  sodium chloride 3%  Inhalation 4 milliLiter(s) Inhalation every 6 hours  tamsulosin 0.4 milliGRAM(s) Oral at bedtime      PHYSICAL EXAM:  T(C): 36.8 (03-08-23 @ 14:50), Max: 36.8 (03-08-23 @ 01:45)  HR: 105 (03-08-23 @ 16:53) (82 - 108)  BP: 119/80 (03-08-23 @ 14:50) (106/62 - 140/93)  RR: 18 (03-08-23 @ 14:50) (18 - 19)  SpO2: 96% (03-08-23 @ 16:53) (93% - 100%)  Wt(kg): --  I&O's Summary    07 Mar 2023 07:01  -  08 Mar 2023 07:00  --------------------------------------------------------  IN: 290 mL / OUT: 1075 mL / NET: -785 mL    08 Mar 2023 07:01  -  08 Mar 2023 19:19  --------------------------------------------------------  IN: 0 mL / OUT: 300 mL / NET: -300 mL          Appearance: Normal	  HEENT:  PERRLA   Lymphatic: No lymphadenopathy   Cardiovascular: Normal S1 S2, no JVD  Respiratory: normal effort , clear  Gastrointestinal:  Soft, Non-tender  Skin: No rashes,  warm to touch  Psychiatry:  Mood & affect appropriate  Musculuskeletal: No edema    recent labs, Imaging and EKGs personally reviewed     03-07-23 @ 07:01  -  03-08-23 @ 07:00  --------------------------------------------------------  IN: 290 mL / OUT: 1075 mL / NET: -785 mL    03-08-23 @ 07:01  -  03-08-23 @ 19:19  --------------------------------------------------------  IN: 0 mL / OUT: 300 mL / NET: -300 mL                          10.2   9.41  )-----------( 133      ( 08 Mar 2023 04:41 )             33.0               03-08    139  |  104  |  9   ----------------------------<  116<H>  3.7   |  27  |  0.48<L>    Ca    8.4      08 Mar 2023 04:41  Phos  2.7     03-08  Mg     1.70     03-08    TPro  5.8<L>  /  Alb  2.5<L>  /  TBili  0.6  /  DBili  x   /  AST  11  /  ALT  20  /  AlkPhos  71  03-08

## 2023-03-09 LAB
ANION GAP SERPL CALC-SCNC: 9 MMOL/L — SIGNIFICANT CHANGE UP (ref 7–14)
APTT BLD: 106.2 SEC — HIGH (ref 27–36.3)
BUN SERPL-MCNC: 10 MG/DL — SIGNIFICANT CHANGE UP (ref 7–23)
CALCIUM SERPL-MCNC: 8.4 MG/DL — SIGNIFICANT CHANGE UP (ref 8.4–10.5)
CHLORIDE SERPL-SCNC: 103 MMOL/L — SIGNIFICANT CHANGE UP (ref 98–107)
CO2 SERPL-SCNC: 28 MMOL/L — SIGNIFICANT CHANGE UP (ref 22–31)
CREAT SERPL-MCNC: 0.53 MG/DL — SIGNIFICANT CHANGE UP (ref 0.5–1.3)
EGFR: 99 ML/MIN/1.73M2 — SIGNIFICANT CHANGE UP
GLUCOSE BLDC GLUCOMTR-MCNC: 188 MG/DL — HIGH (ref 70–99)
GLUCOSE SERPL-MCNC: 117 MG/DL — HIGH (ref 70–99)
HCT VFR BLD CALC: 32.8 % — LOW (ref 39–50)
HCT VFR BLD CALC: 33.2 % — LOW (ref 39–50)
HEMATOPATHOLOGY REPORT: SIGNIFICANT CHANGE UP
HEMATOPATHOLOGY REPORT: SIGNIFICANT CHANGE UP
HGB BLD-MCNC: 10.1 G/DL — LOW (ref 13–17)
HGB BLD-MCNC: 10.2 G/DL — LOW (ref 13–17)
MCHC RBC-ENTMCNC: 27.7 PG — SIGNIFICANT CHANGE UP (ref 27–34)
MCHC RBC-ENTMCNC: 27.7 PG — SIGNIFICANT CHANGE UP (ref 27–34)
MCHC RBC-ENTMCNC: 30.4 GM/DL — LOW (ref 32–36)
MCHC RBC-ENTMCNC: 31.1 GM/DL — LOW (ref 32–36)
MCV RBC AUTO: 89.1 FL — SIGNIFICANT CHANGE UP (ref 80–100)
MCV RBC AUTO: 91.2 FL — SIGNIFICANT CHANGE UP (ref 80–100)
NRBC # BLD: 0 /100 WBCS — SIGNIFICANT CHANGE UP (ref 0–0)
NRBC # BLD: 0 /100 WBCS — SIGNIFICANT CHANGE UP (ref 0–0)
NRBC # FLD: 0 K/UL — SIGNIFICANT CHANGE UP (ref 0–0)
NRBC # FLD: 0 K/UL — SIGNIFICANT CHANGE UP (ref 0–0)
PLATELET # BLD AUTO: 128 K/UL — LOW (ref 150–400)
PLATELET # BLD AUTO: 134 K/UL — LOW (ref 150–400)
POTASSIUM SERPL-MCNC: 3.6 MMOL/L — SIGNIFICANT CHANGE UP (ref 3.5–5.3)
POTASSIUM SERPL-SCNC: 3.6 MMOL/L — SIGNIFICANT CHANGE UP (ref 3.5–5.3)
RBC # BLD: 3.64 M/UL — LOW (ref 4.2–5.8)
RBC # BLD: 3.68 M/UL — LOW (ref 4.2–5.8)
RBC # FLD: 19.5 % — HIGH (ref 10.3–14.5)
RBC # FLD: 19.7 % — HIGH (ref 10.3–14.5)
SODIUM SERPL-SCNC: 140 MMOL/L — SIGNIFICANT CHANGE UP (ref 135–145)
WBC # BLD: 9.69 K/UL — SIGNIFICANT CHANGE UP (ref 3.8–10.5)
WBC # BLD: 9.74 K/UL — SIGNIFICANT CHANGE UP (ref 3.8–10.5)
WBC # FLD AUTO: 9.69 K/UL — SIGNIFICANT CHANGE UP (ref 3.8–10.5)
WBC # FLD AUTO: 9.74 K/UL — SIGNIFICANT CHANGE UP (ref 3.8–10.5)

## 2023-03-09 PROCEDURE — 99232 SBSQ HOSP IP/OBS MODERATE 35: CPT

## 2023-03-09 PROCEDURE — 99233 SBSQ HOSP IP/OBS HIGH 50: CPT | Mod: GC

## 2023-03-09 RX ORDER — ATENOLOL 25 MG/1
12.5 TABLET ORAL DAILY
Refills: 0 | Status: DISCONTINUED | OUTPATIENT
Start: 2023-03-09 | End: 2023-03-15

## 2023-03-09 RX ORDER — APIXABAN 2.5 MG/1
5 TABLET, FILM COATED ORAL
Refills: 0 | Status: DISCONTINUED | OUTPATIENT
Start: 2023-03-09 | End: 2023-03-12

## 2023-03-09 RX ORDER — OXYCODONE HYDROCHLORIDE 5 MG/1
5 TABLET ORAL ONCE
Refills: 0 | Status: DISCONTINUED | OUTPATIENT
Start: 2023-03-09 | End: 2023-03-15

## 2023-03-09 RX ADMIN — SODIUM CHLORIDE 4 MILLILITER(S): 9 INJECTION INTRAMUSCULAR; INTRAVENOUS; SUBCUTANEOUS at 04:24

## 2023-03-09 RX ADMIN — SODIUM CHLORIDE 4 MILLILITER(S): 9 INJECTION INTRAMUSCULAR; INTRAVENOUS; SUBCUTANEOUS at 10:09

## 2023-03-09 RX ADMIN — ALBUTEROL 2.5 MILLIGRAM(S): 90 AEROSOL, METERED ORAL at 22:40

## 2023-03-09 RX ADMIN — Medication 50 MILLIGRAM(S): at 11:47

## 2023-03-09 RX ADMIN — SODIUM CHLORIDE 4 MILLILITER(S): 9 INJECTION INTRAMUSCULAR; INTRAVENOUS; SUBCUTANEOUS at 16:30

## 2023-03-09 RX ADMIN — ALBUTEROL 2.5 MILLIGRAM(S): 90 AEROSOL, METERED ORAL at 04:24

## 2023-03-09 RX ADMIN — MIDODRINE HYDROCHLORIDE 5 MILLIGRAM(S): 2.5 TABLET ORAL at 05:37

## 2023-03-09 RX ADMIN — SIMVASTATIN 40 MILLIGRAM(S): 20 TABLET, FILM COATED ORAL at 21:43

## 2023-03-09 RX ADMIN — ALBUTEROL 2.5 MILLIGRAM(S): 90 AEROSOL, METERED ORAL at 10:09

## 2023-03-09 RX ADMIN — HEPARIN SODIUM 1400 UNIT(S)/HR: 5000 INJECTION INTRAVENOUS; SUBCUTANEOUS at 03:31

## 2023-03-09 RX ADMIN — ESCITALOPRAM OXALATE 5 MILLIGRAM(S): 10 TABLET, FILM COATED ORAL at 21:44

## 2023-03-09 RX ADMIN — CHLORHEXIDINE GLUCONATE 1 APPLICATION(S): 213 SOLUTION TOPICAL at 05:37

## 2023-03-09 RX ADMIN — MIDODRINE HYDROCHLORIDE 5 MILLIGRAM(S): 2.5 TABLET ORAL at 21:44

## 2023-03-09 RX ADMIN — Medication 6 MILLIGRAM(S): at 21:44

## 2023-03-09 RX ADMIN — TAMSULOSIN HYDROCHLORIDE 0.4 MILLIGRAM(S): 0.4 CAPSULE ORAL at 21:43

## 2023-03-09 RX ADMIN — ALBUTEROL 2.5 MILLIGRAM(S): 90 AEROSOL, METERED ORAL at 16:29

## 2023-03-09 RX ADMIN — HEPARIN SODIUM 1400 UNIT(S)/HR: 5000 INJECTION INTRAVENOUS; SUBCUTANEOUS at 07:20

## 2023-03-09 RX ADMIN — FAMOTIDINE 20 MILLIGRAM(S): 10 INJECTION INTRAVENOUS at 15:09

## 2023-03-09 RX ADMIN — SODIUM CHLORIDE 4 MILLILITER(S): 9 INJECTION INTRAMUSCULAR; INTRAVENOUS; SUBCUTANEOUS at 22:39

## 2023-03-09 RX ADMIN — APIXABAN 5 MILLIGRAM(S): 2.5 TABLET, FILM COATED ORAL at 18:59

## 2023-03-09 RX ADMIN — MIDODRINE HYDROCHLORIDE 5 MILLIGRAM(S): 2.5 TABLET ORAL at 15:09

## 2023-03-09 NOTE — PROGRESS NOTE ADULT - SUBJECTIVE AND OBJECTIVE BOX
Patient is a 82y old  Male who presents with a chief complaint of R pleural effusion with chest tube (05 Mar 2023 20:41)    s/p PET  per chart planning for chemo, PICC      REVIEW OF SYSTEMS  weakness    PAST MEDICAL & SURGICAL HISTORY   Atrial fibrillation    Hypertension    Urinary retention    No significant past surgical history       CURRENT FUNCTIONAL STATUS  3/7  Sit-Stand Transfer Training  Sit-to-Stand Transfer Training Rehab Potential: good, to achieve stated therapy goals  Sit-to-Stand Transfer Training Symptoms Noted During/After Treatment: none  Transfer Training Sit-to-Stand Transfer: moderate assist (50% patient effort);  1 person assist;  verbal cues;  weight-bearing as tolerated   rolling walker  Transfer Training Stand-to-Sit Transfer: minimum assist (75% patient effort);  1 person assist;  verbal cues;  weight-bearing as tolerated   rolling walker  Sit-to-Stand Transfer Training Transfer Safety Analysis: decreased step length;  decreased weight-shifting ability;  decreased strength;  decreased flexibility;  impaired postural control;  impaired balance;  rolling walker    Gait Training  Gait Training Rehab Potential: good, to achieve stated therapy goals  Gait Training Symptoms Noted During/After Treatment: fatigue;  shortness of breath  Gait Training: minimum assist (75% patient effort);  1 person assist;  verbal cues;  weight-bearing as tolerated   rolling walker;  30  Gait Analysis: 2-point gait   decreased isabella;  decreased stride length;  crouch;  increased time in double stance;  decreased hip/knee flexion;  decreased velocity of limb motion;  impaired balance;  decreased strength;  decreased flexibility;  30;  rolling walker  Gait Number of Times:: x 2  Type of Rest Type of Rest: sitting  Duration of Rest Duration of Rest: 10 minutes     Therapeutic Exercise  Therapeutic Exercise Rehab Effort: good  Therapeutic Exercise Detail: long arc quads, marches, clam shells, ankle pumps performed as warm up. Encouraged Home exercise program to maintain strength and prevent DVT while in the hospital     Drains/Tubes        RECENT LABS/IMAGING  CBC Full  -  ( 09 Mar 2023 05:26 )  WBC Count : 9.74 K/uL  RBC Count : 3.64 M/uL  Hemoglobin : 10.1 g/dL  Hematocrit : 33.2 %  Platelet Count - Automated : 134 K/uL  Mean Cell Volume : 91.2 fL  Mean Cell Hemoglobin : 27.7 pg  Mean Cell Hemoglobin Concentration : 30.4 gm/dL  Auto Neutrophil # : x  Auto Lymphocyte # : x  Auto Monocyte # : x  Auto Eosinophil # : x  Auto Basophil # : x  Auto Neutrophil % : x  Auto Lymphocyte % : x  Auto Monocyte % : x  Auto Eosinophil % : x  Auto Basophil % : x    03-09    140  |  103  |  10  ----------------------------<  117<H>  3.6   |  28  |  0.53    Ca    8.4      09 Mar 2023 05:26  Phos  2.7     03-08  Mg     1.70     03-08    TPro  5.8<L>  /  Alb  2.5<L>  /  TBili  0.6  /  DBili  x   /  AST  11  /  ALT  20  /  AlkPhos  71  03-08        VITALS  T(C): 36.5 (03-09-23 @ 05:35), Max: 37.3 (03-08-23 @ 21:50)  HR: 89 (03-09-23 @ 10:24) (89 - 105)  BP: 116/83 (03-09-23 @ 05:35) (105/65 - 119/80)  RR: 17 (03-09-23 @ 05:35) (17 - 18)  SpO2: 97% (03-09-23 @ 10:24) (95% - 100%)  Wt(kg): --    ALLERGIES  pertussis vaccines (Rash)  shellfish (Rash)      MEDICATIONS   acetaminophen   IVPB .. 1000 milliGRAM(s) IV Intermittent once  albuterol    0.083% 2.5 milliGRAM(s) Nebulizer every 6 hours  atenolol  Tablet 12.5 milliGRAM(s) Oral daily  chlorhexidine 2% Cloths 1 Application(s) Topical <User Schedule>  escitalopram 5 milliGRAM(s) Oral daily  famotidine    Tablet 20 milliGRAM(s) Oral daily  heparin   Injectable 7000 Unit(s) IV Push every 6 hours PRN  heparin   Injectable 3500 Unit(s) IV Push every 6 hours PRN  heparin  Infusion.  Unit(s)/Hr IV Continuous <Continuous>  melatonin 6 milliGRAM(s) Oral at bedtime  midodrine. 5 milliGRAM(s) Oral three times a day  oxyCODONE    IR 5 milliGRAM(s) Oral once PRN  polyethylene glycol 3350 17 Gram(s) Oral daily  predniSONE   Tablet 50 milliGRAM(s) Oral daily  senna 2 Tablet(s) Oral at bedtime  simvastatin 40 milliGRAM(s) Oral at bedtime  sodium chloride 3%  Inhalation 4 milliLiter(s) Inhalation every 6 hours  tamsulosin 0.4 milliGRAM(s) Oral at bedtime      ----------------------------------------------------------------------------------------  PHYSICAL EXAM  Constitutional - NAD, Comfortable  HEENT - NCAT, EOMI, wearing nasal cannula  Chest - pleurex  Cardiovascular - RRR, S1S2   Abdomen - Soft, NTND  Extremities - No C/C/E, No calf tenderness   Neurologic Exam -                    Cognitive - Awake, Alert, AAO to self, place, date, year, situation     Communication - Fluent, No dysarthria     Cranial Nerves - CN 2-12 intact     Motor - No focal deficits                    LEFT    UE - 4+/5                    RIGHT UE - 4+/5                    LEFT    LE - 4+/5                    RIGHT LE - 4+/5        Sensory - Intact to LT      Balance - WNL Static  Psychiatric - Mood stable, Affect WNL  ----------------------------------------------------------------------------------------  ASSESSMENT/PLAN    81 yo m pmh afib, htn, urinary retention, p/w hypoxic resp failure, recurrent R pleural effusion, lymphadenopathy suspicious of underlying malignancy  s/p steroid taper  s/p ln biopsy  s/p PET scan tomorrow  bone marrow biopsy done 3/7  continue bedside PT and OT  plummer  diet: regular dash/tlc no straws   Rehab - planning for chemo  will monitor for functional improvement  recommended for acute rehab at current level of function

## 2023-03-09 NOTE — PROGRESS NOTE ADULT - SUBJECTIVE AND OBJECTIVE BOX
DATE OF SERVICE: 03-09-23 @ 07:11    Subjective: Patient seen and examined. No new events except as noted.     SUBJECTIVE/ROS:  MEDICATIONS:  MEDICATIONS  (STANDING):  acetaminophen   IVPB .. 1000 milliGRAM(s) IV Intermittent once  albuterol    0.083% 2.5 milliGRAM(s) Nebulizer every 6 hours  chlorhexidine 2% Cloths 1 Application(s) Topical <User Schedule>  escitalopram 5 milliGRAM(s) Oral daily  famotidine    Tablet 20 milliGRAM(s) Oral daily  heparin  Infusion.  Unit(s)/Hr (16 mL/Hr) IV Continuous <Continuous>  melatonin 6 milliGRAM(s) Oral at bedtime  midodrine. 5 milliGRAM(s) Oral three times a day  polyethylene glycol 3350 17 Gram(s) Oral daily  senna 2 Tablet(s) Oral at bedtime  simvastatin 40 milliGRAM(s) Oral at bedtime  sodium chloride 3%  Inhalation 4 milliLiter(s) Inhalation every 6 hours  tamsulosin 0.4 milliGRAM(s) Oral at bedtime      PHYSICAL EXAM:  T(C): 36.5 (03-09-23 @ 05:35), Max: 37.3 (03-08-23 @ 21:50)  HR: 101 (03-09-23 @ 05:35) (90 - 105)  BP: 116/83 (03-09-23 @ 05:35) (105/65 - 119/80)  RR: 17 (03-09-23 @ 05:35) (17 - 18)  SpO2: 98% (03-09-23 @ 05:35) (95% - 100%)  Wt(kg): --  I&O's Summary    08 Mar 2023 07:01  -  09 Mar 2023 07:00  --------------------------------------------------------  IN: 672 mL / OUT: 725 mL / NET: -53 mL            JVP: Normal  Neck: supple  Lung: clear   CV: S1 S2 , Murmur:  Abd: soft  Ext: No edema  neuro: Awake / alert  Psych: flat affect  Skin: normal``    LABS/DATA:    CARDIAC MARKERS:                                10.1   9.74  )-----------( 134      ( 09 Mar 2023 05:26 )             33.2     03-08    139  |  104  |  9   ----------------------------<  116<H>  3.7   |  27  |  0.48<L>    Ca    8.4      08 Mar 2023 04:41  Phos  2.7     03-08  Mg     1.70     03-08    TPro  5.8<L>  /  Alb  2.5<L>  /  TBili  0.6  /  DBili  x   /  AST  11  /  ALT  20  /  AlkPhos  71  03-08    proBNP:   Lipid Profile:   HgA1c:   TSH:     TELE:  EKG:

## 2023-03-09 NOTE — PROGRESS NOTE ADULT - ASSESSMENT
82 year old male with PMH atrial fibrillation, HTN and chronic urinary retention who presented to [facility] from home with acute hypoxic respiratory failure with hypercapnia. Patient was admitted to ICU an [facility], now downgraded and transferred to  [facility] for continuation of his care     #Acute hypoxic respiratory failure with hypercapnia  #Recurrent R pleural effusion-exudative  #Lymphadenopathy and leukocytosis-suspect underlying malignancy  -With large right sided pleural effusion, now status post pigtail placement, monitor output   -O 2 supplement PRN   -Continue steroid taper  -Patient was treated with course of empiric cefepime and dose of vanco at [place]   -Blood cultures 2/17 NGTD, Pleural fluid no growth currently, f/u final culture/cytology  -thoracic surg care appreciated, plan for pleurex and mediastinoscopy to evaluate for lymph nodes. VATS  -Heme/onc follow up appreciated   - patient will benefit form pleurx placement   - IR guided LN biopsy, follow up path report    - S/P vats and pleurx per surg team   - follow up biopsy results   - ? lymphoma, awaiting LN biopsy  - PET noted   - TS for pleurx placement     #Afib/ HTN  rate control   heparin for AC as tolerated , hold eliquis for pleurx next week   telemonitoring       #Urinary Retention  -Continue plummer, per previous notes patient's family did not want trial of void  -Continue flomax    #DVT prophylaxis     Discussed with patient son    82 year old male with PMH atrial fibrillation, HTN and chronic urinary retention who presented to [facility] from home with acute hypoxic respiratory failure with hypercapnia. Patient was admitted to ICU an [facility], now downgraded and transferred to  [facility] for continuation of his care     #Acute hypoxic respiratory failure with hypercapnia  #Recurrent R pleural effusion-exudative  #Lymphadenopathy and leukocytosis-suspect underlying malignancy  -With large right sided pleural effusion, now status post pigtail placement, monitor output   -O 2 supplement PRN   -Continue steroid taper  -Patient was treated with course of empiric cefepime and dose of vanco at [place]   -Blood cultures 2/17 NGTD, Pleural fluid no growth currently, f/u final culture/cytology  -thoracic surg care appreciated, plan for pleurex and mediastinoscopy to evaluate for lymph nodes. VATS  -Heme/onc follow up appreciated   - patient will benefit form pleurx placement , placed by CTS   - IR guided LN biopsy, follow up path report    - S/P vats and pleurx per surg team   - follow up biopsy results   - ? lymphoma, awaiting LN biopsy  - PET noted   - TS for medport on the 15th     #Afib/ HTN  rate control   telemonitoring   eliquis fo rnow, switch to heparin next week for mediport       #Urinary Retention  -Continue plummer  -Continue flomax    #DVT prophylaxis     Discussed with patient son

## 2023-03-09 NOTE — CHART NOTE - NSCHARTNOTEFT_GEN_A_CORE
Source: Patient [ x]    Family [ ]     other [x ] chart review    Patient seen for severe malnutrition f/u. 82 year old male with PMH atrial fibrillation, HTN and chronic urinary retention who presented to [facility] from home with acute hypoxic respiratory failure with hypercapnia. Patient was admitted to ICU and [place], now downgraded and transferred to [facility] for continuation of his care, s/p Pleurx (3/3) and bone marrow biopsy (3/7) per chart.    Patient reports fair appetite. Noted w/ intakes % per RN flow sheet. Amenable to nutritional supplement. Food preferences reviewed. Reports having 2 bowel movements per day that are loose. No N/V or chewing/swallowing issues. Noted w/ +1 L/R foot, ankle, leg edema and no pressure injuries per RN flow sheet.    Diet : Diet, Regular (03-04-23 @ 07:00)    Current Weight: Weight (kg): 83.5 kg (3/9)  82.7 kg (3/5)  90.7 kg (2/25)  Weight Change: Recent weight gain likely from fluid shifts. Has weight loss since admission likely from fluid shifts as well and suboptimal PO intake.    Pertinent Medications: MEDICATIONS  (STANDING):  acetaminophen   IVPB .. 1000 milliGRAM(s) IV Intermittent once  albuterol    0.083% 2.5 milliGRAM(s) Nebulizer every 6 hours  apixaban 5 milliGRAM(s) Oral two times a day  atenolol  Tablet 12.5 milliGRAM(s) Oral daily  chlorhexidine 2% Cloths 1 Application(s) Topical <User Schedule>  escitalopram 5 milliGRAM(s) Oral daily  famotidine    Tablet 20 milliGRAM(s) Oral daily  melatonin 6 milliGRAM(s) Oral at bedtime  midodrine. 5 milliGRAM(s) Oral three times a day  polyethylene glycol 3350 17 Gram(s) Oral daily  predniSONE   Tablet 50 milliGRAM(s) Oral daily  senna 2 Tablet(s) Oral at bedtime  simvastatin 40 milliGRAM(s) Oral at bedtime  sodium chloride 3%  Inhalation 4 milliLiter(s) Inhalation every 6 hours  tamsulosin 0.4 milliGRAM(s) Oral at bedtime    MEDICATIONS  (PRN):  oxyCODONE    IR 5 milliGRAM(s) Oral once PRN Moderate Pain (4 - 6)    Pertinent Labs:  03-09 Na140 mmol/L Glu 117 mg/dL<H> K+ 3.6 mmol/L Cr  0.53 mg/dL BUN 10 mg/dL 03-08 Phos 2.7 mg/dL 03-08 Alb 2.5 g/dL<L> 02-17 Chol --    LDL --    HDL --    Trig 67 mg/dL    Estimated Needs:   [x ] no change since previous assessment    Previous Nutrition Diagnosis: Severe malnutrition    Nutrition Diagnosis is [x ] ongoing  [ ] resolved [ ] not applicable     Recommend  - continue diet as ordered  - addition of ensure plus high protein BID (700 kcal, 40 g pro)  - obtain weekly weight and document PO intake to monitor trend     Monitoring and Evaluation:   [x ] PO intake [x ] Tolerance to diet prescription [ x] weights [x ] follow up per protocol Source: Patient [ x]    Family [ ]     other [x ] chart review    Patient seen for severe malnutrition f/u. 82 year old male with PMH atrial fibrillation, HTN and chronic urinary retention who presented to [facility] from home with acute hypoxic respiratory failure with hypercapnia. Patient was admitted to ICU and [place], now downgraded and transferred to [facility] for continuation of his care, s/p Pleurx (3/3) and bone marrow biopsy (3/7) per chart.    Patient reports fair appetite. Noted w/ intakes % per RN flow sheet. Amenable to nutritional supplement. Food preferences reviewed. Reports having 2 bowel movements per day that are loose. No N/V or chewing/swallowing issues. Noted w/ +1 L/R foot, ankle, leg edema and no pressure injuries per RN flow sheet.    Diet : Diet, Regular (03-04-23 @ 07:00)    Current Weight: Weight (kg): 83.5 kg (3/9)  82.7 kg (3/5)  90.7 kg (2/25)  Weight Change: Recent weight gain likely from fluid shifts. Has weight loss since admission likely from fluid shifts as well and suboptimal PO intake.    Pertinent Medications: MEDICATIONS  (STANDING):  acetaminophen   IVPB .. 1000 milliGRAM(s) IV Intermittent once  albuterol    0.083% 2.5 milliGRAM(s) Nebulizer every 6 hours  apixaban 5 milliGRAM(s) Oral two times a day  atenolol  Tablet 12.5 milliGRAM(s) Oral daily  chlorhexidine 2% Cloths 1 Application(s) Topical <User Schedule>  escitalopram 5 milliGRAM(s) Oral daily  famotidine    Tablet 20 milliGRAM(s) Oral daily  melatonin 6 milliGRAM(s) Oral at bedtime  midodrine. 5 milliGRAM(s) Oral three times a day  polyethylene glycol 3350 17 Gram(s) Oral daily  predniSONE   Tablet 50 milliGRAM(s) Oral daily  senna 2 Tablet(s) Oral at bedtime  simvastatin 40 milliGRAM(s) Oral at bedtime  sodium chloride 3%  Inhalation 4 milliLiter(s) Inhalation every 6 hours  tamsulosin 0.4 milliGRAM(s) Oral at bedtime    MEDICATIONS  (PRN):  oxyCODONE    IR 5 milliGRAM(s) Oral once PRN Moderate Pain (4 - 6)    Pertinent Labs:  03-09 Na140 mmol/L Glu 117 mg/dL<H> K+ 3.6 mmol/L Cr  0.53 mg/dL BUN 10 mg/dL 03-08 Phos 2.7 mg/dL 03-08 Alb 2.5 g/dL<L> 02-17 Chol --    LDL --    HDL --    Trig 67 mg/dL    Estimated Needs:   [x ] no change since previous assessment    Previous Nutrition Diagnosis: Severe malnutrition    Nutrition Diagnosis is [x ] ongoing  [ ] resolved [ ] not applicable     Recommend  - continue diet as ordered  - addition of ensure plus high protein BID (700 kcal, 40 g pro)  - consider d/c bowel regimen if frequent/loose stools continue  - obtain weekly weight and document PO intake to monitor trend     Monitoring and Evaluation:   [x ] PO intake [x ] Tolerance to diet prescription [ x] weights [x ] follow up per protocol

## 2023-03-09 NOTE — PROGRESS NOTE ADULT - ATTENDING COMMENTS
82y Male recently admitted with hypoxic respiratory failure, transferred for further evaluation of lymphadenopathy. s/p chest tube placement, plan for repeat biopsy of supraclavicular LN which was consistent with reactive LN. Excisonal biopsy consistent with cHL.   -trend TLS labs   -bmbx negative   -PET/CT c/w Stage IV disease   -continue prednisone 50 mg daily x 5 doses for symtpomatic relief  -plan for treatment with sequential BV-AVD, to receive BV while admitted  -continue pleurex drainage  -port placement   -PT/OT

## 2023-03-09 NOTE — PROGRESS NOTE ADULT - SUBJECTIVE AND OBJECTIVE BOX
INTERVAL HPI/OVERNIGHT EVENTS:  No overnight events.     MEDICATIONS  (STANDING):  acetaminophen   IVPB .. 1000 milliGRAM(s) IV Intermittent once  albuterol    0.083% 2.5 milliGRAM(s) Nebulizer every 6 hours  apixaban 5 milliGRAM(s) Oral two times a day  atenolol  Tablet 12.5 milliGRAM(s) Oral daily  chlorhexidine 2% Cloths 1 Application(s) Topical <User Schedule>  escitalopram 5 milliGRAM(s) Oral daily  famotidine    Tablet 20 milliGRAM(s) Oral daily  melatonin 6 milliGRAM(s) Oral at bedtime  midodrine. 5 milliGRAM(s) Oral three times a day  polyethylene glycol 3350 17 Gram(s) Oral daily  predniSONE   Tablet 50 milliGRAM(s) Oral daily  senna 2 Tablet(s) Oral at bedtime  simvastatin 40 milliGRAM(s) Oral at bedtime  sodium chloride 3%  Inhalation 4 milliLiter(s) Inhalation every 6 hours  tamsulosin 0.4 milliGRAM(s) Oral at bedtime    MEDICATIONS  (PRN):  oxyCODONE    IR 5 milliGRAM(s) Oral once PRN Moderate Pain (4 - 6)    Allergies    pertussis vaccines (Rash)  shellfish (Rash)    Intolerances          VITAL SIGNS:  T(F): 97.4 (03-09-23 @ 12:46)  HR: 89 (03-09-23 @ 12:46)  BP: 103/61 (03-09-23 @ 12:46)  RR: 18 (03-09-23 @ 12:46)  SpO2: 100% (03-09-23 @ 12:46)  Wt(kg): --    PHYSICAL EXAM:    Constitutional: NAD, lying comfortably in bed  Eyes: EOMI, PERRLA  Neck: supple, no masses, no JVD  Respiratory: CTAB; no r/r/w  Cardiovascular: RRR, no M/R/G  Gastrointestinal: +BS, soft, NTND, no hepatosplenomegaly  Extremities: no c/c/e  Neurological: AAOx3, nonfocal    LABS:                        10.1   9.74  )-----------( 134      ( 09 Mar 2023 05:26 )             33.2     03-09    140  |  103  |  10  ----------------------------<  117<H>  3.6   |  28  |  0.53    Ca    8.4      09 Mar 2023 05:26  Phos  2.7     03-08  Mg     1.70     03-08    TPro  5.8<L>  /  Alb  2.5<L>  /  TBili  0.6  /  DBili  x   /  AST  11  /  ALT  20  /  AlkPhos  71  03-08    PTT - ( 09 Mar 2023 02:41 )  PTT:106.2 sec      RADIOLOGY & ADDITIONAL TESTS:  Studies reviewed.

## 2023-03-09 NOTE — PROGRESS NOTE ADULT - ASSESSMENT
ARIK DUMONT is a 82y Male with a past medical history as described above who presented for evaluation of shortness of breath and was subsequently intubated for hypoxic respiratory failure and treated in the ICU at [facility]. There, he was discovered to have hilar and supraclavicular lymphadenopathy. He had a these biopsied and the pathology returned inconclusive. Previously, at [facility], he was discovered to have a large pleural effusion and underwent thoracentesis. Fluid returned negative for malignancy. S/p VATS with mediastinal LN bx on 3/3 and path resulted as CHL. Bone marrow neg for involvement. PET/CT c/f widespread disease. Plan for sequential BV-AVD. Non-formulary approval for Brentuximab pending.     # Classical Hodgkin's Lymphoma  - s/p VATS, bronch, pleural bx w/ thoracic 3/3, biopsy: CHL  --- Positive for:  CD30, CD15, dim PAX5, MUM1, dim OCT2, NEFTALI  --- Negative for CD3, CD45, CD20, CD79a, BOB1, BCL6  - Smear reviewed by prior heme team: normocytic normochromic red cells, rare ovalocytes, no schistocytes seen. Occasional large platelets, with most within normal limits. No clumping. White cell series demonstrates neutrophil predominant. PMNs with vacuolization and granulation, suggesting activation and inflammation. No blasts, no immature cells.    - BM Bx neg for lymphoma involvement   - PET/CT 3/8: Hypermetabolic lymph nodes above and below the diaphragm.  Suspected osseous focus in the RIGHT hemisacrum. Diffusely increased splenic uptake relative to liver, abnormal but nonspecific finding which may be seen in the setting of malignancy as well as other systemic processes such as infection or inflammatory disorders. Right pleural effusion with additional pulmonary findings; additional malignant involvement versus inflammatory or infectious process not excluded. Area of soft tissue adjacent to distal abdominal aorta without abnormal uptake. Further evaluation as warranted clinically  - EF 50-55%; Hep B and C neg, HIV neg  - Plan for port placement on wed 3/13; can hold off on PICC as single agent brentuximab can be given peripherally  - Transfuse to maintain Hb >7, platelets > 10K, >20K if bleeding, and >50K if bleeding.      Fallon Esposito, PGY-6  Hematology-Oncology Fellow  [phone] [facility]) [phone] ([facility])  I can also be reached on [employer] Teams  Please page fellow on call after 5pm and on weekends ARIK DUMONT is a 82y Male with a past medical history as described above who presented for evaluation of shortness of breath and was subsequently intubated for hypoxic respiratory failure and treated in the ICU at [facility]. There, he was discovered to have hilar and supraclavicular lymphadenopathy. He had a these biopsied and the pathology returned inconclusive. Previously, at [facility], he was discovered to have a large pleural effusion and underwent thoracentesis. Fluid returned negative for malignancy. S/p VATS with mediastinal LN bx on 3/3 and path resulted as CHL. Bone marrow neg for involvement. PET/CT c/f widespread disease. Plan for sequential BV-AVD. Non-formulary approval for Brentuximab pending.     # Classical Hodgkin's Lymphoma  - s/p VATS, bronch, pleural bx w/ thoracic 3/3, biopsy: CHL  --- Positive for:  CD30, CD15, dim PAX5, MUM1, dim OCT2, NEFTALI  --- Negative for CD3, CD45, CD20, CD79a, BOB1, BCL6  - Smear reviewed by prior heme team: normocytic normochromic red cells, rare ovalocytes, no schistocytes seen. Occasional large platelets, with most within normal limits. No clumping. White cell series demonstrates neutrophil predominant. PMNs with vacuolization and granulation, suggesting activation and inflammation. No blasts, no immature cells.    - BM Bx neg for lymphoma involvement   - PET/CT 3/8: Hypermetabolic lymph nodes above and below the diaphragm.  Suspected osseous focus in the RIGHT hemisacrum. Diffusely increased splenic uptake relative to liver, abnormal but nonspecific finding which may be seen in the setting of malignancy as well as other systemic processes such as infection or inflammatory disorders. Right pleural effusion with additional pulmonary findings; additional malignant involvement versus inflammatory or infectious process not excluded. Area of soft tissue adjacent to distal abdominal aorta without abnormal uptake. Further evaluation as warranted clinically  - EF 50-55%; Hep B and C neg, HIV neg  - Plan for port placement on wed 3/13; can hold off on PICC as single agent brentuximab can be given peripherally  - Prednisone 50mg x5 days starting 3/9  - Transfuse to maintain Hb >7, platelets > 10K, >20K if bleeding, and >50K if bleeding.      Fallon Esposito, PGY-6  Hematology-Oncology Fellow  [phone] ([place]) [phone] ([facility])  I can also be reached on [employer] Teams  Please page fellow on call after 5pm and on weekends ARIK DUMONT is a 82y Male with a past medical history as described above who presented for evaluation of shortness of breath and was subsequently intubated for hypoxic respiratory failure and treated in the ICU at [facility]. There, he was discovered to have hilar and supraclavicular lymphadenopathy. He had a these biopsied and the pathology returned inconclusive. Previously, at [place], he was discovered to have a large pleural effusion and underwent thoracentesis. Fluid returned negative for malignancy. S/p VATS with mediastinal LN bx on 3/3 and path resulted as CHL. Bone marrow neg for involvement. PET/CT c/f widespread disease. Plan for sequential BV-AVD. Starting BV tomorrow 3/10.     # Classical Hodgkin's Lymphoma  - s/p VATS, bronch, pleural bx w/ thoracic 3/3, biopsy: CHL  --- Positive for:  CD30, CD15, dim PAX5, MUM1, dim OCT2, NEFTALI  --- Negative for CD3, CD45, CD20, CD79a, BOB1, BCL6  - Smear reviewed by prior heme team: normocytic normochromic red cells, rare ovalocytes, no schistocytes seen. Occasional large platelets, with most within normal limits. No clumping. White cell series demonstrates neutrophil predominant. PMNs with vacuolization and granulation, suggesting activation and inflammation. No blasts, no immature cells.    - BM Bx neg for lymphoma involvement   - PET/CT 3/8: Hypermetabolic lymph nodes above and below the diaphragm.  Suspected osseous focus in the RIGHT hemisacrum. Diffusely increased splenic uptake relative to liver, abnormal but nonspecific finding which may be seen in the setting of malignancy as well as other systemic processes such as infection or inflammatory disorders. Right pleural effusion with additional pulmonary findings; additional malignant involvement versus inflammatory or infectious process not excluded. Area of soft tissue adjacent to distal abdominal aorta without abnormal uptake. Further evaluation as warranted clinically  - EF 50-55%; Hep B and C neg, HIV neg  - Plan for port placement on wed 3/13; can hold off on PICC as single agent brentuximab can be given peripherally  - Prednisone 50mg x5 days starting 3/9 (A1c is 6; please monitor blood sugars)  - BV to be given tomorrow 3/10. Redosed as outpatient after 3 weeks  - Can d/c after port placement on 3/15(request CTSx to move this up if possible)   - Transfuse to maintain Hb >7, platelets > 10K, >20K if bleeding, and >50K if bleeding      Fallon Esposito, PGY-6  Hematology-Oncology Fellow  [phone] ([place]) [phone] ([facility])  I can also be reached on [employer] Teams  Please page fellow on call after 5pm and on weekends

## 2023-03-09 NOTE — PROGRESS NOTE ADULT - SUBJECTIVE AND OBJECTIVE BOX
Name of Patient : ARIK DUMONT  MRN: [id number]  Date of visit: 03-09-23      Subjective: Patient seen and examined. No new events except as noted.   Doing okay     REVIEW OF SYSTEMS:    CONSTITUTIONAL: No weakness, fevers or chills  EYES/ENT: No visual changes;  No vertigo or throat pain   NECK: No pain or stiffness  RESPIRATORY: No cough, wheezing, hemoptysis; No shortness of breath  CARDIOVASCULAR: No chest pain or palpitations  GASTROINTESTINAL: No abdominal or epigastric pain. No nausea, vomiting, or hematemesis; No diarrhea or constipation. No melena or hematochezia.  GENITOURINARY: No dysuria, frequency or hematuria  NEUROLOGICAL: No numbness or weakness  SKIN: No itching, burning, rashes, or lesions   All other review of systems is negative unless indicated above.    MEDICATIONS:  MEDICATIONS  (STANDING):  acetaminophen   IVPB .. 1000 milliGRAM(s) IV Intermittent once  albuterol    0.083% 2.5 milliGRAM(s) Nebulizer every 6 hours  apixaban 5 milliGRAM(s) Oral two times a day  atenolol  Tablet 12.5 milliGRAM(s) Oral daily  chlorhexidine 2% Cloths 1 Application(s) Topical <User Schedule>  escitalopram 5 milliGRAM(s) Oral daily  famotidine    Tablet 20 milliGRAM(s) Oral daily  melatonin 6 milliGRAM(s) Oral at bedtime  midodrine. 5 milliGRAM(s) Oral three times a day  polyethylene glycol 3350 17 Gram(s) Oral daily  predniSONE   Tablet 50 milliGRAM(s) Oral daily  senna 2 Tablet(s) Oral at bedtime  simvastatin 40 milliGRAM(s) Oral at bedtime  sodium chloride 3%  Inhalation 4 milliLiter(s) Inhalation every 6 hours  tamsulosin 0.4 milliGRAM(s) Oral at bedtime      PHYSICAL EXAM:  T(C): 36.7 (03-09-23 @ 15:07), Max: 37.3 (03-08-23 @ 21:50)  HR: 90 (03-09-23 @ 15:07) (89 - 105)  BP: 110/65 (03-09-23 @ 15:07) (103/61 - 116/83)  RR: 18 (03-09-23 @ 15:07) (17 - 18)  SpO2: 98% (03-09-23 @ 15:07) (96% - 100%)  Wt(kg): --  I&O's Summary    08 Mar 2023 07:01  -  09 Mar 2023 07:00  --------------------------------------------------------  IN: 672 mL / OUT: 725 mL / NET: -53 mL    09 Mar 2023 07:01  -  09 Mar 2023 16:45  --------------------------------------------------------  IN: 0 mL / OUT: 150 mL / NET: -150 mL        Weight (kg): 83.5 (03-09 @ 12:00)    Appearance: Normal	  HEENT:  PERRLA   Lymphatic: No lymphadenopathy   Cardiovascular: Normal S1 S2, no JVD  Respiratory: drain   Gastrointestinal:  Soft, Non-tender  Skin: No rashes,  warm to touch  Psychiatry:  Mood & affect appropriate  Musculuskeletal: No edema    recent labs, Imaging and EKGs personally reviewed     03-08-23 @ 07:01  -  03-09-23 @ 07:00  --------------------------------------------------------  IN: 672 mL / OUT: 725 mL / NET: -53 mL    03-09-23 @ 07:01  -  03-09-23 @ 16:45  --------------------------------------------------------  IN: 0 mL / OUT: 150 mL / NET: -150 mL                          10.1   9.74  )-----------( 134      ( 09 Mar 2023 05:26 )             33.2               03-09    140  |  103  |  10  ----------------------------<  117<H>  3.6   |  28  |  0.53    Ca    8.4      09 Mar 2023 05:26  Phos  2.7     03-08  Mg     1.70     03-08    TPro  5.8<L>  /  Alb  2.5<L>  /  TBili  0.6  /  DBili  x   /  AST  11  /  ALT  20  /  AlkPhos  71  03-08    PTT - ( 09 Mar 2023 02:41 )  PTT:106.2 sec

## 2023-03-10 LAB
ALBUMIN SERPL ELPH-MCNC: 2.6 G/DL — LOW (ref 3.3–5)
ALP SERPL-CCNC: 73 U/L — SIGNIFICANT CHANGE UP (ref 40–120)
ALT FLD-CCNC: 18 U/L — SIGNIFICANT CHANGE UP (ref 4–41)
ANION GAP SERPL CALC-SCNC: 6 MMOL/L — LOW (ref 7–14)
AST SERPL-CCNC: 13 U/L — SIGNIFICANT CHANGE UP (ref 4–40)
BILIRUB SERPL-MCNC: 0.4 MG/DL — SIGNIFICANT CHANGE UP (ref 0.2–1.2)
BUN SERPL-MCNC: 17 MG/DL — SIGNIFICANT CHANGE UP (ref 7–23)
CALCIUM SERPL-MCNC: 8.6 MG/DL — SIGNIFICANT CHANGE UP (ref 8.4–10.5)
CHLORIDE SERPL-SCNC: 102 MMOL/L — SIGNIFICANT CHANGE UP (ref 98–107)
CO2 SERPL-SCNC: 31 MMOL/L — SIGNIFICANT CHANGE UP (ref 22–31)
CREAT SERPL-MCNC: 0.52 MG/DL — SIGNIFICANT CHANGE UP (ref 0.5–1.3)
EGFR: 100 ML/MIN/1.73M2 — SIGNIFICANT CHANGE UP
FERRITIN SERPL-MCNC: 823 NG/ML — HIGH (ref 30–400)
FOLATE SERPL-MCNC: 6.2 NG/ML — SIGNIFICANT CHANGE UP (ref 3.1–17.5)
GLUCOSE SERPL-MCNC: 109 MG/DL — HIGH (ref 70–99)
HCT VFR BLD CALC: 32 % — LOW (ref 39–50)
HGB BLD-MCNC: 9.8 G/DL — LOW (ref 13–17)
IRON SATN MFR SERPL: 31 % — SIGNIFICANT CHANGE UP (ref 14–50)
IRON SATN MFR SERPL: 44 UG/DL — LOW (ref 45–165)
LDH SERPL L TO P-CCNC: 181 U/L — SIGNIFICANT CHANGE UP (ref 135–225)
MCHC RBC-ENTMCNC: 28.1 PG — SIGNIFICANT CHANGE UP (ref 27–34)
MCHC RBC-ENTMCNC: 30.6 GM/DL — LOW (ref 32–36)
MCV RBC AUTO: 91.7 FL — SIGNIFICANT CHANGE UP (ref 80–100)
NRBC # BLD: 0 /100 WBCS — SIGNIFICANT CHANGE UP (ref 0–0)
NRBC # FLD: 0 K/UL — SIGNIFICANT CHANGE UP (ref 0–0)
PHOSPHATE SERPL-MCNC: 2.8 MG/DL — SIGNIFICANT CHANGE UP (ref 2.5–4.5)
PLATELET # BLD AUTO: 153 K/UL — SIGNIFICANT CHANGE UP (ref 150–400)
POTASSIUM SERPL-MCNC: 4.1 MMOL/L — SIGNIFICANT CHANGE UP (ref 3.5–5.3)
POTASSIUM SERPL-SCNC: 4.1 MMOL/L — SIGNIFICANT CHANGE UP (ref 3.5–5.3)
PROT SERPL-MCNC: 6.1 G/DL — SIGNIFICANT CHANGE UP (ref 6–8.3)
RBC # BLD: 3.49 M/UL — LOW (ref 4.2–5.8)
RBC # FLD: 19.7 % — HIGH (ref 10.3–14.5)
SODIUM SERPL-SCNC: 139 MMOL/L — SIGNIFICANT CHANGE UP (ref 135–145)
TIBC SERPL-MCNC: 142 UG/DL — LOW (ref 220–430)
UIBC SERPL-MCNC: 98 UG/DL — LOW (ref 110–370)
URATE SERPL-MCNC: 3.9 MG/DL — SIGNIFICANT CHANGE UP (ref 3.4–8.8)
VIT B12 SERPL-MCNC: 810 PG/ML — SIGNIFICANT CHANGE UP (ref 200–900)
WBC # BLD: 10.61 K/UL — HIGH (ref 3.8–10.5)
WBC # FLD AUTO: 10.61 K/UL — HIGH (ref 3.8–10.5)

## 2023-03-10 PROCEDURE — 99233 SBSQ HOSP IP/OBS HIGH 50: CPT

## 2023-03-10 PROCEDURE — 74174 CTA ABD&PLVS W/CONTRAST: CPT | Mod: 26

## 2023-03-10 PROCEDURE — 71275 CT ANGIOGRAPHY CHEST: CPT | Mod: 26

## 2023-03-10 RX ORDER — SODIUM CHLORIDE 9 MG/ML
1000 INJECTION INTRAMUSCULAR; INTRAVENOUS; SUBCUTANEOUS
Refills: 0 | Status: DISCONTINUED | OUTPATIENT
Start: 2023-03-10 | End: 2023-03-15

## 2023-03-10 RX ORDER — ACETAMINOPHEN 500 MG
650 TABLET ORAL EVERY 4 HOURS
Refills: 0 | Status: DISCONTINUED | OUTPATIENT
Start: 2023-03-10 | End: 2023-03-10

## 2023-03-10 RX ORDER — ASPIRIN/CALCIUM CARB/MAGNESIUM 324 MG
81 TABLET ORAL DAILY
Refills: 0 | Status: DISCONTINUED | OUTPATIENT
Start: 2023-03-10 | End: 2023-03-15

## 2023-03-10 RX ORDER — BRENTUXIMAB VEDOTIN 50 MG/10.5ML
150 INJECTION, POWDER, LYOPHILIZED, FOR SOLUTION INTRAVENOUS ONCE
Refills: 0 | Status: COMPLETED | OUTPATIENT
Start: 2023-03-10 | End: 2023-03-10

## 2023-03-10 RX ORDER — BRENTUXIMAB VEDOTIN 50 MG/10.5ML
150 INJECTION, POWDER, LYOPHILIZED, FOR SOLUTION INTRAVENOUS ONCE
Refills: 0 | Status: DISCONTINUED | OUTPATIENT
Start: 2023-03-10 | End: 2023-03-10

## 2023-03-10 RX ORDER — CETIRIZINE HYDROCHLORIDE 10 MG/1
10 TABLET ORAL ONCE
Refills: 0 | Status: COMPLETED | OUTPATIENT
Start: 2023-03-10 | End: 2023-03-10

## 2023-03-10 RX ORDER — ONDANSETRON 8 MG/1
8 TABLET, FILM COATED ORAL ONCE
Refills: 0 | Status: COMPLETED | OUTPATIENT
Start: 2023-03-10 | End: 2023-03-10

## 2023-03-10 RX ADMIN — SODIUM CHLORIDE 4 MILLILITER(S): 9 INJECTION INTRAMUSCULAR; INTRAVENOUS; SUBCUTANEOUS at 22:37

## 2023-03-10 RX ADMIN — POLYETHYLENE GLYCOL 3350 17 GRAM(S): 17 POWDER, FOR SOLUTION ORAL at 14:10

## 2023-03-10 RX ADMIN — APIXABAN 5 MILLIGRAM(S): 2.5 TABLET, FILM COATED ORAL at 18:15

## 2023-03-10 RX ADMIN — SODIUM CHLORIDE 4 MILLILITER(S): 9 INJECTION INTRAMUSCULAR; INTRAVENOUS; SUBCUTANEOUS at 10:29

## 2023-03-10 RX ADMIN — Medication 50 MILLIGRAM(S): at 05:35

## 2023-03-10 RX ADMIN — Medication 6 MILLIGRAM(S): at 21:44

## 2023-03-10 RX ADMIN — SIMVASTATIN 40 MILLIGRAM(S): 20 TABLET, FILM COATED ORAL at 21:44

## 2023-03-10 RX ADMIN — MIDODRINE HYDROCHLORIDE 5 MILLIGRAM(S): 2.5 TABLET ORAL at 05:35

## 2023-03-10 RX ADMIN — TAMSULOSIN HYDROCHLORIDE 0.4 MILLIGRAM(S): 0.4 CAPSULE ORAL at 21:44

## 2023-03-10 RX ADMIN — BRENTUXIMAB VEDOTIN 150 MILLIGRAM(S): 50 INJECTION, POWDER, LYOPHILIZED, FOR SOLUTION INTRAVENOUS at 14:51

## 2023-03-10 RX ADMIN — ALBUTEROL 2.5 MILLIGRAM(S): 90 AEROSOL, METERED ORAL at 22:37

## 2023-03-10 RX ADMIN — CHLORHEXIDINE GLUCONATE 1 APPLICATION(S): 213 SOLUTION TOPICAL at 05:35

## 2023-03-10 RX ADMIN — MIDODRINE HYDROCHLORIDE 5 MILLIGRAM(S): 2.5 TABLET ORAL at 21:45

## 2023-03-10 RX ADMIN — ATENOLOL 12.5 MILLIGRAM(S): 25 TABLET ORAL at 05:35

## 2023-03-10 RX ADMIN — MIDODRINE HYDROCHLORIDE 5 MILLIGRAM(S): 2.5 TABLET ORAL at 14:09

## 2023-03-10 RX ADMIN — ESCITALOPRAM OXALATE 5 MILLIGRAM(S): 10 TABLET, FILM COATED ORAL at 22:54

## 2023-03-10 RX ADMIN — Medication 650 MILLIGRAM(S): at 14:00

## 2023-03-10 RX ADMIN — ALBUTEROL 2.5 MILLIGRAM(S): 90 AEROSOL, METERED ORAL at 10:34

## 2023-03-10 RX ADMIN — FAMOTIDINE 20 MILLIGRAM(S): 10 INJECTION INTRAVENOUS at 14:10

## 2023-03-10 RX ADMIN — APIXABAN 5 MILLIGRAM(S): 2.5 TABLET, FILM COATED ORAL at 05:35

## 2023-03-10 RX ADMIN — CETIRIZINE HYDROCHLORIDE 10 MILLIGRAM(S): 10 TABLET ORAL at 14:01

## 2023-03-10 RX ADMIN — ONDANSETRON 8 MILLIGRAM(S): 8 TABLET, FILM COATED ORAL at 14:01

## 2023-03-10 NOTE — PROGRESS NOTE ADULT - ATTENDING COMMENTS
82y Male recently admitted with hypoxic respiratory failure, transferred for further evaluation of lymphadenopathy. s/p chest tube placement, plan for repeat biopsy of supraclavicular LN which was consistent with reactive LN. Excisonal biopsy consistent with cHL.   -trend TLS labs   -bmbx negative   -PET/CT c/w Stage IV disease   -continue prednisone 50 mg daily x 5 doses for symptomatic relief  -plan for treatment with sequential BV-AVD, currently receiving BV without issue   -continue pleurex drainage  -port placement for use with AVD  -PT/OT

## 2023-03-10 NOTE — PROGRESS NOTE ADULT - ASSESSMENT
82 year old male with PMH atrial fibrillation, HTN and chronic urinary retention who presented to [facility] with hypoxic respiratory failure requiring intubation, now extubated but found to have widespread lymphadenopathy, currently undergoing workup for lymphoma. 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    Plan/Recommendations:  - No acute surgical vascular surgery intervention as findings today appear stable compared to CTA from 2/17.     - Will f/u repeat CTA     - Please have patient f/u with Dr. Gibbons at discharge for outpatient interval imaging   - Recommend aspirin  - Continue Eliquis, statin   - Remainder care per primary       Vascular Surgery  [id number]

## 2023-03-10 NOTE — PROGRESS NOTE ADULT - ASSESSMENT
82 year old male with PMH atrial fibrillation, HTN and chronic urinary retention who presented to [facility] from home with acute hypoxic respiratory failure with hypercapnia. Patient was admitted to ICU an [facility], now downgraded and transferred to  [facility] for continuation of his care     #Acute hypoxic respiratory failure with hypercapnia  #Recurrent R pleural effusion-exudative  #Lymphadenopathy and leukocytosis-suspect underlying malignancy  -With large right sided pleural effusion, now status post pigtail placement, monitor output   -O 2 supplement PRN   -Continue steroid taper  -Patient was treated with course of empiric cefepime and dose of vanco at [place]   -Blood cultures 2/17 NGTD, Pleural fluid no growth currently, f/u final culture/cytology  -thoracic surg care appreciated, plan for pleurex and mediastinoscopy to evaluate for lymph nodes. VATS  -Heme/onc follow up appreciated   - patient will benefit form pleurx placement , placed by CTS   - IR guided LN biopsy, follow up path report    - S/P vats and pleurx per surg team   - follow up biopsy results , [facility]ymhome   - Chemo per heme.Onc   - PET noted   - TS for medport on the 15th     #Afib/ HTN  rate control   telemonitoring   eliquis fo rnow, switch to heparin next week for mediport       #Urinary Retention  -Continue plummer  -Continue flomax    #DVT prophylaxis     Discussed with patient son over the phone

## 2023-03-10 NOTE — PROGRESS NOTE ADULT - SUBJECTIVE AND OBJECTIVE BOX
DATE OF SERVICE: 03-10-23 @ 09:18    Subjective: Patient seen and examined. No new events except as noted.     SUBJECTIVE/ROS      MEDICATIONS:  MEDICATIONS  (STANDING):  acetaminophen   IVPB .. 1000 milliGRAM(s) IV Intermittent once  albuterol    0.083% 2.5 milliGRAM(s) Nebulizer every 6 hours  apixaban 5 milliGRAM(s) Oral two times a day  atenolol  Tablet 12.5 milliGRAM(s) Oral daily  chlorhexidine 2% Cloths 1 Application(s) Topical <User Schedule>  escitalopram 5 milliGRAM(s) Oral daily  famotidine    Tablet 20 milliGRAM(s) Oral daily  melatonin 6 milliGRAM(s) Oral at bedtime  midodrine. 5 milliGRAM(s) Oral three times a day  polyethylene glycol 3350 17 Gram(s) Oral daily  predniSONE   Tablet 50 milliGRAM(s) Oral daily  senna 2 Tablet(s) Oral at bedtime  simvastatin 40 milliGRAM(s) Oral at bedtime  sodium chloride 3%  Inhalation 4 milliLiter(s) Inhalation every 6 hours  tamsulosin 0.4 milliGRAM(s) Oral at bedtime      PHYSICAL EXAM:  T(C): 36.3 (03-10-23 @ 05:30), Max: 36.9 (03-09-23 @ 21:40)  HR: 80 (03-10-23 @ 05:30) (80 - 90)  BP: 116/62 (03-10-23 @ 05:30) (100/59 - 116/62)  RR: 17 (03-10-23 @ 05:30) (17 - 18)  SpO2: 97% (03-10-23 @ 05:30) (96% - 100%)  Wt(kg): --  I&O's Summary    09 Mar 2023 07:01  -  10 Mar 2023 07:00  --------------------------------------------------------  IN: 480 mL / OUT: 850 mL / NET: -370 mL        Weight (kg): 83.5 (03-09 @ 12:00)      JVP: Normal  Neck: supple  Lung: clear   CV: S1 S2 , Murmur:  Abd: soft  Ext: No edema  neuro: Awake / alert  Psych: flat affect  Skin: normal``    LABS/DATA:    CARDIAC MARKERS:                                9.8    10.61 )-----------( 153      ( 10 Mar 2023 05:16 )             32.0     03-10    139  |  102  |  17  ----------------------------<  109<H>  4.1   |  31  |  0.52    Ca    8.6      10 Mar 2023 05:16  Phos  2.8     03-10    TPro  6.1  /  Alb  2.6<L>  /  TBili  0.4  /  DBili  x   /  AST  13  /  ALT  18  /  AlkPhos  73  03-10    proBNP:   Lipid Profile:   HgA1c:   TSH:     TELE:  EKG:

## 2023-03-10 NOTE — PHARMACOTHERAPY INTERVENTION NOTE - COMMENTS
Hematology Fellow wrote the incorrect weight 61.5 KG. But Emerson had weight at 83.5 KG. The weight and Brentuximab order was corrected after Pharmacist Intervention.

## 2023-03-10 NOTE — PROGRESS NOTE ADULT - ASSESSMENT
ARIK DUMONT is a 82y Male with a past medical history as described above who presented for evaluation of shortness of breath and was subsequently intubated for hypoxic respiratory failure and treated in the ICU at [facility]. There, he was discovered to have hilar and supraclavicular lymphadenopathy. He had a these biopsied and the pathology returned inconclusive. Previously, at [facility], he was discovered to have a large pleural effusion and underwent thoracentesis. Fluid returned negative for malignancy. S/p VATS with mediastinal LN bx on 3/3 and path resulted as CHL. Bone marrow neg for involvement. PET/CT c/f widespread disease. Plan for sequential BV-AVD. Starting BV tomorrow 3/10.     # Classical Hodgkin's Lymphoma  - s/p VATS, bronch, pleural bx w/ thoracic 3/3, biopsy: CHL  --- Positive for:  CD30, CD15, dim PAX5, MUM1, dim OCT2, NEFTALI  --- Negative for CD3, CD45, CD20, CD79a, BOB1, BCL6  - Smear reviewed by prior heme team: normocytic normochromic red cells, rare ovalocytes, no schistocytes seen. Occasional large platelets, with most within normal limits. No clumping. White cell series demonstrates neutrophil predominant. PMNs with vacuolization and granulation, suggesting activation and inflammation. No blasts, no immature cells.    - BM Bx neg for lymphoma involvement   - PET/CT 3/8: Hypermetabolic lymph nodes above and below the diaphragm.  Suspected osseous focus in the RIGHT hemisacrum. Diffusely increased splenic uptake relative to liver, abnormal but nonspecific finding which may be seen in the setting of malignancy as well as other systemic processes such as infection or inflammatory disorders. Right pleural effusion with additional pulmonary findings; additional malignant involvement versus inflammatory or infectious process not excluded. Area of soft tissue adjacent to distal abdominal aorta without abnormal uptake. Further evaluation as warranted clinically  - EF 50-55%; Hep B and C neg, HIV neg  - Plan for port placement on wed 3/13; can hold off on PICC as single agent brentuximab can be given peripherally  - Prednisone 50mg x5 days starting 3/9 (A1c is 6; please monitor blood sugars)  - BV to be given today 3/10. Next dose will be as outpatient after 3 weeks. Referral email sent to [facility]; rehab will need to be informed that pt will possibly have an office visit but he will not get any chemo while at rehab   - Can d/c after port placement on 3/15(request CTSx to move this up if possible)   - Transfuse to maintain Hb >7, platelets > 10K, >20K if bleeding, and >50K if bleeding      Fallon Esposito, PGY-6  Hematology-Oncology Fellow  [phone] ([place]) [phone] ([facility])  I can also be reached on [employer] Teams  Please page fellow on call after 5pm and on weekends

## 2023-03-10 NOTE — PROGRESS NOTE ADULT - SUBJECTIVE AND OBJECTIVE BOX
Name of Patient : ARIK DUMONT  MRN: [id number]  Date of visit: 03-10-23       Subjective: Patient seen and examined. No new events except as noted.   Doing okay       REVIEW OF SYSTEMS:    CONSTITUTIONAL: No weakness, fevers or chills  EYES/ENT: No visual changes;  No vertigo or throat pain   NECK: No pain or stiffness  RESPIRATORY: No cough, wheezing, hemoptysis; No shortness of breath  CARDIOVASCULAR: No chest pain or palpitations  GASTROINTESTINAL: No abdominal or epigastric pain. No nausea, vomiting, or hematemesis; No diarrhea or constipation. No melena or hematochezia.  GENITOURINARY: No dysuria, frequency or hematuria  NEUROLOGICAL: No numbness or weakness  SKIN: No itching, burning, rashes, or lesions   All other review of systems is negative unless indicated above.    MEDICATIONS:  MEDICATIONS  (STANDING):  acetaminophen   IVPB .. 1000 milliGRAM(s) IV Intermittent once  albuterol    0.083% 2.5 milliGRAM(s) Nebulizer every 6 hours  apixaban 5 milliGRAM(s) Oral two times a day  atenolol  Tablet 12.5 milliGRAM(s) Oral daily  chlorhexidine 2% Cloths 1 Application(s) Topical <User Schedule>  escitalopram 5 milliGRAM(s) Oral daily  famotidine    Tablet 20 milliGRAM(s) Oral daily  melatonin 6 milliGRAM(s) Oral at bedtime  midodrine. 5 milliGRAM(s) Oral three times a day  polyethylene glycol 3350 17 Gram(s) Oral daily  predniSONE   Tablet 50 milliGRAM(s) Oral daily  senna 2 Tablet(s) Oral at bedtime  simvastatin 40 milliGRAM(s) Oral at bedtime  sodium chloride 0.9%. 1000 milliLiter(s) (20 mL/Hr) IV Continuous <Continuous>  sodium chloride 3%  Inhalation 4 milliLiter(s) Inhalation every 6 hours  tamsulosin 0.4 milliGRAM(s) Oral at bedtime      PHYSICAL EXAM:  T(C): 36.6 (03-10-23 @ 16:00), Max: 36.9 (03-09-23 @ 21:40)  HR: 72 (03-10-23 @ 16:00) (69 - 88)  BP: 106/60 (03-10-23 @ 16:00) (100/59 - 116/63)  RR: 18 (03-10-23 @ 16:00) (17 - 18)  SpO2: 99% (03-10-23 @ 16:00) (97% - 99%)  Wt(kg): --  I&O's Summary    09 Mar 2023 07:01  -  10 Mar 2023 07:00  --------------------------------------------------------  IN: 480 mL / OUT: 850 mL / NET: -370 mL          Appearance: Normal	  HEENT:  PERRLA   Lymphatic: No lymphadenopathy   Cardiovascular: Normal S1 S2, no JVD  Respiratory: normal effort , clear  Gastrointestinal:  Soft, Non-tender  Skin: No rashes,  warm to touch  Psychiatry:  Mood & affect appropriate  Musculuskeletal: No edema    recent labs, Imaging and EKGs personally reviewed     03-09-23 @ 07:01  -  03-10-23 @ 07:00  --------------------------------------------------------  IN: 480 mL / OUT: 850 mL / NET: -370 mL                            9.8    10.61 )-----------( 153      ( 10 Mar 2023 05:16 )             32.0               03-10    139  |  102  |  17  ----------------------------<  109<H>  4.1   |  31  |  0.52    Ca    8.6      10 Mar 2023 05:16  Phos  2.8     03-10    TPro  6.1  /  Alb  2.6<L>  /  TBili  0.4  /  DBili  x   /  AST  13  /  ALT  18  /  AlkPhos  73  03-10    PTT - ( 09 Mar 2023 02:41 )  PTT:106.2 sec

## 2023-03-10 NOTE — PROGRESS NOTE ADULT - SUBJECTIVE AND OBJECTIVE BOX
INTERVAL HPI/OVERNIGHT EVENTS:  No overnight events. Feels better than prior. On oxygen.     MEDICATIONS  (STANDING):  acetaminophen     Tablet .. 650 milliGRAM(s) Oral every 4 hours  acetaminophen   IVPB .. 1000 milliGRAM(s) IV Intermittent once  albuterol    0.083% 2.5 milliGRAM(s) Nebulizer every 6 hours  apixaban 5 milliGRAM(s) Oral two times a day  atenolol  Tablet 12.5 milliGRAM(s) Oral daily  brentuximab vedotin IVPB (eMAR) 150 milliGRAM(s) IV Intermittent once  cetirizine 10 milliGRAM(s) Oral once  chlorhexidine 2% Cloths 1 Application(s) Topical <User Schedule>  escitalopram 5 milliGRAM(s) Oral daily  famotidine    Tablet 20 milliGRAM(s) Oral daily  melatonin 6 milliGRAM(s) Oral at bedtime  midodrine. 5 milliGRAM(s) Oral three times a day  ondansetron Injectable 8 milliGRAM(s) IV Push once  polyethylene glycol 3350 17 Gram(s) Oral daily  predniSONE   Tablet 50 milliGRAM(s) Oral daily  senna 2 Tablet(s) Oral at bedtime  simvastatin 40 milliGRAM(s) Oral at bedtime  sodium chloride 3%  Inhalation 4 milliLiter(s) Inhalation every 6 hours  tamsulosin 0.4 milliGRAM(s) Oral at bedtime    MEDICATIONS  (PRN):  oxyCODONE    IR 5 milliGRAM(s) Oral once PRN Moderate Pain (4 - 6)    Allergies    pertussis vaccines (Rash)  shellfish (Rash)    Intolerances          VITAL SIGNS:  T(F): 98.2 (03-10-23 @ 10:47)  HR: 78 (03-10-23 @ 10:47)  BP: 112/62 (03-10-23 @ 10:47)  RR: 18 (03-10-23 @ 10:47)  SpO2: 98% (03-10-23 @ 10:47)  Wt(kg): --    PHYSICAL EXAM:    Constitutional: NAD, lying comfortably in bed  Eyes: EOMI, PERRLA  Neck: supple, no masses, no JVD  Respiratory: CTAB; no r/r/w  Cardiovascular: RRR, no M/R/G  Gastrointestinal: +BS, soft, NTND, no hepatosplenomegaly  Extremities: no c/c/e  Neurological: AAOx3, nonfocal    LABS:                        9.8    10.61 )-----------( 153      ( 10 Mar 2023 05:16 )             32.0     03-10    139  |  102  |  17  ----------------------------<  109<H>  4.1   |  31  |  0.52    Ca    8.6      10 Mar 2023 05:16  Phos  2.8     03-10    TPro  6.1  /  Alb  2.6<L>  /  TBili  0.4  /  DBili  x   /  AST  13  /  ALT  18  /  AlkPhos  73  03-10    PTT - ( 09 Mar 2023 02:41 )  PTT:106.2 sec      RADIOLOGY & ADDITIONAL TESTS:  Studies reviewed.

## 2023-03-10 NOTE — PROGRESS NOTE ADULT - SUBJECTIVE AND OBJECTIVE BOX
VASCULAR SURGERY DAILY PROGRESS NOTE:     SUBJECTIVE/ROS:   Patient seen and evaluated on rounds. No new complaints        OBJECTIVE:  Vital Signs Last 24 Hrs  T(C): 36.8 (10 Mar 2023 10:47), Max: 36.9 (09 Mar 2023 21:40)  T(F): 98.2 (10 Mar 2023 10:47), Max: 98.4 (09 Mar 2023 21:40)  HR: 78 (10 Mar 2023 10:47) (78 - 90)  BP: 112/62 (10 Mar 2023 10:47) (100/59 - 116/62)  BP(mean): --  RR: 18 (10 Mar 2023 10:47) (17 - 18)  SpO2: 98% (10 Mar 2023 10:47) (96% - 100%)    Parameters below as of 10 Mar 2023 10:47  Patient On (Oxygen Delivery Method): nasal cannula  O2 Flow (L/min): 2    I&O's Detail    09 Mar 2023 07:01  -  10 Mar 2023 07:00  --------------------------------------------------------  IN:    Oral Fluid: 480 mL  Total IN: 480 mL    OUT:    Indwelling Catheter - Urethral (mL): 850 mL  Total OUT: 850 mL    Total NET: -370 mL        Daily     Daily   MEDICATIONS  (STANDING):  acetaminophen     Tablet .. 650 milliGRAM(s) Oral every 4 hours  acetaminophen   IVPB .. 1000 milliGRAM(s) IV Intermittent once  albuterol    0.083% 2.5 milliGRAM(s) Nebulizer every 6 hours  apixaban 5 milliGRAM(s) Oral two times a day  atenolol  Tablet 12.5 milliGRAM(s) Oral daily  brentuximab vedotin IVPB (eMAR) 150 milliGRAM(s) IV Intermittent once  cetirizine 10 milliGRAM(s) Oral once  chlorhexidine 2% Cloths 1 Application(s) Topical <User Schedule>  escitalopram 5 milliGRAM(s) Oral daily  famotidine    Tablet 20 milliGRAM(s) Oral daily  melatonin 6 milliGRAM(s) Oral at bedtime  midodrine. 5 milliGRAM(s) Oral three times a day  ondansetron Injectable 8 milliGRAM(s) IV Push once  polyethylene glycol 3350 17 Gram(s) Oral daily  predniSONE   Tablet 50 milliGRAM(s) Oral daily  senna 2 Tablet(s) Oral at bedtime  simvastatin 40 milliGRAM(s) Oral at bedtime  sodium chloride 3%  Inhalation 4 milliLiter(s) Inhalation every 6 hours  tamsulosin 0.4 milliGRAM(s) Oral at bedtime    MEDICATIONS  (PRN):  oxyCODONE    IR 5 milliGRAM(s) Oral once PRN Moderate Pain (4 - 6)      Labs:                        9.8    10.61 )-----------( 153      ( 10 Mar 2023 05:16 )             32.0     03-10    139  |  102  |  17  ----------------------------<  109<H>  4.1   |  31  |  0.52    Ca    8.6      10 Mar 2023 05:16  Phos  2.8     03-10    TPro  6.1  /  Alb  2.6<L>  /  TBili  0.4  /  DBili  x   /  AST  13  /  ALT  18  /  AlkPhos  73  03-10    PTT - ( 09 Mar 2023 02:41 )  PTT:106.2 sec        Physical Exam:  General: AAOx3, no acute distress.  Respiratory: breathing comfortably, no increased WOB   Abdomen: soft, nontender, nondistended, no rebound, no guarding  Extremities: Moves all four.   - RLE palpable DP/PT/femoral pulse  - LLE palpable DP/PT/femoral pulse

## 2023-03-11 LAB
ALBUMIN SERPL ELPH-MCNC: 2.7 G/DL — LOW (ref 3.3–5)
ALP SERPL-CCNC: 67 U/L — SIGNIFICANT CHANGE UP (ref 40–120)
ALT FLD-CCNC: 33 U/L — SIGNIFICANT CHANGE UP (ref 4–41)
ANION GAP SERPL CALC-SCNC: 10 MMOL/L — SIGNIFICANT CHANGE UP (ref 7–14)
AST SERPL-CCNC: 33 U/L — SIGNIFICANT CHANGE UP (ref 4–40)
BILIRUB SERPL-MCNC: 0.4 MG/DL — SIGNIFICANT CHANGE UP (ref 0.2–1.2)
BUN SERPL-MCNC: 19 MG/DL — SIGNIFICANT CHANGE UP (ref 7–23)
CALCIUM SERPL-MCNC: 9 MG/DL — SIGNIFICANT CHANGE UP (ref 8.4–10.5)
CHLORIDE SERPL-SCNC: 102 MMOL/L — SIGNIFICANT CHANGE UP (ref 98–107)
CO2 SERPL-SCNC: 28 MMOL/L — SIGNIFICANT CHANGE UP (ref 22–31)
CREAT SERPL-MCNC: 0.62 MG/DL — SIGNIFICANT CHANGE UP (ref 0.5–1.3)
EGFR: 95 ML/MIN/1.73M2 — SIGNIFICANT CHANGE UP
GLUCOSE SERPL-MCNC: 86 MG/DL — SIGNIFICANT CHANGE UP (ref 70–99)
HCT VFR BLD CALC: 32.9 % — LOW (ref 39–50)
HGB BLD-MCNC: 10 G/DL — LOW (ref 13–17)
LDH SERPL L TO P-CCNC: 181 U/L — SIGNIFICANT CHANGE UP (ref 135–225)
MAGNESIUM SERPL-MCNC: 1.9 MG/DL — SIGNIFICANT CHANGE UP (ref 1.6–2.6)
MCHC RBC-ENTMCNC: 28 PG — SIGNIFICANT CHANGE UP (ref 27–34)
MCHC RBC-ENTMCNC: 30.4 GM/DL — LOW (ref 32–36)
MCV RBC AUTO: 92.2 FL — SIGNIFICANT CHANGE UP (ref 80–100)
NRBC # BLD: 0 /100 WBCS — SIGNIFICANT CHANGE UP (ref 0–0)
NRBC # FLD: 0 K/UL — SIGNIFICANT CHANGE UP (ref 0–0)
ONKOSIGHT MYELOID SEQUENCE: (no result)
PHOSPHATE SERPL-MCNC: 2.5 MG/DL — SIGNIFICANT CHANGE UP (ref 2.5–4.5)
PLATELET # BLD AUTO: 180 K/UL — SIGNIFICANT CHANGE UP (ref 150–400)
POTASSIUM SERPL-MCNC: 4.1 MMOL/L — SIGNIFICANT CHANGE UP (ref 3.5–5.3)
POTASSIUM SERPL-SCNC: 4.1 MMOL/L — SIGNIFICANT CHANGE UP (ref 3.5–5.3)
PROT SERPL-MCNC: 6.3 G/DL — SIGNIFICANT CHANGE UP (ref 6–8.3)
RBC # BLD: 3.57 M/UL — LOW (ref 4.2–5.8)
RBC # FLD: 19.8 % — HIGH (ref 10.3–14.5)
SODIUM SERPL-SCNC: 140 MMOL/L — SIGNIFICANT CHANGE UP (ref 135–145)
URATE SERPL-MCNC: 4.2 MG/DL — SIGNIFICANT CHANGE UP (ref 3.4–8.8)
URATE SERPL-MCNC: 4.4 MG/DL — SIGNIFICANT CHANGE UP (ref 3.4–8.8)
WBC # BLD: 11.67 K/UL — HIGH (ref 3.8–10.5)
WBC # FLD AUTO: 11.67 K/UL — HIGH (ref 3.8–10.5)

## 2023-03-11 PROCEDURE — 99233 SBSQ HOSP IP/OBS HIGH 50: CPT

## 2023-03-11 RX ORDER — METOPROLOL TARTRATE 50 MG
12.5 TABLET ORAL ONCE
Refills: 0 | Status: COMPLETED | OUTPATIENT
Start: 2023-03-11 | End: 2023-03-11

## 2023-03-11 RX ADMIN — Medication 50 MILLIGRAM(S): at 06:45

## 2023-03-11 RX ADMIN — SODIUM CHLORIDE 4 MILLILITER(S): 9 INJECTION INTRAMUSCULAR; INTRAVENOUS; SUBCUTANEOUS at 22:13

## 2023-03-11 RX ADMIN — Medication 6 MILLIGRAM(S): at 21:22

## 2023-03-11 RX ADMIN — SODIUM CHLORIDE 4 MILLILITER(S): 9 INJECTION INTRAMUSCULAR; INTRAVENOUS; SUBCUTANEOUS at 16:24

## 2023-03-11 RX ADMIN — TAMSULOSIN HYDROCHLORIDE 0.4 MILLIGRAM(S): 0.4 CAPSULE ORAL at 21:22

## 2023-03-11 RX ADMIN — Medication 12.5 MILLIGRAM(S): at 23:53

## 2023-03-11 RX ADMIN — FAMOTIDINE 20 MILLIGRAM(S): 10 INJECTION INTRAVENOUS at 14:43

## 2023-03-11 RX ADMIN — MIDODRINE HYDROCHLORIDE 5 MILLIGRAM(S): 2.5 TABLET ORAL at 14:43

## 2023-03-11 RX ADMIN — APIXABAN 5 MILLIGRAM(S): 2.5 TABLET, FILM COATED ORAL at 06:45

## 2023-03-11 RX ADMIN — APIXABAN 5 MILLIGRAM(S): 2.5 TABLET, FILM COATED ORAL at 18:38

## 2023-03-11 RX ADMIN — ATENOLOL 12.5 MILLIGRAM(S): 25 TABLET ORAL at 06:45

## 2023-03-11 RX ADMIN — CHLORHEXIDINE GLUCONATE 1 APPLICATION(S): 213 SOLUTION TOPICAL at 06:47

## 2023-03-11 RX ADMIN — ALBUTEROL 2.5 MILLIGRAM(S): 90 AEROSOL, METERED ORAL at 22:13

## 2023-03-11 RX ADMIN — Medication 81 MILLIGRAM(S): at 14:43

## 2023-03-11 RX ADMIN — SODIUM CHLORIDE 4 MILLILITER(S): 9 INJECTION INTRAMUSCULAR; INTRAVENOUS; SUBCUTANEOUS at 04:04

## 2023-03-11 RX ADMIN — SODIUM CHLORIDE 4 MILLILITER(S): 9 INJECTION INTRAMUSCULAR; INTRAVENOUS; SUBCUTANEOUS at 09:08

## 2023-03-11 RX ADMIN — MIDODRINE HYDROCHLORIDE 5 MILLIGRAM(S): 2.5 TABLET ORAL at 21:24

## 2023-03-11 RX ADMIN — ALBUTEROL 2.5 MILLIGRAM(S): 90 AEROSOL, METERED ORAL at 16:24

## 2023-03-11 RX ADMIN — ESCITALOPRAM OXALATE 5 MILLIGRAM(S): 10 TABLET, FILM COATED ORAL at 21:22

## 2023-03-11 RX ADMIN — SIMVASTATIN 40 MILLIGRAM(S): 20 TABLET, FILM COATED ORAL at 21:22

## 2023-03-11 NOTE — PROGRESS NOTE ADULT - SUBJECTIVE AND OBJECTIVE BOX
S: Patient evaluated at bedside, eating lunch. Says he is doing well and has no complaints.    O: vital signs reviewed in chart   PHYSICAL EXAM:    Constitutional: NAD, lying comfortably in bed  Eyes: EOMI, PERRLA  Neck: supple, no masses, no JVD  Respiratory: CTAB; no r/r/w  Cardiovascular: RRR, no M/R/G  Gastrointestinal: +BS, soft, NTND, no hepatosplenomegaly  Extremities: no c/c/e  Neurological: AAOx3, nonfocal

## 2023-03-11 NOTE — PROVIDER CONTACT NOTE (OTHER) - REASON
Patient had 6 beats of Vtach
9 beats VTACH HR  140 NON SUSTAINED
Chest tube came out
low urine output through plummer
Patient has sudden onset of shivering/shaking

## 2023-03-11 NOTE — PROGRESS NOTE ADULT - ASSESSMENT
A/P:   82y Male recently admitted with hypoxic respiratory failure, transferred for further evaluation of lymphadenopathy. s/p chest tube placement, plan for repeat biopsy of supraclavicular LN which was consistent with reactive LN. - s/p VATS, bronch, pleural bx w/ thoracic 3/3, biopsy: CHL    -trend TLS labs   -bmbx negative   -PET/CT c/w Stage IV disease   -continue prednisone 50 mg daily x 5 doses for symptomatic relief (started 3/9)  -plan for treatment with sequential BV-AVD, currently receiving BV without issue. Next dose in 3 weeks as outpatient  -continue pleurex drainage  -port placement for use with AVD, can be discharged after

## 2023-03-11 NOTE — PROGRESS NOTE ADULT - ASSESSMENT
afib  HR better   cont low dose atenolol   if BP continues to be high , then wean off midodrine   a/c

## 2023-03-11 NOTE — PROGRESS NOTE ADULT - SUBJECTIVE AND OBJECTIVE BOX
VASCULAR SURGERY DAILY PROGRESS NOTE:     SUBJECTIVE/ROS:   Patient seen and evaluated on rounds. No new concerns.        OBJECTIVE:  Vital Signs Last 24 Hrs  T(C): 36.4 (11 Mar 2023 06:43), Max: 36.8 (10 Mar 2023 10:47)  T(F): 97.6 (11 Mar 2023 06:43), Max: 98.2 (10 Mar 2023 10:47)  HR: 80 (11 Mar 2023 06:43) (69 - 82)  BP: 141/80 (11 Mar 2023 06:43) (103/65 - 141/80)  BP(mean): --  RR: 16 (11 Mar 2023 06:43) (16 - 18)  SpO2: 94% (11 Mar 2023 06:43) (94% - 99%)    Parameters below as of 11 Mar 2023 06:43  Patient On (Oxygen Delivery Method): room air      I&O's Detail    10 Mar 2023 07:01  -  11 Mar 2023 07:00  --------------------------------------------------------  IN:    Oral Fluid: 950 mL  Total IN: 950 mL    OUT:    Bulb (mL): 300 mL    Indwelling Catheter - Urethral (mL): 1835 mL  Total OUT: 2135 mL    Total NET: -1185 mL        Daily     Daily   MEDICATIONS  (STANDING):  acetaminophen   IVPB .. 1000 milliGRAM(s) IV Intermittent once  albuterol    0.083% 2.5 milliGRAM(s) Nebulizer every 6 hours  apixaban 5 milliGRAM(s) Oral two times a day  aspirin  chewable 81 milliGRAM(s) Oral daily  atenolol  Tablet 12.5 milliGRAM(s) Oral daily  chlorhexidine 2% Cloths 1 Application(s) Topical <User Schedule>  escitalopram 5 milliGRAM(s) Oral daily  famotidine    Tablet 20 milliGRAM(s) Oral daily  melatonin 6 milliGRAM(s) Oral at bedtime  midodrine. 5 milliGRAM(s) Oral three times a day  polyethylene glycol 3350 17 Gram(s) Oral daily  predniSONE   Tablet 50 milliGRAM(s) Oral daily  senna 2 Tablet(s) Oral at bedtime  simvastatin 40 milliGRAM(s) Oral at bedtime  sodium chloride 0.9%. 1000 milliLiter(s) (20 mL/Hr) IV Continuous <Continuous>  sodium chloride 3%  Inhalation 4 milliLiter(s) Inhalation every 6 hours  tamsulosin 0.4 milliGRAM(s) Oral at bedtime    MEDICATIONS  (PRN):  oxyCODONE    IR 5 milliGRAM(s) Oral once PRN Moderate Pain (4 - 6)      Labs:                        10.0   11.67 )-----------( 180      ( 11 Mar 2023 05:34 )             32.9     03-11    140  |  102  |  19  ----------------------------<  86  4.1   |  28  |  0.62    Ca    9.0      11 Mar 2023 05:34  Phos  2.5     03-11  Mg     1.90     03-11    TPro  6.3  /  Alb  2.7<L>  /  TBili  0.4  /  DBili  x   /  AST  33  /  ALT  33  /  AlkPhos  67  03-11          Physical Exam:  General: AAOx3, no acute distress.  Respiratory: breathing comfortably, no increased WOB   Abdomen: soft, nontender, nondistended, no rebound, no guarding  Extremities: Moves all four.   - RLE palpable DP/PT/femoral pulse  - LLE palpable DP/PT/femoral pulse

## 2023-03-11 NOTE — PROGRESS NOTE ADULT - REASON FOR ADMISSION
Pleural effusion, LAD
VATS
Biopsy, pleural effusion
LAD, pleural effusion
RVATS, pleurX placement
VATS
Inpatient bx

## 2023-03-11 NOTE — PROVIDER CONTACT NOTE (OTHER) - ACTION/TREATMENT ORDERED:
Medications given as ordered for HR and BP  Patient's symptoms subsided
metoprolol tatrate 12.5mg ordered
Provider consulting with cardiologist
monitor
provider came to bedside, secured tape to site. discontinued heparin drip.

## 2023-03-11 NOTE — PROGRESS NOTE ADULT - SUBJECTIVE AND OBJECTIVE BOX
DATE OF SERVICE: 03-11-23 @ 07:07    Subjective: Patient seen and examined. No new events except as noted.     SUBJECTIVE/ROS:  nad      MEDICATIONS:  MEDICATIONS  (STANDING):  acetaminophen   IVPB .. 1000 milliGRAM(s) IV Intermittent once  albuterol    0.083% 2.5 milliGRAM(s) Nebulizer every 6 hours  apixaban 5 milliGRAM(s) Oral two times a day  aspirin  chewable 81 milliGRAM(s) Oral daily  atenolol  Tablet 12.5 milliGRAM(s) Oral daily  chlorhexidine 2% Cloths 1 Application(s) Topical <User Schedule>  escitalopram 5 milliGRAM(s) Oral daily  famotidine    Tablet 20 milliGRAM(s) Oral daily  melatonin 6 milliGRAM(s) Oral at bedtime  midodrine. 5 milliGRAM(s) Oral three times a day  polyethylene glycol 3350 17 Gram(s) Oral daily  predniSONE   Tablet 50 milliGRAM(s) Oral daily  senna 2 Tablet(s) Oral at bedtime  simvastatin 40 milliGRAM(s) Oral at bedtime  sodium chloride 0.9%. 1000 milliLiter(s) (20 mL/Hr) IV Continuous <Continuous>  sodium chloride 3%  Inhalation 4 milliLiter(s) Inhalation every 6 hours  tamsulosin 0.4 milliGRAM(s) Oral at bedtime      PHYSICAL EXAM:  T(C): 36.4 (03-11-23 @ 06:43), Max: 36.8 (03-10-23 @ 10:47)  HR: 80 (03-11-23 @ 06:43) (69 - 82)  BP: 141/80 (03-11-23 @ 06:43) (103/65 - 141/80)  RR: 16 (03-11-23 @ 06:43) (16 - 18)  SpO2: 94% (03-11-23 @ 06:43) (94% - 99%)  Wt(kg): --  I&O's Summary    10 Mar 2023 07:01  -  11 Mar 2023 07:00  --------------------------------------------------------  IN: 950 mL / OUT: 1235 mL / NET: -285 mL            JVP: Normal  Neck: supple  Lung: clear   CV: S1 S2 , Murmur:  Abd: soft  Ext: No edema  neuro: Awake / alert  Psych: flat affect  Skin: normal``    LABS/DATA:    CARDIAC MARKERS:                                9.8    10.61 )-----------( 153      ( 10 Mar 2023 05:16 )             32.0     03-10    139  |  102  |  17  ----------------------------<  109<H>  4.1   |  31  |  0.52    Ca    8.6      10 Mar 2023 05:16  Phos  2.8     03-10    TPro  6.1  /  Alb  2.6<L>  /  TBili  0.4  /  DBili  x   /  AST  13  /  ALT  18  /  AlkPhos  73  03-10    proBNP:   Lipid Profile:   HgA1c:   TSH:     TELE:  EKG:

## 2023-03-11 NOTE — PROGRESS NOTE ADULT - ASSESSMENT
82 year old male with PMH atrial fibrillation, HTN and chronic urinary retention who presented to [facility] with hypoxic respiratory failure requiring intubation, now extubated but found to have widespread lymphadenopathy, currently undergoing workup for lymphoma. 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    Plan/Recommendations:  - No acute surgical vascular surgery intervention, repeat CTA stable   - Continue ASA, statin, Eliquis   - Please have patient f/u with Dr. Gibbons at discharge for outpatient interval imaging   - Remainder care per primary   - Please call back with questions or concerns       Vascular Surgery  [id number]

## 2023-03-11 NOTE — PROGRESS NOTE ADULT - ASSESSMENT
82 year old male with PMH atrial fibrillation, HTN and chronic urinary retention who presented to [facility] from home with acute hypoxic respiratory failure with hypercapnia. Patient was admitted to ICU an [facility], now downgraded and transferred to  [facility] for continuation of his care     #Acute hypoxic respiratory failure with hypercapnia  #Recurrent R pleural effusion-exudative  #Lymphadenopathy and leukocytosis-suspect underlying malignancy  -With large right sided pleural effusion, now status post pigtail placement, monitor output   -O 2 supplement PRN   -Continue steroid taper  -Patient was treated with course of empiric cefepime and dose of vanco at [place]   -Blood cultures 2/17 NGTD, Pleural fluid no growth currently, f/u final culture/cytology  -thoracic surg care appreciated, plan for pleurex and mediastinoscopy to evaluate for lymph nodes. VATS  -Heme/onc follow up appreciated   - patient will benefit form pleurx placement , placed by CTS   - IR guided LN biopsy, follow up path report    - S/P vats and pleurx per surg team   - follow up biopsy results , Lymhome   - Chemo per heme.Onc follow up appreciated , next chemo in 3 weeks   - PET noted   - TS for medport on the 15th     #Afib/ HTN  rate control   telemonitoring   eliquis fo rnow, switch to heparin next week for mediport       #Urinary Retention  -Continue plummer  -Continue flomax    #DVT prophylaxis     Discussed with patient son over the phone

## 2023-03-11 NOTE — PROGRESS NOTE ADULT - SUBJECTIVE AND OBJECTIVE BOX
Name of Patient : ARIK DUMONT  MRN: [id number]  Date of visit: 03-11-23     Subjective: Patient seen and examined. No new events except as noted.   Doing okay   S/P Chemo       REVIEW OF SYSTEMS:    CONSTITUTIONAL: No weakness, fevers or chills  EYES/ENT: No visual changes;  No vertigo or throat pain   NECK: No pain or stiffness  RESPIRATORY: No cough, wheezing, hemoptysis; No shortness of breath  CARDIOVASCULAR: No chest pain or palpitations  GASTROINTESTINAL: No abdominal or epigastric pain. No nausea, vomiting, or hematemesis; No diarrhea or constipation. No melena or hematochezia.  GENITOURINARY: No dysuria, frequency or hematuria  NEUROLOGICAL: No numbness or weakness  SKIN: No itching, burning, rashes, or lesions   All other review of systems is negative unless indicated above.    MEDICATIONS:  MEDICATIONS  (STANDING):  acetaminophen   IVPB .. 1000 milliGRAM(s) IV Intermittent once  albuterol    0.083% 2.5 milliGRAM(s) Nebulizer every 6 hours  apixaban 5 milliGRAM(s) Oral two times a day  aspirin  chewable 81 milliGRAM(s) Oral daily  atenolol  Tablet 12.5 milliGRAM(s) Oral daily  chlorhexidine 2% Cloths 1 Application(s) Topical <User Schedule>  escitalopram 5 milliGRAM(s) Oral daily  famotidine    Tablet 20 milliGRAM(s) Oral daily  melatonin 6 milliGRAM(s) Oral at bedtime  midodrine. 5 milliGRAM(s) Oral three times a day  polyethylene glycol 3350 17 Gram(s) Oral daily  predniSONE   Tablet 50 milliGRAM(s) Oral daily  senna 2 Tablet(s) Oral at bedtime  simvastatin 40 milliGRAM(s) Oral at bedtime  sodium chloride 0.9%. 1000 milliLiter(s) (20 mL/Hr) IV Continuous <Continuous>  sodium chloride 3%  Inhalation 4 milliLiter(s) Inhalation every 6 hours  tamsulosin 0.4 milliGRAM(s) Oral at bedtime      PHYSICAL EXAM:  T(C): 36.3 (03-11-23 @ 13:18), Max: 36.7 (03-10-23 @ 21:39)  HR: 72 (03-11-23 @ 13:18) (72 - 80)  BP: 108/64 (03-11-23 @ 13:18) (103/65 - 141/80)  RR: 17 (03-11-23 @ 13:18) (16 - 18)  SpO2: 94% (03-11-23 @ 13:18) (94% - 99%)  Wt(kg): --  I&O's Summary    10 Mar 2023 07:01  -  11 Mar 2023 07:00  --------------------------------------------------------  IN: 950 mL / OUT: 2135 mL / NET: -1185 mL          Appearance: Normal	  HEENT:  PERRLA   Lymphatic: No lymphadenopathy   Cardiovascular: Normal S1 S2, no JVD  Respiratory: normal effort , clear  Gastrointestinal:  Soft, Non-tender  Skin: No rashes,  warm to touch  Psychiatry:  Mood & affect appropriate  Musculuskeletal: No edema    recent labs, Imaging and EKGs personally reviewed     03-10-23 @ 07:01  -  03-11-23 @ 07:00  --------------------------------------------------------  IN: 950 mL / OUT: 2135 mL / NET: -1185 mL                          10.0   11.67 )-----------( 180      ( 11 Mar 2023 05:34 )             32.9               03-11    140  |  102  |  19  ----------------------------<  86  4.1   |  28  |  0.62    Ca    9.0      11 Mar 2023 05:34  Phos  2.5     03-11  Mg     1.90     03-11    TPro  6.3  /  Alb  2.7<L>  /  TBili  0.4  /  DBili  x   /  AST  33  /  ALT  33  /  AlkPhos  67  03-11

## 2023-03-11 NOTE — PROVIDER CONTACT NOTE (OTHER) - ASSESSMENT
See flowsheet for pts vitals   Patient denying any other symptoms at the moment
see flowsheet for pts vitals   patients HR and Blood pressure elevated  pts fingers are white/yellow
Patient sleeping.asymptomatic
patient axo4, ambulating with walker. vital signs stable. kept NPO after midnight.
patient denies shortness of breath, pain, chest pain. HR remained stable. o2 stable without supplemental oxygen.

## 2023-03-12 LAB
ALBUMIN SERPL ELPH-MCNC: 2.9 G/DL — LOW (ref 3.3–5)
ALP SERPL-CCNC: 73 U/L — SIGNIFICANT CHANGE UP (ref 40–120)
ALT FLD-CCNC: 59 U/L — HIGH (ref 4–41)
ANION GAP SERPL CALC-SCNC: 6 MMOL/L — LOW (ref 7–14)
APTT BLD: 31.6 SEC — SIGNIFICANT CHANGE UP (ref 27–36.3)
AST SERPL-CCNC: 62 U/L — HIGH (ref 4–40)
BASOPHILS # BLD AUTO: 0.01 K/UL — SIGNIFICANT CHANGE UP (ref 0–0.2)
BASOPHILS NFR BLD AUTO: 0.1 % — SIGNIFICANT CHANGE UP (ref 0–2)
BILIRUB SERPL-MCNC: 0.3 MG/DL — SIGNIFICANT CHANGE UP (ref 0.2–1.2)
BUN SERPL-MCNC: 21 MG/DL — SIGNIFICANT CHANGE UP (ref 7–23)
CALCIUM SERPL-MCNC: 8.9 MG/DL — SIGNIFICANT CHANGE UP (ref 8.4–10.5)
CHLORIDE SERPL-SCNC: 103 MMOL/L — SIGNIFICANT CHANGE UP (ref 98–107)
CO2 SERPL-SCNC: 31 MMOL/L — SIGNIFICANT CHANGE UP (ref 22–31)
CREAT SERPL-MCNC: 0.59 MG/DL — SIGNIFICANT CHANGE UP (ref 0.5–1.3)
EGFR: 96 ML/MIN/1.73M2 — SIGNIFICANT CHANGE UP
EOSINOPHIL # BLD AUTO: 0.02 K/UL — SIGNIFICANT CHANGE UP (ref 0–0.5)
EOSINOPHIL NFR BLD AUTO: 0.2 % — SIGNIFICANT CHANGE UP (ref 0–6)
GLUCOSE SERPL-MCNC: 97 MG/DL — SIGNIFICANT CHANGE UP (ref 70–99)
HCT VFR BLD CALC: 32.8 % — LOW (ref 39–50)
HGB BLD-MCNC: 9.8 G/DL — LOW (ref 13–17)
IANC: 7.24 K/UL — SIGNIFICANT CHANGE UP (ref 1.8–7.4)
IMM GRANULOCYTES NFR BLD AUTO: 0.5 % — SIGNIFICANT CHANGE UP (ref 0–0.9)
INR BLD: 1.42 RATIO — HIGH (ref 0.88–1.16)
LDH SERPL L TO P-CCNC: 186 U/L — SIGNIFICANT CHANGE UP (ref 135–225)
LYMPHOCYTES # BLD AUTO: 1.44 K/UL — SIGNIFICANT CHANGE UP (ref 1–3.3)
LYMPHOCYTES # BLD AUTO: 14.4 % — SIGNIFICANT CHANGE UP (ref 13–44)
MAGNESIUM SERPL-MCNC: 1.8 MG/DL — SIGNIFICANT CHANGE UP (ref 1.6–2.6)
MCHC RBC-ENTMCNC: 27.4 PG — SIGNIFICANT CHANGE UP (ref 27–34)
MCHC RBC-ENTMCNC: 29.9 GM/DL — LOW (ref 32–36)
MCV RBC AUTO: 91.6 FL — SIGNIFICANT CHANGE UP (ref 80–100)
MONOCYTES # BLD AUTO: 1.21 K/UL — HIGH (ref 0–0.9)
MONOCYTES NFR BLD AUTO: 12.1 % — SIGNIFICANT CHANGE UP (ref 2–14)
NEUTROPHILS # BLD AUTO: 7.24 K/UL — SIGNIFICANT CHANGE UP (ref 1.8–7.4)
NEUTROPHILS NFR BLD AUTO: 72.7 % — SIGNIFICANT CHANGE UP (ref 43–77)
NRBC # BLD: 0 /100 WBCS — SIGNIFICANT CHANGE UP (ref 0–0)
NRBC # FLD: 0 K/UL — SIGNIFICANT CHANGE UP (ref 0–0)
PHOSPHATE SERPL-MCNC: 2.8 MG/DL — SIGNIFICANT CHANGE UP (ref 2.5–4.5)
PLATELET # BLD AUTO: 184 K/UL — SIGNIFICANT CHANGE UP (ref 150–400)
POTASSIUM SERPL-MCNC: 4.3 MMOL/L — SIGNIFICANT CHANGE UP (ref 3.5–5.3)
POTASSIUM SERPL-SCNC: 4.3 MMOL/L — SIGNIFICANT CHANGE UP (ref 3.5–5.3)
PROT SERPL-MCNC: 6 G/DL — SIGNIFICANT CHANGE UP (ref 6–8.3)
PROTHROM AB SERPL-ACNC: 16.5 SEC — HIGH (ref 10.5–13.4)
RBC # BLD: 3.58 M/UL — LOW (ref 4.2–5.8)
RBC # FLD: 19.7 % — HIGH (ref 10.3–14.5)
SODIUM SERPL-SCNC: 140 MMOL/L — SIGNIFICANT CHANGE UP (ref 135–145)
URATE SERPL-MCNC: 4.2 MG/DL — SIGNIFICANT CHANGE UP (ref 3.4–8.8)
WBC # BLD: 9.97 K/UL — SIGNIFICANT CHANGE UP (ref 3.8–10.5)
WBC # FLD AUTO: 9.97 K/UL — SIGNIFICANT CHANGE UP (ref 3.8–10.5)

## 2023-03-12 RX ORDER — HEPARIN SODIUM 5000 [USP'U]/ML
3000 INJECTION INTRAVENOUS; SUBCUTANEOUS EVERY 6 HOURS
Refills: 0 | Status: DISCONTINUED | OUTPATIENT
Start: 2023-03-12 | End: 2023-03-12

## 2023-03-12 RX ORDER — HEPARIN SODIUM 5000 [USP'U]/ML
INJECTION INTRAVENOUS; SUBCUTANEOUS
Qty: 25000 | Refills: 0 | Status: DISCONTINUED | OUTPATIENT
Start: 2023-03-12 | End: 2023-03-15

## 2023-03-12 RX ORDER — HEPARIN SODIUM 5000 [USP'U]/ML
INJECTION INTRAVENOUS; SUBCUTANEOUS
Qty: 25000 | Refills: 0 | Status: DISCONTINUED | OUTPATIENT
Start: 2023-03-12 | End: 2023-03-12

## 2023-03-12 RX ORDER — HEPARIN SODIUM 5000 [USP'U]/ML
6500 INJECTION INTRAVENOUS; SUBCUTANEOUS EVERY 6 HOURS
Refills: 0 | Status: DISCONTINUED | OUTPATIENT
Start: 2023-03-12 | End: 2023-03-15

## 2023-03-12 RX ORDER — HEPARIN SODIUM 5000 [USP'U]/ML
3000 INJECTION INTRAVENOUS; SUBCUTANEOUS EVERY 6 HOURS
Refills: 0 | Status: DISCONTINUED | OUTPATIENT
Start: 2023-03-12 | End: 2023-03-15

## 2023-03-12 RX ORDER — HEPARIN SODIUM 5000 [USP'U]/ML
6500 INJECTION INTRAVENOUS; SUBCUTANEOUS EVERY 6 HOURS
Refills: 0 | Status: DISCONTINUED | OUTPATIENT
Start: 2023-03-12 | End: 2023-03-12

## 2023-03-12 RX ADMIN — FAMOTIDINE 20 MILLIGRAM(S): 10 INJECTION INTRAVENOUS at 13:46

## 2023-03-12 RX ADMIN — MIDODRINE HYDROCHLORIDE 5 MILLIGRAM(S): 2.5 TABLET ORAL at 13:45

## 2023-03-12 RX ADMIN — HEPARIN SODIUM 1500 UNIT(S)/HR: 5000 INJECTION INTRAVENOUS; SUBCUTANEOUS at 19:23

## 2023-03-12 RX ADMIN — TAMSULOSIN HYDROCHLORIDE 0.4 MILLIGRAM(S): 0.4 CAPSULE ORAL at 21:48

## 2023-03-12 RX ADMIN — Medication 81 MILLIGRAM(S): at 13:45

## 2023-03-12 RX ADMIN — ESCITALOPRAM OXALATE 5 MILLIGRAM(S): 10 TABLET, FILM COATED ORAL at 21:47

## 2023-03-12 RX ADMIN — ALBUTEROL 2.5 MILLIGRAM(S): 90 AEROSOL, METERED ORAL at 22:21

## 2023-03-12 RX ADMIN — SODIUM CHLORIDE 4 MILLILITER(S): 9 INJECTION INTRAMUSCULAR; INTRAVENOUS; SUBCUTANEOUS at 03:24

## 2023-03-12 RX ADMIN — Medication 50 MILLIGRAM(S): at 05:23

## 2023-03-12 RX ADMIN — Medication 6 MILLIGRAM(S): at 21:48

## 2023-03-12 RX ADMIN — MIDODRINE HYDROCHLORIDE 5 MILLIGRAM(S): 2.5 TABLET ORAL at 05:23

## 2023-03-12 RX ADMIN — SODIUM CHLORIDE 4 MILLILITER(S): 9 INJECTION INTRAMUSCULAR; INTRAVENOUS; SUBCUTANEOUS at 22:21

## 2023-03-12 RX ADMIN — SIMVASTATIN 40 MILLIGRAM(S): 20 TABLET, FILM COATED ORAL at 21:48

## 2023-03-12 RX ADMIN — SODIUM CHLORIDE 4 MILLILITER(S): 9 INJECTION INTRAMUSCULAR; INTRAVENOUS; SUBCUTANEOUS at 10:10

## 2023-03-12 RX ADMIN — HEPARIN SODIUM 1500 UNIT(S)/HR: 5000 INJECTION INTRAVENOUS; SUBCUTANEOUS at 18:01

## 2023-03-12 RX ADMIN — ATENOLOL 12.5 MILLIGRAM(S): 25 TABLET ORAL at 05:23

## 2023-03-12 RX ADMIN — APIXABAN 5 MILLIGRAM(S): 2.5 TABLET, FILM COATED ORAL at 05:23

## 2023-03-12 RX ADMIN — ALBUTEROL 2.5 MILLIGRAM(S): 90 AEROSOL, METERED ORAL at 03:25

## 2023-03-12 RX ADMIN — CHLORHEXIDINE GLUCONATE 1 APPLICATION(S): 213 SOLUTION TOPICAL at 06:00

## 2023-03-12 RX ADMIN — ALBUTEROL 2.5 MILLIGRAM(S): 90 AEROSOL, METERED ORAL at 10:10

## 2023-03-12 NOTE — PROGRESS NOTE ADULT - SUBJECTIVE AND OBJECTIVE BOX
DATE OF SERVICE: 03-12-23 @ 08:02    Subjective: Patient seen and examined. No new events except as noted.     SUBJECTIVE/ROS:  NAD      MEDICATIONS:  MEDICATIONS  (STANDING):  acetaminophen   IVPB .. 1000 milliGRAM(s) IV Intermittent once  albuterol    0.083% 2.5 milliGRAM(s) Nebulizer every 6 hours  apixaban 5 milliGRAM(s) Oral two times a day  aspirin  chewable 81 milliGRAM(s) Oral daily  atenolol  Tablet 12.5 milliGRAM(s) Oral daily  chlorhexidine 2% Cloths 1 Application(s) Topical <User Schedule>  escitalopram 5 milliGRAM(s) Oral daily  famotidine    Tablet 20 milliGRAM(s) Oral daily  melatonin 6 milliGRAM(s) Oral at bedtime  midodrine. 5 milliGRAM(s) Oral three times a day  polyethylene glycol 3350 17 Gram(s) Oral daily  predniSONE   Tablet 50 milliGRAM(s) Oral daily  senna 2 Tablet(s) Oral at bedtime  simvastatin 40 milliGRAM(s) Oral at bedtime  sodium chloride 0.9%. 1000 milliLiter(s) (20 mL/Hr) IV Continuous <Continuous>  sodium chloride 3%  Inhalation 4 milliLiter(s) Inhalation every 6 hours  tamsulosin 0.4 milliGRAM(s) Oral at bedtime      PHYSICAL EXAM:  T(C): 37.1 (03-12-23 @ 06:37), Max: 37.1 (03-12-23 @ 06:37)  HR: 61 (03-12-23 @ 06:37) (61 - 82)  BP: 155/59 (03-12-23 @ 06:37) (80/- - 155/59)  RR: 18 (03-12-23 @ 06:37) (17 - 18)  SpO2: 98% (03-12-23 @ 06:37) (94% - 100%)  Wt(kg): --  I&O's Summary    11 Mar 2023 06:01  -  12 Mar 2023 07:00  --------------------------------------------------------  IN: 800 mL / OUT: 770 mL / NET: 30 mL            JVP: Normal  Neck: supple  Lung: clear   CV: S1 S2 , Murmur:  Abd: soft  Ext: No edema  neuro: Awake / alert  Psych: flat affect  Skin: normal``    LABS/DATA:    CARDIAC MARKERS:                                9.8    9.97  )-----------( 184      ( 12 Mar 2023 05:09 )             32.8     03-12    140  |  103  |  21  ----------------------------<  97  4.3   |  31  |  0.59    Ca    8.9      12 Mar 2023 05:09  Phos  2.8     03-12  Mg     1.80     03-12    TPro  6.0  /  Alb  2.9<L>  /  TBili  0.3  /  DBili  x   /  AST  62<H>  /  ALT  59<H>  /  AlkPhos  73  03-12    proBNP:   Lipid Profile:   HgA1c:   TSH:     TELE:  EKG:

## 2023-03-12 NOTE — PROGRESS NOTE ADULT - SUBJECTIVE AND OBJECTIVE BOX
Name of Patient : ARIK DUMONT  MRN: [id number]  Date of visit: 03-12-23       Subjective: Patient seen and examined. No new events except as noted.   doing okay     REVIEW OF SYSTEMS:    CONSTITUTIONAL: No weakness, fevers or chills  EYES/ENT: No visual changes;  No vertigo or throat pain   NECK: No pain or stiffness  RESPIRATORY: No cough, wheezing, hemoptysis; No shortness of breath  CARDIOVASCULAR: No chest pain or palpitations  GASTROINTESTINAL: No abdominal or epigastric pain. No nausea, vomiting, or hematemesis; No diarrhea or constipation. No melena or hematochezia.  GENITOURINARY: No dysuria, frequency or hematuria  NEUROLOGICAL: No numbness or weakness  SKIN: No itching, burning, rashes, or lesions   All other review of systems is negative unless indicated above.    MEDICATIONS:  MEDICATIONS  (STANDING):  acetaminophen   IVPB .. 1000 milliGRAM(s) IV Intermittent once  albuterol    0.083% 2.5 milliGRAM(s) Nebulizer every 6 hours  aspirin  chewable 81 milliGRAM(s) Oral daily  atenolol  Tablet 12.5 milliGRAM(s) Oral daily  chlorhexidine 2% Cloths 1 Application(s) Topical <User Schedule>  escitalopram 5 milliGRAM(s) Oral daily  famotidine    Tablet 20 milliGRAM(s) Oral daily  heparin  Infusion.  Unit(s)/Hr (15 mL/Hr) IV Continuous <Continuous>  melatonin 6 milliGRAM(s) Oral at bedtime  midodrine. 5 milliGRAM(s) Oral three times a day  polyethylene glycol 3350 17 Gram(s) Oral daily  predniSONE   Tablet 50 milliGRAM(s) Oral daily  senna 2 Tablet(s) Oral at bedtime  simvastatin 40 milliGRAM(s) Oral at bedtime  sodium chloride 0.9%. 1000 milliLiter(s) (20 mL/Hr) IV Continuous <Continuous>  sodium chloride 3%  Inhalation 4 milliLiter(s) Inhalation every 6 hours  tamsulosin 0.4 milliGRAM(s) Oral at bedtime      PHYSICAL EXAM:  T(C): 37 (03-12-23 @ 21:40), Max: 37.1 (03-12-23 @ 06:37)  HR: 74 (03-12-23 @ 21:40) (61 - 82)  BP: 142/79 (03-12-23 @ 21:40) (80/- - 155/59)  RR: 17 (03-12-23 @ 21:40) (17 - 18)  SpO2: 95% (03-12-23 @ 21:40) (95% - 100%)  Wt(kg): --  I&O's Summary    11 Mar 2023 06:01  -  12 Mar 2023 07:00  --------------------------------------------------------  IN: 800 mL / OUT: 1270 mL / NET: -470 mL    12 Mar 2023 07:01  -  12 Mar 2023 22:06  --------------------------------------------------------  IN: 0 mL / OUT: 800 mL / NET: -800 mL          Appearance: Normal	  HEENT:  PERRLA   Lymphatic: No lymphadenopathy   Cardiovascular: Normal S1 S2, no JVD  Respiratory: normal effort , clear  Gastrointestinal:  Soft, Non-tender  Skin: No rashes,  warm to touch  Psychiatry:  Mood & affect appropriate  Musculuskeletal: No edema    recent labs, Imaging and EKGs personally reviewed     03-11-23 @ 06:01  -  03-12-23 @ 07:00  --------------------------------------------------------  IN: 800 mL / OUT: 1270 mL / NET: -470 mL    03-12-23 @ 07:01  -  03-12-23 @ 22:06  --------------------------------------------------------  IN: 0 mL / OUT: 800 mL / NET: -800 mL                            9.8    9.97  )-----------( 184      ( 12 Mar 2023 05:09 )             32.8               03-12    140  |  103  |  21  ----------------------------<  97  4.3   |  31  |  0.59    Ca    8.9      12 Mar 2023 05:09  Phos  2.8     03-12  Mg     1.80     03-12    TPro  6.0  /  Alb  2.9<L>  /  TBili  0.3  /  DBili  x   /  AST  62<H>  /  ALT  59<H>  /  AlkPhos  73  03-12    PT/INR - ( 12 Mar 2023 17:35 )   PT: 16.5 sec;   INR: 1.42 ratio         PTT - ( 12 Mar 2023 17:35 )  PTT:31.6 sec

## 2023-03-12 NOTE — PROGRESS NOTE ADULT - ASSESSMENT
82 year old male with PMH atrial fibrillation, HTN and chronic urinary retention who presented to [facility] from home with acute hypoxic respiratory failure with hypercapnia. Patient was admitted to ICU an [facility], now downgraded and transferred to  [facility] for continuation of his care     #Acute hypoxic respiratory failure with hypercapnia  #Recurrent R pleural effusion-exudative  #Lymphadenopathy and leukocytosis-suspect underlying malignancy  -With large right sided pleural effusion, now status post pigtail placement, monitor output   -O 2 supplement PRN   -Continue steroid taper  -Patient was treated with course of empiric cefepime and dose of vanco at [place]   -Blood cultures 2/17 NGTD, Pleural fluid no growth currently, f/u final culture/cytology  -thoracic surg care appreciated, plan for pleurex and mediastinoscopy to evaluate for lymph nodes. VATS  -Heme/onc follow up appreciated   - patient will benefit form pleurx placement , placed by CTS   - IR guided LN biopsy, follow up path report    - S/P vats and pleurx per surg team   - follow up biopsy results , [facility]   - Chemo per heme.Onc follow up appreciated , next chemo in 3 weeks   - PET noted   - TS for medport on the 15th , heparin started, hold eliquis     #Afib/ HTN  rate control   telemonitoring   eliquis fo rnow, switch to heparin next week for mediport       #Urinary Retention  -Continue plummer  -Continue flomax    #DVT prophylaxis     Discussed with patient son over the phone

## 2023-03-13 LAB
ALBUMIN SERPL ELPH-MCNC: 2.6 G/DL — LOW (ref 3.3–5)
ALP SERPL-CCNC: 77 U/L — SIGNIFICANT CHANGE UP (ref 40–120)
ALT FLD-CCNC: 118 U/L — HIGH (ref 4–41)
ANION GAP SERPL CALC-SCNC: 8 MMOL/L — SIGNIFICANT CHANGE UP (ref 7–14)
APTT BLD: 78.5 SEC — HIGH (ref 27–36.3)
APTT BLD: 98.3 SEC — HIGH (ref 27–36.3)
AST SERPL-CCNC: 104 U/L — HIGH (ref 4–40)
BASOPHILS # BLD AUTO: 0.01 K/UL — SIGNIFICANT CHANGE UP (ref 0–0.2)
BASOPHILS NFR BLD AUTO: 0.1 % — SIGNIFICANT CHANGE UP (ref 0–2)
BILIRUB SERPL-MCNC: 0.4 MG/DL — SIGNIFICANT CHANGE UP (ref 0.2–1.2)
BUN SERPL-MCNC: 22 MG/DL — SIGNIFICANT CHANGE UP (ref 7–23)
CALCIUM SERPL-MCNC: 8.7 MG/DL — SIGNIFICANT CHANGE UP (ref 8.4–10.5)
CHLORIDE SERPL-SCNC: 100 MMOL/L — SIGNIFICANT CHANGE UP (ref 98–107)
CHROM ANALY INTERPHASE BLD FISH-IMP: SIGNIFICANT CHANGE UP
CHROM ANALY OVERALL INTERP SPEC-IMP: SIGNIFICANT CHANGE UP
CO2 SERPL-SCNC: 31 MMOL/L — SIGNIFICANT CHANGE UP (ref 22–31)
CREAT SERPL-MCNC: 0.53 MG/DL — SIGNIFICANT CHANGE UP (ref 0.5–1.3)
EGFR: 99 ML/MIN/1.73M2 — SIGNIFICANT CHANGE UP
EOSINOPHIL # BLD AUTO: 0.06 K/UL — SIGNIFICANT CHANGE UP (ref 0–0.5)
EOSINOPHIL NFR BLD AUTO: 0.5 % — SIGNIFICANT CHANGE UP (ref 0–6)
GLUCOSE SERPL-MCNC: 94 MG/DL — SIGNIFICANT CHANGE UP (ref 70–99)
HCT VFR BLD CALC: 32.4 % — LOW (ref 39–50)
HCT VFR BLD CALC: 33.3 % — LOW (ref 39–50)
HGB BLD-MCNC: 10 G/DL — LOW (ref 13–17)
HGB BLD-MCNC: 9.9 G/DL — LOW (ref 13–17)
IANC: 7.77 K/UL — HIGH (ref 1.8–7.4)
IMM GRANULOCYTES NFR BLD AUTO: 0.6 % — SIGNIFICANT CHANGE UP (ref 0–0.9)
LDH SERPL L TO P-CCNC: 201 U/L — SIGNIFICANT CHANGE UP (ref 135–225)
LYMPHOCYTES # BLD AUTO: 1.95 K/UL — SIGNIFICANT CHANGE UP (ref 1–3.3)
LYMPHOCYTES # BLD AUTO: 17.7 % — SIGNIFICANT CHANGE UP (ref 13–44)
MAGNESIUM SERPL-MCNC: 1.7 MG/DL — SIGNIFICANT CHANGE UP (ref 1.6–2.6)
MCHC RBC-ENTMCNC: 27.1 PG — SIGNIFICANT CHANGE UP (ref 27–34)
MCHC RBC-ENTMCNC: 27.8 PG — SIGNIFICANT CHANGE UP (ref 27–34)
MCHC RBC-ENTMCNC: 30 GM/DL — LOW (ref 32–36)
MCHC RBC-ENTMCNC: 30.6 GM/DL — LOW (ref 32–36)
MCV RBC AUTO: 90.2 FL — SIGNIFICANT CHANGE UP (ref 80–100)
MCV RBC AUTO: 91 FL — SIGNIFICANT CHANGE UP (ref 80–100)
MONOCYTES # BLD AUTO: 1.14 K/UL — HIGH (ref 0–0.9)
MONOCYTES NFR BLD AUTO: 10.4 % — SIGNIFICANT CHANGE UP (ref 2–14)
NEUTROPHILS # BLD AUTO: 7.77 K/UL — HIGH (ref 1.8–7.4)
NEUTROPHILS NFR BLD AUTO: 70.7 % — SIGNIFICANT CHANGE UP (ref 43–77)
NRBC # BLD: 0 /100 WBCS — SIGNIFICANT CHANGE UP (ref 0–0)
NRBC # BLD: 0 /100 WBCS — SIGNIFICANT CHANGE UP (ref 0–0)
NRBC # FLD: 0 K/UL — SIGNIFICANT CHANGE UP (ref 0–0)
NRBC # FLD: 0 K/UL — SIGNIFICANT CHANGE UP (ref 0–0)
PHOSPHATE SERPL-MCNC: 2.4 MG/DL — LOW (ref 2.5–4.5)
PLATELET # BLD AUTO: 205 K/UL — SIGNIFICANT CHANGE UP (ref 150–400)
PLATELET # BLD AUTO: 218 K/UL — SIGNIFICANT CHANGE UP (ref 150–400)
POTASSIUM SERPL-MCNC: 3.8 MMOL/L — SIGNIFICANT CHANGE UP (ref 3.5–5.3)
POTASSIUM SERPL-SCNC: 3.8 MMOL/L — SIGNIFICANT CHANGE UP (ref 3.5–5.3)
PROT SERPL-MCNC: 6.1 G/DL — SIGNIFICANT CHANGE UP (ref 6–8.3)
RBC # BLD: 3.56 M/UL — LOW (ref 4.2–5.8)
RBC # BLD: 3.69 M/UL — LOW (ref 4.2–5.8)
RBC # FLD: 19.7 % — HIGH (ref 10.3–14.5)
RBC # FLD: 19.9 % — HIGH (ref 10.3–14.5)
SODIUM SERPL-SCNC: 139 MMOL/L — SIGNIFICANT CHANGE UP (ref 135–145)
URATE SERPL-MCNC: 3.9 MG/DL — SIGNIFICANT CHANGE UP (ref 3.4–8.8)
WBC # BLD: 11 K/UL — HIGH (ref 3.8–10.5)
WBC # BLD: 9.71 K/UL — SIGNIFICANT CHANGE UP (ref 3.8–10.5)
WBC # FLD AUTO: 11 K/UL — HIGH (ref 3.8–10.5)
WBC # FLD AUTO: 9.71 K/UL — SIGNIFICANT CHANGE UP (ref 3.8–10.5)

## 2023-03-13 PROCEDURE — 71045 X-RAY EXAM CHEST 1 VIEW: CPT | Mod: 26

## 2023-03-13 PROCEDURE — 99232 SBSQ HOSP IP/OBS MODERATE 35: CPT

## 2023-03-13 RX ORDER — MAGNESIUM SULFATE 500 MG/ML
2 VIAL (ML) INJECTION ONCE
Refills: 0 | Status: COMPLETED | OUTPATIENT
Start: 2023-03-13 | End: 2023-03-13

## 2023-03-13 RX ADMIN — MIDODRINE HYDROCHLORIDE 5 MILLIGRAM(S): 2.5 TABLET ORAL at 13:01

## 2023-03-13 RX ADMIN — ALBUTEROL 2.5 MILLIGRAM(S): 90 AEROSOL, METERED ORAL at 04:29

## 2023-03-13 RX ADMIN — ESCITALOPRAM OXALATE 5 MILLIGRAM(S): 10 TABLET, FILM COATED ORAL at 21:29

## 2023-03-13 RX ADMIN — SODIUM CHLORIDE 4 MILLILITER(S): 9 INJECTION INTRAMUSCULAR; INTRAVENOUS; SUBCUTANEOUS at 15:07

## 2023-03-13 RX ADMIN — ATENOLOL 12.5 MILLIGRAM(S): 25 TABLET ORAL at 05:44

## 2023-03-13 RX ADMIN — FAMOTIDINE 20 MILLIGRAM(S): 10 INJECTION INTRAVENOUS at 13:01

## 2023-03-13 RX ADMIN — ALBUTEROL 2.5 MILLIGRAM(S): 90 AEROSOL, METERED ORAL at 15:07

## 2023-03-13 RX ADMIN — MIDODRINE HYDROCHLORIDE 5 MILLIGRAM(S): 2.5 TABLET ORAL at 21:28

## 2023-03-13 RX ADMIN — Medication 81 MILLIGRAM(S): at 13:00

## 2023-03-13 RX ADMIN — ALBUTEROL 2.5 MILLIGRAM(S): 90 AEROSOL, METERED ORAL at 22:09

## 2023-03-13 RX ADMIN — Medication 6 MILLIGRAM(S): at 21:27

## 2023-03-13 RX ADMIN — HEPARIN SODIUM 1500 UNIT(S)/HR: 5000 INJECTION INTRAVENOUS; SUBCUTANEOUS at 06:54

## 2023-03-13 RX ADMIN — SODIUM CHLORIDE 4 MILLILITER(S): 9 INJECTION INTRAMUSCULAR; INTRAVENOUS; SUBCUTANEOUS at 10:38

## 2023-03-13 RX ADMIN — CHLORHEXIDINE GLUCONATE 1 APPLICATION(S): 213 SOLUTION TOPICAL at 05:44

## 2023-03-13 RX ADMIN — SODIUM CHLORIDE 4 MILLILITER(S): 9 INJECTION INTRAMUSCULAR; INTRAVENOUS; SUBCUTANEOUS at 21:29

## 2023-03-13 RX ADMIN — SENNA PLUS 2 TABLET(S): 8.6 TABLET ORAL at 21:29

## 2023-03-13 RX ADMIN — HEPARIN SODIUM 1500 UNIT(S)/HR: 5000 INJECTION INTRAVENOUS; SUBCUTANEOUS at 00:22

## 2023-03-13 RX ADMIN — HEPARIN SODIUM 1500 UNIT(S)/HR: 5000 INJECTION INTRAVENOUS; SUBCUTANEOUS at 12:52

## 2023-03-13 RX ADMIN — HEPARIN SODIUM 1500 UNIT(S)/HR: 5000 INJECTION INTRAVENOUS; SUBCUTANEOUS at 19:41

## 2023-03-13 RX ADMIN — ALBUTEROL 2.5 MILLIGRAM(S): 90 AEROSOL, METERED ORAL at 10:38

## 2023-03-13 RX ADMIN — HEPARIN SODIUM 1500 UNIT(S)/HR: 5000 INJECTION INTRAVENOUS; SUBCUTANEOUS at 07:09

## 2023-03-13 RX ADMIN — SODIUM CHLORIDE 4 MILLILITER(S): 9 INJECTION INTRAMUSCULAR; INTRAVENOUS; SUBCUTANEOUS at 04:30

## 2023-03-13 RX ADMIN — TAMSULOSIN HYDROCHLORIDE 0.4 MILLIGRAM(S): 0.4 CAPSULE ORAL at 21:29

## 2023-03-13 RX ADMIN — Medication 25 GRAM(S): at 12:52

## 2023-03-13 RX ADMIN — Medication 50 MILLIGRAM(S): at 05:44

## 2023-03-13 RX ADMIN — SIMVASTATIN 40 MILLIGRAM(S): 20 TABLET, FILM COATED ORAL at 21:27

## 2023-03-13 NOTE — CHART NOTE - NSCHARTNOTESELECT_GEN_ALL_CORE
Event Note
Event Note
Hematology- PET scheduled/Event Note
Nutrition Services
Chart Note/Nutrition Services
Event Note
Event Note
IR Pre procedure
Thoracic Surgery Post OP Note/Event Note
Thoracic Surgery/Event Note

## 2023-03-13 NOTE — PROGRESS NOTE ADULT - SUBJECTIVE AND OBJECTIVE BOX
Name of Patient : ARIK DUMONT  MRN: [id number]  Date of visit: 03-13-23 @ 13:45      Subjective: Patient seen and examined. No new events except as noted.   Doing okay   plan for port placement on wednsday     REVIEW OF SYSTEMS:    CONSTITUTIONAL: No weakness, fevers or chills  EYES/ENT: No visual changes;  No vertigo or throat pain   NECK: No pain or stiffness  RESPIRATORY: No cough, wheezing, hemoptysis; No shortness of breath  CARDIOVASCULAR: No chest pain or palpitations  GASTROINTESTINAL: No abdominal or epigastric pain. No nausea, vomiting, or hematemesis; No diarrhea or constipation. No melena or hematochezia.  GENITOURINARY: No dysuria, frequency or hematuria  NEUROLOGICAL: No numbness or weakness  SKIN: No itching, burning, rashes, or lesions   All other review of systems is negative unless indicated above.    MEDICATIONS:  MEDICATIONS  (STANDING):  acetaminophen   IVPB .. 1000 milliGRAM(s) IV Intermittent once  albuterol    0.083% 2.5 milliGRAM(s) Nebulizer every 6 hours  aspirin  chewable 81 milliGRAM(s) Oral daily  atenolol  Tablet 12.5 milliGRAM(s) Oral daily  chlorhexidine 2% Cloths 1 Application(s) Topical <User Schedule>  escitalopram 5 milliGRAM(s) Oral daily  famotidine    Tablet 20 milliGRAM(s) Oral daily  heparin  Infusion.  Unit(s)/Hr (15 mL/Hr) IV Continuous <Continuous>  melatonin 6 milliGRAM(s) Oral at bedtime  midodrine. 5 milliGRAM(s) Oral three times a day  polyethylene glycol 3350 17 Gram(s) Oral daily  predniSONE   Tablet 50 milliGRAM(s) Oral daily  senna 2 Tablet(s) Oral at bedtime  simvastatin 40 milliGRAM(s) Oral at bedtime  sodium chloride 0.9%. 1000 milliLiter(s) (20 mL/Hr) IV Continuous <Continuous>  sodium chloride 3%  Inhalation 4 milliLiter(s) Inhalation every 6 hours  tamsulosin 0.4 milliGRAM(s) Oral at bedtime      PHYSICAL EXAM:  T(C): 36.4 (03-13-23 @ 12:53), Max: 37 (03-12-23 @ 21:40)  HR: 62 (03-13-23 @ 12:53) (62 - 74)  BP: 101/61 (03-13-23 @ 12:53) (101/61 - 146/70)  RR: 17 (03-13-23 @ 12:53) (17 - 17)  SpO2: 97% (03-13-23 @ 12:53) (94% - 97%)  Wt(kg): --  I&O's Summary    12 Mar 2023 07:01  -  13 Mar 2023 07:00  --------------------------------------------------------  IN: 575 mL / OUT: 1700 mL / NET: -1125 mL          Appearance: Normal	  HEENT:  PERRLA   Lymphatic: No lymphadenopathy   Cardiovascular: Normal S1 S2, no JVD  Respiratory: normal effort , clear  Gastrointestinal:  Soft, Non-tender  Skin: No rashes,  warm to touch  Psychiatry:  Mood & affect appropriate  Musculuskeletal: No edema    recent labs, Imaging and EKGs personally reviewed     03-12-23 @ 07:01  -  03-13-23 @ 07:00  --------------------------------------------------------  IN: 575 mL / OUT: 1700 mL / NET: -1125 mL                          10.0   11.00 )-----------( 218      ( 13 Mar 2023 05:02 )             33.3               03-13    139  |  100  |  22  ----------------------------<  94  3.8   |  31  |  0.53    Ca    8.7      13 Mar 2023 05:02  Phos  2.4     03-13  Mg     1.70     03-13    TPro  6.1  /  Alb  2.6<L>  /  TBili  0.4  /  DBili  x   /  AST  104<H>  /  ALT  118<H>  /  AlkPhos  77  03-13    PT/INR - ( 12 Mar 2023 17:35 )   PT: 16.5 sec;   INR: 1.42 ratio         PTT - ( 13 Mar 2023 06:10 )  PTT:98.3 sec

## 2023-03-13 NOTE — PROGRESS NOTE ADULT - SUBJECTIVE AND OBJECTIVE BOX
Patient is a 82y old  Male who presents with a chief complaint of VATS (11 Mar 2023 12:16)    Interval history: planned for infusaport 3/15  pet/ct revealed stage IV disease    REVIEW OF SYSTEMS  weakness    PAST MEDICAL & SURGICAL HISTORY  Atrial fibrillation    Hypertension    Urinary retention    No significant past surgical history       CURRENT FUNCTIONAL STATUS   3/13     Bed Mobility  Bed Mobility Training Rolling/Turning: minimum assist (75% patient effort);  1 person assist  Bed Mobility Training Scooting: minimum assist (75% patient effort);  1 person assist  Bed Mobility Training Bridging: minimum assist (75% patient effort);  1 person assist  Bed Mobility Training Sit-to-Supine: minimum assist (75% patient effort);  1 person assist  Bed Mobility Training Supine-to-Sit: minimum assist (75% patient effort);  1 person assist  Bed Mobility Training Limitations: decreased ability to use legs for bridging/pushing;  decreased flexibility;  decreased strength    Sit-Stand Transfer Training  Transfer Training Sit-to-Stand Transfer: minimum assist (75% patient effort);  1 person assist;  weight-bearing as tolerated   rolling walker  Transfer Training Stand-to-Sit Transfer: minimum assist (75% patient effort);  1 person assist;  weight-bearing as tolerated   rolling walker  Sit-to-Stand Transfer Training Transfer Safety Analysis: decreased step length;  decreased flexibility;  rolling walker    Gait Training  Gait Training: minimum assist (75% patient effort);  1 person + 1 person to manage equipment;  weight-bearing as tolerated   rolling walker;  10 feet;  O2nc 3ltrs  Gait Analysis: 3-point gait   decreased isabella;  decreased step length;  decreased strength;  10 feet;  rolling walker    Therapeutic Exercise  Therapeutic Exercise Detail: Pt performed ActiveROM, ankle pumps Bilateral LE's.       RECENT LABS/IMAGING  CBC Full  -  ( 13 Mar 2023 05:02 )  WBC Count : 11.00 K/uL  RBC Count : 3.69 M/uL  Hemoglobin : 10.0 g/dL  Hematocrit : 33.3 %  Platelet Count - Automated : 218 K/uL  Mean Cell Volume : 90.2 fL  Mean Cell Hemoglobin : 27.1 pg  Mean Cell Hemoglobin Concentration : 30.0 gm/dL  Auto Neutrophil # : 7.77 K/uL  Auto Lymphocyte # : 1.95 K/uL  Auto Monocyte # : 1.14 K/uL  Auto Eosinophil # : 0.06 K/uL  Auto Basophil # : 0.01 K/uL  Auto Neutrophil % : 70.7 %  Auto Lymphocyte % : 17.7 %  Auto Monocyte % : 10.4 %  Auto Eosinophil % : 0.5 %  Auto Basophil % : 0.1 %    03-13    139  |  100  |  22  ----------------------------<  94  3.8   |  31  |  0.53    Ca    8.7      13 Mar 2023 05:02  Phos  2.4     03-13  Mg     1.70     03-13    TPro  6.1  /  Alb  2.6<L>  /  TBili  0.4  /  DBili  x   /  AST  104<H>  /  ALT  118<H>  /  AlkPhos  77  03-13        VITALS  T(C): 36.4 (03-13-23 @ 12:53), Max: 37 (03-12-23 @ 21:40)  HR: 61 (03-13-23 @ 15:25) (61 - 74)  BP: 111/52 (03-13-23 @ 15:25) (101/61 - 146/70)  RR: 18 (03-13-23 @ 15:25) (17 - 18)  SpO2: 97% (03-13-23 @ 15:25) (94% - 97%)  Wt(kg): --    ALLERGIES  pertussis vaccines (Rash)  shellfish (Rash)      MEDICATIONS   acetaminophen   IVPB .. 1000 milliGRAM(s) IV Intermittent once  albuterol    0.083% 2.5 milliGRAM(s) Nebulizer every 6 hours  aspirin  chewable 81 milliGRAM(s) Oral daily  atenolol  Tablet 12.5 milliGRAM(s) Oral daily  chlorhexidine 2% Cloths 1 Application(s) Topical <User Schedule>  escitalopram 5 milliGRAM(s) Oral daily  famotidine    Tablet 20 milliGRAM(s) Oral daily  heparin   Injectable 6500 Unit(s) IV Push every 6 hours PRN  heparin   Injectable 3000 Unit(s) IV Push every 6 hours PRN  heparin  Infusion.  Unit(s)/Hr IV Continuous <Continuous>  melatonin 6 milliGRAM(s) Oral at bedtime  midodrine. 5 milliGRAM(s) Oral three times a day  oxyCODONE    IR 5 milliGRAM(s) Oral once PRN  polyethylene glycol 3350 17 Gram(s) Oral daily  predniSONE   Tablet 50 milliGRAM(s) Oral daily  senna 2 Tablet(s) Oral at bedtime  simvastatin 40 milliGRAM(s) Oral at bedtime  sodium chloride 0.9%. 1000 milliLiter(s) IV Continuous <Continuous>  sodium chloride 3%  Inhalation 4 milliLiter(s) Inhalation every 6 hours  tamsulosin 0.4 milliGRAM(s) Oral at bedtime      ----------------------------------------------------------------------------------------  PHYSICAL EXAM  Constitutional - NAD, Comfortable  HEENT - NCAT, EOMI   Chest - pleurex  Cardiovascular - RRR, S1S2   Abdomen - Soft, NTND  Extremities - No C/C/E, No calf tenderness   Neurologic Exam -                    Cognitive - Awake, Alert, AAO to self, place, date, year, situation     Communication - Fluent, No dysarthria     Cranial Nerves - CN 2-12 intact     Motor - No focal deficits                    LEFT    UE - 4+/5                    RIGHT UE - 4+/5                    LEFT    LE - 4+/5                    RIGHT LE - 4+/5        Sensory - Intact to LT      Balance - WNL Static  Psychiatric - Mood stable, Affect WNL  ----------------------------------------------------------------------------------------  ASSESSMENT/PLAN    81 yo m pmh afib, htn, urinary retention, p/w hypoxic resp failure, recurrent R pleural effusion, lymphadenopathy suspicious of underlying malignancy, found to have stage IV disease on PET  s/p steroid taper  s/p ln biopsy   continue bedside PT and OT  plummer  diet: regular dash/tlc no straws   planning for inpatient chemo  Rehab - pending oncologic plan, if it does not interfere with chemo after inpatient treatment, recommend acute inpatient rehab.   currently min assist, patient and family goal is for short stay to improve ambulation before returning home

## 2023-03-13 NOTE — PROGRESS NOTE ADULT - ASSESSMENT
82 year old male with PMH atrial fibrillation, HTN and chronic urinary retention who presented to [facility] from home with acute hypoxic respiratory failure with hypercapnia. Patient was admitted to ICU an [facility], now downgraded and transferred to  [facility] for continuation of his care     #Acute hypoxic respiratory failure with hypercapnia  #Recurrent R pleural effusion-exudative  #Lymphadenopathy and leukocytosis-suspect underlying malignancy  -With large right sided pleural effusion, now status post pigtail placement, monitor output   -O 2 supplement PRN   -Continue steroid taper  -Patient was treated with course of empiric cefepime and dose of vanco at [place]   -Blood cultures 2/17 NGTD, Pleural fluid no growth currently, f/u final culture/cytology  -thoracic surg care appreciated, plan for pleurex and mediastinoscopy to evaluate for lymph nodes. VATS  -Heme/onc follow up appreciated   - patient will benefit form pleurx placement , placed by CTS   - IR guided LN biopsy, follow up path report    - S/P vats and pleurx per surg team   - follow up biopsy results , [facility]   - Chemo per heme.Onc follow up appreciated , next chemo in 3 weeks   - PET noted   - TS for medport on the 15th , heparin started, hold eliquis     #Afib/ HTN  rate control   telemonitoring   eliquis fo rnow, switch to heparin next week for mediport       #Urinary Retention  -Continue plummer  -Continue flomax    #DVT prophylaxis     Discussed with patient son over the phone

## 2023-03-13 NOTE — PROGRESS NOTE ADULT - SUBJECTIVE AND OBJECTIVE BOX
DATE OF SERVICE: 03-13-23 @ 08:53    Subjective: Patient seen and examined. No new events except as noted.     SUBJECTIVE/ROS:        MEDICATIONS:  MEDICATIONS  (STANDING):  acetaminophen   IVPB .. 1000 milliGRAM(s) IV Intermittent once  albuterol    0.083% 2.5 milliGRAM(s) Nebulizer every 6 hours  aspirin  chewable 81 milliGRAM(s) Oral daily  atenolol  Tablet 12.5 milliGRAM(s) Oral daily  chlorhexidine 2% Cloths 1 Application(s) Topical <User Schedule>  escitalopram 5 milliGRAM(s) Oral daily  famotidine    Tablet 20 milliGRAM(s) Oral daily  heparin  Infusion.  Unit(s)/Hr (15 mL/Hr) IV Continuous <Continuous>  melatonin 6 milliGRAM(s) Oral at bedtime  midodrine. 5 milliGRAM(s) Oral three times a day  polyethylene glycol 3350 17 Gram(s) Oral daily  predniSONE   Tablet 50 milliGRAM(s) Oral daily  senna 2 Tablet(s) Oral at bedtime  simvastatin 40 milliGRAM(s) Oral at bedtime  sodium chloride 0.9%. 1000 milliLiter(s) (20 mL/Hr) IV Continuous <Continuous>  sodium chloride 3%  Inhalation 4 milliLiter(s) Inhalation every 6 hours  tamsulosin 0.4 milliGRAM(s) Oral at bedtime      PHYSICAL EXAM:  T(C): 36.4 (03-13-23 @ 05:25), Max: 37 (03-12-23 @ 21:40)  HR: 70 (03-13-23 @ 05:25) (62 - 74)  BP: 146/70 (03-13-23 @ 05:25) (104/66 - 146/70)  RR: 17 (03-13-23 @ 05:25) (17 - 18)  SpO2: 94% (03-13-23 @ 05:25) (94% - 99%)  Wt(kg): --  I&O's Summary    12 Mar 2023 07:01  -  13 Mar 2023 07:00  --------------------------------------------------------  IN: 575 mL / OUT: 1700 mL / NET: -1125 mL            JVP: Normal  Neck: supple  Lung: clear   CV: S1 S2 , Murmur:  Abd: soft  Ext: No edema  neuro: Awake   Psych: flat affect  Skin: normal``    LABS/DATA:    CARDIAC MARKERS:                                10.0   11.00 )-----------( 218      ( 13 Mar 2023 05:02 )             33.3     03-13    139  |  100  |  22  ----------------------------<  94  3.8   |  31  |  0.53    Ca    8.7      13 Mar 2023 05:02  Phos  2.4     03-13  Mg     1.70     03-13    TPro  6.1  /  Alb  2.6<L>  /  TBili  0.4  /  DBili  x   /  AST  104<H>  /  ALT  118<H>  /  AlkPhos  77  03-13    proBNP:   Lipid Profile:   HgA1c:   TSH:     TELE:  EKG:

## 2023-03-13 NOTE — CHART NOTE - NSCHARTNOTEFT_GEN_A_CORE
pt booked for infusaport placement on Wednesday 3/15  on heparin gtt  pt will isabel valid type and screen, coagulation profile and covid status for OR  NPO p MN on Tues 3/14    Yanira COE [id number]

## 2023-03-14 LAB
ALBUMIN SERPL ELPH-MCNC: 2.9 G/DL — LOW (ref 3.3–5)
ALP SERPL-CCNC: 77 U/L — SIGNIFICANT CHANGE UP (ref 40–120)
ALT FLD-CCNC: 112 U/L — HIGH (ref 4–41)
ANION GAP SERPL CALC-SCNC: 11 MMOL/L — SIGNIFICANT CHANGE UP (ref 7–14)
APTT BLD: 67.7 SEC — HIGH (ref 27–36.3)
AST SERPL-CCNC: 64 U/L — HIGH (ref 4–40)
BASOPHILS # BLD AUTO: 0.01 K/UL — SIGNIFICANT CHANGE UP (ref 0–0.2)
BASOPHILS NFR BLD AUTO: 0.1 % — SIGNIFICANT CHANGE UP (ref 0–2)
BILIRUB SERPL-MCNC: 0.6 MG/DL — SIGNIFICANT CHANGE UP (ref 0.2–1.2)
BLD GP AB SCN SERPL QL: NEGATIVE — SIGNIFICANT CHANGE UP
BUN SERPL-MCNC: 19 MG/DL — SIGNIFICANT CHANGE UP (ref 7–23)
CALCIUM SERPL-MCNC: 8.7 MG/DL — SIGNIFICANT CHANGE UP (ref 8.4–10.5)
CHLORIDE SERPL-SCNC: 99 MMOL/L — SIGNIFICANT CHANGE UP (ref 98–107)
CO2 SERPL-SCNC: 29 MMOL/L — SIGNIFICANT CHANGE UP (ref 22–31)
CREAT SERPL-MCNC: 0.49 MG/DL — LOW (ref 0.5–1.3)
EGFR: 102 ML/MIN/1.73M2 — SIGNIFICANT CHANGE UP
EOSINOPHIL # BLD AUTO: 0.05 K/UL — SIGNIFICANT CHANGE UP (ref 0–0.5)
EOSINOPHIL NFR BLD AUTO: 0.6 % — SIGNIFICANT CHANGE UP (ref 0–6)
GLUCOSE BLDC GLUCOMTR-MCNC: 165 MG/DL — HIGH (ref 70–99)
GLUCOSE SERPL-MCNC: 92 MG/DL — SIGNIFICANT CHANGE UP (ref 70–99)
HCT VFR BLD CALC: 33 % — LOW (ref 39–50)
HGB BLD-MCNC: 10.1 G/DL — LOW (ref 13–17)
IANC: 5.97 K/UL — SIGNIFICANT CHANGE UP (ref 1.8–7.4)
IMM GRANULOCYTES NFR BLD AUTO: 0.7 % — SIGNIFICANT CHANGE UP (ref 0–0.9)
LDH SERPL L TO P-CCNC: 194 U/L — SIGNIFICANT CHANGE UP (ref 135–225)
LYMPHOCYTES # BLD AUTO: 1.3 K/UL — SIGNIFICANT CHANGE UP (ref 1–3.3)
LYMPHOCYTES # BLD AUTO: 15.7 % — SIGNIFICANT CHANGE UP (ref 13–44)
MAGNESIUM SERPL-MCNC: 1.9 MG/DL — SIGNIFICANT CHANGE UP (ref 1.6–2.6)
MCHC RBC-ENTMCNC: 27.6 PG — SIGNIFICANT CHANGE UP (ref 27–34)
MCHC RBC-ENTMCNC: 30.6 GM/DL — LOW (ref 32–36)
MCV RBC AUTO: 90.2 FL — SIGNIFICANT CHANGE UP (ref 80–100)
MONOCYTES # BLD AUTO: 0.91 K/UL — HIGH (ref 0–0.9)
MONOCYTES NFR BLD AUTO: 11 % — SIGNIFICANT CHANGE UP (ref 2–14)
NEUTROPHILS # BLD AUTO: 5.97 K/UL — SIGNIFICANT CHANGE UP (ref 1.8–7.4)
NEUTROPHILS NFR BLD AUTO: 71.9 % — SIGNIFICANT CHANGE UP (ref 43–77)
NRBC # BLD: 0 /100 WBCS — SIGNIFICANT CHANGE UP (ref 0–0)
NRBC # FLD: 0 K/UL — SIGNIFICANT CHANGE UP (ref 0–0)
PHOSPHATE SERPL-MCNC: 2.5 MG/DL — SIGNIFICANT CHANGE UP (ref 2.5–4.5)
PLATELET # BLD AUTO: 219 K/UL — SIGNIFICANT CHANGE UP (ref 150–400)
POTASSIUM SERPL-MCNC: 3.8 MMOL/L — SIGNIFICANT CHANGE UP (ref 3.5–5.3)
POTASSIUM SERPL-SCNC: 3.8 MMOL/L — SIGNIFICANT CHANGE UP (ref 3.5–5.3)
PROT SERPL-MCNC: 6.2 G/DL — SIGNIFICANT CHANGE UP (ref 6–8.3)
RBC # BLD: 3.66 M/UL — LOW (ref 4.2–5.8)
RBC # FLD: 19.5 % — HIGH (ref 10.3–14.5)
RH IG SCN BLD-IMP: POSITIVE — SIGNIFICANT CHANGE UP
SARS-COV-2 RNA SPEC QL NAA+PROBE: SIGNIFICANT CHANGE UP
SODIUM SERPL-SCNC: 139 MMOL/L — SIGNIFICANT CHANGE UP (ref 135–145)
URATE SERPL-MCNC: 3.7 MG/DL — SIGNIFICANT CHANGE UP (ref 3.4–8.8)
WBC # BLD: 8.3 K/UL — SIGNIFICANT CHANGE UP (ref 3.8–10.5)
WBC # FLD AUTO: 8.3 K/UL — SIGNIFICANT CHANGE UP (ref 3.8–10.5)

## 2023-03-14 PROCEDURE — 99233 SBSQ HOSP IP/OBS HIGH 50: CPT | Mod: GC

## 2023-03-14 RX ADMIN — FAMOTIDINE 20 MILLIGRAM(S): 10 INJECTION INTRAVENOUS at 13:20

## 2023-03-14 RX ADMIN — HEPARIN SODIUM 1500 UNIT(S)/HR: 5000 INJECTION INTRAVENOUS; SUBCUTANEOUS at 08:03

## 2023-03-14 RX ADMIN — SODIUM CHLORIDE 4 MILLILITER(S): 9 INJECTION INTRAMUSCULAR; INTRAVENOUS; SUBCUTANEOUS at 11:24

## 2023-03-14 RX ADMIN — ALBUTEROL 2.5 MILLIGRAM(S): 90 AEROSOL, METERED ORAL at 03:26

## 2023-03-14 RX ADMIN — ESCITALOPRAM OXALATE 5 MILLIGRAM(S): 10 TABLET, FILM COATED ORAL at 21:51

## 2023-03-14 RX ADMIN — SODIUM CHLORIDE 4 MILLILITER(S): 9 INJECTION INTRAMUSCULAR; INTRAVENOUS; SUBCUTANEOUS at 16:37

## 2023-03-14 RX ADMIN — HEPARIN SODIUM 1500 UNIT(S)/HR: 5000 INJECTION INTRAVENOUS; SUBCUTANEOUS at 21:33

## 2023-03-14 RX ADMIN — SODIUM CHLORIDE 4 MILLILITER(S): 9 INJECTION INTRAMUSCULAR; INTRAVENOUS; SUBCUTANEOUS at 22:17

## 2023-03-14 RX ADMIN — SODIUM CHLORIDE 4 MILLILITER(S): 9 INJECTION INTRAMUSCULAR; INTRAVENOUS; SUBCUTANEOUS at 03:15

## 2023-03-14 RX ADMIN — Medication 6 MILLIGRAM(S): at 21:51

## 2023-03-14 RX ADMIN — MIDODRINE HYDROCHLORIDE 5 MILLIGRAM(S): 2.5 TABLET ORAL at 13:20

## 2023-03-14 RX ADMIN — SIMVASTATIN 40 MILLIGRAM(S): 20 TABLET, FILM COATED ORAL at 21:51

## 2023-03-14 RX ADMIN — ATENOLOL 12.5 MILLIGRAM(S): 25 TABLET ORAL at 07:00

## 2023-03-14 RX ADMIN — HEPARIN SODIUM 1500 UNIT(S)/HR: 5000 INJECTION INTRAVENOUS; SUBCUTANEOUS at 19:33

## 2023-03-14 RX ADMIN — HEPARIN SODIUM 1500 UNIT(S)/HR: 5000 INJECTION INTRAVENOUS; SUBCUTANEOUS at 19:27

## 2023-03-14 RX ADMIN — HEPARIN SODIUM 1500 UNIT(S)/HR: 5000 INJECTION INTRAVENOUS; SUBCUTANEOUS at 13:58

## 2023-03-14 RX ADMIN — ALBUTEROL 2.5 MILLIGRAM(S): 90 AEROSOL, METERED ORAL at 16:37

## 2023-03-14 RX ADMIN — HEPARIN SODIUM 1500 UNIT(S)/HR: 5000 INJECTION INTRAVENOUS; SUBCUTANEOUS at 03:13

## 2023-03-14 RX ADMIN — TAMSULOSIN HYDROCHLORIDE 0.4 MILLIGRAM(S): 0.4 CAPSULE ORAL at 21:51

## 2023-03-14 RX ADMIN — ALBUTEROL 2.5 MILLIGRAM(S): 90 AEROSOL, METERED ORAL at 11:25

## 2023-03-14 RX ADMIN — ALBUTEROL 2.5 MILLIGRAM(S): 90 AEROSOL, METERED ORAL at 22:17

## 2023-03-14 RX ADMIN — Medication 81 MILLIGRAM(S): at 13:20

## 2023-03-14 RX ADMIN — Medication 50 MILLIGRAM(S): at 06:59

## 2023-03-14 RX ADMIN — CHLORHEXIDINE GLUCONATE 1 APPLICATION(S): 213 SOLUTION TOPICAL at 06:29

## 2023-03-14 NOTE — PROGRESS NOTE ADULT - ASSESSMENT
82 year old male with PMH atrial fibrillation, HTN and chronic urinary retention who presented to [facility] from home with acute hypoxic respiratory failure with hypercapnia. Patient was admitted to ICU an [facility], now downgraded and transferred to  [facility] for continuation of his care     #Acute hypoxic respiratory failure with hypercapnia  #Recurrent R pleural effusion-exudative  #Lymphadenopathy and leukocytosis-suspect underlying malignancy  -With large right sided pleural effusion, now status post pigtail placement, monitor output   -O 2 supplement PRN   -Continue steroid taper  -Patient was treated with course of empiric cefepime and dose of vanco at [place]   -Blood cultures 2/17 NGTD, Pleural fluid no growth currently, f/u final culture/cytology  -thoracic surg care appreciated, plan for pleurex and mediastinoscopy to evaluate for lymph nodes. VATS  -Heme/onc follow up appreciated   - patient will benefit form pleurx placement , placed by CTS   - IR guided LN biopsy, follow up path report    - S/P vats and pleurx per surg team   - follow up biopsy results , [facility]   - Chemo per heme.Onc follow up appreciated , next chemo in 3 weeks   - PET noted   - TS for medport on the 15th , heparin started, hold eliquis     #Afib/ HTN  rate control   telemonitoring   eliquis fo rnow, switch to heparin next week for mediport       #Urinary Retention  -Continue plummer  -Continue flomax    #DVT prophylaxis     Discussed with patient son over the phone

## 2023-03-14 NOTE — PROGRESS NOTE ADULT - ATTENDING COMMENTS
------------------------------------------------------------------------  Patient with hypoxic respiratory failure, transferred for further evaluation of lymphadenopathy. s/p chest tube placement, underwent repeat biopsy of supraclavicular LN which was consistent with reactive LN. 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 3/15/23; will need to be off for at least 6 hours prior to procedure; post procedure can resume patient's home dose of Eliquis BID  - port placement for use with AVD to be done by CT surgery (Dr. Sewell) on 3/15/23  - clinically stable for discharge to rehab thereafter with follow-up to be scheduled at the [facility]  - ------------------------------------------------------------------------  Patient with hypoxic respiratory failure, transferred for further evaluation of lymphadenopathy. s/p chest tube placement, underwent repeat biopsy of supraclavicular LN which was consistent with reactive LN. 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 3/15/23; will need to be off for at least 6 hours prior to procedure; post procedure can resume patient's home dose of Eliquis BID  - port placement for use with AVD to be done by CT surgery (Dr. Sewell) on 3/15/23  - clinically stable for discharge to rehab thereafter with follow-up to be scheduled at the [facility]

## 2023-03-14 NOTE — PROGRESS NOTE ADULT - SUBJECTIVE AND OBJECTIVE BOX
Name of Patient : ARIK DUMONT  MRN: [id number]  Date of visit: 03-14-23      Subjective: Patient seen and examined. No new events except as noted.   doing okay  port tomorro w  hol dheparin in AM     REVIEW OF SYSTEMS:    CONSTITUTIONAL: No weakness, fevers or chills  EYES/ENT: No visual changes;  No vertigo or throat pain   NECK: No pain or stiffness  RESPIRATORY: No cough, wheezing, hemoptysis; No shortness of breath  CARDIOVASCULAR: No chest pain or palpitations  GASTROINTESTINAL: No abdominal or epigastric pain. No nausea, vomiting, or hematemesis; No diarrhea or constipation. No melena or hematochezia.  GENITOURINARY: No dysuria, frequency or hematuria  NEUROLOGICAL: No numbness or weakness  SKIN: No itching, burning, rashes, or lesions   All other review of systems is negative unless indicated above.    MEDICATIONS:  MEDICATIONS  (STANDING):  acetaminophen   IVPB .. 1000 milliGRAM(s) IV Intermittent once  albuterol    0.083% 2.5 milliGRAM(s) Nebulizer every 6 hours  aspirin  chewable 81 milliGRAM(s) Oral daily  atenolol  Tablet 12.5 milliGRAM(s) Oral daily  chlorhexidine 2% Cloths 1 Application(s) Topical <User Schedule>  escitalopram 5 milliGRAM(s) Oral daily  famotidine    Tablet 20 milliGRAM(s) Oral daily  heparin  Infusion.  Unit(s)/Hr (15 mL/Hr) IV Continuous <Continuous>  melatonin 6 milliGRAM(s) Oral at bedtime  midodrine. 5 milliGRAM(s) Oral three times a day  polyethylene glycol 3350 17 Gram(s) Oral daily  senna 2 Tablet(s) Oral at bedtime  simvastatin 40 milliGRAM(s) Oral at bedtime  sodium chloride 0.9%. 1000 milliLiter(s) (20 mL/Hr) IV Continuous <Continuous>  sodium chloride 3%  Inhalation 4 milliLiter(s) Inhalation every 6 hours  tamsulosin 0.4 milliGRAM(s) Oral at bedtime      PHYSICAL EXAM:  T(C): 36.8 (03-14-23 @ 13:20), Max: 36.8 (03-14-23 @ 06:57)  HR: 78 (03-14-23 @ 13:20) (67 - 87)  BP: 132/75 (03-14-23 @ 13:20) (132/75 - 140/74)  RR: 18 (03-14-23 @ 13:20) (18 - 18)  SpO2: 98% (03-14-23 @ 13:20) (97% - 98%)  Wt(kg): --  I&O's Summary    13 Mar 2023 07:01  -  14 Mar 2023 07:00  --------------------------------------------------------  IN: 0 mL / OUT: 1805 mL / NET: -1805 mL          Appearance: Normal	  HEENT:  PERRLA   Lymphatic: No lymphadenopathy   Cardiovascular: Normal S1 S2, no JVD  Respiratory: normal effort , clear  Gastrointestinal:  Soft, Non-tender  Skin: No rashes,  warm to touch  Psychiatry:  Mood & affect appropriate  Musculuskeletal: No edema    recent labs, Imaging and EKGs personally reviewed     03-13-23 @ 07:01  -  03-14-23 @ 07:00  --------------------------------------------------------  IN: 0 mL / OUT: 1805 mL / NET: -1805 mL                          10.1   8.30  )-----------( 219      ( 14 Mar 2023 07:10 )             33.0               03-14    139  |  99  |  19  ----------------------------<  92  3.8   |  29  |  0.49<L>    Ca    8.7      14 Mar 2023 07:10  Phos  2.5     03-14  Mg     1.90     03-14    TPro  6.2  /  Alb  2.9<L>  /  TBili  0.6  /  DBili  x   /  AST  64<H>  /  ALT  112<H>  /  AlkPhos  77  03-14    PTT - ( 14 Mar 2023 07:10 )  PTT:67.7 sec

## 2023-03-14 NOTE — PROGRESS NOTE ADULT - NSPROGADDITIONALINFOA_GEN_ALL_CORE
discussed with team. asked onc to discuss with daughter regarding plan of care
Discussed with son over th ephone
discussed with patients daughter

## 2023-03-14 NOTE — PROGRESS NOTE ADULT - SUBJECTIVE AND OBJECTIVE BOX
Pt seen. Sitting OOB in chair.  States he feels pretty good, just tired  Trying to eat as much as possible  Pt aware of planned procedure for tomorrow, questions   answered.    Vital Signs Last 24 Hrs  T(C): 36.8 (14 Mar 2023 06:57), Max: 36.8 (14 Mar 2023 06:57)  T(F): 98.2 (14 Mar 2023 06:57), Max: 98.2 (14 Mar 2023 06:57)  HR: 74 (14 Mar 2023 06:57) (61 - 87)  BP: 140/74 (14 Mar 2023 06:57) (101/61 - 140/74)  BP(mean): --  RR: 18 (14 Mar 2023 06:57) (17 - 18)  SpO2: 97% (14 Mar 2023 06:57) (96% - 98%)    Parameters below as of 14 Mar 2023 06:57  Patient On (Oxygen Delivery Method): room air    A+O x 3  Rt Pleurx site c/d/i  Pt states drained yesterday  Had inpt Chemo last week    Plan:  -Pt for Infusaport placement by Dr. Romo tomorrow in OR  -NPO [id number]  -Send COVID swab  -Hold Heparin gtt at 12mn tonight  Will follow

## 2023-03-14 NOTE — PROGRESS NOTE ADULT - SUBJECTIVE AND OBJECTIVE BOX
DATE OF SERVICE: 03-14-23 @ 09:36    Subjective: Patient seen and examined. No new events except as noted.     SUBJECTIVE/ROS:  nad      MEDICATIONS:  MEDICATIONS  (STANDING):  acetaminophen   IVPB .. 1000 milliGRAM(s) IV Intermittent once  albuterol    0.083% 2.5 milliGRAM(s) Nebulizer every 6 hours  aspirin  chewable 81 milliGRAM(s) Oral daily  atenolol  Tablet 12.5 milliGRAM(s) Oral daily  chlorhexidine 2% Cloths 1 Application(s) Topical <User Schedule>  escitalopram 5 milliGRAM(s) Oral daily  famotidine    Tablet 20 milliGRAM(s) Oral daily  heparin  Infusion.  Unit(s)/Hr (15 mL/Hr) IV Continuous <Continuous>  melatonin 6 milliGRAM(s) Oral at bedtime  midodrine. 5 milliGRAM(s) Oral three times a day  polyethylene glycol 3350 17 Gram(s) Oral daily  senna 2 Tablet(s) Oral at bedtime  simvastatin 40 milliGRAM(s) Oral at bedtime  sodium chloride 0.9%. 1000 milliLiter(s) (20 mL/Hr) IV Continuous <Continuous>  sodium chloride 3%  Inhalation 4 milliLiter(s) Inhalation every 6 hours  tamsulosin 0.4 milliGRAM(s) Oral at bedtime      PHYSICAL EXAM:  T(C): 36.8 (03-14-23 @ 06:57), Max: 36.8 (03-14-23 @ 06:57)  HR: 74 (03-14-23 @ 06:57) (61 - 87)  BP: 140/74 (03-14-23 @ 06:57) (101/61 - 140/74)  RR: 18 (03-14-23 @ 06:57) (17 - 18)  SpO2: 97% (03-14-23 @ 06:57) (96% - 98%)  Wt(kg): --  I&O's Summary    13 Mar 2023 07:01  -  14 Mar 2023 07:00  --------------------------------------------------------  IN: 0 mL / OUT: 1805 mL / NET: -1805 mL            JVP: Normal  Neck: supple  Lung: clear   CV: S1 S2 , Murmur:  Abd: soft  Ext: No edema  neuro: Awake / alert  Psych: flat affect  Skin: normal``    LABS/DATA:    CARDIAC MARKERS:                                10.1   8.30  )-----------( 219      ( 14 Mar 2023 07:10 )             33.0     03-14    139  |  99  |  19  ----------------------------<  92  3.8   |  29  |  0.49<L>    Ca    8.7      14 Mar 2023 07:10  Phos  2.5     03-14  Mg     1.90     03-14    TPro  6.2  /  Alb  2.9<L>  /  TBili  0.6  /  DBili  x   /  AST  64<H>  /  ALT  112<H>  /  AlkPhos  77  03-14    proBNP:   Lipid Profile:   HgA1c:   TSH:     TELE:  EKG:

## 2023-03-14 NOTE — PROGRESS NOTE ADULT - ASSESSMENT
ARIK DUMONT is a 82y Male with a past medical history as described above who presented for evaluation of shortness of breath and was subsequently intubated for hypoxic respiratory failure and treated in the ICU at [facility]. There, he was discovered to have hilar and supraclavicular lymphadenopathy. He had a these biopsied and the pathology returned inconclusive. Previously, at [facility], he was discovered to have a large pleural effusion and underwent thoracentesis. Fluid returned negative for malignancy. S/p VATS with mediastinal LN bx on 3/3 and path resulted as CHL. Bone marrow neg for involvement. PET/CT c/f widespread disease. Plan for sequential BV-AVD. Starting BV tomorrow 3/10.     # Classical Hodgkin's Lymphoma  - s/p VATS, bronch, pleural bx w/ thoracic 3/3, biopsy: CHL  --- Positive for:  CD30, CD15, dim PAX5, MUM1, dim OCT2, NEFTALI  --- Negative for CD3, CD45, CD20, CD79a, BOB1, BCL6  - Smear reviewed by prior heme team: normocytic normochromic red cells, rare ovalocytes, no schistocytes seen. Occasional large platelets, with most within normal limits. No clumping. White cell series demonstrates neutrophil predominant. PMNs with vacuolization and granulation, suggesting activation and inflammation. No blasts, no immature cells.    - BM Bx neg for lymphoma involvement   - PET/CT 3/8: Hypermetabolic lymph nodes above and below the diaphragm.  Suspected osseous focus in the RIGHT hemisacrum. Diffusely increased splenic uptake relative to liver, abnormal but nonspecific finding which may be seen in the setting of malignancy as well as other systemic processes such as infection or inflammatory disorders. Right pleural effusion with additional pulmonary findings; additional malignant involvement versus inflammatory or infectious process not excluded. Area of soft tissue adjacent to distal abdominal aorta without abnormal uptake. Further evaluation as warranted clinically  - EF 50-55%; Hep B and C neg, HIV neg  - BV given on 3/10 (cycle 1). Next cycle will be as outpatient after 3 weeks. WILL NOT PLAN TO GIVE CHEMOTHERAPY WHILE IN SKILLED NURSING FACILITY/REHAB.  - Referral email sent to [facility]  - Can d/c after port placement on 3/15 as planned   - Transfuse to maintain Hb >7, platelets > 10K, >20K if bleeding, and >50K if bleeding      Del Green MD, PGY-4  Hematology/Medical Oncology Fellow  Pager: [phone]  Available on [employer] Teams  After 5pm or on weekends please contact  to page on-call fellow

## 2023-03-14 NOTE — PROGRESS NOTE ADULT - SUBJECTIVE AND OBJECTIVE BOX
ARIK DUMONT 82y Male      Patient is a 82y old  Male who presents with a chief complaint of VATS (11 Mar 2023 12:16)        INTERVAL HPI/OVERNIGHT EVENTS: No acute events overnight. Patient was seen and evaluated at the bedside. The patient denies pain. Vitals stable. Patient denies fever/chills, chest pain, shortness of breath, abdominal pain, headaches, nausea/vomiting, and diarrhea/constipation.      PHYSICAL EXAM:  GENERAL: NAD  HEAD:  Normocephalic  EYES:  conjunctiva and sclera clear  ENMT: Moist mucous membranes  NECK: Supple  NERVOUS SYSTEM:  Alert, awake  CHEST/LUNG: Good air exchange bilaterally, no wheeze  HEART: Regular rate and rhythm        Vital Signs Last 24 Hrs  T(C): 36.8 (14 Mar 2023 13:20), Max: 36.8 (14 Mar 2023 06:57)  T(F): 98.2 (14 Mar 2023 13:20), Max: 98.2 (14 Mar 2023 06:57)  HR: 78 (14 Mar 2023 13:20) (67 - 87)  BP: 132/75 (14 Mar 2023 13:20) (111/63 - 140/74)  BP(mean): --  RR: 18 (14 Mar 2023 13:20) (18 - 18)  SpO2: 98% (14 Mar 2023 13:20) (96% - 98%)    Parameters below as of 14 Mar 2023 13:20  Patient On (Oxygen Delivery Method): room air          MEDICATIONS  (STANDING):  acetaminophen   IVPB .. 1000 milliGRAM(s) IV Intermittent once  albuterol    0.083% 2.5 milliGRAM(s) Nebulizer every 6 hours  aspirin  chewable 81 milliGRAM(s) Oral daily  atenolol  Tablet 12.5 milliGRAM(s) Oral daily  chlorhexidine 2% Cloths 1 Application(s) Topical <User Schedule>  escitalopram 5 milliGRAM(s) Oral daily  famotidine    Tablet 20 milliGRAM(s) Oral daily  heparin  Infusion.  Unit(s)/Hr (15 mL/Hr) IV Continuous <Continuous>  melatonin 6 milliGRAM(s) Oral at bedtime  midodrine. 5 milliGRAM(s) Oral three times a day  polyethylene glycol 3350 17 Gram(s) Oral daily  senna 2 Tablet(s) Oral at bedtime  simvastatin 40 milliGRAM(s) Oral at bedtime  sodium chloride 0.9%. 1000 milliLiter(s) (20 mL/Hr) IV Continuous <Continuous>  sodium chloride 3%  Inhalation 4 milliLiter(s) Inhalation every 6 hours  tamsulosin 0.4 milliGRAM(s) Oral at bedtime    MEDICATIONS  (PRN):  heparin   Injectable 6500 Unit(s) IV Push every 6 hours PRN For aPTT less than 40  heparin   Injectable 3000 Unit(s) IV Push every 6 hours PRN For aPTT between 40 - 57  oxyCODONE    IR 5 milliGRAM(s) Oral once PRN Moderate Pain (4 - 6)      Consultant(s) Notes Reviewed:  [X] YES  [ ] NO  Care Discussed with Other Providers [X] YES  [ ] NO  Imaging Personally Reviewed:  [X] YES  [ ] NO      LABS:                        10.1   8.30  )-----------( 219      ( 14 Mar 2023 07:10 )             33.0     03-14    139  |  99  |  19  ----------------------------<  92  3.8   |  29  |  0.49<L>    Ca    8.7      14 Mar 2023 07:10  Phos  2.5     03-14  Mg     1.90     03-14    TPro  6.2  /  Alb  2.9<L>  /  TBili  0.6  /  DBili  x   /  AST  64<H>  /  ALT  112<H>  /  AlkPhos  77  03-14    PTT - ( 14 Mar 2023 07:10 )  PTT:67.7 sec               ARIK DUMONT 82y Male      Patient is a 82y old  Male who presents with a chief complaint respiratory distress; transferred for VATS (11 Mar 2023 12:16)        INTERVAL HPI/OVERNIGHT EVENTS: No acute events overnight. Patient was seen and evaluated at the bedside. The patient denies pain. Vitals stable. Patient denies fever/chills, chest pain, shortness of breath, abdominal pain, headaches, nausea/vomiting, and diarrhea/constipation.      PHYSICAL EXAM:  GENERAL: NAD  HEAD:  Normocephalic  EYES:  conjunctiva and sclera clear  ENMT: Moist mucous membranes  NECK: Supple  NERVOUS SYSTEM:  Alert, awake  CHEST/LUNG: Right chest pleurex catheter in place; reduced breath sounds in right base; left chest with good air entry  HEART: Regular rate and rhythm  Abd: soft nt, nd;         Vital Signs Last 24 Hrs  T(C): 36.8 (14 Mar 2023 13:20), Max: 36.8 (14 Mar 2023 06:57)  T(F): 98.2 (14 Mar 2023 13:20), Max: 98.2 (14 Mar 2023 06:57)  HR: 78 (14 Mar 2023 13:20) (67 - 87)  BP: 132/75 (14 Mar 2023 13:20) (111/63 - 140/74)  BP(mean): --  RR: 18 (14 Mar 2023 13:20) (18 - 18)  SpO2: 98% (14 Mar 2023 13:20) (96% - 98%)    Parameters below as of 14 Mar 2023 13:20  Patient On (Oxygen Delivery Method): room air          MEDICATIONS  (STANDING):  acetaminophen   IVPB .. 1000 milliGRAM(s) IV Intermittent once  albuterol    0.083% 2.5 milliGRAM(s) Nebulizer every 6 hours  aspirin  chewable 81 milliGRAM(s) Oral daily  atenolol  Tablet 12.5 milliGRAM(s) Oral daily  chlorhexidine 2% Cloths 1 Application(s) Topical <User Schedule>  escitalopram 5 milliGRAM(s) Oral daily  famotidine    Tablet 20 milliGRAM(s) Oral daily  heparin  Infusion.  Unit(s)/Hr (15 mL/Hr) IV Continuous <Continuous>  melatonin 6 milliGRAM(s) Oral at bedtime  midodrine. 5 milliGRAM(s) Oral three times a day  polyethylene glycol 3350 17 Gram(s) Oral daily  senna 2 Tablet(s) Oral at bedtime  simvastatin 40 milliGRAM(s) Oral at bedtime  sodium chloride 0.9%. 1000 milliLiter(s) (20 mL/Hr) IV Continuous <Continuous>  sodium chloride 3%  Inhalation 4 milliLiter(s) Inhalation every 6 hours  tamsulosin 0.4 milliGRAM(s) Oral at bedtime    MEDICATIONS  (PRN):  heparin   Injectable 6500 Unit(s) IV Push every 6 hours PRN For aPTT less than 40  heparin   Injectable 3000 Unit(s) IV Push every 6 hours PRN For aPTT between 40 - 57  oxyCODONE    IR 5 milliGRAM(s) Oral once PRN Moderate Pain (4 - 6)      Consultant(s) Notes Reviewed:  [X] YES  [ ] NO  Care Discussed with Other Providers [X] YES  [ ] NO  Imaging Personally Reviewed:  [X] YES  [ ] NO      LABS:                        10.1   8.30  )-----------( 219      ( 14 Mar 2023 07:10 )             33.0     03-14    139  |  99  |  19  ----------------------------<  92  3.8   |  29  |  0.49<L>    Ca    8.7      14 Mar 2023 07:10  Phos  2.5     03-14  Mg     1.90     03-14    TPro  6.2  /  Alb  2.9<L>  /  TBili  0.6  /  DBili  x   /  AST  64<H>  /  ALT  112<H>  /  AlkPhos  77  03-14    PTT - ( 14 Mar 2023 07:10 )  PTT:67.7 sec

## 2023-03-15 ENCOUNTER — INPATIENT (INPATIENT)
Facility: HOSPITAL | Age: 83
LOS: 9 days | Discharge: ANOTHER HOSPITAL | DRG: 949 | End: 2023-03-25
Attending: INTERNAL MEDICINE | Admitting: INTERNAL MEDICINE
Payer: MEDICARE

## 2023-03-15 ENCOUNTER — APPOINTMENT (OUTPATIENT)
Dept: THORACIC SURGERY | Facility: HOSPITAL | Age: 83
End: 2023-03-15

## 2023-03-15 ENCOUNTER — TRANSCRIPTION ENCOUNTER (OUTPATIENT)
Age: 83
End: 2023-03-15

## 2023-03-15 VITALS
HEART RATE: 78 BPM | SYSTOLIC BLOOD PRESSURE: 142 MMHG | OXYGEN SATURATION: 97 % | RESPIRATION RATE: 17 BRPM | DIASTOLIC BLOOD PRESSURE: 61 MMHG | TEMPERATURE: 98 F

## 2023-03-15 VITALS
HEART RATE: 82 BPM | TEMPERATURE: 98 F | SYSTOLIC BLOOD PRESSURE: 146 MMHG | DIASTOLIC BLOOD PRESSURE: 79 MMHG | RESPIRATION RATE: 17 BRPM | OXYGEN SATURATION: 96 %

## 2023-03-15 DIAGNOSIS — R53.81 OTHER MALAISE: ICD-10-CM

## 2023-03-15 LAB
ALBUMIN SERPL ELPH-MCNC: 3.1 G/DL — LOW (ref 3.3–5)
ALP SERPL-CCNC: 80 U/L — SIGNIFICANT CHANGE UP (ref 40–120)
ALT FLD-CCNC: 93 U/L — HIGH (ref 4–41)
ANION GAP SERPL CALC-SCNC: 10 MMOL/L — SIGNIFICANT CHANGE UP (ref 7–14)
APTT BLD: 27.7 SEC — SIGNIFICANT CHANGE UP (ref 27–36.3)
AST SERPL-CCNC: 28 U/L — SIGNIFICANT CHANGE UP (ref 4–40)
BASOPHILS # BLD AUTO: 0.01 K/UL — SIGNIFICANT CHANGE UP (ref 0–0.2)
BASOPHILS NFR BLD AUTO: 0.1 % — SIGNIFICANT CHANGE UP (ref 0–2)
BILIRUB SERPL-MCNC: 0.9 MG/DL — SIGNIFICANT CHANGE UP (ref 0.2–1.2)
BLD GP AB SCN SERPL QL: NEGATIVE — SIGNIFICANT CHANGE UP
BUN SERPL-MCNC: 17 MG/DL — SIGNIFICANT CHANGE UP (ref 7–23)
CALCIUM SERPL-MCNC: 9.3 MG/DL — SIGNIFICANT CHANGE UP (ref 8.4–10.5)
CHLORIDE SERPL-SCNC: 96 MMOL/L — LOW (ref 98–107)
CO2 SERPL-SCNC: 27 MMOL/L — SIGNIFICANT CHANGE UP (ref 22–31)
CREAT SERPL-MCNC: 0.49 MG/DL — LOW (ref 0.5–1.3)
EGFR: 102 ML/MIN/1.73M2 — SIGNIFICANT CHANGE UP
EOSINOPHIL # BLD AUTO: 0.05 K/UL — SIGNIFICANT CHANGE UP (ref 0–0.5)
EOSINOPHIL NFR BLD AUTO: 0.6 % — SIGNIFICANT CHANGE UP (ref 0–6)
GLUCOSE BLDC GLUCOMTR-MCNC: 99 MG/DL — SIGNIFICANT CHANGE UP (ref 70–99)
GLUCOSE SERPL-MCNC: 89 MG/DL — SIGNIFICANT CHANGE UP (ref 70–99)
HCT VFR BLD CALC: 36.3 % — LOW (ref 39–50)
HGB BLD-MCNC: 11.2 G/DL — LOW (ref 13–17)
IANC: 6.05 K/UL — SIGNIFICANT CHANGE UP (ref 1.8–7.4)
IMM GRANULOCYTES NFR BLD AUTO: 0.5 % — SIGNIFICANT CHANGE UP (ref 0–0.9)
INR BLD: 1.14 RATIO — SIGNIFICANT CHANGE UP (ref 0.88–1.16)
LDH SERPL L TO P-CCNC: 192 U/L — SIGNIFICANT CHANGE UP (ref 135–225)
LYMPHOCYTES # BLD AUTO: 1.49 K/UL — SIGNIFICANT CHANGE UP (ref 1–3.3)
LYMPHOCYTES # BLD AUTO: 17.4 % — SIGNIFICANT CHANGE UP (ref 13–44)
MAGNESIUM SERPL-MCNC: 1.9 MG/DL — SIGNIFICANT CHANGE UP (ref 1.6–2.6)
MCHC RBC-ENTMCNC: 27.8 PG — SIGNIFICANT CHANGE UP (ref 27–34)
MCHC RBC-ENTMCNC: 30.9 GM/DL — LOW (ref 32–36)
MCV RBC AUTO: 90.1 FL — SIGNIFICANT CHANGE UP (ref 80–100)
MONOCYTES # BLD AUTO: 0.9 K/UL — SIGNIFICANT CHANGE UP (ref 0–0.9)
MONOCYTES NFR BLD AUTO: 10.5 % — SIGNIFICANT CHANGE UP (ref 2–14)
NEUTROPHILS # BLD AUTO: 6.05 K/UL — SIGNIFICANT CHANGE UP (ref 1.8–7.4)
NEUTROPHILS NFR BLD AUTO: 70.9 % — SIGNIFICANT CHANGE UP (ref 43–77)
NRBC # BLD: 0 /100 WBCS — SIGNIFICANT CHANGE UP (ref 0–0)
NRBC # FLD: 0 K/UL — SIGNIFICANT CHANGE UP (ref 0–0)
PHOSPHATE SERPL-MCNC: 2.8 MG/DL — SIGNIFICANT CHANGE UP (ref 2.5–4.5)
PLATELET # BLD AUTO: 248 K/UL — SIGNIFICANT CHANGE UP (ref 150–400)
POTASSIUM SERPL-MCNC: 3.7 MMOL/L — SIGNIFICANT CHANGE UP (ref 3.5–5.3)
POTASSIUM SERPL-SCNC: 3.7 MMOL/L — SIGNIFICANT CHANGE UP (ref 3.5–5.3)
PROT SERPL-MCNC: 7 G/DL — SIGNIFICANT CHANGE UP (ref 6–8.3)
PROTHROM AB SERPL-ACNC: 13.3 SEC — SIGNIFICANT CHANGE UP (ref 10.5–13.4)
RBC # BLD: 4.03 M/UL — LOW (ref 4.2–5.8)
RBC # FLD: 19.4 % — HIGH (ref 10.3–14.5)
RH IG SCN BLD-IMP: POSITIVE — SIGNIFICANT CHANGE UP
SODIUM SERPL-SCNC: 133 MMOL/L — LOW (ref 135–145)
URATE SERPL-MCNC: 3.9 MG/DL — SIGNIFICANT CHANGE UP (ref 3.4–8.8)
WBC # BLD: 8.54 K/UL — SIGNIFICANT CHANGE UP (ref 3.8–10.5)
WBC # FLD AUTO: 8.54 K/UL — SIGNIFICANT CHANGE UP (ref 3.8–10.5)

## 2023-03-15 PROCEDURE — 36561 INSERT TUNNELED CV CATH: CPT

## 2023-03-15 PROCEDURE — 77001 FLUOROGUIDE FOR VEIN DEVICE: CPT | Mod: 26

## 2023-03-15 PROCEDURE — 71045 X-RAY EXAM CHEST 1 VIEW: CPT | Mod: 26

## 2023-03-15 PROCEDURE — 99232 SBSQ HOSP IP/OBS MODERATE 35: CPT

## 2023-03-15 RX ORDER — APIXABAN 2.5 MG/1
1 TABLET, FILM COATED ORAL
Qty: 0 | Refills: 0 | DISCHARGE

## 2023-03-15 RX ORDER — ATENOLOL 25 MG/1
12.5 TABLET ORAL
Qty: 0 | Refills: 0 | DISCHARGE
Start: 2023-03-15

## 2023-03-15 RX ORDER — SODIUM CHLORIDE 9 MG/ML
500 INJECTION, SOLUTION INTRAVENOUS
Refills: 0 | Status: DISCONTINUED | OUTPATIENT
Start: 2023-03-15 | End: 2023-03-15

## 2023-03-15 RX ORDER — ASPIRIN/CALCIUM CARB/MAGNESIUM 324 MG
1 TABLET ORAL
Qty: 0 | Refills: 0 | DISCHARGE
Start: 2023-03-15

## 2023-03-15 RX ORDER — ATENOLOL 25 MG/1
12.5 TABLET ORAL DAILY
Refills: 0 | Status: DISCONTINUED | OUTPATIENT
Start: 2023-03-15 | End: 2023-03-17

## 2023-03-15 RX ORDER — LANOLIN ALCOHOL/MO/W.PET/CERES
6 CREAM (GRAM) TOPICAL AT BEDTIME
Refills: 0 | Status: DISCONTINUED | OUTPATIENT
Start: 2023-03-15 | End: 2023-03-18

## 2023-03-15 RX ORDER — ATENOLOL 25 MG/1
75 TABLET ORAL
Qty: 0 | Refills: 0 | DISCHARGE

## 2023-03-15 RX ORDER — LANOLIN ALCOHOL/MO/W.PET/CERES
2 CREAM (GRAM) TOPICAL
Qty: 0 | Refills: 0 | DISCHARGE
Start: 2023-03-15

## 2023-03-15 RX ORDER — SIMVASTATIN 20 MG/1
40 TABLET, FILM COATED ORAL AT BEDTIME
Refills: 0 | Status: DISCONTINUED | OUTPATIENT
Start: 2023-03-15 | End: 2023-03-25

## 2023-03-15 RX ORDER — CHLORHEXIDINE GLUCONATE 213 G/1000ML
1 SOLUTION TOPICAL
Refills: 0 | Status: DISCONTINUED | OUTPATIENT
Start: 2023-03-15 | End: 2023-03-19

## 2023-03-15 RX ORDER — APIXABAN 2.5 MG/1
5 TABLET, FILM COATED ORAL
Refills: 0 | Status: DISCONTINUED | OUTPATIENT
Start: 2023-03-16 | End: 2023-03-25

## 2023-03-15 RX ORDER — TAMSULOSIN HYDROCHLORIDE 0.4 MG/1
0.4 CAPSULE ORAL AT BEDTIME
Refills: 0 | Status: DISCONTINUED | OUTPATIENT
Start: 2023-03-15 | End: 2023-03-19

## 2023-03-15 RX ORDER — OXYCODONE HYDROCHLORIDE 5 MG/1
1 TABLET ORAL
Qty: 0 | Refills: 0 | DISCHARGE
Start: 2023-03-15

## 2023-03-15 RX ORDER — ALBUTEROL 90 UG/1
2.5 AEROSOL, METERED ORAL
Qty: 0 | Refills: 0 | DISCHARGE
Start: 2023-03-15

## 2023-03-15 RX ORDER — ALBUTEROL 90 UG/1
2.5 AEROSOL, METERED ORAL EVERY 6 HOURS
Refills: 0 | Status: DISCONTINUED | OUTPATIENT
Start: 2023-03-15 | End: 2023-03-24

## 2023-03-15 RX ORDER — SODIUM CHLORIDE 9 MG/ML
1000 INJECTION, SOLUTION INTRAVENOUS
Refills: 0 | Status: DISCONTINUED | OUTPATIENT
Start: 2023-03-15 | End: 2023-03-15

## 2023-03-15 RX ORDER — ALPRAZOLAM 0.25 MG
0.25 TABLET ORAL DAILY
Refills: 0 | Status: DISCONTINUED | OUTPATIENT
Start: 2023-03-15 | End: 2023-03-20

## 2023-03-15 RX ORDER — ESCITALOPRAM OXALATE 10 MG/1
5 TABLET, FILM COATED ORAL DAILY
Refills: 0 | Status: DISCONTINUED | OUTPATIENT
Start: 2023-03-15 | End: 2023-03-20

## 2023-03-15 RX ORDER — POLYETHYLENE GLYCOL 3350 17 G/17G
17 POWDER, FOR SOLUTION ORAL
Qty: 0 | Refills: 0 | DISCHARGE
Start: 2023-03-15

## 2023-03-15 RX ORDER — ACETAMINOPHEN 500 MG
650 TABLET ORAL ONCE
Refills: 0 | Status: COMPLETED | OUTPATIENT
Start: 2023-03-15 | End: 2023-03-15

## 2023-03-15 RX ORDER — ACETAMINOPHEN 500 MG
650 TABLET ORAL EVERY 6 HOURS
Refills: 0 | Status: DISCONTINUED | OUTPATIENT
Start: 2023-03-15 | End: 2023-03-25

## 2023-03-15 RX ORDER — ASPIRIN/CALCIUM CARB/MAGNESIUM 324 MG
81 TABLET ORAL DAILY
Refills: 0 | Status: DISCONTINUED | OUTPATIENT
Start: 2023-03-16 | End: 2023-03-25

## 2023-03-15 RX ORDER — APIXABAN 2.5 MG/1
5 TABLET, FILM COATED ORAL
Refills: 0 | Status: DISCONTINUED | OUTPATIENT
Start: 2023-03-15 | End: 2023-03-15

## 2023-03-15 RX ORDER — SENNA PLUS 8.6 MG/1
2 TABLET ORAL
Qty: 0 | Refills: 0 | DISCHARGE
Start: 2023-03-15

## 2023-03-15 RX ORDER — OXYCODONE HYDROCHLORIDE 5 MG/1
5 TABLET ORAL EVERY 8 HOURS
Refills: 0 | Status: DISCONTINUED | OUTPATIENT
Start: 2023-03-15 | End: 2023-03-20

## 2023-03-15 RX ORDER — FAMOTIDINE 10 MG/ML
20 INJECTION INTRAVENOUS DAILY
Refills: 0 | Status: DISCONTINUED | OUTPATIENT
Start: 2023-03-15 | End: 2023-03-25

## 2023-03-15 RX ORDER — SENNA PLUS 8.6 MG/1
2 TABLET ORAL AT BEDTIME
Refills: 0 | Status: DISCONTINUED | OUTPATIENT
Start: 2023-03-15 | End: 2023-03-25

## 2023-03-15 RX ORDER — POLYETHYLENE GLYCOL 3350 17 G/17G
17 POWDER, FOR SOLUTION ORAL DAILY
Refills: 0 | Status: DISCONTINUED | OUTPATIENT
Start: 2023-03-15 | End: 2023-03-25

## 2023-03-15 RX ORDER — FAMOTIDINE 10 MG/ML
1 INJECTION INTRAVENOUS
Qty: 0 | Refills: 0 | DISCHARGE
Start: 2023-03-15

## 2023-03-15 RX ADMIN — SODIUM CHLORIDE 4 MILLILITER(S): 9 INJECTION INTRAMUSCULAR; INTRAVENOUS; SUBCUTANEOUS at 05:06

## 2023-03-15 RX ADMIN — ALBUTEROL 2.5 MILLIGRAM(S): 90 AEROSOL, METERED ORAL at 05:06

## 2023-03-15 RX ADMIN — ALBUTEROL 2.5 MILLIGRAM(S): 90 AEROSOL, METERED ORAL at 10:02

## 2023-03-15 RX ADMIN — TAMSULOSIN HYDROCHLORIDE 0.4 MILLIGRAM(S): 0.4 CAPSULE ORAL at 22:48

## 2023-03-15 RX ADMIN — Medication 650 MILLIGRAM(S): at 16:20

## 2023-03-15 RX ADMIN — Medication 81 MILLIGRAM(S): at 15:24

## 2023-03-15 RX ADMIN — ATENOLOL 12.5 MILLIGRAM(S): 25 TABLET ORAL at 15:25

## 2023-03-15 RX ADMIN — CHLORHEXIDINE GLUCONATE 1 APPLICATION(S): 213 SOLUTION TOPICAL at 05:30

## 2023-03-15 RX ADMIN — SIMVASTATIN 40 MILLIGRAM(S): 20 TABLET, FILM COATED ORAL at 22:48

## 2023-03-15 RX ADMIN — SODIUM CHLORIDE 4 MILLILITER(S): 9 INJECTION INTRAMUSCULAR; INTRAVENOUS; SUBCUTANEOUS at 10:03

## 2023-03-15 RX ADMIN — ALBUTEROL 2.5 MILLIGRAM(S): 90 AEROSOL, METERED ORAL at 21:33

## 2023-03-15 RX ADMIN — SODIUM CHLORIDE 500 MILLILITER(S): 9 INJECTION, SOLUTION INTRAVENOUS at 10:48

## 2023-03-15 RX ADMIN — Medication 6 MILLIGRAM(S): at 22:51

## 2023-03-15 RX ADMIN — Medication 650 MILLIGRAM(S): at 15:23

## 2023-03-15 RX ADMIN — FAMOTIDINE 20 MILLIGRAM(S): 10 INJECTION INTRAVENOUS at 15:24

## 2023-03-15 NOTE — PROGRESS NOTE ADULT - SUBJECTIVE AND OBJECTIVE BOX
DATE OF SERVICE: 03-15-23 @ 06:57    Subjective: Patient seen and examined. No new events except as noted.     SUBJECTIVE/ROS:        MEDICATIONS:  MEDICATIONS  (STANDING):  acetaminophen   IVPB .. 1000 milliGRAM(s) IV Intermittent once  albuterol    0.083% 2.5 milliGRAM(s) Nebulizer every 6 hours  aspirin  chewable 81 milliGRAM(s) Oral daily  atenolol  Tablet 12.5 milliGRAM(s) Oral daily  chlorhexidine 2% Cloths 1 Application(s) Topical <User Schedule>  escitalopram 5 milliGRAM(s) Oral daily  famotidine    Tablet 20 milliGRAM(s) Oral daily  melatonin 6 milliGRAM(s) Oral at bedtime  midodrine. 5 milliGRAM(s) Oral three times a day  polyethylene glycol 3350 17 Gram(s) Oral daily  senna 2 Tablet(s) Oral at bedtime  simvastatin 40 milliGRAM(s) Oral at bedtime  sodium chloride 0.9%. 1000 milliLiter(s) (20 mL/Hr) IV Continuous <Continuous>  sodium chloride 3%  Inhalation 4 milliLiter(s) Inhalation every 6 hours  tamsulosin 0.4 milliGRAM(s) Oral at bedtime      PHYSICAL EXAM:  T(C): 36.4 (03-15-23 @ 06:42), Max: 36.9 (03-15-23 @ 05:00)  HR: 85 (03-15-23 @ 06:42) (63 - 85)  BP: 152/67 (03-15-23 @ 06:42) (132/75 - 152/67)  RR: 16 (03-15-23 @ 06:42) (16 - 18)  SpO2: 95% (03-15-23 @ 06:42) (95% - 98%)  Wt(kg): --  I&O's Summary    13 Mar 2023 07:01  -  14 Mar 2023 07:00  --------------------------------------------------------  IN: 0 mL / OUT: 1805 mL / NET: -1805 mL    14 Mar 2023 07:01  -  15 Mar 2023 06:57  --------------------------------------------------------  IN: 150 mL / OUT: 1030 mL / NET: -880 mL            JVP: Normal  Neck: supple  Lung: clear   CV: S1 S2 , Murmur:  Abd: soft  Ext: No edema  neuro: Awake / alert  Psych: flat affect  Skin: normal``    LABS/DATA:    CARDIAC MARKERS:                                11.2   8.54  )-----------( 248      ( 15 Mar 2023 04:25 )             36.3     03-15    133<L>  |  96<L>  |  17  ----------------------------<  89  3.7   |  27  |  0.49<L>    Ca    9.3      15 Mar 2023 04:25  Phos  2.8     03-15  Mg     1.90     03-15    TPro  7.0  /  Alb  3.1<L>  /  TBili  0.9  /  DBili  x   /  AST  28  /  ALT  93<H>  /  AlkPhos  80  03-15    proBNP:   Lipid Profile:   HgA1c:   TSH:     TELE:  EKG:

## 2023-03-15 NOTE — BRIEF OPERATIVE NOTE - NSICDXBRIEFPROCEDURE_GEN_ALL_CORE_FT
PROCEDURES:  Laparoscopic insertion of central venous catheter with subcutaneous port 15-Mar-2023 09:03:50 with fluoroscopy Carolyn Dukes  
PROCEDURES:  Thoracoscopic biopsy of mediastinal lymph node 03-Mar-2023 18:58:50  Josue Cortés

## 2023-03-15 NOTE — PROGRESS NOTE ADULT - PROVIDER SPECIALTY LIST ADULT
CT Surgery
CT Surgery
Cardiology
Critical Care
Critical Care
Heme/Onc
Heme/Onc
Internal Medicine
Rehab Medicine
Thoracic Surgery
CT Surgery
CT Surgery
Cardiology
Heme/Onc
Internal Medicine
Rehab Medicine
Rehab Medicine
Thoracic Surgery
Vascular Surgery
Vascular Surgery
CT Surgery
CT Surgery
Cardiology
Critical Care
Heme/Onc
Heme/Onc
Internal Medicine
Thoracic Surgery
Internal Medicine

## 2023-03-15 NOTE — H&P ADULT - ATTENDING COMMENTS
Seen and examined with NPs     83 y/o M, community-dwelling  Initially admitted to acute care [facility] 2/17 with resp symptoms, found to have R pleural effusion with mediastinal and hilar lymphadenopathy  Acute Rehab admission  3/15 after Dx of classic Hodgkin's lymphoma, via R supraclavicular mass biopsy on 2/28  Now requiring NC oxy. 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 stone dullness Rt mid/lower lobes  Rt pleural drain in situ  Abd--soft   Choi in situ   Ext no edema    Hypereflexia Patella b/l  MMT--4+/5 except hip flexors 3/5      ICU Vital Signs Last 24 Hrs  T(C): 36.3 (16 Mar 2023 08:04), Max: 36.8 (15 Mar 2023 20:37)  T(F): 97.3 (16 Mar 2023 08:04), Max: 98.3 (15 Mar 2023 20:37)  HR: 71 (16 Mar 2023 08:04) (71 - 82)  BP: 109/67 (16 Mar 2023 08:04) (109/67 - 146/79)  RR: 16 (16 Mar 2023 08:04) (16 - 17)  SpO2: 98% (16 Mar 2023 11:00) (95% - 98%)  O2 Parameters below as of 16 Mar 2023 09:52  Patient On (Oxygen Delivery Method): nasal cannula    RECENT LABS/IMAGING                        10.4   7.98  )-----------( 231      ( 16 Mar 2023 06:10 )             33.0     03-16    139  |  101  |  17  ----------------------------<  109<H>  3.4<L>   |  34<H>  |  0.64    Ca    8.6      16 Mar 2023 06:10  Phos  2.8     03-15  Mg     1.90     03-15    TPro  6.1  /  Alb  2.2<L>  /  TBili  1.0  /  DBili  x   /  AST  18  /  ALT  58<H>  /  AlkPhos  74  03-16    PT/INR - ( 15 Mar 2023 04:25 )   PT: 13.3 sec;   INR: 1.14 ratio         PTT - ( 15 Mar 2023 04:25 )  PTT:27.7 sec      Dx:  Debility due to Hodgkin's Lymphoma  Proximal LE weakness and hyper reflexia  Complete PT/OT    S/p R thoacotomy/VATS with Pleurex placement 3/3  --Continue current protocol for emptying drain Alternate day   --Confirm protocol for emptying/duration and threshold volumes with pulmonary team during acute care  --Will get Pulmonary consult if detailed recs not obtained     L chest wall mediport placed 3/15--chlorhexicine wipe daily    Cycle #1 of BV (Brentuximad Vedotin) given on 3/10   Plan for cycle #2 after 3 weeks  - F/u with Dr. Hays outpatient    Afib--c/w apixiban    Ext dc to be determined at next IDT

## 2023-03-15 NOTE — PROGRESS NOTE ADULT - SUBJECTIVE AND OBJECTIVE BOX
Patient is a 82y old  Male who presents with a chief complaint of VATS (11 Mar 2023 12:16)    Interval history: s/p mediport placement this morning. reports some discomfort at port site.    received chemo on 3/10.    REVIEW OF SYSTEMS  weakness    PAST MEDICAL & SURGICAL HISTORY  Atrial fibrillation    Hypertension    Urinary retention    No significant past surgical history     CURRENT FUNCTIONAL STATUS  3/14  Bed Mobility  Bed Mobility Training Treatment not Performed: received pt in the chair.    Sit-Stand Transfer Training  Transfer Training Sit-to-Stand Transfer: minimum assist (75% patient effort);  1 person assist;  weight-bearing as tolerated   rolling walker  Transfer Training Stand-to-Sit Transfer: minimum assist (75% patient effort);  1 person assist;  weight-bearing as tolerated   rolling walker  Sit-to-Stand Transfer Training Transfer Safety Analysis: decreased step length;  decreased strength;  decreased flexibility;  rolling walker    Gait Training  Gait Training: minimum assist (75% patient effort);  1 person assist;  weight-bearing as tolerated   rolling walker;  25 feet  Gait Analysis: 3-point gait   decreased isabella;  decreased strength;  decreased flexibility;  25 feet;  rolling walker      RECENT LABS/IMAGING  CBC Full  -  ( 15 Mar 2023 04:25 )  WBC Count : 8.54 K/uL  RBC Count : 4.03 M/uL  Hemoglobin : 11.2 g/dL  Hematocrit : 36.3 %  Platelet Count - Automated : 248 K/uL  Mean Cell Volume : 90.1 fL  Mean Cell Hemoglobin : 27.8 pg  Mean Cell Hemoglobin Concentration : 30.9 gm/dL  Auto Neutrophil # : 6.05 K/uL  Auto Lymphocyte # : 1.49 K/uL  Auto Monocyte # : 0.90 K/uL  Auto Eosinophil # : 0.05 K/uL  Auto Basophil # : 0.01 K/uL  Auto Neutrophil % : 70.9 %  Auto Lymphocyte % : 17.4 %  Auto Monocyte % : 10.5 %  Auto Eosinophil % : 0.6 %  Auto Basophil % : 0.1 %    03-15    133<L>  |  96<L>  |  17  ----------------------------<  89  3.7   |  27  |  0.49<L>    Ca    9.3      15 Mar 2023 04:25  Phos  2.8     03-15  Mg     1.90     03-15    TPro  7.0  /  Alb  3.1<L>  /  TBili  0.9  /  DBili  x   /  AST  28  /  ALT  93<H>  /  AlkPhos  80  03-15        VITALS  T(C): 36.4 (03-15-23 @ 08:45), Max: 36.9 (03-15-23 @ 05:00)  HR: 64 (03-15-23 @ 11:30) (62 - 85)  BP: 120/64 (03-15-23 @ 11:30) (91/53 - 152/67)  RR: 24 (03-15-23 @ 11:30) (16 - 28)  SpO2: 95% (03-15-23 @ 11:30) (93% - 99%)  Wt(kg): --    ALLERGIES  pertussis vaccines (Rash)  shellfish (Rash)      MEDICATIONS   acetaminophen   IVPB .. 1000 milliGRAM(s) IV Intermittent once  albuterol    0.083% 2.5 milliGRAM(s) Nebulizer every 6 hours  aspirin  chewable 81 milliGRAM(s) Oral daily  atenolol  Tablet 12.5 milliGRAM(s) Oral daily  chlorhexidine 2% Cloths 1 Application(s) Topical <User Schedule>  escitalopram 5 milliGRAM(s) Oral daily  famotidine    Tablet 20 milliGRAM(s) Oral daily  lactated ringers. 500 milliLiter(s) IV Continuous <Continuous>  melatonin 6 milliGRAM(s) Oral at bedtime  oxyCODONE    IR 5 milliGRAM(s) Oral once PRN  polyethylene glycol 3350 17 Gram(s) Oral daily  senna 2 Tablet(s) Oral at bedtime  simvastatin 40 milliGRAM(s) Oral at bedtime  sodium chloride 0.9%. 1000 milliLiter(s) IV Continuous <Continuous>  sodium chloride 3%  Inhalation 4 milliLiter(s) Inhalation every 6 hours  tamsulosin 0.4 milliGRAM(s) Oral at bedtime      ----------------------------------------------------------------------------------------  PHYSICAL EXAM  Constitutional - NAD, Comfortable  HEENT - NCAT, EOMI   Chest - pleurex,  + mediport  Cardiovascular - RRR, S1S2   Abdomen - Soft, NTND  Extremities - No C/C/E, No calf tenderness   Neurologic Exam -                    Cognitive - Awake, Alert, AAO to self, place, date, year, situation     Communication - Fluent, No dysarthria     Cranial Nerves - CN 2-12 intact     Motor -                      LEFT    UE - 4+/5                    RIGHT UE - 4+/5                    LEFT    LE - 4+/5                    RIGHT LE - 4+/5        Sensory - Intact to LT      Balance - WNL Static  Psychiatric - Mood stable, Affect WNL  ----------------------------------------------------------------------------------------  ASSESSMENT/PLAN    83 yo m pmh afib, htn, urinary retention, p/w hypoxic resp failure, recurrent R pleural effusion, found to have stage IV hodgkins lymphoma   s/p steroid taper  s/p ln biopsy   continue bedside PT and OT  plummer  diet: regular dash/tlc no straws   Rehab -plan is for chemo every 3 weeks. Would recommend short stay in acute rehab in order to return home with family prior to next planned chemo     estimated length of stay 1-2 weeks

## 2023-03-15 NOTE — DISCHARGE NOTE NURSING/CASE MANAGEMENT/SOCIAL WORK - NSDCFUADDAPPT_GEN_ALL_CORE_FT
See Dr Galvan in 2 weeks- call for an appointment and bring a new chest X-ray with you when you come.    The visiting nurse will come to drain your PleurX- initially 2 x per week  Follow up with Urology regarding your Choi catheter and with Oncology upon discharge.    
07-Jun-2017 21:15

## 2023-03-15 NOTE — H&P ADULT - NSHPLABSRESULTS_GEN_ALL_CORE
LABS:                        11.2   8.54  )-----------( 248      ( 15 Mar 2023 04:25 )             36.3     03-15    133<L>  |  96<L>  |  17  ----------------------------<  89  3.7   |  27  |  0.49<L>    Ca    9.3      15 Mar 2023 04:25  Phos  2.8     03-15  Mg     1.90     03-15    TPro  7.0  /  Alb  3.1<L>  /  TBili  0.9  /  DBili  x   /  AST  28  /  ALT  93<H>  /  AlkPhos  80  03-15    PT/INR - ( 15 Mar 2023 04:25 )   PT: 13.3 sec;   INR: 1.14 ratio       PTT - ( 15 Mar 2023 04:25 )  PTT:27.7 sec    IMAGING/RADIOLOGY:  IR PROCEDURE (2/28/23)  IMPRESSION: SUCCESSFUL ULTRASOUND GUIDED NEEDLE BIOPSY OF RIGHT   SUPRACLAVICULAR MASS/LYMPH NODE, AS DESCRIBEDABOVE.      CHEST XRAY (2/28/23)  IMPRESSION: Right-sided pigtail catheter in place.      CARDIOLOGY:  TTE (2/18/23)  Summary:   1. Left ventricular ejection fraction, by visual estimation, is 50 to   55%.   2. Normal global left ventricular systolic function.   3. The left ventricular diastolic function could not be assessed in this   study.   4. Moderately enlarged left atrium.   5. Moderately enlarged right atrium.   6. Mild mitral annular calcification.   7. Mild mitral valve regurgitation.   8. Thickening of the anterior and posterior mitral valve leaflets. LABS:                        11.2   8.54  )-----------( 248      ( 15 Mar 2023 04:25 )             36.3     03-15    133<L>  |  96<L>  |  17  ----------------------------<  89  3.7   |  27  |  0.49<L>    Ca    9.3      15 Mar 2023 04:25  Phos  2.8     03-15  Mg     1.90     03-15    TPro  7.0  /  Alb  3.1<L>  /  TBili  0.9  /  DBili  x   /  AST  28  /  ALT  93<H>  /  AlkPhos  80  03-15    PT/INR - ( 15 Mar 2023 04:25 )   PT: 13.3 sec;   INR: 1.14 ratio       PTT - ( 15 Mar 2023 04:25 )  PTT:27.7 sec    IMAGING/RADIOLOGY:  CHEST XRAY (3/15)  IMPRESSION: New left chest wall Mediport with tip in SVC. No pneumothorax. No focal consolidations.    CT ANGIO CHEST/CT AP (3/10/23)  IMPRESSION:  Stable infrarenal abdominal aortic penetrating ulcer. No dissection.  Decreased right pleural effusion and associated compressive atelectasis.  Similar-appearing inter- and intralobular septal thickening in the lungs   with perilymphatic nodules of uncertain etiology.  Persistent mediastinal lymphadenopathy, and bilateral hilar   lymphadenopathy. Slightly increased mild retroperitoneal lymphadenopathy.    NM PET SCAN (3/8)  IMPRESSION:  1.  Upper metabolic lymph nodes above and below the diaphragm.  2.  Suspected osseous focus in the RIGHT hemisacrum.  3.  Diffusely increased splenic uptake relative to liver, abnormal but   nonspecific finding which may be seen in the setting of malignancy as   well as other systemic processes such as infection or inflammatory   disorders.  4.  Right pleural effusion with additional pulmonary findings; additional   malignant involvement versus inflammatory or infectious process not   excluded. If it will change patient management, thoracentesis may be   diagnostic and therapeutic.  5.  Area of soft tissue adjacent to distal abdominal aorta without   abnormal uptake. Further evaluation as warranted clinically.    CHEST XRAY (3/7/23)  IMPRESSION: Follow-up with Pleurx catheter in place and suspected interstitial edema.    CHEST XRAY (3/6)  IMPRESSION: Right lung clearing. No pneumothorax.    CHEXT SRAY (3/3)  IMPRESSION: Status post right thoracotomy with chest tube.    CHEST XRAY (3/1)  IMPRESSION: Follow-up with right pigtail catheter in place and no effusion.    IR PROCEDURE (2/28/23)  IMPRESSION: SUCCESSFUL ULTRASOUND GUIDED NEEDLE BIOPSY OF RIGHT   SUPRACLAVICULAR MASS/LYMPH NODE, AS DESCRIBEDABOVE.    CHEST XRAY (2/28/23)  IMPRESSION: Right-sided pigtail catheter in place.      CARDIOLOGY:  TTE (2/18/23)  Summary:   1. Left ventricular ejection fraction, by visual estimation, is 50 to   55%.   2. Normal global left ventricular systolic function.   3. The left ventricular diastolic function could not be assessed in this   study.   4. Moderately enlarged left atrium.   5. Moderately enlarged right atrium.   6. Mild mitral annular calcification.   7. Mild mitral valve regurgitation.   8. Thickening of the anterior and posterior mitral valve leaflets.

## 2023-03-15 NOTE — H&P ADULT - NSHPPHYSICALEXAM_GEN_ALL_CORE
Constitutional - NAD, Comfortable  HEENT - NCAT, EOMI; Hard of hearing  Neck - Supple, No limited ROM  Chest - good chest expansion, good respiratory effort, CTAB   Cardio - warm and well perfused, RRR, no murmur; left chem port  Abdomen -  Soft, NTND; +right Pleurex  : +plummer  Extremities - No peripheral edema, No calf tenderness   Neurologic Exam:                    Cognitive -             Orientation: Awake, Alert, AAO to self, place, date, year, situation            Attention:  Days of week, recall 2/3 objects without cuing and 3/3 with cueing            Memory: Recent/ remote memory intact            Thought: process, content appropriate     Speech - Fluent, Comprehensible, No dysarthria, No aphasia      Cranial Nerves - No facial asymmetry, Tongue midline, EOMI, Shoulder shrug intact     Motor -                      LEFT    UE - ShAB 5/5, EF 5/5, EE 5/5, WE 5/5,  WNL                    RIGHT UE - ShAB 5/5, EF 5/5, EE 5/5, WE 5/5,  WNL                    LEFT    LE - HF 3/5, KE 5/5, DF 5/5, PF 5/5                    RIGHT LE - HF 3/5, KE 5/5, DF 5/5, PF 5/5        Sensory - Intact to LT bilateral     Reflexes - DTR 2/ 4 , neg Grimaldo's b/l, neg Babinski's b/l     Coordination - FTN / HTS intact     OculoVestibular -  No nystagmus  Psychiatric - Mood stable, Affect WNL  Skin on admission: right lower back  bone biopsy site redness; right Pleurex

## 2023-03-15 NOTE — H&P ADULT - NSHPREVIEWOFSYSTEMS_GEN_ALL_CORE
REVIEW OF SYSTEMS  Constitutional: No fever, No Chills, No fatigue  HEENT: No eye pain, No visual disturbances, + difficulty hearing  Pulm: No cough,  No shortness of breath  Cardio: No chest pain, No palpitations  GI:  No abdominal pain, No nausea, No vomiting, No diarrhea, No constipation  : No dysuria, No frequency, No hematuria; +Choi  Neuro: No headaches, No memory loss, No loss of strength, No numbness, No tremors  Skin: No itching, No rashes, No lesions   Endo: No temperature intolerance  MSK: No joint pain, No joint swelling, No muscle pain, No Neck or back pain  Psych:  No depression, No anxiety

## 2023-03-15 NOTE — BRIEF OPERATIVE NOTE - NSICDXBRIEFPREOP_GEN_ALL_CORE_FT
PRE-OP DIAGNOSIS:  Lymphoma 15-Mar-2023 09:05:41  Carolyn Dukes  
PRE-OP DIAGNOSIS:  Mediastinal lymphadenopathy 03-Mar-2023 18:59:06  Josue Cortés

## 2023-03-15 NOTE — DISCHARGE NOTE NURSING/CASE MANAGEMENT/SOCIAL WORK - NSDCCRNAME_GEN_ALL_CORE_FT
NS @ [place] address [address], [place], [id number], 5pm  via [facility]

## 2023-03-15 NOTE — PROGRESS NOTE ADULT - ASSESSMENT
82 year old male with PMH atrial fibrillation, HTN and chronic urinary retention who presented to [facility] from home with acute hypoxic respiratory failure with hypercapnia. Patient was admitted to ICU an [facility], now downgraded and transferred to  [facility] for continuation of his care     #Acute hypoxic respiratory failure with hypercapnia  #Recurrent R pleural effusion-exudative  #Lymphadenopathy and leukocytosis-suspect underlying malignancy  -With large right sided pleural effusion, now status post pigtail placement, monitor output   -O 2 supplement PRN   -Continue steroid taper  -Patient was treated with course of empiric cefepime and dose of vanco at [place]   -Blood cultures 2/17 NGTD, Pleural fluid no growth currently, f/u final culture/cytology  -thoracic surg care appreciated, plan for pleurex and mediastinoscopy to evaluate for lymph nodes. VATS  -Heme/onc follow up appreciated   - patient will benefit form pleurx placement , placed by CTS   - S/P vats and pleurx per surg team   - follow up biopsy results , Lymhome   - Chemo per heme.Onc follow up appreciated , next chemo in 3 weeks   - PET noted   - port placed, resume eliquis tomorrow AM       #Afib/ HTN  rate control   telemonitoring   eliquis for AC       #Urinary Retention  -Continue flomax    #DVT prophylaxis     Discussed with patient son over the phone

## 2023-03-15 NOTE — H&P ADULT - HISTORY OF PRESENT ILLNESS
Alexandru Martin is an 82 year old male with PMH of  ; who presented to [facility] on 2/24/23 for   Alexandru Martin is an 82 year old male with PMH of AFIB, HTN and chronic urinary retention (chronic plummer); who presented to [facility] via ambulance on 2/17 with progressively worsening SOB.  He was intubated in the field and brought to [facility] ED and admitted to ICU for acute respiratory failure with hypercapnia. CTA chest was negative for PE but significant for a large R pleural effusion with mediastinal and hilar lymphadenopathy, and 3L of fluid was drained.  He completed a 5 day course of empiric antibiotics. Pleural fluid cultures and blood cultures resulted negative.  Suspicious of underlying malignancy secondary to lymphadenopathy and persistent leukocytosis, Heme/Onc was consulted and recommended transfer to [facility] on 2/24/23 for further workup. On 2/28 he underwent an ultrasound guided biopsy of his R supraclavicular mass/lymph node, which later resulted as classic Hodgkin's lymphoma.  On 3/3, he underwent a R thoracotomy/VATS with Pleurex placement. Postoperatively, he was hypotensive and started on Midodrine. A Left chest wall mediport was placed for ongoing chemotherapy treatment on 3/15. PM&R was consulted and deemed him an appropriate candidate for IRF. He was medically stabilized and cleared for discharge to [facility].

## 2023-03-15 NOTE — PROGRESS NOTE ADULT - NUTRITIONAL ASSESSMENT
This patient has been assessed with a concern for Malnutrition and has been determined to have a diagnosis/diagnoses of Moderate protein-calorie malnutrition.    This patient is being managed with:   Diet Regular-  DASH/TLC {Sodium & Cholesterol Restricted}  No Straws  Entered: Feb 22 2023  4:02PM    
This patient has been assessed with a concern for Malnutrition and has been determined to have a diagnosis/diagnoses of Severe protein-calorie malnutrition.    This patient is being managed with:   Diet NPO after Midnight-     NPO Start Date: 07-Mar-2023   NPO Start Time: 23:59  Entered: Mar  7 2023  7:54PM    Diet NPO after Midnight-     NPO Start Date: 07-Mar-2023   NPO Start Time: 23:59  Except Medications  With Ice Chips/Sips of Water  Entered: Mar  7 2023 10:53AM    Diet Regular-  Entered: Mar  4 2023  7:00AM    
This patient has been assessed with a concern for Malnutrition and has been determined to have a diagnosis/diagnoses of Severe protein-calorie malnutrition.    This patient is being managed with:   Diet NPO after Midnight-     NPO Start Date: 14-Mar-2023   NPO Start Time: 23:59  Entered: Mar 14 2023  5:33PM    Diet Regular-  Entered: Mar  4 2023  7:00AM    
This patient has been assessed with a concern for Malnutrition and has been determined to have a diagnosis/diagnoses of Moderate protein-calorie malnutrition.    This patient is being managed with:   Diet Regular-  DASH/TLC {Sodium & Cholesterol Restricted}  No Straws  Entered: Feb 22 2023  4:02PM    
This patient has been assessed with a concern for Malnutrition and has been determined to have a diagnosis/diagnoses of Severe protein-calorie malnutrition.    This patient is being managed with:   Diet NPO-  NPO for Procedure/Test     NPO Start Date: 01-Mar-2023   NPO Start Time: 00:01  Except Medications  With Ice Chips/Sips of Water  Entered: Mar  1 2023  6:12AM    
This patient has been assessed with a concern for Malnutrition and has been determined to have a diagnosis/diagnoses of Severe protein-calorie malnutrition.    This patient is being managed with:   Diet NPO-  NPO for Procedure/Test     NPO Start Date: 01-Mar-2023   NPO Start Time: 00:01  Except Medications  With Ice Chips/Sips of Water  Entered: Mar  1 2023  6:12AM    
This patient has been assessed with a concern for Malnutrition and has been determined to have a diagnosis/diagnoses of Severe protein-calorie malnutrition.    This patient is being managed with:   Diet Regular-  Entered: Mar  4 2023  7:00AM    
This patient has been assessed with a concern for Malnutrition and has been determined to have a diagnosis/diagnoses of Moderate protein-calorie malnutrition.    This patient is being managed with:   Diet Regular-  DASH/TLC {Sodium & Cholesterol Restricted}  No Straws  Entered: Feb 22 2023  4:02PM    
This patient has been assessed with a concern for Malnutrition and has been determined to have a diagnosis/diagnoses of Severe protein-calorie malnutrition.    This patient is being managed with:   Diet Clear Liquid-  Entered: Mar  3 2023  8:01PM    
This patient has been assessed with a concern for Malnutrition and has been determined to have a diagnosis/diagnoses of Severe protein-calorie malnutrition.    This patient is being managed with:   Diet Regular-  DASH/TLC {Sodium & Cholesterol Restricted}  No Straws  No Carb Prosource (1pkg = 15gms Protein)     Qty per Day:  1  Supplement Feeding Modality:  Oral  Two Rahul HN Cans or Servings Per Day:  1       Frequency:  Two Times a day  Entered: Mar  1 2023  6:12AM    
This patient has been assessed with a concern for Malnutrition and has been determined to have a diagnosis/diagnoses of Severe protein-calorie malnutrition.    This patient is being managed with:   Diet Regular-  Entered: Mar  4 2023  7:00AM    
This patient has been assessed with a concern for Malnutrition and has been determined to have a diagnosis/diagnoses of Moderate protein-calorie malnutrition.    This patient is being managed with:   Diet Regular-  DASH/TLC {Sodium & Cholesterol Restricted}  No Straws  Entered: Feb 22 2023  4:02PM    
This patient has been assessed with a concern for Malnutrition and has been determined to have a diagnosis/diagnoses of Moderate protein-calorie malnutrition.    This patient is being managed with:   Diet Regular-  DASH/TLC {Sodium & Cholesterol Restricted}  No Straws  Entered: Feb 22 2023  4:02PM    
This patient has been assessed with a concern for Malnutrition and has been determined to have a diagnosis/diagnoses of Severe protein-calorie malnutrition.    This patient is being managed with:   Diet Regular-  Entered: Mar  4 2023  7:00AM    

## 2023-03-15 NOTE — BRIEF OPERATIVE NOTE - NSICDXBRIEFPOSTOP_GEN_ALL_CORE_FT
POST-OP DIAGNOSIS:  Lymphoma 15-Mar-2023 09:05:52  Carolyn Dukes  
POST-OP DIAGNOSIS:  Mediastinal lymphadenopathy 03-Mar-2023 18:59:18  Josue Cortés

## 2023-03-15 NOTE — H&P ADULT - NSHPSOCIALHISTORY_GEN_ALL_CORE
SOCIAL HISTORY  Smoking - Former. Last use Feb 1963   EtOH - Denied   Drugs - Denied    FUNCTIONAL HISTORY  Pt states he lives in a house with his wife with 5 steps to enter and a flight of 10 steps once inside. Prior to admission pt reports ambulating independently without a device and was independent in ADLs.    CURRENT FUNCTIONAL STATUS  Bed Mobility  Bed Mobility Training Treatment not Performed: received pt in the chair.    Sit-Stand Transfer Training  Transfer Training Sit-to-Stand Transfer: minimum assist (75% patient effort);  1 person assist;  weight-bearing as tolerated   rolling walker  Transfer Training Stand-to-Sit Transfer: minimum assist (75% patient effort);  1 person assist;  weight-bearing as tolerated   rolling walker  Sit-to-Stand Transfer Training Transfer Safety Analysis: decreased step length;  decreased strength;  decreased flexibility;  rolling walker    Gait Training  Gait Training: minimum assist (75% patient effort);  1 person assist;  weight-bearing as tolerated   rolling walker;  25 feet  Gait Analysis: 3-point gait   decreased isabella;  decreased strength;  decreased flexibility;  25 feet;  rolling walker SOCIAL HISTORY  Smoking - Former. Last use Feb 1963   EtOH - Denied   Drugs - Denied    FUNCTIONAL HISTORY  Pt states he lives in a house with his wife and son with 5 steps to enter and a flight of 10 steps once inside. Prior to admission pt reports ambulating independently without a device and was independent in ADLs.    CURRENT FUNCTIONAL STATUS  Bed Mobility  Bed Mobility Training Treatment not Performed: received pt in the chair.    Sit-Stand Transfer Training  Transfer Training Sit-to-Stand Transfer: minimum assist (75% patient effort);  1 person assist;  weight-bearing as tolerated   rolling walker  Transfer Training Stand-to-Sit Transfer: minimum assist (75% patient effort);  1 person assist;  weight-bearing as tolerated   rolling walker  Sit-to-Stand Transfer Training Transfer Safety Analysis: decreased step length;  decreased strength;  decreased flexibility;  rolling walker    Gait Training  Gait Training: minimum assist (75% patient effort);  1 person assist;  weight-bearing as tolerated   rolling walker;  25 feet  Gait Analysis: 3-point gait   decreased isabella;  decreased strength;  decreased flexibility;  25 feet;  rolling walker

## 2023-03-15 NOTE — PATIENT PROFILE ADULT - FUNCTIONAL ASSESSMENT - BASIC MOBILITY 6.
3-calculated by average/Not able to assess (calculate score using [facility] averaging method)

## 2023-03-15 NOTE — H&P ADULT - ASSESSMENT
Assessment/Plan:  ARIK DUMONT is a 82y with a history of *** who presented to (name of hospital) on (date of acute admission) with (presenting sx) found to have ****.  Hospital Course complicated by ***. Patient now admitted for a multidisciplinary rehab program. 03-15-23 @ 13:06    -------------    Rehab Management/MEDICAL MANAGEMENT     #  - Gait Instability, ADL impairments and Functional impairments: start Comprehensive Rehab Program of PT/OT/SLP  - 3 hours a day, 5 days a week  - P&O as needed     #    #Sleep  - melatonin PRN     #Skin  - Skin on admission  - Pressure injury/Skin: OOB to Chair, PT/OT     #Pain Mgmt   - Tylenol PRN, Oxycodone PRN    #GI/Bowel Mgmt   - Continent c/w Senna, Miralax     #/Bladder Mgmt   -  PVR q8h, CIC>400cc    #FEN   - Diet - Regular + Thins  [CCHO, DASH/TLC]    - Dysphagia  SLP - evaluation and treatment    #Precautions / PROPHYLAXIS:   - Falls, Cardiac, Sternal, Spinal, Seizure   - ortho: Weight bearing status: WBAT   - Lungs: Aspiration, Incentive Spirometer   - DVT: Lovenox  -----------------------------------------------------  FOLLOW UP/OUTPATIENT:      -----------------------------------------------------  MEDICAL PROGNOSIS: GOOD                                   REHAB POTENTIAL: GOOD  ESTIMATED DISPOSITION: HOME                             ELOS: 10-14 Days   EXPECTED THERAPY:     P.T. 1hr/day       O.T. 1hr/day      S.L.P. 1hr/day      EXP FREQUENCY: 5 days per 7 day period     PRESCREEN COMPARISON: I have reviewed the prescreen information and I have found no relevant changes between the preadmission screening and my post admission evaluation     RATIONALE FOR INPATIENT ADMISSION - Patient demonstrates the following: (check all that apply)  [X] Medically appropriate for rehabilitation admission  [X] Has attainable rehab goals with an appropriate initial discharge plan  [X] Has rehabilitation potential (expected to make a significant improvement within a reasonable period of time)   [X] Requires close medical managment by a rehab physician, rehab nursing care, Hospitalist and comprehensive interdisciplinary team (including PT, OT, & or SLP, Prosthetics and Orthotics)     Assessment/Plan:  ARIK DUMONT is a 82 year old male with PMH of AFIB, HTN and chronic urinary retention (chronic plummer); who presented to [facility] via ambulance on 2/17 with progressively worsening SOB, intubated in the field, found to have a large R pleural effusion with mediastinal and hilar lymphadenopathy s/p thoracentesis (~3L), with persistent leukocytosis and concern for suspicious malignancy was transferred to [facility] on 2/24 for further workup.  He underwent a R supraclavicular mass biopsy on 2/28, significant for classic Hodgkin's lymphoma. On 3/3, he underwent a R thoracotomy/VATS with Pleurex placement. Bone marrow negative for involvement,  (s/p L chest wall mediport placement 3/15). Patient now admitted for a multidisciplinary rehab program. 03-15-23 @ 13:06  -------------  Rehab Management/MEDICAL MANAGEMENT     #Debility  #SOB/R Pleural effusion  #Classic Hodgkin's Lymphoma  - Gait Instability, ADL impairments and Functional impairments: start Comprehensive Rehab Program of PT/OT  - 3 hours a day, 5 days a week  - P&O as needed   - SOB with R pleural effusion requiring drainage (~3L)  - S/p R supraclavicular mass biopsy-> significant for Classic Hodgkin's Lymphoma  - S/p R thoacotomy/VATS with Pleurex placement 3/3  - Bone marrow biopsy negative  - L chest wall mediport placed 3/15  -- Cycle #1 of BV (Brentuximad Vedotin) given on 3/10  --- Plan for cycle #2 after 3 weeks  --- No plan to receive chemo while in rehab  - Await [facility] referral   - F/u with Dr. Hays outpatient  - Ipratropium-Albuterol every 6 hours     #AFIB  - Eliquis  - Resume on 3/16 (held for L CW mediport on 3/15)    #HTN  - Atenolol     #HLD  - Simvastatin    #Sleep/Mood  - Lexapro  - Xanax PRN   - Melatonin PRN     #Skin  - Skin on admission: ******************  - Pressure injury/Skin: OOB to Chair, PT/OT     #Pain Mgmt   - Oxycodone PRN    #GI/Bowel Mgmt   - Continent c/w Senna, Miralax     #/Bladder Mgmt   - Chronic Plummer   -  PVR q8h, CIC>400cc  - F/u with Urology outpt     #FEN   - Diet - Regular + Thins  [DASH]      #Precautions / PROPHYLAXIS:   - Falls   - ortho: Weight bearing status: WBAT   - Lungs: Aspiration, Incentive Spirometer   - DVT: Lovenox  -----------------------------------------------------  FOLLOW UP/OUTPATIENT:    Nathaniel Galvan)  Surgery; Thoracic Surgery  [address] [place] [address], [facility]  [address]rd [place] [address]  Phone: [phone]  Fax: [phone]  Established Patient  Follow Up Time: 2 weeks    UROLOGY  MD Saúl Hamm [facility]  Chloé Vazquez  Scheduled Appointment: 03/27/2023  -----------------------------------------------------  MEDICAL PROGNOSIS: GOOD                                   REHAB POTENTIAL: GOOD  ESTIMATED DISPOSITION: HOME                             ELOS: 10-14 Days   EXPECTED THERAPY:     P.T. 2hr/day       O.T. 1hr/day      S.L.P. 0hr/day      EXP FREQUENCY: 5 days per 7 day period     PRESCREEN COMPARISON: I have reviewed the prescreen information and I have found no relevant changes between the preadmission screening and my post admission evaluation     RATIONALE FOR INPATIENT ADMISSION - Patient demonstrates the following: (check all that apply)  [X] Medically appropriate for rehabilitation admission  [X] Has attainable rehab goals with an appropriate initial discharge plan  [X] Has rehabilitation potential (expected to make a significant improvement within a reasonable period of time)   [X] Requires close medical managment by a rehab physician, rehab nursing care, Hospitalist and comprehensive interdisciplinary team (including PT, OT, & or SLP, Prosthetics and Orthotics)     Assessment/Plan:  ARIK DUMONT is a 82 year old male with PMH of AFIB, HTN and chronic urinary retention (chronic plummer); who presented to [facility] via ambulance on 2/17 with progressively worsening SOB, intubated in the field, found to have a large R pleural effusion with mediastinal and hilar lymphadenopathy s/p thoracentesis (~3L), with persistent leukocytosis and concern for suspicious malignancy was transferred to [facility] on 2/24 for further workup.  He underwent a R supraclavicular mass biopsy on 2/28, significant for classic Hodgkin's lymphoma. On 3/3, he underwent a R thoracotomy/VATS with Pleurex placement. Bone marrow negative for involvement,  (s/p L chest wall mediport placement 3/15). Patient now admitted for a multidisciplinary rehab program. 03-15-23 @ 13:06  -------------  Rehab Management/MEDICAL MANAGEMENT     #Debility  #SOB/R Pleural effusion  #Classic Hodgkin's Lymphoma  - Gait Instability, ADL impairments and Functional impairments: start Comprehensive Rehab Program of PT/OT  - 3 hours a day, 5 days a week  - P&O as needed   - SOB with R pleural effusion requiring drainage (~3L)  - S/p R supraclavicular mass biopsy-> significant for Classic Hodgkin's Lymphoma  - S/p R thoacotomy/VATS with Pleurex placement 3/3  - Bone marrow biopsy negative  - L chest wall mediport placed 3/15  -- Cycle #1 of BV (Brentuximad Vedotin) given on 3/10  --- Plan for cycle #2 after 3 weeks  --- No plan to receive chemo while in rehab  - Await [facility] referral   - F/u with Dr. Hays outpatient  - Ipratropium-Albuterol every 6 hours     #AFIB  - Eliquis  - Resume on 3/16 (held for L CW mediport on 3/15)    #HTN  - Atenolol     #HLD  - Simvastatin    #Sleep/Mood  - Lexapro  - Xanax PRN   - Melatonin PRN     #Skin  - Skin on admission: right lower back  bone biopsy site redness; right Pleurex  - Pressure injury/Skin: OOB to Chair, PT/OT     #Pain Mgmt   - Oxycodone PRN    #GI/Bowel Mgmt   - Continent c/w Senna, Miralax     #/Bladder Mgmt   - Chronic Plummer   -Flomax  -  PVR q8h, CIC>400cc  - F/u with Urology outpt     #FEN   - Diet - Regular + Thins  [DASH]      #Precautions / PROPHYLAXIS:   - Falls   - ortho: Weight bearing status: WBAT   - Lungs: Aspiration, Incentive Spirometer   - DVT: Lovenox  -----------------------------------------------------  FOLLOW UP/OUTPATIENT:    Nathaniel Galvan)  Surgery; Thoracic Surgery  [address] [place] [address], Oncology [facility]  3rd [place] [address]  Phone: [phone]  Fax: [phone]  Established Patient  Follow Up Time: 2 weeks    UROLOGY  MD Saúl Hamm [facility]  Chloé Vazquez  Scheduled Appointment: 03/27/2023  -----------------------------------------------------  MEDICAL PROGNOSIS: GOOD                                   REHAB POTENTIAL: GOOD  ESTIMATED DISPOSITION: HOME                             ELOS: 10-14 Days   EXPECTED THERAPY:     P.T. 2hr/day       O.T. 1hr/day      S.L.P. 0hr/day      EXP FREQUENCY: 5 days per 7 day period     PRESCREEN COMPARISON: I have reviewed the prescreen information and I have found no relevant changes between the preadmission screening and my post admission evaluation     RATIONALE FOR INPATIENT ADMISSION - Patient demonstrates the following: (check all that apply)  [X] Medically appropriate for rehabilitation admission  [X] Has attainable rehab goals with an appropriate initial discharge plan  [X] Has rehabilitation potential (expected to make a significant improvement within a reasonable period of time)   [X] Requires close medical managment by a rehab physician, rehab nursing care, Hospitalist and comprehensive interdisciplinary team (including PT, OT, & or SLP, Prosthetics and Orthotics)

## 2023-03-15 NOTE — PROGRESS NOTE ADULT - ASSESSMENT
afib  HR better   cont low dose atenolol   BP stable  DC midodrine   resume a/c once deemed safe

## 2023-03-15 NOTE — DISCHARGE NOTE NURSING/CASE MANAGEMENT/SOCIAL WORK - PATIENT PORTAL LINK FT
You can access the FollowMyHealth Patient Portal offered by [facility] by registering at the following website: [email or web address]. By joining [employer]’s FollowMyHealth portal, you will also be able to view your health information using other applications (apps) compatible with our system.

## 2023-03-15 NOTE — PROGRESS NOTE ADULT - SUBJECTIVE AND OBJECTIVE BOX
Name of Patient : ARIK DUMONT  MRN: [id number]  Date of visit: 03-15-23       Subjective: Patient seen and examined. No new events except as noted.   port today   doing okay     REVIEW OF SYSTEMS:    CONSTITUTIONAL: No weakness, fevers or chills  EYES/ENT: No visual changes;  No vertigo or throat pain   NECK: No pain or stiffness  RESPIRATORY: No cough, wheezing, hemoptysis; No shortness of breath  CARDIOVASCULAR: No chest pain or palpitations  GASTROINTESTINAL: No abdominal or epigastric pain. No nausea, vomiting, or hematemesis; No diarrhea or constipation. No melena or hematochezia.  GENITOURINARY: No dysuria, frequency or hematuria  NEUROLOGICAL: No numbness or weakness  SKIN: No itching, burning, rashes, or lesions   All other review of systems is negative unless indicated above.    MEDICATIONS:  MEDICATIONS  (STANDING):  acetaminophen   IVPB .. 1000 milliGRAM(s) IV Intermittent once  albuterol    0.083% 2.5 milliGRAM(s) Nebulizer every 6 hours  aspirin  chewable 81 milliGRAM(s) Oral daily  atenolol  Tablet 12.5 milliGRAM(s) Oral daily  chlorhexidine 2% Cloths 1 Application(s) Topical <User Schedule>  escitalopram 5 milliGRAM(s) Oral daily  famotidine    Tablet 20 milliGRAM(s) Oral daily  lactated ringers. 500 milliLiter(s) (500 mL/Hr) IV Continuous <Continuous>  melatonin 6 milliGRAM(s) Oral at bedtime  polyethylene glycol 3350 17 Gram(s) Oral daily  senna 2 Tablet(s) Oral at bedtime  simvastatin 40 milliGRAM(s) Oral at bedtime  sodium chloride 0.9%. 1000 milliLiter(s) (20 mL/Hr) IV Continuous <Continuous>  sodium chloride 3%  Inhalation 4 milliLiter(s) Inhalation every 6 hours  tamsulosin 0.4 milliGRAM(s) Oral at bedtime      PHYSICAL EXAM:  T(C): 36.8 (03-15-23 @ 20:37), Max: 36.9 (03-15-23 @ 05:00)  HR: 74 (03-15-23 @ 21:57) (62 - 85)  BP: 146/79 (03-15-23 @ 20:37) (91/53 - 152/67)  RR: 17 (03-15-23 @ 20:37) (16 - 28)  SpO2: 96% (03-15-23 @ 21:57) (93% - 99%)  Wt(kg): --  I&O's Summary    14 Mar 2023 07:01  -  15 Mar 2023 07:00  --------------------------------------------------------  IN: 150 mL / OUT: 1030 mL / NET: -880 mL    15 Mar 2023 07:01  -  15 Mar 2023 22:55  --------------------------------------------------------  IN: 500 mL / OUT: 500 mL / NET: 0 mL      Height (cm): 177.8 (03-15 @ 07:00)  Weight (kg): 90.7 (03-15 @ 07:00)  BMI (kg/m2): 28.7 (03-15 @ 07:00)  BSA (m2): 2.09 (03-15 @ 07:00)    Appearance: Normal	  HEENT:  PERRLA   Lymphatic: No lymphadenopathy   Cardiovascular: Normal S1 S2, no JVD  Respiratory: normal effort , clear  Gastrointestinal:  Soft, Non-tender  Skin: No rashes,  warm to touch  Psychiatry:  Mood & affect appropriate  Musculuskeletal: No edema    recent labs, Imaging and EKGs personally reviewed     03-14-23 @ 07:01  -  03-15-23 @ 07:00  --------------------------------------------------------  IN: 150 mL / OUT: 1030 mL / NET: -880 mL    03-15-23 @ 07:01  -  03-15-23 @ 22:55  --------------------------------------------------------  IN: 500 mL / OUT: 500 mL / NET: 0 mL                          11.2   8.54  )-----------( 248      ( 15 Mar 2023 04:25 )             36.3               03-15    133<L>  |  96<L>  |  17  ----------------------------<  89  3.7   |  27  |  0.49<L>    Ca    9.3      15 Mar 2023 04:25  Phos  2.8     03-15  Mg     1.90     03-15    TPro  7.0  /  Alb  3.1<L>  /  TBili  0.9  /  DBili  x   /  AST  28  /  ALT  93<H>  /  AlkPhos  80  03-15    PT/INR - ( 15 Mar 2023 04:25 )   PT: 13.3 sec;   INR: 1.14 ratio         PTT - ( 15 Mar 2023 04:25 )  PTT:27.7 sec

## 2023-03-15 NOTE — BRIEF OPERATIVE NOTE - OPERATION/FINDINGS
Flexible Bronchoscopy, Right VATS, Mediastinal Lymph Node Dissection, PleurX Catheter Placement
left mediport catheter insertion with fluoroscopy

## 2023-03-16 LAB
ALBUMIN SERPL ELPH-MCNC: 2.2 G/DL — LOW (ref 3.3–5)
ALP SERPL-CCNC: 74 U/L — SIGNIFICANT CHANGE UP (ref 40–120)
ALT FLD-CCNC: 58 U/L — HIGH (ref 10–45)
ANION GAP SERPL CALC-SCNC: 4 MMOL/L — LOW (ref 5–17)
AST SERPL-CCNC: 18 U/L — SIGNIFICANT CHANGE UP (ref 10–40)
BASOPHILS # BLD AUTO: 0.01 K/UL — SIGNIFICANT CHANGE UP (ref 0–0.2)
BASOPHILS NFR BLD AUTO: 0.1 % — SIGNIFICANT CHANGE UP (ref 0–2)
BILIRUB SERPL-MCNC: 1 MG/DL — SIGNIFICANT CHANGE UP (ref 0.2–1.2)
BUN SERPL-MCNC: 17 MG/DL — SIGNIFICANT CHANGE UP (ref 7–23)
CALCIUM SERPL-MCNC: 8.6 MG/DL — SIGNIFICANT CHANGE UP (ref 8.4–10.5)
CHLORIDE SERPL-SCNC: 101 MMOL/L — SIGNIFICANT CHANGE UP (ref 96–108)
CO2 SERPL-SCNC: 34 MMOL/L — HIGH (ref 22–31)
CREAT SERPL-MCNC: 0.64 MG/DL — SIGNIFICANT CHANGE UP (ref 0.5–1.3)
EGFR: 94 ML/MIN/1.73M2 — SIGNIFICANT CHANGE UP
EOSINOPHIL # BLD AUTO: 0.15 K/UL — SIGNIFICANT CHANGE UP (ref 0–0.5)
EOSINOPHIL NFR BLD AUTO: 1.9 % — SIGNIFICANT CHANGE UP (ref 0–6)
GLUCOSE SERPL-MCNC: 109 MG/DL — HIGH (ref 70–99)
HCT VFR BLD CALC: 33 % — LOW (ref 39–50)
HGB BLD-MCNC: 10.4 G/DL — LOW (ref 13–17)
IMM GRANULOCYTES NFR BLD AUTO: 0.5 % — SIGNIFICANT CHANGE UP (ref 0–0.9)
LYMPHOCYTES # BLD AUTO: 1.02 K/UL — SIGNIFICANT CHANGE UP (ref 1–3.3)
LYMPHOCYTES # BLD AUTO: 12.8 % — LOW (ref 13–44)
MCHC RBC-ENTMCNC: 28.7 PG — SIGNIFICANT CHANGE UP (ref 27–34)
MCHC RBC-ENTMCNC: 31.5 GM/DL — LOW (ref 32–36)
MCV RBC AUTO: 91.2 FL — SIGNIFICANT CHANGE UP (ref 80–100)
MONOCYTES # BLD AUTO: 0.66 K/UL — SIGNIFICANT CHANGE UP (ref 0–0.9)
MONOCYTES NFR BLD AUTO: 8.3 % — SIGNIFICANT CHANGE UP (ref 2–14)
NEUTROPHILS # BLD AUTO: 6.1 K/UL — SIGNIFICANT CHANGE UP (ref 1.8–7.4)
NEUTROPHILS NFR BLD AUTO: 76.4 % — SIGNIFICANT CHANGE UP (ref 43–77)
NRBC # BLD: 0 /100 WBCS — SIGNIFICANT CHANGE UP (ref 0–0)
PLATELET # BLD AUTO: 231 K/UL — SIGNIFICANT CHANGE UP (ref 150–400)
POTASSIUM SERPL-MCNC: 3.4 MMOL/L — LOW (ref 3.5–5.3)
POTASSIUM SERPL-SCNC: 3.4 MMOL/L — LOW (ref 3.5–5.3)
PROT SERPL-MCNC: 6.1 G/DL — SIGNIFICANT CHANGE UP (ref 6–8.3)
RBC # BLD: 3.62 M/UL — LOW (ref 4.2–5.8)
RBC # FLD: 19.3 % — HIGH (ref 10.3–14.5)
SODIUM SERPL-SCNC: 139 MMOL/L — SIGNIFICANT CHANGE UP (ref 135–145)
WBC # BLD: 7.98 K/UL — SIGNIFICANT CHANGE UP (ref 3.8–10.5)
WBC # FLD AUTO: 7.98 K/UL — SIGNIFICANT CHANGE UP (ref 3.8–10.5)

## 2023-03-16 PROCEDURE — 99223 1ST HOSP IP/OBS HIGH 75: CPT

## 2023-03-16 RX ORDER — CHLORHEXIDINE GLUCONATE 213 G/1000ML
1 SOLUTION TOPICAL DAILY
Refills: 0 | Status: DISCONTINUED | OUTPATIENT
Start: 2023-03-16 | End: 2023-03-25

## 2023-03-16 RX ADMIN — APIXABAN 5 MILLIGRAM(S): 2.5 TABLET, FILM COATED ORAL at 05:41

## 2023-03-16 RX ADMIN — ALBUTEROL 2.5 MILLIGRAM(S): 90 AEROSOL, METERED ORAL at 08:38

## 2023-03-16 RX ADMIN — ALBUTEROL 2.5 MILLIGRAM(S): 90 AEROSOL, METERED ORAL at 15:25

## 2023-03-16 RX ADMIN — Medication 6 MILLIGRAM(S): at 22:23

## 2023-03-16 RX ADMIN — SIMVASTATIN 40 MILLIGRAM(S): 20 TABLET, FILM COATED ORAL at 22:23

## 2023-03-16 RX ADMIN — Medication 81 MILLIGRAM(S): at 12:27

## 2023-03-16 RX ADMIN — TAMSULOSIN HYDROCHLORIDE 0.4 MILLIGRAM(S): 0.4 CAPSULE ORAL at 22:23

## 2023-03-16 RX ADMIN — CHLORHEXIDINE GLUCONATE 1 APPLICATION(S): 213 SOLUTION TOPICAL at 05:44

## 2023-03-16 RX ADMIN — ESCITALOPRAM OXALATE 5 MILLIGRAM(S): 10 TABLET, FILM COATED ORAL at 12:27

## 2023-03-16 RX ADMIN — ATENOLOL 12.5 MILLIGRAM(S): 25 TABLET ORAL at 05:41

## 2023-03-16 RX ADMIN — APIXABAN 5 MILLIGRAM(S): 2.5 TABLET, FILM COATED ORAL at 17:16

## 2023-03-16 RX ADMIN — ALBUTEROL 2.5 MILLIGRAM(S): 90 AEROSOL, METERED ORAL at 20:52

## 2023-03-16 RX ADMIN — CHLORHEXIDINE GLUCONATE 1 APPLICATION(S): 213 SOLUTION TOPICAL at 12:28

## 2023-03-16 RX ADMIN — SENNA PLUS 2 TABLET(S): 8.6 TABLET ORAL at 22:23

## 2023-03-16 RX ADMIN — FAMOTIDINE 20 MILLIGRAM(S): 10 INJECTION INTRAVENOUS at 12:27

## 2023-03-16 NOTE — CONSULT NOTE ADULT - ASSESSMENT
81 y/o M with PMH of AFIB, HTN and chronic urinary retention (chronic plummer); who presented to [facility] via ambulance on 2/17 with progressively worsening SOB, intubated in the field, found to have a large R pleural effusion with mediastinal and hilar lymphadenopathy s/p thoracentesis (~3L), with persistent leukocytosis and concern for suspicious malignancy was transferred to [facility] on 2/24 for further workup.  He underwent a R supraclavicular mass biopsy on 2/28, significant for classic Hodgkin's lymphoma. On 3/3, he underwent a R thoracotomy/VATS with Pleurex placement. Bone marrow negative for involvement,  (s/p L chest wall mediport placement 3/15). Patient now admitted for a multidisciplinary rehab program. 03-15-23 @ 13:06    #Debility  #Acute hypoxic respiratory failure with hypercapnia   #Recurrent R pleural effusion - exudative  #Hodgkin's Lymphoma  -SOB with R pleural effusion requiring drainage (approx 3L)  -S/p R supraclavicular mass biopsy-> significant for Classic Hodgkin's Lymphoma  -S/p R thoacotomy/VATS with Pleurex placement 3/3  -Bone marrow biopsy negative  -L chest wall mediport placed 3/15  -Cycle #1 of BV (Brentuximad Vedotin) given on 3/10. Plan for cycle #2 after 3 weeks. No plan to receive chemo while in rehab  -Await [facility] referral   -F/u with Dr. Hays outpatient  -Ipratropium-Albuterol q6h     #Hypokalemia  -Replete and monitor    #AFib  -Resume Eliquis  -Cont ASA    #HTN  -Atenolol     #HLD  - Simvastatin    #Urinary retention  -Chronic Plummer   -Flomax  -F/u with Urology outpt    GI ppx - pepcid

## 2023-03-16 NOTE — DIETITIAN INITIAL EVALUATION ADULT - OTHER INFO
Reason for Admission: Debility  History of Present Illness:   Alexandru Martin is an 82 year old male with PMH of AFIB, HTN and chronic urinary retention (chronic plummer); who presented to [facility] via ambulance on 2/17 with progressively worsening SOB.  He was intubated in the field and brought to [facility] ED and admitted to ICU for acute respiratory failure with hypercapnia. CTA chest was negative for PE but significant for a large R pleural effusion with mediastinal and hilar lymphadenopathy, and 3L of fluid was drained.  He completed a 5 day course of empiric antibiotics. Pleural fluid cultures and blood cultures resulted negative.  Suspicious of underlying malignancy secondary to lymphadenopathy and persistent leukocytosis, Heme/Onc was consulted and recommended transfer to [facility] on 2/24/23 for further workup. On 2/28 he underwent an ultrasound guided biopsy of his R supraclavicular mass/lymph node, which later resulted as classic Hodgkin's lymphoma.  On 3/3, he underwent a R thoracotomy/VATS with Pleurex placement. Postoperatively, he was hypotensive and started on Midodrine. A Left chest wall mediport was placed for ongoing chemotherapy treatment on 3/15. PM&R was consulted and deemed him an appropriate candidate for IRF. He was medically stabilized and cleared for discharge to [facility].     At this time patient w/ fair appetite/intake consuming 50% of breakfast meal. Recommend Ensure Plus High Protein Daily 8 oz (Provides 350 kcal, 20 grams of protein) to assist patient in meeting estimated energy requirements. Reviewed preferences and menu alternatives; likes scrambled eggs and yogurt noted in Kardex.   Reason for Admission: Debility  History of Present Illness:   Alexandru Martin is an 82 year old male with PMH of AFIB, HTN and chronic urinary retention (chronic plummer); who presented to [facility] via ambulance on 2/17 with progressively worsening SOB.  He was intubated in the field and brought to [facility] ED and admitted to ICU for acute respiratory failure with hypercapnia. CTA chest was negative for PE but significant for a large R pleural effusion with mediastinal and hilar lymphadenopathy, and 3L of fluid was drained.  He completed a 5 day course of empiric antibiotics. Pleural fluid cultures and blood cultures resulted negative.  Suspicious of underlying malignancy secondary to lymphadenopathy and persistent leukocytosis, Heme/Onc was consulted and recommended transfer to [facility] on 2/24/23 for further workup. On 2/28 he underwent an ultrasound guided biopsy of his R supraclavicular mass/lymph node, which later resulted as classic Hodgkin's lymphoma.  On 3/3, he underwent a R thoracotomy/VATS with Pleurex placement. Postoperatively, he was hypotensive and started on Midodrine. A Left chest wall mediport was placed for ongoing chemotherapy treatment on 3/15. PM&R was consulted and deemed him an appropriate candidate for IRF. He was medically stabilized and cleared for discharge to [facility].     At this time patient w/ fair appetite/intake consuming 50% of breakfast meal. Recommend Ensure Plus High Protein Daily 8 oz (Provides 350 kcal, 20 grams of protein) QD to assist patient in meeting estimated energy requirements. Reviewed preferences and menu alternatives; likes scrambled eggs and yogurt noted in Kardex. Reports dry mouth at difficulty swallowing food at times, will consult w/ team for SLP recommendations. Denies N/V/C/D, last BM 3/14 Per nursing flowsheets. Reports weight stable 200lb, unable to obtain CBW, 197lb per RD note (2/27) 203lb(2/18) continue to monitor. W/ observed subcutaneous fat loss and muscle depletion. Noted A1c 6.0% (2/25) no Hx of DM, blood sugar managed, continue to monitor.

## 2023-03-16 NOTE — DIETITIAN INITIAL EVALUATION ADULT - PERSON TAUGHT/METHOD
Encouraged patient to focus on high-protein, high-calorie foods during meals to provide energy to participate in rehab activities/verbal instruction/teach back - (Patient repeats in own words)/patient instructed

## 2023-03-16 NOTE — CONSULT NOTE ADULT - SUBJECTIVE AND OBJECTIVE BOX
Patient is a 81 y/o M who presents with a chief complaint of Other malaise  (16 Mar 2023 11:30)    HPI:  Alexandru Martin is an 82 year old male with PMH of AFIB, HTN and chronic urinary retention (chronic plummer); who presented to [facility] via ambulance on 2/17 with progressively worsening SOB.  He was intubated in the field and brought to [facility] ED and admitted to ICU for acute respiratory failure with hypercapnia. CTA chest was negative for PE but significant for a large R pleural effusion with mediastinal and hilar lymphadenopathy, and 3L of fluid was drained.  He completed a 5 day course of empiric antibiotics. Pleural fluid cultures and blood cultures resulted negative.  Suspicious of underlying malignancy secondary to lymphadenopathy and persistent leukocytosis, Heme/Onc was consulted and recommended transfer to [facility] on 2/24/23 for further workup. On 2/28 he underwent an ultrasound guided biopsy of his R supraclavicular mass/lymph node, which later resulted as classic Hodgkin's lymphoma.  On 3/3, he underwent a R thoracotomy/VATS with Pleurex placement. Postoperatively, he was hypotensive and started on Midodrine. A Left chest wall mediport was placed for ongoing chemotherapy treatment on 3/15. PM&R was consulted and deemed him an appropriate candidate for IRF. He was medically stabilized and cleared for discharge to [facility].  (15 Mar 2023 12:56)    PAST MEDICAL & SURGICAL HISTORY:  Atrial fibrillation  Hypertension  Urinary retention  No significant past surgical history    SOCIAL HISTORY: +former tobacco use. Denies EtOH or illicit drug use. PTA independent in ADLs    ALLERGIES:  pertussis vaccines (Rash)  shellfish (Rash)    MEDICATIONS  (STANDING):  albuterol    0.083% 2.5 milliGRAM(s) Nebulizer every 6 hours  apixaban 5 milliGRAM(s) Oral two times a day  aspirin  chewable 81 milliGRAM(s) Oral daily  atenolol  Tablet 12.5 milliGRAM(s) Oral daily  chlorhexidine 2% Cloths 1 Application(s) Topical <User Schedule>  chlorhexidine 2% Cloths 1 Application(s) Topical daily  escitalopram 5 milliGRAM(s) Oral daily  famotidine    Tablet 20 milliGRAM(s) Oral daily  melatonin 6 milliGRAM(s) Oral at bedtime  polyethylene glycol 3350 17 Gram(s) Oral daily  senna 2 Tablet(s) Oral at bedtime  simvastatin 40 milliGRAM(s) Oral at bedtime  tamsulosin 0.4 milliGRAM(s) Oral at bedtime    MEDICATIONS  (PRN):  acetaminophen     Tablet .. 650 milliGRAM(s) Oral every 6 hours PRN Mild Pain (1 - 3)  ALPRAZolam 0.25 milliGRAM(s) Oral daily PRN anxiety  oxyCODONE    IR 5 milliGRAM(s) Oral every 8 hours PRN Moderate Pain (4 - 6)    Review of Systems: Refer to HPI for pertinent positives and negatives. All other ROS reviewed and negative except as otherwise stated above.    Vital Signs Last 24 Hrs  T(F): 97.3 (16 Mar 2023 08:04), Max: 98.3 (15 Mar 2023 20:37)  HR: 71 (16 Mar 2023 08:04) (71 - 82)  BP: 109/67 (16 Mar 2023 08:04) (109/67 - 146/79)  RR: 16 (16 Mar 2023 08:04) (16 - 17)  SpO2: 98% (16 Mar 2023 11:00) (95% - 98%)  I&O's Summary    15 Mar 2023 07:01  -  16 Mar 2023 07:00  --------------------------------------------------------  IN: 0 mL / OUT: 1200 mL / NET: -1200 mL    16 Mar 2023 07:01  -  16 Mar 2023 15:10  --------------------------------------------------------  IN: 0 mL / OUT: 200 mL / NET: -200 mL      PHYSICAL EXAM:  GENERAL: NAD, well-groomed, well-developed  HEAD:  Atraumatic, Normocephalic  EYES: EOMI, PERRL, conjunctiva and sclera clear  ENMT: Moist mucous membranes  NECK: Supple, No JVD  CHEST/LUNG: Clear to auscultation bilaterally, non-labored breathing, good air entry  HEART: RRR; S1/S2, +murmur  ABDOMEN: Soft, Nontender, Nondistended; Bowel sounds present  VASCULAR: Normal pulses, Normal capillary refill  EXTREMITIES: No cyanosis, No edema  LYMPH: No lymphadenopathy noted  SKIN: Warm, Intact  PSYCH: Normal mood and affect  NERVOUS SYSTEM: Good concentration; No focal deficits, moves all extremities    LABS:                        10.4   7.98  )-----------( 231      ( 16 Mar 2023 06:10 )             33.0     03-16    139  |  101  |  17  ----------------------------<  109  3.4   |  34  |  0.64    Ca    8.6      16 Mar 2023 06:10  Phos  2.8     03-15  Mg     1.90     03-15    TPro  6.1  /  Alb  2.2  /  TBili  1.0  /  DBili  x   /  AST  18  /  ALT  58  /  AlkPhos  74  03-16      PT/INR - ( 15 Mar 2023 04:25 )   PT: 13.3 sec;   INR: 1.14 ratio         PTT - ( 15 Mar 2023 04:25 )  PTT:27.7 sec                            COVID-19 PCR: NotDetec (03-14-23 @ 16:40)  COVID-19 PCR: NotDetec (03-06-23 @ 10:55)  COVID-19 PCR: NotDetec (03-02-23 @ 14:30)  COVID-19 PCR: NotDetec (02-23-23 @ 10:28)    RADIOLOGY & ADDITIONAL TESTS: reviewed    Care Discussed with Consultants/Other Providers:

## 2023-03-16 NOTE — DIETITIAN INITIAL EVALUATION ADULT - PERTINENT MEDS FT
MEDICATIONS  (STANDING):  albuterol    0.083% 2.5 milliGRAM(s) Nebulizer every 6 hours  apixaban 5 milliGRAM(s) Oral two times a day  aspirin  chewable 81 milliGRAM(s) Oral daily  atenolol  Tablet 12.5 milliGRAM(s) Oral daily  chlorhexidine 2% Cloths 1 Application(s) Topical <User Schedule>  chlorhexidine 2% Cloths 1 Application(s) Topical daily  escitalopram 5 milliGRAM(s) Oral daily  famotidine    Tablet 20 milliGRAM(s) Oral daily  melatonin 6 milliGRAM(s) Oral at bedtime  polyethylene glycol 3350 17 Gram(s) Oral daily  senna 2 Tablet(s) Oral at bedtime  simvastatin 40 milliGRAM(s) Oral at bedtime  tamsulosin 0.4 milliGRAM(s) Oral at bedtime    MEDICATIONS  (PRN):  acetaminophen     Tablet .. 650 milliGRAM(s) Oral every 6 hours PRN Mild Pain (1 - 3)  ALPRAZolam 0.25 milliGRAM(s) Oral daily PRN anxiety  oxyCODONE    IR 5 milliGRAM(s) Oral every 8 hours PRN Moderate Pain (4 - 6)

## 2023-03-16 NOTE — DIETITIAN INITIAL EVALUATION ADULT - PHYSCIAL ASSESSMENT
Unable to obtain diet Hx from patient, no family at bedside. Will obtain subjective wt/diet history from pt/family PRN. to obtain current weight at this time. Patient reports UBW 200lb. W/ observed subcutaneous fat loss and muscle depletion

## 2023-03-16 NOTE — DIETITIAN INITIAL EVALUATION ADULT - PERTINENT LABORATORY DATA
03-16    139  |  101  |  17  ----------------------------<  109<H>  3.4<L>   |  34<H>  |  0.64    Ca    8.6      16 Mar 2023 06:10  Phos  2.8     03-15  Mg     1.90     03-15    TPro  6.1  /  Alb  2.2<L>  /  TBili  1.0  /  DBili  x   /  AST  18  /  ALT  58<H>  /  AlkPhos  74  03-16  A1C with Estimated Average Glucose Result: 6.0 % (02-25-23 @ 06:28)

## 2023-03-16 NOTE — DIETITIAN INITIAL EVALUATION ADULT - ORAL INTAKE PTA/DIET HISTORY
Patient w/ adequate appetite/intake PTA. W/ shellfish allergy Patient w/ fair appetite/intake PTA. w/ recent Hx of tube feed via PEG per RD note 2/18. W/ shellfish allergy

## 2023-03-16 NOTE — DIETITIAN INITIAL EVALUATION ADULT - LOSS OF SUBCUTANEOUS FAT
Quality 154 Part A: Falls: Risk Assessment (Should Be Reported With Measure 155.): Falls risk assessment completed and documented in the past 12 months. Quality 154 Part B: Falls: Risk Screening (Should Be Reported With Measure 155.): Patient screened for future fall risk; documentation of no falls in the past year or only one fall without injury in the past year Quality 431: Preventive Care And Screening: Unhealthy Alcohol Use - Screening: Patient not identified as an unhealthy alcohol user when screened for unhealthy alcohol use using a systematic screening method Quality 402: Tobacco Use And Help With Quitting Among Adolescents: Patient screened for tobacco and never smoked Quality 110: Preventive Care And Screening: Influenza Immunization: Influenza Immunization Administered during Influenza season Detail Level: Detailed Quality 155 (Denominator): Falls Plan Of Care: Plan of Care not Documented, Reason not Otherwise Specified Quality 111:Pneumonia Vaccination Status For Older Adults: Documentation of medical reason(s) for not administering pneumococcal vaccine (e.g., adverse reaction to vaccine) Quality 47: Advance Care Plan: Advance Care Planning discussed and documented in the medical record; patient did not wish or was not able to name a surrogate decision maker or provide an advance care plan. Orbital.../Buccal...

## 2023-03-16 NOTE — DIETITIAN INITIAL EVALUATION ADULT - FUNCTIONAL SCREEN CURRENT LEVEL: SWALLOWING (IF SCORE 2 OR MORE FOR ANY ITEM, CONSULT REHAB SERVICES), MLM)
0 = swallows foods/liquids without difficulty Patient reports difficulty swallowing foods at times, recommend SLP for evaluation/0 = swallows foods/liquids without difficulty

## 2023-03-16 NOTE — DIETITIAN INITIAL EVALUATION ADULT - ADD RECOMMEND
1. Continue Regular diet.   2. Recommend Ensure Plus High Protein Daily 8 oz (Provides 350 kcal, 20 grams of protein) assist pt in meeting estimated protein energy needs.  3. Monitor PO intake, GI tolerance, skin integrity, labs, weight, and bowel movement regularity.   4. Assist with meals PRN and encourage PO intake.  5. Follow SLP recommendations  6. Provide ongoing diet education as needed  7. RD remains available upon request and will follow-up per protocol

## 2023-03-17 DIAGNOSIS — C81.90 HODGKIN LYMPHOMA, UNSPECIFIED, UNSPECIFIED SITE: ICD-10-CM

## 2023-03-17 LAB — SARS-COV-2 RNA SPEC QL NAA+PROBE: SIGNIFICANT CHANGE UP

## 2023-03-17 PROCEDURE — 99233 SBSQ HOSP IP/OBS HIGH 50: CPT

## 2023-03-17 PROCEDURE — 99222 1ST HOSP IP/OBS MODERATE 55: CPT

## 2023-03-17 RX ORDER — ATENOLOL 25 MG/1
12.5 TABLET ORAL DAILY
Refills: 0 | Status: DISCONTINUED | OUTPATIENT
Start: 2023-03-17 | End: 2023-03-19

## 2023-03-17 RX ADMIN — ALBUTEROL 2.5 MILLIGRAM(S): 90 AEROSOL, METERED ORAL at 09:06

## 2023-03-17 RX ADMIN — APIXABAN 5 MILLIGRAM(S): 2.5 TABLET, FILM COATED ORAL at 17:32

## 2023-03-17 RX ADMIN — FAMOTIDINE 20 MILLIGRAM(S): 10 INJECTION INTRAVENOUS at 11:37

## 2023-03-17 RX ADMIN — ESCITALOPRAM OXALATE 5 MILLIGRAM(S): 10 TABLET, FILM COATED ORAL at 11:37

## 2023-03-17 RX ADMIN — Medication 81 MILLIGRAM(S): at 11:37

## 2023-03-17 RX ADMIN — APIXABAN 5 MILLIGRAM(S): 2.5 TABLET, FILM COATED ORAL at 06:40

## 2023-03-17 RX ADMIN — ALBUTEROL 2.5 MILLIGRAM(S): 90 AEROSOL, METERED ORAL at 21:32

## 2023-03-17 RX ADMIN — TAMSULOSIN HYDROCHLORIDE 0.4 MILLIGRAM(S): 0.4 CAPSULE ORAL at 21:01

## 2023-03-17 RX ADMIN — CHLORHEXIDINE GLUCONATE 1 APPLICATION(S): 213 SOLUTION TOPICAL at 11:36

## 2023-03-17 RX ADMIN — Medication 6 MILLIGRAM(S): at 21:01

## 2023-03-17 RX ADMIN — SENNA PLUS 2 TABLET(S): 8.6 TABLET ORAL at 21:01

## 2023-03-17 RX ADMIN — SIMVASTATIN 40 MILLIGRAM(S): 20 TABLET, FILM COATED ORAL at 21:01

## 2023-03-17 RX ADMIN — CHLORHEXIDINE GLUCONATE 1 APPLICATION(S): 213 SOLUTION TOPICAL at 06:42

## 2023-03-17 RX ADMIN — ALBUTEROL 2.5 MILLIGRAM(S): 90 AEROSOL, METERED ORAL at 16:15

## 2023-03-17 NOTE — PROGRESS NOTE ADULT - ASSESSMENT
· Assessment	  Assessment/Plan:  ARIK DUMONT is a 82 year old male with PMH of AFIB, HTN and chronic urinary retention (chronic buckley); who presented to [facility] via ambulance on 2/17 with progressively worsening SOB, intubated in the field, found to have a large R pleural effusion with mediastinal and hilar lymphadenopathy s/p thoracentesis (~3L), with persistent leukocytosis and concern for suspicious malignancy was transferred to [facility] on 2/24 for further workup.  He underwent a R supraclavicular mass biopsy on 2/28, significant for classic Hodgkin's lymphoma. On 3/3, he underwent a R thoracotomy/VATS with Pleurex placement. Bone marrow negative for involvement,  (s/p L chest wall mediport placement 3/15). Patient now admitted for a multidisciplinary rehab program. 03-15-23 @ 13:06    * Heme consult and recs appreciated  * Urology consult and recs appreciated, d/w urologist, ALLOW BUCKLEY IN SITU FOR NOW  * Daily drainage of Rt pleural drain        Rehab Management/MEDICAL MANAGEMENT     #Debility due to Classic Hodgkin's Lymphoma  - Continue  Comprehensive Rehab Program of PT/OT  - 3 hours a day, 5 days a week  - P&O as needed   - SOB with R pleural effusion requiring drainage (~3L)  - S/p R supraclavicular mass biopsy-> significant for Classic Hodgkin's Lymphoma  - S/p R thoacotomy/VATS with Pleurex placement 3/3  - Bone marrow biopsy negative  - L chest wall mediport placed 3/15  -- Cycle #1 of BV (Brentuximad Vedotin) given on 3/10  --- Plan for cycle #2 after 3 weeks  --- No plan to receive chemo while in rehab  - F/u with Dr. Hays outpatient 3/27 for cycle 2 chemo  Heme consult 3/17 and recs appreciated    #AFIB  - Eliquis  - Resume on 3/16 (held for L CW mediport on 3/15)    #HTN  - Atenolol     #HLD  - Simvastatin    #Sleep/Mood  - Lexapro  - Xanax PRN   - Melatonin PRN     #Skin  - Skin--: right lower back  bone biopsy site redness; right Pleurex--limited output   Drain MWF  - Pressure injury/Skin: OOB to Chair, PT/OT     #Pain Mgmt   - Oxycodone PRN    #GI/Bowel Mgmt   - Continent c/w Senna, Miralax     #/Bladder Mgmt   Chronic Buckley ---f/u Urology recs Buckley drainage to continue fornow  Aim to TOV when patient is ambulatory  -Flomax  - F/u with Urology outpt     #FEN   - Diet - Regular + Thins  [DASH]      #Precautions / PROPHYLAXIS:   - Falls   - ortho: Weight bearing status: WBAT   - Lungs: Aspiration, Incentive Spirometer   - DVT: Lovenox  -----------------------------------------------------  FOLLOW UP/OUTPATIENT:    Nathaniel Galvan)  Surgery; Thoracic Surgery  [id number] [place] [address], [facility]  3rd [place] [address]  Phone: [phone]  Fax: [phone]  Established Patient  Follow Up Time: 2 weeks    UROLOGY  MD Saúl Hamm [facility]  Chloé Vazquez  Scheduled Appointment: 03/27/2023  -----------------------------------------------------

## 2023-03-17 NOTE — CONSULT NOTE ADULT - SUBJECTIVE AND OBJECTIVE BOX
ARIK DUMONT  82y  Male  Admitting: S. 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  He was intubated in the field and brought to [facility] ED and admitted to ICU for acute respiratory failure with hypercapnia. CTA chest was negative for PE but significant for a large R pleural effusion with mediastinal and hilar lymphadenopathy, and 3L of fluid was drained.  He completed a 5 day course of empiric antibiotics. Pleural fluid cultures and blood cultures resulted negative.  Suspicious of underlying malignancy secondary to lymphadenopathy and persistent leukocytosis, Heme/Onc was consulted and recommended transfer to [facility] on 2/24/23 for further workup. On 2/28 he underwent an ultrasound guided biopsy of his R supraclavicular mass/lymph node, which later resulted as classic Hodgkin's lymphoma.  On 3/3, he underwent a R thoracotomy/VATS with Pleurex placement. Postoperatively, he was hypotensive and started on Midodrine. A Left chest wall mediport was placed for ongoing chemotherapy treatment on 3/15. PM&R was consulted and deemed him an appropriate candidate for IRF. He was medically stabilized and cleared for discharge to [facility].      PAST MEDICAL & SURGICAL HISTORY:  Atrial fibrillation      Hypertension      Urinary retention    HD    mediport placement      MEDICATIONS  (STANDING):  albuterol    0.083% 2.5 milliGRAM(s) Nebulizer every 6 hours  apixaban 5 milliGRAM(s) Oral two times a day  aspirin  chewable 81 milliGRAM(s) Oral daily  atenolol  Tablet 12.5 milliGRAM(s) Oral daily  chlorhexidine 2% Cloths 1 Application(s) Topical <User Schedule>  chlorhexidine 2% Cloths 1 Application(s) Topical daily  escitalopram 5 milliGRAM(s) Oral daily  famotidine    Tablet 20 milliGRAM(s) Oral daily  melatonin 6 milliGRAM(s) Oral at bedtime  polyethylene glycol 3350 17 Gram(s) Oral daily  senna 2 Tablet(s) Oral at bedtime  simvastatin 40 milliGRAM(s) Oral at bedtime  tamsulosin 0.4 milliGRAM(s) Oral at bedtime    MEDICATIONS  (PRN):  acetaminophen     Tablet .. 650 milliGRAM(s) Oral every 6 hours PRN Mild Pain (1 - 3)  ALPRAZolam 0.25 milliGRAM(s) Oral daily PRN anxiety  oxyCODONE    IR 5 milliGRAM(s) Oral every 8 hours PRN Moderate Pain (4 - 6)    Allergies    pertussis vaccines (Rash)  shellfish (Rash)    FAMILY HISTORY: NC      SOCIAL HISTORY: No EtOH, no tobacco    REVIEW OF SYSTEMS:    CONSTITUTIONAL: No fevers or chills  EYES/ENT: No visual changes;  No vertigo or throat pain   NECK: No pain or stiffness  RESPIRATORY: No hemoptysis  CARDIOVASCULAR: No chest pain or palpitations  GASTROINTESTINAL: No abdominal or epigastric pain. No hematemesis; No melena or hematochezia.  GENITOURINARY: No hematuria  NEUROLOGICAL: No numbness   SKIN: No itching   All other review of systems is negative unless indicated above.      T(F): 98 (03-16-23 @ 22:33), Max: 98 (03-16-23 @ 22:33)  HR: 77 (03-17-23 @ 09:11)  BP: 119/75 (03-17-23 @ 08:56)  RR: 16 (03-17-23 @ 08:56)  SpO2: 97% (03-17-23 @ 09:11)    GENERAL: NAD, well-developed  HEAD:  Atraumatic, Normocephalic  EYES: EOMI, PERRLA, conjunctiva and sclera clear  NECK: Supple, No JVD  CHEST/LUNG: decreased BS bases ant.; scattered crackles  HEART: Regular rate and rhythm; No murmurs, rubs, or gallops  ABDOMEN: Soft, Nontender, Nondistended; Bowel sounds present  EXTREMITIES:  no calf tenderness elicited  NEUROLOGY: awake, alert  oropharynx clear    Labs:             10.4   7.98  )-----------( 231      ( 03-16 @ 06:10 )             33.0                11.2   8.54  )-----------( 248      ( 03-15 @ 04:25 )             36.3       03-16    139  |  101  |  17  ----------------------------<  109<H>  3.4<L>   |  34<H>  |  0.64    Ca    8.6      16 Mar 2023 06:10    TPro  6.1  /  Alb  2.2<L>  /  TBili  1.0  /  DBili  x   /  AST  18  /  ALT  58<H>  /  AlkPhos  74  03-16  Bone marrow biopsy, bone marrow clot, and bone marrow aspirate:   -Mildly hypercellular to normocellular bone marrow (30-40% total   cellularity) with slightly erythroid predominant trilineage hematopoiesis   with maturation.   - Iron stores are increased.   Hematopathology Report:   ACCESSION No: [id number]   Patient: ARIK DUMONT   Accession: [id number]   Collected Date/Time: 3/3/2023 17:17 EST   Received Date/Time: 3/4/2023 08:41 EST   Hematopathology Report - Auth (Verified)   Specimen(s) Submitted   1- Right pleural biopsy   2- Right pleural biopsy   3- Level 7 lymph node   Final Diagnosis   1- Right pleural biopsy   - Pleural tissue with focal chronic inflammation including   eosinophils fibrous tissue, skeletal muscle   2- Right pleural biopsy   - Pleural tissue with chronic inflammation including eosinophils and   fibrous tissue, skeletal muscle   3- Level 7 lymph node   - Classical Hodgkin's lymphoma   Consultant notes reviewed : YES [x ] ; NO [ ]    Radiology and additional tests:  < from: Xray Chest 1 View- PORTABLE-Urgent (03.15.23 @ 09:25) >  IMPRESSION:  New left chest wall Mediport with tip in SVC.  No pneumothorax.  No focal consolidations.    < end of copied text >

## 2023-03-17 NOTE — CONSULT NOTE ADULT - PROBLEM SELECTOR RECOMMENDATION 9
s/p VATS, bronch, pleural bx w/ thoracic 3/3, biopsy: CHL  - BM Bx neg for lymphoma involvement  PET/CT 3/8: Hypermetabolic lymph nodes above and below the diaphragm.  Suspected osseous focus in the RIGHT hemisacrum. Diffusely increased splenic uptake relative to liver, abnormal but nonspecific finding which may be seen in the setting of malignancy as well as other systemic processes. Right pleural effusion with additional pulmonary findings; additional malignant involvement versus inflammatory or infectious process not excluded. Area of soft tissue adjacent to distal abdominal aorta without abnormal uptake.  - BV given on 3/10 (cycle 1). Next cycle will be as outpatient- has appt. with Dr. Hays at the [facility] 3/27/23.  CBC currently acceptable. patient proceeding with rehab.

## 2023-03-17 NOTE — CONSULT NOTE ADULT - ASSESSMENT
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  He was intubated in the field and brought to [facility] ED and admitted to ICU for acute respiratory failure with hypercapnia. CTA chest was negative for PE but significant for a large R pleural effusion with mediastinal and hilar lymphadenopathy, and 3L of fluid was drained.  He completed a 5 day course of empiric antibiotics. Pleural fluid cultures and blood cultures resulted negative.  Suspicious of underlying malignancy secondary to lymphadenopathy and persistent leukocytosis, Heme/Onc was consulted and recommended transfer to [facility] on 2/24/23 for further workup. On 2/28 he underwent an ultrasound guided biopsy of his R supraclavicular mass/lymph node, which later resulted as classic Hodgkin's lymphoma.  On 3/3, he underwent a R thoracotomy/VATS with Pleurex placement. Postoperatively, he was hypotensive and started on Midodrine. A Left chest wall mediport was placed for ongoing chemotherapy treatment on 3/15. PM&R was consulted and deemed him an appropriate candidate for IRF. He was medically stabilized and cleared for discharge to [facility].

## 2023-03-17 NOTE — PROGRESS NOTE ADULT - SUBJECTIVE AND OBJECTIVE BOX
Subjective  Seen and examined   Reports uneventful sleep  Tolerating oral diet    Therapy--engaging in therapy, limited by poor endurance    ROS  No dyspnea, nausea/vomiting,   LBM 3/16    ICU Vital Signs Last 24 Hrs  T(C): 36.7 (16 Mar 2023 22:33), Max: 36.7 (16 Mar 2023 22:33)  T(F): 98 (16 Mar 2023 22:33), Max: 98 (16 Mar 2023 22:33)  HR: 77 (17 Mar 2023 09:11) (68 - 84)  BP: 119/75 (17 Mar 2023 08:56) (86/68 - 127/76)  RR: 16 (17 Mar 2023 08:56) (16 - 16)  SpO2: 97% (17 Mar 2023 09:11) (96% - 99%)  O2 Parameters below as of 17 Mar 2023 09:11  Patient On (Oxygen Delivery Method): nasal cannula      Physical Exam:  Constitutional - NAD, Comfortable  HEENT - NAD  Neck - Supple, No limited ROM  Chest - Air entry diffusely reduced    Cardio - warm and well perfused, RRR, no murmur; left chem port  Abdomen -  Soft, non tender; +right Pleurex  : +Choi in situ  Extremities - No peripheral edema, No calf tenderness       Neurologic Exam:                 Allert and fully oriented  No speech deficit  Cranial Nerves - No facial asymmetry, Tongue midline, EOMI, Shoulder shrug intact  Motor -                      LEFT    UE - ShAB 5/5, EF 5/5, EE 5/5, WE 5/5,  WNL                    RIGHT UE - ShAB 5/5, EF 5/5, EE 5/5, WE 5/5,  WNL                    LEFT    LE - HF 3/5, KE 5/5, DF 5/5, PF 5/5                    RIGHT LE - HF 3/5, KE 5/5, DF 5/5, PF 5/5        Sensory - Intact to LT bilateral     Reflexes - DTR 2/ 4 , neg Grimaldo's b/l, neg Babinski's b/l     Coordination - FTN / HTS intact     OculoVestibular -  No nystagmus  Psychiatric - Mood stable, Affect WNL  Skin--: Right lower back  bone biopsy site redness; right Pleurex  Lab                     10.4   7.98  )-----------( 231      ( 16 Mar 2023 06:10 )             33.0     03-16    139  |  101  |  17  ----------------------------<  109<H>  3.4<L>   |  34<H>  |  0.64    Ca    8.6      16 Mar 2023 06:10    TPro  6.1  /  Alb  2.2<L>  /  TBili  1.0  /  DBili  x   /  AST  18  /  ALT  58<H>  /  AlkPhos  74  03-16      MEDICATIONS  (STANDING):  albuterol    0.083% 2.5 milliGRAM(s) Nebulizer every 6 hours  apixaban 5 milliGRAM(s) Oral two times a day  aspirin  chewable 81 milliGRAM(s) Oral daily  atenolol  Tablet 12.5 milliGRAM(s) Oral daily  chlorhexidine 2% Cloths 1 Application(s) Topical <User Schedule>  chlorhexidine 2% Cloths 1 Application(s) Topical daily  escitalopram 5 milliGRAM(s) Oral daily  famotidine    Tablet 20 milliGRAM(s) Oral daily  melatonin 6 milliGRAM(s) Oral at bedtime  polyethylene glycol 3350 17 Gram(s) Oral daily  senna 2 Tablet(s) Oral at bedtime  simvastatin 40 milliGRAM(s) Oral at bedtime  tamsulosin 0.4 milliGRAM(s) Oral at bedtime    MEDICATIONS  (PRN):  acetaminophen     Tablet .. 650 milliGRAM(s) Oral every 6 hours PRN Mild Pain (1 - 3)  ALPRAZolam 0.25 milliGRAM(s) Oral daily PRN anxiety  oxyCODONE    IR 5 milliGRAM(s) Oral every 8 hours PRN Moderate Pain (4 - 6)

## 2023-03-18 LAB
CULTURE RESULTS: SIGNIFICANT CHANGE UP
SPECIMEN SOURCE: SIGNIFICANT CHANGE UP

## 2023-03-18 PROCEDURE — 99232 SBSQ HOSP IP/OBS MODERATE 35: CPT

## 2023-03-18 RX ORDER — RAMELTEON 8 MG
8 TABLET ORAL AT BEDTIME
Refills: 0 | Status: DISCONTINUED | OUTPATIENT
Start: 2023-03-18 | End: 2023-03-22

## 2023-03-18 RX ADMIN — SIMVASTATIN 40 MILLIGRAM(S): 20 TABLET, FILM COATED ORAL at 21:01

## 2023-03-18 RX ADMIN — TAMSULOSIN HYDROCHLORIDE 0.4 MILLIGRAM(S): 0.4 CAPSULE ORAL at 21:01

## 2023-03-18 RX ADMIN — APIXABAN 5 MILLIGRAM(S): 2.5 TABLET, FILM COATED ORAL at 17:18

## 2023-03-18 RX ADMIN — ALBUTEROL 2.5 MILLIGRAM(S): 90 AEROSOL, METERED ORAL at 22:03

## 2023-03-18 RX ADMIN — ESCITALOPRAM OXALATE 5 MILLIGRAM(S): 10 TABLET, FILM COATED ORAL at 12:39

## 2023-03-18 RX ADMIN — APIXABAN 5 MILLIGRAM(S): 2.5 TABLET, FILM COATED ORAL at 05:54

## 2023-03-18 RX ADMIN — Medication 81 MILLIGRAM(S): at 12:39

## 2023-03-18 RX ADMIN — CHLORHEXIDINE GLUCONATE 1 APPLICATION(S): 213 SOLUTION TOPICAL at 05:54

## 2023-03-18 RX ADMIN — Medication 8 MILLIGRAM(S): at 21:01

## 2023-03-18 RX ADMIN — FAMOTIDINE 20 MILLIGRAM(S): 10 INJECTION INTRAVENOUS at 12:39

## 2023-03-18 RX ADMIN — ALBUTEROL 2.5 MILLIGRAM(S): 90 AEROSOL, METERED ORAL at 16:28

## 2023-03-18 RX ADMIN — CHLORHEXIDINE GLUCONATE 1 APPLICATION(S): 213 SOLUTION TOPICAL at 12:39

## 2023-03-18 NOTE — PROGRESS NOTE ADULT - SUBJECTIVE AND OBJECTIVE BOX
Medicine Progress Note    Patient is a 82y old  Male who presents with a chief complaint of Debility (18 Mar 2023 09:31)      SUBJECTIVE / OVERNIGHT EVENTS:  seen and examined  Chart reviewed  No overnight events  Limb weakness improving with therapy  BM+  No pain  No complaints  per RN, atenolol was held in am for soft BP    ADDITIONAL REVIEW OF SYSTEMS:  denied fever/chills/CP/SOB/cough/palpitation/dizziness/abdominal pian/nausea/vomiting/diarrhoea/constipation/dysuria/leg or calf pain/headaches.all other ROS neg    MEDICATIONS  (STANDING):  albuterol    0.083% 2.5 milliGRAM(s) Nebulizer every 6 hours  apixaban 5 milliGRAM(s) Oral two times a day  aspirin  chewable 81 milliGRAM(s) Oral daily  atenolol  Tablet 12.5 milliGRAM(s) Oral daily  chlorhexidine 2% Cloths 1 Application(s) Topical <User Schedule>  chlorhexidine 2% Cloths 1 Application(s) Topical daily  escitalopram 5 milliGRAM(s) Oral daily  famotidine    Tablet 20 milliGRAM(s) Oral daily  melatonin 6 milliGRAM(s) Oral at bedtime  polyethylene glycol 3350 17 Gram(s) Oral daily  senna 2 Tablet(s) Oral at bedtime  simvastatin 40 milliGRAM(s) Oral at bedtime  tamsulosin 0.4 milliGRAM(s) Oral at bedtime    MEDICATIONS  (PRN):  acetaminophen     Tablet .. 650 milliGRAM(s) Oral every 6 hours PRN Mild Pain (1 - 3)  ALPRAZolam 0.25 milliGRAM(s) Oral daily PRN anxiety  oxyCODONE    IR 5 milliGRAM(s) Oral every 8 hours PRN Moderate Pain (4 - 6)    CAPILLARY BLOOD GLUCOSE        I&O's Summary    17 Mar 2023 07:01  -  18 Mar 2023 07:00  --------------------------------------------------------  IN: 0 mL / OUT: 2030 mL / NET: -2030 mL        PHYSICAL EXAM:  Vital Signs Last 24 Hrs  T(C): 36.5 (18 Mar 2023 09:18), Max: 36.5 (18 Mar 2023 09:18)  T(F): 97.7 (18 Mar 2023 09:18), Max: 97.7 (18 Mar 2023 09:18)  HR: 89 (18 Mar 2023 09:18) (74 - 91)  BP: 106/69 (18 Mar 2023 09:18) (106/69 - 124/67)  BP(mean): --  RR: 16 (18 Mar 2023 09:18) (16 - 16)  SpO2: 97% (18 Mar 2023 09:18) (97% - 98%)    Parameters below as of 18 Mar 2023 09:18  Patient On (Oxygen Delivery Method): nasal cannula  O2 Flow (L/min): 2      GENERAL: Not in distress. Alert    HEENT: clear conjuctiva, MMM. no pallor or icterus  CARDIOVASCULAR: RRR S1, S2. No murmur/rubs/gallop  LUNGS: BLAE+, no rales, no wheezing, no rhonchi.    ABDOMEN: ND. Soft,  NT, no guarding / rebound / rigidity. BS normoactive  BACK: No spine tenderness.  EXTREMITIES: no edema. no leg or calf TP.  SKIN: warm and dry  PSYCHIATRIC: Calm.  No agitation.    LABS:                    COVID-19 PCR: NotDetec (17 Mar 2023 06:46)  COVID-19 PCR: NotDetec (14 Mar 2023 16:40)  COVID-19 PCR: NotDetec (06 Mar 2023 10:55)  COVID-19 PCR: NotDetec (02 Mar 2023 14:30)  COVID-19 PCR: NotDetec (23 Feb 2023 10:28)      RADIOLOGY & ADDITIONAL TESTS:  Imaging from Last 24 Hours:    Electrocardiogram/QTc Interval:    COORDINATION OF CARE:  Care Discussed with Consultants/Other Providers:

## 2023-03-18 NOTE — PROGRESS NOTE ADULT - ASSESSMENT
83 y/o M with PMH of AFIB, HTN and chronic urinary retention (chronic plummer); who presented to [facility] via ambulance on 2/17 with progressively worsening SOB, intubated in the field, found to have a large R pleural effusion with mediastinal and hilar lymphadenopathy s/p thoracentesis (~3L), with persistent leukocytosis and concern for suspicious malignancy was transferred to [facility] on 2/24 for further workup.  He underwent a R supraclavicular mass biopsy on 2/28, significant for classic Hodgkin's lymphoma. On 3/3, he underwent a R thoracotomy/VATS with Pleurex placement. Bone marrow negative for involvement,  (s/p L chest wall mediport placement 3/15). Patient now admitted for a multidisciplinary rehab program. 03-15-23 @ 13:06    #Debility  #Acute hypoxic respiratory failure with hypercapnia   #Recurrent R pleural effusion - exudative  #Hodgkin's Lymphoma  -SOB with R pleural effusion requiring drainage (approx 3L)  -S/p R supraclavicular mass biopsy-> significant for Classic Hodgkin's Lymphoma  -S/p R thoacotomy/VATS with Pleurex placement 3/3  -Bone marrow biopsy negative  -L chest wall mediport placed 3/15  -Cycle #1 of BV (Brentuximad Vedotin) given on 3/10. Plan for cycle #2 on 3/27 as OP.  -Await [facility] referral   -F/u with Dr. Hays outpatient  -Ipratropium-Albuterol q6h     #Hypokalemia  -Replete and monitor K and Mg    #AFib  # HTN  - on Eliquis  - Atenolol held this am for parameter. will check OH and change parameter to SBP <100 and HR <55 unless if no OH  - check VS including OH and adjust meds  - educated about OH and fall prevention    #HLD  - Simvastatin    #Urinary retention  -Chronic Plummer   -Flomax  -F/u with Urology outpt    GI ppx - pepcid    d/w rehab team at [facility]

## 2023-03-18 NOTE — PROGRESS NOTE ADULT - SUBJECTIVE AND OBJECTIVE BOX
Pt. seen and examined at bedside.  No overnight events but c/o poor sleep.        REVIEW OF SYSTEMS  Constitutional - No fever,  ++ fatigue  Neurological - No headaches, ++ loss of strength  Musculoskeletal - No joint pain, No joint swelling, No muscle pain    VITALS  T(C): 36.5 (03-18-23 @ 09:18), Max: 36.5 (03-18-23 @ 09:18)  HR: 89 (03-18-23 @ 09:18) (74 - 91)  BP: 106/69 (03-18-23 @ 09:18) (106/69 - 124/67)  RR: 16 (03-18-23 @ 09:18) (16 - 16)  SpO2: 97% (03-18-23 @ 09:18) (97% - 98%)  Wt(kg): --       MEDICATIONS   acetaminophen     Tablet .. 650 milliGRAM(s) every 6 hours PRN  albuterol    0.083% 2.5 milliGRAM(s) every 6 hours  ALPRAZolam 0.25 milliGRAM(s) daily PRN  apixaban 5 milliGRAM(s) two times a day  aspirin  chewable 81 milliGRAM(s) daily  atenolol  Tablet 12.5 milliGRAM(s) daily  chlorhexidine 2% Cloths 1 Application(s) <User Schedule>  chlorhexidine 2% Cloths 1 Application(s) daily  escitalopram 5 milliGRAM(s) daily  famotidine    Tablet 20 milliGRAM(s) daily  melatonin 6 milliGRAM(s) at bedtime  oxyCODONE    IR 5 milliGRAM(s) every 8 hours PRN  polyethylene glycol 3350 17 Gram(s) daily  senna 2 Tablet(s) at bedtime  simvastatin 40 milliGRAM(s) at bedtime  tamsulosin 0.4 milliGRAM(s) at bedtime      RECENT LABS/IMAGING                        ---------  PHYSICAL EXAM  Constitutional - NAD, Comfortable  Pulm - Breathing comfortably, No wheezing  LEFT CHEST MEDIPORT  Abd - Obese, soft, NTND  Extremities - No edema, No calf tenderness  Neurologic Exam -                    Cognitive - Awake, Alert     Communication - Fluent     Motor - No focal deficits     Sensory - Intact to LT  Psychiatric - Mood WNL, Affect WNL    ASSESSMENT/PLAN  82y Male with functional deficits 2' ACUTE RESP FAILURE -> HODGKIN'S LYMPHOMA.   Continue current medical management  Pain - Tylenol PRN; OXYCODONE PRN  DVT PPX - apixaban 5 milliGRAM(s) two times a day  Active issues - NOEN  Continue 3hrs a day of comprehensive rehab program.

## 2023-03-19 LAB
ANION GAP SERPL CALC-SCNC: 9 MMOL/L — SIGNIFICANT CHANGE UP (ref 5–17)
BUN SERPL-MCNC: 8 MG/DL — SIGNIFICANT CHANGE UP (ref 7–23)
CALCIUM SERPL-MCNC: 9 MG/DL — SIGNIFICANT CHANGE UP (ref 8.4–10.5)
CHLORIDE SERPL-SCNC: 97 MMOL/L — SIGNIFICANT CHANGE UP (ref 96–108)
CO2 SERPL-SCNC: 27 MMOL/L — SIGNIFICANT CHANGE UP (ref 22–31)
CREAT SERPL-MCNC: 0.65 MG/DL — SIGNIFICANT CHANGE UP (ref 0.5–1.3)
EGFR: 94 ML/MIN/1.73M2 — SIGNIFICANT CHANGE UP
GLUCOSE SERPL-MCNC: 147 MG/DL — HIGH (ref 70–99)
MAGNESIUM SERPL-MCNC: 1.4 MG/DL — LOW (ref 1.6–2.6)
POTASSIUM SERPL-MCNC: 3.7 MMOL/L — SIGNIFICANT CHANGE UP (ref 3.5–5.3)
POTASSIUM SERPL-SCNC: 3.7 MMOL/L — SIGNIFICANT CHANGE UP (ref 3.5–5.3)
SODIUM SERPL-SCNC: 133 MMOL/L — LOW (ref 135–145)

## 2023-03-19 PROCEDURE — 99233 SBSQ HOSP IP/OBS HIGH 50: CPT

## 2023-03-19 PROCEDURE — 99232 SBSQ HOSP IP/OBS MODERATE 35: CPT

## 2023-03-19 RX ORDER — MAGNESIUM OXIDE 400 MG ORAL TABLET 241.3 MG
400 TABLET ORAL
Refills: 0 | Status: DISCONTINUED | OUTPATIENT
Start: 2023-03-19 | End: 2023-03-25

## 2023-03-19 RX ORDER — METOPROLOL TARTRATE 50 MG
12.5 TABLET ORAL DAILY
Refills: 0 | Status: DISCONTINUED | OUTPATIENT
Start: 2023-03-19 | End: 2023-03-23

## 2023-03-19 RX ORDER — MAGNESIUM SULFATE 500 MG/ML
2 VIAL (ML) INJECTION ONCE
Refills: 0 | Status: COMPLETED | OUTPATIENT
Start: 2023-03-19 | End: 2023-03-19

## 2023-03-19 RX ADMIN — Medication 25 GRAM(S): at 11:32

## 2023-03-19 RX ADMIN — ALBUTEROL 2.5 MILLIGRAM(S): 90 AEROSOL, METERED ORAL at 16:36

## 2023-03-19 RX ADMIN — Medication 81 MILLIGRAM(S): at 11:30

## 2023-03-19 RX ADMIN — Medication 8 MILLIGRAM(S): at 20:34

## 2023-03-19 RX ADMIN — CHLORHEXIDINE GLUCONATE 1 APPLICATION(S): 213 SOLUTION TOPICAL at 05:59

## 2023-03-19 RX ADMIN — FAMOTIDINE 20 MILLIGRAM(S): 10 INJECTION INTRAVENOUS at 11:31

## 2023-03-19 RX ADMIN — SIMVASTATIN 40 MILLIGRAM(S): 20 TABLET, FILM COATED ORAL at 20:33

## 2023-03-19 RX ADMIN — Medication 12.5 MILLIGRAM(S): at 11:30

## 2023-03-19 RX ADMIN — MAGNESIUM OXIDE 400 MG ORAL TABLET 400 MILLIGRAM(S): 241.3 TABLET ORAL at 13:15

## 2023-03-19 RX ADMIN — APIXABAN 5 MILLIGRAM(S): 2.5 TABLET, FILM COATED ORAL at 05:59

## 2023-03-19 RX ADMIN — ESCITALOPRAM OXALATE 5 MILLIGRAM(S): 10 TABLET, FILM COATED ORAL at 11:31

## 2023-03-19 RX ADMIN — MAGNESIUM OXIDE 400 MG ORAL TABLET 400 MILLIGRAM(S): 241.3 TABLET ORAL at 17:56

## 2023-03-19 RX ADMIN — ALBUTEROL 2.5 MILLIGRAM(S): 90 AEROSOL, METERED ORAL at 09:18

## 2023-03-19 RX ADMIN — APIXABAN 5 MILLIGRAM(S): 2.5 TABLET, FILM COATED ORAL at 17:56

## 2023-03-19 RX ADMIN — CHLORHEXIDINE GLUCONATE 1 APPLICATION(S): 213 SOLUTION TOPICAL at 11:32

## 2023-03-19 NOTE — PROGRESS NOTE ADULT - SUBJECTIVE AND OBJECTIVE BOX
Medicine Progress Note    Patient is a 82y old  Male who presents with a chief complaint of Debility (18 Mar 2023 12:17)      SUBJECTIVE / OVERNIGHT EVENTS:  Atenolol held yesterday and this am for parameter. SBP 97. patient asymptomatic  HR up yesterday after holding atenolol    ADDITIONAL REVIEW OF SYSTEMS:    MEDICATIONS  (STANDING):  albuterol    0.083% 2.5 milliGRAM(s) Nebulizer every 6 hours  apixaban 5 milliGRAM(s) Oral two times a day  aspirin  chewable 81 milliGRAM(s) Oral daily  atenolol  Tablet 12.5 milliGRAM(s) Oral daily  chlorhexidine 2% Cloths 1 Application(s) Topical <User Schedule>  chlorhexidine 2% Cloths 1 Application(s) Topical daily  escitalopram 5 milliGRAM(s) Oral daily  famotidine    Tablet 20 milliGRAM(s) Oral daily  polyethylene glycol 3350 17 Gram(s) Oral daily  ramelteon 8 milliGRAM(s) Oral at bedtime  senna 2 Tablet(s) Oral at bedtime  simvastatin 40 milliGRAM(s) Oral at bedtime  tamsulosin 0.4 milliGRAM(s) Oral at bedtime    MEDICATIONS  (PRN):  acetaminophen     Tablet .. 650 milliGRAM(s) Oral every 6 hours PRN Mild Pain (1 - 3)  ALPRAZolam 0.25 milliGRAM(s) Oral daily PRN anxiety  oxyCODONE    IR 5 milliGRAM(s) Oral every 8 hours PRN Moderate Pain (4 - 6)    CAPILLARY BLOOD GLUCOSE        I&O's Summary    18 Mar 2023 07:01  -  19 Mar 2023 07:00  --------------------------------------------------------  IN: 250 mL / OUT: 1750 mL / NET: -1500 mL        PHYSICAL EXAM:  Vital Signs Last 24 Hrs  T(C): 36.3 (18 Mar 2023 20:58), Max: 36.5 (18 Mar 2023 09:18)  T(F): 97.4 (18 Mar 2023 20:58), Max: 97.7 (18 Mar 2023 09:18)  HR: 91 (19 Mar 2023 05:55) (89 - 106)  BP: 97/58 (19 Mar 2023 05:55) (97/58 - 128/61)  BP(mean): --  RR: 16 (19 Mar 2023 05:55) (16 - 18)  SpO2: 97% (19 Mar 2023 05:55) (95% - 100%)    Parameters below as of 19 Mar 2023 05:55  Patient On (Oxygen Delivery Method): nasal cannula  O2 Flow (L/min): 1    GENERAL: Not in distress. Alert    HEENT: clear conjuctiva, MMM. no pallor or icterus  CARDIOVASCULAR: RRR S1, S2. No murmur/rubs/gallop  LUNGS: BLAE+, no rales, no wheezing, no rhonchi.    ABDOMEN: ND. Soft,  NT, no guarding / rebound / rigidity. BS normoactive  BACK: No spine tenderness.  EXTREMITIES: no edema. no leg or calf TP.  SKIN: warm and dry  PSYCHIATRIC: Calm.  No agitation.    LABS:                    COVID-19 PCR: NotDetec (17 Mar 2023 06:46)  COVID-19 PCR: NotDetec (14 Mar 2023 16:40)  COVID-19 PCR: NotDetec (06 Mar 2023 10:55)  COVID-19 PCR: NotDetec (02 Mar 2023 14:30)  COVID-19 PCR: NotDetec (23 Feb 2023 10:28)      RADIOLOGY & ADDITIONAL TESTS:  Imaging from Last 24 Hours:    Electrocardiogram/QTc Interval:    COORDINATION OF CARE:  Care Discussed with Consultants/Other Providers:   Medicine Progress Note    Patient is a 82y old  Male who presents with a chief complaint of Debility (18 Mar 2023 12:17)      SUBJECTIVE / OVERNIGHT EVENTS:  Atenolol held yesterday and this am for parameter. SBP 97. patient asymptomatic  HR up yesterday after holding atenolol  no complaints    ADDITIONAL REVIEW OF SYSTEMS:  denied fever/chills/CP/SOB/cough/palpitation/dizziness/abdominal pian/nausea/vomiting/diarrhoea/constipation/dysuria/leg or calf pain/headaches.all other ROS neg    MEDICATIONS  (STANDING):  albuterol    0.083% 2.5 milliGRAM(s) Nebulizer every 6 hours  apixaban 5 milliGRAM(s) Oral two times a day  aspirin  chewable 81 milliGRAM(s) Oral daily  atenolol  Tablet 12.5 milliGRAM(s) Oral daily  chlorhexidine 2% Cloths 1 Application(s) Topical <User Schedule>  chlorhexidine 2% Cloths 1 Application(s) Topical daily  escitalopram 5 milliGRAM(s) Oral daily  famotidine    Tablet 20 milliGRAM(s) Oral daily  polyethylene glycol 3350 17 Gram(s) Oral daily  ramelteon 8 milliGRAM(s) Oral at bedtime  senna 2 Tablet(s) Oral at bedtime  simvastatin 40 milliGRAM(s) Oral at bedtime  tamsulosin 0.4 milliGRAM(s) Oral at bedtime    MEDICATIONS  (PRN):  acetaminophen     Tablet .. 650 milliGRAM(s) Oral every 6 hours PRN Mild Pain (1 - 3)  ALPRAZolam 0.25 milliGRAM(s) Oral daily PRN anxiety  oxyCODONE    IR 5 milliGRAM(s) Oral every 8 hours PRN Moderate Pain (4 - 6)    CAPILLARY BLOOD GLUCOSE        I&O's Summary    18 Mar 2023 07:01  -  19 Mar 2023 07:00  --------------------------------------------------------  IN: 250 mL / OUT: 1750 mL / NET: -1500 mL        PHYSICAL EXAM:  Vital Signs Last 24 Hrs  T(C): 36.3 (18 Mar 2023 20:58), Max: 36.5 (18 Mar 2023 09:18)  T(F): 97.4 (18 Mar 2023 20:58), Max: 97.7 (18 Mar 2023 09:18)  HR: 91 (19 Mar 2023 05:55) (89 - 106)  BP: 97/58 (19 Mar 2023 05:55) (97/58 - 128/61)  BP(mean): --  RR: 16 (19 Mar 2023 05:55) (16 - 18)  SpO2: 97% (19 Mar 2023 05:55) (95% - 100%)    Parameters below as of 19 Mar 2023 05:55  Patient On (Oxygen Delivery Method): nasal cannula  O2 Flow (L/min): 1    GENERAL: Not in distress. Alert    HEENT: clear conjuctiva, MMM. no pallor or icterus  CARDIOVASCULAR: RRR S1, S2. No murmur/rubs/gallop  LUNGS: BLAE+, no rales, no wheezing, no rhonchi.    ABDOMEN: ND. Soft,  NT, no guarding / rebound / rigidity. BS normoactive  BACK: No spine tenderness.  EXTREMITIES: no edema. no leg or calf TP.  SKIN: warm and dry  PSYCHIATRIC: Calm.  No agitation.  CNS:AAO*3. moving limbs    LABS:                    COVID-19 PCR: NotDetec (17 Mar 2023 06:46)  COVID-19 PCR: NotDetec (14 Mar 2023 16:40)  COVID-19 PCR: NotDetec (06 Mar 2023 10:55)  COVID-19 PCR: NotDetec (02 Mar 2023 14:30)  COVID-19 PCR: NotDetec (23 Feb 2023 10:28)      RADIOLOGY & ADDITIONAL TESTS:  Imaging from Last 24 Hours:    Electrocardiogram/QTc Interval:    COORDINATION OF CARE:  Care Discussed with Consultants/Other Providers:

## 2023-03-19 NOTE — PROGRESS NOTE ADULT - ASSESSMENT
81 y/o M with PMH of AFIB, HTN and chronic urinary retention (chronic plummer); who presented to [facility] via ambulance on 2/17 with progressively worsening SOB, intubated in the field, found to have a large R pleural effusion with mediastinal and hilar lymphadenopathy s/p thoracentesis (~3L), with persistent leukocytosis and concern for suspicious malignancy was transferred to [facility] on 2/24 for further workup.  He underwent a R supraclavicular mass biopsy on 2/28, significant for classic Hodgkin's lymphoma. On 3/3, he underwent a R thoracotomy/VATS with Pleurex placement. Bone marrow negative for involvement,  (s/p L chest wall mediport placement 3/15). Patient now admitted for a multidisciplinary rehab program. 03-15-23 @ 13:06    #Debility  #Acute hypoxic respiratory failure with hypercapnia   #Recurrent R pleural effusion - exudative  #Hodgkin's Lymphoma  -SOB with R pleural effusion requiring drainage (approx 3L)  -S/p R supraclavicular mass biopsy-> significant for Classic Hodgkin's Lymphoma  -S/p R thoacotomy/VATS with Pleurex placement 3/3  -Bone marrow biopsy negative  -L chest wall mediport placed 3/15  -Cycle #1 of BV (Brentuximad Vedotin) given on 3/10. Plan for cycle #2 on 3/27 as OP.  -Await [facility] referral   -F/u with Dr. Hays outpatient  -Ipratropium-Albuterol q6h     #Hypokalemia  -Replete and monitor K and Mg    #AFib  # HTN  - on Eliquis  - Atenolol held on 3/18 and 3/19 for parameter. HR up while holding BB. BP soft this am.  chnaged Atenolol to metoprolol ER 12.5 mg daily with holding <100 SBP or HR <55  - encourage PO hydration  - check VS including OH and adjust meds  - educated about OH and fall prevention    #HLD  - Simvastatin    #Urinary retention  -Chronic Plummer   -Flomax  -F/u with Urology outpt    GI ppx - pepcid    d/w rehab team at [facility] 81 y/o M with PMH of AFIB, HTN and chronic urinary retention (chronic plummer); who presented to [facility] via ambulance on 2/17 with progressively worsening SOB, intubated in the field, found to have a large R pleural effusion with mediastinal and hilar lymphadenopathy s/p thoracentesis (~3L), with persistent leukocytosis and concern for suspicious malignancy was transferred to [facility] on 2/24 for further workup.  He underwent a R supraclavicular mass biopsy on 2/28, significant for classic Hodgkin's lymphoma. On 3/3, he underwent a R thoracotomy/VATS with Pleurex placement. Bone marrow negative for involvement,  (s/p L chest wall mediport placement 3/15). Patient now admitted for a multidisciplinary rehab program. 03-15-23 @ 13:06    #Debility  #Acute hypoxic respiratory failure with hypercapnia   #Recurrent R pleural effusion - exudative  #Hodgkin's Lymphoma  -SOB with R pleural effusion requiring drainage (approx 3L)  -S/p R supraclavicular mass biopsy-> significant for Classic Hodgkin's Lymphoma  -S/p R thoacotomy/VATS with Pleurex placement 3/3  -Bone marrow biopsy negative  -L chest wall mediport placed 3/15  -Cycle #1 of BV (Brentuximad Vedotin) given on 3/10. Plan for cycle #2 on 3/27 as OP.  -Await [facility] referral   -F/u with Dr. Hays outpatient  -Ipratropium-Albuterol q6h     #Hypokalemia  -Replete and monitor K and Mg    #AFib  # HTN  - on Eliquis  - Atenolol held on 3/18 and 3/19 for parameter. HR up while holding BB. BP soft this am.  chnaged Atenolol to metoprolol ER 12.5 mg daily with holding <100 SBP or HR <55  - encourage PO hydration  - check BMP and Mg  _ keep K>4 and Mg>2  - check VS including OH and adjust meds  - educated about OH and fall prevention    #HLD  - Simvastatin    #Urinary retention  -Chronic Plummer   -Flomax  -F/u with Urology outpt    GI ppx - pepcid    d/w rehab team at [facility] 81 y/o M with PMH of AFIB, HTN and chronic urinary retention (chronic plummer); who presented to [facility] via ambulance on 2/17 with progressively worsening SOB, intubated in the field, found to have a large R pleural effusion with mediastinal and hilar lymphadenopathy s/p thoracentesis (~3L), with persistent leukocytosis and concern for suspicious malignancy was transferred to [facility] on 2/24 for further workup.  He underwent a R supraclavicular mass biopsy on 2/28, significant for classic Hodgkin's lymphoma. On 3/3, he underwent a R thoracotomy/VATS with Pleurex placement. Bone marrow negative for involvement,  (s/p L chest wall mediport placement 3/15). Patient now admitted for a multidisciplinary rehab program. 03-15-23 @ 13:06    #Debility  #Acute hypoxic respiratory failure with hypercapnia   #Recurrent R pleural effusion - exudative  #Hodgkin's Lymphoma  -SOB with R pleural effusion requiring drainage (approx 3L)  -S/p R supraclavicular mass biopsy-> significant for Classic Hodgkin's Lymphoma  -S/p R thoacotomy/VATS with Pleurex placement 3/3  -Bone marrow biopsy negative  -L chest wall mediport placed 3/15  -Cycle #1 of BV (Brentuximad Vedotin) given on 3/10. Plan for cycle #2 on 3/27 as OP.  -Await [facility] referral   -F/u with Dr. Hays outpatient  -Ipratropium-Albuterol q6h     #Hypokalemia  -Replete and monitor K and Mg    #AFib  # HTN  # tachycardia likely ue to BB withdrawal  # HypoMg  - on Eliquis  - Atenolol held on 3/18 and 3/19 for parameter. HR up while holding BB. BP soft this am.    - changed Atenolol to metoprolol ER 12.5 mg daily with holding <100 SBP or HR <55.   - encourage PO hydration  - normal K and low Mg 1.4. ordered one dose of Mg IV 2 gm followed by PO Mg TID  _ keep K>4 and Mg>2  - check VS including OH and adjust meds  - educated about OH and fall prevention    #HLD  - Simvastatin    #Urinary retention  -Chronic Plummer   -Flomax  -F/u with Urology outpt    GI ppx - pepcid    d/w rehab team at [facility]

## 2023-03-19 NOTE — PROGRESS NOTE ADULT - SUBJECTIVE AND OBJECTIVE BOX
Pt. seen and examined at bedside.  No overnight events.  Slept better.        REVIEW OF SYSTEMS  Constitutional - No fever,  ++ fatigue  Neurological - No headaches, ++ loss of strength  Musculoskeletal - No joint pain, No joint swelling, No muscle pain    VITALS  T(C): 36.3 (03-18-23 @ 20:58), Max: 36.5 (03-18-23 @ 09:18)  HR: 91 (03-19-23 @ 05:55) (89 - 106)  BP: 97/58 (03-19-23 @ 05:55) (97/58 - 128/61)  RR: 16 (03-19-23 @ 05:55) (16 - 18)  SpO2: 97% (03-19-23 @ 05:55) (95% - 100%)  Wt(kg): --       MEDICATIONS   acetaminophen     Tablet .. 650 milliGRAM(s) every 6 hours PRN  albuterol    0.083% 2.5 milliGRAM(s) every 6 hours  ALPRAZolam 0.25 milliGRAM(s) daily PRN  apixaban 5 milliGRAM(s) two times a day  aspirin  chewable 81 milliGRAM(s) daily  chlorhexidine 2% Cloths 1 Application(s) <User Schedule>  chlorhexidine 2% Cloths 1 Application(s) daily  escitalopram 5 milliGRAM(s) daily  famotidine    Tablet 20 milliGRAM(s) daily  metoprolol succinate ER 12.5 milliGRAM(s) daily  oxyCODONE    IR 5 milliGRAM(s) every 8 hours PRN  polyethylene glycol 3350 17 Gram(s) daily  ramelteon 8 milliGRAM(s) at bedtime  senna 2 Tablet(s) at bedtime  simvastatin 40 milliGRAM(s) at bedtime      RECENT LABS/IMAGING                        ---------  PHYSICAL EXAM  Constitutional - NAD, Comfortable  Pulm - Breathing comfortably, No wheezing  LEFT ACW MEDIPORT  RIGHT PLEUREX  Abd - Obese, soft, NTND  Extremities - No edema, No calf tenderness  Neurologic Exam -                    Cognitive - Awake, Alert     Communication - Fluent     Motor - No focal deficits     Sensory - Intact to LT  Psychiatric - Mood WNL, Affect WNL    ASSESSMENT/PLAN  82y M w/ h/o LARGE PLEURAL EFFUSION s/p RIGHT THORACOTOMY/VATS now with functional deficits 2' Hodgkin's Lymphoma.  Continue current medical management  Pain - Tylenol PRN; OXYCODONE PRN.  DVT PPX - apixaban 5 milliGRAM(s) two times a day  Active issues - NONE  Continue 3hrs a day of comprehensive rehab program.

## 2023-03-20 LAB
ALBUMIN SERPL ELPH-MCNC: 2.3 G/DL — LOW (ref 3.3–5)
ALP SERPL-CCNC: 76 U/L — SIGNIFICANT CHANGE UP (ref 40–120)
ALT FLD-CCNC: 37 U/L — SIGNIFICANT CHANGE UP (ref 10–45)
ANION GAP SERPL CALC-SCNC: 5 MMOL/L — SIGNIFICANT CHANGE UP (ref 5–17)
AST SERPL-CCNC: 30 U/L — SIGNIFICANT CHANGE UP (ref 10–40)
BASOPHILS # BLD AUTO: 0.04 K/UL — SIGNIFICANT CHANGE UP (ref 0–0.2)
BASOPHILS NFR BLD AUTO: 0.9 % — SIGNIFICANT CHANGE UP (ref 0–2)
BILIRUB SERPL-MCNC: 0.7 MG/DL — SIGNIFICANT CHANGE UP (ref 0.2–1.2)
BUN SERPL-MCNC: 7 MG/DL — SIGNIFICANT CHANGE UP (ref 7–23)
CALCIUM SERPL-MCNC: 8.5 MG/DL — SIGNIFICANT CHANGE UP (ref 8.4–10.5)
CHLORIDE SERPL-SCNC: 98 MMOL/L — SIGNIFICANT CHANGE UP (ref 96–108)
CHROM ANALY OVERALL INTERP SPEC-IMP: SIGNIFICANT CHANGE UP
CO2 SERPL-SCNC: 30 MMOL/L — SIGNIFICANT CHANGE UP (ref 22–31)
CREAT SERPL-MCNC: 0.55 MG/DL — SIGNIFICANT CHANGE UP (ref 0.5–1.3)
EGFR: 98 ML/MIN/1.73M2 — SIGNIFICANT CHANGE UP
EOSINOPHIL # BLD AUTO: 0.08 K/UL — SIGNIFICANT CHANGE UP (ref 0–0.5)
EOSINOPHIL NFR BLD AUTO: 1.8 % — SIGNIFICANT CHANGE UP (ref 0–6)
GLUCOSE SERPL-MCNC: 119 MG/DL — HIGH (ref 70–99)
HCT VFR BLD CALC: 31.1 % — LOW (ref 39–50)
HGB BLD-MCNC: 10 G/DL — LOW (ref 13–17)
IMM GRANULOCYTES NFR BLD AUTO: 0.9 % — SIGNIFICANT CHANGE UP (ref 0–0.9)
LYMPHOCYTES # BLD AUTO: 1.08 K/UL — SIGNIFICANT CHANGE UP (ref 1–3.3)
LYMPHOCYTES # BLD AUTO: 24 % — SIGNIFICANT CHANGE UP (ref 13–44)
MAGNESIUM SERPL-MCNC: 1.9 MG/DL — SIGNIFICANT CHANGE UP (ref 1.6–2.6)
MCHC RBC-ENTMCNC: 29 PG — SIGNIFICANT CHANGE UP (ref 27–34)
MCHC RBC-ENTMCNC: 32.2 GM/DL — SIGNIFICANT CHANGE UP (ref 32–36)
MCV RBC AUTO: 90.1 FL — SIGNIFICANT CHANGE UP (ref 80–100)
MONOCYTES # BLD AUTO: 0.5 K/UL — SIGNIFICANT CHANGE UP (ref 0–0.9)
MONOCYTES NFR BLD AUTO: 11.1 % — SIGNIFICANT CHANGE UP (ref 2–14)
NEUTROPHILS # BLD AUTO: 2.76 K/UL — SIGNIFICANT CHANGE UP (ref 1.8–7.4)
NEUTROPHILS NFR BLD AUTO: 61.3 % — SIGNIFICANT CHANGE UP (ref 43–77)
NRBC # BLD: 0 /100 WBCS — SIGNIFICANT CHANGE UP (ref 0–0)
PLATELET # BLD AUTO: 255 K/UL — SIGNIFICANT CHANGE UP (ref 150–400)
POTASSIUM SERPL-MCNC: 4.1 MMOL/L — SIGNIFICANT CHANGE UP (ref 3.5–5.3)
POTASSIUM SERPL-SCNC: 4.1 MMOL/L — SIGNIFICANT CHANGE UP (ref 3.5–5.3)
PROT SERPL-MCNC: 6.2 G/DL — SIGNIFICANT CHANGE UP (ref 6–8.3)
RBC # BLD: 3.45 M/UL — LOW (ref 4.2–5.8)
RBC # FLD: 18.6 % — HIGH (ref 10.3–14.5)
SODIUM SERPL-SCNC: 133 MMOL/L — LOW (ref 135–145)
WBC # BLD: 4.5 K/UL — SIGNIFICANT CHANGE UP (ref 3.8–10.5)
WBC # FLD AUTO: 4.5 K/UL — SIGNIFICANT CHANGE UP (ref 3.8–10.5)

## 2023-03-20 PROCEDURE — 99232 SBSQ HOSP IP/OBS MODERATE 35: CPT

## 2023-03-20 PROCEDURE — 90791 PSYCH DIAGNOSTIC EVALUATION: CPT

## 2023-03-20 RX ORDER — OXYCODONE HYDROCHLORIDE 5 MG/1
5 TABLET ORAL EVERY 8 HOURS
Refills: 0 | Status: DISCONTINUED | OUTPATIENT
Start: 2023-03-20 | End: 2023-03-25

## 2023-03-20 RX ORDER — ESCITALOPRAM OXALATE 10 MG/1
10 TABLET, FILM COATED ORAL DAILY
Refills: 0 | Status: DISCONTINUED | OUTPATIENT
Start: 2023-03-20 | End: 2023-03-25

## 2023-03-20 RX ORDER — ALPRAZOLAM 0.25 MG
0.25 TABLET ORAL DAILY
Refills: 0 | Status: DISCONTINUED | OUTPATIENT
Start: 2023-03-20 | End: 2023-03-25

## 2023-03-20 RX ADMIN — Medication 8 MILLIGRAM(S): at 21:26

## 2023-03-20 RX ADMIN — SIMVASTATIN 40 MILLIGRAM(S): 20 TABLET, FILM COATED ORAL at 21:26

## 2023-03-20 RX ADMIN — SENNA PLUS 2 TABLET(S): 8.6 TABLET ORAL at 21:26

## 2023-03-20 RX ADMIN — APIXABAN 5 MILLIGRAM(S): 2.5 TABLET, FILM COATED ORAL at 05:44

## 2023-03-20 RX ADMIN — CHLORHEXIDINE GLUCONATE 1 APPLICATION(S): 213 SOLUTION TOPICAL at 11:49

## 2023-03-20 RX ADMIN — Medication 12.5 MILLIGRAM(S): at 05:44

## 2023-03-20 RX ADMIN — MAGNESIUM OXIDE 400 MG ORAL TABLET 400 MILLIGRAM(S): 241.3 TABLET ORAL at 17:25

## 2023-03-20 RX ADMIN — ALBUTEROL 2.5 MILLIGRAM(S): 90 AEROSOL, METERED ORAL at 21:26

## 2023-03-20 RX ADMIN — ESCITALOPRAM OXALATE 10 MILLIGRAM(S): 10 TABLET, FILM COATED ORAL at 11:48

## 2023-03-20 RX ADMIN — ALBUTEROL 2.5 MILLIGRAM(S): 90 AEROSOL, METERED ORAL at 08:44

## 2023-03-20 RX ADMIN — MAGNESIUM OXIDE 400 MG ORAL TABLET 400 MILLIGRAM(S): 241.3 TABLET ORAL at 11:49

## 2023-03-20 RX ADMIN — Medication 81 MILLIGRAM(S): at 11:48

## 2023-03-20 RX ADMIN — MAGNESIUM OXIDE 400 MG ORAL TABLET 400 MILLIGRAM(S): 241.3 TABLET ORAL at 08:47

## 2023-03-20 RX ADMIN — APIXABAN 5 MILLIGRAM(S): 2.5 TABLET, FILM COATED ORAL at 17:25

## 2023-03-20 RX ADMIN — FAMOTIDINE 20 MILLIGRAM(S): 10 INJECTION INTRAVENOUS at 11:49

## 2023-03-20 NOTE — CONSULT NOTE ADULT - SUBJECTIVE AND OBJECTIVE BOX
Pt is an 82 year-old, right-handed male with PMHx of AFIB, HTN and chronic urinary retention (chronic plummer); who presented to [facility] via ambulance on 2/17 with progressively worsening SOB.  He was intubated in the field and brought to [facility] ED and admitted to ICU for acute respiratory failure with hypercapnia. CTA chest was negative for PE but significant for a large R pleural effusion with mediastinal and hilar lymphadenopathy, and 3L of fluid was drained.  He completed a 5 day course of empiric antibiotics. Pleural fluid cultures and blood cultures resulted negative. Suspicious of underlying malignancy secondary to lymphadenopathy and persistent leukocytosis, Heme/Onc was consulted and recommended transfer to [facility] on 2/24/23 for further workup. On 2/28 he underwent an ultrasound guided biopsy of his R supraclavicular mass/lymph node, which later resulted as classic Hodgkin's lymphoma.  On 3/3, he underwent a R thoracotomy/VATS with Pleurex placement. Postoperatively, he was hypotensive and started on Midodrine. A Left chest wall mediport was placed for ongoing chemotherapy treatment on 3/15. PM&R was consulted and deemed him an appropriate candidate for IRF. He was medically stabilized and cleared for discharge to [facility]. PMHx: As noted above. Current meds: Lexapro 10 mg QD, Xanax 0.25 mg QD PRN anxiety. Please see list in Pt’s chart. Social Hx: Pt is  and has five adult children. Pt graduated from high school and is a retired . He served for 5 years in the [employer] during the early stages of the [place] War. Pt lacks hx of mental illness and substance abuse. Pt is Yazidi. He enjoys bowling, swimming and going on vacation.   Findings: Pt was seen for an initial assessment of his cognitive and emotional functioning. The Modified MMSE (3MS) was administered; Pt’s results (/100) were in the range. His/Her scores in geriatric mood measures suggested levels of anxiety and depression (GAD7 = /21; PHQ9 = /27; GAS = /30; GDS =  /15). Pt was alert,  Ox3, and cooperative.  Attn/Conc: Simple auditory attention - .  Concentration/Working memory for reversed counting and spelling – (/7). Language: Pt’s speech was of  . Naming - . Sentence repetition - . Auditory Comprehension - . Reading - . Writing - . Memory: Encoding of 3 words was (/3); short-delayed verbal recall – (/3, improving to /3 with cueing); long-delayed verbal recall – (/3, improving to /3 with cueing). LTM was for [place] presidents (/4, improving to /4 with cueing). Visual memory –. Visuospatial: Visuomotor integration – for copy of a 2D figure, was noted. Executive Functions: Motor Planning - . Organizational skills - . Abstract reasoning - . Initiation/Phonemic Fluency - .Verbal problem solving – .   Emotional functioning: Affect - depressed. Mood - euthymic ("okay"); Pt reported experiencing helplessness, poor sleep, low energy and fatigue. On mood measures he additionally reported experiencing anhedonia, boredom, helplessness, memory loss, low energy and low self-esteem. Thought processes were goal-directed.  No abnormal thought contents were observed.  Pt denied any AH/VH. Pt also denied SI/HI/I/P. Insight - WFL. Judgment - fair.     Pt is an 82 year-old, right-handed male with PMHx of AFIB, HTN and chronic urinary retention (chronic plummer); who presented to [facility] via ambulance on 2/17 with progressively worsening SOB.  He was intubated in the field and brought to [facility] ED and admitted to ICU for acute respiratory failure with hypercapnia. CTA chest was negative for PE but significant for a large R pleural effusion with mediastinal and hilar lymphadenopathy, and 3L of fluid was drained.  He completed a 5 day course of empiric antibiotics. Pleural fluid cultures and blood cultures resulted negative. Suspicious of underlying malignancy secondary to lymphadenopathy and persistent leukocytosis, Heme/Onc was consulted and recommended transfer to [facility] on 2/24/23 for further workup. On 2/28 he underwent an ultrasound guided biopsy of his R supraclavicular mass/lymph node, which later resulted as classic Hodgkin's lymphoma.  On 3/3, he underwent a R thoracotomy/VATS with Pleurex placement. Postoperatively, he was hypotensive and started on Midodrine. A Left chest wall mediport was placed for ongoing chemotherapy treatment on 3/15. PM&R was consulted and deemed him an appropriate candidate for IRF. He was medically stabilized and cleared for discharge to [facility]. PMHx: As noted above. Current meds: Lexapro 10 mg QD, Xanax 0.25 mg QD PRN anxiety. Please see list in Pt’s chart. Social Hx: Pt is  and has five adult children. Pt graduated from high school and is a retired . He served for 5 years in the [employer] during the early stages of the [place] War. Pt lacks hx of mental illness and substance abuse. Pt is Sabianism. He enjoys bowling, swimming and going on vacation.   Findings: Pt was seen for an initial assessment of his cognitive and emotional functioning. Formal cognitive assessment was by passed as Pt denied noticing any difficulties with memory and concentration at this time. His/Her scores in geriatric mood measure suggested mild levels of depression (GDS = 6/15). Pt was alert, Ox3, and cooperative. Attn/Conc: Simple auditory attention - intact.  Concentration/Working memory: Not formally assessed. Language: Pt’s speech was of normal volume, pitch and pace. Receptive and expressive vocabularies were intact on conversation. Memory: Autobiographical memory and recall of recent events were intact. Visuospatial: Not assessed.  Executive Functions: Pt exhibited relatively intact behavioral inhibition and affective regulation. Verbal problem solving – intact. Emotional functioning: Affect - depressed. Mood - euthymic ("okay"); Pt reported experiencing helplessness, poor sleep, low energy and fatigue. On mood measures he additionally reported experiencing anhedonia, boredom, helplessness, memory loss, low energy and low self-esteem. Thought processes were goal-directed.  No abnormal thought contents were observed.  Pt denied any AH/VH. Pt also denied SI/HI/I/P. Insight - WFL. Judgment - fair.  Pt expressed concern about being in the hospital for several weeks now and wanting to go home as soon as he is cleared for discharge. Pt's daughter, Judith, who spoke on the phone with this writer, expressed concern about Pt's emotional state. She reported noting Pt very depressed during her visits, and requested that his dose of Lexapro be increased. She was informed that the dose had already been increased from 5 mg to 10 mg. She was also informed that Pt would be seen by this writer for supportive tx 1-2 times per week, and that Pt would be referred to recreation/creative arts txs so he could work on establishing a routine of activities aside from his formal txs. Pt and his daughter agreed with plan.

## 2023-03-20 NOTE — CONSULT NOTE ADULT - ASSESSMENT
Assessment:    FIM scores: Social Interaction ; Problem Solving ; Memory .  Plan: Individual supportive psychotherapy to monitor cognition, affect/mood, and behavior. Cognitive remediation during speech therapy sessions. Participation in recreation/art therapy in order to have pleasure and mastery experiences and regain/reestablish a sense of routine.  Time spent with Pt,  minutes.   Assessment: Pt presents with adjustment symptoms in response to his current medical condition and separation from home and family. Pt reports anhedonia, boredom, feelings of helplessness, memory loss, fatigue/low energy, and low self-esteem. Discussed with Pt how his symptoms occur in response to his current circumstances, and how a combination of antidepressant medication, supportive tx and recreation tx, along with the natural course of his recovery, may be beneficial to help him overcome his current emotional difficulties. FIM scores: Social Interaction 5; Problem Solving 7; Memory 7.  Plan: Individual supportive psychotherapy to monitor cognition, affect/mood, and behavior. Continue with antidepressant medication. Participation in recreation/art therapy in order to have pleasure and mastery experiences and regain/reestablish a sense of routine.  Time spent with Pt, 50 minutes.

## 2023-03-20 NOTE — CONSULT NOTE ADULT - ASSESSMENT
Rehab after hospitalization for recently diagnosed Hodgkin's Lymphoma. H/o atonic bladder and severe retention on self-caths at home. Now with Choi during his recent illness. Doing well with Choi.    - would continue with Choi for now  - will discuss timing of switching back to self-catheterization with family and patient in the future  - does not need Flomax as managed with Choi / self-caths

## 2023-03-20 NOTE — PROGRESS NOTE ADULT - ASSESSMENT
82 year old male with PMH of AFIB, HTN and chronic urinary retention (chronic plummer); who presented to [facility] via ambulance on 2/17 with progressively worsening SOB.  He was intubated in the field and brought to [facility] ED and admitted to ICU for acute respiratory failure with hypercapnia. CTA chest was negative for PE but significant for a large R pleural effusion with mediastinal and hilar lymphadenopathy, and 3L of fluid was drained.  He completed a 5 day course of empiric antibiotics. Pleural fluid cultures and blood cultures resulted negative.  Suspicious of underlying malignancy secondary to lymphadenopathy and persistent leukocytosis, Heme/Onc was consulted and recommended transfer to [facility] on 2/24/23 for further workup. On 2/28 he underwent an ultrasound guided biopsy of his R supraclavicular mass/lymph node, which later resulted as classic Hodgkin's lymphoma.  On 3/3, he underwent a R thoracotomy/VATS with Pleurex placement. Postoperatively, he was hypotensive and started on Midodrine. A Left chest wall mediport was placed for ongoing chemotherapy treatment on 3/15. PM&R was consulted and deemed him an appropriate candidate for IRF. He was medically stabilized and cleared for discharge to [facility].

## 2023-03-20 NOTE — PROGRESS NOTE ADULT - SUBJECTIVE AND OBJECTIVE BOX
ARIK DUMONT   82y   Male    Admitting: S. Yulia  HPI:  82 year old male with PMH of AFIB, HTN and chronic urinary retention (chronic plummer); who presented to [facility] via ambulance on 2/17 with progressively worsening SOB.  He was intubated in the field and brought to [facility] ED and admitted to ICU for acute respiratory failure with hypercapnia. CTA chest was negative for PE but significant for a large R pleural effusion with mediastinal and hilar lymphadenopathy, and 3L of fluid was drained.  He completed a 5 day course of empiric antibiotics. Pleural fluid cultures and blood cultures resulted negative.  Suspicious of underlying malignancy secondary to lymphadenopathy and persistent leukocytosis, Heme/Onc was consulted and recommended transfer to [facility] on 2/24/23 for further workup. On 2/28 he underwent an ultrasound guided biopsy of his R supraclavicular mass/lymph node, which later resulted as classic Hodgkin's lymphoma.  On 3/3, he underwent a R thoracotomy/VATS with Pleurex placement. Postoperatively, he was hypotensive and started on Midodrine. A Left chest wall mediport was placed for ongoing chemotherapy treatment on 3/15. PM&R was consulted and deemed him an appropriate candidate for IRF. He was medically stabilized and cleared for discharge to [facility].      PAST MEDICAL & SURGICAL HISTORY:  Atrial fibrillation      Hypertension      Urinary retention      HEALTH ISSUES - PROBLEM Dx:  Hodgkins lymphoma      MEDICATIONS  (STANDING):  albuterol    0.083% 2.5 milliGRAM(s) Nebulizer every 6 hours  apixaban 5 milliGRAM(s) Oral two times a day  aspirin  chewable 81 milliGRAM(s) Oral daily  chlorhexidine 2% Cloths 1 Application(s) Topical daily  escitalopram 5 milliGRAM(s) Oral daily  famotidine    Tablet 20 milliGRAM(s) Oral daily  magnesium oxide 400 milliGRAM(s) Oral three times a day with meals  metoprolol succinate ER 12.5 milliGRAM(s) Oral daily  polyethylene glycol 3350 17 Gram(s) Oral daily  ramelteon 8 milliGRAM(s) Oral at bedtime  senna 2 Tablet(s) Oral at bedtime  simvastatin 40 milliGRAM(s) Oral at bedtime    MEDICATIONS  (PRN):  acetaminophen     Tablet .. 650 milliGRAM(s) Oral every 6 hours PRN Mild Pain (1 - 3)  ALPRAZolam 0.25 milliGRAM(s) Oral daily PRN anxiety  oxyCODONE    IR 5 milliGRAM(s) Oral every 8 hours PRN Moderate Pain (4 - 6)    Allergies    pertussis vaccines (Rash)  shellfish (Rash)    INTERVAL HPI/OVERNIGHT EVENTS:  Patient S&E at bedside. +Fatigue.     VITAL SIGNS:  T(F): 97.6 (03-19-23 @ 20:32)  HR: 87 (03-20-23 @ 05:40)  BP: 104/64 (03-20-23 @ 05:40)  RR: 18 (03-20-23 @ 05:40)  SpO2: 98% (03-20-23 @ 05:40)    PHYSICAL EXAM:  Constitutional: NAD  Eyes: sclera non-icteric  Neck: no JVD  Respiratory: CTA ant.; no wheezes  Cardiovascular: RRR, no M/R/G  Gastrointestinal: soft, NTND, no masses palpable  Extremities: no calf tenderness  Neurological: Awake, alert.    Labs:             10.0   4.50  )-----------( 255      ( 03-20 @ 06:18 )             31.1       03-20    133<L>  |  98  |  7   ----------------------------<  119<H>  4.1   |  30  |  0.55    Ca    8.5      20 Mar 2023 06:18  Mg     1.9     03-20    TPro  6.2  /  Alb  2.3<L>  /  TBili  0.7  /  DBili  x   /  AST  30  /  ALT  37  /  AlkPhos  76  03-20    Consultant notes reviewed : YES [x ] ; NO [ ]

## 2023-03-20 NOTE — PROGRESS NOTE ADULT - ASSESSMENT
· Assessment	  Assessment/Plan:  ARIK DUMONT is a 82 year old male with PMH of AFIB, HTN and chronic urinary retention (chronic buckley); who presented to [facility] via ambulance on 2/17 with progressively worsening SOB, intubated in the field, found to have a large R pleural effusion with mediastinal and hilar lymphadenopathy s/p thoracentesis (~3L), with persistent leukocytosis and concern for suspicious malignancy was transferred to [facility] on 2/24 for further workup.  He underwent a R supraclavicular mass biopsy on 2/28, significant for classic Hodgkin's lymphoma. On 3/3, he underwent a R thoracotomy/VATS with Pleurex placement. Bone marrow negative for involvement,  (s/p L chest wall mediport placement 3/15). Patient now admitted for a multidisciplinary rehab program. 03-15-23 @ 13:06    * Depression--lexapro dose increased  * Urology consult and recs appreciated, d/w urologist, ALLOW BUCKLEY IN SITU FOR NOW  * Daily drainage of Rt pleural drain        Rehab Management/MEDICAL MANAGEMENT     #Debility due to Classic Hodgkin's Lymphoma  - Continue  Comprehensive Rehab Program of PT/OT  - 3 hours a day, 5 days a week  - P&O as needed   - SOB with R pleural effusion requiring drainage (~3L)  - S/p R supraclavicular mass biopsy-> significant for Classic Hodgkin's Lymphoma  - S/p R thoacotomy/VATS with Pleurex placement 3/3  - Bone marrow biopsy negative  - L chest wall mediport placed 3/15  -- Cycle #1 of BV (Brentuximad Vedotin) given on 3/10  --- Plan for cycle #2 after 3 weeks  --- No plan to receive chemo while in rehab  - F/u with Dr. Hays outpatient 3/27 for cycle 2 chemo  Heme consult 3/17 and recs appreciated    * Rt pleurax drain output being monitored, drain M/W/F  #AFIB  - Eliquis  - Resume on 3/16 (held for L CW mediport on 3/15)    #HTN  - Atenolol     #HLD  - Simvastatin    #Sleep/Mood  - Lexapro--3/20--increase to 10mg daily   - Xanax PRN   - Melatonin PRN     #Skin  - Skin--: right lower back  bone biopsy site redness; right Pleurex--limited output   Drain MWF  - Pressure injury/Skin: OOB to Chair, PT/OT     #Pain Mgmt   - Oxycodone PRN    #GI/Bowel Mgmt   - Continent c/w Senna, Miralax     #/Bladder Mgmt   Chronic Buckley ---f/u Urology recs Buckley drainage to continue fornow  Aim to TOV when patient is ambulatory  -Flomax  - F/u with Urology outpt     #FEN   - Diet - Regular + Thins  [DASH]      #Precautions / PROPHYLAXIS:   - Falls   - ortho: Weight bearing status: WBAT   - Lungs: Aspiration, Incentive Spirometer   - DVT: Lovenox  -----------------------------------------------------  FOLLOW UP/OUTPATIENT:    Nathaniel Galvan)  Surgery; Thoracic Surgery  [phone] [place] [address], [facility]  [address]rd [place] [address]  Phone: [phone]  Fax: [phone]  Established Patient  Follow Up Time: 2 weeks    UROLOGY  MD Saúl Hamm [facility]  Chloé Vazquez  Scheduled Appointment: 03/27/2023  -----------------------------------------------------

## 2023-03-20 NOTE — PROGRESS NOTE ADULT - SUBJECTIVE AND OBJECTIVE BOX
Subjective  Seen and examined.  Reports insomnia. He was commenced on [place] over the week end after failed trail with melatonin  He admits to intermittent low mood  Tolerating oral diet  Still requiring NC oxy    Therapy--engaging in therapy, limited by poor endurance    ROS  No dyspnea, nausea/vomiting,   LBM 3/19    Liaison with family--Daughter, a Physician, requested increase of lexapro, patient admits to residual symptoms of depression--low mood  Dose will be increased      Vital Signs Last 24 Hrs  T(C): 36.4 (20 Mar 2023 08:39), Max: 36.6 (19 Mar 2023 09:46)  T(F): 97.6 (20 Mar 2023 08:39), Max: 97.9 (19 Mar 2023 09:46)  HR: 52 (20 Mar 2023 08:47) (52 - 97)  BP: 101/68 (20 Mar 2023 08:39) (101/68 - 123/57)  RR: 18 (20 Mar 2023 08:39) (16 - 18)  SpO2: 99% (20 Mar 2023 08:47) (91% - 99%)  O2 Parameters below as of 20 Mar 2023 08:47  Patient On (Oxygen Delivery Method): nasal cannula      Physical Exam:  Constitutional - Comfortable, NC oxy in situ, 2L , Oxy sat 98%  HEENT - NAD  Neck - Supple, No limited ROM  Chest - Air entry diffusely reduced  R>L  Cardio - warm and well perfused, RRR, no murmur; left chem port  Abdomen -  Soft, non tender; +right Pleurex  : +Choi in situ  Extremities - No peripheral edema, No calf tenderness       Neurologic Exam:                 Allert and fully oriented  No speech deficit  Cranial Nerves - No facial asymmetry, Tongue midline, EOMI, Shoulder shrug intact  Motor -                      LEFT    UE - ShAB 5/5, EF 5/5, EE 5/5, WE 5/5,  WNL                    RIGHT UE - ShAB 5/5, EF 5/5, EE 5/5, WE 5/5,  WNL                    LEFT    LE - HF 3/5, KE 5/5, DF 5/5, PF 5/5                    RIGHT LE - HF 3/5, KE 5/5, DF 5/5, PF 5/5        Sensory - Intact to LT bilateral     Reflexes - DTR 2/ 4 , neg Grimaldo's b/l, neg Babinski's b/l     Coordination - FTN / HTS intact     OculoVestibular -  No nystagmus  Psychiatric - Mood stable, Affect WNL  Skin--: Right lower back  bone biopsy site redness; right Pleurex  RECENT LABS/IMAGING                        10.0   4.50  )-----------( 255      ( 20 Mar 2023 06:18 )             31.1     03-20    133<L>  |  98  |  7   ----------------------------<  119<H>  4.1   |  30  |  0.55    Ca    8.5      20 Mar 2023 06:18  Mg     1.9     03-20    TPro  6.2  /  Alb  2.3<L>  /  TBili  0.7  /  DBili  x   /  AST  30  /  ALT  37  /  AlkPhos  76  03-20      MEDICATIONS  (STANDING):  albuterol    0.083% 2.5 milliGRAM(s) Nebulizer every 6 hours  apixaban 5 milliGRAM(s) Oral two times a day  aspirin  chewable 81 milliGRAM(s) Oral daily  chlorhexidine 2% Cloths 1 Application(s) Topical daily  escitalopram 10 milliGRAM(s) Oral daily  famotidine    Tablet 20 milliGRAM(s) Oral daily  magnesium oxide 400 milliGRAM(s) Oral three times a day with meals  metoprolol succinate ER 12.5 milliGRAM(s) Oral daily  polyethylene glycol 3350 17 Gram(s) Oral daily  ramelteon 8 milliGRAM(s) Oral at bedtime  senna 2 Tablet(s) Oral at bedtime  simvastatin 40 milliGRAM(s) Oral at bedtime    MEDICATIONS  (PRN):  acetaminophen     Tablet .. 650 milliGRAM(s) Oral every 6 hours PRN Mild Pain (1 - 3)  ALPRAZolam 0.25 milliGRAM(s) Oral daily PRN anxiety  oxyCODONE    IR 5 milliGRAM(s) Oral every 8 hours PRN Moderate Pain (4 - 6)

## 2023-03-20 NOTE — CONSULT NOTE ADULT - SUBJECTIVE AND OBJECTIVE BOX
HPI:  Alexandru Martin is an 82 year old male with PMH of AFIB, HTN and chronic urinary retention (chronic plummer); who presented to [facility] via ambulance on 2/17 with progressively worsening SOB.  He was intubated in the field and brought to [facility] ED and admitted to ICU for acute respiratory failure with hypercapnia. CTA chest was negative for PE but significant for a large R pleural effusion with mediastinal and hilar lymphadenopathy, and 3L of fluid was drained.  He completed a 5 day course of empiric antibiotics. Pleural fluid cultures and blood cultures resulted negative.  Suspicious of underlying malignancy secondary to lymphadenopathy and persistent leukocytosis, Heme/Onc was consulted and recommended transfer to [facility] on 2/24/23 for further workup. On 2/28 he underwent an ultrasound guided biopsy of his R supraclavicular mass/lymph node, which later resulted as classic Hodgkin's lymphoma.  On 3/3, he underwent a R thoracotomy/VATS with Pleurex placement. Postoperatively, he was hypotensive and started on Midodrine. A Left chest wall mediport was placed for ongoing chemotherapy treatment on 3/15. PM&R was consulted and deemed him an appropriate candidate for IRF. He was medically stabilized and cleared for discharge to [facility].     Remains on rehab. Still with Plummer. Longstanding retention due to atonic bladder. On self catheterization at home. Has been managed with a Plummer during this recent illness / hospitalizations. He is comfortable with Plummer. On Flomax in the past.      PAST MEDICAL & SURGICAL HISTORY:  Atrial fibrillation      Hypertension      Urinary retention      No significant past surgical history          REVIEW OF SYSTEMS:    CONSTITUTIONAL:  no fevers or chills  CARDIOVASCULAR: No chest pain  GASTROINTESTINAL: No abdominal or epigastric pain      MEDICATIONS  (STANDING):  albuterol    0.083% 2.5 milliGRAM(s) Nebulizer every 6 hours  apixaban 5 milliGRAM(s) Oral two times a day  aspirin  chewable 81 milliGRAM(s) Oral daily  chlorhexidine 2% Cloths 1 Application(s) Topical daily  escitalopram 10 milliGRAM(s) Oral daily  famotidine    Tablet 20 milliGRAM(s) Oral daily  magnesium oxide 400 milliGRAM(s) Oral three times a day with meals  metoprolol succinate ER 12.5 milliGRAM(s) Oral daily  polyethylene glycol 3350 17 Gram(s) Oral daily  ramelteon 8 milliGRAM(s) Oral at bedtime  senna 2 Tablet(s) Oral at bedtime  simvastatin 40 milliGRAM(s) Oral at bedtime    MEDICATIONS  (PRN):  acetaminophen     Tablet .. 650 milliGRAM(s) Oral every 6 hours PRN Mild Pain (1 - 3)  ALPRAZolam 0.25 milliGRAM(s) Oral daily PRN anxiety  oxyCODONE    IR 5 milliGRAM(s) Oral every 8 hours PRN Moderate Pain (4 - 6)      Allergies    pertussis vaccines (Rash)  shellfish (Rash)    SOCIAL HISTORY: No illicit drug use      Vital Signs Last 24 Hrs  T(C): 36.4 (20 Mar 2023 08:39), Max: 36.4 (19 Mar 2023 20:32)  T(F): 97.6 (20 Mar 2023 08:39), Max: 97.6 (19 Mar 2023 20:32)  HR: 52 (20 Mar 2023 08:47) (52 - 95)  BP: 101/68       PHYSICAL EXAM:    Constitutional: NAD  Abd: Soft, NT/ND   : Plummer clear        LABS:                        10.0   4.50  )-----------( 255      ( 20 Mar 2023 06:18 )             31.1     03-20    133<L>  |  98  |  7   ----------------------------<  119<H>  4.1   |  30  |  0.55

## 2023-03-21 PROCEDURE — 99232 SBSQ HOSP IP/OBS MODERATE 35: CPT

## 2023-03-21 RX ADMIN — Medication 81 MILLIGRAM(S): at 11:25

## 2023-03-21 RX ADMIN — ESCITALOPRAM OXALATE 10 MILLIGRAM(S): 10 TABLET, FILM COATED ORAL at 11:24

## 2023-03-21 RX ADMIN — Medication 8 MILLIGRAM(S): at 21:29

## 2023-03-21 RX ADMIN — MAGNESIUM OXIDE 400 MG ORAL TABLET 400 MILLIGRAM(S): 241.3 TABLET ORAL at 18:47

## 2023-03-21 RX ADMIN — SENNA PLUS 2 TABLET(S): 8.6 TABLET ORAL at 21:29

## 2023-03-21 RX ADMIN — APIXABAN 5 MILLIGRAM(S): 2.5 TABLET, FILM COATED ORAL at 18:50

## 2023-03-21 RX ADMIN — ALBUTEROL 2.5 MILLIGRAM(S): 90 AEROSOL, METERED ORAL at 20:54

## 2023-03-21 RX ADMIN — Medication 12.5 MILLIGRAM(S): at 05:09

## 2023-03-21 RX ADMIN — MAGNESIUM OXIDE 400 MG ORAL TABLET 400 MILLIGRAM(S): 241.3 TABLET ORAL at 08:05

## 2023-03-21 RX ADMIN — SIMVASTATIN 40 MILLIGRAM(S): 20 TABLET, FILM COATED ORAL at 21:30

## 2023-03-21 RX ADMIN — APIXABAN 5 MILLIGRAM(S): 2.5 TABLET, FILM COATED ORAL at 05:09

## 2023-03-21 RX ADMIN — MAGNESIUM OXIDE 400 MG ORAL TABLET 400 MILLIGRAM(S): 241.3 TABLET ORAL at 11:28

## 2023-03-21 RX ADMIN — ALBUTEROL 2.5 MILLIGRAM(S): 90 AEROSOL, METERED ORAL at 08:22

## 2023-03-21 RX ADMIN — CHLORHEXIDINE GLUCONATE 1 APPLICATION(S): 213 SOLUTION TOPICAL at 11:26

## 2023-03-21 RX ADMIN — FAMOTIDINE 20 MILLIGRAM(S): 10 INJECTION INTRAVENOUS at 11:25

## 2023-03-21 NOTE — CONSULT NOTE ADULT - SUBJECTIVE AND OBJECTIVE BOX
PULMONARY CONSULT  Location of Patient :  [facility] [address] ( [facility])    Initial HPI on admission:  HPI:  82 year old male with PMH of AFIB, HTN and chronic urinary retention (chronic plummer); who presented to [facility] via ambulance on 2/17 with progressively worsening SOB.  He was intubated in the field and brought to [facility] ED and admitted to ICU for acute respiratory failure with hypercapnia. CTA chest was negative for PE but significant for a large R pleural effusion with mediastinal and hilar lymphadenopathy, and 3L of fluid was drained.  He completed a 5 day course of empiric antibiotics. Pleural fluid cultures and blood cultures resulted negative.  Suspicious of underlying malignancy secondary to lymphadenopathy and persistent leukocytosis, Heme/Onc was consulted and recommended transfer to [facility] on 2/24/23 for further workup. On 2/28 he underwent an ultrasound guided biopsy of his R supraclavicular mass/lymph node, which later resulted as classic Hodgkin's lymphoma.  On 3/3, he underwent a R thoracotomy/VATS with Pleurex placement. Postoperatively, he was hypotensive and started on Midodrine. A Left chest wall mediport was placed for ongoing chemotherapy treatment on 3/15. PM&R was consulted and deemed him an appropriate candidate for IRF. He was medically stabilized and cleared for discharge to [facility].  (15 Mar 2023 12:56)      BRIEF HOSPITAL COURSE:   patient now noted to have decreased drainage from pleurex  no cp, sob, palp, n/v  states now being treated with chemo    PAST MEDICAL & SURGICAL HISTORY:  Atrial fibrillation  Hypertension  Urinary retention  No significant past surgical history        Allergies  pertussis vaccines (Rash)  shellfish (Rash)    Intolerances      FAMILY HISTORY: non contributory   Social History:  Smoking - Former. Last use Feb 1963   EtOH - Denied   Drugs - Denied       Review of Systems: as stated above   patient is poor historian and ROS not accurate     Medications:  MEDICATIONS  (STANDING):  albuterol    0.083% 2.5 milliGRAM(s) Nebulizer every 6 hours  apixaban 5 milliGRAM(s) Oral two times a day  aspirin  chewable 81 milliGRAM(s) Oral daily  chlorhexidine 2% Cloths 1 Application(s) Topical daily  escitalopram 10 milliGRAM(s) Oral daily  famotidine    Tablet 20 milliGRAM(s) Oral daily  magnesium oxide 400 milliGRAM(s) Oral three times a day with meals  metoprolol succinate ER 12.5 milliGRAM(s) Oral daily  polyethylene glycol 3350 17 Gram(s) Oral daily  ramelteon 8 milliGRAM(s) Oral at bedtime  senna 2 Tablet(s) Oral at bedtime  simvastatin 40 milliGRAM(s) Oral at bedtime    MEDICATIONS  (PRN):  acetaminophen     Tablet .. 650 milliGRAM(s) Oral every 6 hours PRN Mild Pain (1 - 3)  ALPRAZolam 0.25 milliGRAM(s) Oral daily PRN anxiety  oxyCODONE    IR 5 milliGRAM(s) Oral every 8 hours PRN Moderate Pain (4 - 6)      Antibiotics History      Heme Medications   apixaban 5 milliGRAM(s) Oral two times a day, 03-16-23 @ 00:00  aspirin  chewable 81 milliGRAM(s) Oral daily, 03-16-23 @ 00:00      GI Medications  famotidine    Tablet 20 milliGRAM(s) Oral daily, 03-15-23 @ 19:20, Routine  polyethylene glycol 3350 17 Gram(s) Oral daily, 03-15-23 @ 19:20, Routine  senna 2 Tablet(s) Oral at bedtime, 03-15-23 @ 19:20, Routine        Home Medications:  Last Order Reconciliation Date: Not Done  albuterol 2.5 mg/3 mL (0.083%) inhalation solution: 2.5 milligram(s) inhaled every 6 hours (03-15-23 @ 15:23)  aspirin 81 mg oral tablet, chewable: 1 tab(s) orally once a day (03-15-23 @ 15:23)  atenolol: 12.5 milligram(s) orally once a day (03-15-23 @ 15:23)  Eliquis 5 mg oral tablet: 1 tab(s) orally 2 times a day (03-15-23 @ 15:23)  famotidine 20 mg oral tablet: 1 tab(s) orally once a day (03-15-23 @ 15:23)  Lexapro 5 mg oral tablet: 1 tab(s) orally once a day (02-22-23 @ 14:45)  melatonin 3 mg oral tablet: 2 tab(s) orally once a day (at bedtime) (03-15-23 @ 15:23)  oxyCODONE 5 mg oral tablet: 1 tab(s) orally once, As needed, Moderate Pain (4 - 6) (03-15-23 @ 14:55)  polyethylene glycol 3350 oral powder for reconstitution: 17 gram(s) orally once a day (03-15-23 @ 15:23)  senna leaf extract oral tablet: 2 tab(s) orally once a day (at bedtime) (03-15-23 @ 15:23)  simvastatin 40 mg oral tablet: 1 tab(s) orally once a day (at bedtime) (02-22-23 @ 14:45)  tamsulosin 0.4 mg oral capsule: 1 cap(s) orally once a day (02-22-23 @ 14:45)  Xanax 0.25 mg oral tablet: 1 tab(s) orally once a day, As Needed (02-22-23 @ 14:45)      LABS:                        10.0   4.50  )-----------( 255      ( 20 Mar 2023 06:18 )             31.1     03-20    133<L>  |  98  |  7   ----------------------------<  119<H>  4.1   |  30  |  0.55    Ca    8.5      20 Mar 2023 06:18  Mg     1.9     03-20    TPro  6.2  /  Alb  2.3<L>  /  TBili  0.7  /  DBili  x   /  AST  30  /  ALT  37  /  AlkPhos  76  03-20     RADIOLOGY  CXR:  < from: Xray Chest 1 View- PORTABLE-Urgent (03.15.23 @ 09:25) >  INTERPRETATION:  CLINICAL INDICATION: Status post Mediport insertion    EXAM: Frontal radiograph of the chest.    COMPARISON: Chest radiograph from 3/13/2023    FINDINGS:  Left chest wall Mediport with tip in the SVC.  Redemonstrated right chest surgical drain.  Redemonstrated trace right pleural effusion.  No focal consolidations  There is no pneumothorax.  The heart is normal in size  The visualized osseous structures demonstrate no acute pathology.    IMPRESSION:  New left chest wall Mediport with tip in SVC.  No pneumothorax.  No focal consolidations.    --- End of Report ---    < end of copied text >      CT:  < from: CT Angio Chest Aorta w/wo IV Cont (03.10.23 @ 22:23) >  ACC: [id number] EXAM:  CT ANGIO ABD PELV (W)AW IC   ORDERED BY: GEORGE HOLBROOK     ACC: [id number] EXAM:  CT ANGIO CHEST AORTA WAWIC   ORDERED BY: GEORGE HOLBROOK     PROCEDURE DATE:  03/10/2023          INTERPRETATION:  INDICATION: Follow-up penetrating aortic ulcer.    TECHNIQUE: CT angiogram of the chest, abdomen, and pelvis was obtained   before and after the intravenous administration of 90 cc administered mL   of Omnipaque 350, 10 cc discarded discarded. Three-dimensional maximum   intensity projection images were generated.    COMPARISON: CT angiogram chest, abdomen, and pelvis 2/17/2023.    FINDINGS:  Aorta angiogram: Redemonstrated focal outpouching of contrast involving the infrarenal abdominal aorta measuring approximately 0.9 cm with associated surrounding nonenhancing material, consistent with stable penetrating aortic ulcer. No associated dissection.  Lymph nodes: Similar-appearing mediastinal and bilateral hilar lymphadenopathy. A reference precarinal lymph node measures 3.5 cm, previously 3.9 cm.  Heart/vasculature: The heart is mildly enlarged. Aortic valve and coronary artery calcifications. No pericardial effusion.  Airways/lungs/pleura: Redemonstrated inter- and intralobular septal thickening as wellas multiple nodules in a perilymphatic distribution, similar to the prior study. Decreased right pleural effusion and associated compressive atelectasis. Right pleural drain in place.    Abdomen/pelvis: Hepatic cysts and additional subcentimeter hypodensities too small to characterize. Similar mild right renal cortical thinning. Colonic diverticulosis without evidence of diverticulitis. Enlarged prostate. Plummer catheter in place with air throughout the bladder lumen, likely secondary to instrumentation. Slightly increased mild retroperitoneal lymphadenopathy.    Bones/soft tissues: Degenerative changes.    IMPRESSION:  Stable infrarenal abdominal aortic penetrating ulcer. No dissection.    Decreased right pleural effusion and associated compressive atelectasis.    Similar-appearing inter- and intralobular septal thickening in the lungs with perilymphatic nodules of uncertain etiology.    Persistent mediastinal lymphadenopathy, and bilateral hilar lymphadenopathy. Slightly increased mild retroperitoneal lymphadenopathy.   < end of copied text >        VITALS:  T(C): 36.3 (03-21-23 @ 09:21), Max: 36.7 (03-20-23 @ 19:06)  T(F): 97.4 (03-21-23 @ 09:21), Max: 98.1 (03-20-23 @ 19:06)  HR: 80 (03-21-23 @ 09:21) (80 - 88)  BP: 108/69 (03-21-23 @ 09:21) (107/68 - 109/65)  BP(mean): --  ABP: --  ABP(mean): --  RR: 15 (03-21-23 @ 09:21) (15 - 16)  SpO2: 99% (03-21-23 @ 09:21) (97% - 99%)  CVP(mm Hg): --  CVP(cm H2O): --    Ins and Outs     03-20-23 @ 07:01  -  03-21-23 @ 07:00  --------------------------------------------------------  IN: 1080 mL / OUT: 775 mL / NET: 305 mL    03-21-23 @ 07:01  -  03-21-23 @ 12:35  --------------------------------------------------------  IN: 0 mL / OUT: 450 mL / NET: -450 mL          Physical Examination:  GENERAL:               Alert, Oriented, No acute distress.    HEENT:                     o JVD, Moist MM  PULM:                     Bilateral air entry, diminished ion bilaterally, no significant sputum production, No Rales, No Rhonchi, No Wheezing  CVS:                         S1, S2,  No Murmur  ABD:                        Soft, nondistended, nontender, normoactive bowel sounds,   EXT:                         No edema, nontender, No Cyanosis or Clubbing    NEURO:                  Alert, oriented, interactive, nonfocal, follows commands  PSYC:                      Calm, + Insight.

## 2023-03-21 NOTE — PROGRESS NOTE ADULT - SUBJECTIVE AND OBJECTIVE BOX
Subjective  Seen and examined.  Reports no acute medical symptoms  Slept better  Still requiring NC oxy, sat normal  Tolerating increased dose of SSRI  Discussed heme onc recs for chemo on 3/27, he is happy to go ahead with this  Aim is to optimize function and dc before the    I told him I left a msg for daughter as I couldnt get her on ph yesterday    Therapy--engaging in therapy, motivated  Working on gait, stability and endurance     ROS  No dyspnea, nausea/vomiting,   LBM 3/20    Vital Signs Last 24 Hrs  T(C): 36.3 (21 Mar 2023 09:21), Max: 36.7 (20 Mar 2023 19:06)  T(F): 97.4 (21 Mar 2023 09:21), Max: 98.1 (20 Mar 2023 19:06)  HR: 80 (21 Mar 2023 09:21) (80 - 88)  BP: 108/69 (21 Mar 2023 09:21) (107/68 - 109/65)  RR: 15 (21 Mar 2023 09:21) (15 - 16)  SpO2: 99% (21 Mar 2023 09:21) (97% - 99%)  O2 Parameters below as of 21 Mar 2023 09:21  Patient On (Oxygen Delivery Method): nasal cannula      Physical Exam:  Constitutional - Comfortable, NC oxy in situ, 2L , Oxy sat 98%  HEENT - NAD  Neck - Supple, No limited ROM  Chest - Air entry diffusely reduced  R>L  Cardio - warm and well perfused, RRR, no murmur; left chem port  Abdomen -  Soft, non tender; +right Pleurex, no output as per records  : +Choi in situ  Extremities - No peripheral edema, No calf tenderness       Neurologic Exam:                 Allert and fully oriented  No speech deficit  Cranial Nerves - No facial asymmetry, Tongue midline, EOMI, Shoulder shrug intact  Motor -                      LEFT    UE - ShAB 5/5, EF 5/5, EE 5/5, WE 5/5,  WNL                    RIGHT UE - ShAB 5/5, EF 5/5, EE 5/5, WE 5/5,  WNL                    LEFT    LE - HF 3/5, KE 5/5, DF 5/5, PF 5/5                    RIGHT LE - HF 3/5, KE 5/5, DF 5/5, PF 5/5        Sensory - Intact to LT bilateral     Reflexes - DTR 2/ 4 , neg Grimaldo's b/l, neg Babinski's b/l     Coordination - FTN / HTS intact     OculoVestibular -  No nystagmus  Psychiatric - Mood stable, Affect WNL  Skin--: Right lower back  bone biopsy site redness; right Pleurex in situ     CXR 3/15--unremarkable  < from: Xray Chest 1 View- PORTABLE-Urgent (03.15.23 @ 09:25) >  PROCEDURE DATE:  03/15/2023          INTERPRETATION:  CLINICAL INDICATION: Status post Mediport insertion    EXAM: Frontal radiograph of the chest.    COMPARISON: Chest radiograph from 3/13/2023    FINDINGS:  Left chest wall Mediport with tip in the SVC.  Redemonstrated right chest surgical drain.  Redemonstrated trace right pleural effusion.  No focal consolidations  There is no pneumothorax.  The heart is normal in size  The visualized osseous structures demonstrate no acute pathology.    IMPRESSION:  New left chest wall Mediport with tip in SVC.  No pneumothorax.  No focal consolidations.    < end of copied text >    RECENT LABS/IMAGING                        10.0   4.50  )-----------( 255      ( 20 Mar 2023 06:18 )             31.1     03-20    133<L>  |  98  |  7   ----------------------------<  119<H>  4.1   |  30  |  0.55    Ca    8.5      20 Mar 2023 06:18  Mg     1.9     03-20    TPro  6.2  /  Alb  2.3<L>  /  TBili  0.7  /  DBili  x   /  AST  30  /  ALT  37  /  AlkPhos  76  03-20    MEDICATIONS  (STANDING):  albuterol    0.083% 2.5 milliGRAM(s) Nebulizer every 6 hours  apixaban 5 milliGRAM(s) Oral two times a day  aspirin  chewable 81 milliGRAM(s) Oral daily  chlorhexidine 2% Cloths 1 Application(s) Topical daily  escitalopram 10 milliGRAM(s) Oral daily  famotidine    Tablet 20 milliGRAM(s) Oral daily  magnesium oxide 400 milliGRAM(s) Oral three times a day with meals  metoprolol succinate ER 12.5 milliGRAM(s) Oral daily  polyethylene glycol 3350 17 Gram(s) Oral daily  ramelteon 8 milliGRAM(s) Oral at bedtime  senna 2 Tablet(s) Oral at bedtime  simvastatin 40 milliGRAM(s) Oral at bedtime    MEDICATIONS  (PRN):  acetaminophen     Tablet .. 650 milliGRAM(s) Oral every 6 hours PRN Mild Pain (1 - 3)  ALPRAZolam 0.25 milliGRAM(s) Oral daily PRN anxiety  oxyCODONE    IR 5 milliGRAM(s) Oral every 8 hours PRN Moderate Pain (4 - 6)   Subjective  Seen and examined.  Reports no acute medical symptoms  Slept better  Still requiring NC oxy, sat normal  Tolerating increased dose of SSRI  Discussed heme onc recs for chemo on 3/27, he is happy to go ahead with this  Aim is to optimize function and dc before the    I told him I left a msg for daughter as I couldnt get her on ph yesterday    Therapy--engaging in therapy, motivated  Working on gait, stability and endurance     ROS  No dyspnea, nausea/vomiting,   Pleurax drain output is minimal   LBM 3/20  CXR --Unremarkable      Vital Signs Last 24 Hrs  T(C): 36.3 (21 Mar 2023 09:21), Max: 36.7 (20 Mar 2023 19:06)  T(F): 97.4 (21 Mar 2023 09:21), Max: 98.1 (20 Mar 2023 19:06)  HR: 80 (21 Mar 2023 09:21) (80 - 88)  BP: 108/69 (21 Mar 2023 09:21) (107/68 - 109/65)  RR: 15 (21 Mar 2023 09:21) (15 - 16)  SpO2: 99% (21 Mar 2023 09:21) (97% - 99%)  O2 Parameters below as of 21 Mar 2023 09:21  Patient On (Oxygen Delivery Method): nasal cannula      Physical Exam:  Constitutional - Comfortable, NC oxy in situ, 2L , Oxy sat 98%  HEENT - NAD  Neck - Supple, No limited ROM  Chest - Air entry diffusely reduced  R>L  Cardio - warm and well perfused, RRR, no murmur; left chem port  Abdomen -  Soft, non tender; +right Pleurex, no output as per records  : +Choi in situ  Extremities - No peripheral edema, No calf tenderness       Neurologic Exam:                 Allert and fully oriented  No speech deficit  Cranial Nerves - No facial asymmetry, Tongue midline, EOMI, Shoulder shrug intact  Motor -                      LEFT    UE - ShAB 5/5, EF 5/5, EE 5/5, WE 5/5,  WNL                    RIGHT UE - ShAB 5/5, EF 5/5, EE 5/5, WE 5/5,  WNL                    LEFT    LE - HF 3/5, KE 5/5, DF 5/5, PF 5/5                    RIGHT LE - HF 3/5, KE 5/5, DF 5/5, PF 5/5        Sensory - Intact to LT bilateral     Reflexes - DTR 2/ 4 , neg Grimaldo's b/l, neg Babinski's b/l     Coordination - FTN / HTS intact     OculoVestibular -  No nystagmus  Psychiatric - Mood stable, Affect WNL  Skin--: Right lower back  bone biopsy site redness; right Pleurex in situ     CXR 3/15--unremarkable  < from: Xray Chest 1 View- PORTABLE-Urgent (03.15.23 @ 09:25) >  PROCEDURE DATE:  03/15/2023          INTERPRETATION:  CLINICAL INDICATION: Status post Mediport insertion    EXAM: Frontal radiograph of the chest.    COMPARISON: Chest radiograph from 3/13/2023    FINDINGS:  Left chest wall Mediport with tip in the SVC.  Redemonstrated right chest surgical drain.  Redemonstrated trace right pleural effusion.  No focal consolidations  There is no pneumothorax.  The heart is normal in size  The visualized osseous structures demonstrate no acute pathology.    IMPRESSION:  New left chest wall Mediport with tip in SVC.  No pneumothorax.  No focal consolidations.    < end of copied text >    RECENT LABS/IMAGING                        10.0   4.50  )-----------( 255      ( 20 Mar 2023 06:18 )             31.1     03-20    133<L>  |  98  |  7   ----------------------------<  119<H>  4.1   |  30  |  0.55    Ca    8.5      20 Mar 2023 06:18  Mg     1.9     03-20    TPro  6.2  /  Alb  2.3<L>  /  TBili  0.7  /  DBili  x   /  AST  30  /  ALT  37  /  AlkPhos  76  03-20    MEDICATIONS  (STANDING):  albuterol    0.083% 2.5 milliGRAM(s) Nebulizer every 6 hours  apixaban 5 milliGRAM(s) Oral two times a day  aspirin  chewable 81 milliGRAM(s) Oral daily  chlorhexidine 2% Cloths 1 Application(s) Topical daily  escitalopram 10 milliGRAM(s) Oral daily  famotidine    Tablet 20 milliGRAM(s) Oral daily  magnesium oxide 400 milliGRAM(s) Oral three times a day with meals  metoprolol succinate ER 12.5 milliGRAM(s) Oral daily  polyethylene glycol 3350 17 Gram(s) Oral daily  ramelteon 8 milliGRAM(s) Oral at bedtime  senna 2 Tablet(s) Oral at bedtime  simvastatin 40 milliGRAM(s) Oral at bedtime    MEDICATIONS  (PRN):  acetaminophen     Tablet .. 650 milliGRAM(s) Oral every 6 hours PRN Mild Pain (1 - 3)  ALPRAZolam 0.25 milliGRAM(s) Oral daily PRN anxiety  oxyCODONE    IR 5 milliGRAM(s) Oral every 8 hours PRN Moderate Pain (4 - 6)

## 2023-03-21 NOTE — PROGRESS NOTE ADULT - ASSESSMENT
· Assessment	  Assessment/Plan:  ARIK DUMONT is a 82 year old male with PMH of AFIB, HTN and chronic urinary retention (chronic buckley); who presented to [facility] via ambulance on 2/17 with progressively worsening SOB, intubated in the field, found to have a large R pleural effusion with mediastinal and hilar lymphadenopathy s/p thoracentesis (~3L), with persistent leukocytosis and concern for suspicious malignancy was transferred to [facility] on 2/24 for further workup.  He underwent a R supraclavicular mass biopsy on 2/28, significant for classic Hodgkin's lymphoma. On 3/3, he underwent a R thoracotomy/VATS with Pleurex placement. Bone marrow negative for involvement,  (s/p L chest wall mediport placement 3/15). Patient now admitted for a multidisciplinary rehab program. 03-15-23 @ 13:06    * Due Oncology appointment 3/27 for chemo, aim to dc from acute rehab prior to this appointment  * Urology consult and recs appreciated, d/w urologist, ALLOW BUCKLEY IN SITU FOR NOW  * Daily drainage of Rt pleural drain (please record output if any)    Rehab Management/MEDICAL MANAGEMENT     #Debility due to Classic Hodgkin's Lymphoma  - Continue  Comprehensive Rehab Program of PT/OT  - 3 hours a day, 5 days a week  - P&O as needed   - SOB with R pleural effusion requiring drainage (~3L)  - S/p R supraclavicular mass biopsy-> significant for Classic Hodgkin's Lymphoma  - S/p R thoacotomy/VATS with Pleurex placement 3/3  - Bone marrow biopsy negative  - Lt chest wall mediport placed 3/15  -- Cycle #1 of BV (Brentuximad Vedotin) given on 3/10  ---Plan for cycle #2 after 3 weeks  --- No plan to receive chemo while in rehab  - F/u with Dr. Hays outpatient 3/27 for cycle 2 chemo  --Heme consult 3/17 and 3/20  and recs appreciated    * Rt pleurax drain output being monitored, drain M/W/F  #AFIB  - Eliquis  - Resume on 3/16 (held for L CW mediport on 3/15)    #HTN  - Atenolol     #HLD  - Simvastatin    #Sleep/Mood  - Lexapro--3/20--increase to 10mg daily   - Xanax PRN   - Melatonin PRN     #Skin  - Skin--: right lower back  bone biopsy site redness; right Pleurex--limited output   Drain MWF  - Pressure injury/Skin: OOB to Chair, PT/OT     #Pain Mgmt   - Oxycodone PRN    #GI/Bowel Mgmt   - Continent c/w Senna, Miralax     #/Bladder Mgmt   Chronic Buckley ---f/u Urology recs Buckley drainage to continue fornow  Aim to TOV when patient is ambulatory    -Flomax  - F/u with Urology outpt     #FEN   - Diet - Regular + Thins  [DASH]      #Precautions / PROPHYLAXIS:   - Falls   - ortho: Weight bearing status: WBAT   - Lungs: Aspiration, Incentive Spirometer   - DVT: Lovenox  -----------------------------------------------------  FOLLOW UP/OUTPATIENT:    Nathaniel Galvan)  Surgery; Thoracic Surgery  [address] [place] [address] Oncology [facility]  3rd [place] [address]  Phone: [phone]  Fax: [phone]  Established Patient  Follow Up Time: 2 weeks    UROLOGY  MD Saúl Hamm [facility]  Chloé Vazquez  Scheduled Appointment: 03/27/2023  -----------------------------------------------------         · Assessment	  Assessment/Plan:  ARIK DUMONT is a 82 year old male with PMH of AFIB, HTN and chronic urinary retention (chronic buckley); who presented to [facility] via ambulance on 2/17 with progressively worsening SOB, intubated in the field, found to have a large R pleural effusion with mediastinal and hilar lymphadenopathy s/p thoracentesis (~3L), with persistent leukocytosis and concern for suspicious malignancy was transferred to [facility] on 2/24 for further workup.  He underwent a R supraclavicular mass biopsy on 2/28, significant for classic Hodgkin's lymphoma. On 3/3, he underwent a R thoracotomy/VATS with Pleurex placement. Bone marrow negative for involvement,  (s/p L chest wall mediport placement 3/15). Patient now admitted for a multidisciplinary rehab program. 03-15-23 @ 13:06    * Due Oncology appointment 3/27 for chemo, aim to dc from acute rehab prior to this appointment  * Urology consult and recs appreciated, d/w urologist, ALLOW BUCKLEY IN SITU FOR NOW  * Daily drainage of Rt pleural drain --output is minimal 25cc/24hr   * Pulmonary f/u    Rehab Management/MEDICAL MANAGEMENT     #Debility due to Classic Hodgkin's Lymphoma  - Continue  Comprehensive Rehab Program of PT/OT  - 3 hours a day, 5 days a week  - P&O as needed   - SOB with R pleural effusion requiring drainage (~3L)  - S/p R supraclavicular mass biopsy-> significant for Classic Hodgkin's Lymphoma  - S/p R thoacotomy/VATS with Pleurex placement 3/3  - Bone marrow biopsy negative  - Lt chest wall mediport placed 3/15  -- Cycle #1 of BV (Brentuximad Vedotin) given on 3/10  ---Plan for cycle #2 after 3 weeks  --- No plan to receive chemo while in rehab  - F/u with Dr. Hays outpatient 3/27 for cycle 2 chemo  --Heme consult 3/17 and 3/20  and recs appreciated    * Rt pleurax drain output being monitored, drain M/W/F  --output is minimal 25cc/24hr   #AFIB  - Eliquis  - Resume on 3/16 (held for L CW mediport on 3/15)    #HTN  - Atenolol     #HLD  - Simvastatin    #Sleep/Mood  - Lexapro--3/20--increase to 10mg daily   - Xanax PRN   - Melatonin PRN     #Skin  - Skin--: right lower back  bone biopsy site redness; right Pleurex--limited output   Drain MWF  - Pressure injury/Skin: OOB to Chair, PT/OT     #Pain Mgmt   - Oxycodone PRN    #GI/Bowel Mgmt   - Continent c/w Senna, Miralax     #/Bladder Mgmt   Chronic Buckley ---f/u Urology recs Buckley drainage to continue fornow  Aim to TOV when patient is ambulatory    -Flomax  - F/u with Urology outpt     #FEN   - Diet - Regular + Thins  [DASH]      #Precautions / PROPHYLAXIS:   - Falls   - ortho: Weight bearing status: WBAT   - Lungs: Aspiration, Incentive Spirometer   - DVT: Lovenox  -----------------------------------------------------  FOLLOW UP/OUTPATIENT:    Nathaniel Galvan)  Surgery; Thoracic Surgery  [address] [place] [address], Oncology Building  3rd [place] [address]  Phone: [phone]  Fax: [phone]  Established Patient  Follow Up Time: 2 weeks    UROLOGY  MD Saúl Hamm [facility]  Chloé Vazquez  Scheduled Appointment: 03/27/2023  -----------------------------------------------------         · Assessment	  Assessment/Plan:  ARIK DUMONT is a 82 year old male with PMH of AFIB, HTN and chronic urinary retention (chronic buckley); who presented to [facility] via ambulance on 2/17 with progressively worsening SOB, intubated in the field, found to have a large R pleural effusion with mediastinal and hilar lymphadenopathy s/p thoracentesis (~3L), with persistent leukocytosis and concern for suspicious malignancy was transferred to [facility] on 2/24 for further workup.  He underwent a R supraclavicular mass biopsy on 2/28, significant for classic Hodgkin's lymphoma. On 3/3, he underwent a R thoracotomy/VATS with Pleurex placement. Bone marrow negative for involvement,  (s/p L chest wall mediport placement 3/15). Patient now admitted for a multidisciplinary rehab program. 03-15-23 @ 13:06    * Due Oncology appointment 3/27 for chemo, aim to dc from acute rehab prior to this appointment  * Urology consult and recs appreciated, d/w urologist, ALLOW BUCKLEY IN SITU FOR NOW  * Daily drainage of Rt pleural drain --output is minimal 25cc/24hr   * Pulmonary f/u    Rehab Management/MEDICAL MANAGEMENT     #Debility due to Classic Hodgkin's Lymphoma  - Continue  Comprehensive Rehab Program of PT/OT  - 3 hours a day, 5 days a week  - P&O as needed   - SOB with R pleural effusion requiring drainage (~3L)  - S/p R supraclavicular mass biopsy-> significant for Classic Hodgkin's Lymphoma  - S/p R thoacotomy/VATS with Pleurex placement 3/3  - Bone marrow biopsy negative  - Lt chest wall mediport placed 3/15  -- Cycle #1 of BV (Brentuximad Vedotin) given on 3/10  ---Plan for cycle #2 after 3 weeks  --- No plan to receive chemo while in rehab  - F/u with Dr. Hays outpatient 3/27 for cycle 2 chemo  --Heme consult 3/17 and 3/20  and recs appreciated    * Rt pleurax drain output being monitored, drain M/W/F  --output is minimal 25cc/24hr   #AFIB  - Eliquis  - Resume on 3/16 (held for L CW mediport on 3/15)    #HTN  - Atenolol     #HLD  - Simvastatin    #Sleep/Mood  - Lexapro--3/20--increase to 10mg daily   - Xanax PRN   - Melatonin PRN     #Skin  - Skin--: right lower back  bone biopsy site redness; right Pleurex--limited output   Drain MWF  - Pressure injury/Skin: OOB to Chair, PT/OT     #Pain Mgmt   - Oxycodone PRN    #GI/Bowel Mgmt   - Continent c/w Senna, Miralax     #/Bladder Mgmt   Chronic Buckley ---f/u Urology recs Buckley drainage to continue fornow  Aim to TOV when patient is ambulatory    -Flomax  - F/u with Urology outpt     #FEN   - Diet - Regular + Thins  [DASH]      #Precautions / PROPHYLAXIS:   - Falls   - ortho: Weight bearing status: WBAT   - Lungs: Aspiration, Incentive Spirometer   - DVT: Lovenox  -----------------------------------------------------  IDT conference on 3/21  TDD:  3/25 to home  Barriers: Fatigues easily, endurance, 02 requirements, pleurex drain  Social Work: Lives in  with wife and son, 1STE, 10STI, has first floor living. Wife available for support. Daughter has MS.   OT: Setup/sup for eating/grooming. Mod A for transfers and ADLs. Tub/shower- TBD. Goal of mod I.   PT: Min-mod A for transfers. Ambulated 45 ft with RW with min A, shuffling gait with WCF. Negotiated 4 stairs with 2 HRs min-mod A x1. (step to step)   SLP: --  -----------------------------------------------------  FOLLOW UP/OUTPATIENT:    Nathaniel Galvan)  Surgery; Thoracic Surgery  [address] [place], Oncology [facility]  [address]rd [place] [address]  Phone: [phone]  Fax: [phone]  Established Patient  Follow Up Time: 2 weeks    UROLOGY  Alyskewycz, MD    Kolitz, Karl E  [facility]  Chloé Vazquez  Scheduled Appointment: 03/27/2023  -----------------------------------------------------         · Assessment	  Assessment/Plan:  ARIK DUMONT is a 82 year old male with PMH of AFIB, HTN and chronic urinary retention (chronic buckley); who presented to [facility] via ambulance on 2/17 with progressively worsening SOB, intubated in the field, found to have a large R pleural effusion with mediastinal and hilar lymphadenopathy s/p thoracentesis (~3L), with persistent leukocytosis and concern for suspicious malignancy was transferred to [facility] on 2/24 for further workup.  He underwent a R supraclavicular mass biopsy on 2/28, significant for classic Hodgkin's lymphoma. On 3/3, he underwent a R thoracotomy/VATS with Pleurex placement. Bone marrow negative for involvement,  (s/p L chest wall mediport placement 3/15). Patient now admitted for a multidisciplinary rehab program. 03-15-23 @ 13:06    * Due Oncology appointment 3/27 for chemo, aim to dc from acute rehab prior to this appointment  * Urology consult and recs appreciated, d/w urologist, ALLOW BUCKLEY IN SITU FOR NOW  * Daily drainage of Rt pleural drain --output is minimal 25cc/24hr   * Pulmonary f/u    Rehab Management/MEDICAL MANAGEMENT     #Debility due to Classic Hodgkin's Lymphoma  - Continue  Comprehensive Rehab Program of PT/OT  - 3 hours a day, 5 days a week  - P&O as needed   - SOB with R pleural effusion requiring drainage (~3L)  - S/p R supraclavicular mass biopsy-> significant for Classic Hodgkin's Lymphoma  - S/p R thoacotomy/VATS with Pleurex placement 3/3  - Bone marrow biopsy negative  - Lt chest wall mediport placed 3/15  -- Cycle #1 of BV (Brentuximad Vedotin) given on 3/10  ---Plan for cycle #2 after 3 weeks  --- No plan to receive chemo while in rehab  - F/u with Dr. Hays outpatient 3/27 for cycle 2 chemo  --Heme consult 3/17 and 3/20  and recs appreciated    * Rt pleurax drain output being monitored, drain M/W/F  --output is minimal 25cc/24hr   #AFIB  - Eliquis  - Resume on 3/16 (held for L CW mediport on 3/15)    #HTN  - Atenolol     #HLD  - Simvastatin    #Sleep/Mood  - Lexapro--3/20--increase to 10mg daily   - Xanax PRN   - Melatonin PRN     #Skin  - Skin--: right lower back  bone biopsy site redness; right Pleurex--limited output   Drain MWF  - Pressure injury/Skin: OOB to Chair, PT/OT     #Pain Mgmt   - Oxycodone PRN    #GI/Bowel Mgmt   - Continent c/w Senna, Miralax     #/Bladder Mgmt   Chronic Buckley ---f/u Urology recs Buckley drainage to continue fornow  Aim to TOV when patient is ambulatory    -Flomax  - F/u with Urology outpt     #FEN   - Diet - Regular + Thins  [DASH]      #Precautions / PROPHYLAXIS:   - Falls   - ortho: Weight bearing status: WBAT   - Lungs: Aspiration, Incentive Spirometer   - DVT: Lovenox  -----------------------------------------------------  IDT conference on 3/21  TDD:  3/25 to home  Barriers: Fatigues easily, endurance, 02 requirements, pleurex drain  Social Work: Lives in  with wife and son, 1STE, 10STI, has first floor living. Wife available for support. Daughter has MS.   OT: Setup/sup for eating/grooming. Mod A for transfers and ADLs. Tub/shower- TBD. Goal of mod I.   PT: Min-mod A for transfers. Ambulated 45 ft with RW with min A, shuffling gait with WCF. Negotiated 4 stairs with 2 HRs min-mod A x1. (step to step)   SLP: --    3/21--Liaison with family. I called and spoke on phone with daughter,  Judith Schumacher. She asked questions on all aspects of patient's care --urology/oncology/pulmonary/Rehab  I gave detailed explanation of treatment to date  She explained  that patient has a TV with her primary oncologist 3/20 and chemo on 3/31  She requested for therapy on DC date (revised DC date 3/30), I explained that we do not normally offer therapy on DC date, but we would try to see if we can make accommodation for this  She requested for update from Oncologist follow up patient's care Dr Velez, and I have facilitated this request      -----------------------------------------------------  FOLLOW UP/OUTPATIENT:    Nathaniel Galvan)  Surgery; Thoracic Surgery  [address] [place] [address], [place] [address]  Phone: [phone]  Fax: [phone]  Established Patient  Follow Up Time: 2 weeks    UROLOGY  MD Saúl Hamm [facility]  Chloé Vazquez  Scheduled Appointment: 03/27/2023  -----------------------------------------------------

## 2023-03-21 NOTE — PROGRESS NOTE ADULT - ASSESSMENT
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  He underwent a R supraclavicular mass biopsy on 2/28, significant for classic Hodgkin's lymphoma. On 3/3, he underwent a R thoracotomy/VATS with Pleurex placement. Bone marrow negative for involvement,  (s/p L chest wall mediport placement 3/15). Patient now admitted for a multidisciplinary rehab program. 03-15-23 @ 13:06    #Debility  #Recent hypoxic respiratory failure with hypercapnia   #Recurrent R pleural effusion - exudative  #Hodgkin's Lymphoma  -S/p R supraclavicular mass biopsy-> significant for Classic Hodgkin's Lymphoma  -S/p R thoacotomy/VATS with Pleurex placement 3/3  -Bone marrow biopsy negative  -L chest wall mediport placed 3/15  -Cycle #1 of BV (Brentuximad Vedotin) given on 3/10. Plan for cycle #2 on 3/27 as OP.  -Await [facility] referral   -F/u with Dr. Hays outpatient  -Ipratropium-Albuterol q6h   -currently sating 97% on 2L NC- taper oxygen as tolerated    #Hypokalemia  -Replete and monitor K and Mg    #AFib  #HTN  #Tachycardia likely due to BB withdrawal- resolved  #HypoMg  - on Eliquis  - [id number]  - encourage PO hydration  - PO magnesium   _ keep K>4 and Mg>2  - check OH VS    #HLD  - Simvastatin    #Urinary retention  -Chronic Plummer   -Flomax  -F/u with Urology outpt    GI ppx - pepcid    d/w rehab team at [facility]

## 2023-03-21 NOTE — CONSULT NOTE ADULT - ASSESSMENT
Assessment  1) Right pleural effusion - Malignant s/p Pleurex  2) Hodgkin Lymphoma   3) Underlying HTN, Afib and urinary retention    Plan  Can drain pleurex twice weekly  check cxr in am prior next draw  suspect if low drainage after 1 week can make it weekly and then  after low drainage after 2 weeks can remove catheter      heme onc f/u for chemo  will follow

## 2023-03-21 NOTE — PROGRESS NOTE ADULT - SUBJECTIVE AND OBJECTIVE BOX
Patient is a 82y old  Male who presents with a chief complaint of Debility due to Hodgkin's l.umphoma (21 Mar 2023 10:54)      Subjective and overnight events:  Patient seen and examined at bedside. feels well overall. denies any sob, cp, abd pain. no fever, chills, cough.    ALLERGIES:  pertussis vaccines (Rash)  shellfish (Rash)    MEDICATIONS  (STANDING):  albuterol    0.083% 2.5 milliGRAM(s) Nebulizer every 6 hours  apixaban 5 milliGRAM(s) Oral two times a day  aspirin  chewable 81 milliGRAM(s) Oral daily  chlorhexidine 2% Cloths 1 Application(s) Topical daily  escitalopram 10 milliGRAM(s) Oral daily  famotidine    Tablet 20 milliGRAM(s) Oral daily  magnesium oxide 400 milliGRAM(s) Oral three times a day with meals  metoprolol succinate ER 12.5 milliGRAM(s) Oral daily  polyethylene glycol 3350 17 Gram(s) Oral daily  ramelteon 8 milliGRAM(s) Oral at bedtime  senna 2 Tablet(s) Oral at bedtime  simvastatin 40 milliGRAM(s) Oral at bedtime    MEDICATIONS  (PRN):  acetaminophen     Tablet .. 650 milliGRAM(s) Oral every 6 hours PRN Mild Pain (1 - 3)  ALPRAZolam 0.25 milliGRAM(s) Oral daily PRN anxiety  oxyCODONE    IR 5 milliGRAM(s) Oral every 8 hours PRN Moderate Pain (4 - 6)    Vital Signs Last 24 Hrs  T(F): 97.4 (21 Mar 2023 09:21), Max: 98.1 (20 Mar 2023 19:06)  HR: 80 (21 Mar 2023 09:21) (80 - 88)  BP: 108/69 (21 Mar 2023 09:21) (107/68 - 109/65)  RR: 15 (21 Mar 2023 09:21) (15 - 16)  SpO2: 99% (21 Mar 2023 09:21) (97% - 99%)  I&O's Summary    20 Mar 2023 07:01  -  21 Mar 2023 07:00  --------------------------------------------------------  IN: 1080 mL / OUT: 775 mL / NET: 305 mL    21 Mar 2023 07:01  -  21 Mar 2023 11:44  --------------------------------------------------------  IN: 0 mL / OUT: 450 mL / NET: -450 mL      PHYSICAL EXAM:  General: NAD, A/O x 3  ENT: MMM  Neck: Supple, No JVD  Lungs: Clear to auscultation bilaterally on left. decreased BS on right lung  Cardio: RRR, S1/S2, No murmurs  Abdomen: Soft, Nontender, Nondistended; Bowel sounds present  Extremities: No calf tenderness, No pitting edema    LABS:                        10.0   4.50  )-----------( 255      ( 20 Mar 2023 06:18 )             31.1     03-20    133  |  98  |  7   ----------------------------<  119  4.1   |  30  |  0.55    Ca    8.5      20 Mar 2023 06:18  Mg     1.9     03-20    TPro  6.2  /  Alb  2.3  /  TBili  0.7  /  DBili  x   /  AST  30  /  ALT  37  /  AlkPhos  76  03-20 02-17 Chol -- LDL -- HDL -- Trig 67 mg/dL        COVID-19 PCR: NotDetec (03-17-23 @ 06:46)  COVID-19 PCR: NotDetec (03-14-23 @ 16:40)  COVID-19 PCR: NotDetec (03-06-23 @ 10:55)  COVID-19 PCR: NotDetec (03-02-23 @ 14:30)  COVID-19 PCR: NotDetec (02-23-23 @ 10:28)      RADIOLOGY & ADDITIONAL TESTS:    Care Discussed with Consultants/Other Providers:

## 2023-03-22 ENCOUNTER — OUTPATIENT (OUTPATIENT)
Dept: OUTPATIENT SERVICES | Facility: HOSPITAL | Age: 83
LOS: 1 days | Discharge: HOME | End: 2023-03-22

## 2023-03-22 DIAGNOSIS — C81.90 HODGKIN LYMPHOMA, UNSPECIFIED, UNSPECIFIED SITE: ICD-10-CM

## 2023-03-22 PROCEDURE — 99232 SBSQ HOSP IP/OBS MODERATE 35: CPT

## 2023-03-22 PROCEDURE — 71045 X-RAY EXAM CHEST 1 VIEW: CPT | Mod: 26

## 2023-03-22 RX ORDER — TRAZODONE HCL 50 MG
50 TABLET ORAL AT BEDTIME
Refills: 0 | Status: DISCONTINUED | OUTPATIENT
Start: 2023-03-22 | End: 2023-03-24

## 2023-03-22 RX ADMIN — SENNA PLUS 2 TABLET(S): 8.6 TABLET ORAL at 21:05

## 2023-03-22 RX ADMIN — APIXABAN 5 MILLIGRAM(S): 2.5 TABLET, FILM COATED ORAL at 17:38

## 2023-03-22 RX ADMIN — ESCITALOPRAM OXALATE 10 MILLIGRAM(S): 10 TABLET, FILM COATED ORAL at 11:20

## 2023-03-22 RX ADMIN — Medication 50 MILLIGRAM(S): at 21:06

## 2023-03-22 RX ADMIN — Medication 81 MILLIGRAM(S): at 11:20

## 2023-03-22 RX ADMIN — APIXABAN 5 MILLIGRAM(S): 2.5 TABLET, FILM COATED ORAL at 05:28

## 2023-03-22 RX ADMIN — Medication 12.5 MILLIGRAM(S): at 05:29

## 2023-03-22 RX ADMIN — SIMVASTATIN 40 MILLIGRAM(S): 20 TABLET, FILM COATED ORAL at 21:06

## 2023-03-22 RX ADMIN — MAGNESIUM OXIDE 400 MG ORAL TABLET 400 MILLIGRAM(S): 241.3 TABLET ORAL at 17:38

## 2023-03-22 RX ADMIN — FAMOTIDINE 20 MILLIGRAM(S): 10 INJECTION INTRAVENOUS at 11:20

## 2023-03-22 RX ADMIN — CHLORHEXIDINE GLUCONATE 1 APPLICATION(S): 213 SOLUTION TOPICAL at 11:22

## 2023-03-22 RX ADMIN — MAGNESIUM OXIDE 400 MG ORAL TABLET 400 MILLIGRAM(S): 241.3 TABLET ORAL at 08:46

## 2023-03-22 NOTE — PROGRESS NOTE ADULT - SUBJECTIVE AND OBJECTIVE BOX
ARIK DUMONT   82y   Male    Admitting: S. 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  He was intubated in the field and brought to [facility] ED and admitted to ICU for acute respiratory failure with hypercapnia. CTA chest was negative for PE but significant for a large R pleural effusion with mediastinal and hilar lymphadenopathy, and 3L of fluid was drained.  He completed a 5 day course of empiric antibiotics. Pleural fluid cultures and blood cultures resulted negative.  Suspicious of underlying malignancy secondary to lymphadenopathy and persistent leukocytosis, Heme/Onc was consulted and recommended transfer to [facility] on 2/24/23 for further workup. On 2/28 he underwent an ultrasound guided biopsy of his R supraclavicular mass/lymph node, which later resulted as classic Hodgkin's lymphoma.  On 3/3, he underwent a R thoracotomy/VATS with Pleurex placement. Postoperatively, he was hypotensive and started on Midodrine. A Left chest wall mediport was placed for ongoing chemotherapy treatment on 3/15. PM&R was consulted and deemed him an appropriate candidate for IRF. He was medically stabilized and cleared for discharge to [facility].      PAST MEDICAL & SURGICAL HISTORY:  Atrial fibrillation      Hypertension      Urinary retention    HEALTH ISSUES - PROBLEM Dx:  Hodgkins lymphoma      MEDICATIONS  (STANDING):  albuterol    0.083% 2.5 milliGRAM(s) Nebulizer every 6 hours  apixaban 5 milliGRAM(s) Oral two times a day  aspirin  chewable 81 milliGRAM(s) Oral daily  chlorhexidine 2% Cloths 1 Application(s) Topical daily  escitalopram 10 milliGRAM(s) Oral daily  famotidine    Tablet 20 milliGRAM(s) Oral daily  magnesium oxide 400 milliGRAM(s) Oral three times a day with meals  metoprolol succinate ER 12.5 milliGRAM(s) Oral daily  polyethylene glycol 3350 17 Gram(s) Oral daily  ramelteon 8 milliGRAM(s) Oral at bedtime  senna 2 Tablet(s) Oral at bedtime  simvastatin 40 milliGRAM(s) Oral at bedtime    MEDICATIONS  (PRN):  acetaminophen     Tablet .. 650 milliGRAM(s) Oral every 6 hours PRN Mild Pain (1 - 3)  ALPRAZolam 0.25 milliGRAM(s) Oral daily PRN anxiety  oxyCODONE    IR 5 milliGRAM(s) Oral every 8 hours PRN Moderate Pain (4 - 6)    Allergies    pertussis vaccines (Rash)  shellfish (Rash)    INTERVAL HPI/OVERNIGHT EVENTS:  Patient S&E at bedside. No c/o CP or SOB.     VITAL SIGNS:  T(F): 97.5 (03-22-23 @ 08:42)  HR: 84 (03-22-23 @ 08:42)  BP: 121/73 (03-22-23 @ 08:42)  RR: 16 (03-22-23 @ 08:42)  SpO2: 95% (03-22-23 @ 08:42)    PHYSICAL EXAM:  Constitutional: NAD  Eyes: sclera non-icteric  Neck: no JVD  Respiratory: CTA ant.; no wheezes  Cardiovascular: RRR, no M/R/G  Gastrointestinal: soft, NTND, no masses palpable  Extremities: no calf tenderness  Neurological: Awake, alert.    Labs:             10.0   4.50  )-----------( 255      ( 03-20 @ 06:18 )             31.1     Consultant notes reviewed : YES [x ] ; NO [ ]

## 2023-03-22 NOTE — PROGRESS NOTE ADULT - SUBJECTIVE AND OBJECTIVE BOX
Patient is a 82y old  Male who presents with a chief complaint of Debility due to Hodgkin's lymphoma with Resp Failure (22 Mar 2023 10:11)      Subjective and overnight events:  Patient seen and examined at bedside. + insomnia. otherwise, pt reports he is feeling well. no fever, chills, sob, cp, abd pain. no pain anywhere     ALLERGIES:  pertussis vaccines (Rash)  shellfish (Rash)    MEDICATIONS  (STANDING):  albuterol    0.083% 2.5 milliGRAM(s) Nebulizer every 6 hours  apixaban 5 milliGRAM(s) Oral two times a day  aspirin  chewable 81 milliGRAM(s) Oral daily  chlorhexidine 2% Cloths 1 Application(s) Topical daily  escitalopram 10 milliGRAM(s) Oral daily  famotidine    Tablet 20 milliGRAM(s) Oral daily  magnesium oxide 400 milliGRAM(s) Oral three times a day with meals  metoprolol succinate ER 12.5 milliGRAM(s) Oral daily  polyethylene glycol 3350 17 Gram(s) Oral daily  senna 2 Tablet(s) Oral at bedtime  simvastatin 40 milliGRAM(s) Oral at bedtime  traZODone 50 milliGRAM(s) Oral at bedtime    MEDICATIONS  (PRN):  acetaminophen     Tablet .. 650 milliGRAM(s) Oral every 6 hours PRN Mild Pain (1 - 3)  ALPRAZolam 0.25 milliGRAM(s) Oral daily PRN anxiety  oxyCODONE    IR 5 milliGRAM(s) Oral every 8 hours PRN Moderate Pain (4 - 6)    Vital Signs Last 24 Hrs  T(F): 97.5 (22 Mar 2023 08:42), Max: 98.1 (22 Mar 2023 05:26)  HR: 84 (22 Mar 2023 08:42) (80 - 88)  BP: 121/73 (22 Mar 2023 08:42) (104/62 - 121/73)  RR: 16 (22 Mar 2023 08:42) (16 - 16)  SpO2: 95% (22 Mar 2023 08:42) (95% - 98%)  I&O's Summary    21 Mar 2023 07:01  -  22 Mar 2023 07:00  --------------------------------------------------------  IN: 0 mL / OUT: 1225 mL / NET: -1225 mL      PHYSICAL EXAM:  General: NAD, awake alert answering questions appropriately  ENT: MMM  Neck: Supple, No JVD  Lungs: Clear to auscultation bilaterally  Cardio: RRR, S1/S2, No murmurs  Abdomen: Soft, Nontender, Nondistended; Bowel sounds present  Extremities: No calf tenderness, No pitting edema    LABS:                        10.0   4.50  )-----------( 255      ( 20 Mar 2023 06:18 )             31.1     03-20    133  |  98  |  7   ----------------------------<  119  4.1   |  30  |  0.55    Ca    8.5      20 Mar 2023 06:18  Mg     1.9     03-20    TPro  6.2  /  Alb  2.3  /  TBili  0.7  /  DBili  x   /  AST  30  /  ALT  37  /  AlkPhos  76  03-20 02-17 Chol -- LDL -- HDL -- Trig 67 mg/dL            COVID-19 PCR: NotDetec (03-17-23 @ 06:46)  COVID-19 PCR: NotDetec (03-14-23 @ 16:40)  COVID-19 PCR: NotDetec (03-06-23 @ 10:55)  COVID-19 PCR: NotDetec (03-02-23 @ 14:30)  COVID-19 PCR: NotDetec (02-23-23 @ 10:28)      RADIOLOGY & ADDITIONAL TESTS:    Care Discussed with Consultants/Other Providers:

## 2023-03-22 NOTE — PROGRESS NOTE ADULT - SUBJECTIVE AND OBJECTIVE BOX
Feels ok.  Choi draining well. Clear urine.  Continues on rehab.    ROS  General: no fever      VITAL SIGNS  Vital Signs Last 24 Hrs  T(C): 36.4 (22 Mar 2023 08:42), Max: 36.7 (22 Mar 2023 05:26)  T(F): 97.5 (22 Mar 2023 08:42), Max: 98.1 (22 Mar 2023 05:26)  HR: 84 (22 Mar 2023 08:42) (80 - 88)  BP: 121/73       PHYSICAL EXAM:  General: awake and alert          Prior notes/chart reviewed.

## 2023-03-22 NOTE — PROGRESS NOTE ADULT - SUBJECTIVE AND OBJECTIVE BOX
Subjective  Seen and examined.  Reports insomnia, stating that both melatonin and remelton have not give him any sustained relief of the insomina  Reports fatigue due to insomnia  Still requiring NC oxy, sat normal    Therapy--engaging in therapy, ambulating increasing distance    ROS  No dyspnea, nausea/vomiting,   Pleurax drain output is minimal   LBM 3/21      ICU Vital Signs Last 24 Hrs  T(C): 36.4 (22 Mar 2023 08:42), Max: 36.7 (22 Mar 2023 05:26)  T(F): 97.5 (22 Mar 2023 08:42), Max: 98.1 (22 Mar 2023 05:26)  HR: 84 (22 Mar 2023 08:42) (80 - 88)  BP: 121/73 (22 Mar 2023 08:42) (104/62 - 121/73)  RR: 16 (22 Mar 2023 08:42) (16 - 16)  SpO2: 95% (22 Mar 2023 08:42) (95% - 98%)  O2 Parameters below as of 22 Mar 2023 08:42  Patient On (Oxygen Delivery Method): room air      Physical Exam:  Constitutional - Comfortable, NC oxy in situ, 2L , Oxy sat 98%  HEENT - NAD  Neck - Supple,   Chest - Air entry diffusely reduced  R>L  Cardio - warm and well perfused, RRR, no murmur; left chem port  Abdomen -  Soft, non tender; +right Pleurex, no output as per records  : +Choi in situ  Extremities - No peripheral edema, No calf tenderness       Neurologic Exam:                 Allert and fully oriented  No speech deficit  Cranial Nerves - No facial asymmetry, Tongue midline, EOMI, Shoulder shrug intact  Motor -                      LEFT    UE - ShAB 5/5, EF 5/5, EE 5/5, WE 5/5,  WNL                    RIGHT UE - ShAB 5/5, EF 5/5, EE 5/5, WE 5/5,  WNL                    LEFT    LE - HF 3/5, KE 5/5, DF 5/5, PF 5/5                    RIGHT LE - HF 3/5, KE 5/5, DF 5/5, PF 5/5        Sensory - Intact to LT bilateral     Reflexes - DTR 2/ 4 , neg Grimaldo's b/l, neg Babinski's b/l     Coordination - FTN / HTS intact  Psychiatric - Mood stable, Affect WNL  Skin--: Right lower back  bone biopsy site unremarkable; right Pleurex in situ   CXR 3/15--unremarkable  Await f/u CXR    RECENT LABS/IMAGING                        10.0   4.50  )-----------( 255      ( 20 Mar 2023 06:18 )             31.1     03-20    133<L>  |  98  |  7   ----------------------------<  119<H>  4.1   |  30  |  0.55    Ca    8.5      20 Mar 2023 06:18  Mg     1.9     03-20    TPro  6.2  /  Alb  2.3<L>  /  TBili  0.7  /  DBili  x   /  AST  30  /  ALT  37  /  AlkPhos  76  03-20    MEDICATIONS  (STANDING):  albuterol    0.083% 2.5 milliGRAM(s) Nebulizer every 6 hours  apixaban 5 milliGRAM(s) Oral two times a day  aspirin  chewable 81 milliGRAM(s) Oral daily  chlorhexidine 2% Cloths 1 Application(s) Topical daily  escitalopram 10 milliGRAM(s) Oral daily  famotidine    Tablet 20 milliGRAM(s) Oral daily  magnesium oxide 400 milliGRAM(s) Oral three times a day with meals  metoprolol succinate ER 12.5 milliGRAM(s) Oral daily  polyethylene glycol 3350 17 Gram(s) Oral daily  senna 2 Tablet(s) Oral at bedtime  simvastatin 40 milliGRAM(s) Oral at bedtime  traZODone 50 milliGRAM(s) Oral at bedtime    MEDICATIONS  (PRN):  acetaminophen     Tablet .. 650 milliGRAM(s) Oral every 6 hours PRN Mild Pain (1 - 3)  ALPRAZolam 0.25 milliGRAM(s) Oral daily PRN anxiety  oxyCODONE    IR 5 milliGRAM(s) Oral every 8 hours PRN Moderate Pain (4 - 6)

## 2023-03-22 NOTE — PROGRESS NOTE ADULT - ASSESSMENT
Retention / atonic bladder. On self caths in the past. Now with Choi. Possibly to resume self caths when ready for discharge home.    - cont Choi  - will discuss self cath resumption with family

## 2023-03-22 NOTE — PROGRESS NOTE ADULT - ASSESSMENT
· Assessment	  Assessment/Plan:  ARIK DUMONT is a 82 year old male with PMH of AFIB, HTN and chronic urinary retention (chronic plummer); who presented to [facility] via ambulance on 2/17 with progressively worsening SOB, intubated in the field, found to have a large R pleural effusion with mediastinal and hilar lymphadenopathy s/p thoracentesis (~3L), with persistent leukocytosis and concern for suspicious malignancy was transferred to [facility] on 2/24 for further workup.  He underwent a R supraclavicular mass biopsy on 2/28, significant for classic Hodgkin's lymphoma. On 3/3, he underwent a R thoracotomy/VATS with Pleurex placement. Bone marrow negative for involvement,  (s/p L chest wall mediport placement 3/15). Patient now admitted for a multidisciplinary rehab program. 03-15-23 @ 13:06    * Due Oncology  3/27 Televisit, Chemo 3/31, DC home 3/30  * Daily drainage of Rt pleural drain ---reduced freq of emptying 2x/wk   * Pulmonary f/u appreciate, await F/U CXR    Rehab Management/MEDICAL MANAGEMENT     #Debility due to Classic Hodgkin's Lymphoma with resp failure  - Continue  Comprehensive Rehab Program of PT/OT  - 3 hours a day, 5 days a week  - P&O as needed   - SOB with R pleural effusion requiring drainage (~3L)  - S/p R supraclavicular mass biopsy-> significant for Classic Hodgkin's Lymphoma  - S/p R thoacotomy/VATS with Pleurex placement 3/3  - Bone marrow biopsy negative  - Lt chest wall mediport placed 3/15  -- Cycle #1 of BV (Brentuximad Vedotin) given on 3/10  ---Plan for cycle #2 after 3 weeks  --- No plan to receive chemo while in rehab  - F/u with Dr. Hays outpatient 3/27 for cycle 2 chemo  --Heme consult 3/17 and 3/20  and recs appreciated    * Rt pleurax drain output being monitored, drain M/T  CXR ordered    #AFIB  - Eliquis  - Resume on 3/16 (held for L CW mediport on 3/15)    #HTN  - Atenolol     #HLD  - Simvastatin    #Sleep/Mood  - Lexapro--3/20--increase to 10mg daily   - Xanax PRN   - Melatonin PRN     #Skin  - Skin--: right lower back  bone biopsy site redness; right Pleurex--limited output   Drain MWF  - Pressure injury/Skin: OOB to Chair, PT/OT     #Pain Mgmt   - Oxycodone PRN    #GI/Bowel Mgmt   - Continent c/w Senna, Miralax     #/Bladder Mgmt   Chronic Plummer ---f/u Urology recs Plummer drainage to continue   Patient prefers to contnue with Plummer decompression      -Flomax  - F/u with Urology outpt     #FEN   - Diet - Regular + Thins  [DASH]      #Precautions / PROPHYLAXIS:   - Falls   - ortho: Weight bearing status: WBAT   - Lungs: Aspiration, Incentive Spirometer   - DVT: Lovenox  -----------------------------------------------------  IDT conference on 3/21  TDD:  3/25 to home, revised to 3/30  Barriers: Fatigues easily, endurance, 02 requirements, pleurex drain  Social Work: Lives in  with wife and son, 1STE, 10STI, has first floor living. Wife available for support. Daughter has MS.   OT: Setup/sup for eating/grooming. Mod A for transfers and ADLs. Tub/shower- TBD. Goal of mod I.   PT: Min-mod A for transfers. Ambulated 45 ft with RW with min A, shuffling gait with WCF. Negotiated 4 stairs with 2 HRs min-mod A x1. (step to step)   SLP: --    3/21--Liaison with family. I called and spoke on phone with daughter,  Judith Schumacher. She asked questions on all aspects of patient's care --urology/oncology/pulmonary/Rehab  I gave detailed explanation of treatment to date  She explained  that patient has a TV with her primary oncologist 3/20 and chemo on 3/31  She requested for therapy on DC date (revised DC date 3/30), I explained that we do not normally offer therapy on DC date, but we would try to see if we can make accommodation for this  She requested for update from Oncologist follow up patient's care Dr Velez, and I have facilitated this request      -----------------------------------------------------  FOLLOW UP/OUTPATIENT:    Nathaniel Galvan)  Surgery; Thoracic Surgery  [phone] [place] [address], Oncology [facility]  3rd [place] [address]  Phone: [phone]  Fax: [phone]  Established Patient  Follow Up Time: 2 weeks    UROLOGY  MD Saúl Hamm [facility]  Chloé Vazquez  Scheduled Appointment: 03/27/2023  -----------------------------------------------------

## 2023-03-22 NOTE — PROGRESS NOTE ADULT - ASSESSMENT
82 year old male with PMH of AFIB, HTN and chronic urinary retention (chronic plummer); who presented to [facility] via ambulance on 2/17 with progressively worsening SOB.  He was intubated in the field and brought to [facility] ED and admitted to ICU for acute respiratory failure with hypercapnia. CTA chest was negative for PE but significant for a large R pleural effusion with mediastinal and hilar lymphadenopathy, and 3L of fluid was drained.  He completed a 5 day course of empiric antibiotics. Pleural fluid cultures and blood cultures resulted negative.  Suspicious of underlying malignancy secondary to lymphadenopathy and persistent leukocytosis, Heme/Onc was consulted and recommended transfer to [facility] on 2/24/23 for further workup. On 2/28 he underwent an ultrasound guided biopsy of his R supraclavicular mass/lymph node, which later resulted as classic Hodgkin's lymphoma.  On 3/3, he underwent a R thoracotomy/VATS with Pleurex placement. Postoperatively, he was hypotensive and started on Midodrine. A Left chest wall mediport was placed for ongoing chemotherapy treatment on 3/15. PM&R was consulted and deemed him an appropriate candidate for IRF. He was medically stabilized and cleared for discharge to [facility].

## 2023-03-22 NOTE — PROGRESS NOTE ADULT - ASSESSMENT
81 y/o M with PMH of AFIB, HTN and chronic urinary retention (chronic plummer); who presented to [facility] via ambulance on 2/17 with progressively worsening SOB, intubated in the field, found to have a large R pleural effusion with mediastinal and hilar lymphadenopathy s/p thoracentesis (~3L), with persistent leukocytosis and concern for suspicious malignancy was transferred to [facility] on 2/24 for further workup.  He underwent a R supraclavicular mass biopsy on 2/28, significant for classic Hodgkin's lymphoma. On 3/3, he underwent a R thoracotomy/VATS with Pleurex placement. Bone marrow negative for involvement,  (s/p L chest wall mediport placement 3/15). Patient now admitted for a multidisciplinary rehab program. 03-15-23 @ 13:06    #Debility  #Recent hypoxic respiratory failure with hypercapnia   #Recurrent R pleural effusion - exudative  #Hodgkin's Lymphoma  -S/p R supraclavicular mass biopsy-> significant for Classic Hodgkin's Lymphoma  -S/p R thoacotomy/VATS with Pleurex placement 3/3  -Bone marrow biopsy negative  -L chest wall mediport placed 3/15  -Cycle #1 of BV (Brentuximad Vedotin) given on 3/10. Plan for cycle #2 on 3/27 as OP.  -F/u with Dr. Hays outpatient  -Ipratropium-Albuterol q6h   -currently sating 97% on 2L NC- taper oxygen as tolerated    #Insomnia  -trazodone per rehab team    #AFib  #HTN  - on Eliquis  - [id number]  - PO magnesium     #HLD  - Simvastatin    #Urinary retention  -Chronic Plummer   -Flomax  -per urology, cont with plummer    GI ppx - pepcid    d/w rehab team at [facility] [place]

## 2023-03-23 LAB
ALBUMIN SERPL ELPH-MCNC: 2.4 G/DL — LOW (ref 3.3–5)
ALP SERPL-CCNC: 80 U/L — SIGNIFICANT CHANGE UP (ref 40–120)
ALT FLD-CCNC: 33 U/L — SIGNIFICANT CHANGE UP (ref 10–45)
ANION GAP SERPL CALC-SCNC: 5 MMOL/L — SIGNIFICANT CHANGE UP (ref 5–17)
AST SERPL-CCNC: 20 U/L — SIGNIFICANT CHANGE UP (ref 10–40)
BASOPHILS # BLD AUTO: 0.04 K/UL — SIGNIFICANT CHANGE UP (ref 0–0.2)
BASOPHILS NFR BLD AUTO: 1 % — SIGNIFICANT CHANGE UP (ref 0–2)
BILIRUB SERPL-MCNC: 0.7 MG/DL — SIGNIFICANT CHANGE UP (ref 0.2–1.2)
BUN SERPL-MCNC: 7 MG/DL — SIGNIFICANT CHANGE UP (ref 7–23)
CALCIUM SERPL-MCNC: 9 MG/DL — SIGNIFICANT CHANGE UP (ref 8.4–10.5)
CHLORIDE SERPL-SCNC: 99 MMOL/L — SIGNIFICANT CHANGE UP (ref 96–108)
CO2 SERPL-SCNC: 33 MMOL/L — HIGH (ref 22–31)
CREAT SERPL-MCNC: 0.56 MG/DL — SIGNIFICANT CHANGE UP (ref 0.5–1.3)
EGFR: 98 ML/MIN/1.73M2 — SIGNIFICANT CHANGE UP
EOSINOPHIL # BLD AUTO: 0.04 K/UL — SIGNIFICANT CHANGE UP (ref 0–0.5)
EOSINOPHIL NFR BLD AUTO: 1 % — SIGNIFICANT CHANGE UP (ref 0–6)
GLUCOSE SERPL-MCNC: 121 MG/DL — HIGH (ref 70–99)
HCT VFR BLD CALC: 33.3 % — LOW (ref 39–50)
HGB BLD-MCNC: 10.4 G/DL — LOW (ref 13–17)
LYMPHOCYTES # BLD AUTO: 1.4 K/UL — SIGNIFICANT CHANGE UP (ref 1–3.3)
LYMPHOCYTES # BLD AUTO: 35 % — SIGNIFICANT CHANGE UP (ref 13–44)
MANUAL SMEAR VERIFICATION: SIGNIFICANT CHANGE UP
MCHC RBC-ENTMCNC: 29.1 PG — SIGNIFICANT CHANGE UP (ref 27–34)
MCHC RBC-ENTMCNC: 31.2 GM/DL — LOW (ref 32–36)
MCV RBC AUTO: 93 FL — SIGNIFICANT CHANGE UP (ref 80–100)
MONOCYTES # BLD AUTO: 0.84 K/UL — SIGNIFICANT CHANGE UP (ref 0–0.9)
MONOCYTES NFR BLD AUTO: 21 % — HIGH (ref 2–14)
NEUTROPHILS # BLD AUTO: 1.52 K/UL — LOW (ref 1.8–7.4)
NEUTROPHILS NFR BLD AUTO: 38 % — LOW (ref 43–77)
NRBC # BLD: 0 /100 — SIGNIFICANT CHANGE UP (ref 0–0)
PLAT MORPH BLD: NORMAL — SIGNIFICANT CHANGE UP
PLATELET # BLD AUTO: 323 K/UL — SIGNIFICANT CHANGE UP (ref 150–400)
POTASSIUM SERPL-MCNC: 3.7 MMOL/L — SIGNIFICANT CHANGE UP (ref 3.5–5.3)
POTASSIUM SERPL-SCNC: 3.7 MMOL/L — SIGNIFICANT CHANGE UP (ref 3.5–5.3)
PROT SERPL-MCNC: 6.5 G/DL — SIGNIFICANT CHANGE UP (ref 6–8.3)
RBC # BLD: 3.58 M/UL — LOW (ref 4.2–5.8)
RBC # FLD: 20.2 % — HIGH (ref 10.3–14.5)
RBC BLD AUTO: SIGNIFICANT CHANGE UP
SODIUM SERPL-SCNC: 137 MMOL/L — SIGNIFICANT CHANGE UP (ref 135–145)
VARIANT LYMPHS # BLD: 4 % — SIGNIFICANT CHANGE UP (ref 0–6)
WBC # BLD: 4 K/UL — SIGNIFICANT CHANGE UP (ref 3.8–10.5)
WBC # FLD AUTO: 4 K/UL — SIGNIFICANT CHANGE UP (ref 3.8–10.5)

## 2023-03-23 PROCEDURE — 99232 SBSQ HOSP IP/OBS MODERATE 35: CPT

## 2023-03-23 RX ORDER — METOPROLOL TARTRATE 50 MG
12.5 TABLET ORAL DAILY
Refills: 0 | Status: DISCONTINUED | OUTPATIENT
Start: 2023-03-24 | End: 2023-03-24

## 2023-03-23 RX ADMIN — MAGNESIUM OXIDE 400 MG ORAL TABLET 400 MILLIGRAM(S): 241.3 TABLET ORAL at 11:49

## 2023-03-23 RX ADMIN — ALBUTEROL 2.5 MILLIGRAM(S): 90 AEROSOL, METERED ORAL at 08:12

## 2023-03-23 RX ADMIN — Medication 50 MILLIGRAM(S): at 20:09

## 2023-03-23 RX ADMIN — ALBUTEROL 2.5 MILLIGRAM(S): 90 AEROSOL, METERED ORAL at 16:38

## 2023-03-23 RX ADMIN — MAGNESIUM OXIDE 400 MG ORAL TABLET 400 MILLIGRAM(S): 241.3 TABLET ORAL at 18:20

## 2023-03-23 RX ADMIN — SIMVASTATIN 40 MILLIGRAM(S): 20 TABLET, FILM COATED ORAL at 20:09

## 2023-03-23 RX ADMIN — ALBUTEROL 2.5 MILLIGRAM(S): 90 AEROSOL, METERED ORAL at 21:32

## 2023-03-23 RX ADMIN — ESCITALOPRAM OXALATE 10 MILLIGRAM(S): 10 TABLET, FILM COATED ORAL at 11:14

## 2023-03-23 RX ADMIN — Medication 12.5 MILLIGRAM(S): at 05:28

## 2023-03-23 RX ADMIN — CHLORHEXIDINE GLUCONATE 1 APPLICATION(S): 213 SOLUTION TOPICAL at 11:12

## 2023-03-23 RX ADMIN — APIXABAN 5 MILLIGRAM(S): 2.5 TABLET, FILM COATED ORAL at 05:28

## 2023-03-23 RX ADMIN — FAMOTIDINE 20 MILLIGRAM(S): 10 INJECTION INTRAVENOUS at 11:14

## 2023-03-23 RX ADMIN — SENNA PLUS 2 TABLET(S): 8.6 TABLET ORAL at 20:09

## 2023-03-23 RX ADMIN — MAGNESIUM OXIDE 400 MG ORAL TABLET 400 MILLIGRAM(S): 241.3 TABLET ORAL at 07:51

## 2023-03-23 RX ADMIN — APIXABAN 5 MILLIGRAM(S): 2.5 TABLET, FILM COATED ORAL at 18:20

## 2023-03-23 RX ADMIN — Medication 81 MILLIGRAM(S): at 11:14

## 2023-03-23 NOTE — PROGRESS NOTE ADULT - SUBJECTIVE AND OBJECTIVE BOX
Subjective  Seen and examined.  Reports restful night sleep with Trazodone  Reports good energy level  Able to spend considerable duration off oxy  Oxy sat during review today 94% off oxy    Therapy--engaging in therapy, ambulating increasing distance    ROS: no head ache  No dyspnea, nausea/vomiting,   Pleurax drain output remains minimal   LBM 3/22    Vital Signs Last 24 Hrs  T(C): 36.6 (23 Mar 2023 07:36), Max: 36.9 (22 Mar 2023 20:10)  T(F): 97.8 (23 Mar 2023 07:36), Max: 98.4 (22 Mar 2023 20:10)  HR: 80 (23 Mar 2023 08:13) (78 - 86)  BP: 114/70 (23 Mar 2023 07:43) (88/50 - 127/79)  RR: 19 (23 Mar 2023 07:36) (16 - 19)  SpO2: 98% (23 Mar 2023 08:13) (98% - 98%)  O2 Parameters below as of 23 Mar 2023 08:13  Patient On (Oxygen Delivery Method): nasal cannula    Physical Exam:  Constitutional - Comfortable, NC oxy in situ, 2L, occasionally off oxy with normal sats , Oxy sat 94% off oxy  HEENT - NAD  Neck - Supple,   Chest - Air entry diffusely reduced  R>L  Cardio - warm and well perfused, RRR, no murmur; left chem port  Abdomen -  Soft, non tender; +right Pleurex, no output as per records  : +Choi in situ  Extremities - No peripheral edema, No calf tenderness     Neurologic Exam:                 Allert and fully oriented, good energy levels  No speech deficit  Cranial Nerves - No facial asymmetry, Tongue midline, EOMI, Shoulder shrug intact  Motor -                      LEFT    UE - ShAB 5/5, EF 5/5, EE 5/5, WE 5/5,  WNL                    RIGHT UE - ShAB 5/5, EF 5/5, EE 5/5, WE 5/5,  WNL                    LEFT    LE - HF 3/5, KE 5/5, DF 5/5, PF 5/5                    RIGHT LE - HF 3/5, KE 5/5, DF 5/5, PF 5/5        Sensory - Intact to LT bilateral     Reflexes - DTR 2/ 4 , neg Grimaldo's b/l, neg Babinski's b/l     Coordination - FTN / HTS intact  Psychiatric - Mood stable, Affect WNL  Skin--: Pleurex in situ Rt lateral chest  CXR 3/15--unremarkable  < from: Xray Chest 1 View- PORTABLE-Urgent (03.15.23 @ 09:25) >  Left chest wall Mediport with tip in the SVC.  Redemonstrated right chest surgical drain.  Redemonstrated trace right pleural effusion.  No focal consolidations  There is no pneumothorax.  The heart is normal in size  The visualized osseous structures demonstrate no acute pathology.    IMPRESSION:  New left chest wall Mediport with tip in SVC.  No pneumothorax.  No focal consolidations.    3/22--Await CXR report  RECENT LABS/IMAGING                        10.4   4.00  )-----------( 323      ( 23 Mar 2023 06:35 )             33.3     03-23    137  |  99  |  7   ----------------------------<  121<H>  3.7   |  33<H>  |  0.56    Ca    9.0      23 Mar 2023 06:35    TPro  6.5  /  Alb  2.4<L>  /  TBili  0.7  /  DBili  x   /  AST  20  /  ALT  33  /  AlkPhos  80  03-23    MEDICATIONS  (STANDING):  albuterol    0.083% 2.5 milliGRAM(s) Nebulizer every 6 hours  apixaban 5 milliGRAM(s) Oral two times a day  aspirin  chewable 81 milliGRAM(s) Oral daily  chlorhexidine 2% Cloths 1 Application(s) Topical daily  escitalopram 10 milliGRAM(s) Oral daily  famotidine    Tablet 20 milliGRAM(s) Oral daily  magnesium oxide 400 milliGRAM(s) Oral three times a day with meals  polyethylene glycol 3350 17 Gram(s) Oral daily  senna 2 Tablet(s) Oral at bedtime  simvastatin 40 milliGRAM(s) Oral at bedtime  traZODone 50 milliGRAM(s) Oral at bedtime    MEDICATIONS  (PRN):  acetaminophen     Tablet .. 650 milliGRAM(s) Oral every 6 hours PRN Mild Pain (1 - 3)  ALPRAZolam 0.25 milliGRAM(s) Oral daily PRN anxiety  oxyCODONE    IR 5 milliGRAM(s) Oral every 8 hours PRN Moderate Pain (4 - 6)

## 2023-03-23 NOTE — PROGRESS NOTE ADULT - SUBJECTIVE AND OBJECTIVE BOX
Patient is a 82y old  Male who presents with a chief complaint of Debility (22 Mar 2023 13:30)      Subjective and overnight events:  Patient seen and examined at bedside. reports sleeping much better last night but had dizziness when he got up this morning. SBP in 80s around 7:30am. no other complaints. no fever, chills, headache, sob, cp, abd pain.     ALLERGIES:  pertussis vaccines (Rash)  shellfish (Rash)    MEDICATIONS  (STANDING):  albuterol    0.083% 2.5 milliGRAM(s) Nebulizer every 6 hours  apixaban 5 milliGRAM(s) Oral two times a day  aspirin  chewable 81 milliGRAM(s) Oral daily  chlorhexidine 2% Cloths 1 Application(s) Topical daily  escitalopram 10 milliGRAM(s) Oral daily  famotidine    Tablet 20 milliGRAM(s) Oral daily  magnesium oxide 400 milliGRAM(s) Oral three times a day with meals  polyethylene glycol 3350 17 Gram(s) Oral daily  senna 2 Tablet(s) Oral at bedtime  simvastatin 40 milliGRAM(s) Oral at bedtime  traZODone 50 milliGRAM(s) Oral at bedtime    MEDICATIONS  (PRN):  acetaminophen     Tablet .. 650 milliGRAM(s) Oral every 6 hours PRN Mild Pain (1 - 3)  ALPRAZolam 0.25 milliGRAM(s) Oral daily PRN anxiety  oxyCODONE    IR 5 milliGRAM(s) Oral every 8 hours PRN Moderate Pain (4 - 6)    Vital Signs Last 24 Hrs  T(F): 97.8 (23 Mar 2023 07:36), Max: 98.4 (22 Mar 2023 20:10)  HR: 80 (23 Mar 2023 08:13) (78 - 86)  BP: 114/70 (23 Mar 2023 07:43) (88/50 - 127/79)  RR: 19 (23 Mar 2023 07:36) (16 - 19)  SpO2: 98% (23 Mar 2023 08:13) (98% - 98%)  I&O's Summary    22 Mar 2023 07:01  -  23 Mar 2023 07:00  --------------------------------------------------------  IN: 840 mL / OUT: 1150 mL / NET: -310 mL      PHYSICAL EXAM:  General: NAD, awake alert and answering questions   ENT: MMM  Neck: Supple, No JVD  Lungs: Clear to auscultation bilaterally  Cardio: RRR, S1/S2, No murmurs  Abdomen: Soft, Nontender, Nondistended; Bowel sounds present  Extremities: No calf tenderness, No pitting edema    LABS:                        10.4   4.00  )-----------( 323      ( 23 Mar 2023 06:35 )             33.3     03-23    137  |  99  |  7   ----------------------------<  121  3.7   |  33  |  0.56    Ca    9.0      23 Mar 2023 06:35    TPro  6.5  /  Alb  2.4  /  TBili  0.7  /  DBili  x   /  AST  20  /  ALT  33  /  AlkPhos  80  03-23 02-17 Chol -- LDL -- HDL -- Trig 67 mg/dL        COVID-19 PCR: NotDetec (03-17-23 @ 06:46)  COVID-19 PCR: NotDetec (03-14-23 @ 16:40)  COVID-19 PCR: NotDetec (03-06-23 @ 10:55)  COVID-19 PCR: NotDetec (03-02-23 @ 14:30)  COVID-19 PCR: NotDetec (02-23-23 @ 10:28)      RADIOLOGY & ADDITIONAL TESTS:    Care Discussed with Consultants/Other Providers:

## 2023-03-23 NOTE — PROGRESS NOTE ADULT - ASSESSMENT
· Assessment	  Assessment/Plan:  ARIK DUMONT is a 82 year old male with PMH of AFIB, HTN and chronic urinary retention (chronic plummer); who presented to [facility] via ambulance on 2/17 with progressively worsening SOB, intubated in the field, found to have a large R pleural effusion with mediastinal and hilar lymphadenopathy s/p thoracentesis (~3L), with persistent leukocytosis and concern for suspicious malignancy was transferred to [facility] on 2/24 for further workup.  He underwent a R supraclavicular mass biopsy on 2/28, significant for classic Hodgkin's lymphoma. On 3/3, he underwent a R thoracotomy/VATS with Pleurex placement. Bone marrow negative for involvement,  (s/p L chest wall mediport placement 3/15). Patient now admitted for a multidisciplinary rehab program. 03-15-23 @ 13:06    * Due Oncology  3/27 Televisit, Chemo 3/31, DC home 3/30  * Improved energy level  * Await report of  F/U CXR 3/22    Rehab Management/MEDICAL MANAGEMENT     #Debility due to Classic Hodgkin's Lymphoma with resp failure  - Continue  Comprehensive Rehab Program of PT/OT  - 3 hours a day, 5 days a week  - P&O as needed   - SOB with R pleural effusion requiring drainage (~3L)  - S/p R supraclavicular mass biopsy-> significant for Classic Hodgkin's Lymphoma  - S/p R thoacotomy/VATS with Pleurex placement 3/3  - Bone marrow biopsy negative  - Lt chest wall mediport placed 3/15  -- Cycle #1 of BV (Brentuximad Vedotin) given on 3/10  ---Plan for cycle #2 after 3 weeks  --- No plan to receive chemo while in rehab  - F/u with Dr. Hays outpatient 3/27 for cycle 2 chemo  --Heme consult 3/17 and 3/20  and recs appreciated    * Rt pleurax drain output being monitored, drain M/T  CXR 3/22 report awaited    #AFIB  - Eliquis  - Resume on 3/16 (held for L CW mediport on 3/15)    #HTN  - Atenolol     #HLD  - Simvastatin    #Sleep/Mood  - Lexapro--3/20--increase to 10mg daily   - Xanax PRN   - Melatonin PRN     #Skin  - Skin--: right lower back  bone biopsy site redness; right Pleurex--limited output   Drain MWF  - Pressure injury/Skin: OOB to Chair, PT/OT     #Pain Mgmt   - Oxycodone PRN    #GI/Bowel Mgmt   - Continent c/w Senna, Miralax     #/Bladder Mgmt   Chronic Plummer ---f/u Urology recs Plummer drainage to continue   Patient prefers to contnue with Plummer decompression      -Flomax  - F/u with Urology outpt     #FEN   - Diet - Regular + Thins  [DASH]      #Precautions / PROPHYLAXIS:   - Falls   - ortho: Weight bearing status: WBAT   - Lungs: Aspiration, Incentive Spirometer   - DVT: Lovenox  -----------------------------------------------------  IDT conference on 3/21  TDD:  3/25 to home, revised to 3/30  Barriers: Fatigues easily, endurance, 02 requirements, pleurex drain  Social Work: Lives in  with wife and son, 1STE, 10STI, has first floor living. Wife available for support. Daughter has MS.   OT: Setup/sup for eating/grooming. Mod A for transfers and ADLs. Tub/shower- TBD. Goal of mod I.   PT: Min-mod A for transfers. Ambulated 45 ft with RW with min A, shuffling gait with WCF. Negotiated 4 stairs with 2 HRs min-mod A x1. (step to step)   SLP: --n/a    3/21--Liaison with family. I called and spoke on phone with daughter,  Judith Schumacher. She asked questions on all aspects of patient's care --urology/oncology/pulmonary/Rehab  I gave detailed explanation of treatment to date  She explained  that patient has a TV with her primary oncologist 3/20 and chemo on 3/31  She requested for therapy on DC date (revised DC date 3/30), I explained that we do not normally offer therapy on DC date, but we would try to see if we can make accommodation for this  She requested for update from Oncologist follow up patient's care Dr Velez, and I have facilitated this request      -----------------------------------------------------  FOLLOW UP/OUTPATIENT:    Nathaniel Galvan)  Surgery; Thoracic Surgery  [address] [place] [address], [facility]  3rd [place] [address]  Phone: [phone]  Fax: [phone]  Established Patient  Follow Up Time: 2 weeks    UROLOGY  MD Saúl Hamm [facility]  Chloé Vazquez  Scheduled Appointment: 03/27/2023  -----------------------------------------------------

## 2023-03-23 NOTE — PROGRESS NOTE ADULT - ASSESSMENT
81 y/o M with PMH of AFIB, HTN and chronic urinary retention (chronic plummer); who presented to [facility] via ambulance on 2/17 with progressively worsening SOB, intubated in the field, found to have a large R pleural effusion with mediastinal and hilar lymphadenopathy s/p thoracentesis (~3L), with persistent leukocytosis and concern for suspicious malignancy was transferred to [facility] on 2/24 for further workup.  He underwent a R supraclavicular mass biopsy on 2/28, significant for classic Hodgkin's lymphoma. On 3/3, he underwent a R thoracotomy/VATS with Pleurex placement. Bone marrow negative for involvement,  (s/p L chest wall mediport placement 3/15). Patient now admitted for a multidisciplinary rehab program. 03-15-23 @ 13:06    #Debility  #Recent hypoxic respiratory failure with hypercapnia   #Recurrent R pleural effusion - exudative  #Hodgkin's Lymphoma  -S/p R supraclavicular mass biopsy-> significant for Classic Hodgkin's Lymphoma  -S/p R thoacotomy/VATS with Pleurex placement 3/3  -Bone marrow biopsy negative  -L chest wall mediport placed 3/15  -Cycle #1 of BV (Brentuximad Vedotin) given on 3/10. Plan for cycle #2 on 3/27 as OP.  -F/u with Dr. Hays outpatient  -Ipratropium-Albuterol q6h   -currently sating 97% on 2L NC- taper oxygen as tolerated    #Insomnia  -trazodone per rehab team    #AFib  #HTN  - on Eliquis  - PO magnesium   - BP 88/50 this morning, repeat /70  - check orthostatic VS  - encourage po fluid    #HLD  - Simvastatin    #Urinary retention  -Chronic Plummer   -Flomax  -per urology, cont with plummer    GI ppx - pepcid    d/w rehab team at [facility]

## 2023-03-24 ENCOUNTER — TRANSCRIPTION ENCOUNTER (OUTPATIENT)
Age: 83
End: 2023-03-24

## 2023-03-24 VITALS
HEART RATE: 103 BPM | SYSTOLIC BLOOD PRESSURE: 120 MMHG | RESPIRATION RATE: 40 BRPM | TEMPERATURE: 98 F | OXYGEN SATURATION: 95 % | DIASTOLIC BLOOD PRESSURE: 78 MMHG

## 2023-03-24 LAB
ANION GAP SERPL CALC-SCNC: 4 MMOL/L — LOW (ref 5–17)
BASOPHILS # BLD AUTO: 0.03 K/UL — SIGNIFICANT CHANGE UP (ref 0–0.2)
BASOPHILS NFR BLD AUTO: 0.8 % — SIGNIFICANT CHANGE UP (ref 0–2)
BUN SERPL-MCNC: 10 MG/DL — SIGNIFICANT CHANGE UP (ref 7–23)
CALCIUM SERPL-MCNC: 8.7 MG/DL — SIGNIFICANT CHANGE UP (ref 8.4–10.5)
CHLORIDE SERPL-SCNC: 96 MMOL/L — SIGNIFICANT CHANGE UP (ref 96–108)
CK MB BLD-MCNC: 2.8 % — SIGNIFICANT CHANGE UP (ref 0–3.5)
CK MB CFR SERPL CALC: 2.7 NG/ML — SIGNIFICANT CHANGE UP (ref 0.5–10)
CK SERPL-CCNC: 96 U/L — SIGNIFICANT CHANGE UP (ref 30–200)
CO2 SERPL-SCNC: 31 MMOL/L — SIGNIFICANT CHANGE UP (ref 22–31)
CREAT SERPL-MCNC: 0.56 MG/DL — SIGNIFICANT CHANGE UP (ref 0.5–1.3)
D DIMER BLD IA.RAPID-MCNC: 199 NG/ML DDU — SIGNIFICANT CHANGE UP
EGFR: 98 ML/MIN/1.73M2 — SIGNIFICANT CHANGE UP
EOSINOPHIL # BLD AUTO: 0.02 K/UL — SIGNIFICANT CHANGE UP (ref 0–0.5)
EOSINOPHIL NFR BLD AUTO: 0.5 % — SIGNIFICANT CHANGE UP (ref 0–6)
GLUCOSE SERPL-MCNC: 152 MG/DL — HIGH (ref 70–99)
HCT VFR BLD CALC: 28.7 % — LOW (ref 39–50)
HGB BLD-MCNC: 9.2 G/DL — LOW (ref 13–17)
IMM GRANULOCYTES NFR BLD AUTO: 0.5 % — SIGNIFICANT CHANGE UP (ref 0–0.9)
LACTATE SERPL-SCNC: 1.7 MMOL/L — SIGNIFICANT CHANGE UP (ref 0.7–2)
LYMPHOCYTES # BLD AUTO: 1.17 K/UL — SIGNIFICANT CHANGE UP (ref 1–3.3)
LYMPHOCYTES # BLD AUTO: 32 % — SIGNIFICANT CHANGE UP (ref 13–44)
MAGNESIUM SERPL-MCNC: 1.9 MG/DL — SIGNIFICANT CHANGE UP (ref 1.6–2.6)
MCHC RBC-ENTMCNC: 29.5 PG — SIGNIFICANT CHANGE UP (ref 27–34)
MCHC RBC-ENTMCNC: 32.1 GM/DL — SIGNIFICANT CHANGE UP (ref 32–36)
MCV RBC AUTO: 92 FL — SIGNIFICANT CHANGE UP (ref 80–100)
MONOCYTES # BLD AUTO: 1.19 K/UL — HIGH (ref 0–0.9)
MONOCYTES NFR BLD AUTO: 32.5 % — HIGH (ref 2–14)
NEUTROPHILS # BLD AUTO: 1.23 K/UL — LOW (ref 1.8–7.4)
NEUTROPHILS NFR BLD AUTO: 33.7 % — LOW (ref 43–77)
NRBC # BLD: 0 /100 WBCS — SIGNIFICANT CHANGE UP (ref 0–0)
NT-PROBNP SERPL-SCNC: 1311 PG/ML — HIGH (ref 0–300)
PLATELET # BLD AUTO: 389 K/UL — SIGNIFICANT CHANGE UP (ref 150–400)
POTASSIUM SERPL-MCNC: 4.7 MMOL/L — SIGNIFICANT CHANGE UP (ref 3.5–5.3)
POTASSIUM SERPL-SCNC: 4.7 MMOL/L — SIGNIFICANT CHANGE UP (ref 3.5–5.3)
PROCALCITONIN SERPL-MCNC: 0.09 NG/ML — HIGH
RBC # BLD: 3.12 M/UL — LOW (ref 4.2–5.8)
RBC # FLD: 19.9 % — HIGH (ref 10.3–14.5)
SODIUM SERPL-SCNC: 131 MMOL/L — LOW (ref 135–145)
TROPONIN I, HIGH SENSITIVITY RESULT: 68.9 NG/L — SIGNIFICANT CHANGE UP
WBC # BLD: 3.66 K/UL — LOW (ref 3.8–10.5)
WBC # FLD AUTO: 3.66 K/UL — LOW (ref 3.8–10.5)

## 2023-03-24 PROCEDURE — 99222 1ST HOSP IP/OBS MODERATE 55: CPT

## 2023-03-24 PROCEDURE — 99232 SBSQ HOSP IP/OBS MODERATE 35: CPT

## 2023-03-24 PROCEDURE — 99233 SBSQ HOSP IP/OBS HIGH 50: CPT

## 2023-03-24 PROCEDURE — 93970 EXTREMITY STUDY: CPT | Mod: 26

## 2023-03-24 PROCEDURE — 71045 X-RAY EXAM CHEST 1 VIEW: CPT | Mod: 26

## 2023-03-24 PROCEDURE — 93010 ELECTROCARDIOGRAM REPORT: CPT

## 2023-03-24 PROCEDURE — 71250 CT THORAX DX C-: CPT | Mod: 26

## 2023-03-24 RX ORDER — TRAZODONE HCL 50 MG
25 TABLET ORAL
Qty: 0 | Refills: 0 | DISCHARGE
Start: 2023-03-24

## 2023-03-24 RX ORDER — ALPRAZOLAM 0.25 MG
1 TABLET ORAL
Qty: 0 | Refills: 0 | DISCHARGE
Start: 2023-03-24

## 2023-03-24 RX ORDER — APIXABAN 2.5 MG/1
1 TABLET, FILM COATED ORAL
Qty: 0 | Refills: 0 | DISCHARGE

## 2023-03-24 RX ORDER — LEVALBUTEROL 1.25 MG/.5ML
0.63 SOLUTION, CONCENTRATE RESPIRATORY (INHALATION) EVERY 6 HOURS
Refills: 0 | Status: DISCONTINUED | OUTPATIENT
Start: 2023-03-24 | End: 2023-03-25

## 2023-03-24 RX ORDER — LEVALBUTEROL 1.25 MG/.5ML
3 SOLUTION, CONCENTRATE RESPIRATORY (INHALATION)
Qty: 0 | Refills: 0 | DISCHARGE
Start: 2023-03-24

## 2023-03-24 RX ORDER — SIMVASTATIN 20 MG/1
1 TABLET, FILM COATED ORAL
Qty: 0 | Refills: 0 | DISCHARGE

## 2023-03-24 RX ORDER — ALPRAZOLAM 0.25 MG
1 TABLET ORAL
Qty: 0 | Refills: 0 | DISCHARGE

## 2023-03-24 RX ORDER — CHLORHEXIDINE GLUCONATE 213 G/1000ML
1 SOLUTION TOPICAL
Qty: 0 | Refills: 0 | DISCHARGE
Start: 2023-03-24

## 2023-03-24 RX ORDER — SODIUM CHLORIDE 9 MG/ML
500 INJECTION INTRAMUSCULAR; INTRAVENOUS; SUBCUTANEOUS
Refills: 0 | Status: DISCONTINUED | OUTPATIENT
Start: 2023-03-24 | End: 2023-03-24

## 2023-03-24 RX ORDER — SODIUM CHLORIDE 9 MG/ML
1000 INJECTION INTRAMUSCULAR; INTRAVENOUS; SUBCUTANEOUS
Refills: 0 | Status: DISCONTINUED | OUTPATIENT
Start: 2023-03-24 | End: 2023-03-24

## 2023-03-24 RX ORDER — ESCITALOPRAM OXALATE 10 MG/1
1 TABLET, FILM COATED ORAL
Qty: 0 | Refills: 0 | DISCHARGE

## 2023-03-24 RX ORDER — ESCITALOPRAM OXALATE 10 MG/1
1 TABLET, FILM COATED ORAL
Qty: 0 | Refills: 0 | DISCHARGE
Start: 2023-03-24

## 2023-03-24 RX ORDER — LEVALBUTEROL 1.25 MG/.5ML
0.63 SOLUTION, CONCENTRATE RESPIRATORY (INHALATION) ONCE
Refills: 0 | Status: COMPLETED | OUTPATIENT
Start: 2023-03-24 | End: 2023-03-24

## 2023-03-24 RX ORDER — POLYETHYLENE GLYCOL 3350 17 G/17G
17 POWDER, FOR SOLUTION ORAL
Qty: 0 | Refills: 0 | DISCHARGE
Start: 2023-03-24

## 2023-03-24 RX ORDER — SENNA PLUS 8.6 MG/1
2 TABLET ORAL
Qty: 0 | Refills: 0 | DISCHARGE
Start: 2023-03-24

## 2023-03-24 RX ORDER — OXYCODONE HYDROCHLORIDE 5 MG/1
1 TABLET ORAL
Qty: 0 | Refills: 0 | DISCHARGE
Start: 2023-03-24

## 2023-03-24 RX ORDER — ACETAMINOPHEN 500 MG
2 TABLET ORAL
Qty: 0 | Refills: 0 | DISCHARGE
Start: 2023-03-24

## 2023-03-24 RX ORDER — APIXABAN 2.5 MG/1
1 TABLET, FILM COATED ORAL
Qty: 0 | Refills: 0 | DISCHARGE
Start: 2023-03-24

## 2023-03-24 RX ORDER — TRAZODONE HCL 50 MG
25 TABLET ORAL AT BEDTIME
Refills: 0 | Status: DISCONTINUED | OUTPATIENT
Start: 2023-03-24 | End: 2023-03-25

## 2023-03-24 RX ORDER — FAMOTIDINE 10 MG/ML
1 INJECTION INTRAVENOUS
Qty: 0 | Refills: 0 | DISCHARGE
Start: 2023-03-24

## 2023-03-24 RX ORDER — ASPIRIN/CALCIUM CARB/MAGNESIUM 324 MG
1 TABLET ORAL
Qty: 0 | Refills: 0 | DISCHARGE
Start: 2023-03-24

## 2023-03-24 RX ORDER — TAMSULOSIN HYDROCHLORIDE 0.4 MG/1
1 CAPSULE ORAL
Qty: 0 | Refills: 0 | DISCHARGE

## 2023-03-24 RX ORDER — MAGNESIUM OXIDE 400 MG ORAL TABLET 241.3 MG
1 TABLET ORAL
Qty: 0 | Refills: 0 | DISCHARGE
Start: 2023-03-24

## 2023-03-24 RX ORDER — SIMVASTATIN 20 MG/1
1 TABLET, FILM COATED ORAL
Qty: 0 | Refills: 0 | DISCHARGE
Start: 2023-03-24

## 2023-03-24 RX ADMIN — CHLORHEXIDINE GLUCONATE 1 APPLICATION(S): 213 SOLUTION TOPICAL at 11:21

## 2023-03-24 RX ADMIN — Medication 25 MILLIGRAM(S): at 20:44

## 2023-03-24 RX ADMIN — MAGNESIUM OXIDE 400 MG ORAL TABLET 400 MILLIGRAM(S): 241.3 TABLET ORAL at 07:37

## 2023-03-24 RX ADMIN — Medication 81 MILLIGRAM(S): at 11:21

## 2023-03-24 RX ADMIN — FAMOTIDINE 20 MILLIGRAM(S): 10 INJECTION INTRAVENOUS at 11:19

## 2023-03-24 RX ADMIN — LEVALBUTEROL 0.63 MILLIGRAM(S): 1.25 SOLUTION, CONCENTRATE RESPIRATORY (INHALATION) at 16:08

## 2023-03-24 RX ADMIN — MAGNESIUM OXIDE 400 MG ORAL TABLET 400 MILLIGRAM(S): 241.3 TABLET ORAL at 17:19

## 2023-03-24 RX ADMIN — Medication 12.5 MILLIGRAM(S): at 04:57

## 2023-03-24 RX ADMIN — APIXABAN 5 MILLIGRAM(S): 2.5 TABLET, FILM COATED ORAL at 04:56

## 2023-03-24 RX ADMIN — ALBUTEROL 2.5 MILLIGRAM(S): 90 AEROSOL, METERED ORAL at 05:03

## 2023-03-24 RX ADMIN — ESCITALOPRAM OXALATE 10 MILLIGRAM(S): 10 TABLET, FILM COATED ORAL at 11:19

## 2023-03-24 RX ADMIN — LEVALBUTEROL 0.63 MILLIGRAM(S): 1.25 SOLUTION, CONCENTRATE RESPIRATORY (INHALATION) at 19:54

## 2023-03-24 RX ADMIN — ALBUTEROL 2.5 MILLIGRAM(S): 90 AEROSOL, METERED ORAL at 08:16

## 2023-03-24 RX ADMIN — SIMVASTATIN 40 MILLIGRAM(S): 20 TABLET, FILM COATED ORAL at 20:44

## 2023-03-24 RX ADMIN — APIXABAN 5 MILLIGRAM(S): 2.5 TABLET, FILM COATED ORAL at 17:19

## 2023-03-24 RX ADMIN — MAGNESIUM OXIDE 400 MG ORAL TABLET 400 MILLIGRAM(S): 241.3 TABLET ORAL at 12:51

## 2023-03-24 NOTE — DISCHARGE NOTE PROVIDER - CARE PROVIDER_API CALL
Nathaniel Galvan)  Surgery; Thoracic Surgery  [address] [place] [address] Oncology [facility]  3rd [place] [address]  Phone: [phone]  Fax: [phone]  Established Patient  Follow Up Time: 2 weeks

## 2023-03-24 NOTE — PROGRESS NOTE ADULT - ASSESSMENT
83 y/o M with PMH of AFIB, HTN and chronic urinary retention (chronic plummer); who presented to [facility] via ambulance on 2/17 with progressively worsening SOB, intubated in the field, found to have a large R pleural effusion with mediastinal and hilar lymphadenopathy s/p thoracentesis (~3L), with persistent leukocytosis and concern for suspicious malignancy was transferred to [facility] on 2/24 for further workup.  He underwent a R supraclavicular mass biopsy on 2/28, significant for classic Hodgkin's lymphoma. On 3/3, he underwent a R thoracotomy/VATS with Pleurex placement. Bone marrow negative for involvement,  (s/p L chest wall mediport placement 3/15). Patient now admitted for a multidisciplinary rehab program. 03-15-23 @ 13:06    #Debility  #Recent hypoxic respiratory failure with hypercapnia   #Recurrent R pleural effusion - exudative  #Hodgkin's Lymphoma  -S/p R supraclavicular mass biopsy-> significant for Classic Hodgkin's Lymphoma  -S/p R thoacotomy/VATS with Pleurex placement 3/3  -Bone marrow biopsy negative  -L chest wall mediport placed 3/15  -Cycle #1 of BV (Brentuximad Vedotin) given on 3/10. Plan for cycle #2 on 3/27 as OP.  -F/u with Dr. Hays outpatient  -Ipratropium-Albuterol q6h   -currently sating 97% on 2L NC- taper oxygen as tolerated  - CXR on 3/22: minimal effusion    #Insomnia  -trazodone per rehab team    #AFib  #HTN  # hypotension in am  # Dizziness, likely due to hypotension  - held Torpol XL 3/24  - on Eliquis  - PO magnesium   - check orthostatic VS  - encourage po fluid  - if patient is still symptomatic will give  mls only. CXR minimal effusion. on PleurX  - educated about OH and fall precautions  - I and OS  _ keep K>4 and Mg>2    #HLD  - Simvastatin    #Urinary retention  -Chronic Plummer   -Flomax  -per urology, cont with plummer    # Moderate protein-calorie malnutrition.  - nutrition eval ongoing    GI ppx - pepcid    d/w Dr. FUNEZ

## 2023-03-24 NOTE — PROGRESS NOTE ADULT - SUBJECTIVE AND OBJECTIVE BOX
Medicine Progress Note    Patient is a 82y old  Male who presents with a chief complaint of Debility due to Hodgkin's lymphoma and resp failure (23 Mar 2023 09:36)      SUBJECTIVE / OVERNIGHT EVENTS:  dizziness even while lying flat. worse on standing.  no other complaints  per RN, patient was hypotensive in am. got Toprol in am.     ADDITIONAL REVIEW OF SYSTEMS:  denied fever/chills/CP/SOB/cough/palpitation/dizziness/abdominal pian/nausea/vomiting/diarrhoea/constipation/dysuria/leg or calf pain/headaches.all other ROS neg    MEDICATIONS  (STANDING):  albuterol    0.083% 2.5 milliGRAM(s) Nebulizer every 6 hours  apixaban 5 milliGRAM(s) Oral two times a day  aspirin  chewable 81 milliGRAM(s) Oral daily  chlorhexidine 2% Cloths 1 Application(s) Topical daily  escitalopram 10 milliGRAM(s) Oral daily  famotidine    Tablet 20 milliGRAM(s) Oral daily  magnesium oxide 400 milliGRAM(s) Oral three times a day with meals  polyethylene glycol 3350 17 Gram(s) Oral daily  senna 2 Tablet(s) Oral at bedtime  simvastatin 40 milliGRAM(s) Oral at bedtime  sodium chloride 0.9%. 500 milliLiter(s) (50 mL/Hr) IV Continuous <Continuous>  traZODone 50 milliGRAM(s) Oral at bedtime    MEDICATIONS  (PRN):  acetaminophen     Tablet .. 650 milliGRAM(s) Oral every 6 hours PRN Mild Pain (1 - 3)  ALPRAZolam 0.25 milliGRAM(s) Oral daily PRN anxiety  oxyCODONE    IR 5 milliGRAM(s) Oral every 8 hours PRN Moderate Pain (4 - 6)    CAPILLARY BLOOD GLUCOSE        I&O's Summary    23 Mar 2023 07:01  -  24 Mar 2023 07:00  --------------------------------------------------------  IN: 150 mL / OUT: 1125 mL / NET: -975 mL        PHYSICAL EXAM:  Vital Signs Last 24 Hrs  T(C): 36.4 (24 Mar 2023 07:45), Max: 36.6 (23 Mar 2023 20:23)  T(F): 97.5 (24 Mar 2023 07:45), Max: 97.9 (23 Mar 2023 20:23)  HR: 98 (24 Mar 2023 08:20) (79 - 99)  BP: 104/64 (24 Mar 2023 09:26) (88/48 - 115/81)  BP(mean): --  RR: 21 (24 Mar 2023 07:45) (21 - 21)  SpO2: 95% (24 Mar 2023 08:20) (95% - 97%)    Parameters below as of 24 Mar 2023 08:20  Patient On (Oxygen Delivery Method): nasal cannula      GENERAL: Not in distress. Alert    HEENT: clear conjuctiva, MMM. no pallor or icterus  CARDIOVASCULAR: RRR S1, S2. No murmur/rubs/gallop  LUNGS: BLAE+, no rales, no wheezing, no rhonchi.    ABDOMEN: ND. Soft,  NT, no guarding / rebound / rigidity. BS normoactive  BACK: No spine tenderness.  EXTREMITIES: no edema. no leg or calf TP.  SKIN: warm and dry  PSYCHIATRIC: Calm.  No agitation.  CNS:AAO*3. moving limbs     LABS:                        10.4   4.00  )-----------( 323      ( 23 Mar 2023 06:35 )             33.3     03-23    137  |  99  |  7   ----------------------------<  121<H>  3.7   |  33<H>  |  0.56    Ca    9.0      23 Mar 2023 06:35    TPro  6.5  /  Alb  2.4<L>  /  TBili  0.7  /  DBili  x   /  AST  20  /  ALT  33  /  AlkPhos  80  03-23              COVID-19 PCR: NotDetec (17 Mar 2023 06:46)  COVID-19 PCR: NotDetec (14 Mar 2023 16:40)  COVID-19 PCR: NotDetec (06 Mar 2023 10:55)  COVID-19 PCR: NotDetec (02 Mar 2023 14:30)  COVID-19 PCR: NotDetec (23 Feb 2023 10:28)      RADIOLOGY & ADDITIONAL TESTS:  Imaging from Last 24 Hours:    Electrocardiogram/QTc Interval:    COORDINATION OF CARE:  Care Discussed with Consultants/Other Providers:

## 2023-03-24 NOTE — CHART NOTE - NSCHARTNOTEFT_GEN_A_CORE
Follow up review for chest discomfort, Tachycardia and increased Resp rate    Patient seen and examined,  His two sons present at bed side    Patient report central chest discomfort with cough  No chest pain   Tachycardia noted during therapy today HR 96--115  with slightly fluctuating systolic BP    Sitting in bed  NC oxy in situ  Tachypneic RR 25,   Fully oriented and alert but fatigued  Chest--Decreased breath sounds Right lung and lower left lobes  Abd--soft   Ext --no edema    PROCEDURE DATE:  03/24/2023    AP chest. Comparison 3/22/2023.  IMPRESSION: Low lung volumes. Left chest port right chest tube   reidentified in position. New small to moderate loculated right pleural   effusion. Increase in patchy consolidation/atelectasis right base.   Discoid atelectasis left midlung. No pneumothorax.      RECENT LABS/IMAGING                        9.2    3.66  )-----------( 389      ( 24 Mar 2023 15:00 )             28.7     03-24    131<L>  |  96  |  10  ----------------------------<  152<H>  4.7   |  31  |  0.56    Ca    8.7      24 Mar 2023 15:00  Mg     1.9     03-24    TPro  6.5  /  Alb  2.4<L>  /  TBili  0.7  /  DBili  x   /  AST  20  /  ALT  33  /  AlkPhos  80  03-23    Dx--Interval Clinical progression with increasing loculated effusion and atelectasis  Patient is fatigued and tachypneic, unable to engage in therapy  Patient has multiple significant medical conditions including a new Dx of Lymphoma s/p 1st cycle of chemo, due next cycle 3/31.    D/W Hospitalist, recommending transfer to acute care for higher level of care/monitoring  Awaiting CT chest as recommended by Pulmonary/ICU team    I called on phone, patient's daughter  Judith. I discussed patient's interval clinical deterioration and plan for CT chest (urgent)  'Monitoring by Pulmonary and recommendation for Acute care transfer    Medicine/Pulmonary want to review CT chest and determine most appropriate transfer location--[place] or [facility]  Await pending lab results--including BNP    Continue frequent vitals monitoring every 30 mins  Monitor Rt Pleuax drain output  Inform Hospitalist/Pulmonary team if any change progression of clinical status
Nutrition Follow Up Note  Hospital Course   (Per Electronic Medical Record)    Source:  Patient [X]  Medical Record [X]      Diet: Diet, Regular:   Supplement Feeding Modality:  Oral  Ensure Plus High Protein Cans or Servings Per Day:  1       Frequency:  Daily (03-16-23 @ 13:37) [Active]    At this time patient w/ adequate appetite/intake consuming % of meals. Reviewed preferences and menu alternatives; likes vanilla ensure, dislikes chocolate ensure, noted in Kardex. No issues w/ chewing and swallowing. Denies N/V/C/D, last BM 3/19 Per nursing flowsheets.  Noted:  Na133 mmol/L<L>, continue to monitor.     Enteral/Parenteral Nutrition: Not Applicable    Current Weight: 188.2lb on 3/19    Pertinent Medications: MEDICATIONS  (STANDING):  albuterol    0.083% 2.5 milliGRAM(s) Nebulizer every 6 hours  apixaban 5 milliGRAM(s) Oral two times a day  aspirin  chewable 81 milliGRAM(s) Oral daily  chlorhexidine 2% Cloths 1 Application(s) Topical daily  escitalopram 10 milliGRAM(s) Oral daily  famotidine    Tablet 20 milliGRAM(s) Oral daily  magnesium oxide 400 milliGRAM(s) Oral three times a day with meals  metoprolol succinate ER 12.5 milliGRAM(s) Oral daily  polyethylene glycol 3350 17 Gram(s) Oral daily  ramelteon 8 milliGRAM(s) Oral at bedtime  senna 2 Tablet(s) Oral at bedtime  simvastatin 40 milliGRAM(s) Oral at bedtime    MEDICATIONS  (PRN):  acetaminophen     Tablet .. 650 milliGRAM(s) Oral every 6 hours PRN Mild Pain (1 - 3)  ALPRAZolam 0.25 milliGRAM(s) Oral daily PRN anxiety  oxyCODONE    IR 5 milliGRAM(s) Oral every 8 hours PRN Moderate Pain (4 - 6)      Pertinent Labs:  03-20 Na133 mmol/L<L> Glu 119 mg/dL<H> K+ 4.1 mmol/L Cr  0.55 mg/dL BUN 7 mg/dL 03-15 Phos 2.8 mg/dL 03-20 Alb 2.3 g/dL<L>    Skin: No pressure injury per nursing flowsheets    Edema: No edema noted per nursing flowsheet    Last Bowel Movement: on 3/19    Estimated Needs:   [X] No Change Since Previous Assessment    Previous Nutrition Diagnosis:   Malnutrition...   Moderate, Chronic  related to decreased ability to meet estimated energy requirements.   as evidenced by subcutaneous fat loss and muscle depletion     New Nutrition Diagnosis: [X] Not Applicable    Interventions:   1. Recommend Continue Nutrition Plan of Care.    Monitoring & Evaluation:   [X] Weights   [X] PO Intake   [X] Skin Integrity   [X] Follow Up (Per Protocol)  [X] Tolerance to Diet Prescription   [X] Other: Labs & electrolytes    Registered Dietitian/Nutritionist Remains Available.  Zeinab Powell RD, MS, CDN    Phone# [phone]
Patient was evaluated earlier at the bedside this evening for tachypnea. Patient with RR 40s and SpO2 low-90s. Does not report coughing, wheezing, SOB, or pain at the bedside. Does not appear to be in acute distress; on 2L NC. Lungs sounds clear on the Left, but diminished on the Right; tachycardia on auscultation. Abdominal exam was unremarkable.     Receiving Xoponex at the bedside during encounter with improvement of SpO2 from 91% to 95%. Plan to give Xanax PRN for anxiety and increase NC to 3L. Continue to monitor. Plan discussed with nocturnist.
[facility] Interdisciplinary Plan of Care    REHABILITATION DIAGNOSIS:  Debility due to Hodgkin's Lymphoma          COMORBIDITIES/COMPLICATING CONDITIONS IMPACTING REHABILITATION:  HEALTH ISSUES - PROBLEM Dx:  Hodgkins lymphoma          PAST MEDICAL & SURGICAL HISTORY:  Atrial fibrillation      Hypertension      Urinary retention      No significant past surgical history          Based upon consideration of the patient's impairments, functional status, complicating conditions and any other contributing factors and after information garnered from the assessments of all therapy disciplines involved in treating the patient and other pertinent clinicians:    INTERDISCIPLINARY REHABILITATION INTERVENTIONS:    [ X  ] Transfer Training  [ X  ] Bed Mobility  [ X  ] Therapeutic Exercise  [ X ] Balance/Coordination Exercises  [ X ] Locomotion retraining  [ X  ] Stairs  [  X ] Functional Transfer Training  [   ] Bowel/Bladder program  [ x  ] Pain Management  [   ] Skin/Wound Care  [   ] Visual/Perceptual Training  [   ] Therapeutic Recreation Activities  [   ] Neuromuscular Re-education  [ X  ] Activities of Daily Living  [   ] Speech Exercise  [   ] Swallowing Exercises  [   ] Vital Stim  [   ] Dietary Supplements  [   ] Calorie Count  [   ] Cognitive Exercises  [   ] Congnitive/Linguistic Treatment  [   ] Behavior Program  [   ] Neuropsych Therapy  [ X  ] Patient/Family Counseling  [ X ] Family Training  [ X  ] Community Re-entry  [   ] Orthotic Evaluation  [   ] Prosthetic Eval/Training    MEDICAL PROGNOSIS:  fair    REHAB POTENTIAL:  Fair   EXPECTED DAILY THERAPY:         PT: 1 hr        OT: 1 hr        ST: 1 hr        P&O: n/a    EXPECTED INTENSITY OF PROGRAM:  3 hrs / Day    EXPECTED FREQUENCY OF PROGRAM: 5 Days/ Week    ESTIMATED LOS:  [  ] 5-7 Days  [  ] 7-10 Days  [x  ] 10- 14 Days  [  ] 14- 18 Days  [  ] 18- 21 Days    ESTIMATED DISPOSITION:  [  ] Home   [  ] Home with Outpatient Therapies  [ x ] Home with Home Therapies  [  ] Assisted Living  [  ] Nursing Home  [  ] Long Term Acute Care    INTERDISCIPLINARY FUNCTIONAL OUTCOMES/GOALS:         Gait/Mobility: 5 with gait device        Transfers: 5       ADLs: 5       Functional Transfers: 6       Medication Management: 6       Communication: 6       Cognitive: 6       Dysphagia: 5       Bladder 5, able to use straight cath with supervision       Bowel: 5     Functional Independent Measures:   7 = Independent  6 = Modified Independent  5 = Supervision  4 = Minimal Assist/ Contact Guard  3 = Moderate Assistance  2 = Maximum Assistance  1 = Total Assistance  0 = Unable to assess

## 2023-03-24 NOTE — PROGRESS NOTE ADULT - SUBJECTIVE AND OBJECTIVE BOX
Subjective  Seen and examined  He reports fatigue with dizziness on waking up  Took Trazodone a bit early, slept till 2am and has been awake since then, agreed to try lower dose of trazodone 25mg this night  No dyspnea  Oxy sat remains > 90% on 1L NC oxy  Noted to be coughing while drinking water and while eating, SLP F/U bed side swallow evaluation requested    Therapy--engaging in therapy, doing LE exercises during review    ROS: no head ache  No dyspnea, nausea/vomiting,   Pleurax drain output remains minimal, last drained yesterday   LBM 3/23    Vital Signs Last 24 Hrs  T(C): 36.4 (24 Mar 2023 07:45), Max: 36.6 (23 Mar 2023 20:23)  T(F): 97.5 (24 Mar 2023 07:45), Max: 97.9 (23 Mar 2023 20:23)  HR: 98 (24 Mar 2023 08:20) (79 - 99)  BP: 104/64 (24 Mar 2023 09:26) (88/48 - 115/81)  RR: 21 (24 Mar 2023 07:45) (21 - 21)  SpO2: 95% (24 Mar 2023 08:20) (95% - 97%)  O2 Parameters below as of 24 Mar 2023 08:20  Patient On (Oxygen Delivery Method): nasal cannula      Physical Exam:  Constitutional - Comfortable, NC oxy in situ, 1L, occasionally off oxy with normal sats, no dyspnea  HEENT - NAD  Neck - Supple,   Chest - Air entry diffusely reduced  R>L  Cardio - warm and well perfused, RRR, no murmur; left chem port  Abdomen -  Soft, non tender; +right Pleurex, no output minimal  : +Choi in situ  Extremities - No peripheral edema, No calf tenderness     Neurologic Exam:                 Allert and fully oriented, good energy levels  No speech deficit  Cranial Nerves - No facial asymmetry, Tongue midline, EOMI, Shoulder shrug intact  Motor - UE 5/5, LE 4/5 except Hips 3+/5     Sensory - Intact to LT bilateral     Reflexes - 2+     Coordination - FTN / HTS intact  Psychiatric - Mood stable, Affect WNL  Skin--: Pleurex in situ Rt lateral chest  CXR 3/15--unremarkable  < from: Xray Chest 1 View- PORTABLE-Urgent (03.15.23 @ 09:25) >  Left chest wall Mediport with tip in the SVC.  Redemonstrated right chest surgical drain.  Redemonstrated trace right pleural effusion.  No focal consolidations  There is no pneumothorax.  The heart is normal in size  The visualized osseous structures demonstrate no acute pathology.    IMPRESSION:  New left chest wall Mediport with tip in SVC.  No pneumothorax.  No focal consolidations.    3/22-  < from: Xray Chest 1 View- PORTABLE-Routine (Xray Chest 1 View- PORTABLE-Routine in AM.) (03.22.23 @ 09:57) >    COMPARISON: 3/15/2023    FINDINGS:  Heart/Vascular: The heart size, mediastinum, hilum and aorta are enlarged   and stable in appearance.  Pulmonary: Midline trachea. Right Pleurx catheter unchanged. Small   residual right effusion and lower lobe atelectasis. Minimal left-sided   subsegmental atelectasis. No pulmonary venous congestion or pneumothorax.    Bones: There is no fracture.  Lines and catheter: Tip of left Port-A-Cath at cavoatrial junction.    Impression:    Right Pleurx catheter unchanged. Small residual right effusion and lower   lobe atelectasis.    < end of copied text >                          10.4   4.00  )-----------( 323      ( 23 Mar 2023 06:35 )             33.3     03-23    137  |  99  |  7   ----------------------------<  121<H>  3.7   |  33<H>  |  0.56    Ca    9.0      23 Mar 2023 06:35    TPro  6.5  /  Alb  2.4<L>  /  TBili  0.7  /  DBili  x   /  AST  20  /  ALT  33  /  AlkPhos  80  03-23    MEDICATIONS  (STANDING):  albuterol    0.083% 2.5 milliGRAM(s) Nebulizer every 6 hours  apixaban 5 milliGRAM(s) Oral two times a day  aspirin  chewable 81 milliGRAM(s) Oral daily  chlorhexidine 2% Cloths 1 Application(s) Topical daily  escitalopram 10 milliGRAM(s) Oral daily  famotidine    Tablet 20 milliGRAM(s) Oral daily  magnesium oxide 400 milliGRAM(s) Oral three times a day with meals  polyethylene glycol 3350 17 Gram(s) Oral daily  senna 2 Tablet(s) Oral at bedtime  simvastatin 40 milliGRAM(s) Oral at bedtime  traZODone 50 milliGRAM(s) Oral at bedtime    MEDICATIONS  (PRN):  acetaminophen     Tablet .. 650 milliGRAM(s) Oral every 6 hours PRN Mild Pain (1 - 3)  ALPRAZolam 0.25 milliGRAM(s) Oral daily PRN anxiety  oxyCODONE    IR 5 milliGRAM(s) Oral every 8 hours PRN Moderate Pain (4 - 6)

## 2023-03-24 NOTE — CONSULT NOTE ADULT - CONSULT REASON
Pt's family request for increased Lexapro, depression
Urinary retention / Atonic bladder
Pleural Effusion
hypotension rapid afib
medical comanagement
HD
pleural effusion with tachypnea

## 2023-03-24 NOTE — DISCHARGE NOTE PROVIDER - DETAILS OF MALNUTRITION DIAGNOSIS/DIAGNOSES
This patient has been assessed with a concern for Malnutrition and was treated during this hospitalization for the following Nutrition diagnosis/diagnoses:     -  03/16/2023: Moderate protein-calorie malnutrition

## 2023-03-24 NOTE — PROGRESS NOTE ADULT - ASSESSMENT
82 year old male with PMH of AFIB, HTN and chronic urinary retention (chronic plummer); who presented to [facility] via ambulance on 2/17 with progressively worsening SOB.  He was intubated in the field and brought to [facility] ED and admitted to ICU for acute respiratory failure with hypercapnia. CTA chest was negative for PE but significant for a large R pleural effusion with mediastinal and hilar lymphadenopathy, and 3L of fluid was drained.  He completed a 5 day course of empiric antibiotics. Pleural fluid cultures and blood cultures resulted negative.  Suspicious of underlying malignancy secondary to lymphadenopathy and persistent leukocytosis, Heme/Onc was consulted and recommended transfer to [facility] on 2/24/23 for further workup. On 2/28 he underwent an ultrasound guided biopsy of his R supraclavicular mass/lymph node, which later resulted as classic Hodgkin's lymphoma.  On 3/3, he underwent a R thoracotomy/VATS with Pleurex placement. Postoperatively, he was hypotensive and started on Midodrine. A Left chest wall mediport was placed for ongoing chemotherapy treatment on 3/15. PM&R was consulted and deemed him an appropriate candidate for IRF. He was medically stabilized and cleared for discharge to [facility].

## 2023-03-24 NOTE — DISCHARGE NOTE PROVIDER - NSDCCPCAREPLAN_GEN_ALL_CORE_FT
PRINCIPAL DISCHARGE DIAGNOSIS  Diagnosis: Acute respiratory failure with hypoxia  Assessment and Plan of Treatment: Continue NC oxygen, maintain oxy sat >90%      SECONDARY DISCHARGE DIAGNOSES  Diagnosis: Pleural effusion  Assessment and Plan of Treatment: Rt pleurax drainage 3 times a week, max 1L drainage/pda    Diagnosis: Pleural effusion  Assessment and Plan of Treatment:      PRINCIPAL DISCHARGE DIAGNOSIS  Diagnosis: Acute respiratory failure  Assessment and Plan of Treatment: You were admitted to [facility] for acute rehab after being treated for acute hypoxic respiratory failure. On 3/24, you developed chest discomfort with low blood pressure and rapid heart rate. In addition, you had a chest xray that showed increase in the size of your pleural effusion. Following discussions with your surgeon, Dr. Galvan, the decision was made to transfer you to [facility] for further workup and management.      SECONDARY DISCHARGE DIAGNOSES  Diagnosis: Pleural effusion  Assessment and Plan of Treatment: A chest xray on 3/24 showed increase in the size of your pleural effusion. You are being transferred to [facility] for further management.    Diagnosis: Hodgkins lymphoma  Assessment and Plan of Treatment: You completed your first cycle of chemotherapy on 3/10 and you are due for your second dose 3/31. Please follow up with your hematologist/oncologist for further management.

## 2023-03-24 NOTE — DISCHARGE NOTE PROVIDER - HOSPITAL COURSE
Alexandru Martin is an 82 year old male with PMH of AFIB, HTN and chronic urinary retention (chronic plummer); who presented to [facility] via ambulance on 2/17 with progressively worsening SOB.  He was intubated in the field and brought to [facility] ED and admitted to ICU for acute respiratory failure with hypercapnia. CTA chest was negative for PE but significant for a large R pleural effusion with mediastinal and hilar lymphadenopathy, and 3L of fluid was drained.  He completed a 5 day course of empiric antibiotics. Pleural fluid cultures and blood cultures resulted negative.  Suspicious of underlying malignancy secondary to lymphadenopathy and persistent leukocytosis, Heme/Onc was consulted and recommended transfer to [facility] on 2/24/23 for further workup. On 2/28 he underwent an ultrasound guided biopsy of his R supraclavicular mass/lymph node, which later resulted as classic Hodgkin's lymphoma.  On 3/3, he underwent a R thoracotomy/VATS with Pleurex placement. Postoperatively, he was hypotensive and started on Midodrine. A Left chest wall mediport was placed for ongoing chemotherapy treatment on 3/15. PM&R was consulted and deemed him an appropriate candidate for IRF. He was medically stabilized and cleared for discharge to [facility].         You made some functional progress, ambulating with walker, but distance limited, due to exertional fatigue  Your Rt pleurax train output remained minimal  Today 3/25, you developed chest discomfort., low systolic BP and mild tachycardia.  EKG showed Afib with rapid ventricular rythmn  On exam, you had increased resp rate  CXR and  CT chest showed increased Rt sided effusion with loculation and increasing atelectasis  You were seen by the Pulmonary team  We discussed with [facility] Cardiothoracic team where you had the Rt pleurax train inserted, requesting transfer of care to evaluate the train and continue care in a monitored setting    We discussed plan for T/F to acute care with your family--sons and your daughter  Judith. they agreed to this plan        Your were clinically stable, although mildly tachypneic with increased oxy requirement  Review today 3/24/23     Alexandru Martin is an 82 year old male with PMH of AFIB, HTN and chronic urinary retention (chronic plummer); who presented to [facility] via ambulance on 2/17 with progressively worsening SOB.  He was intubated in the field and brought to [facility] ED and admitted to ICU for acute respiratory failure with hypercapnia. CTA chest was negative for PE but significant for a large R pleural effusion with mediastinal and hilar lymphadenopathy, and 3L of fluid was drained.  He completed a 5 day course of empiric antibiotics. Pleural fluid cultures and blood cultures resulted negative.  Suspicious of underlying malignancy secondary to lymphadenopathy and persistent leukocytosis, Heme/Onc was consulted and recommended transfer to [facility] on 2/24/23 for further workup. On 2/28 he underwent an ultrasound guided biopsy of his R supraclavicular mass/lymph node, which later resulted as classic Hodgkin's lymphoma.  On 3/3, he underwent a R thoracotomy/VATS with Pleurex placement. Postoperatively, he was hypotensive and started on Midodrine. A Left chest wall mediport was placed for ongoing chemotherapy treatment on 3/15. PM&R was consulted and deemed him an appropriate candidate for IRF. He was medically stabilized and cleared for discharge to [facility].     ACUTE REHAB TREATMENT COURSE  You made some functional progress, ambulating with walker, but distance limited, due to exertional fatigue  Your Rt pleurax train output remained minimal  Today 3/25, you developed chest discomfort., low systolic BP and mild tachycardia.  EKG showed Afib with rapid ventricular rythmn  On exam, you had increased resp rate  CXR and  CT chest showed increased Rt sided effusion with loculation and increasing atelectasis  You were seen by the Pulmonary team  We discussed with [facility] Cardiothoracic team where you had the Rt pleurax train inserted, requesting transfer of care to evaluate the train and continue care in a monitored setting  They recommended T/F to medicine for acute care.    We discussed plan for T/F to acute care with your family--sons and your daughter  Judith. they agreed to this plan    IDT conference on 3/21  TDD:  3/25 to home, revised to 3/30  Barriers: Fatigues easily, endurance, 02 requirements, pleurex drain  Social Work: Lives in  with wife and son, 1STE, 10STI, has first floor living. Wife available for support. Daughter has MS.   OT: Setup/sup for eating/grooming. Mod A for transfers and ADLs. Tub/shower- TBD. Goal of mod I.   PT: Min-mod A for transfers. Ambulated 45 ft with RW with min A, shuffling gait with WCF. Negotiated 4 stairs with 2 HRs min-mod A x1. (step to step)   SLP: --n/a    Your were clinically stable, although mildly tachypneic with increased oxy requirement  Review today 3/24/23 Alexandru Martin is an 82 year old male with PMH of AFIB, HTN and chronic urinary retention (chronic plummer); who presented to [facility] via ambulance on 2/17 with progressively worsening SOB.  He was intubated in the field and brought to [facility] ED and admitted to ICU for acute respiratory failure with hypercapnia. CTA chest was negative for PE but significant for a large R pleural effusion with mediastinal and hilar lymphadenopathy, and 3L of fluid was drained.  He completed a 5 day course of empiric antibiotics. Pleural fluid cultures and blood cultures resulted negative.  Suspicious of underlying malignancy secondary to lymphadenopathy and persistent leukocytosis, Heme/Onc was consulted and recommended transfer to [facility] on 2/24/23 for further workup. On 2/28 he underwent an ultrasound guided biopsy of his R supraclavicular mass/lymph node, which later resulted as classic Hodgkin's lymphoma.  On 3/3, he underwent a R thoracotomy/VATS with Pleurex placement. Postoperatively, he was hypotensive and started on Midodrine. A Left chest wall mediport was placed for ongoing chemotherapy treatment on 3/15. PM&R was consulted and deemed him an appropriate candidate for IRF. He was medically stabilized and cleared for discharge to [facility].       ACUTE REHAB TREATMENT COURSE  You made some functional progress, ambulating with walker, but distance limited, due to exertional fatigue  Your Rt pleurax train output remained minimal  Today 3/24, you developed chest discomfort., low systolic BP and mild tachycardia.  EKG showed Afib with rapid ventricular rythmn  On exam, you had increased resp rate  CXR and  CT chest showed increased Rt sided effusion with loculation and increasing atelectasis  You were seen by the Pulmonary team  We discussed with [facility] Cardiothoracic team where you had the Rt pleurax train inserted, requesting transfer of care to evaluate the train and continue care in a monitored setting  They recommended T/F to medicine for acute care.    We discussed plan for T/F to acute care with your family--sons and your daughter  Judith. they agreed to this plan    IDT conference on 3/21  TDD:  3/25 to home, revised to 3/30  Barriers: Fatigues easily, endurance, 02 requirements, pleurex drain  Social Work: Lives in  with wife and son, 1STE, 10STI, has first floor living. Wife available for support. Daughter has MS.   OT: Setup/sup for eating/grooming. Mod A for transfers and ADLs. Tub/shower- TBD. Goal of mod I.   PT: Min-mod A for transfers. Ambulated 45 ft with RW with min A, shuffling gait with WCF. Negotiated 4 stairs with 2 HRs min-mod A x1. (step to step)   SLP: --n/a    Your were clinically stable, although mildly tachypneic with increased oxy requirement  Review today 3/24/23

## 2023-03-24 NOTE — DISCHARGE NOTE NURSING/CASE MANAGEMENT/SOCIAL WORK - NSDCPEFALRISK_GEN_ALL_CORE
For information on Fall & Injury Prevention, visit: [email or web address] OR  [email or web address] OR  https://www.cdc.gov/steadi/patient.html

## 2023-03-24 NOTE — PROGRESS NOTE ADULT - SUBJECTIVE AND OBJECTIVE BOX
Follow-up Pulmonary Progress Note  Chief Complaint : Other malaise    was asked to follow up with patient as had hypotensive episode this am  when seen patient comfortable   on 1-2 L n/c sat in 97  pt has no complaints to me.       Allergies :pertussis vaccines (Rash)  shellfish (Rash)      PAST MEDICAL & SURGICAL HISTORY:  Atrial fibrillation    Hypertension    Urinary retention    No significant past surgical history        Medications:  MEDICATIONS  (STANDING):  apixaban 5 milliGRAM(s) Oral two times a day  aspirin  chewable 81 milliGRAM(s) Oral daily  chlorhexidine 2% Cloths 1 Application(s) Topical daily  escitalopram 10 milliGRAM(s) Oral daily  famotidine    Tablet 20 milliGRAM(s) Oral daily  levalbuterol Inhalation 0.63 milliGRAM(s) Inhalation once  levalbuterol Inhalation 0.63 milliGRAM(s) Inhalation every 6 hours  magnesium oxide 400 milliGRAM(s) Oral three times a day with meals  polyethylene glycol 3350 17 Gram(s) Oral daily  senna 2 Tablet(s) Oral at bedtime  simvastatin 40 milliGRAM(s) Oral at bedtime  traZODone 25 milliGRAM(s) Oral at bedtime    MEDICATIONS  (PRN):  acetaminophen     Tablet .. 650 milliGRAM(s) Oral every 6 hours PRN Mild Pain (1 - 3)  ALPRAZolam 0.25 milliGRAM(s) Oral daily PRN anxiety  oxyCODONE    IR 5 milliGRAM(s) Oral every 8 hours PRN Moderate Pain (4 - 6)      Antibiotics History      Heme Medications   apixaban 5 milliGRAM(s) Oral two times a day, 03-16-23 @ 00:00  aspirin  chewable 81 milliGRAM(s) Oral daily, 03-16-23 @ 00:00      GI Medications  famotidine    Tablet 20 milliGRAM(s) Oral daily, 03-15-23 @ 19:20, Routine  polyethylene glycol 3350 17 Gram(s) Oral daily, 03-15-23 @ 19:20, Routine  senna 2 Tablet(s) Oral at bedtime, 03-15-23 @ 19:20, Routine        LABS:                        9.2    3.66  )-----------( 389      ( 24 Mar 2023 15:00 )             28.7     03-23    137  |  99  |  7   ----------------------------<  121<H>  3.7   |  33<H>  |  0.56    Ca    9.0      23 Mar 2023 06:35  Mg     1.9     03-24    TPro  6.5  /  Alb  2.4<L>  /  TBili  0.7  /  DBili  x   /  AST  20  /  ALT  33  /  AlkPhos  80  03-23     RADIOLOGY  CXR:  < from: Xray Chest 1 View- PORTABLE-Urgent (Xray Chest 1 View- PORTABLE-Urgent .) (03.24.23 @ 14:51) >    ACC: [id number] EXAM:  XR CHEST PORTABLE URGENT 1V   ORDERED BY: OMARI MINOR     PROCEDURE DATE:  03/24/2023          INTERPRETATION:  Follow-up.    AP chest. Comparison 3/22/2023.    IMPRESSION: Low lung volumes. Left chest port right chest tube reidentified in position. New small to moderate loculated right pleural effusion. Increase in patchy consolidation/atelectasis right base.   Discoid atelectasis left midlung. No pneumothorax.    --- End of Report ---    < end of copied text >      VITALS:  T(C): 36.4 (03-24-23 @ 07:45), Max: 36.6 (03-23-23 @ 20:23)  T(F): 97.5 (03-24-23 @ 07:45), Max: 97.9 (03-23-23 @ 20:23)  HR: 106 (03-24-23 @ 14:45) (79 - 112)  BP: 111/75 (03-24-23 @ 14:45) (88/48 - 115/81)  BP(mean): --  ABP: --  ABP(mean): --  RR: 23 (03-24-23 @ 14:30) (21 - 23)  SpO2: 96% (03-24-23 @ 14:45) (94% - 98%)  CVP(mm Hg): --  CVP(cm H2O): --    Ins and Outs     03-23-23 @ 07:01  -  03-24-23 @ 07:00  --------------------------------------------------------  IN: 150 mL / OUT: 1125 mL / NET: -975 mL    03-24-23 @ 07:01  -  03-24-23 @ 16:06  --------------------------------------------------------  IN: 0 mL / OUT: 200 mL / NET: -200 mL                I&O's Detail    23 Mar 2023 07:01  -  24 Mar 2023 07:00  --------------------------------------------------------  IN:    Oral Fluid: 150 mL  Total IN: 150 mL    OUT:    Chest Tube (mL): 25 mL    Indwelling Catheter - Urethral (mL): 1100 mL  Total OUT: 1125 mL    Total NET: -975 mL      24 Mar 2023 07:01  -  24 Mar 2023 16:06  --------------------------------------------------------  IN:  Total IN: 0 mL    OUT:    Indwelling Catheter - Urethral (mL): 200 mL  Total OUT: 200 mL    Total NET: -200 mL

## 2023-03-24 NOTE — CONSULT NOTE ADULT - CONSULT REQUESTED DATE/TIME
16-Mar-2023 15:09
17-Mar-2023 09:15
20-Mar-2023 07:10
24-Mar-2023 16:07
20-Mar-2023 11:30
21-Mar-2023 10:09
24-Mar-2023 18:32

## 2023-03-24 NOTE — DISCHARGE NOTE PROVIDER - NSDCMRMEDTOKEN_GEN_ALL_CORE_FT
albuterol 2.5 mg/3 mL (0.083%) inhalation solution: 2.5 milligram(s) inhaled every 6 hours  aspirin 81 mg oral tablet, chewable: 1 tab(s) orally once a day  atenolol: 12.5 milligram(s) orally once a day  Eliquis 5 mg oral tablet: 1 tab(s) orally 2 times a day  famotidine 20 mg oral tablet: 1 tab(s) orally once a day  Lexapro 5 mg oral tablet: 1 tab(s) orally once a day  melatonin 3 mg oral tablet: 2 tab(s) orally once a day (at bedtime)  oxyCODONE 5 mg oral tablet: 1 tab(s) orally once, As needed, Moderate Pain (4 - 6)  polyethylene glycol 3350 oral powder for reconstitution: 17 gram(s) orally once a day  senna leaf extract oral tablet: 2 tab(s) orally once a day (at bedtime)  simvastatin 40 mg oral tablet: 1 tab(s) orally once a day (at bedtime)  tamsulosin 0.4 mg oral capsule: 1 cap(s) orally once a day  Xanax 0.25 mg oral tablet: 1 tab(s) orally once a day, As Needed   acetaminophen 325 mg oral tablet: 2 tab(s) orally every 6 hours, As needed, Mild Pain (1 - 3)  ALPRAZolam 0.25 mg oral tablet: 1 tab(s) orally once a day, As needed, anxiety  apixaban 5 mg oral tablet: 1 tab(s) orally 2 times a day  aspirin 81 mg oral tablet, chewable: 1 tab(s) orally once a day  chlorhexidine 2% topical pad: 1 application topically once a day  escitalopram 10 mg oral tablet: 1 tab(s) orally once a day  famotidine 20 mg oral tablet: 1 tab(s) orally once a day  levalbuterol 0.63 mg/3 mL inhalation solution: 3 milliliter(s) inhaled every 6 hours  magnesium oxide 400 mg oral tablet: 1 tab(s) orally 3 times a day (with meals)  oxyCODONE 5 mg oral tablet: 1 tab(s) orally every 8 hours, As needed, Moderate Pain (4 - 6)  polyethylene glycol 3350 oral powder for reconstitution: 17 gram(s) orally once a day  senna leaf extract oral tablet: 2 tab(s) orally once a day (at bedtime)  simvastatin 40 mg oral tablet: 1 tab(s) orally once a day (at bedtime)  traZODone: 25 milligram(s) orally once a day (at bedtime)

## 2023-03-24 NOTE — PROGRESS NOTE ADULT - SUBJECTIVE AND OBJECTIVE BOX
ARIK DUMONT   82y   Male    Admitting: S. 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  He was intubated in the field and brought to [facility] ED and admitted to ICU for acute respiratory failure with hypercapnia. CTA chest was negative for PE but significant for a large R pleural effusion with mediastinal and hilar lymphadenopathy, and 3L of fluid was drained.  He completed a 5 day course of empiric antibiotics. Pleural fluid cultures and blood cultures resulted negative.  Suspicious of underlying malignancy secondary to lymphadenopathy and persistent leukocytosis, Heme/Onc was consulted and recommended transfer to [facility] on 2/24/23 for further workup. On 2/28 he underwent an ultrasound guided biopsy of his R supraclavicular mass/lymph node, which later resulted as classic Hodgkin's lymphoma.  On 3/3, he underwent a R thoracotomy/VATS with Pleurex placement. Postoperatively, he was hypotensive and started on Midodrine. A Left chest wall mediport was placed for ongoing chemotherapy treatment on 3/15. PM&R was consulted and deemed him an appropriate candidate for IRF. He was medically stabilized and cleared for discharge to [facility].      PAST MEDICAL & SURGICAL HISTORY:  Atrial fibrillation      Hypertension      Urinary retention    HEALTH ISSUES - PROBLEM Dx:  Hodgkins lymphoma      MEDICATIONS  (STANDING):  albuterol    0.083% 2.5 milliGRAM(s) Nebulizer every 6 hours  apixaban 5 milliGRAM(s) Oral two times a day  aspirin  chewable 81 milliGRAM(s) Oral daily  chlorhexidine 2% Cloths 1 Application(s) Topical daily  escitalopram 10 milliGRAM(s) Oral daily  famotidine    Tablet 20 milliGRAM(s) Oral daily  magnesium oxide 400 milliGRAM(s) Oral three times a day with meals  polyethylene glycol 3350 17 Gram(s) Oral daily  senna 2 Tablet(s) Oral at bedtime  simvastatin 40 milliGRAM(s) Oral at bedtime  traZODone 50 milliGRAM(s) Oral at bedtime    MEDICATIONS  (PRN):  acetaminophen     Tablet .. 650 milliGRAM(s) Oral every 6 hours PRN Mild Pain (1 - 3)  ALPRAZolam 0.25 milliGRAM(s) Oral daily PRN anxiety  oxyCODONE    IR 5 milliGRAM(s) Oral every 8 hours PRN Moderate Pain (4 - 6)    Allergies    pertussis vaccines (Rash)  shellfish (Rash)    INTERVAL HPI/OVERNIGHT EVENTS:  Patient S&E at bedside. Dizzy earlier this am. BP medication being adjusted. No c/o H/A, CP or SOB at rest.     VITAL SIGNS:  T(F): 97.5 (03-24-23 @ 07:45)  HR: 98 (03-24-23 @ 08:20)  BP: 104/64 (03-24-23 @ 09:26)  RR: 21 (03-24-23 @ 07:45)  SpO2: 95% (03-24-23 @ 08:20)    PHYSICAL EXAM:  Constitutional: NAD  Eyes: sclera non-icteric  Neck: no JVD  Respiratory: slightly decreased BS bases ant.  Cardiovascular: RRR, no M/R/G  Gastrointestinal: soft, NTND, no masses palpable  Extremities: no calf tenderness  Neurological: Awake, alert.    Labs:             10.4   4.00  )-----------( 323      ( 03-23 @ 06:35 )             33.3       03-23    137  |  99  |  7   ----------------------------<  121<H>  3.7   |  33<H>  |  0.56    Ca    9.0      23 Mar 2023 06:35    TPro  6.5  /  Alb  2.4<L>  /  TBili  0.7  /  DBili  x   /  AST  20  /  ALT  33  /  AlkPhos  80  03-23    Consultant notes reviewed : YES [x ] ; NO [ ]

## 2023-03-24 NOTE — PROGRESS NOTE ADULT - PROBLEM SELECTOR PLAN 1
s/p VATS, bronch, pleural bx w/ thoracic 3/3, biopsy: CHL  - BM Bx neg for lymphoma involvement  PET/CT 3/8: Hypermetabolic lymph nodes above and below the diaphragm.  Suspected osseous focus in the RIGHT hemisacrum. Diffusely increased splenic uptake relative to liver, abnormal but nonspecific finding which may be seen in the setting of malignancy as well as other systemic processes. Right pleural effusion with additional pulmonary findings; additional malignant involvement versus inflammatory or infectious process not excluded. Area of soft tissue adjacent to distal abdominal aorta without abnormal uptake.  - BV given on 3/10 (cycle 1). Next cycle will be as outpatient- has appt. with Dr. Hays at the [facility] 3/27/23.  CBC acceptable at present. Patient continues with rehab.
s/p VATS, bronch, pleural bx w/ thoracic 3/3, biopsy: CHL  - BM Bx neg for lymphoma involvement  PET/CT 3/8: Hypermetabolic lymph nodes above and below the diaphragm.  Suspected osseous focus in the RIGHT hemisacrum. Diffusely increased splenic uptake relative to liver, abnormal but nonspecific finding which may be seen in the setting of malignancy as well as other systemic processes. Right pleural effusion with additional pulmonary findings; additional malignant involvement versus inflammatory or infectious process not excluded. Area of soft tissue adjacent to distal abdominal aorta without abnormal uptake.  - BV given on 3/10 (cycle 1). Next cycle will be as outpatient- has appt. with Dr. Hays at the [facility] 3/27/23.  CBC acceptable at present. Patient continues with rehab.  Spoke with patient's daughter 3/21/23 and updated her regarding H/O status and plans of care.
s/p VATS, bronch, pleural bx w/ thoracic 3/3, biopsy: CHL  - BM Bx neg for lymphoma involvement  PET/CT 3/8: Hypermetabolic lymph nodes above and below the diaphragm.  Suspected osseous focus in the RIGHT hemisacrum. Diffusely increased splenic uptake relative to liver, abnormal but nonspecific finding which may be seen in the setting of malignancy as well as other systemic processes. Right pleural effusion with additional pulmonary findings; additional malignant involvement versus inflammatory or infectious process not excluded. Area of soft tissue adjacent to distal abdominal aorta without abnormal uptake.  - BV given on 3/10 (cycle 1). Next cycle will be as outpatient- has appt. with Dr. Hays at the [facility] 3/27/23.  CBC acceptable at present. Hemoglobin stable.  Had spoken with patient's daughter 3/21/23 and updated her regarding H/O status and plans of care.

## 2023-03-24 NOTE — CONSULT NOTE ADULT - SUBJECTIVE AND OBJECTIVE BOX
Chief Complaint:   Alexandru Martin is an 82 year old male with PMH of AFIB, HTN and chronic urinary retention (chronic plummer); who presented to [facility] via ambulance on 2/17 with progressively worsening SOB.  He was intubated in the field and brought to [facility] ED and admitted to ICU for acute respiratory failure with hypercapnia. CTA chest was negative for PE but significant for a large R pleural effusion with mediastinal and hilar lymphadenopathy, and 3L of fluid was drained.  He completed a 5 day course of empiric antibiotics. Pleural fluid cultures and blood cultures resulted negative.  Suspicious of underlying malignancy secondary to lymphadenopathy and persistent leukocytosis, Heme/Onc was consulted and recommended transfer to [facility] on 2/24/23 for further workup. On 2/28 he underwent an ultrasound guided biopsy of his R supraclavicular mass/lymph node, which later resulted as classic Hodgkin's lymphoma.  On 3/3, he underwent a R thoracotomy/VATS with Pleurex placement. Postoperatively, he was hypotensive and started on Midodrine. A Left chest wall mediport was placed for ongoing chemotherapy treatment on 3/15. PM&R was consulted and deemed him an appropriate candidate for IRF. He was medically stabilized and cleared for discharge to [facility].    HPI:    PMH:   No pertinent past medical history    Atrial fibrillation    Hypertension    Urinary retention      PSH:   No significant past surgical history      Family History:  FAMILY HISTORY:      Social History:  Smoking:  Alcohol:  Drugs:    Allergies:  pertussis vaccines (Rash)  shellfish (Rash)      Medications:  acetaminophen     Tablet .. 650 milliGRAM(s) Oral every 6 hours PRN  ALPRAZolam 0.25 milliGRAM(s) Oral daily PRN  apixaban 5 milliGRAM(s) Oral two times a day  aspirin  chewable 81 milliGRAM(s) Oral daily  chlorhexidine 2% Cloths 1 Application(s) Topical daily  escitalopram 10 milliGRAM(s) Oral daily  famotidine    Tablet 20 milliGRAM(s) Oral daily  levalbuterol Inhalation 0.63 milliGRAM(s) Inhalation every 6 hours  magnesium oxide 400 milliGRAM(s) Oral three times a day with meals  oxyCODONE    IR 5 milliGRAM(s) Oral every 8 hours PRN  polyethylene glycol 3350 17 Gram(s) Oral daily  senna 2 Tablet(s) Oral at bedtime  simvastatin 40 milliGRAM(s) Oral at bedtime  traZODone 25 milliGRAM(s) Oral at bedtime      REVIEW OF SYSTEMS:  CONSTITUTIONAL: No fever, weight loss, or fatigue  EYES: No eye pain, visual disturbances, or discharge  ENMT:  No difficulty hearing, tinnitus, vertigo; No sinus or throat pain  NECK: No pain or stiffness  BREASTS: No pain, masses, or nipple discharge  RESPIRATORY: No cough, wheezing, chills or hemoptysis; No shortness of breath  CARDIOVASCULAR: No chest pain, palpitations, dizziness, or leg swelling  GASTROINTESTINAL: No abdominal or epigastric pain. No nausea, vomiting, or hematemesis; No diarrhea or constipation. No melena or hematochezia.  GENITOURINARY: No dysuria, frequency, hematuria, or incontinence  NEUROLOGICAL: No headaches, memory loss, loss of strength, numbness, or tremors  SKIN: No itching, burning, rashes, or lesions   LYMPH NODES: No enlarged glands  ENDOCRINE: No heat or cold intolerance; No hair loss  MUSCULOSKELETAL: No joint pain or swelling; No muscle, back, or extremity pain  PSYCHIATRIC: No depression, anxiety, mood swings, or difficulty sleeping  HEME/LYMPH: No easy bruising, or bleeding gums  ALLERY AND IMMUNOLOGIC: No hives or eczema    Physical Exam:  T(C): 36.3 (03-24-23 @ 17:52), Max: 36.6 (03-23-23 @ 20:23)  HR: 96 (03-24-23 @ 17:52) (79 - 112)  BP: 112/59 (03-24-23 @ 17:52) (88/48 - 115/81)  RR: 24 (03-24-23 @ 17:15) (21 - 24)  SpO2: 93% (03-24-23 @ 17:52) (92% - 98%)  Wt(kg): --    GENERAL: chronically and acutely ill appearing tachypnea and pale appearing family at bedside  HEAD:  Atraumatic, Normocephalic  EYES: EOMI, conjunctiva and sclera clear  ENT: Moist mucous membranes,  NECK: Supple, No JVD, no bruits  CHEST/LUNG: Clear to percussion bilaterally; No rales, rhonchi, wheezing, or rubs  HEART: Regular rate and rhythm; No murmurs, rubs, or gallops PMI non displaced.  ABDOMEN: Soft, Nontender, Nondistended; Bowel sounds present  EXTREMITIES:  2+ Peripheral Pulses, No clubbing, cyanosis, or edema  SKIN: No rashes or lesions  NERVOUS SYSTEM:  Cranial Nerves II-XII intact     Cardiovascular Diagnostic Testing:  ECG:    < from: 12 Lead ECG (02.17.23 @ 10:43) >    Diagnosis Line Atrial fibrillation  Nonspecific ST abnormality  Abnormal ECG  No previous ECGs available  Confirmed by SPENCER GUDINO, YESSI PERALES ([id number]) on 2/18/2023 7:49:34 AM    < end of copied text >      ECHO:    < from: TTE Echo Complete w/o Contrast w/ Doppler (02.18.23 @ 08:03) >  s normal in size and structure.      Summary:   1. Left ventricular ejection fraction, by visual estimation, is 50 to   55%.   2. Normal global left ventricular systolic function.   3. The left ventricular diastolic function could not be assessed in this   study.   4. Moderately enlarged left atrium.   5. Moderately enlarged right atrium.   6. Mild mitral annular calcification.   7. Mild mitral valve regurgitation.   8. Thickening of the anterior and posterior mitral valve leaflets.    [id number] Yessi Sahu MD, [facility] , Electronically signed on   2/18/2023 at 2:31:51 PM            *** Final ***    < end of copied text >      Labs:                        9.2    3.66  )-----------( 389      ( 24 Mar 2023 15:00 )             28.7     03-24    131<L>  |  96  |  10  ----------------------------<  152<H>  4.7   |  31  |  0.56    Ca    8.7      24 Mar 2023 15:00  Mg     1.9     03-24    TPro  6.5  /  Alb  2.4<L>  /  TBili  0.7  /  DBili  x   /  AST  20  /  ALT  33  /  AlkPhos  80  03-23      CARDIAC MARKERS ( 24 Mar 2023 15:00 )  x     / x     / 96 U/L / x     / 2.7 ng/mL      Imaging:    < from: CT Chest No Cont (03.24.23 @ 16:53) >  Osseous structures and Soft Tissues: There is diffuse qualitative   osteopenia and multilevel discogenic disease in the spine.    IMPRESSION:    1.  Interval placement of right Pleurx catheter. Decrease in size of the   right pleural effusion, which is now small and loculated. New peripheral   demarcatedhigh attenuation in the right pleural space for which   considerations include hemothorax as well as neoplastic pleural   thickening secondary to lymphoma    2.  Decreased thoracic lymphadenopathy    --- End of Report ---             SADAF IVY M.D., Attending Radiologist  This document has been electronically signed. Mar 24 2023  5:28PM    < end of copied text >    impression   tachcyardia and tachpnea mos likely due to muleiple medical derangements including pleural effusion with hemothorax, anemia low albumin wiht third spacing. rednders this patietn high risk.      Chief Complaint:   Alexandru Martin is an 82 year old male with PMH of AFIB, HTN and chronic urinary retention (chronic plummer); who presented to [facility] via ambulance on 2/17 with progressively worsening SOB.  He was intubated in the field and brought to [facility] ED and admitted to ICU for acute respiratory failure with hypercapnia. CTA chest was negative for PE but significant for a large R pleural effusion with mediastinal and hilar lymphadenopathy, and 3L of fluid was drained.  He completed a 5 day course of empiric antibiotics. Pleural fluid cultures and blood cultures resulted negative.  Suspicious of underlying malignancy secondary to lymphadenopathy and persistent leukocytosis, Heme/Onc was consulted and recommended transfer to [facility] on 2/24/23 for further workup. On 2/28 he underwent an ultrasound guided biopsy of his R supraclavicular mass/lymph node, which later resulted as classic Hodgkin's lymphoma.  On 3/3, he underwent a R thoracotomy/VATS with Pleurex placement. Postoperatively, he was hypotensive and started on Midodrine. A Left chest wall mediport was placed for ongoing chemotherapy treatment on 3/15. PM&R was consulted and deemed him an appropriate candidate for IRF. He was medically stabilized and cleared for discharge to [facility].    HPI:    PMH:   No pertinent past medical history    Atrial fibrillation    Hypertension    Urinary retention      PSH:   No significant past surgical history      Family History:  FAMILY HISTORY:      Social History:  Smoking:  Alcohol:  Drugs:    Allergies:  pertussis vaccines (Rash)  shellfish (Rash)      Medications:  acetaminophen     Tablet .. 650 milliGRAM(s) Oral every 6 hours PRN  ALPRAZolam 0.25 milliGRAM(s) Oral daily PRN  apixaban 5 milliGRAM(s) Oral two times a day  aspirin  chewable 81 milliGRAM(s) Oral daily  chlorhexidine 2% Cloths 1 Application(s) Topical daily  escitalopram 10 milliGRAM(s) Oral daily  famotidine    Tablet 20 milliGRAM(s) Oral daily  levalbuterol Inhalation 0.63 milliGRAM(s) Inhalation every 6 hours  magnesium oxide 400 milliGRAM(s) Oral three times a day with meals  oxyCODONE    IR 5 milliGRAM(s) Oral every 8 hours PRN  polyethylene glycol 3350 17 Gram(s) Oral daily  senna 2 Tablet(s) Oral at bedtime  simvastatin 40 milliGRAM(s) Oral at bedtime  traZODone 25 milliGRAM(s) Oral at bedtime      REVIEW OF SYSTEMS:  CONSTITUTIONAL: No fever, weight loss, or fatigue  EYES: No eye pain, visual disturbances, or discharge  ENMT:  No difficulty hearing, tinnitus, vertigo; No sinus or throat pain  NECK: No pain or stiffness  BREASTS: No pain, masses, or nipple discharge  RESPIRATORY: No cough, wheezing, chills or hemoptysis; No shortness of breath  CARDIOVASCULAR: No chest pain, palpitations, dizziness, or leg swelling  GASTROINTESTINAL: No abdominal or epigastric pain. No nausea, vomiting, or hematemesis; No diarrhea or constipation. No melena or hematochezia.  GENITOURINARY: No dysuria, frequency, hematuria, or incontinence  NEUROLOGICAL: No headaches, memory loss, loss of strength, numbness, or tremors  SKIN: No itching, burning, rashes, or lesions   LYMPH NODES: No enlarged glands  ENDOCRINE: No heat or cold intolerance; No hair loss  MUSCULOSKELETAL: No joint pain or swelling; No muscle, back, or extremity pain  PSYCHIATRIC: No depression, anxiety, mood swings, or difficulty sleeping  HEME/LYMPH: No easy bruising, or bleeding gums  ALLERY AND IMMUNOLOGIC: No hives or eczema    Physical Exam:  T(C): 36.3 (03-24-23 @ 17:52), Max: 36.6 (03-23-23 @ 20:23)  HR: 96 (03-24-23 @ 17:52) (79 - 112)  BP: 112/59 (03-24-23 @ 17:52) (88/48 - 115/81)  RR: 24 (03-24-23 @ 17:15) (21 - 24)  SpO2: 93% (03-24-23 @ 17:52) (92% - 98%)  Wt(kg): --    GENERAL: chronically and acutely ill appearing tachypnea and pale appearing family at bedside  HEAD:  Atraumatic, Normocephalic  EYES: EOMI, conjunctiva and sclera clear  ENT: Moist mucous membranes,  NECK: Supple, No JVD, no bruits  CHEST/LUNG: Clear to percussion bilaterally; No rales, rhonchi, wheezing, or rubs  HEART: Regular rate and rhythm; No murmurs, rubs, or gallops PMI non displaced.  ABDOMEN: Soft, Nontender, Nondistended; Bowel sounds present  EXTREMITIES:  2+ Peripheral Pulses, No clubbing, cyanosis, or edema  SKIN: No rashes or lesions  NERVOUS SYSTEM:  Cranial Nerves II-XII intact     Cardiovascular Diagnostic Testing:  ECG:    < from: 12 Lead ECG (02.17.23 @ 10:43) >    Diagnosis Line Atrial fibrillation  Nonspecific ST abnormality  Abnormal ECG  No previous ECGs available  Confirmed by SPENCER GUDINO, YESSI PERALES ([id number]) on 2/18/2023 7:49:34 AM    < end of copied text >      ECHO:    < from: TTE Echo Complete w/o Contrast w/ Doppler (02.18.23 @ 08:03) >  s normal in size and structure.      Summary:   1. Left ventricular ejection fraction, by visual estimation, is 50 to   55%.   2. Normal global left ventricular systolic function.   3. The left ventricular diastolic function could not be assessed in this   study.   4. Moderately enlarged left atrium.   5. Moderately enlarged right atrium.   6. Mild mitral annular calcification.   7. Mild mitral valve regurgitation.   8. Thickening of the anterior and posterior mitral valve leaflets.    [id number] Yessi Sahu MD, [facility] , Electronically signed on   2/18/2023 at 2:31:51 PM            *** Final ***    < end of copied text >      Labs:                        9.2    3.66  )-----------( 389      ( 24 Mar 2023 15:00 )             28.7     03-24    131<L>  |  96  |  10  ----------------------------<  152<H>  4.7   |  31  |  0.56    Ca    8.7      24 Mar 2023 15:00  Mg     1.9     03-24    TPro  6.5  /  Alb  2.4<L>  /  TBili  0.7  /  DBili  x   /  AST  20  /  ALT  33  /  AlkPhos  80  03-23      CARDIAC MARKERS ( 24 Mar 2023 15:00 )  x     / x     / 96 U/L / x     / 2.7 ng/mL      Imaging:    < from: CT Chest No Cont (03.24.23 @ 16:53) >  Osseous structures and Soft Tissues: There is diffuse qualitative   osteopenia and multilevel discogenic disease in the spine.    IMPRESSION:    1.  Interval placement of right Pleurx catheter. Decrease in size of the   right pleural effusion, which is now small and loculated. New peripheral   demarcatedhigh attenuation in the right pleural space for which   considerations include hemothorax as well as neoplastic pleural   thickening secondary to lymphoma    2.  Decreased thoracic lymphadenopathy    --- End of Report ---             SADAF IVY M.D., Attending Radiologist  This document has been electronically signed. Mar 24 2023  5:28PM    < end of copied text >    impression   tachycardia and tachypnea mos likely due to multiple medical and non cardiac derangements given normal EF including pleural effusion with hemothorax, anemia low albumin with third spacing. renders this patient high risk. discussed with family members at beds

## 2023-03-24 NOTE — PROGRESS NOTE ADULT - ASSESSMENT
Physical Examination:  GENERAL:               Alert, Oriented, No acute distress.    HEENT:                     o JVD, Moist MM  PULM:                     Bilateral air entry, diminished ion bilaterally, no significant sputum production, No Rales, No Rhonchi, No Wheezing  CVS:                         S1, S2,  No Murmur  ABD:                        Soft, nondistended, nontender, normoactive bowel sounds,   EXT:                         No edema, nontender, No Cyanosis or Clubbing    NEURO:                  Alert, oriented, interactive, nonfocal, follows commands  PSYC:                      Calm, + Insight.      Assessment  1) Right pleural effusion - Malignant s/p Pleurex  2) Hodgkin Lymphoma   3) Underlying HTN, Afib and urinary retention    Plan  CXR reviewed noted loculated effusion, drained by bedside team today state zero drainage.   will get ct chest to evaluate  unsure if hypotension related to pleural study  if loculation noted again on ct can discuss with thoracic    heme onc f/u for chemo  will follow   d/w Dr. Paez

## 2023-03-24 NOTE — DISCHARGE NOTE PROVIDER - REASON FOR ADMISSION
Hodgkins lymphoma with resp failure Debility due to Hodgkin's lymphoma with resp failure (pleural effusion)  Debility

## 2023-03-24 NOTE — CONSULT NOTE ADULT - ASSESSMENT
Pt to be seen, full consult pending 82y male with hx Afib, HTN, large R pleural effusion with mediastinal and hilar adenopathy, had thoracentesis 3L of fluid was drained.  He completed a 5 day course of empiric antibiotics. He was transferred  [facility] on 2/24/23 for further workup. On 2/28 he underwent an ultrasound guided biopsy of his R supraclavicular mass/lymph node, +Hodgkin's lymphoma.  On 3/3, he underwent a R thoracotomy/VATS with Pleurex placement.  A Left chest wall mediport was placed for ongoing chemotherapy treatment on 3/15. He is now in rehab. He was noted today increased tachypnea and increased R pleuritic CP. He denies any fevers or chills, no GI or  c/o. CT chest today showed residual loculated pleural effusion, as per d/w pulmonary, unable to drain pleurx catheter  no fevers, no leukocytosis.   Suspect tachypnea and pleuritic CP due to loculated effusion     Suggest:  no indications for abx at present  d/w Dr Paez and Dr Crisostomo  plan for eval by CT surgery for pleural effusion  d/w family at bedside

## 2023-03-24 NOTE — PROGRESS NOTE ADULT - ASSESSMENT
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  He underwent a R supraclavicular mass biopsy on 2/28, significant for classic Hodgkin's lymphoma. On 3/3, he underwent a R thoracotomy/VATS with Pleurex placement. Bone marrow negative for involvement,  (s/p L chest wall mediport placement 3/15). Patient now admitted for a multidisciplinary rehab program. 03-15-23 @ 13:06    * Due Oncology  3/27 Televisit, Chemo 3/31,--with his primary oncologist   * In house routine oncology f/u and recs appreciated   * Improved energy level  * CXR 3/22--Rt sided effusion, and atelectasis, non progressive c/w resp f/u  * Hypotension--Beta blocker on hold  * Insomnia with am fatigue---reduce dose of trazodone    Rehab Management/MEDICAL MANAGEMENT     #Debility due to Classic Hodgkin's Lymphoma with resp failure  - Continue  Comprehensive Rehab Program of PT/OT  - 3 hours a day, 5 days a week  - P&O as needed   - SOB with R pleural effusion requiring drainage (~3L)  - S/p R supraclavicular mass biopsy-> significant for Classic Hodgkin's Lymphoma  - S/p R thoacotomy/VATS with Pleurex placement 3/3  - Bone marrow biopsy negative  - Lt chest wall mediport placed 3/15  -- Cycle #1 of BV (Brentuximad Vedotin) given on 3/10  ---Plan for cycle #2 after 3 weeks  --- No plan to receive chemo while in rehab  - F/u with Dr. Hays outpatient 3/27 for cycle 2 chemo  --Heme consult 3/17 and 3/20  and recs appreciated    * Rt pleurax drain output being monitored, drain M/T--minimal output--drain M/T  * CXR 3/22--Rt sided effusion, and atelectasis, non progressive c/w resp f/u    #AFIB  - Eliquis  - Resume on 3/16 (held for L CW mediport on 3/15)    #HTN  - Atenolol ---on hold due to hypotension  f/u with hospitalist     #HLD  - Simvastatin    #Sleep/Mood  - Lexapro--3/20--increase to 10mg daily   --Trazodone commenced 3/22, reduced to 25mg qhs on 3/24  - Xanax PRN       #Skin  - Skin--: right lower back  bone biopsy site, healthy scab  Rt lower chest pleurax drain  - Pressure injury/Skin: OOB to Chair, PT/OT     #Pain Mgmt   - Oxycodone PRN    #GI/Bowel Mgmt   - Continent c/w Senna, Miralax     #/Bladder Mgmt   Chronic Plummer ---f/u Urology recs Plummer drainage to continue   Patient prefers to continue with Plummer in situ and f/u with Urology post d/c. Family happy with same      -Flomax  - F/u with Urology outpt     #FEN   - Diet - Regular + Thins  [DASH]      #Precautions / PROPHYLAXIS:   - Falls   - ortho: Weight bearing status: WBAT   - Lungs: Aspiration, Incentive Spirometer   - DVT: Lovenox  -----------------------------------------------------  IDT conference on 3/21  TDD:  3/25 to home, revised to 3/30  Barriers: Fatigues easily, endurance, 02 requirements, pleurex drain  Social Work: Lives in  with wife and son, 1STE, 10STI, has first floor living. Wife available for support. Daughter has MS.   OT: Setup/sup for eating/grooming. Mod A for transfers and ADLs. Tub/shower- TBD. Goal of mod I.   PT: Min-mod A for transfers. Ambulated 45 ft with RW with min A, shuffling gait with WCF. Negotiated 4 stairs with 2 HRs min-mod A x1. (step to step)   SLP: --n/a    3/21--Liaison with family. I called and spoke on phone with daughter, Dr Judith Schumacher. She asked questions on all aspects of patient's care --urology/oncology/pulmonary/Rehab  I gave detailed explanation of treatment to date  She explained  that patient has a TV with her primary oncologist 3/20 and chemo on 3/31  She requested for therapy on DC date (revised DC date 3/30), I explained that we do not normally offer therapy on DC date, but we would try to see if we can make accommodation for this  She requested for update from Oncologist follow up patient's care Dr Velez, and I have facilitated this request      -----------------------------------------------------  FOLLOW UP/OUTPATIENT:    Nathaniel Galvan)  Surgery; Thoracic Surgery  [phone] [place] [address]  3rd [place] [address]  Phone: [phone]  Fax: [phone]  Established Patient  Follow Up Time: 2 weeks    UROLOGY  MD Saúl Hamm [facility]  Chloé Vazquez  Scheduled Appointment: 03/27/2023  -----------------------------------------------------

## 2023-03-24 NOTE — DISCHARGE NOTE NURSING/CASE MANAGEMENT/SOCIAL WORK - PATIENT PORTAL LINK FT
You can access the FollowMyHealth Patient Portal offered by [facility] by registering at the following website: [email or web address]. By joining [employer]’s FollowMyHealth portal, you will also be able to view your health information using other applications (apps) compatible with our system.

## 2023-03-24 NOTE — CONSULT NOTE ADULT - SUBJECTIVE AND OBJECTIVE BOX
HPI:   Patient is a 82y male with    REVIEW OF SYSTEMS:  All other review of systems negative (Comprehensive ROS)    PAST MEDICAL & SURGICAL HISTORY:  Atrial fibrillation      Hypertension      Urinary retention      No significant past surgical history          Allergies    pertussis vaccines (Rash)  shellfish (Rash)    Intolerances        Antimicrobials Day #      Other Medications:  acetaminophen     Tablet .. 650 milliGRAM(s) Oral every 6 hours PRN  ALPRAZolam 0.25 milliGRAM(s) Oral daily PRN  apixaban 5 milliGRAM(s) Oral two times a day  aspirin  chewable 81 milliGRAM(s) Oral daily  chlorhexidine 2% Cloths 1 Application(s) Topical daily  escitalopram 10 milliGRAM(s) Oral daily  famotidine    Tablet 20 milliGRAM(s) Oral daily  levalbuterol Inhalation 0.63 milliGRAM(s) Inhalation once  levalbuterol Inhalation 0.63 milliGRAM(s) Inhalation every 6 hours  magnesium oxide 400 milliGRAM(s) Oral three times a day with meals  oxyCODONE    IR 5 milliGRAM(s) Oral every 8 hours PRN  polyethylene glycol 3350 17 Gram(s) Oral daily  senna 2 Tablet(s) Oral at bedtime  simvastatin 40 milliGRAM(s) Oral at bedtime  traZODone 25 milliGRAM(s) Oral at bedtime      FAMILY HISTORY:      SOCIAL HISTORY:  Smoking:     ETOH:     Drug Use:     Single     Vital Signs Last 24 Hrs  T(C): 36.4 (24 Mar 2023 07:45), Max: 36.6 (23 Mar 2023 20:23)  T(F): 97.5 (24 Mar 2023 07:45), Max: 97.9 (23 Mar 2023 20:23)  HR: 106 (24 Mar 2023 14:45) (79 - 112)  BP: 111/75 (24 Mar 2023 14:45) (88/48 - 115/81)  BP(mean): --  RR: 23 (24 Mar 2023 14:30) (21 - 23)  SpO2: 96% (24 Mar 2023 14:45) (94% - 98%)    Parameters below as of 24 Mar 2023 14:45  Patient On (Oxygen Delivery Method): nasal cannula,2L      PHYSICAL EXAM:  General: alert, no acute distress  Eyes:  anicteric, no conjunctival injection, no discharge  Oropharynx: no lesions or injection 	  Neck: supple, without adenopathy  Lungs: clear to auscultation  Heart: regular rate and rhythm; no murmur, rubs or gallops  Abdomen: soft, nondistended, nontender, without mass or organomegaly  Skin: no lesions  Extremities: no clubbing, cyanosis, or edema  Neurologic: alert, oriented, moves all extremities    LAB RESULTS:                        9.2    3.66  )-----------( 389      ( 24 Mar 2023 15:00 )             28.7     03-23    137  |  99  |  7   ----------------------------<  121<H>  3.7   |  33<H>  |  0.56    Ca    9.0      23 Mar 2023 06:35  Mg     1.9     03-24    TPro  6.5  /  Alb  2.4<L>  /  TBili  0.7  /  DBili  x   /  AST  20  /  ALT  33  /  AlkPhos  80  03-23    LIVER FUNCTIONS - ( 23 Mar 2023 06:35 )  Alb: 2.4 g/dL / Pro: 6.5 g/dL / ALK PHOS: 80 U/L / ALT: 33 U/L / AST: 20 U/L / GGT: x               MICROBIOLOGY REVIEWED:    RADIOLOGY REVIEWED:   HPI:   Patient is a 82y male with hx Afib, HTN, large R pleural effusion with mediastinal and hilar adenopathy, had thoracentesis 3L of fluid was drained.  He completed a 5 day course of empiric antibiotics. He was transferred  [facility] on 2/24/23 for further workup. On 2/28 he underwent an ultrasound guided biopsy of his R supraclavicular mass/lymph node, +Hodgkin's lymphoma.  On 3/3, he underwent a R thoracotomy/VATS with Pleurex placement.  A Left chest wall mediport was placed for ongoing chemotherapy treatment on 3/15. He is now in rehab. He was noted today increased tachypnea and increased R pleuritic CP. He denies any fevers or chills, no GI or  c/o   REVIEW OF SYSTEMS:  All other review of systems negative (Comprehensive ROS)    PAST MEDICAL & SURGICAL HISTORY:  Atrial fibrillation      Hypertension      Urinary retention      No significant past surgical history          Allergies    pertussis vaccines (Rash)  shellfish (Rash)    Intolerances        Antimicrobials Day #      Other Medications:  acetaminophen     Tablet .. 650 milliGRAM(s) Oral every 6 hours PRN  ALPRAZolam 0.25 milliGRAM(s) Oral daily PRN  apixaban 5 milliGRAM(s) Oral two times a day  aspirin  chewable 81 milliGRAM(s) Oral daily  chlorhexidine 2% Cloths 1 Application(s) Topical daily  escitalopram 10 milliGRAM(s) Oral daily  famotidine    Tablet 20 milliGRAM(s) Oral daily  levalbuterol Inhalation 0.63 milliGRAM(s) Inhalation once  levalbuterol Inhalation 0.63 milliGRAM(s) Inhalation every 6 hours  magnesium oxide 400 milliGRAM(s) Oral three times a day with meals  oxyCODONE    IR 5 milliGRAM(s) Oral every 8 hours PRN  polyethylene glycol 3350 17 Gram(s) Oral daily  senna 2 Tablet(s) Oral at bedtime  simvastatin 40 milliGRAM(s) Oral at bedtime  traZODone 25 milliGRAM(s) Oral at bedtime      FAMILY HISTORY:      SOCIAL HISTORY:  Smoking:     ETOH:     Drug Use:     Single     Vital Signs Last 24 Hrs  T(C): 36.4 (24 Mar 2023 07:45), Max: 36.6 (23 Mar 2023 20:23)  T(F): 97.5 (24 Mar 2023 07:45), Max: 97.9 (23 Mar 2023 20:23)  HR: 106 (24 Mar 2023 14:45) (79 - 112)  BP: 111/75 (24 Mar 2023 14:45) (88/48 - 115/81)  BP(mean): --  RR: 23 (24 Mar 2023 14:30) (21 - 23)  SpO2: 96% (24 Mar 2023 14:45) (94% - 98%)    Parameters below as of 24 Mar 2023 14:45  Patient On (Oxygen Delivery Method): nasal cannula,2L      PHYSICAL EXAM:  General: alert, no acute distress  Eyes:  anicteric, no conjunctival injection, no discharge  Oropharynx: no lesions or injection 	  Neck: supple, without adenopathy  Lungs: diminished at R base  Heart: regular rate and rhythm; no murmur, rubs or gallops  Abdomen: soft, nondistended, nontender, without mass or organomegaly  Skin: no lesions  Extremities: no clubbing, cyanosis, or edema  Neurologic: alert, oriented, moves all extremities    LAB RESULTS:                        9.2    3.66  )-----------( 389      ( 24 Mar 2023 15:00 )             28.7     03-23    137  |  99  |  7   ----------------------------<  121<H>  3.7   |  33<H>  |  0.56    Ca    9.0      23 Mar 2023 06:35  Mg     1.9     03-24    TPro  6.5  /  Alb  2.4<L>  /  TBili  0.7  /  DBili  x   /  AST  20  /  ALT  33  /  AlkPhos  80  03-23    LIVER FUNCTIONS - ( 23 Mar 2023 06:35 )  Alb: 2.4 g/dL / Pro: 6.5 g/dL / ALK PHOS: 80 U/L / ALT: 33 U/L / AST: 20 U/L / GGT: x               MICROBIOLOGY REVIEWED:    RADIOLOGY REVIEWED:  < from: CT Chest No Cont (03.24.23 @ 16:53) >  1.  Interval placement of right Pleurx catheter. Decrease in size of the   right pleural effusion, which is now small and loculated. New peripheral   demarcatedhigh attenuation in the right pleural space for which   considerations include hemothorax as well as neoplastic pleural   thickening secondary to lymphoma    2.  Decreased thoracic lymphadenopathy    < end of copied text >

## 2023-03-25 ENCOUNTER — INPATIENT (INPATIENT)
Facility: HOSPITAL | Age: 83
LOS: 15 days | Discharge: ANOTHER HOSPITAL | End: 2023-04-10
Attending: INTERNAL MEDICINE | Admitting: INTERNAL MEDICINE
Payer: MEDICARE

## 2023-03-25 VITALS
RESPIRATION RATE: 40 BRPM | SYSTOLIC BLOOD PRESSURE: 100 MMHG | TEMPERATURE: 98 F | DIASTOLIC BLOOD PRESSURE: 59 MMHG | HEIGHT: 70 IN | OXYGEN SATURATION: 100 % | HEART RATE: 98 BPM

## 2023-03-25 DIAGNOSIS — I27.20 PULMONARY HYPERTENSION, UNSPECIFIED: ICD-10-CM

## 2023-03-25 DIAGNOSIS — R33.9 RETENTION OF URINE, UNSPECIFIED: ICD-10-CM

## 2023-03-25 DIAGNOSIS — I10 ESSENTIAL (PRIMARY) HYPERTENSION: ICD-10-CM

## 2023-03-25 DIAGNOSIS — Z29.9 ENCOUNTER FOR PROPHYLACTIC MEASURES, UNSPECIFIED: ICD-10-CM

## 2023-03-25 DIAGNOSIS — I48.21 PERMANENT ATRIAL FIBRILLATION: ICD-10-CM

## 2023-03-25 DIAGNOSIS — J90 PLEURAL EFFUSION, NOT ELSEWHERE CLASSIFIED: ICD-10-CM

## 2023-03-25 DIAGNOSIS — C81.90 HODGKIN LYMPHOMA, UNSPECIFIED, UNSPECIFIED SITE: ICD-10-CM

## 2023-03-25 LAB
ANION GAP SERPL CALC-SCNC: 10 MMOL/L — SIGNIFICANT CHANGE UP (ref 7–14)
BASOPHILS # BLD AUTO: 0.03 K/UL — SIGNIFICANT CHANGE UP (ref 0–0.2)
BASOPHILS NFR BLD AUTO: 0.7 % — SIGNIFICANT CHANGE UP (ref 0–2)
BUN SERPL-MCNC: 8 MG/DL — SIGNIFICANT CHANGE UP (ref 7–23)
CALCIUM SERPL-MCNC: 8.4 MG/DL — SIGNIFICANT CHANGE UP (ref 8.4–10.5)
CHLORIDE SERPL-SCNC: 95 MMOL/L — LOW (ref 98–107)
CO2 SERPL-SCNC: 27 MMOL/L — SIGNIFICANT CHANGE UP (ref 22–31)
CREAT SERPL-MCNC: 0.54 MG/DL — SIGNIFICANT CHANGE UP (ref 0.5–1.3)
EGFR: 99 ML/MIN/1.73M2 — SIGNIFICANT CHANGE UP
EOSINOPHIL # BLD AUTO: 0.02 K/UL — SIGNIFICANT CHANGE UP (ref 0–0.5)
EOSINOPHIL NFR BLD AUTO: 0.5 % — SIGNIFICANT CHANGE UP (ref 0–6)
GLUCOSE SERPL-MCNC: 122 MG/DL — HIGH (ref 70–99)
HCT VFR BLD CALC: 26.8 % — LOW (ref 39–50)
HGB BLD-MCNC: 8.4 G/DL — LOW (ref 13–17)
IANC: 0.97 K/UL — LOW (ref 1.8–7.4)
IMM GRANULOCYTES NFR BLD AUTO: 0.7 % — SIGNIFICANT CHANGE UP (ref 0–0.9)
INR BLD: 1.91 RATIO — HIGH (ref 0.88–1.16)
LYMPHOCYTES # BLD AUTO: 1.7 K/UL — SIGNIFICANT CHANGE UP (ref 1–3.3)
LYMPHOCYTES # BLD AUTO: 38.5 % — SIGNIFICANT CHANGE UP (ref 13–44)
MCHC RBC-ENTMCNC: 28.9 PG — SIGNIFICANT CHANGE UP (ref 27–34)
MCHC RBC-ENTMCNC: 31.3 GM/DL — LOW (ref 32–36)
MCV RBC AUTO: 92.1 FL — SIGNIFICANT CHANGE UP (ref 80–100)
MONOCYTES # BLD AUTO: 1.67 K/UL — HIGH (ref 0–0.9)
MONOCYTES NFR BLD AUTO: 37.8 % — HIGH (ref 2–14)
NEUTROPHILS # BLD AUTO: 0.97 K/UL — LOW (ref 1.8–7.4)
NEUTROPHILS NFR BLD AUTO: 21.8 % — LOW (ref 43–77)
NRBC # BLD: 0 /100 WBCS — SIGNIFICANT CHANGE UP (ref 0–0)
NRBC # FLD: 0.02 K/UL — HIGH (ref 0–0)
PLATELET # BLD AUTO: 308 K/UL — SIGNIFICANT CHANGE UP (ref 150–400)
POTASSIUM SERPL-MCNC: 4.2 MMOL/L — SIGNIFICANT CHANGE UP (ref 3.5–5.3)
POTASSIUM SERPL-SCNC: 4.2 MMOL/L — SIGNIFICANT CHANGE UP (ref 3.5–5.3)
PROTHROM AB SERPL-ACNC: 22.3 SEC — HIGH (ref 10.5–13.4)
RBC # BLD: 2.91 M/UL — LOW (ref 4.2–5.8)
RBC # FLD: 19.9 % — HIGH (ref 10.3–14.5)
SODIUM SERPL-SCNC: 132 MMOL/L — LOW (ref 135–145)
WBC # BLD: 4.42 K/UL — SIGNIFICANT CHANGE UP (ref 3.8–10.5)
WBC # FLD AUTO: 4.42 K/UL — SIGNIFICANT CHANGE UP (ref 3.8–10.5)

## 2023-03-25 PROCEDURE — 99233 SBSQ HOSP IP/OBS HIGH 50: CPT

## 2023-03-25 PROCEDURE — 99285 EMERGENCY DEPT VISIT HI MDM: CPT

## 2023-03-25 PROCEDURE — 99223 1ST HOSP IP/OBS HIGH 75: CPT

## 2023-03-25 PROCEDURE — 99283 EMERGENCY DEPT VISIT LOW MDM: CPT | Mod: 57

## 2023-03-25 PROCEDURE — 71045 X-RAY EXAM CHEST 1 VIEW: CPT | Mod: 26

## 2023-03-25 RX ORDER — SIMVASTATIN 20 MG/1
40 TABLET, FILM COATED ORAL AT BEDTIME
Refills: 0 | Status: DISCONTINUED | OUTPATIENT
Start: 2023-03-25 | End: 2023-04-10

## 2023-03-25 RX ORDER — ALPRAZOLAM 0.25 MG
0.25 TABLET ORAL DAILY
Refills: 0 | Status: DISCONTINUED | OUTPATIENT
Start: 2023-03-25 | End: 2023-03-28

## 2023-03-25 RX ORDER — TRAZODONE HCL 50 MG
25 TABLET ORAL AT BEDTIME
Refills: 0 | Status: DISCONTINUED | OUTPATIENT
Start: 2023-03-25 | End: 2023-04-10

## 2023-03-25 RX ORDER — SENNA PLUS 8.6 MG/1
2 TABLET ORAL AT BEDTIME
Refills: 0 | Status: DISCONTINUED | OUTPATIENT
Start: 2023-03-25 | End: 2023-04-10

## 2023-03-25 RX ORDER — ESCITALOPRAM OXALATE 10 MG/1
10 TABLET, FILM COATED ORAL DAILY
Refills: 0 | Status: DISCONTINUED | OUTPATIENT
Start: 2023-03-25 | End: 2023-04-10

## 2023-03-25 RX ORDER — POLYETHYLENE GLYCOL 3350 17 G/17G
17 POWDER, FOR SOLUTION ORAL DAILY
Refills: 0 | Status: DISCONTINUED | OUTPATIENT
Start: 2023-03-25 | End: 2023-04-10

## 2023-03-25 RX ORDER — FAMOTIDINE 10 MG/ML
20 INJECTION INTRAVENOUS DAILY
Refills: 0 | Status: DISCONTINUED | OUTPATIENT
Start: 2023-03-25 | End: 2023-04-10

## 2023-03-25 RX ORDER — ACETAMINOPHEN 500 MG
650 TABLET ORAL EVERY 6 HOURS
Refills: 0 | Status: DISCONTINUED | OUTPATIENT
Start: 2023-03-25 | End: 2023-04-10

## 2023-03-25 RX ORDER — MAGNESIUM OXIDE 400 MG ORAL TABLET 241.3 MG
400 TABLET ORAL
Refills: 0 | Status: DISCONTINUED | OUTPATIENT
Start: 2023-03-25 | End: 2023-04-10

## 2023-03-25 RX ADMIN — Medication 25 MILLIGRAM(S): at 23:45

## 2023-03-25 RX ADMIN — SENNA PLUS 2 TABLET(S): 8.6 TABLET ORAL at 23:45

## 2023-03-25 RX ADMIN — SIMVASTATIN 40 MILLIGRAM(S): 20 TABLET, FILM COATED ORAL at 23:45

## 2023-03-25 NOTE — PROGRESS NOTE ADULT - SUBJECTIVE AND OBJECTIVE BOX
Patient seen at bedside by Thoracic.  Patient on 6L O2. Resting in bed.    ICU Vital Signs Last 24 Hrs  T(C): 36.2 (25 Mar 2023 14:57), Max: 36.8 (25 Mar 2023 01:22)  T(F): 97.1 (25 Mar 2023 14:57), Max: 98.2 (25 Mar 2023 01:22)  HR: 91 (25 Mar 2023 14:57) (91 - 106)  BP: 108/55 (25 Mar 2023 14:57) (100/59 - 120/78)  BP(mean): --  ABP: --  ABP(mean): --  RR: 24 (25 Mar 2023 14:57) (24 - 50)  SpO2: 98% (25 Mar 2023 14:57) (92% - 100%)    O2 Parameters below as of 25 Mar 2023 14:57  Patient On (Oxygen Delivery Method): nasal cannula  O2 Flow (L/min): 6         PHYSICAL EXAM:  General: Appears stated age, NAD  Eyes: Vision grossly intact, EOMI  ENMT: Hearing grossly intact. Airway grossly patent, no stridor  Neck: Neck supple, trachea midline  Cardiovascular: Reg rate, well perfused  Respiratory: Resting on 6L O2 via NC.  Gastrointestinal: Soft, NT, ND  Extremities: BAUTISTA x4  Vascular: Well perfused  Neurological: Nonfocal, no deficits  Psychiatric: Appropriate affect  Incisions: c/d/i  Tubes: PleurX to PleurEvac to Water Seal  Relevant labs, radiology and Medications reviewed                        8.4    4.42  )-----------( 308      ( 25 Mar 2023 10:00 )             26.8     03-25    132<L>  |  95<L>  |  8   ----------------------------<  122<H>  4.2   |  27  |  0.54    Ca    8.4      25 Mar 2023 10:00  Mg     1.9     03-24    TPro  6.2  /  Alb  2.9<L>  /  TBili  0.7  /  DBili  x   /  AST  19  /  ALT  24  /  AlkPhos  68  03-25    PT/INR - ( 25 Mar 2023 10:00 )   PT: 22.3 sec;   INR: 1.91 ratio         PTT - ( 25 Mar 2023 01:40 )  PTT:29.2 sec  MEDICATIONS  (STANDING):    MEDICATIONS  (PRN):    Pertinent Physical Exam  I&O's Summary      Assessment  82y Male w/ pmhx afib (on eliquis), HTN and urinary retention (self caths at home) admitted to [facility] w/ recurrent pleural effusions.  CT surgery consulted, prior patient of Dr. Galvan, placed PleurX on 3/3. PleurX placed to PleurEvac.    PLAN  - Cont. PleurX to PleurEvac to WS.  - Plan for TPA on Monday, 3/27/23  - Must hold Anticoagulation (last Eliquis dose yesterday 3/26, at 17:00)  - Must get CBC w/ diff on Sunday. Monitor WBC count.  - Remainder of care per primary team  - Thoracic to follow, contact with concerns

## 2023-03-25 NOTE — PROGRESS NOTE ADULT - NS ATTEND AMEND GEN_ALL_CORE FT
pleurx connected with flushing and drainage of serosanguinous fluid. hold eliquis for possible tpa depending on output tomorrow.

## 2023-03-25 NOTE — PROGRESS NOTE ADULT - PROVIDER SPECIALTY LIST ADULT
Rehab Medicine
Rehab Medicine
Hospitalist
Pulmonology
Rehab Medicine
Urology
Hospitalist
Rehab Medicine
Heme/Onc

## 2023-03-25 NOTE — ED PROVIDER NOTE - PROGRESS NOTE DETAILS
I was not involved in the care of this patient and am only entering information to back log for downtime that took place 3/24/23 at 2300 to 2/25/23 at 1000.

## 2023-03-25 NOTE — PROGRESS NOTE ADULT - REASON FOR ADMISSION
Debility
Debility due to Hodgkin's lymphoma
Debility due to Hodgkin's lymphoma and resp failure
Debility due to Hodgkin's lymphoma with Resp Failure
Debility due to hodgkin's lymphoma
Debility due to hodgkins lumphoma and resp failure
Debility
Debility due to Hodgkin's l.umphoma
Debility

## 2023-03-25 NOTE — H&P ADULT - HISTORY OF PRESENT ILLNESS
83 y/o M with pmhx of  82 year old male with PMH of AFIB, HTN and 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 Patient is poor historian, history obtained by son Francisco at bedside. States that  patient has been recovering wellat  rehab. States patient was able to walk over 150 ft while at rehab. States that suddenly on Friday patient started to develop acute SOB and increased WOB. Imaging was done at  rehab showing worsening pleural effusions EMS was called and patient was noted to be tachypneic to 30s and placed on 10L NRB.        Recently admitted from 3/15-3/24 for worsening SOB was found to have  significant for a large R pleural effusion with mediastinal and hilar lymphadenopathy, and 3L of fluid was drained. Pleural fluid cultures and blood cultures resulted negative.  Suspicious of underlying malignancy secondary to lymphadenopathy and persistent leukocytosis, Heme/Onc was consulted and recommended transfer to [facility] on 2/24/23 for further workup. On 2/28 he underwent an ultrasound guided biopsy of his R supraclavicular mass/lymph node, which later resulted as classic Hodgkin's lymphoma.  On 3/3, he underwent a R thoracotomy/VATS with Pleurex placement. Left chest wall mediport was placed and received  first cycle of chemotherapy on 3/10.

## 2023-03-25 NOTE — H&P ADULT - NSHPLABSRESULTS_GEN_ALL_CORE
03-25    132<L>  |  95<L>  |  8   ----------------------------<  122<H>  4.2   |  27  |  0.54  03-25    130<L>  |  94<L>  |  9   ----------------------------<  123<H>  4.0   |  24  |  0.54  03-24    131<L>  |  96  |  10  ----------------------------<  152<H>  4.7   |  31  |  0.56    Ca    8.4      25 Mar 2023 10:00  Ca    8.5      25 Mar 2023 01:40  Ca    8.7      24 Mar 2023 15:00  Mg     1.9     03-24    TPro  6.2  /  Alb  2.9<L>  /  TBili  0.7  /  DBili  x   /  AST  19  /  ALT  24  /  AlkPhos  68  03-25  TPro  6.5  /  Alb  2.4<L>  /  TBili  0.7  /  DBili  x   /  AST  20  /  ALT  33  /  AlkPhos  80  03-23      PT/INR - ( 25 Mar 2023 10:00 )   PT: 22.3 sec;   INR: 1.91 ratio         PTT - ( 25 Mar 2023 01:40 )  PTT:29.2 sec                                        8.4    4.42  )-----------( 308      ( 25 Mar 2023 10:00 )             26.8                         8.4    4.36  )-----------( 372      ( 25 Mar 2023 01:40 )             26.9                         9.2    3.66  )-----------( 389      ( 24 Mar 2023 15:00 )             28.7     CAPILLARY BLOOD GLUCOSE    < from: CT Chest No Cont (03.24.23 @ 16:53) >    IMPRESSION:    1.  Interval placement of right Pleurx catheter. Decrease in size of the   right pleural effusion, which is now small and loculated. New peripheral   demarcatedhigh attenuation in the right pleural space for which   considerations include hemothorax as well as neoplastic pleural   thickening secondary to lymphoma    2.  Decreased thoracic lymphadenopathy    --- End of Report ---      < from: Xray Chest 1 View- PORTABLE-Urgent (03.25.23 @ 02:36) >    FINDINGS:  Left chest wall port terminates in the superior atriocaval junction.  The heart is not well assessed in this projection.  Large right loculated pleural effusion with right-sided chest tube in   place. Redemonstrated patchy opacities in the right lung base.  No pneumothorax.    IMPRESSION:  Large right loculated pleural effusion with patchy opacities in the right   lung base. Effusion has increased in size compared to prior studies.    --- End of Report ---    EKG: Afib HR 94. No ST- T wave changes appreciated.

## 2023-03-25 NOTE — PROGRESS NOTE ADULT - NUTRITIONAL ASSESSMENT
This patient has been assessed with a concern for Malnutrition and has been determined to have a diagnosis/diagnoses of Moderate protein-calorie malnutrition.  
This patient has been assessed with a concern for Malnutrition and has been determined to have a diagnosis/diagnoses of Moderate protein-calorie malnutrition.    This patient is being managed with:   Diet Regular-  Supplement Feeding Modality:  Oral  Ensure Plus High Protein Cans or Servings Per Day:  1       Frequency:  Daily  Entered: Mar 16 2023  1:36PM    

## 2023-03-25 NOTE — PROGRESS NOTE ADULT - SUBJECTIVE AND OBJECTIVE BOX
on 3/24/23, 10 pm,   patient was seen at the bedside for tachypnea  dyspnea, hypoxia, Requiring 7 LNCO2, maintaining sats  94-99% at rest.  awaiting to be transferred to [facility].    full noted on paper chart due to down time.    also spoke with family since daughter was refusing transfer initially, but later agreed   transfer center called and tier 1 transfer completed.  patient made aware     time spent in co orientating care, e/m visit 2 hours.   refer to paper notes.

## 2023-03-25 NOTE — H&P ADULT - NSHPPHYSICALEXAM_GEN_ALL_CORE
T(C): 36.2 (03-25-23 @ 14:57), Max: 36.8 (03-25-23 @ 01:22)  HR: 91 (03-25-23 @ 14:57) (91 - 105)  BP: 108/55 (03-25-23 @ 14:57) (100/59 - 120/78)  RR: 24 (03-25-23 @ 14:57) (24 - 50)  SpO2: 98% (03-25-23 @ 14:57) (92% - 100%)    CONSTITUTIONAL: Well groomed, in mild respiratory distress   EYES:  No conjunctival or scleral injection, non-icteric  ENMT: Oral mucosa with moist membranes. Normal dentition; no pharyngeal injection or exudates  RESP: mild respiratory distress, + tachypnea, + use of accessory muscles; decreased breath sounds noted on R, no WRR  CV: irregularly irregular  +S1S2, no MRG; no JVD; no peripheral edema  GI: Soft, NT, ND, no rebound, no guarding; no palpable masses; no hepatosplenomegaly; no hernia palpated  LYMPH: No cervical LAD or tenderness; no axillary LAD or tenderness; no inguinal LAD or tenderness  MSK: No digital clubbing or cyanosis  SKIN: No rashes or ulcers noted  NEURO: CN II-XII intact; normal reflexes in upper and lower extremities, sensation intact in upper and lower extremities b/l to light touch   PSYCH: Appropriate insight/judgment; A+O x 3, mood and affect appropriate, recent/remote memory intact

## 2023-03-25 NOTE — H&P ADULT - ASSESSMENT
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 presents from  rehab due to 24 hours of tachypnea and SOB. Found to have recurrent pleural effusion with loculation Pleurx catheter placed with pleurvac to water seal.

## 2023-03-25 NOTE — H&P ADULT - PROBLEM SELECTOR PLAN 1
- patient presents from  rehab for tachypnea to 30s initially requiring 10L NRB, CXR showing recurrent loculated R pleural effusion   - CT chest with small loculated R pleural effusion with new peripheral demarcated high attenuation in the R pleural space   - was seen by CT surgery in the ED, per paper chart, "drained about 100cc of fluid, placed on Pleura vac . PleurX was then flushed and drained about 2180cc fluid  - c/w Pleura vac to water seal,   - need to hold AC for 48 hours for possible TPA into pleurX cath - patient presents from  rehab for tachypnea to 30s initially requiring 10L NRB, CXR showing recurrent loculated R pleural effusion   - CT chest with small loculated R pleural effusion with new peripheral demarcated high attenuation in the R pleural space   - was seen by CT surgery in the ED, per paper chart, "drained about 100cc of fluid, placed on Pleura vac . PleurX was then flushed and drained about 2180cc fluid  - c/w Pleura vac to water seal,   - need to hold AC for 48 hours for possible TPA into pleurX cath  - F/u CT surgery recs

## 2023-03-25 NOTE — H&P ADULT - NSHPREVIEWOFSYSTEMS_GEN_ALL_CORE
CONSTITUTIONAL: No fever, no chills  EYES: No eye pain, no acute blindness  Mouth: no pain in mouth, no cuts  RESPIRATORY: No cough, No sob  CARDIOVASCULAR: No CP, no palpitations  GASTROINTESTINAL: no abdominal pain, no n/v/d  GENITOURINARY: No dysuria, no hematuria  Heme: No easy bruising, no swelling of neck  NEUROLOGICAL: No seizure, No acute paralysis  SKIN: No itching, no rashes  MUSCULOSKELETAL: No acute joint pain, no joint swelling CONSTITUTIONAL: No fever, no chills  EYES: No eye pain, no acute blindness  Mouth: no pain in mouth, no cuts  RESPIRATORY: No cough, + sob  CARDIOVASCULAR: No CP, no palpitations  GASTROINTESTINAL: no abdominal pain, no n/v/d  GENITOURINARY: No dysuria, no hematuria  Heme: No easy bruising, no swelling of neck  NEUROLOGICAL: No seizure, No acute paralysis  SKIN: No itching, no rashes  MUSCULOSKELETAL: No acute joint pain, no joint swelling

## 2023-03-25 NOTE — PATIENT PROFILE ADULT - FALL HARM RISK - HARM RISK INTERVENTIONS

## 2023-03-25 NOTE — H&P ADULT - PROBLEM SELECTOR PLAN 3
A&O. Forgetful. VSS- temp max 99.9, not within notifying parameters. On 2L O2 while sleeping, room air while awake. Pain controlled with scheduled tylenol. PIV x 2 saline locked. JUAN R x 3 with minimal dark red output. LLE with ACE bandage. RLE with cast in place. RLE elevated on pillows. RLE flap warm, soft, pink, +doppler. Flap checks q1hour until POD 2 at 1600. Vioptix readings WNL. Jasmine hugger on BLE. Spontaneously voiding- minimal output- see provider notification. Passing gas, no BM. Assist x2 -using bedpan, no out of bed activity. Regular diet- denies nausea. Room temp >75 and door shut. Continue with pan of care.     Addendum 0615: pt having some nausea- prn Zofran given.    - had ultrasound guided biopsy of his R supraclavicular mass/lymph node, which later resulted as classic Hodgkin's lymphoma on 2/28  - now s/p 1st cycle of chemotherapy on 3/10, plan for 2nd round of chemotherapy on 3/31   - need to hold AC for 48 hours for possible TPA into pleurX - had ultrasound guided biopsy of his R supraclavicular mass/lymph node, which later resulted as classic Hodgkin's lymphoma on 2/28  - now s/p 1st cycle of chemotherapy on 3/10, plan for 2nd round of chemotherapy on 3/31   - oncology emailed, f/u recs

## 2023-03-26 LAB
A1C WITH ESTIMATED AVERAGE GLUCOSE RESULT: 5.3 % — SIGNIFICANT CHANGE UP (ref 4–5.6)
ALBUMIN SERPL ELPH-MCNC: 2.9 G/DL — LOW (ref 3.3–5)
ALP SERPL-CCNC: 70 U/L — SIGNIFICANT CHANGE UP (ref 40–120)
ALT FLD-CCNC: 18 U/L — SIGNIFICANT CHANGE UP (ref 4–41)
ANION GAP SERPL CALC-SCNC: 9 MMOL/L — SIGNIFICANT CHANGE UP (ref 7–14)
AST SERPL-CCNC: 14 U/L — SIGNIFICANT CHANGE UP (ref 4–40)
BASOPHILS # BLD AUTO: 0.02 K/UL — SIGNIFICANT CHANGE UP (ref 0–0.2)
BASOPHILS NFR BLD AUTO: 0.5 % — SIGNIFICANT CHANGE UP (ref 0–2)
BILIRUB SERPL-MCNC: 0.7 MG/DL — SIGNIFICANT CHANGE UP (ref 0.2–1.2)
BUN SERPL-MCNC: 6 MG/DL — LOW (ref 7–23)
CALCIUM SERPL-MCNC: 8.4 MG/DL — SIGNIFICANT CHANGE UP (ref 8.4–10.5)
CHLORIDE SERPL-SCNC: 95 MMOL/L — LOW (ref 98–107)
CHOLEST SERPL-MCNC: 117 MG/DL — SIGNIFICANT CHANGE UP
CO2 SERPL-SCNC: 28 MMOL/L — SIGNIFICANT CHANGE UP (ref 22–31)
CREAT SERPL-MCNC: 0.47 MG/DL — LOW (ref 0.5–1.3)
EGFR: 103 ML/MIN/1.73M2 — SIGNIFICANT CHANGE UP
EOSINOPHIL # BLD AUTO: 0.03 K/UL — SIGNIFICANT CHANGE UP (ref 0–0.5)
EOSINOPHIL NFR BLD AUTO: 0.7 % — SIGNIFICANT CHANGE UP (ref 0–6)
ESTIMATED AVERAGE GLUCOSE: 105 — SIGNIFICANT CHANGE UP
GLUCOSE BLDC GLUCOMTR-MCNC: 114 MG/DL — HIGH (ref 70–99)
GLUCOSE BLDC GLUCOMTR-MCNC: 115 MG/DL — HIGH (ref 70–99)
GLUCOSE SERPL-MCNC: 110 MG/DL — HIGH (ref 70–99)
HCT VFR BLD CALC: 26.9 % — LOW (ref 39–50)
HDLC SERPL-MCNC: 53 MG/DL — SIGNIFICANT CHANGE UP
HGB BLD-MCNC: 8.3 G/DL — LOW (ref 13–17)
IANC: 0.66 K/UL — LOW (ref 1.8–7.4)
IMM GRANULOCYTES NFR BLD AUTO: 0.9 % — SIGNIFICANT CHANGE UP (ref 0–0.9)
LIPID PNL WITH DIRECT LDL SERPL: 51 MG/DL — SIGNIFICANT CHANGE UP
LYMPHOCYTES # BLD AUTO: 1.65 K/UL — SIGNIFICANT CHANGE UP (ref 1–3.3)
LYMPHOCYTES # BLD AUTO: 38.6 % — SIGNIFICANT CHANGE UP (ref 13–44)
MCHC RBC-ENTMCNC: 28.2 PG — SIGNIFICANT CHANGE UP (ref 27–34)
MCHC RBC-ENTMCNC: 30.9 GM/DL — LOW (ref 32–36)
MCV RBC AUTO: 91.5 FL — SIGNIFICANT CHANGE UP (ref 80–100)
MONOCYTES # BLD AUTO: 1.88 K/UL — HIGH (ref 0–0.9)
MONOCYTES NFR BLD AUTO: 43.9 % — HIGH (ref 2–14)
NEUTROPHILS # BLD AUTO: 0.66 K/UL — LOW (ref 1.8–7.4)
NEUTROPHILS NFR BLD AUTO: 15.4 % — LOW (ref 43–77)
NON HDL CHOLESTEROL: 64 MG/DL — SIGNIFICANT CHANGE UP
NRBC # BLD: 0 /100 WBCS — SIGNIFICANT CHANGE UP (ref 0–0)
NRBC # FLD: 0.02 K/UL — HIGH (ref 0–0)
PLATELET # BLD AUTO: 320 K/UL — SIGNIFICANT CHANGE UP (ref 150–400)
POTASSIUM SERPL-MCNC: 3.7 MMOL/L — SIGNIFICANT CHANGE UP (ref 3.5–5.3)
POTASSIUM SERPL-SCNC: 3.7 MMOL/L — SIGNIFICANT CHANGE UP (ref 3.5–5.3)
PROT SERPL-MCNC: 5.7 G/DL — LOW (ref 6–8.3)
RBC # BLD: 2.94 M/UL — LOW (ref 4.2–5.8)
RBC # FLD: 20.1 % — HIGH (ref 10.3–14.5)
SODIUM SERPL-SCNC: 132 MMOL/L — LOW (ref 135–145)
TRIGL SERPL-MCNC: 65 MG/DL — SIGNIFICANT CHANGE UP
WBC # BLD: 4.28 K/UL — SIGNIFICANT CHANGE UP (ref 3.8–10.5)
WBC # FLD AUTO: 4.28 K/UL — SIGNIFICANT CHANGE UP (ref 3.8–10.5)

## 2023-03-26 PROCEDURE — 71045 X-RAY EXAM CHEST 1 VIEW: CPT | Mod: 26

## 2023-03-26 PROCEDURE — 99223 1ST HOSP IP/OBS HIGH 75: CPT

## 2023-03-26 PROCEDURE — 99232 SBSQ HOSP IP/OBS MODERATE 35: CPT | Mod: 57

## 2023-03-26 RX ORDER — SODIUM CHLORIDE 9 MG/ML
1000 INJECTION, SOLUTION INTRAVENOUS
Refills: 0 | Status: DISCONTINUED | OUTPATIENT
Start: 2023-03-26 | End: 2023-04-01

## 2023-03-26 RX ORDER — ALBUTEROL 90 UG/1
2.5 AEROSOL, METERED ORAL EVERY 6 HOURS
Refills: 0 | Status: DISCONTINUED | OUTPATIENT
Start: 2023-03-26 | End: 2023-04-04

## 2023-03-26 RX ADMIN — MAGNESIUM OXIDE 400 MG ORAL TABLET 400 MILLIGRAM(S): 241.3 TABLET ORAL at 08:18

## 2023-03-26 RX ADMIN — MAGNESIUM OXIDE 400 MG ORAL TABLET 400 MILLIGRAM(S): 241.3 TABLET ORAL at 17:32

## 2023-03-26 RX ADMIN — ESCITALOPRAM OXALATE 10 MILLIGRAM(S): 10 TABLET, FILM COATED ORAL at 11:42

## 2023-03-26 RX ADMIN — FAMOTIDINE 20 MILLIGRAM(S): 10 INJECTION INTRAVENOUS at 11:42

## 2023-03-26 RX ADMIN — SIMVASTATIN 40 MILLIGRAM(S): 20 TABLET, FILM COATED ORAL at 21:14

## 2023-03-26 RX ADMIN — ALBUTEROL 2.5 MILLIGRAM(S): 90 AEROSOL, METERED ORAL at 22:13

## 2023-03-26 RX ADMIN — MAGNESIUM OXIDE 400 MG ORAL TABLET 400 MILLIGRAM(S): 241.3 TABLET ORAL at 11:42

## 2023-03-26 RX ADMIN — Medication 25 MILLIGRAM(S): at 21:13

## 2023-03-26 RX ADMIN — SODIUM CHLORIDE 50 MILLILITER(S): 9 INJECTION, SOLUTION INTRAVENOUS at 14:22

## 2023-03-26 NOTE — CONSULT NOTE ADULT - ASSESSMENT
ARIK DUMONT is a 82y Male with a past medical history as described above who presented for evaluation of shortness of breath. Presents from  Rehab Center. CXR revealed increased pleural effusions, likely loculated. Thoracics following, PleurX to waterseal.     # Classical Hodgkin Lymphoma   - S/P Cycle 1 Brentuximab vedotin (BV) 3/10/23. C2 planned for tomorrow at [facility] with Dr. Hays. Will email to cancel this appointment.  - Will plan to administer C2D1 of BV this week to mitigate further delays.   - Normocytic anemia lower than baseline attributed to recent treatment. Continue to monitor CBC.   - Thoracic surgery planning to address the effusion at bedside tomorrow. Please obtain necessary labs/tests that they recommended.      Del Green MD, PGY-4  Hematology/Medical Oncology Fellow  Pager: [phone]  Available on [employer] Teams  After 5pm or on weekends please contact  to page on-call fellow

## 2023-03-26 NOTE — CONSULT NOTE ADULT - ATTENDING COMMENTS
This is an 82 year old man with a history of afib, Hypertension, chronic urinary retention. patient found to have a large left pleural effusion s/p thoracentesis, then Pleurex placement.   Significant lymphadenopathy noted. Biopsy demonstrated classic Hodgkins lymphoma.  Patient was then started on BV and then discharged to acute rehab at [place].  Patient presented back to [facility] due to increased SOB.  Reaccumulation of right sided pleural effusion.  Will need adjustment of the pleurex cathter. Given patient having significant setbacks in the treatment of an otherwise curable illness, we can consider administration of Cycle 2 while here at the hospital after his pulmonary status is improved.

## 2023-03-26 NOTE — PROGRESS NOTE ADULT - PROBLEM SELECTOR PLAN 2
- on eliquis, currently on hold ofr possible TPA in pleruX catheter on Monday per CT surgery  - Monitor HR, card eval PRN

## 2023-03-26 NOTE — CONSULT NOTE ADULT - SUBJECTIVE AND OBJECTIVE BOX
HEMATOLOGY ONCOLOGY CONSULT     Patient is a 82y old  Male who presents with a chief complaint of     HPI:  82 year old male with PMH of AFIB, HTN and 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 Patient is poor historian, history obtained by son Francisco at bedside. States that  patient has been recovering wellat  rehab. States patient was able to walk over 150 ft while at rehab. States that suddenly on Friday patient started to develop acute SOB and increased WOB. Imaging was done at  rehab showing worsening pleural effusions EMS was called and patient was noted to be tachypneic to 30s and placed on 10L NRB.        Recently admitted from 3/15-3/24 for worsening SOB was found to have  significant for a large R pleural effusion with mediastinal and hilar lymphadenopathy, and 3L of fluid was drained. Pleural fluid cultures and blood cultures resulted negative.  Suspicious of underlying malignancy secondary to lymphadenopathy and persistent leukocytosis, Heme/Onc was consulted and recommended transfer to [facility] on 2/24/23 for further workup. On 2/28 he underwent an ultrasound guided biopsy of his R supraclavicular mass/lymph node, which later resulted as classic Hodgkin's lymphoma.  On 3/3, he underwent a R thoracotomy/VATS with Pleurex placement. Left chest wall mediport was placed and received  first cycle of chemotherapy on 3/10. (25 Mar 2023 16:44)       ROS negative except as indicated in the HPI.    PAST MEDICAL & SURGICAL HISTORY:  Atrial fibrillation      Hypertension      Urinary retention      Past surgical history: R VATS, R pleurX cath, L chest port placement       MEDICATIONS  (STANDING):  dextrose 5%. 1000 milliLiter(s) (50 mL/Hr) IV Continuous <Continuous>  escitalopram 10 milliGRAM(s) Oral daily  famotidine    Tablet 20 milliGRAM(s) Oral daily  magnesium oxide 400 milliGRAM(s) Oral three times a day with meals  polyethylene glycol 3350 17 Gram(s) Oral daily  senna 2 Tablet(s) Oral at bedtime  simvastatin 40 milliGRAM(s) Oral at bedtime  traZODone 25 milliGRAM(s) Oral at bedtime    MEDICATIONS  (PRN):  acetaminophen     Tablet .. 650 milliGRAM(s) Oral every 6 hours PRN Temp greater or equal to 38C (100.4F), Mild Pain (1 - 3)  ALPRAZolam 0.25 milliGRAM(s) Oral daily PRN anxiety      Allergies    pertussis vaccines (Rash)  shellfish (Rash)    Intolerances        Vital Signs Last 24 Hrs  T(C): 36.9 (26 Mar 2023 10:22), Max: 36.9 (26 Mar 2023 10:22)  T(F): 98.4 (26 Mar 2023 10:22), Max: 98.4 (26 Mar 2023 10:22)  HR: 97 (26 Mar 2023 10:22) (91 - 97)  BP: 122/69 (26 Mar 2023 10:22) (106/58 - 122/69)  BP(mean): --  RR: 20 (26 Mar 2023 10:22) (19 - 24)  SpO2: 96% (26 Mar 2023 10:22) (95% - 100%)    Parameters below as of 26 Mar 2023 10:22  Patient On (Oxygen Delivery Method): nasal cannula  O2 Flow (L/min): 4      PHYSICAL EXAM  General: adult in NAD  HEENT: clear oropharynx, anicteric sclera, pink conjunctiva  Neck: supple  CV: normal S1/S2 with no murmur rubs or gallops  Lungs: positive air movement b/l ant lungs, breath sounds decreased over right hemithorax, last pleurx attached to collection device   Abdomen: soft non-tender non-distended, no hepatosplenomegaly  Ext: no clubbing cyanosis or edema  Skin: no rashes and no petechiae  Neuro: alert and oriented X 4, no focal deficits other than hard of hearing       03-25-23 @ 07:01  -  03-26-23 @ 07:00  --------------------------------------------------------  IN: 120 mL / OUT: 810 mL / NET: -690 mL      LABS:                          8.3    4.28  )-----------( 320      ( 26 Mar 2023 07:07 )             26.9         Mean Cell Volume : 91.5 fL  Mean Cell Hemoglobin : 28.2 pg  Mean Cell Hemoglobin Concentration : 30.9 gm/dL  Auto Neutrophil # : 0.66 K/uL  Auto Lymphocyte # : 1.65 K/uL  Auto Monocyte # : 1.88 K/uL  Auto Eosinophil # : 0.03 K/uL  Auto Basophil # : 0.02 K/uL  Auto Neutrophil % : 15.4 %  Auto Lymphocyte % : 38.6 %  Auto Monocyte % : 43.9 %  Auto Eosinophil % : 0.7 %  Auto Basophil % : 0.5 %      03-26    132<L>  |  95<L>  |  6<L>  ----------------------------<  110<H>  3.7   |  28  |  0.47<L>    Ca    8.4      26 Mar 2023 07:07  Mg     1.9     03-24    TPro  5.7<L>  /  Alb  2.9<L>  /  TBili  0.7  /  DBili  x   /  AST  14  /  ALT  18  /  AlkPhos  70  03-26          PT/INR - ( 25 Mar 2023 10:00 )   PT: 22.3 sec;   INR: 1.91 ratio         PTT - ( 25 Mar 2023 01:40 )  PTT:29.2 sec

## 2023-03-26 NOTE — PROGRESS NOTE ADULT - SUBJECTIVE AND OBJECTIVE BOX
Name of Patient : ARIK DUMONT  MRN: [id number]  Date of visit: 03-26-23      Subjective: Patient seen and examined. No new events except as noted.   awake, mild/moderate breathing     REVIEW OF SYSTEMS:    CONSTITUTIONAL: No weakness, fevers or chills  EYES/ENT: No visual changes;  No vertigo or throat pain   NECK: No pain or stiffness  RESPIRATORY: + SOB   CARDIOVASCULAR: No chest pain or palpitations  GASTROINTESTINAL: No abdominal or epigastric pain. No nausea, vomiting, or hematemesis; No diarrhea or constipation. No melena or hematochezia.  GENITOURINARY: No dysuria, frequency or hematuria  NEUROLOGICAL: No numbness or weakness  SKIN: No itching, burning, rashes, or lesions   All other review of systems is negative unless indicated above.    MEDICATIONS:  MEDICATIONS  (STANDING):  dextrose 5%. 1000 milliLiter(s) (50 mL/Hr) IV Continuous <Continuous>  escitalopram 10 milliGRAM(s) Oral daily  famotidine    Tablet 20 milliGRAM(s) Oral daily  magnesium oxide 400 milliGRAM(s) Oral three times a day with meals  polyethylene glycol 3350 17 Gram(s) Oral daily  senna 2 Tablet(s) Oral at bedtime  simvastatin 40 milliGRAM(s) Oral at bedtime  traZODone 25 milliGRAM(s) Oral at bedtime      PHYSICAL EXAM:  T(C): 36.7 (03-26-23 @ 14:22), Max: 36.9 (03-26-23 @ 10:22)  HR: 93 (03-26-23 @ 14:22) (93 - 97)  BP: 118/71 (03-26-23 @ 14:22) (106/58 - 122/69)  RR: 20 (03-26-23 @ 14:22) (19 - 22)  SpO2: 100% (03-26-23 @ 14:22) (95% - 100%)  Wt(kg): --  I&O's Summary    25 Mar 2023 07:01  -  26 Mar 2023 07:00  --------------------------------------------------------  IN: 120 mL / OUT: 810 mL / NET: -690 mL    26 Mar 2023 07:01  -  26 Mar 2023 15:30  --------------------------------------------------------  IN: 0 mL / OUT: 250 mL / NET: -250 mL      Height (cm): 177.8 (03-26 @ 06:22)  Weight (kg): 84.6 (03-26 @ 06:22)  BMI (kg/m2): 26.8 (03-26 @ 06:22)  BSA (m2): 2.03 (03-26 @ 06:22)    Appearance: awake, look weak   HEENT:  PERRLA   Lymphatic: No lymphadenopathy   Cardiovascular: Normal S1 S2, no JVD  Respiratory: normal effort , clear  Gastrointestinal:  Soft, Non-tender  Skin: No rashes,  warm to touch  Psychiatry:  Mood & affect appropriate  Musculuskeletal: No edema    recent labs, Imaging and EKGs personally reviewed     03-25-23 @ 07:01  -  03-26-23 @ 07:00  --------------------------------------------------------  IN: 120 mL / OUT: 810 mL / NET: -690 mL    03-26-23 @ 07:01  -  03-26-23 @ 15:30  --------------------------------------------------------  IN: 0 mL / OUT: 250 mL / NET: -250 mL                          8.3    4.28  )-----------( 320      ( 26 Mar 2023 07:07 )             26.9               03-26    132<L>  |  95<L>  |  6<L>  ----------------------------<  110<H>  3.7   |  28  |  0.47<L>    Ca    8.4      26 Mar 2023 07:07    TPro  5.7<L>  /  Alb  2.9<L>  /  TBili  0.7  /  DBili  x   /  AST  14  /  ALT  18  /  AlkPhos  70  03-26    PT/INR - ( 25 Mar 2023 10:00 )   PT: 22.3 sec;   INR: 1.91 ratio         PTT - ( 25 Mar 2023 01:40 )  PTT:29.2 sec

## 2023-03-26 NOTE — CHART NOTE - NSCHARTNOTEFT_GEN_A_CORE
Pt seen with DR Lawrence JO to chair.  Pt's son at bedside.   Pt with continued fatigue, some SOB.   Per pt/family, he generally doesn't "feel well"     Vital Signs Last 24 Hrs  T(C): 36.9 (26 Mar 2023 10:22), Max: 36.9 (26 Mar 2023 10:22)  T(F): 98.4 (26 Mar 2023 10:22), Max: 98.4 (26 Mar 2023 10:22)  HR: 97 (26 Mar 2023 10:22) (91 - 97)  BP: 122/69 (26 Mar 2023 10:22) (106/58 - 122/69)  BP(mean): --  RR: 20 (26 Mar 2023 10:22) (19 - 24)  SpO2: 96% (26 Mar 2023 10:22) (95% - 100%)    Parameters below as of 26 Mar 2023 10:22  Patient On (Oxygen Delivery Method): nasal cannula  O2 Flow (L/min): 4    Alert, oriented  Appears more pale, tired looking compared to   previous admission.   No obvious SOB, or resp distress  Wearing NC O2  Rt Pleurx site c/d/i.   Output 300cc serosang fluid, on waterseal, no air leak  CXR today with some improvement in effusion    A/p: 81yo M with recent Dx lymphoma. s/p Rt VATS pleurx with DR. Galvan on 3/3/23. Now admitted   w inc SOB, fatigue.     -CXR improved  -Pleurx output >300cc  -Would continue to waterseal  -Cont daily CXR  -Pending output and CXR, possible MIST procedure tomorrow  -Cont to hold A/c  -Consider GOC discussion  Will continue to follow  Above d/w Dr Connor Pt seen with DR Lawrence JO to chair.  Pt's son at bedside.   Pt with continued fatigue, some SOB.   Per pt/family, he generally doesn't "feel well"     Vital Signs Last 24 Hrs  T(C): 36.9 (26 Mar 2023 10:22), Max: 36.9 (26 Mar 2023 10:22)  T(F): 98.4 (26 Mar 2023 10:22), Max: 98.4 (26 Mar 2023 10:22)  HR: 97 (26 Mar 2023 10:22) (91 - 97)  BP: 122/69 (26 Mar 2023 10:22) (106/58 - 122/69)  BP(mean): --  RR: 20 (26 Mar 2023 10:22) (19 - 24)  SpO2: 96% (26 Mar 2023 10:22) (95% - 100%)    Parameters below as of 26 Mar 2023 10:22  Patient On (Oxygen Delivery Method): nasal cannula  O2 Flow (L/min): 4    Alert, oriented  Appears more pale, tired looking compared to   previous admission.   No obvious SOB, or resp distress  Wearing NC O2  Rt Pleurx site c/d/i.   Output 300cc serosang fluid, on waterseal, no air leak  CXR today with some improvement in effusion    A/p: 81yo M with recent Dx lymphoma. s/p Rt VATS pleurx with DR. Galvan on 3/3/23. Now admitted   w inc SOB, fatigue.     -CXR improved  -Pleurx output >300cc  -Would continue to waterseal  -Cont daily CXR  -Pending output and CXR, possible MIST procedure tomorrow  -Cont to hold A/c  -Consider GOC discussion  Will continue to follow  Above d/w Dr Connor        patient with drainage of >300cc of fluid after flushing pleurx alone, no tpa used . patient per son still does not feel well, with sob.   unlikely to be residual remainding fluid for etiology.   would defer MIST if cxr continues to improve. care per medicine

## 2023-03-27 ENCOUNTER — APPOINTMENT (OUTPATIENT)
Dept: HEMATOLOGY ONCOLOGY | Facility: CLINIC | Age: 83
End: 2023-03-27

## 2023-03-27 LAB
AMMONIA BLD-MCNC: 21 UMOL/L — SIGNIFICANT CHANGE UP (ref 11–55)
ANION GAP SERPL CALC-SCNC: 11 MMOL/L — SIGNIFICANT CHANGE UP (ref 7–14)
APTT BLD: 27.9 SEC — SIGNIFICANT CHANGE UP (ref 27–36.3)
BASOPHILS # BLD AUTO: 0.03 K/UL — SIGNIFICANT CHANGE UP (ref 0–0.2)
BASOPHILS NFR BLD AUTO: 0.6 % — SIGNIFICANT CHANGE UP (ref 0–2)
BUN SERPL-MCNC: 9 MG/DL — SIGNIFICANT CHANGE UP (ref 7–23)
CALCIUM SERPL-MCNC: 8.6 MG/DL — SIGNIFICANT CHANGE UP (ref 8.4–10.5)
CHLORIDE SERPL-SCNC: 95 MMOL/L — LOW (ref 98–107)
CO2 SERPL-SCNC: 28 MMOL/L — SIGNIFICANT CHANGE UP (ref 22–31)
CREAT SERPL-MCNC: 0.53 MG/DL — SIGNIFICANT CHANGE UP (ref 0.5–1.3)
EGFR: 99 ML/MIN/1.73M2 — SIGNIFICANT CHANGE UP
EOSINOPHIL # BLD AUTO: 0.01 K/UL — SIGNIFICANT CHANGE UP (ref 0–0.5)
EOSINOPHIL NFR BLD AUTO: 0.2 % — SIGNIFICANT CHANGE UP (ref 0–6)
GLUCOSE BLDC GLUCOMTR-MCNC: 108 MG/DL — HIGH (ref 70–99)
GLUCOSE SERPL-MCNC: 101 MG/DL — HIGH (ref 70–99)
HCT VFR BLD CALC: 27.3 % — LOW (ref 39–50)
HGB BLD-MCNC: 8.3 G/DL — LOW (ref 13–17)
IANC: 0.54 K/UL — LOW (ref 1.8–7.4)
IMM GRANULOCYTES NFR BLD AUTO: 1.3 % — HIGH (ref 0–0.9)
INR BLD: 1.5 RATIO — HIGH (ref 0.88–1.16)
LYMPHOCYTES # BLD AUTO: 2.15 K/UL — SIGNIFICANT CHANGE UP (ref 1–3.3)
LYMPHOCYTES # BLD AUTO: 44.8 % — HIGH (ref 13–44)
MAGNESIUM SERPL-MCNC: 2 MG/DL — SIGNIFICANT CHANGE UP (ref 1.6–2.6)
MCHC RBC-ENTMCNC: 28.3 PG — SIGNIFICANT CHANGE UP (ref 27–34)
MCHC RBC-ENTMCNC: 30.4 GM/DL — LOW (ref 32–36)
MCV RBC AUTO: 93.2 FL — SIGNIFICANT CHANGE UP (ref 80–100)
MONOCYTES # BLD AUTO: 2.01 K/UL — HIGH (ref 0–0.9)
MONOCYTES NFR BLD AUTO: 41.9 % — HIGH (ref 2–14)
NEUTROPHILS # BLD AUTO: 0.54 K/UL — LOW (ref 1.8–7.4)
NEUTROPHILS NFR BLD AUTO: 11.2 % — LOW (ref 43–77)
NRBC # BLD: 0 /100 WBCS — SIGNIFICANT CHANGE UP (ref 0–0)
NRBC # FLD: 0.02 K/UL — HIGH (ref 0–0)
PHOSPHATE SERPL-MCNC: 2.2 MG/DL — LOW (ref 2.5–4.5)
PLATELET # BLD AUTO: 357 K/UL — SIGNIFICANT CHANGE UP (ref 150–400)
POTASSIUM SERPL-MCNC: 4 MMOL/L — SIGNIFICANT CHANGE UP (ref 3.5–5.3)
POTASSIUM SERPL-SCNC: 4 MMOL/L — SIGNIFICANT CHANGE UP (ref 3.5–5.3)
PROCALCITONIN SERPL-MCNC: 0.14 NG/ML — HIGH (ref 0.02–0.1)
PROTHROM AB SERPL-ACNC: 17.5 SEC — HIGH (ref 10.5–13.4)
RBC # BLD: 2.93 M/UL — LOW (ref 4.2–5.8)
RBC # FLD: 20.4 % — HIGH (ref 10.3–14.5)
SODIUM SERPL-SCNC: 134 MMOL/L — LOW (ref 135–145)
WBC # BLD: 4.8 K/UL — SIGNIFICANT CHANGE UP (ref 3.8–10.5)
WBC # FLD AUTO: 4.8 K/UL — SIGNIFICANT CHANGE UP (ref 3.8–10.5)

## 2023-03-27 PROCEDURE — 99233 SBSQ HOSP IP/OBS HIGH 50: CPT | Mod: GC

## 2023-03-27 PROCEDURE — 71045 X-RAY EXAM CHEST 1 VIEW: CPT | Mod: 26

## 2023-03-27 RX ORDER — ATENOLOL 25 MG/1
12.5 TABLET ORAL DAILY
Refills: 0 | Status: DISCONTINUED | OUTPATIENT
Start: 2023-03-27 | End: 2023-04-10

## 2023-03-27 RX ORDER — CEFEPIME 1 G/1
2000 INJECTION, POWDER, FOR SOLUTION INTRAMUSCULAR; INTRAVENOUS EVERY 8 HOURS
Refills: 0 | Status: DISCONTINUED | OUTPATIENT
Start: 2023-03-28 | End: 2023-03-31

## 2023-03-27 RX ORDER — CEFEPIME 1 G/1
2000 INJECTION, POWDER, FOR SOLUTION INTRAMUSCULAR; INTRAVENOUS ONCE
Refills: 0 | Status: COMPLETED | OUTPATIENT
Start: 2023-03-27 | End: 2023-03-27

## 2023-03-27 RX ORDER — CEFEPIME 1 G/1
INJECTION, POWDER, FOR SOLUTION INTRAMUSCULAR; INTRAVENOUS
Refills: 0 | Status: DISCONTINUED | OUTPATIENT
Start: 2023-03-27 | End: 2023-03-31

## 2023-03-27 RX ORDER — FILGRASTIM 480MCG/1.6
480 VIAL (ML) INJECTION DAILY
Refills: 0 | Status: DISCONTINUED | OUTPATIENT
Start: 2023-03-27 | End: 2023-03-28

## 2023-03-27 RX ADMIN — SIMVASTATIN 40 MILLIGRAM(S): 20 TABLET, FILM COATED ORAL at 21:24

## 2023-03-27 RX ADMIN — ALBUTEROL 2.5 MILLIGRAM(S): 90 AEROSOL, METERED ORAL at 13:40

## 2023-03-27 RX ADMIN — Medication 63.75 MILLIMOLE(S): at 12:49

## 2023-03-27 RX ADMIN — MAGNESIUM OXIDE 400 MG ORAL TABLET 400 MILLIGRAM(S): 241.3 TABLET ORAL at 17:43

## 2023-03-27 RX ADMIN — MAGNESIUM OXIDE 400 MG ORAL TABLET 400 MILLIGRAM(S): 241.3 TABLET ORAL at 07:51

## 2023-03-27 RX ADMIN — ESCITALOPRAM OXALATE 10 MILLIGRAM(S): 10 TABLET, FILM COATED ORAL at 13:09

## 2023-03-27 RX ADMIN — Medication 25 MILLIGRAM(S): at 21:24

## 2023-03-27 RX ADMIN — FAMOTIDINE 20 MILLIGRAM(S): 10 INJECTION INTRAVENOUS at 13:09

## 2023-03-27 RX ADMIN — Medication 480 MICROGRAM(S): at 20:30

## 2023-03-27 RX ADMIN — CEFEPIME 100 MILLIGRAM(S): 1 INJECTION, POWDER, FOR SOLUTION INTRAMUSCULAR; INTRAVENOUS at 21:00

## 2023-03-27 RX ADMIN — MAGNESIUM OXIDE 400 MG ORAL TABLET 400 MILLIGRAM(S): 241.3 TABLET ORAL at 13:08

## 2023-03-27 NOTE — PROGRESS NOTE ADULT - ASSESSMENT
ARIK DUMONT is a 82y Male with a past medical history as described above who presented for evaluation of shortness of breath. [facility] from  [facility]. CXR revealed increased pleural effusions, likely loculated. Thoracics following, PleurX to waterseal.     # Classical Hodgkin Lymphoma   - S/P Cycle 1 Brentuximab vedotin (BV) 3/10/23  - C2D1 due this Friday 3/31, but given current state and increased lethargy, likely will hold off    - Will follow-up with thoracic surgery as when he can resume Eliquis   - Please panculture, start broad spectrum abx as he is neutropenic. Will also order Zarxio 300mcg SubQ daily for 5 days.   - Spoke with son at [facility] and daughter via telephone today  - CXR daily   - CBC with diff daily - need to trend ANC  - Thoracic input appreciated, no procedure for now, monitoring serosanguinous output      Del Green MD, PGY-4  Hematology/Medical Oncology Fellow  Pager: [phone]  Available on Microsoft Teams  After 5pm or on weekends please contact  to page on-call fellow

## 2023-03-27 NOTE — PHYSICAL THERAPY INITIAL EVALUATION ADULT - LEVEL OF INDEPENDENCE: GAIT, REHAB EVAL
due to patient displays fatigue and shortness of breath despite  4 liters oxygen via nasal cannula/unable to perform

## 2023-03-27 NOTE — DIETITIAN INITIAL EVALUATION ADULT - ADD RECOMMEND
1. Encourage & assist Pt with meals; Monitor PO diet tolerance;   2. Add appetite stimulant to boost Pt's PO intake/appetite;   3. Add Multivitamins 1 tab daily for micronutrient coverage;   4. Monitor labs, hydration status;

## 2023-03-27 NOTE — PROGRESS NOTE ADULT - SUBJECTIVE AND OBJECTIVE BOX
Name of Patient : ARIK DUMONT  MRN: [id number]  Date of visit: 03-27-23       Subjective: Patient seen and examined. No new events except as noted.   doing better  improved breathing     REVIEW OF SYSTEMS:    CONSTITUTIONAL: No weakness, fevers or chills  EYES/ENT: No visual changes;  No vertigo or throat pain   NECK: No pain or stiffness  RESPIRATORY: No cough, wheezing, hemoptysis; No shortness of breath  CARDIOVASCULAR: No chest pain or palpitations  GASTROINTESTINAL: No abdominal or epigastric pain. No nausea, vomiting, or hematemesis; No diarrhea or constipation. No melena or hematochezia.  GENITOURINARY: No dysuria, frequency or hematuria  NEUROLOGICAL: No numbness or weakness  SKIN: No itching, burning, rashes, or lesions   All other review of systems is negative unless indicated above.    MEDICATIONS:  MEDICATIONS  (STANDING):  atenolol  Tablet 12.5 milliGRAM(s) Oral daily  cefepime   IVPB      dextrose 5%. 1000 milliLiter(s) (50 mL/Hr) IV Continuous <Continuous>  escitalopram 10 milliGRAM(s) Oral daily  famotidine    Tablet 20 milliGRAM(s) Oral daily  filgrastim-sndz (ZARXIO) Injectable 480 MICROGram(s) SubCutaneous daily  magnesium oxide 400 milliGRAM(s) Oral three times a day with meals  polyethylene glycol 3350 17 Gram(s) Oral daily  senna 2 Tablet(s) Oral at bedtime  simvastatin 40 milliGRAM(s) Oral at bedtime  traZODone 25 milliGRAM(s) Oral at bedtime      PHYSICAL EXAM:  T(C): 36.6 (03-27-23 @ 21:03), Max: 36.6 (03-27-23 @ 08:00)  HR: 98 (03-27-23 @ 21:03) (63 - 101)  BP: 127/67 (03-27-23 @ 21:03) (105/68 - 127/67)  RR: 17 (03-27-23 @ 21:03) (17 - 19)  SpO2: 96% (03-27-23 @ 21:03) (96% - 99%)  Wt(kg): --  I&O's Summary    26 Mar 2023 07:01  -  27 Mar 2023 07:00  --------------------------------------------------------  IN: 740 mL / OUT: 1240 mL / NET: -500 mL    27 Mar 2023 07:01  -  27 Mar 2023 21:48  --------------------------------------------------------  IN: 0 mL / OUT: 470 mL / NET: -470 mL          Appearance: Normal	  HEENT:  PERRLA   Lymphatic: No lymphadenopathy   Cardiovascular: Normal S1 S2, no JVD  Respiratory: normal effort , clear  Gastrointestinal:  Soft, Non-tender  Skin: No rashes,  warm to touch  Psychiatry:  Mood & affect appropriate  Musculuskeletal: No edema    recent labs, Imaging and EKGs personally reviewed     03-26-23 @ 07:01  -  03-27-23 @ 07:00  --------------------------------------------------------  IN: 740 mL / OUT: 1240 mL / NET: -500 mL    03-27-23 @ 07:01  -  03-27-23 @ 21:48  --------------------------------------------------------  IN: 0 mL / OUT: 470 mL / NET: -470 mL                          8.3    4.80  )-----------( 357      ( 27 Mar 2023 06:15 )             27.3               03-27    134<L>  |  95<L>  |  9   ----------------------------<  101<H>  4.0   |  28  |  0.53    Ca    8.6      27 Mar 2023 06:15  Phos  2.2     03-27  Mg     2.00     03-27    TPro  5.7<L>  /  Alb  2.9<L>  /  TBili  0.7  /  DBili  x   /  AST  14  /  ALT  18  /  AlkPhos  70  03-26    PT/INR - ( 27 Mar 2023 06:15 )   PT: 17.5 sec;   INR: 1.50 ratio         PTT - ( 27 Mar 2023 06:15 )  PTT:27.9 sec                              No

## 2023-03-27 NOTE — PHYSICAL THERAPY INITIAL EVALUATION ADULT - PERTINENT HX OF CURRENT PROBLEM, REHAB EVAL
Patient is 82 year old  male with recent Dx lymphoma. s/p Rt VATS pleurx with DR. Galvan on 3/3/23. Admitted with increased shortness of breath and fatigue.

## 2023-03-27 NOTE — DIETITIAN INITIAL EVALUATION ADULT - NSFNSGIIOFT_GEN_A_CORE
03-26-23 @ 07:01  -  03-27-23 @ 07:00  --------------------------------------------------------  OUT:    Chest Tube (mL): 190 mL  Total OUT: 190 mL    Total NET: -190 mL

## 2023-03-27 NOTE — CONSULT NOTE ADULT - ASSESSMENT
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 Patient is poor historian, history obtained by son Francisco at bedside. States that  patient has been recovering wellat  rehab. States patient was able to walk over 150 ft while at rehab. States that suddenly on Friday patient started to develop acute SOB and increased WOB. Imaging was done at  rehab showing worsening pleural effusions EMS was called and patient was noted to be tachypneic to 30s and placed on 10L NRB.    Recently admitted from 3/15-3/24 for worsening SOB was found to have  significant for a large R pleural effusion with mediastinal and hilar lymphadenopathy, and 3L of fluid was drained. Pleural fluid cultures and blood cultures resulted negative.  Suspicious of underlying malignancy secondary to lymphadenopathy and persistent leukocytosis, Heme/Onc was consulted and recommended transfer to [facility] on 2/24/23 for further workup. On 2/28 he underwent an ultrasound guided biopsy of his R supraclavicular mass/lymph node, which later resulted as classic Hodgkin's lymphoma.  On 3/3, he underwent a R thoracotomy/VATS with Pleurex placement. Left chest wall mediport was placed and received  first cycle of chemotherapy on 3/10. (25 Mar 2023 16:44)    no wpulm called:  pt has chest tube on rigth side:  has bloody secretions  he looks comfortable:  no sob :      right sided pl effusion:  A fibrillation:   HTN:  Urinary retention     3/27:  right sided pl effusion:  [id number]:  blood pl fluid  ; on ac;  cts surgery to see:  chest xray today looks pretty good to me: not hypercarbic and acidotic on VBG   A fibrillation: off ac at t his time  HTN:  blood pressure is controlled  Urinary retention  ; has chr plummer: :    marah pmd

## 2023-03-27 NOTE — DIETITIAN INITIAL EVALUATION ADULT - NUTRITON FOCUSED PHYSICAL EXAM
"Anesthesia Transfer of Care Note    Patient: Brisa Rodriguez    Procedure(s) Performed: Procedure(s) (LRB):  RELEASE, CARPAL TUNNEL,RIGHT (Right)    Patient location: PACU    Anesthesia Type: general    Transport from OR: Transported from OR on 6-10 L/min O2 by face mask with adequate spontaneous ventilation    Post pain: adequate analgesia    Post assessment: no apparent anesthetic complications    Post vital signs: stable    Level of consciousness: awake    Nausea/Vomiting: no nausea/vomiting    Complications: none    Transfer of care protocol was followed      Last vitals:   Visit Vitals  /87 (BP Location: Left arm, Patient Position: Lying)   Pulse 75   Temp 36.8 °C (98.2 °F) (Oral)   Resp 16   Ht 5' 8" (1.727 m)   Wt 113.4 kg (250 lb)   LMP 02/02/2022 (Within Months)   SpO2 96%   Breastfeeding No   BMI 38.01 kg/m²     "
yes...

## 2023-03-27 NOTE — CONSULT NOTE ADULT - SUBJECTIVE AND OBJECTIVE BOX
CHIEF COMPLAINT:Patient is a 82y old  Male who presents with a chief complaint of     HISTORY OF PRESENT ILLNESS:    82 year old man with a history of afib, Hypertension, chronic urinary retention. patient found to have a large left pleural effusion s/p thoracentesis, then Pleurex placement.   Significant lymphadenopathy noted. Biopsy demonstrated classic Hodgkins lymphoma.  Patient was then started on BV and then discharged to acute rehab at [facility].  Patient presented back to [facility] due to increased SOB.  Reaccumulation of right sided pleural effusion.     PAST MEDICAL & SURGICAL HISTORY:  Atrial fibrillation      Hypertension      Urinary retention      No significant past surgical history              MEDICATIONS:      albuterol   0.5% 2.5 milliGRAM(s) Nebulizer every 6 hours PRN    acetaminophen     Tablet .. 650 milliGRAM(s) Oral every 6 hours PRN  ALPRAZolam 0.25 milliGRAM(s) Oral daily PRN  escitalopram 10 milliGRAM(s) Oral daily  traZODone 25 milliGRAM(s) Oral at bedtime    famotidine    Tablet 20 milliGRAM(s) Oral daily  polyethylene glycol 3350 17 Gram(s) Oral daily  senna 2 Tablet(s) Oral at bedtime    simvastatin 40 milliGRAM(s) Oral at bedtime    dextrose 5%. 1000 milliLiter(s) IV Continuous <Continuous>  magnesium oxide 400 milliGRAM(s) Oral three times a day with meals      FAMILY HISTORY:      Non-contributory    SOCIAL HISTORY:    No tobacco, drugs or etoh    Allergies    pertussis vaccines (Rash)  shellfish (Rash)    Intolerances    	    REVIEW OF SYSTEMS:  as above  The rest of the 14 points ROS reviewed and except above they are unremarkable.        PHYSICAL EXAM:  T(C): 36.6 (03-27-23 @ 08:00), Max: 36.7 (03-26-23 @ 14:22)  HR: 100 (03-27-23 @ 08:00) (93 - 110)  BP: 112/59 (03-27-23 @ 08:00) (112/59 - 131/80)  RR: 18 (03-27-23 @ 08:00) (18 - 20)  SpO2: 97% (03-27-23 @ 08:00) (95% - 100%)  Wt(kg): --  I&O's Summary    26 Mar 2023 07:01  -  27 Mar 2023 07:00  --------------------------------------------------------  IN: 740 mL / OUT: 1240 mL / NET: -500 mL        JVP: Normal  Neck: supple  Lung: clear   CV: S1 S2 , Murmur:  Abd: soft  Ext: No edema  neuro: Awake / alert  Psych: flat affect  Skin: normal    LABS/DATA:    TELEMETRY: 	    ECG:  	   	  CARDIAC MARKERS:          < from: TTE Echo Complete w/o Contrast w/ Doppler (02.18.23 @ 08:03) >  Summary:   1. Left ventricular ejection fraction, by visual estimation, is 50 to   55%.   2. Normal global left ventricular systolic function.   3. The left ventricular diastolic function could not be assessed in this   study.   4. Moderately enlarged left atrium.   5. Moderately enlarged right atrium.   6. Mild mitral annular calcification.   7. Mild mitral valve regurgitation.   8. Thickening of the anterior and posterior mitral valve leaflets.    [id number] Gibran Sahu MD, [facility] , Electronically signed on   2/18/2023 at 2:31:51 PM        < end of copied text >                              8.3    4.80  )-----------( 357      ( 27 Mar 2023 06:15 )             27.3     03-27    134<L>  |  95<L>  |  9   ----------------------------<  101<H>  4.0   |  28  |  0.53    Ca    8.6      27 Mar 2023 06:15  Phos  2.2     03-27  Mg     2.00     03-27    TPro  5.7<L>  /  Alb  2.9<L>  /  TBili  0.7  /  DBili  x   /  AST  14  /  ALT  18  /  AlkPhos  70  03-26    proBNP:   Lipid Profile:   HgA1c:   TSH:            CHIEF COMPLAINT:Patient is a 82y old  Male who presents with a chief complaint of     HISTORY OF PRESENT ILLNESS:    82 year old man with a history of afib, Hypertension, chronic urinary retention. patient found to have a large left pleural effusion s/p thoracentesis, then Pleurex placement.   Significant lymphadenopathy noted. Biopsy demonstrated classic Hodgkins lymphoma.  Patient was then started on BV and then discharged to acute rehab at [place].  Patient presented back to [facility] due to increased SOB.  Reaccumulation of right sided pleural effusion.   currently feels ok   no dizziness  no cp   no palpitation     PAST MEDICAL & SURGICAL HISTORY:  Atrial fibrillation      Hypertension      Urinary retention      No significant past surgical history              MEDICATIONS:      albuterol   0.5% 2.5 milliGRAM(s) Nebulizer every 6 hours PRN    acetaminophen     Tablet .. 650 milliGRAM(s) Oral every 6 hours PRN  ALPRAZolam 0.25 milliGRAM(s) Oral daily PRN  escitalopram 10 milliGRAM(s) Oral daily  traZODone 25 milliGRAM(s) Oral at bedtime    famotidine    Tablet 20 milliGRAM(s) Oral daily  polyethylene glycol 3350 17 Gram(s) Oral daily  senna 2 Tablet(s) Oral at bedtime    simvastatin 40 milliGRAM(s) Oral at bedtime    dextrose 5%. 1000 milliLiter(s) IV Continuous <Continuous>  magnesium oxide 400 milliGRAM(s) Oral three times a day with meals      FAMILY HISTORY:      Non-contributory    SOCIAL HISTORY:    No tobacco, drugs or etoh    Allergies    pertussis vaccines (Rash)  shellfish (Rash)    Intolerances    	    REVIEW OF SYSTEMS:  as above  The rest of the 14 points ROS reviewed and except above they are unremarkable.        PHYSICAL EXAM:  T(C): 36.6 (03-27-23 @ 08:00), Max: 36.7 (03-26-23 @ 14:22)  HR: 100 (03-27-23 @ 08:00) (93 - 110)  BP: 112/59 (03-27-23 @ 08:00) (112/59 - 131/80)  RR: 18 (03-27-23 @ 08:00) (18 - 20)  SpO2: 97% (03-27-23 @ 08:00) (95% - 100%)  Wt(kg): --  I&O's Summary    26 Mar 2023 07:01  -  27 Mar 2023 07:00  --------------------------------------------------------  IN: 740 mL / OUT: 1240 mL / NET: -500 mL        JVP: Normal  Neck: supple  Lung: clear   CV: S1 S2 , Murmur:  Abd: soft  Ext: No edema  neuro: Awake / alert  Psych: flat affect  Skin: normal    LABS/DATA:    TELEMETRY: 	    ECG:  	   	  CARDIAC MARKERS:          < from: TTE Echo Complete w/o Contrast w/ Doppler (02.18.23 @ 08:03) >  Summary:   1. Left ventricular ejection fraction, by visual estimation, is 50 to   55%.   2. Normal global left ventricular systolic function.   3. The left ventricular diastolic function could not be assessed in this   study.   4. Moderately enlarged left atrium.   5. Moderately enlarged right atrium.   6. Mild mitral annular calcification.   7. Mild mitral valve regurgitation.   8. Thickening of the anterior and posterior mitral valve leaflets.    [id number] Gibran Sahu MD, [facility] , Electronically signed on   2/18/2023 at 2:31:51 PM        < end of copied text >                              8.3    4.80  )-----------( 357      ( 27 Mar 2023 06:15 )             27.3     03-27    134<L>  |  95<L>  |  9   ----------------------------<  101<H>  4.0   |  28  |  0.53    Ca    8.6      27 Mar 2023 06:15  Phos  2.2     03-27  Mg     2.00     03-27    TPro  5.7<L>  /  Alb  2.9<L>  /  TBili  0.7  /  DBili  x   /  AST  14  /  ALT  18  /  AlkPhos  70  03-26    proBNP:   Lipid Profile:   HgA1c:   TSH:

## 2023-03-27 NOTE — CHART NOTE - NSCHARTNOTEFT_GEN_A_CORE
late entry due to sunrise issue:                          8.3    4.80  )-----------( 357      ( 27 Mar 2023 06:15 )             27.3   03-27    134<L>  |  95<L>  |  9   ----------------------------<  101<H>  4.0   |  28  |  0.53    Ca    8.6      27 Mar 2023 06:15  Phos  2.2     03-27  Mg     2.00     03-27    TPro  5.7<L>  /  Alb  2.9<L>  /  TBili  0.7  /  DBili  x   /  AST  14  /  ALT  18  /  AlkPhos  70  03-26    Results and case was discussed with Dr. Connor this morning, CXR reviewed by CTSx- no intervention or MIST for now.  CTSx Dr. Romo discussed with patient and patient's son at bedside this afternoon, PLeurX noted with bloody-tinged drainage, HOLD AC for now.  Writer discussed plan of care in length multiple times with patient's son Francisco throughout the day at bedside, all questions answered. Discussed with RN and attending

## 2023-03-27 NOTE — DIETITIAN INITIAL EVALUATION ADULT - PERTINENT LABORATORY DATA
03-27    134<L>  |  95<L>  |  9   ----------------------------<  101<H>  4.0   |  28  |  0.53    Ca    8.6      27 Mar 2023 06:15  Phos  2.2     03-27  Mg     2.00     03-27    TPro  5.7<L>  /  Alb  2.9<L>  /  TBili  0.7  /  DBili  x   /  AST  14  /  ALT  18  /  AlkPhos  70  03-26  POCT Blood Glucose.: 108 mg/dL (03-27-23 @ 07:37)  A1C with Estimated Average Glucose Result: 5.3 % (03-26-23 @ 07:07)  A1C with Estimated Average Glucose Result: 6.0 % (02-25-23 @ 06:28)

## 2023-03-27 NOTE — CONSULT NOTE ADULT - ASSESSMENT
chronic persistent afib    pleural effusion  chronic persistent afib  HR a bit elevated  resume low dose atenolol   resume a/c once deemed safe from CTS / Heme     pleural effusion   improving   fu with CTS

## 2023-03-27 NOTE — DIETITIAN INITIAL EVALUATION ADULT - OTHER INFO
Pt 81 yo male with PMHx of AFIB, HTN, chronic urinary retention presented from  rehab 2/2 tachypnea and SOB - per chart review. Of note, Pt with pleural effusion with, Pleurx catheter placed with water seal.     At time of visit, Pt awake, alert but weak. Per Pt, his appetite not as good as used to be, Pt ate >50% of breakfast this morning. Food preferences discussed. No report of chewing or swallowing difficulty; no report of vomiting or diarrhea @ this time. +Last BM (3/26) per flow sheet.     Pt could not confirm his height or his actual body weight; Pt with unintended weight loss PTA, but unable to quantify. Nutrition focused physical exam conducted, Pt found with signs of subcutaneous fat loss: [mild] Buccal fat region, [mild] Triceps region & muscle wasting: [mild] Temples region, [mild] Clavicle region, [moderate] Interosseous region. Rec to add appetite stimulant to boost Pt's PO intake/appetite. Rec to PO supplement: Ensure Compact 2x daily, to diet rx.

## 2023-03-27 NOTE — CONSULT NOTE ADULT - SUBJECTIVE AND OBJECTIVE BOX
03-27-23 @ 10:54    Patient is a 82y old  Male who presents with a chief complaint of     HPI:  82 year old male with PMH of AFIB, HTN and 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 Patient is poor historian, history obtained by son Francisco at bedside. States that  patient has been recovering wellat  rehab. States patient was able to walk over 150 ft while at rehab. States that suddenly on Friday patient started to develop acute SOB and increased WOB. Imaging was done at  rehab showing worsening pleural effusions EMS was called and patient was noted to be tachypneic to 30s and placed on 10L NRB.        Recently admitted from 3/15-3/24 for worsening SOB was found to have  significant for a large R pleural effusion with mediastinal and hilar lymphadenopathy, and 3L of fluid was drained. Pleural fluid cultures and blood cultures resulted negative.  Suspicious of underlying malignancy secondary to lymphadenopathy and persistent leukocytosis, Heme/Onc was consulted and recommended transfer to [facility] on 2/24/23 for further workup. On 2/28 he underwent an ultrasound guided biopsy of his R supraclavicular mass/lymph node, which later resulted as classic Hodgkin's lymphoma.  On 3/3, he underwent a R thoracotomy/VATS with Pleurex placement. Left chest wall mediport was placed and received  first cycle of chemotherapy on 3/10. (25 Mar 2023 16:44)    no wpulm called:  pt has chest tube on rigth side:  has bloody secretions  he looks comfortable:  no sob     ?FOLLOWING PRESENT  [x ] Hx of PE/DVT, [ x] Hx COPD, [ x] Hx of Asthma, [y ] Hx of Hospitalization, x[ ]  Hx of BiPAP/CPAP use, [x ] Hx of MING    Allergies    pertussis vaccines (Rash)  shellfish (Rash)    Intolerances        PAST MEDICAL & SURGICAL HISTORY:  Atrial fibrillation      Hypertension      Urinary retention      No significant past surgical history          FAMILY HISTORY:      Social History: [ 1964 ] TOBACCO                  [ x ] ETOH                                 [x  ] IVDA/DRUGS    REVIEW OF SYSTEMS      General:x	    Skin/Breast:x  	  Ophthalmologic:x  	  ENMT:	x    Respiratory and Thorax: sob,  handy :  	  Cardiovascular:	x    Gastrointestinal:	x    Genitourinary:	x    Musculoskeletal:	 mild edema    Neurological:	x    Psychiatric:	x    Hematology/Lymphatics:	 x    Endocrine:	x    Allergic/Immunologic:	x    MEDICATIONS  (STANDING):  atenolol  Tablet 12.5 milliGRAM(s) Oral daily  dextrose 5%. 1000 milliLiter(s) (50 mL/Hr) IV Continuous <Continuous>  escitalopram 10 milliGRAM(s) Oral daily  famotidine    Tablet 20 milliGRAM(s) Oral daily  magnesium oxide 400 milliGRAM(s) Oral three times a day with meals  polyethylene glycol 3350 17 Gram(s) Oral daily  senna 2 Tablet(s) Oral at bedtime  simvastatin 40 milliGRAM(s) Oral at bedtime  traZODone 25 milliGRAM(s) Oral at bedtime    MEDICATIONS  (PRN):  acetaminophen     Tablet .. 650 milliGRAM(s) Oral every 6 hours PRN Temp greater or equal to 38C (100.4F), Mild Pain (1 - 3)  albuterol   0.5% 2.5 milliGRAM(s) Nebulizer every 6 hours PRN Shortness of Breath  ALPRAZolam 0.25 milliGRAM(s) Oral daily PRN anxiety       Vital Signs Last 24 Hrs  T(C): 36.6 (27 Mar 2023 08:00), Max: 36.7 (26 Mar 2023 14:22)  T(F): 97.9 (27 Mar 2023 08:00), Max: 98 (26 Mar 2023 14:22)  HR: 100 (27 Mar 2023 08:00) (93 - 110)  BP: 112/59 (27 Mar 2023 08:00) (112/59 - 131/80)  BP(mean): --  RR: 18 (27 Mar 2023 08:00) (18 - 20)  SpO2: 97% (27 Mar 2023 08:00) (95% - 100%)    Parameters below as of 27 Mar 2023 08:00  Patient On (Oxygen Delivery Method): nasal cannula  O2 Flow (L/min): 4  Orthostatic VS          I&O's Summary    26 Mar 2023 07:01  -  27 Mar 2023 07:00  --------------------------------------------------------  IN: 740 mL / OUT: 1240 mL / NET: -500 mL        Physical Exam:   GENERAL: NAD, well-groomed, well-developed  HEENT: CHUCK/   Atraumatic, Normocephalic  ENMT: No tonsillar erythema, exudates, or enlargement; Moist mucous membranes, Good dentition, No lesions  NECK: Supple, No JVD, Normal thyroid  CHEST/LUNG: decreased air entery rigth side  CVS: Regular rate and rhythm; No murmurs, rubs, or gallops  GI: : Soft, Nontender, Nondistended; Bowel sounds present  NERVOUS SYSTEM:  Alert & Oriented X3  EXTREMITIES: + edema  LYMPH: No lymphadenopathy noted  SKIN: No rashes or lesions  ENDOCRINOLOGY: No Thyromegaly  PSYCH: Appropriate    Labs:  4.9<49<4>>49<<7.405>>4.9<<3><<4><<5<<499>>COVID-19 PCR: NotDetec (17 Mar 2023 06:46)  COVID-19 PCR: NotDetec (14 Mar 2023 16:40)  COVID-19 PCR: NotDetec (06 Mar 2023 10:55)  COVID-19 PCR: NotDetec (02 Mar 2023 14:30)  COVID-19 PCR: NotDetec (23 Feb 2023 10:28)                              8.3    4.80  )-----------( 357      ( 27 Mar 2023 06:15 )             27.3                         8.3    4.28  )-----------( 320      ( 26 Mar 2023 07:07 )             26.9                         8.4    4.42  )-----------( 308      ( 25 Mar 2023 10:00 )             26.8                         8.4    4.36  )-----------( 372      ( 25 Mar 2023 01:40 )             26.9                         9.2    3.66  )-----------( 389      ( 24 Mar 2023 15:00 )             28.7     03-27    134<L>  |  95<L>  |  9   ----------------------------<  101<H>  4.0   |  28  |  0.53  03-26    132<L>  |  95<L>  |  6<L>  ----------------------------<  110<H>  3.7   |  28  |  0.47<L>  03-25    132<L>  |  95<L>  |  8   ----------------------------<  122<H>  4.2   |  27  |  0.54  03-25    130<L>  |  94<L>  |  9   ----------------------------<  123<H>  4.0   |  24  |  0.54  03-24    131<L>  |  96  |  10  ----------------------------<  152<H>  4.7   |  31  |  0.56    Ca    8.6      27 Mar 2023 06:15  Ca    8.4      26 Mar 2023 07:07  Phos  2.2     03-27  Mg     2.00     03-27    TPro  5.7<L>  /  Alb  2.9<L>  /  TBili  0.7  /  DBili  x   /  AST  14  /  ALT  18  /  AlkPhos  70  03-26  TPro  6.2  /  Alb  2.9<L>  /  TBili  0.7  /  DBili  x   /  AST  19  /  ALT  24  /  AlkPhos  68  03-25    CAPILLARY BLOOD GLUCOSE      POCT Blood Glucose.: 108 mg/dL (27 Mar 2023 07:37)  POCT Blood Glucose.: 114 mg/dL (26 Mar 2023 22:39)    LIVER FUNCTIONS - ( 26 Mar 2023 07:07 )  Alb: 2.9 g/dL / Pro: 5.7 g/dL / ALK PHOS: 70 U/L / ALT: 18 U/L / AST: 14 U/L / GGT: x           PT/INR - ( 27 Mar 2023 06:15 )   PT: 17.5 sec;   INR: 1.50 ratio         PTT - ( 27 Mar 2023 06:15 )  PTT:27.9 sec    Culture - Blood (collected 24 Mar 2023 16:10)  Source: .Blood Blood-Peripheral  Preliminary Report (25 Mar 2023 22:02):    No growth to date.    Culture - Blood (collected 24 Mar 2023 16:05)  Source: .Blood Blood-Peripheral  Preliminary Report (25 Mar 2023 22:02):    No growth to date.      D DImerLactate, Blood: 1.7 mmol/L (03-24 @ 16:10)    Procalcitonin, Serum: 0.09 ng/mL (03-24 @ 15:00)  D-Dimer Assay, Quantitative: 199 ng/mL DDU (03-24 @ 15:45)      Studies  Chest X-RAY  CT SCAN Chest   CT Abdomen  Venous Dopplers: LE:   Others          rad< from: Xray Chest 1 View- PORTABLE-Routine (Xray Chest 1 View- PORTABLE-Routine in AM.) (03.26.23 @ 09:27) >    ACC: [id number] EXAM:  XR CHEST PORTABLE ROUTINE 1V   ORDERED BY: ZEENAT DRIVER     PROCEDURE DATE:  03/26/2023          INTERPRETATION:  Clinical Information: Right pleural effusion    Technique: 1 view(s) chest    Comparison: 03/25/2023    Findings/  Impression: Status post right Pleurx catheter. Status post left chest   wall infusion port. Cardiomegaly. Small loculated right pleural effusion,   decreased compared to prior    --- End of Report ---            MIRNA WHEELER MD; Attending Interventional Radiologist  This document has been electronically signed. Mar 26 2023 12:56PM    < end of copied text >

## 2023-03-27 NOTE — DIETITIAN INITIAL EVALUATION ADULT - PATIENT MEETS CRITERIA FOR MALNUTRITION
How Severe Is Your Acne?: moderate Is This A New Presentation, Or A Follow-Up?: Acne Females Only: When Was Your Last Menstrual Period?: [id number] yes

## 2023-03-27 NOTE — PROGRESS NOTE ADULT - SUBJECTIVE AND OBJECTIVE BOX
ARIK DUMONT 82y Male      Patient is a 82y old  Male who presents with a chief complaint of pl effusion (27 Mar 2023 10:54)        INTERVAL HPI/OVERNIGHT EVENTS: No acute events overnight. Patient was seen and evaluated at the bedside. The patient denies pain. Falls asleep intermittently. Vitals stable. Patient denies fever/chills, chest pain, shortness of breath, abdominal pain, headaches, nausea/vomiting, and diarrhea/constipation.      PHYSICAL EXAM:  GENERAL: NAD  HEAD:  Normocephalic  EYES:  conjunctiva and sclera clear  ENMT: Moist mucous membranes  NECK: Supple  NERVOUS SYSTEM:  Alert, awake  CHEST/LUNG: R lung sounds diminished, R PleurX   HEART: Regular rate and rhythm        Vital Signs Last 24 Hrs  T(C): 36.6 (27 Mar 2023 16:00), Max: 36.6 (26 Mar 2023 21:05)  T(F): 97.9 (27 Mar 2023 16:00), Max: 97.9 (27 Mar 2023 08:00)  HR: 99 (27 Mar 2023 16:00) (63 - 110)  BP: 118/63 (27 Mar 2023 16:00) (105/68 - 131/80)  BP(mean): --  RR: 17 (27 Mar 2023 16:00) (17 - 20)  SpO2: 97% (27 Mar 2023 16:00) (95% - 99%)    Parameters below as of 27 Mar 2023 16:00  Patient On (Oxygen Delivery Method): nasal cannula  O2 Flow (L/min): 4        MEDICATIONS  (STANDING):  atenolol  Tablet 12.5 milliGRAM(s) Oral daily  dextrose 5%. 1000 milliLiter(s) (50 mL/Hr) IV Continuous <Continuous>  escitalopram 10 milliGRAM(s) Oral daily  famotidine    Tablet 20 milliGRAM(s) Oral daily  magnesium oxide 400 milliGRAM(s) Oral three times a day with meals  polyethylene glycol 3350 17 Gram(s) Oral daily  senna 2 Tablet(s) Oral at bedtime  simvastatin 40 milliGRAM(s) Oral at bedtime  traZODone 25 milliGRAM(s) Oral at bedtime    MEDICATIONS  (PRN):  acetaminophen     Tablet .. 650 milliGRAM(s) Oral every 6 hours PRN Temp greater or equal to 38C (100.4F), Mild Pain (1 - 3)  albuterol   0.5% 2.5 milliGRAM(s) Nebulizer every 6 hours PRN Shortness of Breath  ALPRAZolam 0.25 milliGRAM(s) Oral daily PRN anxiety      Consultant(s) Notes Reviewed:  [X] YES  [ ] NO  Care Discussed with Other Providers [X] YES  [ ] NO  Imaging Personally Reviewed:  [X] YES  [ ] NO      LABS:                        8.3    4.80  )-----------( 357      ( 27 Mar 2023 06:15 )             27.3     03-27    134<L>  |  95<L>  |  9   ----------------------------<  101<H>  4.0   |  28  |  0.53    Ca    8.6      27 Mar 2023 06:15  Phos  2.2     03-27  Mg     2.00     03-27    TPro  5.7<L>  /  Alb  2.9<L>  /  TBili  0.7  /  DBili  x   /  AST  14  /  ALT  18  /  AlkPhos  70  03-26    PT/INR - ( 27 Mar 2023 06:15 )   PT: 17.5 sec;   INR: 1.50 ratio         PTT - ( 27 Mar 2023 06:15 )  PTT:27.9 sec

## 2023-03-27 NOTE — CHART NOTE - NSCHARTNOTEFT_GEN_A_CORE
Pt seen with Dr. Romo  Pt's son at bedside.   Pt with continued fatigue w/SOB.   Per pt/family, he generally doesn't "feel well"  ICU Vital Signs Last 24 Hrs  T(C): 36.3 (27 Mar 2023 12:00), Max: 36.6 (26 Mar 2023 21:05)  T(F): 97.4 (27 Mar 2023 12:00), Max: 97.9 (27 Mar 2023 08:00)  HR: 98 (27 Mar 2023 12:00) (94 - 110)  BP: 105/68 (27 Mar 2023 12:00) (105/68 - 131/80)  BP(mean): --  ABP: --  ABP(mean): --  RR: 17 (27 Mar 2023 12:00) (17 - 20)  SpO2: 99% (27 Mar 2023 12:00) (95% - 99%)    O2 Parameters below as of 27 Mar 2023 12:00  Patient On (Oxygen Delivery Method): nasal cannula  O2 Flow (L/min): 4      Physical exam:  Alert, oriented  Appears more pale, tired looking compared to   previous admission.   No obvious SOB, or resp distress  Wearing NC O2  Rt Pleurx site c/d/i.   Output 300cc serosang fluid, on waterseal, no air leak  CXR today with some improvement in effusion    A/p: 81yo M with recent Dx lymphoma. s/p Rt VATS pleurx with DR. Galvan on 3/3/23. Admitted   w inc SOB, fatigue.     - CXR improved  - Pleurx output >300cc  - Would continue to waterseal  - Cont daily CXR  - Hold MIST d/t bloody output from chest tube  - Cont to hold A/c  - Consider GOC discussion  - Will continue to follow

## 2023-03-28 LAB
ANION GAP SERPL CALC-SCNC: 10 MMOL/L — SIGNIFICANT CHANGE UP (ref 7–14)
APPEARANCE UR: ABNORMAL
APTT BLD: 27.6 SEC — SIGNIFICANT CHANGE UP (ref 27–36.3)
BASOPHILS # BLD AUTO: 0 K/UL — SIGNIFICANT CHANGE UP (ref 0–0.2)
BASOPHILS NFR BLD AUTO: 0 % — SIGNIFICANT CHANGE UP (ref 0–2)
BILIRUB UR-MCNC: NEGATIVE — SIGNIFICANT CHANGE UP
BUN SERPL-MCNC: 8 MG/DL — SIGNIFICANT CHANGE UP (ref 7–23)
CALCIUM SERPL-MCNC: 8.5 MG/DL — SIGNIFICANT CHANGE UP (ref 8.4–10.5)
CHLORIDE SERPL-SCNC: 96 MMOL/L — LOW (ref 98–107)
CO2 SERPL-SCNC: 27 MMOL/L — SIGNIFICANT CHANGE UP (ref 22–31)
COLOR SPEC: YELLOW — SIGNIFICANT CHANGE UP
CREAT SERPL-MCNC: 0.54 MG/DL — SIGNIFICANT CHANGE UP (ref 0.5–1.3)
DIFF PNL FLD: ABNORMAL
EGFR: 99 ML/MIN/1.73M2 — SIGNIFICANT CHANGE UP
EOSINOPHIL # BLD AUTO: 0 K/UL — SIGNIFICANT CHANGE UP (ref 0–0.5)
EOSINOPHIL NFR BLD AUTO: 0 % — SIGNIFICANT CHANGE UP (ref 0–6)
GLUCOSE SERPL-MCNC: 101 MG/DL — HIGH (ref 70–99)
GLUCOSE UR QL: NEGATIVE — SIGNIFICANT CHANGE UP
HCT VFR BLD CALC: 29.1 % — LOW (ref 39–50)
HGB BLD-MCNC: 9 G/DL — LOW (ref 13–17)
IANC: 2.52 K/UL — SIGNIFICANT CHANGE UP (ref 1.8–7.4)
INR BLD: 1.45 RATIO — HIGH (ref 0.88–1.16)
KETONES UR-MCNC: ABNORMAL
LEUKOCYTE ESTERASE UR-ACNC: ABNORMAL
LYMPHOCYTES # BLD AUTO: 1.25 K/UL — SIGNIFICANT CHANGE UP (ref 1–3.3)
LYMPHOCYTES # BLD AUTO: 18.9 % — SIGNIFICANT CHANGE UP (ref 13–44)
MAGNESIUM SERPL-MCNC: 1.8 MG/DL — SIGNIFICANT CHANGE UP (ref 1.6–2.6)
MCHC RBC-ENTMCNC: 29.1 PG — SIGNIFICANT CHANGE UP (ref 27–34)
MCHC RBC-ENTMCNC: 30.9 GM/DL — LOW (ref 32–36)
MCV RBC AUTO: 94.2 FL — SIGNIFICANT CHANGE UP (ref 80–100)
MONOCYTES # BLD AUTO: 2.38 K/UL — HIGH (ref 0–0.9)
MONOCYTES NFR BLD AUTO: 36.1 % — HIGH (ref 2–14)
NEUTROPHILS # BLD AUTO: 2.81 K/UL — SIGNIFICANT CHANGE UP (ref 1.8–7.4)
NEUTROPHILS NFR BLD AUTO: 34.4 % — LOW (ref 43–77)
NITRITE UR-MCNC: POSITIVE
PH UR: 6.5 — SIGNIFICANT CHANGE UP (ref 5–8)
PHOSPHATE SERPL-MCNC: 2.2 MG/DL — LOW (ref 2.5–4.5)
PLATELET # BLD AUTO: 351 K/UL — SIGNIFICANT CHANGE UP (ref 150–400)
POTASSIUM SERPL-MCNC: 3.9 MMOL/L — SIGNIFICANT CHANGE UP (ref 3.5–5.3)
POTASSIUM SERPL-SCNC: 3.9 MMOL/L — SIGNIFICANT CHANGE UP (ref 3.5–5.3)
PROT UR-MCNC: ABNORMAL
PROTHROM AB SERPL-ACNC: 16.9 SEC — HIGH (ref 10.5–13.4)
RBC # BLD: 3.09 M/UL — LOW (ref 4.2–5.8)
RBC # FLD: 20.8 % — HIGH (ref 10.3–14.5)
SODIUM SERPL-SCNC: 133 MMOL/L — LOW (ref 135–145)
SP GR SPEC: 1.02 — SIGNIFICANT CHANGE UP (ref 1.01–1.05)
UROBILINOGEN FLD QL: SIGNIFICANT CHANGE UP
WBC # BLD: 6.6 K/UL — SIGNIFICANT CHANGE UP (ref 3.8–10.5)
WBC # FLD AUTO: 6.6 K/UL — SIGNIFICANT CHANGE UP (ref 3.8–10.5)

## 2023-03-28 PROCEDURE — 99222 1ST HOSP IP/OBS MODERATE 55: CPT

## 2023-03-28 PROCEDURE — 99232 SBSQ HOSP IP/OBS MODERATE 35: CPT | Mod: GC

## 2023-03-28 PROCEDURE — 71045 X-RAY EXAM CHEST 1 VIEW: CPT | Mod: 26

## 2023-03-28 RX ORDER — MAGNESIUM SULFATE 500 MG/ML
2 VIAL (ML) INJECTION ONCE
Refills: 0 | Status: COMPLETED | OUTPATIENT
Start: 2023-03-28 | End: 2023-03-28

## 2023-03-28 RX ORDER — POTASSIUM CHLORIDE 20 MEQ
20 PACKET (EA) ORAL ONCE
Refills: 0 | Status: COMPLETED | OUTPATIENT
Start: 2023-03-28 | End: 2023-03-28

## 2023-03-28 RX ORDER — OXYCODONE HYDROCHLORIDE 5 MG/1
2.5 TABLET ORAL ONCE
Refills: 0 | Status: DISCONTINUED | OUTPATIENT
Start: 2023-03-28 | End: 2023-03-28

## 2023-03-28 RX ADMIN — Medication 25 GRAM(S): at 13:43

## 2023-03-28 RX ADMIN — MAGNESIUM OXIDE 400 MG ORAL TABLET 400 MILLIGRAM(S): 241.3 TABLET ORAL at 10:17

## 2023-03-28 RX ADMIN — FAMOTIDINE 20 MILLIGRAM(S): 10 INJECTION INTRAVENOUS at 13:40

## 2023-03-28 RX ADMIN — Medication 20 MILLIEQUIVALENT(S): at 13:40

## 2023-03-28 RX ADMIN — Medication 480 MICROGRAM(S): at 13:40

## 2023-03-28 RX ADMIN — SENNA PLUS 2 TABLET(S): 8.6 TABLET ORAL at 22:07

## 2023-03-28 RX ADMIN — MAGNESIUM OXIDE 400 MG ORAL TABLET 400 MILLIGRAM(S): 241.3 TABLET ORAL at 17:50

## 2023-03-28 RX ADMIN — CEFEPIME 100 MILLIGRAM(S): 1 INJECTION, POWDER, FOR SOLUTION INTRAMUSCULAR; INTRAVENOUS at 05:10

## 2023-03-28 RX ADMIN — ATENOLOL 12.5 MILLIGRAM(S): 25 TABLET ORAL at 05:03

## 2023-03-28 RX ADMIN — ALBUTEROL 2.5 MILLIGRAM(S): 90 AEROSOL, METERED ORAL at 11:10

## 2023-03-28 RX ADMIN — MAGNESIUM OXIDE 400 MG ORAL TABLET 400 MILLIGRAM(S): 241.3 TABLET ORAL at 13:40

## 2023-03-28 RX ADMIN — Medication 0.25 MILLIGRAM(S): at 23:34

## 2023-03-28 RX ADMIN — ESCITALOPRAM OXALATE 10 MILLIGRAM(S): 10 TABLET, FILM COATED ORAL at 13:40

## 2023-03-28 RX ADMIN — Medication 25 MILLIGRAM(S): at 22:06

## 2023-03-28 RX ADMIN — CEFEPIME 100 MILLIGRAM(S): 1 INJECTION, POWDER, FOR SOLUTION INTRAMUSCULAR; INTRAVENOUS at 22:59

## 2023-03-28 RX ADMIN — CEFEPIME 100 MILLIGRAM(S): 1 INJECTION, POWDER, FOR SOLUTION INTRAMUSCULAR; INTRAVENOUS at 15:14

## 2023-03-28 RX ADMIN — ALBUTEROL 2.5 MILLIGRAM(S): 90 AEROSOL, METERED ORAL at 20:02

## 2023-03-28 RX ADMIN — SIMVASTATIN 40 MILLIGRAM(S): 20 TABLET, FILM COATED ORAL at 22:06

## 2023-03-28 NOTE — PROGRESS NOTE ADULT - ASSESSMENT
ARIK DUMONT is a 82y Male with a past medical history as described above who presented for evaluation of shortness of breath. Presents from  [facility]hab [facility]. CXR revealed increased pleural effusions, likely loculated. Thoracics following, PleurX to waterseal.     # Classical Hodgkin Lymphoma   - S/P Cycle 1 Brentuximab vedotin (BV) 3/10/23  - C2D1 due this Friday 3/31, but given current state and increased lethargy, likely will hold off    - CTS note from yesterday states to continue holding AC; Per NP, hold until non bloody.   - Blood culture pending, on cefepime empirically. Please send Urine culture  - Zarxio 300mcg SubQ daily for 5 days - today is Day 1/5   - Family updated on 3/27  - CXR daily   - CBC with diff daily - need to trend ANC  - Thoracic input appreciated, no procedure for now, monitoring serosanguinous output      Chary Tsang MD   Hematology/Medical Oncology Fellow PGY5      ARIK DUMONT is a 82y Male with a past medical history as described above who presented for evaluation of shortness of breath. Presents from  [facility]hab Center. CXR revealed increased pleural effusions, likely loculated. Thoracics following, PleurX to waterseal.     # Classical Hodgkin Lymphoma   - S/P Cycle 1 Brentuximab vedotin (BV) 3/10/23  - C2D1 due this Friday 3/31, but given current state and increased lethargy, likely will hold off    - CTS note from yesterday states to continue holding AC; Per NP, hold until non bloody.   - Blood culture pending, on cefepime empirically. Please send Urine culture  - Recieved one dose of Zarxio; ANC much improved today; will discontinue.   - Family updated on 3/27  - CXR daily   - CBC with diff daily   - Thoracic input appreciated, no procedure for now, monitoring serosanguinous output      Chary Tsang MD   Hematology/Medical Oncology Fellow PGY5

## 2023-03-28 NOTE — PROGRESS NOTE ADULT - SUBJECTIVE AND OBJECTIVE BOX
Hematology Oncology Follow-up    INTERVAL HPI/OVERNIGHT EVENTS:  No o/n events, patient resting comfortably.      Psych: denies anxiety or sleep disturbances    VITAL SIGNS:  T(F): 97.5 (23 @ 04:24)  HR: 97 (23 @ 04:24)  BP: 109/48 (23 @ 04:24)  RR: 19 (23 @ 04:24)  SpO2: 97% (23 @ 04:24)  Wt(kg): --    23 @ 07:01  -  23 @ 07:00  --------------------------------------------------------  IN: 400 mL / OUT: 1150 mL / NET: -750 mL        PHYSICAL EXAM:    Constitutional: AAOx3, NAD  Eyes: PERRL, EOMI, sclera non-icteric  Neck: supple, no masses, no JVD, no lymphadenopathy  Respiratory: PLeurx to water seel   Cardiovascular: RRR, normal S1S2, no M/R/G  Gastrointestinal: soft, NTND,  Extremities:  no edema  MSK: no obvious abnormalities, normal ROM, no lymphadenopathy  Neurological: Grossly intact  Skin: Normal temperature, no rash, no echymoses, no petichiae  Psych: normal affect    MEDICATIONS  (STANDING):  atenolol  Tablet 12.5 milliGRAM(s) Oral daily  cefepime   IVPB      cefepime   IVPB 2000 milliGRAM(s) IV Intermittent every 8 hours  dextrose 5%. 1000 milliLiter(s) (50 mL/Hr) IV Continuous <Continuous>  escitalopram 10 milliGRAM(s) Oral daily  famotidine    Tablet 20 milliGRAM(s) Oral daily  filgrastim-sndz (ZARXIO) Injectable 480 MICROGram(s) SubCutaneous daily  magnesium oxide 400 milliGRAM(s) Oral three times a day with meals  polyethylene glycol 3350 17 Gram(s) Oral daily  senna 2 Tablet(s) Oral at bedtime  simvastatin 40 milliGRAM(s) Oral at bedtime  traZODone 25 milliGRAM(s) Oral at bedtime    MEDICATIONS  (PRN):  acetaminophen     Tablet .. 650 milliGRAM(s) Oral every 6 hours PRN Temp greater or equal to 38C (100.4F), Mild Pain (1 - 3)  albuterol   0.5% 2.5 milliGRAM(s) Nebulizer every 6 hours PRN Shortness of Breath  ALPRAZolam 0.25 milliGRAM(s) Oral daily PRN anxiety      pertussis vaccines (Rash)  shellfish (Rash)      LABS:                        9.0    6.60  )-----------( 351      ( 28 Mar 2023 05:35 )             29.1     03-    134<L>  |  95<L>  |  9   ----------------------------<  101<H>  4.0   |  28  |  0.53    Ca    8.6      27 Mar 2023 06:15  Phos  2.2       Mg     2.00           PT/INR - ( 28 Mar 2023 05:35 )   PT: 16.9 sec;   INR: 1.45 ratio         PTT - ( 28 Mar 2023 05:35 )  PTT:27.6 sec   Urinalysis Basic - ( 27 Mar 2023 23:50 )    Color: Yellow / Appearance: Slightly Turbid / S.019 / pH: x  Gluc: x / Ketone: Trace  / Bili: Negative / Urobili: <2 mg/dL   Blood: x / Protein: 30 mg/dL / Nitrite: Positive   Leuk Esterase: Large / RBC: 5 /HPF /  /HPF   Sq Epi: x / Non Sq Epi: 1 /HPF / Bacteria: Moderate              Bilirubin: Negative (23 @ 23:50)      RADIOLOGY & ADDITIONAL TESTS:  Studies reviewed. Hematology Oncology Follow-up    INTERVAL HPI/OVERNIGHT EVENTS:  No o/n events, patient resting comfortably. Comfortable, communicating today.        VITAL SIGNS:  T(F): 97.5 (23 @ 04:24)  HR: 97 (23 @ 04:24)  BP: 109/48 (23 @ 04:24)  RR: 19 (23 @ 04:24)  SpO2: 97% (23 @ 04:24)  Wt(kg): --    23 @ 07:01  -  23 @ 07:00  --------------------------------------------------------  IN: 400 mL / OUT: 1150 mL / NET: -750 mL        PHYSICAL EXAM:    Constitutional: AAOx3, NAD, improved since yesterday!  Eyes: PERRL, EOMI, sclera non-icteric  Neck: supple, no masses, no JVD, no lymphadenopathy  Respiratory: PLeurx to water seel   Cardiovascular: RRR, normal S1S2, no M/R/G  Gastrointestinal: soft, NTND,  Extremities:  no edema  MSK: no obvious abnormalities, normal ROM, no lymphadenopathy  Neurological: Grossly intact  Skin: Normal temperature, no rash, no echymoses, no petichiae  Psych: normal affect    MEDICATIONS  (STANDING):  atenolol  Tablet 12.5 milliGRAM(s) Oral daily  cefepime   IVPB      cefepime   IVPB 2000 milliGRAM(s) IV Intermittent every 8 hours  dextrose 5%. 1000 milliLiter(s) (50 mL/Hr) IV Continuous <Continuous>  escitalopram 10 milliGRAM(s) Oral daily  famotidine    Tablet 20 milliGRAM(s) Oral daily  filgrastim-sndz (ZARXIO) Injectable 480 MICROGram(s) SubCutaneous daily  magnesium oxide 400 milliGRAM(s) Oral three times a day with meals  polyethylene glycol 3350 17 Gram(s) Oral daily  senna 2 Tablet(s) Oral at bedtime  simvastatin 40 milliGRAM(s) Oral at bedtime  traZODone 25 milliGRAM(s) Oral at bedtime    MEDICATIONS  (PRN):  acetaminophen     Tablet .. 650 milliGRAM(s) Oral every 6 hours PRN Temp greater or equal to 38C (100.4F), Mild Pain (1 - 3)  albuterol   0.5% 2.5 milliGRAM(s) Nebulizer every 6 hours PRN Shortness of Breath  ALPRAZolam 0.25 milliGRAM(s) Oral daily PRN anxiety      pertussis vaccines (Rash)  shellfish (Rash)      LABS:                        9.0    6.60  )-----------( 351      ( 28 Mar 2023 05:35 )             29.1         134<L>  |  95<L>  |  9   ----------------------------<  101<H>  4.0   |  28  |  0.53    Ca    8.6      27 Mar 2023 06:15  Phos  2.2       Mg     2.00           PT/INR - ( 28 Mar 2023 05:35 )   PT: 16.9 sec;   INR: 1.45 ratio         PTT - ( 28 Mar 2023 05:35 )  PTT:27.6 sec   Urinalysis Basic - ( 27 Mar 2023 23:50 )    Color: Yellow / Appearance: Slightly Turbid / S.019 / pH: x  Gluc: x / Ketone: Trace  / Bili: Negative / Urobili: <2 mg/dL   Blood: x / Protein: 30 mg/dL / Nitrite: Positive   Leuk Esterase: Large / RBC: 5 /HPF /  /HPF   Sq Epi: x / Non Sq Epi: 1 /HPF / Bacteria: Moderate              Bilirubin: Negative (23 @ 23:50)      RADIOLOGY & ADDITIONAL TESTS:  Studies reviewed.

## 2023-03-28 NOTE — CONSULT NOTE ADULT - SUBJECTIVE AND OBJECTIVE BOX
Patient is a 82y old  Male who presents with a chief complaint of pl effusion (27 Mar 2023 10:54)      HPI:  82 year old male with PMH of AFIB, HTN and 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 Patient is poor historian, history obtained by son Francisco at bedside. States that  patient has been recovering wellat  rehab. States patient was able to walk over 150 ft while at rehab. States that suddenly on Friday patient started to develop acute SOB and increased WOB. Imaging was done at  rehab showing worsening pleural effusions EMS was called and patient was noted to be tachypneic to 30s and placed on 10L NRB.        Recently admitted from 3/15-3/24 for worsening SOB was found to have  significant for a large R pleural effusion with mediastinal and hilar lymphadenopathy, and 3L of fluid was drained. Pleural fluid cultures and blood cultures resulted negative.  Suspicious of underlying malignancy secondary to lymphadenopathy and persistent leukocytosis, Heme/Onc was consulted and recommended transfer to [facility] on 23 for further workup. On  he underwent an ultrasound guided biopsy of his R supraclavicular mass/lymph node, which later resulted as classic Hodgkin's lymphoma.  On 3/3, he underwent a R thoracotomy/VATS with Pleurex placement. Left chest wall mediport was placed and received  first cycle of chemotherapy on 3/10. (25 Mar 2023 16:44)    per [facility] due for next chemo 3/31 but will likely hold off due to medical condition and lethargy    REVIEW OF SYSTEMS   weakness    PAST MEDICAL & SURGICAL HISTORY  Atrial fibrillation  Hypertension  Urinary retention  No significant past surgical history       FUNCTIONAL HISTORY   Lives with wife in house with 5 steps to enter, flight of 10 steps inside. admitted from [facility]  Independent    CURRENT FUNCTIONAL STATUS  3/27  Transfer: Sit to Stand:     · Level of [place]	moderate assist (50% patients effort)  · Physical Assist/Nonphysical Assist	1 person assist; verbal cues  · Assistive Device	rolling walker    Transfer: Stand to Sit:     · Level of [place]	minimum assist (75% patients effort)  · Physical Assist/Nonphysical Assist	1 person assist; verbal cues  · Assistive Device	rolling walker    Gait Skills:     · Level of [place]	unable to perform; due to patient displays fatigue and shortness of breath despite  4 liters oxygen via nasal cannula      RECENT LABS/IMAGING  CBC Full  -  ( 28 Mar 2023 05:35 )  WBC Count : 6.60 K/uL  RBC Count : 3.09 M/uL  Hemoglobin : 9.0 g/dL  Hematocrit : 29.1 %  Platelet Count - Automated : 351 K/uL  Mean Cell Volume : 94.2 fL  Mean Cell Hemoglobin : 29.1 pg  Mean Cell Hemoglobin Concentration : 30.9 gm/dL  Auto Neutrophil # : 2.81 K/uL  Auto Lymphocyte # : 1.25 K/uL  Auto Monocyte # : 2.38 K/uL  Auto Eosinophil # : 0.00 K/uL  Auto Basophil # : 0.00 K/uL  Auto Neutrophil % : 34.4 %  Auto Lymphocyte % : 18.9 %  Auto Monocyte % : 36.1 %  Auto Eosinophil % : 0.0 %  Auto Basophil % : 0.0 %        133<L>  |  96<L>  |  8   ----------------------------<  101<H>  3.9   |  27  |  0.54    Ca    8.5      28 Mar 2023 05:35  Phos  2.2       Mg     1.80           Urinalysis Basic - ( 27 Mar 2023 23:50 )    Color: Yellow / Appearance: Slightly Turbid / S.019 / pH: x  Gluc: x / Ketone: Trace  / Bili: Negative / Urobili: <2 mg/dL   Blood: x / Protein: 30 mg/dL / Nitrite: Positive   Leuk Esterase: Large / RBC: 5 /HPF /  /HPF   Sq Epi: x / Non Sq Epi: 1 /HPF / Bacteria: Moderate        VITALS  T(C): 36.4 (23 @ 11:25), Max: 36.6 (23 @ 16:00)  HR: 83 (23 @ 11:25) (83 - 99)  BP: 101/65 (23 @ 11:25) (101/58 - 127/67)  RR: 20 (23 @ 11:25) (17 - 20)  SpO2: 98% (23 @ 11:25) (95% - 98%)  Wt(kg): --    ALLERGIES  pertussis vaccines (Rash)  shellfish (Rash)      MEDICATIONS   acetaminophen     Tablet .. 650 milliGRAM(s) Oral every 6 hours PRN  albuterol   0.5% 2.5 milliGRAM(s) Nebulizer every 6 hours PRN  ALPRAZolam 0.25 milliGRAM(s) Oral daily PRN  atenolol  Tablet 12.5 milliGRAM(s) Oral daily  cefepime   IVPB      cefepime   IVPB 2000 milliGRAM(s) IV Intermittent every 8 hours  dextrose 5%. 1000 milliLiter(s) IV Continuous <Continuous>  escitalopram 10 milliGRAM(s) Oral daily  famotidine    Tablet 20 milliGRAM(s) Oral daily  filgrastim-sndz (ZARXIO) Injectable 480 MICROGram(s) SubCutaneous daily  magnesium oxide 400 milliGRAM(s) Oral three times a day with meals  polyethylene glycol 3350 17 Gram(s) Oral daily  senna 2 Tablet(s) Oral at bedtime  simvastatin 40 milliGRAM(s) Oral at bedtime  traZODone 25 milliGRAM(s) Oral at bedtime      ----------------------------------------------------------------------------------------  PHYSICAL EXAM  Constitutional - NAD, Comfortable  HEENT - NCAT, EOMI  Neck - Supple, No limited ROM  Chest - CTA bilaterally, No wheeze, No rhonchi, No crackles  Cardiovascular - RRR, S1S2, No murmurs  Abdomen - BS+, Soft, NTND  Extremities - No C/C/E, No calf tenderness   Neurologic Exam -                    Cognitive - Awake, Alert, AAO to self, place, date, year, situation     Communication - Fluent, No dysarthria     Cranial Nerves - CN 2-12 intact     Motor - No focal deficits                    LEFT    UE - ShAB 5/5, EF 5/5, EE 5/5, WE 5/5,  5/5                    RIGHT UE - ShAB 5/5, EF 5/5, EE 5/5, WE 5/5,  5/5                    LEFT    LE - HF 5/5, KE 5/5, DF 5/5, PF 5/5                    RIGHT LE - HF 5/, KE 5/, DF 5/, PF /        Sensory - Intact to LT     Reflexes - DTR Intact, No primitive reflexive     Coordination - FTN intact     OculoVestibular - No saccades, No nystagmus, VOR         Balance - WNL Static  Psychiatric - Mood stable, Affect WNL  ----------------------------------------------------------------------------------------  ASSESSMENT/PLAN    Pain -  DVT PPX -   Rehab -    incomplete note, consult in progress Patient is a 82y old  Male who presents with a chief complaint of pl effusion (27 Mar 2023 10:54)      HPI:  82 year old male with PMH of AFIB, HTN and 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 Patient is poor historian, history obtained by son Francisco at bedside. States that  patient has been recovering wellat  rehab. States patient was able to walk over 150 ft while at rehab. States that suddenly on Friday patient started to develop acute SOB and increased WOB. Imaging was done at  rehab showing worsening pleural effusions EMS was called and patient was noted to be tachypneic to 30s and placed on 10L NRB.        Recently admitted from 3/15-3/24 for worsening SOB was found to have  significant for a large R pleural effusion with mediastinal and hilar lymphadenopathy, and 3L of fluid was drained. Pleural fluid cultures and blood cultures resulted negative.  Suspicious of underlying malignancy secondary to lymphadenopathy and persistent leukocytosis, Heme/Onc was consulted and recommended transfer to [facility] on 23 for further workup. On  he underwent an ultrasound guided biopsy of his R supraclavicular mass/lymph node, which later resulted as classic Hodgkin's lymphoma.  On 3/3, he underwent a R thoracotomy/VATS with Pleurex placement. Left chest wall mediport was placed and received  first cycle of chemotherapy on 3/10. (25 Mar 2023 16:44)    per [facility] due for next chemo 3/31 but will likely hold off due to medical condition and lethargy  patient mod assist with transfers, unable to ambulate with PT during last session.    reports sob improving   REVIEW OF SYSTEMS   weakness    PAST MEDICAL & SURGICAL HISTORY  Atrial fibrillation  Hypertension  Urinary retention  No significant past surgical history       FUNCTIONAL HISTORY   Lives with wife in house with 5 steps to enter, flight of 10 steps inside. can stay on main level if needed.  admitted from [facility]  Independent at baseline    CURRENT FUNCTIONAL STATUS  3/27  Transfer: Sit to Stand:     · Level of [place]	moderate assist (50% patients effort)  · Physical Assist/Nonphysical Assist	1 person assist; verbal cues  · Assistive Device	rolling walker    Transfer: Stand to Sit:     · Level of [place]	minimum assist (75% patients effort)  · Physical Assist/Nonphysical Assist	1 person assist; verbal cues  · Assistive Device	rolling walker    Gait Skills:     · Level of [place]	unable to perform; due to patient displays fatigue and shortness of breath despite  4 liters oxygen via nasal cannula      RECENT LABS/IMAGING  CBC Full  -  ( 28 Mar 2023 05:35 )  WBC Count : 6.60 K/uL  RBC Count : 3.09 M/uL  Hemoglobin : 9.0 g/dL  Hematocrit : 29.1 %  Platelet Count - Automated : 351 K/uL  Mean Cell Volume : 94.2 fL  Mean Cell Hemoglobin : 29.1 pg  Mean Cell Hemoglobin Concentration : 30.9 gm/dL  Auto Neutrophil # : 2.81 K/uL  Auto Lymphocyte # : 1.25 K/uL  Auto Monocyte # : 2.38 K/uL  Auto Eosinophil # : 0.00 K/uL  Auto Basophil # : 0.00 K/uL  Auto Neutrophil % : 34.4 %  Auto Lymphocyte % : 18.9 %  Auto Monocyte % : 36.1 %  Auto Eosinophil % : 0.0 %  Auto Basophil % : 0.0 %        133<L>  |  96<L>  |  8   ----------------------------<  101<H>  3.9   |  27  |  0.54    Ca    8.5      28 Mar 2023 05:35  Phos  2.2       Mg     1.80           Urinalysis Basic - ( 27 Mar 2023 23:50 )    Color: Yellow / Appearance: Slightly Turbid / S.019 / pH: x  Gluc: x / Ketone: Trace  / Bili: Negative / Urobili: <2 mg/dL   Blood: x / Protein: 30 mg/dL / Nitrite: Positive   Leuk Esterase: Large / RBC: 5 /HPF /  /HPF   Sq Epi: x / Non Sq Epi: 1 /HPF / Bacteria: Moderate        VITALS  T(C): 36.4 (23 @ 11:25), Max: 36.6 (23 @ 16:00)  HR: 83 (23 @ 11:25) (83 - 99)  BP: 101/65 (23 @ 11:25) (101/58 - 127/67)  RR: 20 (23 @ 11:25) (17 - 20)  SpO2: 98% (23 @ 11:25) (95% - 98%)  Wt(kg): --    ALLERGIES  pertussis vaccines (Rash)  shellfish (Rash)      MEDICATIONS   acetaminophen     Tablet .. 650 milliGRAM(s) Oral every 6 hours PRN  albuterol   0.5% 2.5 milliGRAM(s) Nebulizer every 6 hours PRN  ALPRAZolam 0.25 milliGRAM(s) Oral daily PRN  atenolol  Tablet 12.5 milliGRAM(s) Oral daily  cefepime   IVPB      cefepime   IVPB 2000 milliGRAM(s) IV Intermittent every 8 hours  dextrose 5%. 1000 milliLiter(s) IV Continuous <Continuous>  escitalopram 10 milliGRAM(s) Oral daily  famotidine    Tablet 20 milliGRAM(s) Oral daily  filgrastim-sndz (ZARXIO) Injectable 480 MICROGram(s) SubCutaneous daily  magnesium oxide 400 milliGRAM(s) Oral three times a day with meals  polyethylene glycol 3350 17 Gram(s) Oral daily  senna 2 Tablet(s) Oral at bedtime  simvastatin 40 milliGRAM(s) Oral at bedtime  traZODone 25 milliGRAM(s) Oral at bedtime      ----------------------------------------------------------------------------------------  PHYSICAL EXAM  Constitutional - NAD, Comfortable  HEENT - NCAT, EOMI   + nasal cannula  Chest - + pleurex  Cardiovascular - RRR, S1S2   Abdomen -  Soft, NTND  Extremities - No C/C/E, No calf tenderness   Neurologic Exam -                    Cognitive - Awake, Alert, AAO to self, place, date, year, situation     Communication - Fluent, No dysarthria     Cranial Nerves - CN 2-12 intact     Motor -                      LEFT    UE - 4/5                    RIGHT UE - 4/5                    LEFT    LE -  4/5                    RIGHT LE - 4/5        Sensory - Intact to LT      Balance - WNL Static  Psychiatric - Mood stable, Affect WNL  ----------------------------------------------------------------------------------------  ASSESSMENT/PLAN   81 yo m with hodgkin lymphoma admitted from acute rehab, with pleural effusions and SOB  cefepime  diet: regular  plummer  DVT PPX - ac held  family requests to speak to primary team and thoracic surgery for update  Rehab -will monitor for improvement, per discussion with Dr. Subramanian, would prefer home discharge for oncology follow up, however currently not ambulating, will monitor for improvement.  If still requires assistance for ambulation may require subacute rehab.

## 2023-03-28 NOTE — PROGRESS NOTE ADULT - SUBJECTIVE AND OBJECTIVE BOX
DATE OF SERVICE: 03-28-23 @ 06:45    Subjective: Patient seen and examined. No new events except as noted.     SUBJECTIVE/ROS:  nad      MEDICATIONS:  MEDICATIONS  (STANDING):  atenolol  Tablet 12.5 milliGRAM(s) Oral daily  cefepime   IVPB      cefepime   IVPB 2000 milliGRAM(s) IV Intermittent every 8 hours  dextrose 5%. 1000 milliLiter(s) (50 mL/Hr) IV Continuous <Continuous>  escitalopram 10 milliGRAM(s) Oral daily  famotidine    Tablet 20 milliGRAM(s) Oral daily  filgrastim-sndz (ZARXIO) Injectable 480 MICROGram(s) SubCutaneous daily  magnesium oxide 400 milliGRAM(s) Oral three times a day with meals  polyethylene glycol 3350 17 Gram(s) Oral daily  senna 2 Tablet(s) Oral at bedtime  simvastatin 40 milliGRAM(s) Oral at bedtime  traZODone 25 milliGRAM(s) Oral at bedtime      PHYSICAL EXAM:  T(C): 36.4 (03-28-23 @ 04:24), Max: 36.6 (03-27-23 @ 08:00)  HR: 97 (03-28-23 @ 04:24) (63 - 100)  BP: 109/48 (03-28-23 @ 04:24) (105/68 - 127/67)  RR: 19 (03-28-23 @ 04:24) (17 - 19)  SpO2: 97% (03-28-23 @ 04:24) (96% - 99%)  Wt(kg): --  I&O's Summary    26 Mar 2023 07:01  -  27 Mar 2023 07:00  --------------------------------------------------------  IN: 740 mL / OUT: 1240 mL / NET: -500 mL    27 Mar 2023 07:01  -  28 Mar 2023 06:45  --------------------------------------------------------  IN: 400 mL / OUT: 1150 mL / NET: -750 mL            JVP: Normal  Neck: supple  Lung: clear   CV: S1 S2 , Murmur:  Abd: soft  Ext: No edema  neuro: Awake / alert  Psych: flat affect  Skin: normal``    LABS/DATA:    CARDIAC MARKERS:                                8.3    4.80  )-----------( 357      ( 27 Mar 2023 06:15 )             27.3     03-27    134<L>  |  95<L>  |  9   ----------------------------<  101<H>  4.0   |  28  |  0.53    Ca    8.6      27 Mar 2023 06:15  Phos  2.2     03-27  Mg     2.00     03-27    TPro  5.7<L>  /  Alb  2.9<L>  /  TBili  0.7  /  DBili  x   /  AST  14  /  ALT  18  /  AlkPhos  70  03-26    proBNP:   Lipid Profile:   HgA1c:   TSH:     TELE:  EKG:

## 2023-03-28 NOTE — PROGRESS NOTE ADULT - SUBJECTIVE AND OBJECTIVE BOX
Date of Service: 23 @ 10:53    Patient is a 82y old  Male who presents with a chief complaint of pl effusion (27 Mar 2023 10:54)      Any change in ROS: seems OK: sitting in chair:  on 2 l of oxygen ;  has rigth sided chest tube    MEDICATIONS  (STANDING):  atenolol  Tablet 12.5 milliGRAM(s) Oral daily  cefepime   IVPB      cefepime   IVPB 2000 milliGRAM(s) IV Intermittent every 8 hours  dextrose 5%. 1000 milliLiter(s) (50 mL/Hr) IV Continuous <Continuous>  escitalopram 10 milliGRAM(s) Oral daily  famotidine    Tablet 20 milliGRAM(s) Oral daily  filgrastim-sndz (ZARXIO) Injectable 480 MICROGram(s) SubCutaneous daily  magnesium oxide 400 milliGRAM(s) Oral three times a day with meals  polyethylene glycol 3350 17 Gram(s) Oral daily  senna 2 Tablet(s) Oral at bedtime  simvastatin 40 milliGRAM(s) Oral at bedtime  traZODone 25 milliGRAM(s) Oral at bedtime    MEDICATIONS  (PRN):  acetaminophen     Tablet .. 650 milliGRAM(s) Oral every 6 hours PRN Temp greater or equal to 38C (100.4F), Mild Pain (1 - 3)  albuterol   0.5% 2.5 milliGRAM(s) Nebulizer every 6 hours PRN Shortness of Breath  ALPRAZolam 0.25 milliGRAM(s) Oral daily PRN anxiety    Vital Signs Last 24 Hrs  T(C): 36.6 (28 Mar 2023 08:24), Max: 36.6 (27 Mar 2023 16:00)  T(F): 97.8 (28 Mar 2023 08:24), Max: 97.9 (27 Mar 2023 16:00)  HR: 84 (28 Mar 2023 08:24) (63 - 99)  BP: 101/58 (28 Mar 2023 08:24) (101/58 - 127/67)  BP(mean): --  RR: 20 (28 Mar 2023 10:11) (17 - 20)  SpO2: 98% (28 Mar 2023 10:11) (95% - 99%)    Parameters below as of 28 Mar 2023 10:11  Patient On (Oxygen Delivery Method): nasal cannula  O2 Flow (L/min): 3      I&O's Summary    27 Mar 2023 07:01  -  28 Mar 2023 07:00  --------------------------------------------------------  IN: 400 mL / OUT: 1150 mL / NET: -750 mL          Physical Exam:   GENERAL: NAD, well-groomed, well-developed  HEENT: CHUCK/   Atraumatic, Normocephalic  ENMT: No tonsillar erythema, exudates, or enlargement; Moist mucous membranes, Good dentition, No lesions  NECK: Supple, No JVD, Normal thyroid  CHEST/LUNG: Clear to auscultaion- no wheezing  CVS: Regular rate and rhythm; No murmurs, rubs, or gallops  GI: : Soft, Nontender, Nondistended; Bowel sounds present  NERVOUS SYSTEM:  Alert & awake: responds to questions well   EXTREMITIES: - edema  LYMPH: No lymphadenopathy noted  SKIN: No rashes or lesions  ENDOCRINOLOGY: No Thyromegaly  PSYCH: calm     Labs:  30                            9.0    6.60  )-----------( 351      ( 28 Mar 2023 05:35 )             29.1                         8.3    4.80  )-----------( 357      ( 27 Mar 2023 06:15 )             27.3                         8.3    4.28  )-----------( 320      ( 26 Mar 2023 07:07 )             26.9                         8.4    4.42  )-----------( 308      ( 25 Mar 2023 10:00 )             26.8                         8.4    4.36  )-----------( 372      ( 25 Mar 2023 01:40 )             26.9                         9.2    3.66  )-----------( 389      ( 24 Mar 2023 15:00 )             28.7     03-28    133<L>  |  96<L>  |  8   ----------------------------<  101<H>  3.9   |  27  |  0.54      134<L>  |  95<L>  |  9   ----------------------------<  101<H>  4.0   |  28  |  0.53  26    132<L>  |  95<L>  |  6<L>  ----------------------------<  110<H>  3.7   |  28  |  0.47<L>  25    132<L>  |  95<L>  |  8   ----------------------------<  122<H>  4.2   |  27  |  0.54  -    130<L>  |  94<L>  |  9   ----------------------------<  123<H>  4.0   |  24  |  0.54  24    131<L>  |  96  |  10  ----------------------------<  152<H>  4.7   |  31  |  0.56    Ca    8.5      28 Mar 2023 05:35  Ca    8.6      27 Mar 2023 06:15  Phos  2.2     -28  Phos  2.2     03-27  Mg     1.80     -28  Mg     2.00     03-27    TPro  5.7<L>  /  Alb  2.9<L>  /  TBili  0.7  /  DBili  x   /  AST  14  /  ALT  18  /  AlkPhos  70  03-26  TPro  6.2  /  Alb  2.9<L>  /  TBili  0.7  /  DBili  x   /  AST  19  /  ALT  24  /  AlkPhos  68  03-25    CAPILLARY BLOOD GLUCOSE            PT/INR - ( 28 Mar 2023 05:35 )   PT: 16.9 sec;   INR: 1.45 ratio         PTT - ( 28 Mar 2023 05:35 )  PTT:27.6 sec  Urinalysis Basic - ( 27 Mar 2023 23:50 )    Color: Yellow / Appearance: Slightly Turbid / S.019 / pH: x  Gluc: x / Ketone: Trace  / Bili: Negative / Urobili: <2 mg/dL   Blood: x / Protein: 30 mg/dL / Nitrite: Positive   Leuk Esterase: Large / RBC: 5 /HPF /  /HPF   Sq Epi: x / Non Sq Epi: 1 /HPF / Bacteria: Moderate      D-Dimer Assay, Quantitative: 199 ng/mL DDU ( @ 15:45)  Procalcitonin, Serum: 0.14 ng/mL ( @ 19:30)  Procalcitonin, Serum: 0.09 ng/mL ( @ 15:00)  Lactate, Blood: 1.7 mmol/L ( @ 16:10)        RECENT CULTURES:   @ 16:10 .Blood Blood-Peripheral         rad< from: Xray Chest 1 View- PORTABLE-Routine (Xray Chest 1 View- PORTABLE-Routine in AM.) (23 @ 09:36) >  ACC: [id number] EXAM:  XR CHEST PORTABLE URGENT 1V   ORDERED BY: MELO BRODERICK     PROCEDURE DATE:  2023          INTERPRETATION:  Chest one view 3/27/2023 12:15 AM    HISTORY: Shortness of breath    COMPARISON STUDY: 3/26/2023    Frontal expiratory view of the chest shows the heart to be similarly   enlarged in size. Left chest port and right chest tube are again noted.    The lungs show progression of pulmonary congestion with partial clearing   of the right lung and there is no evidence of pneumothorax nor definite   pleural effusion.    Chest one view 3/27/2023 8:14 AM  Compared to the prior study, there is less pulmonary congestion with mild   bilateral atelectasis. No pneumothorax.    IMPRESSION:  Decreasing pulmonary congestion. No pneumothorax.        Thank you for the courtesy of this referral.    --- End of Report ---            KAROLYN ELDER MD; Attending Interventional Radiologist  This document has been electronically signed. Mar 27 2023 12:20PM    < end of copied text >         No growth to date.     @ 16:05 .Blood Blood-Peripheral                No growth to date.          RESPIRATORY CULTURES:          Studies  Chest X-RAY  CT SCAN Chest   Venous Dopplers: LE:   CT Abdomen  Others

## 2023-03-28 NOTE — PROGRESS NOTE ADULT - ASSESSMENT
chronic persistent afib  HR stable   cont low dose atenolol   resume a/c once deemed safe from CTS / Heme     pleural effusion   improving   fu with CTS

## 2023-03-28 NOTE — CHART NOTE - NSCHARTNOTEFT_GEN_A_CORE
Called by CTS NP Alexsandra [phone] - as per Dr Amos no VATS in am, but will keep pt NPO just in case,  day team to verify in am.

## 2023-03-28 NOTE — PROGRESS NOTE ADULT - ASSESSMENT
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 Patient is poor historian, history obtained by son Francisco at bedside. States that  patient has been recovering wellat  rehab. States patient was able to walk over 150 ft while at rehab. States that suddenly on Friday patient started to develop acute SOB and increased WOB. Imaging was done at  rehab showing worsening pleural effusions EMS was called and patient was noted to be tachypneic to 30s and placed on 10L NRB.    Recently admitted from 3/15-3/24 for worsening SOB was found to have  significant for a large R pleural effusion with mediastinal and hilar lymphadenopathy, and 3L of fluid was drained. Pleural fluid cultures and blood cultures resulted negative.  Suspicious of underlying malignancy secondary to lymphadenopathy and persistent leukocytosis, Heme/Onc was consulted and recommended transfer to [facility] on 2/24/23 for further workup. On 2/28 he underwent an ultrasound guided biopsy of his R supraclavicular mass/lymph node, which later resulted as classic Hodgkin's lymphoma.  On 3/3, he underwent a R thoracotomy/VATS with Pleurex placement. Left chest wall mediport was placed and received  first cycle of chemotherapy on 3/10. (25 Mar 2023 16:44)    no wpulm called:  pt has chest tube on rigth side:  has bloody secretions  he looks comfortable:  no sob :      right sided pl effusion:  A fibrillation:   HTN:  Urinary retention     3/27:  right sided pl effusion:  [id number]:  blood pl fluid  ; on ac;  cts surgery to see:  chest xray today looks pretty good to me: not hypercarbic and acidotic on VBG   A fibrillation: off ac at t his time  HTN:  blood pressure is controlled  Urinary retention  ; has chr plummer: :    marah pmd    3/28:    right sided pl effusion:  has lymphoma:  blood pl fluid  ; on ac;  cts surgery to see:  chest xray today looks pretty good to me: not hypercarbic and acidotic on VBG   A fibrillation: off ac at t his time: no tpa done as the pl fluid is very blood:   HTN:  blood pressure is controlled  Urinary retention  ; has chr plummer: :    marah pmd

## 2023-03-29 ENCOUNTER — APPOINTMENT (OUTPATIENT)
Dept: THORACIC SURGERY | Facility: HOSPITAL | Age: 83
End: 2023-03-29

## 2023-03-29 DIAGNOSIS — D72.829 ELEVATED WHITE BLOOD CELL COUNT, UNSPECIFIED: ICD-10-CM

## 2023-03-29 LAB
ANION GAP SERPL CALC-SCNC: 7 MMOL/L — SIGNIFICANT CHANGE UP (ref 7–14)
APTT BLD: 29.3 SEC — SIGNIFICANT CHANGE UP (ref 27–36.3)
BASOPHILS # BLD AUTO: 0.08 K/UL — SIGNIFICANT CHANGE UP (ref 0–0.2)
BASOPHILS NFR BLD AUTO: 0.4 % — SIGNIFICANT CHANGE UP (ref 0–2)
BLD GP AB SCN SERPL QL: NEGATIVE — SIGNIFICANT CHANGE UP
BUN SERPL-MCNC: 11 MG/DL — SIGNIFICANT CHANGE UP (ref 7–23)
CALCIUM SERPL-MCNC: 8.4 MG/DL — SIGNIFICANT CHANGE UP (ref 8.4–10.5)
CHLORIDE SERPL-SCNC: 96 MMOL/L — LOW (ref 98–107)
CO2 SERPL-SCNC: 27 MMOL/L — SIGNIFICANT CHANGE UP (ref 22–31)
CREAT SERPL-MCNC: 0.55 MG/DL — SIGNIFICANT CHANGE UP (ref 0.5–1.3)
CULTURE RESULTS: SIGNIFICANT CHANGE UP
CULTURE RESULTS: SIGNIFICANT CHANGE UP
EGFR: 98 ML/MIN/1.73M2 — SIGNIFICANT CHANGE UP
EOSINOPHIL # BLD AUTO: 0.05 K/UL — SIGNIFICANT CHANGE UP (ref 0–0.5)
EOSINOPHIL NFR BLD AUTO: 0.2 % — SIGNIFICANT CHANGE UP (ref 0–6)
GLUCOSE BLDC GLUCOMTR-MCNC: 123 MG/DL — HIGH (ref 70–99)
GLUCOSE SERPL-MCNC: 109 MG/DL — HIGH (ref 70–99)
HCT VFR BLD CALC: 30.6 % — LOW (ref 39–50)
HCT VFR BLD CALC: 32.5 % — LOW (ref 39–50)
HGB BLD-MCNC: 9.5 G/DL — LOW (ref 13–17)
HGB BLD-MCNC: 9.9 G/DL — LOW (ref 13–17)
IANC: 13.83 K/UL — HIGH (ref 1.8–7.4)
IMM GRANULOCYTES NFR BLD AUTO: 8.6 % — HIGH (ref 0–0.9)
INR BLD: 1.55 RATIO — HIGH (ref 0.88–1.16)
LYMPHOCYTES # BLD AUTO: 1.88 K/UL — SIGNIFICANT CHANGE UP (ref 1–3.3)
LYMPHOCYTES # BLD AUTO: 9.1 % — LOW (ref 13–44)
MAGNESIUM SERPL-MCNC: 1.9 MG/DL — SIGNIFICANT CHANGE UP (ref 1.6–2.6)
MCHC RBC-ENTMCNC: 29.2 PG — SIGNIFICANT CHANGE UP (ref 27–34)
MCHC RBC-ENTMCNC: 29.3 PG — SIGNIFICANT CHANGE UP (ref 27–34)
MCHC RBC-ENTMCNC: 30.5 GM/DL — LOW (ref 32–36)
MCHC RBC-ENTMCNC: 31 GM/DL — LOW (ref 32–36)
MCV RBC AUTO: 94.4 FL — SIGNIFICANT CHANGE UP (ref 80–100)
MCV RBC AUTO: 95.9 FL — SIGNIFICANT CHANGE UP (ref 80–100)
MONOCYTES # BLD AUTO: 3.05 K/UL — HIGH (ref 0–0.9)
MONOCYTES NFR BLD AUTO: 14.8 % — HIGH (ref 2–14)
NEUTROPHILS # BLD AUTO: 13.83 K/UL — HIGH (ref 1.8–7.4)
NEUTROPHILS NFR BLD AUTO: 66.9 % — SIGNIFICANT CHANGE UP (ref 43–77)
NRBC # BLD: 0 /100 WBCS — SIGNIFICANT CHANGE UP (ref 0–0)
NRBC # BLD: 0 /100 WBCS — SIGNIFICANT CHANGE UP (ref 0–0)
NRBC # FLD: 0.05 K/UL — HIGH (ref 0–0)
NRBC # FLD: 0.05 K/UL — HIGH (ref 0–0)
PHOSPHATE SERPL-MCNC: 2.3 MG/DL — LOW (ref 2.5–4.5)
PLATELET # BLD AUTO: 298 K/UL — SIGNIFICANT CHANGE UP (ref 150–400)
PLATELET # BLD AUTO: 367 K/UL — SIGNIFICANT CHANGE UP (ref 150–400)
POTASSIUM SERPL-MCNC: 4.2 MMOL/L — SIGNIFICANT CHANGE UP (ref 3.5–5.3)
POTASSIUM SERPL-SCNC: 4.2 MMOL/L — SIGNIFICANT CHANGE UP (ref 3.5–5.3)
PROCALCITONIN SERPL-MCNC: 0.26 NG/ML — HIGH (ref 0.02–0.1)
PROTHROM AB SERPL-ACNC: 18.1 SEC — HIGH (ref 10.5–13.4)
RBC # BLD: 3.24 M/UL — LOW (ref 4.2–5.8)
RBC # BLD: 3.39 M/UL — LOW (ref 4.2–5.8)
RBC # FLD: 21 % — HIGH (ref 10.3–14.5)
RBC # FLD: 21.2 % — HIGH (ref 10.3–14.5)
RH IG SCN BLD-IMP: POSITIVE — SIGNIFICANT CHANGE UP
SARS-COV-2 RNA SPEC QL NAA+PROBE: SIGNIFICANT CHANGE UP
SODIUM SERPL-SCNC: 130 MMOL/L — LOW (ref 135–145)
SPECIMEN SOURCE: SIGNIFICANT CHANGE UP
SPECIMEN SOURCE: SIGNIFICANT CHANGE UP
WBC # BLD: 20.66 K/UL — HIGH (ref 3.8–10.5)
WBC # BLD: 26.5 K/UL — HIGH (ref 3.8–10.5)
WBC # FLD AUTO: 20.66 K/UL — HIGH (ref 3.8–10.5)
WBC # FLD AUTO: 26.5 K/UL — HIGH (ref 3.8–10.5)

## 2023-03-29 PROCEDURE — 74177 CT ABD & PELVIS W/CONTRAST: CPT | Mod: 26

## 2023-03-29 PROCEDURE — 71045 X-RAY EXAM CHEST 1 VIEW: CPT | Mod: 26

## 2023-03-29 PROCEDURE — 71045 X-RAY EXAM CHEST 1 VIEW: CPT | Mod: 26,77

## 2023-03-29 PROCEDURE — 99232 SBSQ HOSP IP/OBS MODERATE 35: CPT

## 2023-03-29 PROCEDURE — 71260 CT THORAX DX C+: CPT | Mod: 26

## 2023-03-29 RX ORDER — VANCOMYCIN HCL 1 G
1250 VIAL (EA) INTRAVENOUS EVERY 12 HOURS
Refills: 0 | Status: DISCONTINUED | OUTPATIENT
Start: 2023-03-29 | End: 2023-03-30

## 2023-03-29 RX ADMIN — MAGNESIUM OXIDE 400 MG ORAL TABLET 400 MILLIGRAM(S): 241.3 TABLET ORAL at 18:52

## 2023-03-29 RX ADMIN — CEFEPIME 100 MILLIGRAM(S): 1 INJECTION, POWDER, FOR SOLUTION INTRAMUSCULAR; INTRAVENOUS at 21:53

## 2023-03-29 RX ADMIN — ATENOLOL 12.5 MILLIGRAM(S): 25 TABLET ORAL at 06:32

## 2023-03-29 RX ADMIN — FAMOTIDINE 20 MILLIGRAM(S): 10 INJECTION INTRAVENOUS at 12:55

## 2023-03-29 RX ADMIN — SENNA PLUS 2 TABLET(S): 8.6 TABLET ORAL at 21:49

## 2023-03-29 RX ADMIN — MAGNESIUM OXIDE 400 MG ORAL TABLET 400 MILLIGRAM(S): 241.3 TABLET ORAL at 12:55

## 2023-03-29 RX ADMIN — ALBUTEROL 2.5 MILLIGRAM(S): 90 AEROSOL, METERED ORAL at 13:20

## 2023-03-29 RX ADMIN — Medication 166.67 MILLIGRAM(S): at 18:51

## 2023-03-29 RX ADMIN — POLYETHYLENE GLYCOL 3350 17 GRAM(S): 17 POWDER, FOR SOLUTION ORAL at 12:55

## 2023-03-29 RX ADMIN — CEFEPIME 100 MILLIGRAM(S): 1 INJECTION, POWDER, FOR SOLUTION INTRAMUSCULAR; INTRAVENOUS at 06:31

## 2023-03-29 RX ADMIN — CEFEPIME 100 MILLIGRAM(S): 1 INJECTION, POWDER, FOR SOLUTION INTRAMUSCULAR; INTRAVENOUS at 13:01

## 2023-03-29 RX ADMIN — ESCITALOPRAM OXALATE 10 MILLIGRAM(S): 10 TABLET, FILM COATED ORAL at 12:55

## 2023-03-29 RX ADMIN — MAGNESIUM OXIDE 400 MG ORAL TABLET 400 MILLIGRAM(S): 241.3 TABLET ORAL at 09:22

## 2023-03-29 RX ADMIN — Medication 25 MILLIGRAM(S): at 21:49

## 2023-03-29 RX ADMIN — SIMVASTATIN 40 MILLIGRAM(S): 20 TABLET, FILM COATED ORAL at 21:49

## 2023-03-29 NOTE — PROGRESS NOTE ADULT - SUBJECTIVE AND OBJECTIVE BOX
Date of Service: 23 @ 12:01    Patient is a 82y old  Male who presents with a chief complaint of pl effusion (27 Mar 2023 10:54)      Any change in ROS: seems to be doing  ok ; no sob:  feels frustrated     MEDICATIONS  (STANDING):  atenolol  Tablet 12.5 milliGRAM(s) Oral daily  cefepime   IVPB      cefepime   IVPB 2000 milliGRAM(s) IV Intermittent every 8 hours  dextrose 5%. 1000 milliLiter(s) (50 mL/Hr) IV Continuous <Continuous>  escitalopram 10 milliGRAM(s) Oral daily  famotidine    Tablet 20 milliGRAM(s) Oral daily  magnesium oxide 400 milliGRAM(s) Oral three times a day with meals  polyethylene glycol 3350 17 Gram(s) Oral daily  senna 2 Tablet(s) Oral at bedtime  simvastatin 40 milliGRAM(s) Oral at bedtime  traZODone 25 milliGRAM(s) Oral at bedtime    MEDICATIONS  (PRN):  acetaminophen     Tablet .. 650 milliGRAM(s) Oral every 6 hours PRN Temp greater or equal to 38C (100.4F), Mild Pain (1 - 3)  albuterol   0.5% 2.5 milliGRAM(s) Nebulizer every 6 hours PRN Shortness of Breath  ALPRAZolam 0.25 milliGRAM(s) Oral daily PRN anxiety  oxyCODONE    IR 2.5 milliGRAM(s) Oral once PRN Severe Pain (7 - 10)    Vital Signs Last 24 Hrs  T(C): 36.4 (29 Mar 2023 10:30), Max: 37 (28 Mar 2023 19:30)  T(F): 97.5 (29 Mar 2023 10:30), Max: 98.6 (28 Mar 2023 19:30)  HR: 85 (29 Mar 2023 10:30) (85 - 105)  BP: 102/65 (29 Mar 2023 10:30) (98/57 - 117/60)  BP(mean): --  RR: 19 (29 Mar 2023 10:30) (17 - 19)  SpO2: 95% (29 Mar 2023 10:30) (95% - 98%)    Parameters below as of 29 Mar 2023 10:30  Patient On (Oxygen Delivery Method): nasal cannula  O2 Flow (L/min): 4      I&O's Summary    28 Mar 2023 07:01  -  29 Mar 2023 07:00  --------------------------------------------------------  IN: 150 mL / OUT: 390 mL / NET: -240 mL          Physical Exam:   GENERAL: obese+  HEENT: CHUCK/   Atraumatic, Normocephalic  ENMT: No tonsillar erythema, exudates, or enlargement; Moist mucous membranes, Good dentition, No lesions  NECK: Supple, No JVD, Normal thyroid  CHEST/LUNG: Clear to auscultaion, ; No rales, rhonchi, wheezing, or rubs  CVS: Regular rate and rhythm; No murmurs, rubs, or gallops  GI: : Soft, Nontender, Nondistended; Bowel sounds present  NERVOUS SYSTEM:  Alert & Oriented X3  EXTREMITIES: + edema  LYMPH: No lymphadenopathy noted  SKIN: No rashes or lesions  ENDOCRINOLOGY: No Thyromegaly  PSYCH: Appropriate    Labs:  30                            9.5    20.66 )-----------( 298      ( 29 Mar 2023 07:00 )             30.6                         9.0    6.60  )-----------( 351      ( 28 Mar 2023 05:35 )             29.1                         8.3    4.80  )-----------( 357      ( 27 Mar 2023 06:15 )             27.3                         8.3    4.28  )-----------( 320      ( 26 Mar 2023 07:07 )             26.9     03-29    130<L>  |  96<L>  |  11  ----------------------------<  109<H>  4.2   |  27  |  0.55  03-28    133<L>  |  96<L>  |  8   ----------------------------<  101<H>  3.9   |  27  |  0.54  03-27    134<L>  |  95<L>  |  9   ----------------------------<  101<H>  4.0   |  28  |  0.53      132<L>  |  95<L>  |  6<L>  ----------------------------<  110<H>  3.7   |  28  |  0.47<L>    Ca    8.4      29 Mar 2023 07:00  Ca    8.5      28 Mar 2023 05:35  Phos  2.3       Phos  2.2       Mg     1.90       Mg     1.80         TPro  5.7<L>  /  Alb  2.9<L>  /  TBili  0.7  /  DBili  x   /  AST  14  /  ALT  18  /  AlkPhos  70      CAPILLARY BLOOD GLUCOSE            PT/INR - ( 29 Mar 2023 07:00 )   PT: 18.1 sec;   INR: 1.55 ratio         PTT - ( 29 Mar 2023 07:00 )  PTT:29.3 sec  Urinalysis Basic - ( 27 Mar 2023 23:50 )    Color: Yellow / Appearance: Slightly Turbid / S.019 / pH: x  Gluc: x / Ketone: Trace  / Bili: Negative / Urobili: <2 mg/dL   Blood: x / Protein: 30 mg/dL / Nitrite: Positive   Leuk Esterase: Large / RBC: 5 /HPF /  /HPF   Sq Epi: x / Non Sq Epi: 1 /HPF / Bacteria: Moderate      D-Dimer Assay, Quantitative: 199 ng/mL DDU ( @ 15:45)  Procalcitonin, Serum: 0.14 ng/mL ( @ 19:30)        RECENT CULTURES:   @ 19:45 .Blood Blood-Peripheral         rad< from: Xray Chest 1 View- PORTABLE-Urgent (Xray Chest 1 View- PORTABLE-Urgent .) (23 @ 10:54) >      INTERPRETATION:  CLINICAL INFORMATION: Pleurx catheter    TIME OF EXAMINATION: 2023 at 9:47 AM    EXAM: AP chest    FINDINGS:  Right-sided Pleurx catheter present unchanged from the study of the day   before. Small loculated hydropneumothorax unchanged. Lungs show no focal   consolidations. Mediport in place.        COMPARISON:         IMPRESSION: Follow-up right Pleurx catheter with small loculated   hydropneumothorax.    --- End of Report ---            AMIRAH EARLY MD; Attending Radiologist  This document has been electronically signed. Mar 29 2023 11:06AM    < end of copied text >         No growth to date.     @ 19:30 .Blood Blood-Peripheral                No growth to date.     @ 16:10 .Blood Blood-Peripheral                No growth to date.     @ 16:05 .Blood Blood-Peripheral                No growth to date.          RESPIRATORY CULTURES:          Studies  Chest X-RAY  CT SCAN Chest   Venous Dopplers: LE:   CT Abdomen  Others

## 2023-03-29 NOTE — PROGRESS NOTE ADULT - PROBLEM SELECTOR PLAN 2
acute onset  no fever, not toxic looking  already on cefepime, added vanco   pan Cx  Pan CT with IV contrast   ID eval PRN   monitor clinically

## 2023-03-29 NOTE — PROGRESS NOTE ADULT - SUBJECTIVE AND OBJECTIVE BOX
Patient is a 82y old  Male who presents with a chief complaint of pl effusion (27 Mar 2023 10:54)    Interval history: patient sitting in chair at bedside, states he is going for procedure today.  Appears sob but denies complaint.       REVIEW OF SYSTEMS  weakness    PAST MEDICAL & SURGICAL HISTORY  No pertinent past medical history    Atrial fibrillation    Hypertension    Urinary retention    No significant past surgical history      CURRENT FUNCTIONAL STATUS  3/27 Bed Mobility: Supine to Sit:     · Level of [place]askins	unable to perform; Patient seen sitting in chair    Transfer: Sit to Stand:     · Level of [place]askins	moderate assist (50% patients effort)  · Physical Assist/Nonphysical Assist	1 person assist; verbal cues  · Assistive Device	rolling walker    Transfer: Stand to Sit:     · Level of Haskins	minimum assist (75% patients effort)  · Physical Assist/Nonphysical Assist	1 person assist; verbal cues  · Assistive Device	rolling walker    Gait Skills:     · Level of [place]askins	unable to perform; due to patient displays fatigue and shortness of breath despite  4 liters oxygen via nasal cannula         RECENT LABS/IMAGING  CBC Full  -  ( 29 Mar 2023 07:00 )  WBC Count : 20.66 K/uL  RBC Count : 3.24 M/uL  Hemoglobin : 9.5 g/dL  Hematocrit : 30.6 %  Platelet Count - Automated : 298 K/uL  Mean Cell Volume : 94.4 fL  Mean Cell Hemoglobin : 29.3 pg  Mean Cell Hemoglobin Concentration : 31.0 gm/dL  Auto Neutrophil # : 13.83 K/uL  Auto Lymphocyte # : 1.88 K/uL  Auto Monocyte # : 3.05 K/uL  Auto Eosinophil # : 0.05 K/uL  Auto Basophil # : 0.08 K/uL  Auto Neutrophil % : 66.9 %  Auto Lymphocyte % : 9.1 %  Auto Monocyte % : 14.8 %  Auto Eosinophil % : 0.2 %  Auto Basophil % : 0.4 %        130<L>  |  96<L>  |  11  ----------------------------<  109<H>  4.2   |  27  |  0.55    Ca    8.4      29 Mar 2023 07:00  Phos  2.3     03-29  Mg     1.90     -      Urinalysis Basic - ( 27 Mar 2023 23:50 )    Color: Yellow / Appearance: Slightly Turbid / S.019 / pH: x  Gluc: x / Ketone: Trace  / Bili: Negative / Urobili: <2 mg/dL   Blood: x / Protein: 30 mg/dL / Nitrite: Positive   Leuk Esterase: Large / RBC: 5 /HPF /  /HPF   Sq Epi: x / Non Sq Epi: 1 /HPF / Bacteria: Moderate        VITALS  T(C): 36.4 (23 @ 10:30), Max: 37 (23 @ 19:30)  HR: 85 (23 @ 10:30) (85 - 105)  BP: 102/65 (23 @ 10:30) (98/57 - 117/60)  RR: 19 (23 @ 10:30) (17 - 19)  SpO2: 95% (23 @ 10:30) (95% - 98%)  Wt(kg): --    ALLERGIES  pertussis vaccines (Rash)  shellfish (Rash)      MEDICATIONS   acetaminophen     Tablet .. 650 milliGRAM(s) Oral every 6 hours PRN  albuterol   0.5% 2.5 milliGRAM(s) Nebulizer every 6 hours PRN  ALPRAZolam 0.25 milliGRAM(s) Oral daily PRN  atenolol  Tablet 12.5 milliGRAM(s) Oral daily  cefepime   IVPB      cefepime   IVPB 2000 milliGRAM(s) IV Intermittent every 8 hours  dextrose 5%. 1000 milliLiter(s) IV Continuous <Continuous>  escitalopram 10 milliGRAM(s) Oral daily  famotidine    Tablet 20 milliGRAM(s) Oral daily  magnesium oxide 400 milliGRAM(s) Oral three times a day with meals  oxyCODONE    IR 2.5 milliGRAM(s) Oral once PRN  polyethylene glycol 3350 17 Gram(s) Oral daily  senna 2 Tablet(s) Oral at bedtime  simvastatin 40 milliGRAM(s) Oral at bedtime  traZODone 25 milliGRAM(s) Oral at bedtime      ----------------------------------------------------------------------------------------  PHYSICAL EXAM  Constitutional - NAD, appears fatigued but alert  HEENT - NCAT, EOMI   + nasal cannula  Chest - + pleurex draining  Cardiovascular - RRR, S1S2   Abdomen -  Soft, NTND  Extremities - No C/C/E, No calf tenderness   Neurologic Exam -                    Cognitive - Awake, Alert      Communication - Fluent, No dysarthria     Cranial Nerves - CN 2-12 intact     Motor -                      LEFT    UE - 4/5                    RIGHT UE - 4/5                    LEFT    LE -  4/5                    RIGHT LE - 4/5        Sensory - Intact to LT      Balance - WNL Static  Psychiatric - Mood stable, Affect WNL  ----------------------------------------------------------------------------------------  ASSESSMENT/PLAN   83 yo m with hodgkin lymphoma admitted from acute rehab, with pleural effusions and SOB  cefepime  diet: regular  plummer  DVT PPX - ac held   Rehab -will monitor for improvement. currently not ambulatory, medical treatments in progress. If still requires assistance for ambulation may require subacute rehab.

## 2023-03-29 NOTE — PROGRESS NOTE ADULT - SUBJECTIVE AND OBJECTIVE BOX
Name of Patient : ARIK DUMONT  MRN: [id number]  Date of visit: 23       Subjective: Patient seen and examined. No new events except as noted.   calm and stable   on NC   family at the bedside       REVIEW OF SYSTEMS:    CONSTITUTIONAL: No weakness, fevers or chills  EYES/ENT: No visual changes;  No vertigo or throat pain   NECK: No pain or stiffness  RESPIRATORY: No cough, wheezing, hemoptysis; No shortness of breath  CARDIOVASCULAR: No chest pain or palpitations  GASTROINTESTINAL: No abdominal or epigastric pain. No nausea, vomiting, or hematemesis; No diarrhea or constipation. No melena or hematochezia.  GENITOURINARY: No dysuria, frequency or hematuria  NEUROLOGICAL: No numbness or weakness  SKIN: No itching, burning, rashes, or lesions   All other review of systems is negative unless indicated above.    MEDICATIONS:  MEDICATIONS  (STANDING):  atenolol  Tablet 12.5 milliGRAM(s) Oral daily  cefepime   IVPB      cefepime   IVPB 2000 milliGRAM(s) IV Intermittent every 8 hours  dextrose 5%. 1000 milliLiter(s) (50 mL/Hr) IV Continuous <Continuous>  escitalopram 10 milliGRAM(s) Oral daily  famotidine    Tablet 20 milliGRAM(s) Oral daily  magnesium oxide 400 milliGRAM(s) Oral three times a day with meals  polyethylene glycol 3350 17 Gram(s) Oral daily  senna 2 Tablet(s) Oral at bedtime  simvastatin 40 milliGRAM(s) Oral at bedtime  traZODone 25 milliGRAM(s) Oral at bedtime  vancomycin  IVPB 1250 milliGRAM(s) IV Intermittent every 12 hours      PHYSICAL EXAM:  T(C): 36.9 (23 @ 14:38), Max: 37 (23 @ 19:30)  HR: 77 (23 @ 14:38) (77 - 105)  BP: 100/53 (23 @ 14:38) (98/57 - 117/60)  RR: 18 (23 @ 14:38) (17 - 19)  SpO2: 100% (23 @ 14:38) (95% - 100%)  Wt(kg): --  I&O's Summary    28 Mar 2023 07:01  -  29 Mar 2023 07:00  --------------------------------------------------------  IN: 150 mL / OUT: 390 mL / NET: -240 mL          Appearance: Normal	  HEENT:  PERRLA   Lymphatic: No lymphadenopathy   Cardiovascular: Normal S1 S2, no JVD  Respiratory: normal effort , clear  Gastrointestinal:  Soft, Non-tender  Skin: No rashes,  warm to touch  Psychiatry:  Mood & affect appropriate  Musculuskeletal: No edema    recent labs, Imaging and EKGs personally reviewed     23 @ 07:01  -  23 @ 07:00  --------------------------------------------------------  IN: 150 mL / OUT: 390 mL / NET: -240 mL                          9.9    26.50 )-----------( 367      ( 29 Mar 2023 12:01 )             32.5               03-29    130<L>  |  96<L>  |  11  ----------------------------<  109<H>  4.2   |  27  |  0.55    Ca    8.4      29 Mar 2023 07:00  Phos  2.3     03-29  Mg     1.90     03-29      PT/INR - ( 29 Mar 2023 07:00 )   PT: 18.1 sec;   INR: 1.55 ratio         PTT - ( 29 Mar 2023 07:00 )  PTT:29.3 sec                   Urinalysis Basic - ( 27 Mar 2023 23:50 )    Color: Yellow / Appearance: Slightly Turbid / S.019 / pH: x  Gluc: x / Ketone: Trace  / Bili: Negative / Urobili: <2 mg/dL   Blood: x / Protein: 30 mg/dL / Nitrite: Positive   Leuk Esterase: Large / RBC: 5 /HPF /  /HPF   Sq Epi: x / Non Sq Epi: 1 /HPF / Bacteria: Moderate

## 2023-03-29 NOTE — PROGRESS NOTE ADULT - ASSESSMENT
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 Patient is poor historian, history obtained by son Francisco at bedside. States that  patient has been recovering wellat  rehab. States patient was able to walk over 150 ft while at rehab. States that suddenly on Friday patient started to develop acute SOB and increased WOB. Imaging was done at  rehab showing worsening pleural effusions EMS was called and patient was noted to be tachypneic to 30s and placed on 10L NRB.    Recently admitted from 3/15-3/24 for worsening SOB was found to have  significant for a large R pleural effusion with mediastinal and hilar lymphadenopathy, and 3L of fluid was drained. Pleural fluid cultures and blood cultures resulted negative.  Suspicious of underlying malignancy secondary to lymphadenopathy and persistent leukocytosis, Heme/Onc was consulted and recommended transfer to [facility] on 2/24/23 for further workup. On 2/28 he underwent an ultrasound guided biopsy of his R supraclavicular mass/lymph node, which later resulted as classic Hodgkin's lymphoma.  On 3/3, he underwent a R thoracotomy/VATS with Pleurex placement. Left chest wall mediport was placed and received  first cycle of chemotherapy on 3/10. (25 Mar 2023 16:44)    no wpulm called:  pt has chest tube on rigth side:  has bloody secretions  he looks comfortable:  no sob :      right sided pl effusion:  A fibrillation:   HTN:  Urinary retention     3/27:  right sided pl effusion:  [id number]:  blood pl fluid  ; on ac;  cts surgery to see:  chest xray today looks pretty good to me: not hypercarbic and acidotic on VBG   A fibrillation: off ac at t his time  HTN:  blood pressure is controlled  Urinary retention  ; has chr plummer: :    marah pmd    3/28:    right sided pl effusion:  has lymphoma:  blood pl fluid  ; on ac;  cts surgery to see:  chest xray today looks pretty good to me: not hypercarbic and acidotic on VBG   A fibrillation: off ac at t his time: no tpa done as the pl fluid is very blood:   HTN:  blood pressure is controlled  Urinary retention  ; has chr plummer: :    marah pmd      3/29:    right sided pl effusion:  has lymphoma:  blood pl fluid  ; on ac;  cts surgery to see:  chest xray today looks pretty good to me: not hypercarbic and acidotic on VBG : his wbc is increased today  : ? etiology : ct scan chest awaited:  check procal as well as monitor for fever:   A fibrillation: off ac at t his time: no tpa done as the pl fluid is very blood:   HTN:  blood pressure is controlled  Urinary retention  ; has chr plummer: :    marah brown

## 2023-03-29 NOTE — CHART NOTE - NSCHARTNOTEFT_GEN_A_CORE
Patient seen and examined at bedside. Patient reports feeling discomfort in his nose associated with his nasal cannula placement.   Discussed with patient's son and daughter on the phone regarding chest tube placement, CXR findings, and treatment plan. Patient currently pending CT scan.     Plan explained to son and daughter to continue chest tube to water seal for R pleural effusion, no surgical intervention planned currently as chest tube continues to drain serosanguinous fluid with no air leak. Reaccumulation of pleural effusions are likely due to patient lymphoma.    Recommend continue lymphoma treatment. Thoracic surgery will continue to follow patient's course. Possible change to small atrium, pleurx on discharge  Patient and patient's family members expressed understanding. Questions were answered appropriately to their satisfaction.     ICU Vital Signs Last 24 Hrs  T(C): 36.9 (29 Mar 2023 14:38), Max: 37 (28 Mar 2023 19:30)  T(F): 98.4 (29 Mar 2023 14:38), Max: 98.6 (28 Mar 2023 19:30)  HR: 77 (29 Mar 2023 14:38) (77 - 105)  BP: 100/53 (29 Mar 2023 14:38) (98/57 - 117/60)  BP(mean): --  ABP: --  ABP(mean): --  RR: 18 (29 Mar 2023 14:38) (17 - 19)  SpO2: 100% (29 Mar 2023 14:38) (95% - 100%)    O2 Parameters below as of 29 Mar 2023 14:38  Patient On (Oxygen Delivery Method): nasal cannula  O2 Flow (L/min): 4 Patient seen and examined at bedside. Patient reports feeling discomfort in his nose associated with his nasal cannula placement.   Discussed with patient's son and daughter on the phone regarding chest tube placement, CXR findings, and treatment plan. Patient currently pending CT scan.     Plan explained to son and daughter to continue chest tube to water seal for R pleural effusion, no surgical intervention planned currently as chest tube continues to drain serosanguinous fluid with no air leak. Reaccumulation of pleural effusions are likely due to patient lymphoma.    Recommend continue lymphoma treatment. Thoracic surgery will continue to follow patient's course as needed  Call Thoracic surgery please when pt is ready for discharge and we will cap Pleurx.   Patient and patient's family members expressed understanding. Questions were answered appropriately to their satisfaction.     ICU Vital Signs Last 24 Hrs  T(C): 36.9 (29 Mar 2023 14:38), Max: 37 (28 Mar 2023 19:30)  T(F): 98.4 (29 Mar 2023 14:38), Max: 98.6 (28 Mar 2023 19:30)  HR: 77 (29 Mar 2023 14:38) (77 - 105)  BP: 100/53 (29 Mar 2023 14:38) (98/57 - 117/60)  BP(mean): --  ABP: --  ABP(mean): --  RR: 18 (29 Mar 2023 14:38) (17 - 19)  SpO2: 100% (29 Mar 2023 14:38) (95% - 100%)    O2 Parameters below as of 29 Mar 2023 14:38  Patient On (Oxygen Delivery Method): nasal cannula  O2 Flow (L/min): 4 Patient seen and examined at bedside. Patient reports feeling discomfort in his nose associated with his nasal cannula placement.   Discussed with patient's son and daughter on the phone regarding chest tube placement, CXR findings, and treatment plan. Patient currently pending CT scan.     Plan explained to son and daughter to continue chest tube to water seal for R pleural effusion, no surgical intervention planned currently as chest tube continues to drain serosanguinous fluid with no air leak. Reaccumulation of pleural effusions are likely due to patient lymphoma.    Recommend continue lymphoma treatment. Thoracic surgery will continue to follow patient's course as needed  Call Thoracic surgery please when pt is ready for discharge and we will cap Pleurx.   Recommend holding all anticoagulation indefinitely due to sanguinous Pleurx output and risk of hemothorax.   Patient and patient's family members expressed understanding. Questions were answered appropriately to their satisfaction.     ICU Vital Signs Last 24 Hrs  T(C): 36.9 (29 Mar 2023 14:38), Max: 37 (28 Mar 2023 19:30)  T(F): 98.4 (29 Mar 2023 14:38), Max: 98.6 (28 Mar 2023 19:30)  HR: 77 (29 Mar 2023 14:38) (77 - 105)  BP: 100/53 (29 Mar 2023 14:38) (98/57 - 117/60)  BP(mean): --  ABP: --  ABP(mean): --  RR: 18 (29 Mar 2023 14:38) (17 - 19)  SpO2: 100% (29 Mar 2023 14:38) (95% - 100%)    O2 Parameters below as of 29 Mar 2023 14:38  Patient On (Oxygen Delivery Method): nasal cannula  O2 Flow (L/min): 4

## 2023-03-30 LAB
-  AMIKACIN: SIGNIFICANT CHANGE UP
-  AMOXICILLIN/CLAVULANIC ACID: SIGNIFICANT CHANGE UP
-  AMPICILLIN/SULBACTAM: SIGNIFICANT CHANGE UP
-  AMPICILLIN: SIGNIFICANT CHANGE UP
-  AZTREONAM: SIGNIFICANT CHANGE UP
-  CEFAZOLIN: SIGNIFICANT CHANGE UP
-  CEFEPIME: SIGNIFICANT CHANGE UP
-  CEFTRIAXONE: SIGNIFICANT CHANGE UP
-  CEFUROXIME: SIGNIFICANT CHANGE UP
-  CIPROFLOXACIN: SIGNIFICANT CHANGE UP
-  ERTAPENEM: SIGNIFICANT CHANGE UP
-  GENTAMICIN: SIGNIFICANT CHANGE UP
-  IMIPENEM: SIGNIFICANT CHANGE UP
-  LEVOFLOXACIN: SIGNIFICANT CHANGE UP
-  MEROPENEM: SIGNIFICANT CHANGE UP
-  NITROFURANTOIN: SIGNIFICANT CHANGE UP
-  PIPERACILLIN/TAZOBACTAM: SIGNIFICANT CHANGE UP
-  TOBRAMYCIN: SIGNIFICANT CHANGE UP
-  TRIMETHOPRIM/SULFAMETHOXAZOLE: SIGNIFICANT CHANGE UP
ANION GAP SERPL CALC-SCNC: 10 MMOL/L — SIGNIFICANT CHANGE UP (ref 7–14)
BUN SERPL-MCNC: 13 MG/DL — SIGNIFICANT CHANGE UP (ref 7–23)
CALCIUM SERPL-MCNC: 8.7 MG/DL — SIGNIFICANT CHANGE UP (ref 8.4–10.5)
CHLORIDE SERPL-SCNC: 96 MMOL/L — LOW (ref 98–107)
CO2 SERPL-SCNC: 26 MMOL/L — SIGNIFICANT CHANGE UP (ref 22–31)
CREAT SERPL-MCNC: 0.56 MG/DL — SIGNIFICANT CHANGE UP (ref 0.5–1.3)
CULTURE RESULTS: SIGNIFICANT CHANGE UP
EGFR: 98 ML/MIN/1.73M2 — SIGNIFICANT CHANGE UP
GLUCOSE SERPL-MCNC: 113 MG/DL — HIGH (ref 70–99)
HCT VFR BLD CALC: 30.7 % — LOW (ref 39–50)
HGB BLD-MCNC: 9.4 G/DL — LOW (ref 13–17)
MAGNESIUM SERPL-MCNC: 2.1 MG/DL — SIGNIFICANT CHANGE UP (ref 1.6–2.6)
MCHC RBC-ENTMCNC: 28.7 PG — SIGNIFICANT CHANGE UP (ref 27–34)
MCHC RBC-ENTMCNC: 30.6 GM/DL — LOW (ref 32–36)
MCV RBC AUTO: 93.6 FL — SIGNIFICANT CHANGE UP (ref 80–100)
METHOD TYPE: SIGNIFICANT CHANGE UP
NRBC # BLD: 0 /100 WBCS — SIGNIFICANT CHANGE UP (ref 0–0)
NRBC # FLD: 0.03 K/UL — HIGH (ref 0–0)
ORGANISM # SPEC MICROSCOPIC CNT: SIGNIFICANT CHANGE UP
ORGANISM # SPEC MICROSCOPIC CNT: SIGNIFICANT CHANGE UP
PHOSPHATE SERPL-MCNC: 2.3 MG/DL — LOW (ref 2.5–4.5)
PLATELET # BLD AUTO: 290 K/UL — SIGNIFICANT CHANGE UP (ref 150–400)
POTASSIUM SERPL-MCNC: 4.4 MMOL/L — SIGNIFICANT CHANGE UP (ref 3.5–5.3)
POTASSIUM SERPL-SCNC: 4.4 MMOL/L — SIGNIFICANT CHANGE UP (ref 3.5–5.3)
RBC # BLD: 3.28 M/UL — LOW (ref 4.2–5.8)
RBC # FLD: 20.7 % — HIGH (ref 10.3–14.5)
SODIUM SERPL-SCNC: 132 MMOL/L — LOW (ref 135–145)
SPECIMEN SOURCE: SIGNIFICANT CHANGE UP
WBC # BLD: 26.45 K/UL — HIGH (ref 3.8–10.5)
WBC # FLD AUTO: 26.45 K/UL — HIGH (ref 3.8–10.5)

## 2023-03-30 PROCEDURE — 99232 SBSQ HOSP IP/OBS MODERATE 35: CPT

## 2023-03-30 PROCEDURE — 99222 1ST HOSP IP/OBS MODERATE 55: CPT

## 2023-03-30 PROCEDURE — 99233 SBSQ HOSP IP/OBS HIGH 50: CPT | Mod: GC

## 2023-03-30 RX ORDER — ENOXAPARIN SODIUM 100 MG/ML
40 INJECTION SUBCUTANEOUS EVERY 24 HOURS
Refills: 0 | Status: DISCONTINUED | OUTPATIENT
Start: 2023-03-30 | End: 2023-04-10

## 2023-03-30 RX ADMIN — ATENOLOL 12.5 MILLIGRAM(S): 25 TABLET ORAL at 05:29

## 2023-03-30 RX ADMIN — FAMOTIDINE 20 MILLIGRAM(S): 10 INJECTION INTRAVENOUS at 12:10

## 2023-03-30 RX ADMIN — MAGNESIUM OXIDE 400 MG ORAL TABLET 400 MILLIGRAM(S): 241.3 TABLET ORAL at 08:44

## 2023-03-30 RX ADMIN — SIMVASTATIN 40 MILLIGRAM(S): 20 TABLET, FILM COATED ORAL at 21:13

## 2023-03-30 RX ADMIN — ESCITALOPRAM OXALATE 10 MILLIGRAM(S): 10 TABLET, FILM COATED ORAL at 12:09

## 2023-03-30 RX ADMIN — CEFEPIME 100 MILLIGRAM(S): 1 INJECTION, POWDER, FOR SOLUTION INTRAMUSCULAR; INTRAVENOUS at 12:07

## 2023-03-30 RX ADMIN — Medication 166.67 MILLIGRAM(S): at 06:50

## 2023-03-30 RX ADMIN — MAGNESIUM OXIDE 400 MG ORAL TABLET 400 MILLIGRAM(S): 241.3 TABLET ORAL at 12:10

## 2023-03-30 RX ADMIN — POLYETHYLENE GLYCOL 3350 17 GRAM(S): 17 POWDER, FOR SOLUTION ORAL at 12:10

## 2023-03-30 RX ADMIN — ENOXAPARIN SODIUM 40 MILLIGRAM(S): 100 INJECTION SUBCUTANEOUS at 21:22

## 2023-03-30 RX ADMIN — MAGNESIUM OXIDE 400 MG ORAL TABLET 400 MILLIGRAM(S): 241.3 TABLET ORAL at 17:48

## 2023-03-30 RX ADMIN — SENNA PLUS 2 TABLET(S): 8.6 TABLET ORAL at 21:14

## 2023-03-30 RX ADMIN — Medication 166.67 MILLIGRAM(S): at 17:47

## 2023-03-30 RX ADMIN — CEFEPIME 100 MILLIGRAM(S): 1 INJECTION, POWDER, FOR SOLUTION INTRAMUSCULAR; INTRAVENOUS at 05:28

## 2023-03-30 RX ADMIN — CEFEPIME 100 MILLIGRAM(S): 1 INJECTION, POWDER, FOR SOLUTION INTRAMUSCULAR; INTRAVENOUS at 21:14

## 2023-03-30 RX ADMIN — Medication 25 MILLIGRAM(S): at 21:13

## 2023-03-30 NOTE — PROGRESS NOTE ADULT - SUBJECTIVE AND OBJECTIVE BOX
DATE OF SERVICE: 03-30-23 @ 09:06    Subjective: Patient seen and examined. No new events except as noted.     SUBJECTIVE/ROS:  nad      MEDICATIONS:  MEDICATIONS  (STANDING):  atenolol  Tablet 12.5 milliGRAM(s) Oral daily  cefepime   IVPB      cefepime   IVPB 2000 milliGRAM(s) IV Intermittent every 8 hours  dextrose 5%. 1000 milliLiter(s) (50 mL/Hr) IV Continuous <Continuous>  escitalopram 10 milliGRAM(s) Oral daily  famotidine    Tablet 20 milliGRAM(s) Oral daily  magnesium oxide 400 milliGRAM(s) Oral three times a day with meals  polyethylene glycol 3350 17 Gram(s) Oral daily  senna 2 Tablet(s) Oral at bedtime  simvastatin 40 milliGRAM(s) Oral at bedtime  traZODone 25 milliGRAM(s) Oral at bedtime  vancomycin  IVPB 1250 milliGRAM(s) IV Intermittent every 12 hours      PHYSICAL EXAM:  T(C): 36.9 (03-30-23 @ 05:25), Max: 37.1 (03-29-23 @ 22:33)  HR: 85 (03-30-23 @ 05:25) (77 - 89)  BP: 106/73 (03-30-23 @ 05:25) (100/53 - 114/63)  RR: 18 (03-30-23 @ 05:25) (18 - 19)  SpO2: 97% (03-30-23 @ 05:25) (95% - 100%)  Wt(kg): --  I&O's Summary    29 Mar 2023 07:01  -  30 Mar 2023 07:00  --------------------------------------------------------  IN: 150 mL / OUT: 570 mL / NET: -420 mL            JVP: Normal  Neck: supple  Lung: clear   CV: S1 S2 , Murmur:  Abd: soft  Ext: No edema  neuro: Awake  Psych: flat affect  Skin: normal``    LABS/DATA:    CARDIAC MARKERS:                                9.4    26.45 )-----------( 290      ( 30 Mar 2023 06:30 )             30.7     03-30    132<L>  |  96<L>  |  13  ----------------------------<  113<H>  4.4   |  26  |  0.56    Ca    8.7      30 Mar 2023 06:30  Phos  2.3     03-30  Mg     2.10     03-30      proBNP:   Lipid Profile:   HgA1c:   TSH:     TELE:  EKG:

## 2023-03-30 NOTE — PROVIDER CONTACT NOTE (CRITICAL VALUE NOTIFICATION) - ASSESSMENT
A&Ox4. atrial fibrillation on telemetry monitoring. breathing unlabored on 3-4Lnc. vital signs as charted. Denies pain, SOB, dizziness, lightheadedness, denies feelings of feeling feverish.

## 2023-03-30 NOTE — PROGRESS NOTE ADULT - SUBJECTIVE AND OBJECTIVE BOX
ARIK DUMONT 82y Male      Patient is a 82y old  Male who presents with a chief complaint of pl effusion (27 Mar 2023 10:54)        INTERVAL HPI/OVERNIGHT EVENTS: No acute events overnight. Patient was seen and evaluated at the bedside. The patient denies pain. Vitals stable. Patient denies fever/chills, chest pain, shortness of breath, abdominal pain, headaches, nausea/vomiting, and diarrhea/constipation.      PHYSICAL EXAM:  GENERAL: NAD  HEAD:  Normocephalic  EYES:  conjunctiva and sclera clear  ENMT: Moist mucous membranes  NECK: Supple  NERVOUS SYSTEM:  Alert, awake  CHEST/LUNG: Good air exchange bilaterally, no wheeze  HEART: Regular rate and rhythm        Vital Signs Last 24 Hrs  T(C): 36.6 (30 Mar 2023 09:25), Max: 37.1 (29 Mar 2023 22:33)  T(F): 97.9 (30 Mar 2023 09:25), Max: 98.7 (29 Mar 2023 22:33)  HR: 82 (30 Mar 2023 09:25) (77 - 89)  BP: 101/65 (30 Mar 2023 09:25) (100/53 - 114/63)  BP(mean): --  RR: 18 (30 Mar 2023 09:25) (18 - 18)  SpO2: 97% (30 Mar 2023 09:25) (96% - 100%)    Parameters below as of 30 Mar 2023 09:25  Patient On (Oxygen Delivery Method): nasal cannula  O2 Flow (L/min): 4        MEDICATIONS  (STANDING):  atenolol  Tablet 12.5 milliGRAM(s) Oral daily  cefepime   IVPB      cefepime   IVPB 2000 milliGRAM(s) IV Intermittent every 8 hours  dextrose 5%. 1000 milliLiter(s) (50 mL/Hr) IV Continuous <Continuous>  escitalopram 10 milliGRAM(s) Oral daily  famotidine    Tablet 20 milliGRAM(s) Oral daily  magnesium oxide 400 milliGRAM(s) Oral three times a day with meals  polyethylene glycol 3350 17 Gram(s) Oral daily  senna 2 Tablet(s) Oral at bedtime  simvastatin 40 milliGRAM(s) Oral at bedtime  traZODone 25 milliGRAM(s) Oral at bedtime  vancomycin  IVPB 1250 milliGRAM(s) IV Intermittent every 12 hours    MEDICATIONS  (PRN):  acetaminophen     Tablet .. 650 milliGRAM(s) Oral every 6 hours PRN Temp greater or equal to 38C (100.4F), Mild Pain (1 - 3)  albuterol   0.5% 2.5 milliGRAM(s) Nebulizer every 6 hours PRN Shortness of Breath  ALPRAZolam 0.25 milliGRAM(s) Oral daily PRN anxiety  oxyCODONE    IR 2.5 milliGRAM(s) Oral once PRN Severe Pain (7 - 10)      Consultant(s) Notes Reviewed:  [X] YES  [ ] NO  Care Discussed with Other Providers [X] YES  [ ] NO  Imaging Personally Reviewed:  [X] YES  [ ] NO      LABS:                        9.4    26.45 )-----------( 290      ( 30 Mar 2023 06:30 )             30.7     03-30    132<L>  |  96<L>  |  13  ----------------------------<  113<H>  4.4   |  26  |  0.56    Ca    8.7      30 Mar 2023 06:30  Phos  2.3     03-30  Mg     2.10     03-30      PT/INR - ( 29 Mar 2023 07:00 )   PT: 18.1 sec;   INR: 1.55 ratio         PTT - ( 29 Mar 2023 07:00 )  PTT:29.3 sec

## 2023-03-30 NOTE — PROGRESS NOTE ADULT - SUBJECTIVE AND OBJECTIVE BOX
Name of Patient : ARIK DUMONT  MRN: [id number]  Date of visit: 03-30-23       Subjective: Patient seen and examined. No new events except as noted.   doing okay     REVIEW OF SYSTEMS:    CONSTITUTIONAL: No weakness, fevers or chills  EYES/ENT: No visual changes;  No vertigo or throat pain   NECK: No pain or stiffness  RESPIRATORY: No cough, wheezing, hemoptysis; No shortness of breath  CARDIOVASCULAR: No chest pain or palpitations  GASTROINTESTINAL: No abdominal or epigastric pain. No nausea, vomiting, or hematemesis; No diarrhea or constipation. No melena or hematochezia.  GENITOURINARY: No dysuria, frequency or hematuria  NEUROLOGICAL: No numbness or weakness  SKIN: No itching, burning, rashes, or lesions   All other review of systems is negative unless indicated above.    MEDICATIONS:  MEDICATIONS  (STANDING):  atenolol  Tablet 12.5 milliGRAM(s) Oral daily  cefepime   IVPB      cefepime   IVPB 2000 milliGRAM(s) IV Intermittent every 8 hours  dextrose 5%. 1000 milliLiter(s) (50 mL/Hr) IV Continuous <Continuous>  enoxaparin Injectable 40 milliGRAM(s) SubCutaneous every 24 hours  escitalopram 10 milliGRAM(s) Oral daily  famotidine    Tablet 20 milliGRAM(s) Oral daily  magnesium oxide 400 milliGRAM(s) Oral three times a day with meals  polyethylene glycol 3350 17 Gram(s) Oral daily  senna 2 Tablet(s) Oral at bedtime  simvastatin 40 milliGRAM(s) Oral at bedtime  traZODone 25 milliGRAM(s) Oral at bedtime      PHYSICAL EXAM:  T(C): 36.7 (03-30-23 @ 17:35), Max: 37.1 (03-29-23 @ 22:33)  HR: 90 (03-30-23 @ 17:35) (77 - 90)  BP: 132/79 (03-30-23 @ 17:35) (101/65 - 132/79)  RR: 18 (03-30-23 @ 17:35) (18 - 18)  SpO2: 96% (03-30-23 @ 17:35) (96% - 98%)  Wt(kg): --  I&O's Summary    29 Mar 2023 07:01  -  30 Mar 2023 07:00  --------------------------------------------------------  IN: 150 mL / OUT: 570 mL / NET: -420 mL    30 Mar 2023 07:01  -  30 Mar 2023 22:18  --------------------------------------------------------  IN: 0 mL / OUT: 0 mL / NET: 0 mL          Appearance: Normal	  HEENT:  PERRLA   Lymphatic: No lymphadenopathy   Cardiovascular: Normal S1 S2, no JVD  Respiratory: normal effort , clear  Gastrointestinal:  Soft, Non-tender  Skin: No rashes,  warm to touch  Psychiatry:  Mood & affect appropriate  Musculuskeletal: No edema    recent labs, Imaging and EKGs personally reviewed     03-29-23 @ 07:01  -  03-30-23 @ 07:00  --------------------------------------------------------  IN: 150 mL / OUT: 570 mL / NET: -420 mL    03-30-23 @ 07:01  -  03-30-23 @ 22:18  --------------------------------------------------------  IN: 0 mL / OUT: 0 mL / NET: 0 mL                          9.4    26.45 )-----------( 290      ( 30 Mar 2023 06:30 )             30.7               03-30    132<L>  |  96<L>  |  13  ----------------------------<  113<H>  4.4   |  26  |  0.56    Ca    8.7      30 Mar 2023 06:30  Phos  2.3     03-30  Mg     2.10     03-30      PT/INR - ( 29 Mar 2023 07:00 )   PT: 18.1 sec;   INR: 1.55 ratio         PTT - ( 29 Mar 2023 07:00 )  PTT:29.3 sec

## 2023-03-30 NOTE — CHART NOTE - NSCHARTNOTEFT_GEN_A_CORE
Notified by RN urine culture is +. Sensitvity was reviewed , patient is currently asymptomatic however sensitivity shows ESBL E.coli is resistant to cefepime. Discussed with  , as patient is asymptomatic currently will hold off abx and will follow up with ID tomorrow if to re-start abx.

## 2023-03-30 NOTE — PROGRESS NOTE ADULT - ASSESSMENT
chronic persistent afib  HR stable   cont low dose atenolol   as per CTS pt is not a good candidate for a/c due to high risk of hemothorax     pleural effusion   improving   fu with CTS

## 2023-03-30 NOTE — PROGRESS NOTE ADULT - PROBLEM SELECTOR PLAN 2
acute onset  no fever, not toxic looking  Zarxio given by hematology,   Infectious W/U negative  D/C antibtioics

## 2023-03-30 NOTE — CONSULT NOTE ADULT - SUBJECTIVE AND OBJECTIVE BOX
HPI:  82 m with HTN, AFIB, urinary retention (chronic plummer), recent hospitalization 3/15-3/124 for SOB found to have R large plerual effusion with mediastinal and hilar LAD, s/p tap with negative cultures and R supraclavicular lymph node/mass biopsy showed Hodgkin's lymphoma s/p R thoracotomy, VATS, pleurex and mediport, started on C1 Brentuximab vedotin (BV) 3/10/23 and discharged to rehab and was doing well suddenly but the pleurx was not draining and pt was brought back with SOB, tachypnea, placed on NRB  pleurx was connected to vac and then flushed and 2180 cc drained  initial WBC: 3-6  s/p filgrastim 3/27 and 3/28 now WBC 26  pt afebrile since admission  chest/abd/pelvis CT 3/29 with loculated R hemothorax and pneumo, pleural thickening s/o malingancy  u/a positive but pt has a chronic plummer and no suprapubic or flank pain        PAST MEDICAL & SURGICAL HISTORY:  Atrial fibrillation      Hypertension      Urinary retention      No significant past surgical history          Allergies    pertussis vaccines (Rash)  shellfish (Rash)    Intolerances        ANTIMICROBIALS:  cefepime   IVPB    cefepime   IVPB 2000 every 8 hours  vancomycin  IVPB 1250 every 12 hours      OTHER MEDS:  acetaminophen     Tablet .. 650 milliGRAM(s) Oral every 6 hours PRN  albuterol   0.5% 2.5 milliGRAM(s) Nebulizer every 6 hours PRN  ALPRAZolam 0.25 milliGRAM(s) Oral daily PRN  atenolol  Tablet 12.5 milliGRAM(s) Oral daily  dextrose 5%. 1000 milliLiter(s) IV Continuous <Continuous>  escitalopram 10 milliGRAM(s) Oral daily  famotidine    Tablet 20 milliGRAM(s) Oral daily  magnesium oxide 400 milliGRAM(s) Oral three times a day with meals  oxyCODONE    IR 2.5 milliGRAM(s) Oral once PRN  polyethylene glycol 3350 17 Gram(s) Oral daily  senna 2 Tablet(s) Oral at bedtime  simvastatin 40 milliGRAM(s) Oral at bedtime  traZODone 25 milliGRAM(s) Oral at bedtime      SOCIAL HISTORY:  , lives with family, no pets at home, former smoker  no  alcohol or drug abuse  no recent travel    FAMILY HISTORY:  no recent febrile illness in family members    ROS:    All other systems negative     Constitutional: no fever, no chills  Eye: no eye pain, no redness, no vision changes  ENT:  no sore throat, no rhinorrhea  Cardiovascular:  no chest pain, no palpitation  Respiratory:  improved SOB, no cough  GI:  no abd pain, no vomiting, no diarrhea  urinary:  no hematuria, no flank pain  : no penile discharge or bleeding  musculoskeletal:  no joint swelling  skin:  no rash  neurology:  no headache, no seizure, no change in mental status  psych: no anxiety, no depression     Physical Exam:    General:    NAD, non toxic  Head: atraumatic, normocephalic  Eyes: normal sclera and conjunctiva  ENT:   no oropharyngeal lesions, no LAD, neck supple  Cardio:   irregular S1,S2  Respiratory:   R chest tube, tachypnea  abd:   soft, BS +, not tender  :     no CVAT, no suprapubic tenderness, + plummer  Musculoskeletal : no joint swelling  Skin:    no rash  vascular: L chest mediport with no tenderness or erythema  Neurologic:     no focal deficits  psych: normal affect      Drug Dosing Weight  Height (cm): 177.8 (26 Mar 2023 06:22)  Weight (kg): 84.6 (26 Mar 2023 06:22)  BMI (kg/m2): 26.8 (26 Mar 2023 06:22)  BSA (m2): 2.03 (26 Mar 2023 06:22)    Vital Signs Last 24 Hrs  T(F): 98.1 (03-30-23 @ 13:25), Max: 98.7 (03-29-23 @ 22:33)    Vital Signs Last 24 Hrs  HR: 85 (03-30-23 @ 13:25) (77 - 89)  BP: 105/66 (03-30-23 @ 13:25) (101/65 - 114/63)  RR: 18 (03-30-23 @ 13:25)  SpO2: 97% (03-30-23 @ 13:25) (96% - 98%)  Wt(kg): --                          9.4    26.45 )-----------( 290      ( 30 Mar 2023 06:30 )             30.7       03-30    132<L>  |  96<L>  |  13  ----------------------------<  113<H>  4.4   |  26  |  0.56    Ca    8.7      30 Mar 2023 06:30  Phos  2.3     03-30  Mg     2.10     03-30            MICROBIOLOGY:  v  Clean Catch Clean Catch (Midstream)  03-28-23   50,000 - 99,000 CFU/mL Escherichia coli  --  --      .Blood Blood-Peripheral  03-27-23   No growth to date.  --  --      .Blood Blood-Peripheral  03-27-23   No growth to date.  --  --      .Blood Blood-Peripheral  03-24-23   No Growth Final  --  --      .Blood Blood-Peripheral  03-24-23   No Growth Final  --  --      .Tissue (1) level lymph node  03-03-23   No growth at 5 days  --    No polymorphonuclear leukocytes seen per low power field  No organisms seen per oil power field                  RADIOLOGY:    Images independently visualized and reviewed personally,  findings as below    < from: CT Abdomen and Pelvis w/ Oral Cont and w/ IV Cont (03.29.23 @ 18:34) >  IMPRESSION:    *  Right pleural chest tube in place. Loculated right hemothorax,  decreased from the prior chest CT. Trace right pneumothorax, likely   related to the presence of the chest tube.  *  Redemonstrated hyperattenuation centered along the right pleural space   and along the mediastinum, which could reflect residual loculated   hemothorax but is again questioned to represent neoplastic pleural   thickening. Continued follow-up to ensure resolution is recommended.  *  Peripheral reticular opacities and scattered nodular opacities in the   bilateral lungs, which appear similar to prior imaging and may be   infectious/inflammatory or neoplastic in etiology. Continued follow-up is   recommended.  *  Intermittent opacification of the right lower and middle lobe airways,   which may reflect mucoid impaction.  *  Mediastinal and hilar lymphadenopathy, overall similar to mildly   decreased in size since 3/10/2023.  *  Perivesicular fat stranding. Cystitis is considered. Recommend   correlation with urinalysis.  *  Unchanged penetrating ulcer of infrarenal abdominalaorta.    < end of copied text >

## 2023-03-30 NOTE — PROGRESS NOTE ADULT - SUBJECTIVE AND OBJECTIVE BOX
Patient is a 82y old  Male who presents with a chief complaint of pl effusion (27 Mar 2023 10:54)      Interval history: chest tube draining. patient sitting in recliner.       REVIEW OF SYSTEMS  weakness    PAST MEDICAL & SURGICAL HISTORY  No pertinent past medical history    Atrial fibrillation    Hypertension    Urinary retention    No significant past surgical history     CURRENT FUNCTIONAL STATUS  follow up pending     3/27   Bed Mobility: Supine to Sit:     · Level of L[place]	unable to perform; Patient seen sitting in chair    Transfer: Sit to Stand:     · Level of Loving	moderate assist (50% patients effort)  · Physical Assist/Nonphysical Assist	1 person assist; verbal cues  · Assistive Device	rolling walker    Transfer: Stand to Sit:     · Level of Loving	minimum assist (75% patients effort)  · Physical Assist/Nonphysical Assist	1 person assist; verbal cues  · Assistive Device	rolling walker    Gait Skills:     · Level of Loving	unable to perform; due to patient displays fatigue and shortness of breath despite  4 liters oxygen via nasal cannula        RECENT LABS/IMAGING  CBC Full  -  ( 30 Mar 2023 06:30 )  WBC Count : 26.45 K/uL  RBC Count : 3.28 M/uL  Hemoglobin : 9.4 g/dL  Hematocrit : 30.7 %  Platelet Count - Automated : 290 K/uL  Mean Cell Volume : 93.6 fL  Mean Cell Hemoglobin : 28.7 pg  Mean Cell Hemoglobin Concentration : 30.6 gm/dL  Auto Neutrophil # : x  Auto Lymphocyte # : x  Auto Monocyte # : x  Auto Eosinophil # : x  Auto Basophil # : x  Auto Neutrophil % : x  Auto Lymphocyte % : x  Auto Monocyte % : x  Auto Eosinophil % : x  Auto Basophil % : x    03-30    132<L>  |  96<L>  |  13  ----------------------------<  113<H>  4.4   |  26  |  0.56    Ca    8.7      30 Mar 2023 06:30  Phos  2.3     03-30  Mg     2.10     03-30          VITALS  T(C): 36.6 (03-30-23 @ 09:25), Max: 37.1 (03-29-23 @ 22:33)  HR: 82 (03-30-23 @ 09:25) (77 - 89)  BP: 101/65 (03-30-23 @ 09:25) (100/53 - 114/63)  RR: 18 (03-30-23 @ 09:25) (18 - 18)  SpO2: 97% (03-30-23 @ 09:25) (96% - 100%)  Wt(kg): --    ALLERGIES  pertussis vaccines (Rash)  shellfish (Rash)      MEDICATIONS   acetaminophen     Tablet .. 650 milliGRAM(s) Oral every 6 hours PRN  albuterol   0.5% 2.5 milliGRAM(s) Nebulizer every 6 hours PRN  ALPRAZolam 0.25 milliGRAM(s) Oral daily PRN  atenolol  Tablet 12.5 milliGRAM(s) Oral daily  cefepime   IVPB      cefepime   IVPB 2000 milliGRAM(s) IV Intermittent every 8 hours  dextrose 5%. 1000 milliLiter(s) IV Continuous <Continuous>  escitalopram 10 milliGRAM(s) Oral daily  famotidine    Tablet 20 milliGRAM(s) Oral daily  magnesium oxide 400 milliGRAM(s) Oral three times a day with meals  oxyCODONE    IR 2.5 milliGRAM(s) Oral once PRN  polyethylene glycol 3350 17 Gram(s) Oral daily  senna 2 Tablet(s) Oral at bedtime  simvastatin 40 milliGRAM(s) Oral at bedtime  traZODone 25 milliGRAM(s) Oral at bedtime  vancomycin  IVPB 1250 milliGRAM(s) IV Intermittent every 12 hours      ----------------------------------------------------------------------------------------  PHYSICAL EXAM  Constitutional - NAD, appears fatigued but alert  HEENT - NCAT, EOMI   + nasal cannula  Chest - + pleurex draining  Cardiovascular - RRR, S1S2   Abdomen -  Soft, NTND  Extremities - No C/C/E, No calf tenderness   Neurologic Exam -                    Cognitive - Awake, Alert      Communication - Fluent, No dysarthria     Cranial Nerves - CN 2-12 intact     Motor -                      LEFT    UE - 4/5                    RIGHT UE - 4/5                    LEFT    LE -  4/5                    RIGHT LE - 4/5        Sensory - Intact to LT      Balance - WNL Static  Psychiatric - Mood stable, Affect WNL  ----------------------------------------------------------------------------------------  ASSESSMENT/PLAN   81 yo m with hodgkin lymphoma admitted from acute rehab, with pleural effusions and SOB  cefepime  diet: regular  plummer  continue bedside PT  please request OT  DVT PPX - ac held   Rehab -will monitor for improvement. If still requires assistance for ambulation may require subacute rehab.

## 2023-03-30 NOTE — PROGRESS NOTE ADULT - ASSESSMENT
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 Patient is poor historian, history obtained by son Francisco at bedside. States that  patient has been recovering wellat  rehab. States patient was able to walk over 150 ft while at rehab. States that suddenly on Friday patient started to develop acute SOB and increased WOB. Imaging was done at  rehab showing worsening pleural effusions EMS was called and patient was noted to be tachypneic to 30s and placed on 10L NRB.    Recently admitted from 3/15-3/24 for worsening SOB was found to have  significant for a large R pleural effusion with mediastinal and hilar lymphadenopathy, and 3L of fluid was drained. Pleural fluid cultures and blood cultures resulted negative.  Suspicious of underlying malignancy secondary to lymphadenopathy and persistent leukocytosis, Heme/Onc was consulted and recommended transfer to [facility] on 2/24/23 for further workup. On 2/28 he underwent an ultrasound guided biopsy of his R supraclavicular mass/lymph node, which later resulted as classic Hodgkin's lymphoma.  On 3/3, he underwent a R thoracotomy/VATS with Pleurex placement. Left chest wall mediport was placed and received  first cycle of chemotherapy on 3/10. (25 Mar 2023 16:44)    no wpulm called:  pt has chest tube on rigth side:  has bloody secretions  he looks comfortable:  no sob :      right sided pl effusion:  A fibrillation:   HTN:  Urinary retention     3/27:  right sided pl effusion:  [id number]:  blood pl fluid  ; on ac;  cts surgery to see:  chest xray today looks pretty good to me: not hypercarbic and acidotic on VBG   A fibrillation: off ac at t his time  HTN:  blood pressure is controlled  Urinary retention  ; has chr plummer: :    marah pmd    3/28:    right sided pl effusion:  has lymphoma:  blood pl fluid  ; on ac;  cts surgery to see:  chest xray today looks pretty good to me: not hypercarbic and acidotic on VBG   A fibrillation: off ac at t his time: no tpa done as the pl fluid is very blood:   HTN:  blood pressure is controlled  Urinary retention  ; has chr plummer: :    marah pmd      3/29:    right sided pl effusion:  has lymphoma:  blood pl fluid  ; on ac;  cts surgery to see:  chest xray today looks pretty good to me: not hypercarbic and acidotic on VBG : his wbc is increased today  : ? etiology : ct scan chest awaited:  check procal as well as monitor for fever:   A fibrillation: off ac at t his time: no tpa done as the pl fluid is very blood:   HTN:  blood pressure is controlled  Urinary retention  ; has chr plummer: :    marah pmd      3/30:    right sided pl effusion:  has lymphoma:  blood pl fluid  ; off ac;  rpt ct chest noted from last night:  has mild hemothorax and pneumothorax too ; with alexey effusion; His WBC is high  but he has no fever: Slight jump in his procal : but he doesnto look septic to me ? could it be reactive:  he does have underlying lymphoma:   A fibrillation: off ac at t his time: no tpa done as the pl fluid is very bloody - and now he will be off ac:    HTN:  blood pressure is controlled  Urinary retention  ; has chr plummer: :    marah cts

## 2023-03-30 NOTE — PROGRESS NOTE ADULT - SUBJECTIVE AND OBJECTIVE BOX
Date of Service: 03-30-23 @ 10:15    Patient is a 82y old  Male who presents with a chief complaint of pl effusion (27 Mar 2023 10:54)      Any change in ROS: seems OK: no sob:  noc ugh: sitting in chair:  no apparent resp distress   talks with me:  saying he has no resp issues:     MEDICATIONS  (STANDING):  atenolol  Tablet 12.5 milliGRAM(s) Oral daily  cefepime   IVPB      cefepime   IVPB 2000 milliGRAM(s) IV Intermittent every 8 hours  dextrose 5%. 1000 milliLiter(s) (50 mL/Hr) IV Continuous <Continuous>  escitalopram 10 milliGRAM(s) Oral daily  famotidine    Tablet 20 milliGRAM(s) Oral daily  magnesium oxide 400 milliGRAM(s) Oral three times a day with meals  polyethylene glycol 3350 17 Gram(s) Oral daily  senna 2 Tablet(s) Oral at bedtime  simvastatin 40 milliGRAM(s) Oral at bedtime  traZODone 25 milliGRAM(s) Oral at bedtime  vancomycin  IVPB 1250 milliGRAM(s) IV Intermittent every 12 hours    MEDICATIONS  (PRN):  acetaminophen     Tablet .. 650 milliGRAM(s) Oral every 6 hours PRN Temp greater or equal to 38C (100.4F), Mild Pain (1 - 3)  albuterol   0.5% 2.5 milliGRAM(s) Nebulizer every 6 hours PRN Shortness of Breath  ALPRAZolam 0.25 milliGRAM(s) Oral daily PRN anxiety  oxyCODONE    IR 2.5 milliGRAM(s) Oral once PRN Severe Pain (7 - 10)    Vital Signs Last 24 Hrs  T(C): 36.6 (30 Mar 2023 09:25), Max: 37.1 (29 Mar 2023 22:33)  T(F): 97.9 (30 Mar 2023 09:25), Max: 98.7 (29 Mar 2023 22:33)  HR: 82 (30 Mar 2023 09:25) (77 - 89)  BP: 101/65 (30 Mar 2023 09:25) (100/53 - 114/63)  BP(mean): --  RR: 18 (30 Mar 2023 09:25) (18 - 19)  SpO2: 97% (30 Mar 2023 09:25) (95% - 100%)    Parameters below as of 30 Mar 2023 09:25  Patient On (Oxygen Delivery Method): nasal cannula  O2 Flow (L/min): 4      I&O's Summary    29 Mar 2023 07:01  -  30 Mar 2023 07:00  --------------------------------------------------------  IN: 150 mL / OUT: 570 mL / NET: -420 mL          Physical Exam:   GENERAL: NAD, well-groomed, well-developed  HEENT: CHUCK/   Atraumatic, Normocephalic  ENMT: No tonsillar erythema, exudates, or enlargement; Moist mucous membranes, Good dentition, No lesions  NECK: Supple, No JVD, Normal thyroid  CHEST/LUNG: decreased air entery right base and cracekls+  CVS: Regular rate and rhythm; No murmurs, rubs, or gallops  GI: : Soft, Nontender, Nondistended; Bowel sounds present  NERVOUS SYSTEM:  Alert & awake and responsive to questions  EXTREMITIES:  mild  edema  LYMPH: No lymphadenopathy noted  SKIN: No rashes or lesions  ENDOCRINOLOGY: No Thyromegaly  PSYCH: Appropriate    Labs:  30                            9.4    26.45 )-----------( 290      ( 30 Mar 2023 06:30 )             30.7                         9.9    26.50 )-----------( 367      ( 29 Mar 2023 12:01 )             32.5                         9.5    20.66 )-----------( 298      ( 29 Mar 2023 07:00 )             30.6                         9.0    6.60  )-----------( 351      ( 28 Mar 2023 05:35 )             29.1                         8.3    4.80  )-----------( 357      ( 27 Mar 2023 06:15 )             27.3     03-30    132<L>  |  96<L>  |  13  ----------------------------<  113<H>  4.4   |  26  |  0.56  03-29    130<L>  |  96<L>  |  11  ----------------------------<  109<H>  4.2   |  27  |  0.55  03-28    133<L>  |  96<L>  |  8   ----------------------------<  101<H>  3.9   |  27  |  0.54  03-27    134<L>  |  95<L>  |  9   ----------------------------<  101<H>  4.0   |  28  |  0.53    Ca    8.7      30 Mar 2023 06:30  Ca    8.4      29 Mar 2023 07:00  Phos  2.3     03-30  Phos  2.3     03-29  Mg     2.10     03-30  Mg     1.90     03-29      CAPILLARY BLOOD GLUCOSE      POCT Blood Glucose.: 123 mg/dL (29 Mar 2023 11:52)        PT/INR - ( 29 Mar 2023 07:00 )   PT: 18.1 sec;   INR: 1.55 ratio         PTT - ( 29 Mar 2023 07:00 )  PTT:29.3 sec    D-Dimer Assay, Quantitative: 199 ng/mL DDU (03-24 @ 15:45)  Procalcitonin, Serum: 0.26 ng/mL (03-29 @ 15:26)  Procalcitonin, Serum: 0.14 ng/mL (03-27 @ 19:30)        RECENT CULTURES:  03-28 @ 12:40 Clean Catch Clean Catch (Midstream)                50,000 - 99,000 CFU/mL Escherichia coli    03-27 @ 19:45 .Blood Blood-Peripheral     rad< from: CT Abdomen and Pelvis w/ Oral Cont and w/ IV Cont (03.29.23 @ 18:34) >    PROCEDURE DATE:  03/29/2023    ******PRELIMINARY REPORT******      ******PRELIMINARY REPORT******           INTERPRETATION:  Compared to prior CT chest 3/24/23, there are small foci   of air in the right pleural effusion. The pleural effusion contains areas   of high attenutation and likely also represents hemopneumothorax. It is   similar in size compared to prior study 3/23/23 and is loculated.    Mild bilateral hydronephrosis.        ******PRELIMINARY REPORT******      ******PRELIMINARY REPORT******       YESSENIA BEDOYA MD; Resident Radiologist  This document is a PRELIMINARY interpretation and is pending final   attending approval. Mar 29 2023  8:45PM    < end of copied text >             No growth to date.    03-27 @ 19:30 .Blood Blood-Peripheral                No growth to date.    03-24 @ 16:10 .Blood Blood-Peripheral                No Growth Final    03-24 @ 16:05 .Blood Blood-Peripheral                No Growth Final          RESPIRATORY CULTURES:          Studies  Chest X-RAY  CT SCAN Chest   Venous Dopplers: LE:   CT Abdomen  Others

## 2023-03-30 NOTE — CONSULT NOTE ADULT - ASSESSMENT
82 m with HTN, AFIB, urinary retention (chronic plummer), recent hospitalization 3/15-3/124 for SOB found to have R large plerual effusion with mediastinal and hilar LAD, s/p tap with negative cultures and R supraclavicular lymph node/mass biopsy showed Hodgkin's lymphoma s/p R thoracotomy, VATS, pleurex and mediport, started on C1 Brentuximab vedotin (BV) 3/10/23 and discharged to rehab and was doing well suddenly but the pleurx was not draining and pt was brought back with SOB, tachypnea, placed on NRB  pleurx was connected to vac and then flushed and 2180 cc drained  initial WBC: 3-6  s/p filgrastim 3/27 and 3/28 now WBC 26  pt afebrile since admission  chest/abd/pelvis CT 3/29 with loculated R hemothorax and pneumo, pleural thickening s/o malingancy  u/a positive but pt has a chronic plummer and no suprapubic or flank pain    respiratory failure due to malignant R pleural effusion due to Hodgkin's lymphoma started on chemo 3/10, pleurX was not draining so pt brought in  CT s/o loculated hemothorax and pneumo with pleural thickening due to malignancy  leukocytosis is due to filgrastim, pt had no WBC before it was given  positive u/a and urine cx with 50 K E-coli likely colonization     * on cefepime since 3/27 now day 4 and vanco was added 3/29 but no evidence of infection, leukocytosis is due to zarxio, the pleural effusion is malignancy and the urine is colonization, would discontinue antibiotics  * monitor CBC/diff  * f/u with hem/onc and thoracic surgery    The above assessment and plan was discussed with the primary team    Selin Stark MD  contact on teams  After 5pm and on weekends call [phone]

## 2023-03-30 NOTE — PROGRESS NOTE ADULT - ASSESSMENT
ARIK DUMONT is a 82y Male with a past medical history as described above who presented for evaluation of shortness of breath. Presents from  [facility]. CXR revealed increased pleural effusions, likely loculated. Thoracics following, PleurX to waterseal.     # Classical Hodgkin Lymphoma   - S/P Cycle 1 Brentuximab vedotin (BV) 3/10/23  - C2D1 due this Friday 3/31, but given current state and increased lethargy, likely will hold off    - CTS note from yesterday states to continue holding AC - may need to be off for long term   - Thoracic surgery to cap the PleurX when ready for discharge   - Blood culture NGTD, on cefepime and vanco empirically.   - Recieved one dose of Zarxio; ANC much improved  - Family updated daily   - CXR daily   - CBC with diff daily   - Thoracic input appreciated, no procedure for now, monitoring serosanguinous output      Del Green MD, PGY-4  Hematology/Medical Oncology Fellow  Pager: [phone]  Available on [employer] Teams  After 5pm or on weekends please contact  to page on-call fellow        ARIK DUMONT is a 82y Male with a past medical history as described above who presented for evaluation of shortness of breath. Presents from  Rehab Center. CXR revealed increased pleural effusions, likely loculated. Thoracics following, PleurX to waterseal.     # Classical Hodgkin Lymphoma   - S/P Cycle 1 Brentuximab vedotin (BV) 3/10/23  - C2D1 due this Friday 3/31, but given current state and increased lethargy, likely will hold off    - CTS note from yesterday states to continue holding AC - may need to be off for long term   - Thoracic surgery to cap the PleurX when ready for discharge   - Blood culture NGTD, on cefepime and vanco empirically.   - Recieved one dose of Zarxio; ANC much improved  - Family updated daily   - CXR daily   - CBC with diff daily   - Thoracic input appreciated, no procedure for now, monitoring serosanguinous output  - If patient able to be discarged home with rehab, would prefer to administer immunotherapy in the outpatient setting, but if plan is for [facility] and he is inpatient, with acceptable counts and freedom from infection - can give inpatient.     # Leukocytosis   - Zarxio effect       Del Green MD, PGY-4  Hematology/Medical Oncology Fellow  Pager: [phone]  Available on [employer] Teams  After 5pm or on weekends please contact  to page on-call fellow        ARIK DUMONT is a 82y Male with a past medical history as described above who presented for evaluation of shortness of breath. Presents from  Rehab Center. CXR revealed increased pleural effusions, likely loculated. Thoracics following, PleurX to waterseal.     # Classical Hodgkin Lymphoma   - S/P Cycle 1 Brentuximab vedotin (BV) 3/10/23  - C2D1 due this Friday 3/31, but given current state and increased lethargy, likely will hold off    - CTS note from yesterday states to continue holding AC - may need to be off for long term   - Thoracic surgery to cap the PleurX when ready for discharge   - Blood culture NGTD, on cefepime and vanco empirically.   - Received one dose of Zarxio; ANC much improved  - Family updated daily   - CXR daily   - CBC with diff daily   - Thoracic input appreciated, no procedure for now, monitoring serosanguinous output  - If patient able to be discharged home with rehab, would prefer to administer immunotherapy in the outpatient setting, but if plan is for [facility] and he is inpatient, with acceptable counts and freedom from infection - can give inpatient.     # Leukocytosis   - Zarxio effect       Del Green MD, PGY-4  Hematology/Medical Oncology Fellow  Pager: [phone]  Available on Microsoft Teams  After 5pm or on weekends please contact  to page on-call fellow

## 2023-03-30 NOTE — CHART NOTE - NSCHARTNOTEFT_GEN_A_CORE
Discussed with Dr. Connor will start dvt ppx lovenox 40 qd.   Discussed with patient's daughter and son bedside.

## 2023-03-31 LAB
ANION GAP SERPL CALC-SCNC: 5 MMOL/L — LOW (ref 7–14)
BUN SERPL-MCNC: 13 MG/DL — SIGNIFICANT CHANGE UP (ref 7–23)
CALCIUM SERPL-MCNC: 8.5 MG/DL — SIGNIFICANT CHANGE UP (ref 8.4–10.5)
CHLORIDE SERPL-SCNC: 97 MMOL/L — LOW (ref 98–107)
CO2 SERPL-SCNC: 27 MMOL/L — SIGNIFICANT CHANGE UP (ref 22–31)
CREAT SERPL-MCNC: 0.58 MG/DL — SIGNIFICANT CHANGE UP (ref 0.5–1.3)
EGFR: 97 ML/MIN/1.73M2 — SIGNIFICANT CHANGE UP
GLUCOSE SERPL-MCNC: 97 MG/DL — SIGNIFICANT CHANGE UP (ref 70–99)
HCT VFR BLD CALC: 31.3 % — LOW (ref 39–50)
HGB BLD-MCNC: 9.4 G/DL — LOW (ref 13–17)
MAGNESIUM SERPL-MCNC: 1.9 MG/DL — SIGNIFICANT CHANGE UP (ref 1.6–2.6)
MCHC RBC-ENTMCNC: 28.7 PG — SIGNIFICANT CHANGE UP (ref 27–34)
MCHC RBC-ENTMCNC: 30 GM/DL — LOW (ref 32–36)
MCV RBC AUTO: 95.4 FL — SIGNIFICANT CHANGE UP (ref 80–100)
NRBC # BLD: 0 /100 WBCS — SIGNIFICANT CHANGE UP (ref 0–0)
NRBC # FLD: 0.02 K/UL — HIGH (ref 0–0)
PHOSPHATE SERPL-MCNC: 2 MG/DL — LOW (ref 2.5–4.5)
PLATELET # BLD AUTO: 247 K/UL — SIGNIFICANT CHANGE UP (ref 150–400)
POTASSIUM SERPL-MCNC: 4.3 MMOL/L — SIGNIFICANT CHANGE UP (ref 3.5–5.3)
POTASSIUM SERPL-SCNC: 4.3 MMOL/L — SIGNIFICANT CHANGE UP (ref 3.5–5.3)
RBC # BLD: 3.28 M/UL — LOW (ref 4.2–5.8)
RBC # FLD: 20.6 % — HIGH (ref 10.3–14.5)
SODIUM SERPL-SCNC: 129 MMOL/L — LOW (ref 135–145)
WBC # BLD: 20.2 K/UL — HIGH (ref 3.8–10.5)
WBC # FLD AUTO: 20.2 K/UL — HIGH (ref 3.8–10.5)

## 2023-03-31 PROCEDURE — 99233 SBSQ HOSP IP/OBS HIGH 50: CPT | Mod: GC

## 2023-03-31 PROCEDURE — 99232 SBSQ HOSP IP/OBS MODERATE 35: CPT

## 2023-03-31 RX ORDER — SODIUM,POTASSIUM PHOSPHATES 278-250MG
1 POWDER IN PACKET (EA) ORAL ONCE
Refills: 0 | Status: COMPLETED | OUTPATIENT
Start: 2023-03-31 | End: 2023-03-31

## 2023-03-31 RX ORDER — ERTAPENEM SODIUM 1 G/1
1000 INJECTION, POWDER, LYOPHILIZED, FOR SOLUTION INTRAMUSCULAR; INTRAVENOUS EVERY 24 HOURS
Refills: 0 | Status: COMPLETED | OUTPATIENT
Start: 2023-03-31 | End: 2023-04-04

## 2023-03-31 RX ADMIN — FAMOTIDINE 20 MILLIGRAM(S): 10 INJECTION INTRAVENOUS at 12:38

## 2023-03-31 RX ADMIN — ESCITALOPRAM OXALATE 10 MILLIGRAM(S): 10 TABLET, FILM COATED ORAL at 12:37

## 2023-03-31 RX ADMIN — MAGNESIUM OXIDE 400 MG ORAL TABLET 400 MILLIGRAM(S): 241.3 TABLET ORAL at 13:02

## 2023-03-31 RX ADMIN — ATENOLOL 12.5 MILLIGRAM(S): 25 TABLET ORAL at 05:30

## 2023-03-31 RX ADMIN — SIMVASTATIN 40 MILLIGRAM(S): 20 TABLET, FILM COATED ORAL at 21:33

## 2023-03-31 RX ADMIN — ENOXAPARIN SODIUM 40 MILLIGRAM(S): 100 INJECTION SUBCUTANEOUS at 21:34

## 2023-03-31 RX ADMIN — ERTAPENEM SODIUM 120 MILLIGRAM(S): 1 INJECTION, POWDER, LYOPHILIZED, FOR SOLUTION INTRAMUSCULAR; INTRAVENOUS at 17:54

## 2023-03-31 RX ADMIN — SENNA PLUS 2 TABLET(S): 8.6 TABLET ORAL at 21:33

## 2023-03-31 RX ADMIN — MAGNESIUM OXIDE 400 MG ORAL TABLET 400 MILLIGRAM(S): 241.3 TABLET ORAL at 17:54

## 2023-03-31 RX ADMIN — CEFEPIME 100 MILLIGRAM(S): 1 INJECTION, POWDER, FOR SOLUTION INTRAMUSCULAR; INTRAVENOUS at 06:20

## 2023-03-31 RX ADMIN — Medication 1 PACKET(S): at 09:21

## 2023-03-31 RX ADMIN — Medication 25 MILLIGRAM(S): at 21:34

## 2023-03-31 RX ADMIN — MAGNESIUM OXIDE 400 MG ORAL TABLET 400 MILLIGRAM(S): 241.3 TABLET ORAL at 09:20

## 2023-03-31 NOTE — PROGRESS NOTE ADULT - SUBJECTIVE AND OBJECTIVE BOX
DATE OF SERVICE: 03-31-23 @ 10:24    Subjective: Patient seen and examined. No new events except as noted.     SUBJECTIVE/ROS:  nad      MEDICATIONS:  MEDICATIONS  (STANDING):  atenolol  Tablet 12.5 milliGRAM(s) Oral daily  dextrose 5%. 1000 milliLiter(s) (50 mL/Hr) IV Continuous <Continuous>  enoxaparin Injectable 40 milliGRAM(s) SubCutaneous every 24 hours  ertapenem  IVPB 1000 milliGRAM(s) IV Intermittent every 24 hours  escitalopram 10 milliGRAM(s) Oral daily  famotidine    Tablet 20 milliGRAM(s) Oral daily  magnesium oxide 400 milliGRAM(s) Oral three times a day with meals  polyethylene glycol 3350 17 Gram(s) Oral daily  senna 2 Tablet(s) Oral at bedtime  simvastatin 40 milliGRAM(s) Oral at bedtime  traZODone 25 milliGRAM(s) Oral at bedtime      PHYSICAL EXAM:  T(C): 36.8 (03-31-23 @ 09:29), Max: 36.8 (03-31-23 @ 09:29)  HR: 67 (03-31-23 @ 09:29) (67 - 102)  BP: 145/83 (03-31-23 @ 09:29) (105/66 - 145/83)  RR: 18 (03-31-23 @ 09:29) (16 - 18)  SpO2: 98% (03-31-23 @ 09:29) (95% - 98%)  Wt(kg): --  I&O's Summary    30 Mar 2023 07:01  -  31 Mar 2023 07:00  --------------------------------------------------------  IN: 200 mL / OUT: 345 mL / NET: -145 mL            JVP: Normal  Neck: supple  Lung: clear   CV: S1 S2 , Murmur:  Abd: soft  Ext: No edema  neuro: Awake / alert  Psych: flat affect  Skin: normal``    LABS/DATA:    CARDIAC MARKERS:                                9.4    20.20 )-----------( 247      ( 31 Mar 2023 06:00 )             31.3     03-31    129<L>  |  97<L>  |  13  ----------------------------<  97  4.3   |  27  |  0.58    Ca    8.5      31 Mar 2023 06:00  Phos  2.0     03-31  Mg     1.90     03-31      proBNP:   Lipid Profile:   HgA1c:   TSH:     TELE:  EKG:

## 2023-03-31 NOTE — PROGRESS NOTE ADULT - ASSESSMENT
ARIK DUMONT is a 82y Male with a past medical history as described above who presented for evaluation of shortness of breath. Presents from  [facility]hab [facility]. CXR revealed increased pleural effusions, likely loculated. Thoracics following, PleurX to waterseal.     # Classical Hodgkin Lymphoma   - S/P Cycle 1 Brentuximab vedotin (BV) 3/10/23  - C2D1 due this Friday 3/31, but given current state and increased lethargy, likely will hold off for now. Plan to give C2 brentuximab Monday if overall status improves.  - CTS note from yesterday states to continue holding AC - may need to be off for long term   - Thoracic surgery to cap the PleurX when ready for discharge, recommend doing it now.  - Blood culture NGTD, UCx ESBL, on ertapenem  - Received one dose of Zarxio; ANC much improved  - Family updated daily   - CXR daily   - CBC with diff daily   - Thoracic input appreciated, no procedure for now, monitoring serosanguinous output    # Leukocytosis   - Zarxio effect       Please page with questions or concerns. Will Follow with you.      Karl Salomon M.D.  Hematology/Oncology Fellow PGY5  Pager [phone]  After 5pm, please contact on-call team.

## 2023-03-31 NOTE — PROGRESS NOTE ADULT - SUBJECTIVE AND OBJECTIVE BOX
Name of Patient : ARIK DUMONT  MRN: [id number]  Date of visit: 03-31-23      Subjective: Patient seen and examined. No new events except as noted.   doing okay   plan for chemo on monday     REVIEW OF SYSTEMS:    CONSTITUTIONAL: No weakness, fevers or chills  EYES/ENT: No visual changes;  No vertigo or throat pain   NECK: No pain or stiffness  RESPIRATORY: No cough, wheezing, hemoptysis; No shortness of breath  CARDIOVASCULAR: No chest pain or palpitations  GASTROINTESTINAL: No abdominal or epigastric pain. No nausea, vomiting, or hematemesis; No diarrhea or constipation. No melena or hematochezia.  GENITOURINARY: No dysuria, frequency or hematuria  NEUROLOGICAL: No numbness or weakness  SKIN: No itching, burning, rashes, or lesions   All other review of systems is negative unless indicated above.    MEDICATIONS:  MEDICATIONS  (STANDING):  atenolol  Tablet 12.5 milliGRAM(s) Oral daily  dextrose 5%. 1000 milliLiter(s) (50 mL/Hr) IV Continuous <Continuous>  enoxaparin Injectable 40 milliGRAM(s) SubCutaneous every 24 hours  ertapenem  IVPB 1000 milliGRAM(s) IV Intermittent every 24 hours  escitalopram 10 milliGRAM(s) Oral daily  famotidine    Tablet 20 milliGRAM(s) Oral daily  magnesium oxide 400 milliGRAM(s) Oral three times a day with meals  polyethylene glycol 3350 17 Gram(s) Oral daily  senna 2 Tablet(s) Oral at bedtime  simvastatin 40 milliGRAM(s) Oral at bedtime  traZODone 25 milliGRAM(s) Oral at bedtime      PHYSICAL EXAM:  T(C): 36.7 (03-31-23 @ 12:54), Max: 36.8 (03-31-23 @ 09:29)  HR: 118 (03-31-23 @ 12:54) (67 - 118)  BP: 102/60 (03-31-23 @ 12:54) (102/60 - 145/83)  RR: 18 (03-31-23 @ 12:54) (16 - 18)  SpO2: 100% (03-31-23 @ 12:54) (95% - 100%)  Wt(kg): --  I&O's Summary    30 Mar 2023 07:01  -  31 Mar 2023 07:00  --------------------------------------------------------  IN: 200 mL / OUT: 345 mL / NET: -145 mL          Appearance: Normal	  HEENT:  PERRLA   Lymphatic: No lymphadenopathy   Cardiovascular: Normal S1 S2, no JVD  Respiratory: normal effort , clear  Gastrointestinal:  Soft, Non-tender  Skin: No rashes,  warm to touch  Psychiatry:  Mood & affect appropriate  Musculuskeletal: No edema    recent labs, Imaging and EKGs personally reviewed     03-30-23 @ 07:01  -  03-31-23 @ 07:00  --------------------------------------------------------  IN: 200 mL / OUT: 345 mL / NET: -145 mL                          9.4    20.20 )-----------( 247      ( 31 Mar 2023 06:00 )             31.3               03-31    129<L>  |  97<L>  |  13  ----------------------------<  97  4.3   |  27  |  0.58    Ca    8.5      31 Mar 2023 06:00  Phos  2.0     03-31  Mg     1.90     03-31

## 2023-03-31 NOTE — PROGRESS NOTE ADULT - SUBJECTIVE AND OBJECTIVE BOX
Date of Service: 03-31-23 @ 12:06    Patient is a 82y old  Male who presents with a chief complaint of pl effusion (27 Mar 2023 10:54)      Any change in ROS: seems OK: no sob:  no cough: :l etahrgic today  : on 3 l TODAY       MEDICATIONS  (STANDING):  atenolol  Tablet 12.5 milliGRAM(s) Oral daily  dextrose 5%. 1000 milliLiter(s) (50 mL/Hr) IV Continuous <Continuous>  enoxaparin Injectable 40 milliGRAM(s) SubCutaneous every 24 hours  ertapenem  IVPB 1000 milliGRAM(s) IV Intermittent every 24 hours  escitalopram 10 milliGRAM(s) Oral daily  famotidine    Tablet 20 milliGRAM(s) Oral daily  magnesium oxide 400 milliGRAM(s) Oral three times a day with meals  polyethylene glycol 3350 17 Gram(s) Oral daily  senna 2 Tablet(s) Oral at bedtime  simvastatin 40 milliGRAM(s) Oral at bedtime  traZODone 25 milliGRAM(s) Oral at bedtime    MEDICATIONS  (PRN):  acetaminophen     Tablet .. 650 milliGRAM(s) Oral every 6 hours PRN Temp greater or equal to 38C (100.4F), Mild Pain (1 - 3)  albuterol   0.5% 2.5 milliGRAM(s) Nebulizer every 6 hours PRN Shortness of Breath  ALPRAZolam 0.25 milliGRAM(s) Oral daily PRN anxiety  oxyCODONE    IR 2.5 milliGRAM(s) Oral once PRN Severe Pain (7 - 10)    Vital Signs Last 24 Hrs  T(C): 36.8 (31 Mar 2023 09:29), Max: 36.8 (31 Mar 2023 09:29)  T(F): 98.3 (31 Mar 2023 09:29), Max: 98.3 (31 Mar 2023 09:29)  HR: 67 (31 Mar 2023 09:29) (67 - 102)  BP: 145/83 (31 Mar 2023 09:29) (105/66 - 145/83)  BP(mean): --  RR: 18 (31 Mar 2023 09:29) (16 - 18)  SpO2: 98% (31 Mar 2023 09:29) (95% - 98%)    Parameters below as of 31 Mar 2023 09:29  Patient On (Oxygen Delivery Method): nasal cannula  O2 Flow (L/min): 3      I&O's Summary    30 Mar 2023 07:01  -  31 Mar 2023 07:00  --------------------------------------------------------  IN: 200 mL / OUT: 345 mL / NET: -145 mL          Physical Exam:   GENERAL: NAD, well-groomed, well-developed  HEENT: CHUCK/   Atraumatic, Normocephalic  ENMT: No tonsillar erythema, exudates, or enlargement; Moist mucous membranes, Good dentition, No lesions  NECK: Supple, No JVD, Normal thyroid  CHEST/LUNG: poor air entry bilaterally:   CVS: Regular rate and rhythm; No murmurs, rubs, or gallops  GI: : Soft, Nontender, Nondistended; Bowel sounds present  NERVOUS SYSTEM:  LETAHRGIC OPEN EYES;   EXTREMITIES:  2Mild  edema  LYMPH: No lymphadenopathy noted  SKIN: No rashes or lesions  ENDOCRINOLOGY: No Thyromegaly  PSYCH: Letahrgic    Labs:  30                            9.4    20.20 )-----------( 247      ( 31 Mar 2023 06:00 )             31.3                         9.4    26.45 )-----------( 290      ( 30 Mar 2023 06:30 )             30.7                         9.9    26.50 )-----------( 367      ( 29 Mar 2023 12:01 )             32.5                         9.5    20.66 )-----------( 298      ( 29 Mar 2023 07:00 )             30.6                         9.0    6.60  )-----------( 351      ( 28 Mar 2023 05:35 )             29.1     03-31    129<L>  |  97<L>  |  13  ----------------------------<  97  4.3   |  27  |  0.58  03-30    132<L>  |  96<L>  |  13  ----------------------------<  113<H>  4.4   |  26  |  0.56  03-29    130<L>  |  96<L>  |  11  ----------------------------<  109<H>  4.2   |  27  |  0.55  03-28    133<L>  |  96<L>  |  8   ----------------------------<  101<H>  3.9   |  27  |  0.54    Ca    8.5      31 Mar 2023 06:00  Ca    8.7      30 Mar 2023 06:30  Phos  2.0     03-31  Phos  2.3     03-30  Mg     1.90     03-31  Mg     2.10     03-30      CAPILLARY BLOOD GLUCOSE          rad< from: CT Abdomen and Pelvis w/ Oral Cont and w/ IV Cont (03.29.23 @ 18:34) >  1.4 cm, while the enhancing component that opacifies with contrast   measures approximately 0.9 cm (series 4 image 45), previously 0.9 cm  RETROPERITONEUM/LYMPH NODES: Prominent nonspecific retroperitoneal lymph   nodes measuring less than 1 cm in short axis, unchanged.  ABDOMINAL WALL: Within normal limits.  BONES: Degenerative changes.    IMPRESSION:    *  Right pleural chest tube in place. Loculated right hemothorax,  decreased from the prior chest CT. Trace right pneumothorax, likely   related to the presence of the chest tube.  *  Redemonstrated hyperattenuation centered along the right pleural space   and along the mediastinum, which could reflect residual loculated   hemothorax but is again questioned to represent neoplastic pleural   thickening. Continued follow-up to ensure resolution is recommended.  *  Peripheral reticular opacities and scattered nodular opacities in the   bilateral lungs, which appear similar to prior imaging and may be   infectious/inflammatory or neoplastic in etiology. Continued follow-up is   recommended.  *  Intermittent opacification of the right lower and middle lobe airways,   which may reflect mucoid impaction.  *  Mediastinal and hilar lymphadenopathy, overall similar to mildly   decreased in size since 3/10/2023.  *  Perivesicular fat stranding. Cystitis is considered. Recommend   correlation with urinalysis.  *  Unchanged penetrating ulcer of infrarenal abdominalaorta.    < end of copied text >  < from: CT Abdomen and Pelvis w/ Oral Cont and w/ IV Cont (03.29.23 @ 18:34) >  1.4 cm, while the enhancing component that opacifies with contrast   measures approximately 0.9 cm (series 4 image 45), previously 0.9 cm  RETROPERITONEUM/LYMPH NODES: Prominent nonspecific retroperitoneal lymph   nodes measuring less than 1 cm in short axis, unchanged.  ABDOMINAL WALL: Within normal limits.  BONES: Degenerative changes.    IMPRESSION:    *  Right pleural chest tube in place. Loculated right hemothorax,  decreased from the prior chest CT. Trace right pneumothorax, likely   related to the presence of the chest tube.  *  Redemonstrated hyperattenuation centered along the right pleural space   and along the mediastinum, which could reflect residual loculated   hemothorax but is again questioned to represent neoplastic pleural   thickening. Continued follow-up to ensure resolution is recommended.  *  Peripheral reticular opacities and scattered nodular opacities in the   bilateral lungs, which appear similar to prior imaging and may be   infectious/inflammatory or neoplastic in etiology. Continued follow-up is   recommended.  *  Intermittent opacification of the right lower and middle lobe airways,   which may reflect mucoid impaction.  *  Mediastinal and hilar lymphadenopathy, overall similar to mildly   decreased in size since 3/10/2023.  *  Perivesicular fat stranding. Cystitis is considered. Recommend   correlation with urinalysis.  *  Unchanged penetrating ulcer of infrarenal abdominalaorta.    < end of copied text >        D-Dimer Assay, Quantitative: 199 ng/mL DDU (03-24 @ 15:45)  Procalcitonin, Serum: 0.26 ng/mL (03-29 @ 15:26)  Procalcitonin, Serum: 0.14 ng/mL (03-27 @ 19:30)        RECENT CULTURES:  03-29 @ 15:46 .Blood Blood-Peripheral                No growth to date.    03-29 @ 15:26 .Blood Blood-Peripheral                No growth to date.    03-28 @ 12:40 Clean Catch Clean Catch (Midstream)   DAVIS      Escherichia coli ESBL  Escherichia coli ESBL     50,000 - 99,000 CFU/mL Escherichia coli ESBL    03-27 @ 19:45 .Blood Blood-Peripheral                No growth to date.    03-27 @ 19:30 .Blood Blood-Peripheral                No growth to date.    03-24 @ 16:10 .Blood Blood-Peripheral                No Growth Final    03-24 @ 16:05 .Blood Blood-Peripheral                No Growth Final          RESPIRATORY CULTURES:          Studies  Chest X-RAY  CT SCAN Chest   Venous Dopplers: LE:   CT Abdomen  Others

## 2023-03-31 NOTE — PROGRESS NOTE ADULT - SUBJECTIVE AND OBJECTIVE BOX
Follow Up:  leukocytosis, positive urine cx, loculated pleural effusion    Interval History/ROS: no fever, cough, abd pain, flank pain, urine cx showed ESBL E-coli        Allergies  pertussis vaccines (Rash)  shellfish (Rash)        ANTIMICROBIALS:  ertapenem  IVPB 1000 every 24 hours      OTHER MEDS:  acetaminophen     Tablet .. 650 milliGRAM(s) Oral every 6 hours PRN  albuterol   0.5% 2.5 milliGRAM(s) Nebulizer every 6 hours PRN  ALPRAZolam 0.25 milliGRAM(s) Oral daily PRN  atenolol  Tablet 12.5 milliGRAM(s) Oral daily  dextrose 5%. 1000 milliLiter(s) IV Continuous <Continuous>  enoxaparin Injectable 40 milliGRAM(s) SubCutaneous every 24 hours  escitalopram 10 milliGRAM(s) Oral daily  famotidine    Tablet 20 milliGRAM(s) Oral daily  magnesium oxide 400 milliGRAM(s) Oral three times a day with meals  oxyCODONE    IR 2.5 milliGRAM(s) Oral once PRN  polyethylene glycol 3350 17 Gram(s) Oral daily  senna 2 Tablet(s) Oral at bedtime  simvastatin 40 milliGRAM(s) Oral at bedtime  traZODone 25 milliGRAM(s) Oral at bedtime      Vital Signs Last 24 Hrs  T(C): 36.7 (31 Mar 2023 12:54), Max: 36.8 (31 Mar 2023 09:29)  T(F): 98 (31 Mar 2023 12:54), Max: 98.3 (31 Mar 2023 09:29)  HR: 118 (31 Mar 2023 12:54) (67 - 118)  BP: 102/60 (31 Mar 2023 12:54) (102/60 - 145/83)  BP(mean): --  RR: 18 (31 Mar 2023 12:54) (16 - 18)  SpO2: 100% (31 Mar 2023 12:54) (95% - 100%)    Parameters below as of 31 Mar 2023 12:54  Patient On (Oxygen Delivery Method): nasal cannula  O2 Flow (L/min): 3      Physical Exam:  General:    NAD, non toxic, sitting on a chair  Respiratory:   R chest tube, tachypnea  abd:   soft, BS +, not tender  :     no CVAT, no suprapubic tenderness, + plummer  Musculoskeletal : no joint swelling  Skin:    no rash  vascular: L chest mediport with no tenderness or erythema                            9.4    20.20 )-----------( 247      ( 31 Mar 2023 06:00 )             31.3       03-31    129<L>  |  97<L>  |  13  ----------------------------<  97  4.3   |  27  |  0.58    Ca    8.5      31 Mar 2023 06:00  Phos  2.0     03-31  Mg     1.90     03-31            MICROBIOLOGY:  v  .Blood Blood-Peripheral  03-29-23   No growth to date.  --  --      .Blood Blood-Peripheral  03-29-23   No growth to date.  --  --      Clean Catch Clean Catch (Midstream)  03-28-23   50,000 - 99,000 CFU/mL Escherichia coli ESBL  --  Escherichia coli ESBL      .Blood Blood-Peripheral  03-27-23   No growth to date.  --  --      .Blood Blood-Peripheral  03-27-23   No growth to date.  --  --      .Blood Blood-Peripheral  03-24-23   No Growth Final  --  --      .Blood Blood-Peripheral  03-24-23   No Growth Final  --  --      .Tissue (1) level lymph node  03-03-23   No growth at 5 days  --    No polymorphonuclear leukocytes seen per low power field  No organisms seen per oil power field                RADIOLOGY:  Images independently visualized and reviewed personally, findings as below  < from: CT Abdomen and Pelvis w/ Oral Cont and w/ IV Cont (03.29.23 @ 18:34) >  IMPRESSION:    *  Right pleural chest tube in place. Loculated right hemothorax,  decreased from the prior chest CT. Trace right pneumothorax, likely   related to the presence of the chest tube.  *  Redemonstrated hyperattenuation centered along the right pleural space   and along the mediastinum, which could reflect residual loculated   hemothorax but is again questioned to represent neoplastic pleural   thickening. Continued follow-up to ensure resolution is recommended.  *  Peripheral reticular opacities and scattered nodular opacities in the   bilateral lungs, which appear similar to prior imaging and may be   infectious/inflammatory or neoplastic in etiology. Continued follow-up is   recommended.  *  Intermittent opacification of the right lower and middle lobe airways,   which may reflect mucoid impaction.  *  Mediastinal and hilar lymphadenopathy, overall similar to mildly   decreased in size since 3/10/2023.  *  Perivesicular fat stranding. Cystitis is considered. Recommend   correlation with urinalysis.  *  Unchanged penetrating ulcer of infrarenal abdominalaorta.    < end of copied text >

## 2023-03-31 NOTE — PROGRESS NOTE ADULT - ASSESSMENT
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 Patient is poor historian, history obtained by son Francisco at bedside. States that  patient has been recovering wellat  rehab. States patient was able to walk over 150 ft while at rehab. States that suddenly on Friday patient started to develop acute SOB and increased WOB. Imaging was done at  rehab showing worsening pleural effusions EMS was called and patient was noted to be tachypneic to 30s and placed on 10L NRB.    Recently admitted from 3/15-3/24 for worsening SOB was found to have  significant for a large R pleural effusion with mediastinal and hilar lymphadenopathy, and 3L of fluid was drained. Pleural fluid cultures and blood cultures resulted negative.  Suspicious of underlying malignancy secondary to lymphadenopathy and persistent leukocytosis, Heme/Onc was consulted and recommended transfer to [facility] on 2/24/23 for further workup. On 2/28 he underwent an ultrasound guided biopsy of his R supraclavicular mass/lymph node, which later resulted as classic Hodgkin's lymphoma.  On 3/3, he underwent a R thoracotomy/VATS with Pleurex placement. Left chest wall mediport was placed and received  first cycle of chemotherapy on 3/10. (25 Mar 2023 16:44)    no wpulm called:  pt has chest tube on rigth side:  has bloody secretions  he looks comfortable:  no sob :      right sided pl effusion:  A fibrillation:   HTN:  Urinary retention     3/27:  right sided pl effusion:  [id number]:  blood pl fluid  ; on ac;  cts surgery to see:  chest xray today looks pretty good to me: not hypercarbic and acidotic on VBG   A fibrillation: off ac at t his time  HTN:  blood pressure is controlled  Urinary retention  ; has chr plummer: :    marah pmd    3/28:    right sided pl effusion:  has lymphoma:  blood pl fluid  ; on ac;  cts surgery to see:  chest xray today looks pretty good to me: not hypercarbic and acidotic on VBG   A fibrillation: off ac at t his time: no tpa done as the pl fluid is very blood:   HTN:  blood pressure is controlled  Urinary retention  ; has chr plummer: :    marah pmd      3/29:    right sided pl effusion:  has lymphoma:  blood pl fluid  ; on ac;  cts surgery to see:  chest xray today looks pretty good to me: not hypercarbic and acidotic on VBG : his wbc is increased today  : ? etiology : ct scan chest awaited:  check procal as well as monitor for fever:   A fibrillation: off ac at t his time: no tpa done as the pl fluid is very blood:   HTN:  blood pressure is controlled  Urinary retention  ; has chr plummer: :    marah pmd      3/30:    right sided pl effusion:  has lymphoma:  blood pl fluid  ; off ac;  rpt ct chest noted from last night:  has mild hemothorax and pneumothorax too ; with alexey effusion; His WBC is high  but he has no fever: Slight jump in his procal : but he doesnto look septic to me ? could it be reactive:  he does have underlying lymphoma:   A fibrillation: off ac at t his time: no tpa done as the pl fluid is very bloody - and now he will be off ac:    HTN:  blood pressure is controlled  Urinary retention  ; has chr plummer: :    marah cts      3/31:    right sided pl effusion:  has lymphoma:  blood pl fluid  ; off ac;  rpt ct chest noted from last night:  has mild hemothorax and pneumothorax too ; with alexey effusion; His WBC is high  but he has no fever: Slight jump in his procal : but he doesnto look septic to me ? could it be reactive:  he does have underlying lymphoma: ct scan pan body yesterday revealed *  Right pleural chest tube in place. Loculated right hemothorax, decreased from the prior chest CT. Trace right pneumothorax, likely  related to the presence of the chest tube. Redemonstrated hyperattenuation centered along the right pleural space  and along the mediastinum, which could reflect residual loculated  hemothorax but is again questioned to represent neoplastic pleural  thickening. Continued follow-up to ensure resolution is recommended.  Peripheral reticular opacities and scattered nodular opacities in the  bilateral lungs, which appear similar to prior imaging and may be  infectious/inflammatory or neoplastic in etiology. Continued follow-up is  recommended. Intermittent opacification of the right lower and middle lobe airways, which may reflect mucoid impaction. *  Mediastinal and hilar lymphadenopathy, overall similar to mildly decreased in size since 3/10/202 Perivesicular fat stranding. Cystitis is considered. Recommend  correlation with urinalysis Unchanged penetrating ulcer of infrarenal abdominalaorta.    A fibrillation: off ac at t his time: no tpa done as the pl fluid is very bloody - and now he will be off ac:    HTN:  blood pressure is controlled  Urinary retention  ; has chr plummer: :      overall prognosis guarded;  if his mental status does not improve by afternoon, he would need abg: marah cuevas

## 2023-03-31 NOTE — PROGRESS NOTE ADULT - SUBJECTIVE AND OBJECTIVE BOX
Hematology/Oncology Follow-up    INTERVAL HPI/OVERNIGHT EVENTS:  Patient S&E at bedside. No o/n events, patient resting comfortably. No complaints at this time. Patient denies fever, chills, dizziness, weakness, CP, palpitations, SOB, cough, N/V/D/C, dysuria, changes in bowel movements, LE edema.    VITAL SIGNS:  T(F): 98 (03-31-23 @ 12:54)  HR: 118 (03-31-23 @ 12:54)  BP: 102/60 (03-31-23 @ 12:54)  RR: 18 (03-31-23 @ 12:54)  SpO2: 100% (03-31-23 @ 12:54)  Wt(kg): --    PHYSICAL EXAM:    Constitutional: AAOx3, NAD, NC in place  Eyes: PERRL, EOMI, sclera non-icteric  Neck: supple, no masses, no JVD  Respiratory: CTA b/l, good air entry b/l, no wheezing, rhonchi, rales, with normal respiratory effort and no intercostal retractions. CT in place, bloody output  Cardiovascular: RRR, normal S1S2, no M/R/G  Gastrointestinal: soft, NTND, no masses palpable, BS normal in all four quadrants, no HSM  Extremities:  no c/c/e  Neurological: Grossly intact  Skin: No rash or lesion    MEDICATIONS  (STANDING):  atenolol  Tablet 12.5 milliGRAM(s) Oral daily  dextrose 5%. 1000 milliLiter(s) (50 mL/Hr) IV Continuous <Continuous>  enoxaparin Injectable 40 milliGRAM(s) SubCutaneous every 24 hours  ertapenem  IVPB 1000 milliGRAM(s) IV Intermittent every 24 hours  escitalopram 10 milliGRAM(s) Oral daily  famotidine    Tablet 20 milliGRAM(s) Oral daily  magnesium oxide 400 milliGRAM(s) Oral three times a day with meals  polyethylene glycol 3350 17 Gram(s) Oral daily  senna 2 Tablet(s) Oral at bedtime  simvastatin 40 milliGRAM(s) Oral at bedtime  traZODone 25 milliGRAM(s) Oral at bedtime    MEDICATIONS  (PRN):  acetaminophen     Tablet .. 650 milliGRAM(s) Oral every 6 hours PRN Temp greater or equal to 38C (100.4F), Mild Pain (1 - 3)  albuterol   0.5% 2.5 milliGRAM(s) Nebulizer every 6 hours PRN Shortness of Breath  ALPRAZolam 0.25 milliGRAM(s) Oral daily PRN anxiety  oxyCODONE    IR 2.5 milliGRAM(s) Oral once PRN Severe Pain (7 - 10)      pertussis vaccines (Rash)  shellfish (Rash)      LABS:                        9.4    20.20 )-----------( 247      ( 31 Mar 2023 06:00 )             31.3     03-31    129<L>  |  97<L>  |  13  ----------------------------<  97  4.3   |  27  |  0.58    Ca    8.5      31 Mar 2023 06:00  Phos  2.0     03-31  Mg     1.90     03-31             RADIOLOGY & ADDITIONAL TESTS:  Studies reviewed.

## 2023-03-31 NOTE — PROGRESS NOTE ADULT - ASSESSMENT
82 m with HTN, AFIB, urinary retention (chronic plummer), recent hospitalization 3/15-3/124 for SOB found to have R large plerual effusion with mediastinal and hilar LAD, s/p tap with negative cultures and R supraclavicular lymph node/mass biopsy showed Hodgkin's lymphoma s/p R thoracotomy, VATS, pleurex and mediport, started on C1 Brentuximab vedotin (BV) 3/10/23 and discharged to rehab and was doing well suddenly but the pleurx was not draining and pt was brought back with SOB, tachypnea, placed on NRB  pleurx was connected to vac and then flushed and 2180 cc drained  initial WBC: 3-6  s/p filgrastim 3/27 and 3/28 now WBC 26  pt afebrile since admission  chest/abd/pelvis CT 3/29 with loculated R hemothorax and pneumo, pleural thickening s/o malingancy  u/a positive but pt has a chronic plummer and no suprapubic or flank pain    respiratory failure due to malignant R pleural effusion due to Hodgkin's lymphoma started on chemo 3/10, pleurX was not draining so pt brought in  CT s/o loculated hemothorax and pneumo with pleural thickening due to malignancy  leukocytosis is due to filgrastim, pt had no WBC before it was given  positive u/a and urine cx with 50 K E-coli and CT showed, fat stranding around the bladder, maybe cystitis    * s/p 5 days of cefepime now E-coli with ESBL, as pt will be on chemo and there is some perivesicular fat stranding on CT with chronic plummer will treat with 5 days of ertapenem  * monitor CBC/diff  * f/u with hem/onc and thoracic surgery    The above assessment and plan was discussed with the primary team    Selin Stark MD  contact on teams  After 5pm and on weekends call [phone]

## 2023-04-01 LAB
ANION GAP SERPL CALC-SCNC: 6 MMOL/L — LOW (ref 7–14)
BUN SERPL-MCNC: 14 MG/DL — SIGNIFICANT CHANGE UP (ref 7–23)
CALCIUM SERPL-MCNC: 8.4 MG/DL — SIGNIFICANT CHANGE UP (ref 8.4–10.5)
CHLORIDE SERPL-SCNC: 96 MMOL/L — LOW (ref 98–107)
CO2 SERPL-SCNC: 30 MMOL/L — SIGNIFICANT CHANGE UP (ref 22–31)
CREAT SERPL-MCNC: 0.57 MG/DL — SIGNIFICANT CHANGE UP (ref 0.5–1.3)
CULTURE RESULTS: SIGNIFICANT CHANGE UP
EGFR: 97 ML/MIN/1.73M2 — SIGNIFICANT CHANGE UP
GLUCOSE SERPL-MCNC: 98 MG/DL — SIGNIFICANT CHANGE UP (ref 70–99)
HCT VFR BLD CALC: 32.2 % — LOW (ref 39–50)
HGB BLD-MCNC: 9.8 G/DL — LOW (ref 13–17)
MAGNESIUM SERPL-MCNC: 1.9 MG/DL — SIGNIFICANT CHANGE UP (ref 1.6–2.6)
MCHC RBC-ENTMCNC: 29 PG — SIGNIFICANT CHANGE UP (ref 27–34)
MCHC RBC-ENTMCNC: 30.4 GM/DL — LOW (ref 32–36)
MCV RBC AUTO: 95.3 FL — SIGNIFICANT CHANGE UP (ref 80–100)
NRBC # BLD: 0 /100 WBCS — SIGNIFICANT CHANGE UP (ref 0–0)
NRBC # FLD: 0.04 K/UL — HIGH (ref 0–0)
PHOSPHATE SERPL-MCNC: 2.1 MG/DL — LOW (ref 2.5–4.5)
PLATELET # BLD AUTO: 210 K/UL — SIGNIFICANT CHANGE UP (ref 150–400)
POTASSIUM SERPL-MCNC: 4.3 MMOL/L — SIGNIFICANT CHANGE UP (ref 3.5–5.3)
POTASSIUM SERPL-SCNC: 4.3 MMOL/L — SIGNIFICANT CHANGE UP (ref 3.5–5.3)
RBC # BLD: 3.38 M/UL — LOW (ref 4.2–5.8)
RBC # FLD: 20.9 % — HIGH (ref 10.3–14.5)
SODIUM SERPL-SCNC: 132 MMOL/L — LOW (ref 135–145)
SPECIMEN SOURCE: SIGNIFICANT CHANGE UP
WBC # BLD: 15.46 K/UL — HIGH (ref 3.8–10.5)
WBC # FLD AUTO: 15.46 K/UL — HIGH (ref 3.8–10.5)

## 2023-04-01 RX ORDER — ONDANSETRON 8 MG/1
8 TABLET, FILM COATED ORAL ONCE
Refills: 0 | Status: COMPLETED | OUTPATIENT
Start: 2023-04-03 | End: 2023-04-03

## 2023-04-01 RX ORDER — DIPHENHYDRAMINE HCL 50 MG
50 CAPSULE ORAL EVERY 6 HOURS
Refills: 0 | Status: DISCONTINUED | OUTPATIENT
Start: 2023-04-03 | End: 2023-04-10

## 2023-04-01 RX ORDER — BRENTUXIMAB VEDOTIN 50 MG/10.5ML
100 INJECTION, POWDER, LYOPHILIZED, FOR SOLUTION INTRAVENOUS ONCE
Refills: 0 | Status: COMPLETED | OUTPATIENT
Start: 2023-04-03 | End: 2023-04-03

## 2023-04-01 RX ORDER — CETIRIZINE HYDROCHLORIDE 10 MG/1
10 TABLET ORAL ONCE
Refills: 0 | Status: COMPLETED | OUTPATIENT
Start: 2023-04-03 | End: 2023-04-03

## 2023-04-01 RX ORDER — HYDROCORTISONE 20 MG
100 TABLET ORAL ONCE
Refills: 0 | Status: DISCONTINUED | OUTPATIENT
Start: 2023-04-03 | End: 2023-04-10

## 2023-04-01 RX ORDER — ACETAMINOPHEN 500 MG
650 TABLET ORAL ONCE
Refills: 0 | Status: COMPLETED | OUTPATIENT
Start: 2023-04-03 | End: 2023-04-03

## 2023-04-01 RX ADMIN — ENOXAPARIN SODIUM 40 MILLIGRAM(S): 100 INJECTION SUBCUTANEOUS at 22:06

## 2023-04-01 RX ADMIN — MAGNESIUM OXIDE 400 MG ORAL TABLET 400 MILLIGRAM(S): 241.3 TABLET ORAL at 12:15

## 2023-04-01 RX ADMIN — FAMOTIDINE 20 MILLIGRAM(S): 10 INJECTION INTRAVENOUS at 12:15

## 2023-04-01 RX ADMIN — MAGNESIUM OXIDE 400 MG ORAL TABLET 400 MILLIGRAM(S): 241.3 TABLET ORAL at 18:05

## 2023-04-01 RX ADMIN — SIMVASTATIN 40 MILLIGRAM(S): 20 TABLET, FILM COATED ORAL at 22:07

## 2023-04-01 RX ADMIN — SENNA PLUS 2 TABLET(S): 8.6 TABLET ORAL at 22:07

## 2023-04-01 RX ADMIN — MAGNESIUM OXIDE 400 MG ORAL TABLET 400 MILLIGRAM(S): 241.3 TABLET ORAL at 09:01

## 2023-04-01 RX ADMIN — ERTAPENEM SODIUM 120 MILLIGRAM(S): 1 INJECTION, POWDER, LYOPHILIZED, FOR SOLUTION INTRAMUSCULAR; INTRAVENOUS at 18:04

## 2023-04-01 RX ADMIN — Medication 25 MILLIGRAM(S): at 22:07

## 2023-04-01 RX ADMIN — ESCITALOPRAM OXALATE 10 MILLIGRAM(S): 10 TABLET, FILM COATED ORAL at 12:15

## 2023-04-01 NOTE — OCCUPATIONAL THERAPY INITIAL EVALUATION ADULT - ADDITIONAL COMMENTS
Patient admitted from [facility]. Patient reports needing some assistance with ADLs and walking using a rolling walker, however, was improving and getting stronger. Prior to rehab, patient lived with his spouse in a private home with 5 steps to enter. Pt. reports he was receiving HHA coverage 5 days/week x 8 hours/day to assist him with ADL's as needed and was independent with functional mobility.

## 2023-04-01 NOTE — PROGRESS NOTE ADULT - ASSESSMENT
chronic persistent afib  HR stable   cont low dose atenolol   as per CTS pt is not a good candidate for a/c due to high risk of hemothorax

## 2023-04-01 NOTE — PROGRESS NOTE ADULT - SUBJECTIVE AND OBJECTIVE BOX
Follow-up Pulm Progress Note for Rosaura    Appears comfortable in chair.     Medications:  MEDICATIONS  (STANDING):  atenolol  Tablet 12.5 milliGRAM(s) Oral daily  dextrose 5%. 1000 milliLiter(s) (50 mL/Hr) IV Continuous <Continuous>  enoxaparin Injectable 40 milliGRAM(s) SubCutaneous every 24 hours  ertapenem  IVPB 1000 milliGRAM(s) IV Intermittent every 24 hours  escitalopram 10 milliGRAM(s) Oral daily  famotidine    Tablet 20 milliGRAM(s) Oral daily  magnesium oxide 400 milliGRAM(s) Oral three times a day with meals  polyethylene glycol 3350 17 Gram(s) Oral daily  senna 2 Tablet(s) Oral at bedtime  simvastatin 40 milliGRAM(s) Oral at bedtime  traZODone 25 milliGRAM(s) Oral at bedtime    MEDICATIONS  (PRN):  acetaminophen     Tablet .. 650 milliGRAM(s) Oral every 6 hours PRN Temp greater or equal to 38C (100.4F), Mild Pain (1 - 3)  albuterol   0.5% 2.5 milliGRAM(s) Nebulizer every 6 hours PRN Shortness of Breath  ALPRAZolam 0.25 milliGRAM(s) Oral daily PRN anxiety  oxyCODONE    IR 2.5 milliGRAM(s) Oral once PRN Severe Pain (7 - 10)          Vital Signs Last 24 Hrs  T(C): 36.7 (01 Apr 2023 09:42), Max: 36.9 (01 Apr 2023 05:42)  T(F): 98.1 (01 Apr 2023 09:42), Max: 98.5 (01 Apr 2023 05:42)  HR: 77 (01 Apr 2023 09:42) (76 - 118)  BP: 102/64 (01 Apr 2023 09:42) (95/60 - 102/64)  BP(mean): --  RR: 18 (01 Apr 2023 09:42) (16 - 18)  SpO2: 97% (01 Apr 2023 09:42) (96% - 100%)    Parameters below as of 01 Apr 2023 09:42  Patient On (Oxygen Delivery Method): nasal cannula  O2 Flow (L/min): 3            03-31 @ 07:01  -  04-01 @ 07:00  --------------------------------------------------------  IN: 300 mL / OUT: 300 mL / NET: 0 mL          LABS:                        9.8    15.46 )-----------( 210      ( 01 Apr 2023 03:38 )             32.2     04-01    132<L>  |  96<L>  |  14  ----------------------------<  98  4.3   |  30  |  0.57    Ca    8.4      01 Apr 2023 03:38  Phos  2.1     04-01  Mg     1.90     04-01            CAPILLARY BLOOD GLUCOSE            Procalcitonin, Serum: 0.26 ng/mL (03-29-23 @ 15:26)    CULTURES: (if applicable)    Culture - Blood (collected 03-29-23 @ 15:46)  Source: .Blood Blood-Peripheral  Preliminary Report (03-30-23 @ 19:02):    No growth to date.    Culture - Blood (collected 03-29-23 @ 15:26)  Source: .Blood Blood-Peripheral  Preliminary Report (03-30-23 @ 19:02):    No growth to date.    Culture - Blood (collected 03-27-23 @ 19:45)  Source: .Blood Blood-Peripheral  Preliminary Report (03-29-23 @ 01:02):    No growth to date.    Culture - Blood (collected 03-27-23 @ 19:30)  Source: .Blood Blood-Peripheral  Preliminary Report (03-29-23 @ 01:02):    No growth to date.    Culture - Blood (collected 03-24-23 @ 16:10)  Source: .Blood Blood-Peripheral  Final Report (03-29-23 @ 22:01):    No Growth Final    Culture - Blood (collected 03-24-23 @ 16:05)  Source: .Blood Blood-Peripheral  Final Report (03-29-23 @ 22:01):    No Growth Final        Culture - Urine (collected 03-28-23 @ 12:40)  Source: Clean Catch Clean Catch (Midstream)  Final Report (03-30-23 @ 17:15):    50,000 - 99,000 CFU/mL Escherichia coli ESBL  Organism: Escherichia coli ESBL (03-30-23 @ 17:15)  Organism: Escherichia coli ESBL (03-30-23 @ 17:15)      Method Type: DAVIS      -  Amikacin: S <=16      -  Amoxicillin/Clavulanic Acid: S <=8/4      -  Ampicillin: R >16 These ampicillin results predict results for amoxicillin      -  Ampicillin/Sulbactam: S 8/4 Enterobacter, Klebsiella aerogenes, Citrobacter, and Serratia may develop resistance during prolonged therapy (3-4 days)      -  Aztreonam: R >16      -  Cefazolin: R >16 For uncomplicated UTI with K. pneumoniae, E. coli, or P. mirablis: DAVIS <=16 is sensitive and DAVIS >=32 is resistant. This also predicts results for oral agents cefaclor, cefdinir, cefpodoxime, cefprozil, cefuroxime axetil, cephalexin and locarbef for uncomplicated UTI. Note that some isolates may be susceptible to these agents while testing resistant to cefazolin.      -  Cefepime: R 16      -  Ceftriaxone: R >32 Enterobacter, Klebsiella aerogenes, Citrobacter, and Serratia may develop resistance during prolonged therapy      -  Cefuroxime: R >16      -  Ciprofloxacin: R >2      -  Ertapenem: S <=0.5      -  Gentamicin: S <=2      -  Imipenem: S <=1      -  Levofloxacin: R >4      -  Meropenem: S <=1      -  Nitrofurantoin: S <=32 Should not be used to treat pyelonephritis      -  Piperacillin/Tazobactam: S <=8      -  Tobramycin: S <=2      -  Trimethoprim/Sulfamethoxazole: R >2/38                                Physical Examination:  PULM: Decreased BS rt base  CVS: S1, S2 heard    RADIOLOGY REVIEWED  CXR:     CT chest:    TTE:

## 2023-04-01 NOTE — PROGRESS NOTE ADULT - SUBJECTIVE AND OBJECTIVE BOX
DATE OF SERVICE: 04-01-23 @ 06:44    Subjective: Patient seen and examined. No new events except as noted.     SUBJECTIVE/ROS:  nad      MEDICATIONS:  MEDICATIONS  (STANDING):  atenolol  Tablet 12.5 milliGRAM(s) Oral daily  dextrose 5%. 1000 milliLiter(s) (50 mL/Hr) IV Continuous <Continuous>  enoxaparin Injectable 40 milliGRAM(s) SubCutaneous every 24 hours  ertapenem  IVPB 1000 milliGRAM(s) IV Intermittent every 24 hours  escitalopram 10 milliGRAM(s) Oral daily  famotidine    Tablet 20 milliGRAM(s) Oral daily  magnesium oxide 400 milliGRAM(s) Oral three times a day with meals  polyethylene glycol 3350 17 Gram(s) Oral daily  senna 2 Tablet(s) Oral at bedtime  simvastatin 40 milliGRAM(s) Oral at bedtime  traZODone 25 milliGRAM(s) Oral at bedtime      PHYSICAL EXAM:  T(C): 36.9 (04-01-23 @ 05:42), Max: 36.9 (04-01-23 @ 05:42)  HR: 79 (04-01-23 @ 05:42) (67 - 118)  BP: 102/59 (04-01-23 @ 05:42) (95/60 - 145/83)  RR: 18 (04-01-23 @ 05:42) (16 - 18)  SpO2: 97% (04-01-23 @ 05:42) (96% - 100%)  Wt(kg): --  I&O's Summary    30 Mar 2023 07:01  -  31 Mar 2023 07:00  --------------------------------------------------------  IN: 200 mL / OUT: 345 mL / NET: -145 mL    31 Mar 2023 07:01  -  01 Apr 2023 06:44  --------------------------------------------------------  IN: 300 mL / OUT: 300 mL / NET: 0 mL            JVP: Normal  Neck: supple  Lung: clear   CV: S1 S2 , Murmur:  Abd: soft  Ext: No edema  neuro: Awake / alert  Psych: flat affect  Skin: normal``    LABS/DATA:    CARDIAC MARKERS:                                9.8    15.46 )-----------( 210      ( 01 Apr 2023 03:38 )             32.2     04-01    132<L>  |  96<L>  |  14  ----------------------------<  98  4.3   |  30  |  0.57    Ca    8.4      01 Apr 2023 03:38  Phos  2.1     04-01  Mg     1.90     04-01      proBNP:   Lipid Profile:   HgA1c:   TSH:     TELE:  EKG:

## 2023-04-01 NOTE — PROGRESS NOTE ADULT - ASSESSMENT
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 Patient is poor historian, history obtained by son Francisco at bedside. States that  patient has been recovering wellat  rehab. States patient was able to walk over 150 ft while at rehab. States that suddenly on Friday patient started to develop acute SOB and increased WOB. Imaging was done at  rehab showing worsening pleural effusions EMS was called and patient was noted to be tachypneic to 30s and placed on 10L NRB.    Recently admitted from 3/15-3/24 for worsening SOB was found to have  significant for a large R pleural effusion with mediastinal and hilar lymphadenopathy, and 3L of fluid was drained. Pleural fluid cultures and blood cultures resulted negative.  Suspicious of underlying malignancy secondary to lymphadenopathy and persistent leukocytosis, Heme/Onc was consulted and recommended transfer to [facility] on 2/24/23 for further workup. On 2/28 he underwent an ultrasound guided biopsy of his R supraclavicular mass/lymph node, which later resulted as classic Hodgkin's lymphoma.  On 3/3, he underwent a R thoracotomy/VATS with Pleurex placement. Left chest wall mediport was placed and received  first cycle of chemotherapy on 3/10. (25 Mar 2023 16:44)    no wpulm called:  pt has chest tube on rigth side:  has bloody secretions  he looks comfortable:  no sob :      right sided pl effusion:  A fibrillation:   HTN:  Urinary retention     3/27:  right sided pl effusion:  [id number]:  blood pl fluid  ; on ac;  cts surgery to see:  chest xray today looks pretty good to me: not hypercarbic and acidotic on VBG   A fibrillation: off ac at t his time  HTN:  blood pressure is controlled  Urinary retention  ; has chr plummer: :    marah pmd    3/28:    right sided pl effusion:  has lymphoma:  blood pl fluid  ; on ac;  cts surgery to see:  chest xray today looks pretty good to me: not hypercarbic and acidotic on VBG   A fibrillation: off ac at t his time: no tpa done as the pl fluid is very blood:   HTN:  blood pressure is controlled  Urinary retention  ; has chr plummer: :    marah pmd      3/29:    right sided pl effusion:  has lymphoma:  blood pl fluid  ; on ac;  cts surgery to see:  chest xray today looks pretty good to me: not hypercarbic and acidotic on VBG : his wbc is increased today  : ? etiology : ct scan chest awaited:  check procal as well as monitor for fever:   A fibrillation: off ac at t his time: no tpa done as the pl fluid is very blood:   HTN:  blood pressure is controlled  Urinary retention  ; has chr plummer: :    marah pmd      3/30:    right sided pl effusion:  has lymphoma:  blood pl fluid  ; off ac;  rpt ct chest noted from last night:  has mild hemothorax and pneumothorax too ; with alexey effusion; His WBC is high  but he has no fever: Slight jump in his procal : but he doesnto look septic to me ? could it be reactive:  he does have underlying lymphoma:   A fibrillation: off ac at t his time: no tpa done as the pl fluid is very bloody - and now he will be off ac:    HTN:  blood pressure is controlled  Urinary retention  ; has chr plummer: :    marah cts      3/31:    right sided pl effusion:  has lymphoma:  blood pl fluid  ; off ac;  rpt ct chest noted from last night:  has mild hemothorax and pneumothorax too ; with alexey effusion; His WBC is high  but he has no fever: Slight jump in his procal : but he doesnto look septic to me ? could it be reactive:  he does have underlying lymphoma: ct scan pan body yesterday revealed *  Right pleural chest tube in place. Loculated right hemothorax, decreased from the prior chest CT. Trace right pneumothorax, likely  related to the presence of the chest tube. Redemonstrated hyperattenuation centered along the right pleural space  and along the mediastinum, which could reflect residual loculated  hemothorax but is again questioned to represent neoplastic pleural  thickening. Continued follow-up to ensure resolution is recommended.  Peripheral reticular opacities and scattered nodular opacities in the  bilateral lungs, which appear similar to prior imaging and may be  infectious/inflammatory or neoplastic in etiology. Continued follow-up is  recommended. Intermittent opacification of the right lower and middle lobe airways, which may reflect mucoid impaction. *  Mediastinal and hilar lymphadenopathy, overall similar to mildly decreased in size since 3/10/202 Perivesicular fat stranding. Cystitis is considered. Recommend  correlation with urinalysis Unchanged penetrating ulcer of infrarenal abdominalaorta.    A fibrillation: off ac at t his time: no tpa done as the pl fluid is very bloody - and now he will be off ac:    HTN:  blood pressure is controlled  Urinary retention  ; has chr plummer: :      4/1 Awake and responding appropriately. CT management as per thoracic      overall prognosis guarded;  if his mental status does not improve by afternoon, he would need abg: marah cuevas

## 2023-04-01 NOTE — OCCUPATIONAL THERAPY INITIAL EVALUATION ADULT - GENERAL OBSERVATIONS, REHAB EVAL
Patient received seated in bedside chair in NAD; agreeable to participate in OT evaluation. +O2 via NC. +right chest tube. +Choi. HR 84 bpm. SpO2 95% on 3L NC Patient received seated in bedside chair in NAD; agreeable to participate in OT evaluation. +O2 via NC. +right chest tube. +Choi.

## 2023-04-01 NOTE — OCCUPATIONAL THERAPY INITIAL EVALUATION ADULT - PERTINENT HX OF CURRENT PROBLEM, REHAB EVAL
82 year old male with history of AFIB, HTN and chronic urinary retention (chronic plummer), recent diagnosis of Hodgkins Lymphoma s/p 1st session chemo on 3/10 who presents with increased tachypnea and SOB for 24 hours. Found to have recurrent pleural effusion with loculation. Pleurx catheter placed with pleurvac to water seal.

## 2023-04-01 NOTE — PROGRESS NOTE ADULT - SUBJECTIVE AND OBJECTIVE BOX
Name of Patient : ARIK DUMONT  MRN: [id number]  Date of visit: 04-01-23       Subjective: Patient seen and examined. No new events except as noted.   doing okay  plan for chemo on monday     REVIEW OF SYSTEMS:    CONSTITUTIONAL: No weakness, fevers or chills  EYES/ENT: No visual changes;  No vertigo or throat pain   NECK: No pain or stiffness  RESPIRATORY: No cough, wheezing, hemoptysis; No shortness of breath  CARDIOVASCULAR: No chest pain or palpitations  GASTROINTESTINAL: No abdominal or epigastric pain. No nausea, vomiting, or hematemesis; No diarrhea or constipation. No melena or hematochezia.  GENITOURINARY: No dysuria, frequency or hematuria  NEUROLOGICAL: No numbness or weakness  SKIN: No itching, burning, rashes, or lesions   All other review of systems is negative unless indicated above.    MEDICATIONS:  MEDICATIONS  (STANDING):  atenolol  Tablet 12.5 milliGRAM(s) Oral daily  enoxaparin Injectable 40 milliGRAM(s) SubCutaneous every 24 hours  ertapenem  IVPB 1000 milliGRAM(s) IV Intermittent every 24 hours  escitalopram 10 milliGRAM(s) Oral daily  famotidine    Tablet 20 milliGRAM(s) Oral daily  magnesium oxide 400 milliGRAM(s) Oral three times a day with meals  polyethylene glycol 3350 17 Gram(s) Oral daily  senna 2 Tablet(s) Oral at bedtime  simvastatin 40 milliGRAM(s) Oral at bedtime  traZODone 25 milliGRAM(s) Oral at bedtime      PHYSICAL EXAM:  T(C): 36.4 (04-01-23 @ 17:47), Max: 36.9 (04-01-23 @ 05:42)  HR: 90 (04-01-23 @ 17:47) (76 - 90)  BP: 100/59 (04-01-23 @ 17:47) (99/60 - 102/64)  RR: 18 (04-01-23 @ 17:47) (16 - 18)  SpO2: 97% (04-01-23 @ 17:47) (96% - 98%)  Wt(kg): --  I&O's Summary    31 Mar 2023 07:01  -  01 Apr 2023 07:00  --------------------------------------------------------  IN: 300 mL / OUT: 300 mL / NET: 0 mL    Appearance: Normal	  HEENT:  PERRLA   Lymphatic: No lymphadenopathy   Cardiovascular: Normal S1 S2, no JVD  Respiratory: normal effort , clear  Gastrointestinal:  Soft, Non-tender  Skin: No rashes,  warm to touch  Psychiatry:  Mood & affect appropriate  Musculuskeletal: No edema    recent labs, Imaging and EKGs personally reviewed       03-31-23 @ 07:01  -  04-01-23 @ 07:00  --------------------------------------------------------  IN: 300 mL / OUT: 300 mL / NET: 0 mL                          9.8    15.46 )-----------( 210      ( 01 Apr 2023 03:38 )             32.2               04-01    132<L>  |  96<L>  |  14  ----------------------------<  98  4.3   |  30  |  0.57    Ca    8.4      01 Apr 2023 03:38  Phos  2.1     04-01  Mg     1.90     04-01

## 2023-04-02 LAB
ANION GAP SERPL CALC-SCNC: 7 MMOL/L — SIGNIFICANT CHANGE UP (ref 7–14)
BUN SERPL-MCNC: 12 MG/DL — SIGNIFICANT CHANGE UP (ref 7–23)
CALCIUM SERPL-MCNC: 8.2 MG/DL — LOW (ref 8.4–10.5)
CHLORIDE SERPL-SCNC: 97 MMOL/L — LOW (ref 98–107)
CO2 SERPL-SCNC: 28 MMOL/L — SIGNIFICANT CHANGE UP (ref 22–31)
CREAT SERPL-MCNC: 0.48 MG/DL — LOW (ref 0.5–1.3)
CULTURE RESULTS: SIGNIFICANT CHANGE UP
CULTURE RESULTS: SIGNIFICANT CHANGE UP
EGFR: 102 ML/MIN/1.73M2 — SIGNIFICANT CHANGE UP
GLUCOSE SERPL-MCNC: 93 MG/DL — SIGNIFICANT CHANGE UP (ref 70–99)
HCT VFR BLD CALC: 30.6 % — LOW (ref 39–50)
HGB BLD-MCNC: 9.3 G/DL — LOW (ref 13–17)
MAGNESIUM SERPL-MCNC: 1.8 MG/DL — SIGNIFICANT CHANGE UP (ref 1.6–2.6)
MCHC RBC-ENTMCNC: 28.3 PG — SIGNIFICANT CHANGE UP (ref 27–34)
MCHC RBC-ENTMCNC: 30.4 GM/DL — LOW (ref 32–36)
MCV RBC AUTO: 93 FL — SIGNIFICANT CHANGE UP (ref 80–100)
NRBC # BLD: 0 /100 WBCS — SIGNIFICANT CHANGE UP (ref 0–0)
NRBC # FLD: 0.03 K/UL — HIGH (ref 0–0)
PHOSPHATE SERPL-MCNC: 2 MG/DL — LOW (ref 2.5–4.5)
PLATELET # BLD AUTO: 188 K/UL — SIGNIFICANT CHANGE UP (ref 150–400)
POTASSIUM SERPL-MCNC: 3.7 MMOL/L — SIGNIFICANT CHANGE UP (ref 3.5–5.3)
POTASSIUM SERPL-SCNC: 3.7 MMOL/L — SIGNIFICANT CHANGE UP (ref 3.5–5.3)
RBC # BLD: 3.29 M/UL — LOW (ref 4.2–5.8)
RBC # FLD: 20.7 % — HIGH (ref 10.3–14.5)
SARS-COV-2 RNA SPEC QL NAA+PROBE: SIGNIFICANT CHANGE UP
SODIUM SERPL-SCNC: 132 MMOL/L — LOW (ref 135–145)
SPECIMEN SOURCE: SIGNIFICANT CHANGE UP
SPECIMEN SOURCE: SIGNIFICANT CHANGE UP
WBC # BLD: 13.71 K/UL — HIGH (ref 3.8–10.5)
WBC # FLD AUTO: 13.71 K/UL — HIGH (ref 3.8–10.5)

## 2023-04-02 PROCEDURE — 71045 X-RAY EXAM CHEST 1 VIEW: CPT | Mod: 26

## 2023-04-02 RX ADMIN — MAGNESIUM OXIDE 400 MG ORAL TABLET 400 MILLIGRAM(S): 241.3 TABLET ORAL at 18:03

## 2023-04-02 RX ADMIN — ERTAPENEM SODIUM 120 MILLIGRAM(S): 1 INJECTION, POWDER, LYOPHILIZED, FOR SOLUTION INTRAMUSCULAR; INTRAVENOUS at 18:03

## 2023-04-02 RX ADMIN — MAGNESIUM OXIDE 400 MG ORAL TABLET 400 MILLIGRAM(S): 241.3 TABLET ORAL at 09:44

## 2023-04-02 RX ADMIN — FAMOTIDINE 20 MILLIGRAM(S): 10 INJECTION INTRAVENOUS at 12:00

## 2023-04-02 RX ADMIN — ENOXAPARIN SODIUM 40 MILLIGRAM(S): 100 INJECTION SUBCUTANEOUS at 21:46

## 2023-04-02 RX ADMIN — SIMVASTATIN 40 MILLIGRAM(S): 20 TABLET, FILM COATED ORAL at 21:47

## 2023-04-02 RX ADMIN — MAGNESIUM OXIDE 400 MG ORAL TABLET 400 MILLIGRAM(S): 241.3 TABLET ORAL at 12:01

## 2023-04-02 RX ADMIN — Medication 25 MILLIGRAM(S): at 21:47

## 2023-04-02 RX ADMIN — ATENOLOL 12.5 MILLIGRAM(S): 25 TABLET ORAL at 05:33

## 2023-04-02 RX ADMIN — ALBUTEROL 2.5 MILLIGRAM(S): 90 AEROSOL, METERED ORAL at 15:50

## 2023-04-02 RX ADMIN — Medication 63.75 MILLIMOLE(S): at 14:13

## 2023-04-02 RX ADMIN — ESCITALOPRAM OXALATE 10 MILLIGRAM(S): 10 TABLET, FILM COATED ORAL at 12:01

## 2023-04-02 RX ADMIN — SENNA PLUS 2 TABLET(S): 8.6 TABLET ORAL at 21:47

## 2023-04-02 NOTE — CHART NOTE - NSCHARTNOTEFT_GEN_A_CORE
Pt seen. Son at bedside. Pt and son   expressing concerns over pt feeling more tired,  more SOB. Pt remains stable on 3L NC  O2 sats remain 98%    A+O x 3  Pt does appear more fatigued, but no distress  Sitting in chair.  Rt Pleurx site remains c/d/i  Pleurx with old dark sanguinous output.   Minimal drainage. To Pleuravac to waterseal  Pleurx stripped, draining freely  CXR obtained to r/o any new effusion, however, CXR  remains grossly stable. Minimal effusion noted.     PLan:  -No plans for thoracic intervention  -CXR remains stable, no new effusion  -Can cap Pleurx when pt ready for discharge  -Care and further w/u per medical team  Will follow as needed

## 2023-04-02 NOTE — PROGRESS NOTE ADULT - SUBJECTIVE AND OBJECTIVE BOX
DATE OF SERVICE: 04-02-23 @ 12:17    Subjective: Patient seen and examined. No new events except as noted.     SUBJECTIVE/ROS:  nad      MEDICATIONS:  MEDICATIONS  (STANDING):  atenolol  Tablet 12.5 milliGRAM(s) Oral daily  enoxaparin Injectable 40 milliGRAM(s) SubCutaneous every 24 hours  ertapenem  IVPB 1000 milliGRAM(s) IV Intermittent every 24 hours  escitalopram 10 milliGRAM(s) Oral daily  famotidine    Tablet 20 milliGRAM(s) Oral daily  magnesium oxide 400 milliGRAM(s) Oral three times a day with meals  polyethylene glycol 3350 17 Gram(s) Oral daily  senna 2 Tablet(s) Oral at bedtime  simvastatin 40 milliGRAM(s) Oral at bedtime  sodium phosphate 15 milliMole(s)/250 mL IVPB 15 milliMole(s) IV Intermittent once  traZODone 25 milliGRAM(s) Oral at bedtime      PHYSICAL EXAM:  T(C): 36.8 (04-02-23 @ 05:30), Max: 36.9 (04-01-23 @ 23:15)  HR: 88 (04-02-23 @ 05:30) (78 - 90)  BP: 123/73 (04-02-23 @ 05:30) (95/58 - 123/73)  RR: 18 (04-02-23 @ 05:30) (18 - 18)  SpO2: 95% (04-02-23 @ 05:30) (95% - 98%)  Wt(kg): --  I&O's Summary    01 Apr 2023 07:01  -  02 Apr 2023 07:00  --------------------------------------------------------  IN: 300 mL / OUT: 620 mL / NET: -320 mL            JVP: Normal  Neck: supple  Lung: clear   CV: S1 S2 , Murmur:  Abd: soft  Ext: No edema  neuro: Awake / alert  Psych: flat affect  Skin: normal``    LABS/DATA:    CARDIAC MARKERS:                                9.3    13.71 )-----------( 188      ( 02 Apr 2023 06:11 )             30.6     04-02    132<L>  |  97<L>  |  12  ----------------------------<  93  3.7   |  28  |  0.48<L>    Ca    8.2<L>      02 Apr 2023 06:11  Phos  2.0     04-02  Mg     1.80     04-02      proBNP:   Lipid Profile:   HgA1c:   TSH:     TELE:  EKG:

## 2023-04-02 NOTE — PROGRESS NOTE ADULT - PROBLEM SELECTOR PLAN 2
acute onset  no fever, not toxic looking  Zarxio given by hematology,   Infectious W/U negative  D/C antibtioics acute onset  no fever, not toxic looking  Zarxio given by hematology,   Infectious W/U negative  Urine ESBL, ertapenem x 5 days per ID

## 2023-04-02 NOTE — PROGRESS NOTE ADULT - SUBJECTIVE AND OBJECTIVE BOX
Name of Patient : ARIK DUMONT  MRN: [id number]  Date of visit: 04-02-23 @ 20:56      Subjective: Patient seen and examined. No new events except as noted.     REVIEW OF SYSTEMS:    CONSTITUTIONAL: No weakness, fevers or chills  EYES/ENT: No visual changes;  No vertigo or throat pain   NECK: No pain or stiffness  RESPIRATORY: No cough, wheezing, hemoptysis; No shortness of breath  CARDIOVASCULAR: No chest pain or palpitations  GASTROINTESTINAL: No abdominal or epigastric pain. No nausea, vomiting, or hematemesis; No diarrhea or constipation. No melena or hematochezia.  GENITOURINARY: No dysuria, frequency or hematuria  NEUROLOGICAL: No numbness or weakness  SKIN: No itching, burning, rashes, or lesions   All other review of systems is negative unless indicated above.    MEDICATIONS:  MEDICATIONS  (STANDING):  atenolol  Tablet 12.5 milliGRAM(s) Oral daily  enoxaparin Injectable 40 milliGRAM(s) SubCutaneous every 24 hours  ertapenem  IVPB 1000 milliGRAM(s) IV Intermittent every 24 hours  escitalopram 10 milliGRAM(s) Oral daily  famotidine    Tablet 20 milliGRAM(s) Oral daily  magnesium oxide 400 milliGRAM(s) Oral three times a day with meals  polyethylene glycol 3350 17 Gram(s) Oral daily  senna 2 Tablet(s) Oral at bedtime  simvastatin 40 milliGRAM(s) Oral at bedtime  traZODone 25 milliGRAM(s) Oral at bedtime      PHYSICAL EXAM:  T(C): 36.4 (04-02-23 @ 20:35), Max: 36.9 (04-01-23 @ 23:15)  HR: 85 (04-02-23 @ 20:35) (78 - 90)  BP: 117/78 (04-02-23 @ 20:35) (116/69 - 123/73)  RR: 18 (04-02-23 @ 20:35) (18 - 18)  SpO2: 98% (04-02-23 @ 20:35) (95% - 98%)  Wt(kg): --  I&O's Summary    01 Apr 2023 07:01  -  02 Apr 2023 07:00  --------------------------------------------------------  IN: 300 mL / OUT: 620 mL / NET: -320 mL          Appearance: Normal	  HEENT:  PERRLA   Lymphatic: No lymphadenopathy   Cardiovascular: Normal S1 S2, no JVD  Respiratory: normal effort , clear  Gastrointestinal:  Soft, Non-tender  Skin: No rashes,  warm to touch  Psychiatry:  Mood & affect appropriate  Musculuskeletal: No edema    recent labs, Imaging and EKGs personally reviewed     04-01-23 @ 07:01  -  04-02-23 @ 07:00  --------------------------------------------------------  IN: 300 mL / OUT: 620 mL / NET: -320 mL                          9.3    13.71 )-----------( 188      ( 02 Apr 2023 06:11 )             30.6               04-02    132<L>  |  97<L>  |  12  ----------------------------<  93  3.7   |  28  |  0.48<L>    Ca    8.2<L>      02 Apr 2023 06:11  Phos  2.0     04-02  Mg     1.80     04-02

## 2023-04-02 NOTE — PHARMACOTHERAPY INTERVENTION NOTE - COMMENTS
Duplicate therapy noted with reaction med for brentuximab vedotin; acetaminophen 650 mG PO Q6H PRN for fever was written on paper chemo order under "REACTION MEDICATION" section, order duplicate with existing active order PRN for fever and mild pain.     Order on paper chemo order not transcribed due to duplication with existing order.

## 2023-04-03 LAB
ANION GAP SERPL CALC-SCNC: 8 MMOL/L — SIGNIFICANT CHANGE UP (ref 7–14)
BUN SERPL-MCNC: 11 MG/DL — SIGNIFICANT CHANGE UP (ref 7–23)
CALCIUM SERPL-MCNC: 8.5 MG/DL — SIGNIFICANT CHANGE UP (ref 8.4–10.5)
CHLORIDE SERPL-SCNC: 97 MMOL/L — LOW (ref 98–107)
CO2 SERPL-SCNC: 28 MMOL/L — SIGNIFICANT CHANGE UP (ref 22–31)
CREAT SERPL-MCNC: 0.49 MG/DL — LOW (ref 0.5–1.3)
CULTURE RESULTS: SIGNIFICANT CHANGE UP
CULTURE RESULTS: SIGNIFICANT CHANGE UP
EGFR: 102 ML/MIN/1.73M2 — SIGNIFICANT CHANGE UP
GLUCOSE SERPL-MCNC: 87 MG/DL — SIGNIFICANT CHANGE UP (ref 70–99)
HCT VFR BLD CALC: 30.4 % — LOW (ref 39–50)
HGB BLD-MCNC: 9.2 G/DL — LOW (ref 13–17)
MAGNESIUM SERPL-MCNC: 2 MG/DL — SIGNIFICANT CHANGE UP (ref 1.6–2.6)
MCHC RBC-ENTMCNC: 28.7 PG — SIGNIFICANT CHANGE UP (ref 27–34)
MCHC RBC-ENTMCNC: 30.3 GM/DL — LOW (ref 32–36)
MCV RBC AUTO: 94.7 FL — SIGNIFICANT CHANGE UP (ref 80–100)
NRBC # BLD: 0 /100 WBCS — SIGNIFICANT CHANGE UP (ref 0–0)
NRBC # FLD: 0.03 K/UL — HIGH (ref 0–0)
PHOSPHATE SERPL-MCNC: 2.3 MG/DL — LOW (ref 2.5–4.5)
PLATELET # BLD AUTO: 185 K/UL — SIGNIFICANT CHANGE UP (ref 150–400)
POTASSIUM SERPL-MCNC: 3.8 MMOL/L — SIGNIFICANT CHANGE UP (ref 3.5–5.3)
POTASSIUM SERPL-SCNC: 3.8 MMOL/L — SIGNIFICANT CHANGE UP (ref 3.5–5.3)
RBC # BLD: 3.21 M/UL — LOW (ref 4.2–5.8)
RBC # FLD: 21.1 % — HIGH (ref 10.3–14.5)
SODIUM SERPL-SCNC: 133 MMOL/L — LOW (ref 135–145)
SPECIMEN SOURCE: SIGNIFICANT CHANGE UP
SPECIMEN SOURCE: SIGNIFICANT CHANGE UP
WBC # BLD: 14.48 K/UL — HIGH (ref 3.8–10.5)
WBC # FLD AUTO: 14.48 K/UL — HIGH (ref 3.8–10.5)

## 2023-04-03 PROCEDURE — 99232 SBSQ HOSP IP/OBS MODERATE 35: CPT

## 2023-04-03 PROCEDURE — 99232 SBSQ HOSP IP/OBS MODERATE 35: CPT | Mod: GC

## 2023-04-03 RX ADMIN — ATENOLOL 12.5 MILLIGRAM(S): 25 TABLET ORAL at 05:57

## 2023-04-03 RX ADMIN — MAGNESIUM OXIDE 400 MG ORAL TABLET 400 MILLIGRAM(S): 241.3 TABLET ORAL at 17:43

## 2023-04-03 RX ADMIN — SIMVASTATIN 40 MILLIGRAM(S): 20 TABLET, FILM COATED ORAL at 21:50

## 2023-04-03 RX ADMIN — Medication 650 MILLIGRAM(S): at 12:24

## 2023-04-03 RX ADMIN — BRENTUXIMAB VEDOTIN 100 MILLIGRAM(S): 50 INJECTION, POWDER, LYOPHILIZED, FOR SOLUTION INTRAVENOUS at 13:29

## 2023-04-03 RX ADMIN — MAGNESIUM OXIDE 400 MG ORAL TABLET 400 MILLIGRAM(S): 241.3 TABLET ORAL at 11:54

## 2023-04-03 RX ADMIN — ESCITALOPRAM OXALATE 10 MILLIGRAM(S): 10 TABLET, FILM COATED ORAL at 11:54

## 2023-04-03 RX ADMIN — MAGNESIUM OXIDE 400 MG ORAL TABLET 400 MILLIGRAM(S): 241.3 TABLET ORAL at 08:50

## 2023-04-03 RX ADMIN — ERTAPENEM SODIUM 120 MILLIGRAM(S): 1 INJECTION, POWDER, LYOPHILIZED, FOR SOLUTION INTRAMUSCULAR; INTRAVENOUS at 17:36

## 2023-04-03 RX ADMIN — ENOXAPARIN SODIUM 40 MILLIGRAM(S): 100 INJECTION SUBCUTANEOUS at 21:50

## 2023-04-03 RX ADMIN — FAMOTIDINE 20 MILLIGRAM(S): 10 INJECTION INTRAVENOUS at 11:55

## 2023-04-03 RX ADMIN — ONDANSETRON 8 MILLIGRAM(S): 8 TABLET, FILM COATED ORAL at 12:24

## 2023-04-03 RX ADMIN — Medication 25 MILLIGRAM(S): at 21:50

## 2023-04-03 RX ADMIN — CETIRIZINE HYDROCHLORIDE 10 MILLIGRAM(S): 10 TABLET ORAL at 12:24

## 2023-04-03 NOTE — PROGRESS NOTE ADULT - SUBJECTIVE AND OBJECTIVE BOX
Hematology/Oncology Follow-up    INTERVAL HPI/OVERNIGHT EVENTS:  Patient S&E at bedside. No o/n events, patient resting comfortably. No complaints at this time. Patient denies fever, chills, dizziness, weakness, CP, palpitations, SOB, cough, N/V/D/C, dysuria, changes in bowel movements, LE edema.    VITAL SIGNS:  T(F): 97.8 (04-03-23 @ 09:55)  HR: 66 (04-03-23 @ 09:55)  BP: 102/62 (04-03-23 @ 09:55)  RR: 18 (04-03-23 @ 09:55)  SpO2: 94% (04-03-23 @ 09:55)  Wt(kg): --    PHYSICAL EXAM:    Constitutional: AAOx3, NAD,   Eyes: PERRL, EOMI, sclera non-icteric  Neck: supple, no masses, no JVD  Respiratory: CTA b/l, good air entry b/l, no wheezing, rhonchi, rales, with normal respiratory effort and no intercostal retractions  Cardiovascular: RRR, normal S1S2, no M/R/G  Gastrointestinal: soft, NTND, no masses palpable, BS normal in all four quadrants, no HSM  Extremities:  no c/c/e  Neurological: Grossly intact  Skin: No rash or lesion    MEDICATIONS  (STANDING):  atenolol  Tablet 12.5 milliGRAM(s) Oral daily  brentuximab vedotin IVPB (eMAR) 100 milliGRAM(s) IV Intermittent once  enoxaparin Injectable 40 milliGRAM(s) SubCutaneous every 24 hours  ertapenem  IVPB 1000 milliGRAM(s) IV Intermittent every 24 hours  escitalopram 10 milliGRAM(s) Oral daily  famotidine    Tablet 20 milliGRAM(s) Oral daily  magnesium oxide 400 milliGRAM(s) Oral three times a day with meals  polyethylene glycol 3350 17 Gram(s) Oral daily  senna 2 Tablet(s) Oral at bedtime  simvastatin 40 milliGRAM(s) Oral at bedtime  traZODone 25 milliGRAM(s) Oral at bedtime    MEDICATIONS  (PRN):  acetaminophen     Tablet .. 650 milliGRAM(s) Oral every 6 hours PRN Temp greater or equal to 38C (100.4F), Mild Pain (1 - 3)  albuterol   0.5% 2.5 milliGRAM(s) Nebulizer every 6 hours PRN Shortness of Breath  diphenhydrAMINE Injectable 50 milliGRAM(s) IV Push every 6 hours PRN Rash and/or Itching  hydrocortisone sodium succinate Injectable 100 milliGRAM(s) IV Push once PRN Infusion Reaction  oxyCODONE    IR 2.5 milliGRAM(s) Oral once PRN Severe Pain (7 - 10)      pertussis vaccines (Rash)  shellfish (Rash)      LABS:                        9.2    14.48 )-----------( 185      ( 03 Apr 2023 05:30 )             30.4     04-03    133<L>  |  97<L>  |  11  ----------------------------<  87  3.8   |  28  |  0.49<L>    Ca    8.5      03 Apr 2023 05:30  Phos  2.3     04-03  Mg     2.00     04-03             RADIOLOGY & ADDITIONAL TESTS:  Studies reviewed.   Hematology/Oncology Follow-up    INTERVAL HPI/OVERNIGHT EVENTS:  Patient S&E at bedside. No o/n events, patient resting comfortably. No complaints at this time. Patient denies fever, chills, dizziness, weakness, CP, palpitations, SOB, cough, N/V/D/C, dysuria, changes in bowel movements, LE edema.    VITAL SIGNS:  T(F): 97.8 (04-03-23 @ 09:55)  HR: 66 (04-03-23 @ 09:55)  BP: 102/62 (04-03-23 @ 09:55)  RR: 18 (04-03-23 @ 09:55)  SpO2: 94% (04-03-23 @ 09:55)  Wt(kg): --    PHYSICAL EXAM:    Constitutional: AAOx3, NAD, NC in place  Eyes: PERRL, EOMI, sclera non-icteric  Neck: supple, no masses, no JVD  Respiratory: CTA b/l, good air entry b/l, no wheezing, rhonchi, rales, with normal respiratory effort and no intercostal retractions. CT in place. bloody output  Cardiovascular: RRR, normal S1S2, no M/R/G  Gastrointestinal: soft, NTND, no masses palpable, BS normal in all four quadrants, no HSM  Extremities:  no c/c/e  Neurological: Grossly intact  Skin: No rash or lesion    MEDICATIONS  (STANDING):  atenolol  Tablet 12.5 milliGRAM(s) Oral daily  brentuximab vedotin IVPB (eMAR) 100 milliGRAM(s) IV Intermittent once  enoxaparin Injectable 40 milliGRAM(s) SubCutaneous every 24 hours  ertapenem  IVPB 1000 milliGRAM(s) IV Intermittent every 24 hours  escitalopram 10 milliGRAM(s) Oral daily  famotidine    Tablet 20 milliGRAM(s) Oral daily  magnesium oxide 400 milliGRAM(s) Oral three times a day with meals  polyethylene glycol 3350 17 Gram(s) Oral daily  senna 2 Tablet(s) Oral at bedtime  simvastatin 40 milliGRAM(s) Oral at bedtime  traZODone 25 milliGRAM(s) Oral at bedtime    MEDICATIONS  (PRN):  acetaminophen     Tablet .. 650 milliGRAM(s) Oral every 6 hours PRN Temp greater or equal to 38C (100.4F), Mild Pain (1 - 3)  albuterol   0.5% 2.5 milliGRAM(s) Nebulizer every 6 hours PRN Shortness of Breath  diphenhydrAMINE Injectable 50 milliGRAM(s) IV Push every 6 hours PRN Rash and/or Itching  hydrocortisone sodium succinate Injectable 100 milliGRAM(s) IV Push once PRN Infusion Reaction  oxyCODONE    IR 2.5 milliGRAM(s) Oral once PRN Severe Pain (7 - 10)      pertussis vaccines (Rash)  shellfish (Rash)      LABS:                        9.2    14.48 )-----------( 185      ( 03 Apr 2023 05:30 )             30.4     04-03    133<L>  |  97<L>  |  11  ----------------------------<  87  3.8   |  28  |  0.49<L>    Ca    8.5      03 Apr 2023 05:30  Phos  2.3     04-03  Mg     2.00     04-03             RADIOLOGY & ADDITIONAL TESTS:  Studies reviewed.

## 2023-04-03 NOTE — PROGRESS NOTE ADULT - ASSESSMENT
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 Patient is poor historian, history obtained by son Francisco at bedside. States that  patient has been recovering wellat  rehab. States patient was able to walk over 150 ft while at rehab. States that suddenly on Friday patient started to develop acute SOB and increased WOB. Imaging was done at  rehab showing worsening pleural effusions EMS was called and patient was noted to be tachypneic to 30s and placed on 10L NRB.    Recently admitted from 3/15-3/24 for worsening SOB was found to have  significant for a large R pleural effusion with mediastinal and hilar lymphadenopathy, and 3L of fluid was drained. Pleural fluid cultures and blood cultures resulted negative.  Suspicious of underlying malignancy secondary to lymphadenopathy and persistent leukocytosis, Heme/Onc was consulted and recommended transfer to [facility] on 2/24/23 for further workup. On 2/28 he underwent an ultrasound guided biopsy of his R supraclavicular mass/lymph node, which later resulted as classic Hodgkin's lymphoma.  On 3/3, he underwent a R thoracotomy/VATS with Pleurex placement. Left chest wall mediport was placed and received  first cycle of chemotherapy on 3/10. (25 Mar 2023 16:44)    no wpulm called:  pt has chest tube on rigth side:  has bloody secretions  he looks comfortable:  no sob :      right sided pl effusion:  A fibrillation:   HTN:  Urinary retention     3/27:  right sided pl effusion:  [id number]:  blood pl fluid  ; on ac;  cts surgery to see:  chest xray today looks pretty good to me: not hypercarbic and acidotic on VBG   A fibrillation: off ac at t his time  HTN:  blood pressure is controlled  Urinary retention  ; has chr plummer: :    marah pmd    3/28:    right sided pl effusion:  has lymphoma:  blood pl fluid  ; on ac;  cts surgery to see:  chest xray today looks pretty good to me: not hypercarbic and acidotic on VBG   A fibrillation: off ac at t his time: no tpa done as the pl fluid is very blood:   HTN:  blood pressure is controlled  Urinary retention  ; has chr plummer: :    marah pmd      3/29:    right sided pl effusion:  has lymphoma:  blood pl fluid  ; on ac;  cts surgery to see:  chest xray today looks pretty good to me: not hypercarbic and acidotic on VBG : his wbc is increased today  : ? etiology : ct scan chest awaited:  check procal as well as monitor for fever:   A fibrillation: off ac at t his time: no tpa done as the pl fluid is very blood:   HTN:  blood pressure is controlled  Urinary retention  ; has chr plummer: :    marah pmd      3/30:    right sided pl effusion:  has lymphoma:  blood pl fluid  ; off ac;  rpt ct chest noted from last night:  has mild hemothorax and pneumothorax too ; with alexey effusion; His WBC is high  but he has no fever: Slight jump in his procal : but he doesnto look septic to me ? could it be reactive:  he does have underlying lymphoma:   A fibrillation: off ac at t his time: no tpa done as the pl fluid is very bloody - and now he will be off ac:    HTN:  blood pressure is controlled  Urinary retention  ; has chr plummer: :    marah cts      3/31:    right sided pl effusion:  has lymphoma:  blood pl fluid  ; off ac;  rpt ct chest noted from last night:  has mild hemothorax and pneumothorax too ; with alexey effusion; His WBC is high  but he has no fever: Slight jump in his procal : but he doesnto look septic to me ? could it be reactive:  he does have underlying lymphoma: ct scan pan body yesterday revealed *  Right pleural chest tube in place. Loculated right hemothorax, decreased from the prior chest CT. Trace right pneumothorax, likely  related to the presence of the chest tube. Redemonstrated hyperattenuation centered along the right pleural space  and along the mediastinum, which could reflect residual loculated  hemothorax but is again questioned to represent neoplastic pleural  thickening. Continued follow-up to ensure resolution is recommended.  Peripheral reticular opacities and scattered nodular opacities in the  bilateral lungs, which appear similar to prior imaging and may be  infectious/inflammatory or neoplastic in etiology. Continued follow-up is  recommended. Intermittent opacification of the right lower and middle lobe airways, which may reflect mucoid impaction. *  Mediastinal and hilar lymphadenopathy, overall similar to mildly decreased in size since 3/10/202 Perivesicular fat stranding. Cystitis is considered. Recommend  correlation with urinalysis Unchanged penetrating ulcer of infrarenal abdominalaorta.    A fibrillation: off ac at t his time: no tpa done as the pl fluid is very bloody - and now he will be off ac:    HTN:  blood pressure is controlled  Urinary retention  ; has chr plummer: :      4/1 Awake and responding appropriately. CT management as per thoracic    4/3;    right sided pl effusion:  has lymphoma:  blood pl fluid  ; off ac;  rpt ct chest noted from last night:  has mild hemothorax and pneumothorax too ; with alexey effusion; His WBC is high  but he has no fever: Slight jump in his procal : but he doesnto look septic to me ? could it be reactive:  he does have underlying lymphoma: ct scan pan body yesterday revealed *  Right pleural chest tube in place. Loculated right hemothorax, decreased from the prior chest CT. Trace right pneumothorax, likely  related to the presence of the chest tube. Redemonstrated hyperattenuation centered along the right pleural space  and along the mediastinum, which could reflect residual loculated  hemothorax but is again questioned to represent neoplastic pleural  thickening. Continued follow-up to ensure resolution is recommended.  Peripheral reticular opacities and scattered nodular opacities in the  bilateral lungs, which appear similar to prior imaging and may be  infectious/inflammatory or neoplastic in etiology. Continued follow-up is  recommended. Intermittent opacification of the right lower and middle lobe airways, which may reflect mucoid impaction. *  Mediastinal and hilar lymphadenopathy, overall similar to mildly decreased in size since 3/10/202 Perivesicular fat stranding.:    he is getting chemo per oncology  : for Hodgkins lymphoma: abgin am      A fibrillation: off ac at t his time: no tpa done as the pl fluid is very bloody - and now he will be off ac:    HTN:  blood pressure is controlled  Urinary retention  ; has chr plummer: ::    marah lee

## 2023-04-03 NOTE — PROGRESS NOTE ADULT - SUBJECTIVE AND OBJECTIVE BOX
Name of Patient : ARIK DUMONT  MRN: [id number]  Date of visit: 04-03-23      Subjective: Patient seen and examined. No new events except as noted.   Chemo today       REVIEW OF SYSTEMS:    CONSTITUTIONAL: No weakness, fevers or chills  EYES/ENT: No visual changes;  No vertigo or throat pain   NECK: No pain or stiffness  RESPIRATORY: No cough, wheezing, hemoptysis; No shortness of breath  CARDIOVASCULAR: No chest pain or palpitations  GASTROINTESTINAL: No abdominal or epigastric pain. No nausea, vomiting, or hematemesis; No diarrhea or constipation. No melena or hematochezia.  GENITOURINARY: No dysuria, frequency or hematuria  NEUROLOGICAL: No numbness or weakness  SKIN: No itching, burning, rashes, or lesions   All other review of systems is negative unless indicated above.    MEDICATIONS:  MEDICATIONS  (STANDING):  atenolol  Tablet 12.5 milliGRAM(s) Oral daily  enoxaparin Injectable 40 milliGRAM(s) SubCutaneous every 24 hours  ertapenem  IVPB 1000 milliGRAM(s) IV Intermittent every 24 hours  escitalopram 10 milliGRAM(s) Oral daily  famotidine    Tablet 20 milliGRAM(s) Oral daily  magnesium oxide 400 milliGRAM(s) Oral three times a day with meals  polyethylene glycol 3350 17 Gram(s) Oral daily  senna 2 Tablet(s) Oral at bedtime  simvastatin 40 milliGRAM(s) Oral at bedtime  traZODone 25 milliGRAM(s) Oral at bedtime      PHYSICAL EXAM:  T(C): 37.1 (04-03-23 @ 17:15), Max: 37.1 (04-03-23 @ 17:15)  HR: 87 (04-03-23 @ 17:15) (66 - 87)  BP: 127/76 (04-03-23 @ 17:15) (99/61 - 127/76)  RR: 18 (04-03-23 @ 17:15) (16 - 18)  SpO2: 96% (04-03-23 @ 17:15) (94% - 98%)  Wt(kg): --  I&O's Summary    02 Apr 2023 07:01  -  03 Apr 2023 07:00  --------------------------------------------------------  IN: 100 mL / OUT: 290 mL / NET: -190 mL        Weight (kg): 80.2 (04-03 @ 11:13)    Appearance: Normal	  HEENT:  PERRLA   Lymphatic: No lymphadenopathy   Cardiovascular: Normal S1 S2, no JVD  Respiratory: normal effort , clear  Gastrointestinal:  Soft, Non-tender  Skin: No rashes,  warm to touch  Psychiatry:  Mood & affect appropriate  Musculuskeletal: No edema    recent labs, Imaging and EKGs personally reviewed     04-02-23 @ 07:01  -  04-03-23 @ 07:00  --------------------------------------------------------  IN: 100 mL / OUT: 290 mL / NET: -190 mL                          9.2    14.48 )-----------( 185      ( 03 Apr 2023 05:30 )             30.4               04-03    133<L>  |  97<L>  |  11  ----------------------------<  87  3.8   |  28  |  0.49<L>    Ca    8.5      03 Apr 2023 05:30  Phos  2.3     04-03  Mg     2.00     04-03

## 2023-04-03 NOTE — PROGRESS NOTE ADULT - SUBJECTIVE AND OBJECTIVE BOX
Follow Up:  leukocytosis, positive urine cx, loculated pleural effusion    Interval History/ROS: no fever, cough, abd pain, flank pain, but feels tired and still on NC        Allergies  pertussis vaccines (Rash)  shellfish (Rash)        ANTIMICROBIALS:  ertapenem  IVPB 1000 every 24 hours      OTHER MEDS:  acetaminophen     Tablet .. 650 milliGRAM(s) Oral every 6 hours PRN  acetaminophen     Tablet .. 650 milliGRAM(s) Oral once  albuterol   0.5% 2.5 milliGRAM(s) Nebulizer every 6 hours PRN  atenolol  Tablet 12.5 milliGRAM(s) Oral daily  brentuximab vedotin IVPB (eMAR) 100 milliGRAM(s) IV Intermittent once  cetirizine 10 milliGRAM(s) Oral once  diphenhydrAMINE Injectable 50 milliGRAM(s) IV Push every 6 hours PRN  enoxaparin Injectable 40 milliGRAM(s) SubCutaneous every 24 hours  escitalopram 10 milliGRAM(s) Oral daily  famotidine    Tablet 20 milliGRAM(s) Oral daily  hydrocortisone sodium succinate Injectable 100 milliGRAM(s) IV Push once PRN  magnesium oxide 400 milliGRAM(s) Oral three times a day with meals  ondansetron Injectable 8 milliGRAM(s) IV Push once  oxyCODONE    IR 2.5 milliGRAM(s) Oral once PRN  polyethylene glycol 3350 17 Gram(s) Oral daily  senna 2 Tablet(s) Oral at bedtime  simvastatin 40 milliGRAM(s) Oral at bedtime  traZODone 25 milliGRAM(s) Oral at bedtime      Vital Signs Last 24 Hrs  T(C): 36.6 (03 Apr 2023 09:55), Max: 36.9 (03 Apr 2023 05:55)  T(F): 97.8 (03 Apr 2023 09:55), Max: 98.4 (03 Apr 2023 05:55)  HR: 66 (03 Apr 2023 09:55) (66 - 90)  BP: 102/62 (03 Apr 2023 09:55) (102/62 - 127/75)  BP(mean): --  RR: 18 (03 Apr 2023 09:55) (16 - 18)  SpO2: 94% (03 Apr 2023 09:55) (94% - 98%)    Parameters below as of 03 Apr 2023 09:55  Patient On (Oxygen Delivery Method): nasal cannula  O2 Flow (L/min): 3      Physical Exam:  General:   non toxic  Respiratory:   R chest tube, on 3 L NC  abd:   soft, BS +, not tender  :     no CVAT, no suprapubic tenderness, + plummer  Musculoskeletal : no joint swelling  Skin:    no rash  vascular: L chest mediport with no tenderness or erythema                          9.2    14.48 )-----------( 185      ( 03 Apr 2023 05:30 )             30.4       04-03    133<L>  |  97<L>  |  11  ----------------------------<  87  3.8   |  28  |  0.49<L>    Ca    8.5      03 Apr 2023 05:30  Phos  2.3     04-03  Mg     2.00     04-03            MICROBIOLOGY:  v  .Blood Blood-Peripheral  03-29-23   No growth to date.  --  --      .Blood Blood-Peripheral  03-29-23   No growth to date.  --  --      Clean Catch Clean Catch (Midstream)  03-28-23   50,000 - 99,000 CFU/mL Escherichia coli ESBL  --  Escherichia coli ESBL      .Blood Blood-Peripheral  03-27-23   No Growth Final  --  --      .Blood Blood-Peripheral  03-27-23   No Growth Final  --  --      .Blood Blood-Peripheral  03-24-23   No Growth Final  --  --      .Blood Blood-Peripheral  03-24-23   No Growth Final  --  --                RADIOLOGY:  Images independently visualized and reviewed personally, findings as below  < from: Xray Chest 1 View- PORTABLE-Urgent (Xray Chest 1 View- PORTABLE-Urgent .) (04.02.23 @ 14:00) >  Impression:  Bilateral lung infective changes are stable.  Stable small right lateral pneumothorax.    < end of copied text >  < from: CT Abdomen and Pelvis w/ Oral Cont and w/ IV Cont (03.29.23 @ 18:34) >  IMPRESSION:    *  Right pleural chest tube in place. Loculated right hemothorax,  decreased from the prior chest CT. Trace right pneumothorax, likely   related to the presence of the chest tube.  *  Redemonstrated hyperattenuation centered along the right pleural space   and along the mediastinum, which could reflect residual loculated   hemothorax but is again questioned to represent neoplastic pleural   thickening. Continued follow-up to ensure resolution is recommended.  *  Peripheral reticular opacities and scattered nodular opacities in the   bilateral lungs, which appear similar to prior imaging and may be   infectious/inflammatory or neoplastic in etiology. Continued follow-up is   recommended.  *  Intermittent opacification of the right lower and middle lobe airways,   which may reflect mucoid impaction.  *  Mediastinal and hilar lymphadenopathy, overall similar to mildly   decreased in size since 3/10/2023.  *  Perivesicular fat stranding. Cystitis is considered. Recommend   correlation with urinalysis.  *  Unchanged penetrating ulcer of infrarenal abdominalaorta.    < end of copied text >

## 2023-04-03 NOTE — PROGRESS NOTE ADULT - ASSESSMENT
ARIK DUMONT is a 82y Male with a past medical history as described above who presented for evaluation of shortness of breath. [facility] from  [facility]. CXR revealed increased pleural effusions, likely loculated. Thoracics following, PleurX to waterseal.     # Classical Hodgkin Lymphoma   - S/P Cycle 1 Brentuximab vedotin (BV) 3/10/23  - C2D1 due this Friday 3/31, but given current state and increased lethargy, likely will hold off for now. Plan to give C2 brentuximab today.  - CTS note from yesterday states to continue holding AC - may need to be off for long term   - Thoracic surgery to cap the PleurX when ready for discharge, recommend doing it now.  - Blood culture NGTD, UCx ESBL, on ertapenem  - Received one dose of Zarxio; ANC much improved  - Family updated daily   - CXR daily   - CBC with diff daily   - Thoracic input appreciated, no procedure for now, monitoring serosanguinous output    # Leukocytosis, coming down  - Zarxio effect       Please page with questions or concerns. Will Follow with you.      Karl Salomon M.D.  Hematology/Oncology Fellow PGY5  Pager [phone]  After 5pm, please contact on-call team.          82 year old man with a past medical history as described above who presented for evaluation of shortness of breath. [facility]. CXR revealed increased pleural effusions, likely loculated. Thoracics following, PleurX to waterseal. Hematology on board for management of cHL.    # Classical Hodgkin Lymphoma   - S/P Cycle 1 Brentuximab vedotin (BV) 3/10/23  - C2D1 due this Friday 3/31, but given current state and increased lethargy, likely will hold off for now. Plan to give C2 brentuximab today.  - CTS note from yesterday states to continue holding AC - may need to be off for long term   - Thoracic surgery to cap the PleurX when ready for discharge, recommend doing it now.  - Blood culture NGTD, UCx ESBL, on ertapenem  - Received one dose of Zarxio; ANC much improved  - Family updated daily   - CXR daily   - CBC with diff daily   - Thoracic input appreciated, no procedure for now, monitoring serosanguinous output    # Leukocytosis  -coming down  - Zarxio effect       Please page with questions or concerns. Will Follow with you.      Karl Salomon M.D.  Hematology/Oncology Fellow PGY5  Pager [phone]  After 5pm, please contact on-call team.

## 2023-04-03 NOTE — PROGRESS NOTE ADULT - PROBLEM SELECTOR PLAN 2
acute onset  no fever, not toxic looking  Zarxio given by hematology,   Infectious W/U negative  Urine ESBL, ertapenem x 5 days per ID

## 2023-04-03 NOTE — PROGRESS NOTE ADULT - SUBJECTIVE AND OBJECTIVE BOX
Date of Service: 04-03-23 @ 15:49    Patient is a 82y old  Male who presents with a chief complaint of pl effusion (27 Mar 2023 10:54)      Any change in ROS: lying in bed with no sob: ;  on 2 L ; no apparent restp distress    MEDICATIONS  (STANDING):  atenolol  Tablet 12.5 milliGRAM(s) Oral daily  enoxaparin Injectable 40 milliGRAM(s) SubCutaneous every 24 hours  ertapenem  IVPB 1000 milliGRAM(s) IV Intermittent every 24 hours  escitalopram 10 milliGRAM(s) Oral daily  famotidine    Tablet 20 milliGRAM(s) Oral daily  magnesium oxide 400 milliGRAM(s) Oral three times a day with meals  polyethylene glycol 3350 17 Gram(s) Oral daily  senna 2 Tablet(s) Oral at bedtime  simvastatin 40 milliGRAM(s) Oral at bedtime  traZODone 25 milliGRAM(s) Oral at bedtime    MEDICATIONS  (PRN):  acetaminophen     Tablet .. 650 milliGRAM(s) Oral every 6 hours PRN Temp greater or equal to 38C (100.4F), Mild Pain (1 - 3)  albuterol   0.5% 2.5 milliGRAM(s) Nebulizer every 6 hours PRN Shortness of Breath  diphenhydrAMINE Injectable 50 milliGRAM(s) IV Push every 6 hours PRN Rash and/or Itching  hydrocortisone sodium succinate Injectable 100 milliGRAM(s) IV Push once PRN Infusion Reaction  oxyCODONE    IR 2.5 milliGRAM(s) Oral once PRN Severe Pain (7 - 10)    Vital Signs Last 24 Hrs  T(C): 36.7 (03 Apr 2023 13:15), Max: 36.9 (03 Apr 2023 05:55)  T(F): 98.1 (03 Apr 2023 13:15), Max: 98.4 (03 Apr 2023 05:55)  HR: 70 (03 Apr 2023 13:15) (66 - 86)  BP: 99/61 (03 Apr 2023 13:15) (99/61 - 127/75)  BP(mean): --  RR: 16 (03 Apr 2023 13:15) (16 - 18)  SpO2: 97% (03 Apr 2023 13:15) (94% - 98%)    Parameters below as of 03 Apr 2023 13:15  Patient On (Oxygen Delivery Method): nasal cannula  O2 Flow (L/min): 3      I&O's Summary    02 Apr 2023 07:01  -  03 Apr 2023 07:00  --------------------------------------------------------  IN: 100 mL / OUT: 290 mL / NET: -190 mL          Physical Exam:   GENERAL: NAD, well-groomed, well-developed  HEENT: CHUCK/   Atraumatic, Normocephalic  ENMT: No tonsillar erythema, exudates, or enlargement; Moist mucous membranes, Good dentition, No lesions  NECK: Supple, No JVD, Normal thyroid  CHEST/LUNG: Clear to auscultaion-  rt sided chest tube  CVS: Regular rate and rhythm; No murmurs, rubs, or gallops  GI: : Soft, Nontender, Nondistended; Bowel sounds present  NERVOUS SYSTEM:  Alert & awake  EXTREMITIES:  Mild  edema  LYMPH: No lymphadenopathy noted  SKIN: No rashes or lesions  ENDOCRINOLOGY: No Thyromegaly  PSYCH: Appropriate    Labs:                              9.2    14.48 )-----------( 185      ( 03 Apr 2023 05:30 )             30.4                         9.3    13.71 )-----------( 188      ( 02 Apr 2023 06:11 )             30.6                         9.8    15.46 )-----------( 210      ( 01 Apr 2023 03:38 )             32.2                         9.4    20.20 )-----------( 247      ( 31 Mar 2023 06:00 )             31.3     04-03    133<L>  |  97<L>  |  11  ----------------------------<  87  3.8   |  28  |  0.49<L>  04-02    132<L>  |  97<L>  |  12  ----------------------------<  93  3.7   |  28  |  0.48<L>  04-01    132<L>  |  96<L>  |  14  ----------------------------<  98  4.3   |  30  |  0.57  03-31    129<L>  |  97<L>  |  13  ----------------------------<  97  4.3   |  27  |  0.58    Ca    8.5      03 Apr 2023 05:30  Ca    8.2<L>      02 Apr 2023 06:11  Phos  2.3     04-03  Phos  2.0     04-02  Mg     2.00     04-03  Mg     1.80     04-02      CAPILLARY BLOOD GLUCOSE      rad< from: Xray Chest 1 View- PORTABLE-Urgent (Xray Chest 1 View- PORTABLE-Urgent .) (04.02.23 @ 14:00) >  Left chest port in right chest tube remain present.  Heart/Vascular: The heart is similar in size.  Pulmonary: Stable mid left lung and lower right lung reticular opacities.   Small right lateral pneumothorax is unchanged. Right lateral chest   pleural thickening is stable in appearance.  Bones: No acute bony finding.    Impression:  Bilateral lung infective changes are stable.  Stable small right lateral pneumothorax.        --- End of Report ---            REGINA TRONCOSO MD; Attending Radiologist  This document has been electronically signed. Apr 2 2023  4:21PM    < end of copied text >                  RECENT CULTURES:  03-29 @ 15:46 .Blood Blood-Peripheral                No growth to date.    03-29 @ 15:26 .Blood Blood-Peripheral                No growth to date.    03-28 @ 12:40 Clean Catch Clean Catch (Midstream)   DAVIS      Escherichia coli ESBL  Escherichia coli ESBL     50,000 - 99,000 CFU/mL Escherichia coli ESBL    03-27 @ 19:45 .Blood Blood-Peripheral                No Growth Final    03-27 @ 19:30 .Blood Blood-Peripheral                No Growth Final          RESPIRATORY CULTURES:          Studies  Chest X-RAY  CT SCAN Chest   Venous Dopplers: LE:   CT Abdomen  Others

## 2023-04-03 NOTE — PROGRESS NOTE ADULT - ASSESSMENT
82 m with HTN, AFIB, urinary retention (chronic plummer), recent hospitalization 3/15-3/124 for SOB found to have R large plerual effusion with mediastinal and hilar LAD, s/p tap with negative cultures and R supraclavicular lymph node/mass biopsy showed Hodgkin's lymphoma s/p R thoracotomy, VATS, pleurex and mediport, started on C1 Brentuximab vedotin (BV) 3/10/23 and discharged to rehab and was doing well suddenly but the pleurx was not draining and pt was brought back with SOB, tachypnea, placed on NRB  pleurx was connected to vac and then flushed and 2180 cc drained  initial WBC: 3-6  s/p filgrastim 3/27 and 3/28 now WBC 26  pt afebrile since admission  chest/abd/pelvis CT 3/29 with loculated R hemothorax and pneumo, pleural thickening s/o malingancy  u/a positive but pt has a chronic plummer and no suprapubic or flank pain    respiratory failure due to malignant R pleural effusion due to Hodgkin's lymphoma started on chemo 3/10, pleurX was not draining so pt brought in  CT s/o loculated hemothorax and pneumo with pleural thickening due to malignancy  leukocytosis is due to filgrastim, pt had no WBC before it was given, now 13-14  positive u/a and urine cx with 50 K E-coli and CT showed, fat stranding around the bladder, maybe cystitis    * s/p 5 days of cefepime now E-coli with ESBL, as pt will be on chemo and there is some perivesicular fat stranding on CT with chronic plummer was started on ertapenem 3/31  * will complete a 5 day course of ertapenem tomorrow  * monitor CBC/diff  * f/u with hem/onc and thoracic surgery, now plan for C2 today  * will sign off, please call with questions      The above assessment and plan was discussed with the primary team    Selin Stark MD  contact on teams  After 5pm and on weekends call [phone]

## 2023-04-03 NOTE — PROGRESS NOTE ADULT - SUBJECTIVE AND OBJECTIVE BOX
Patient is a 82y old  Male who presents with a chief complaint of pl effusion (27 Mar 2023 10:54)      HPI:  82 year old male with PMH of AFIB, HTN and 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 Patient is poor historian, history obtained by son Francisco at bedside. States that  patient has been recovering wellat  rehab. States patient was able to walk over 150 ft while at rehab. States that suddenly on Friday patient started to develop acute SOB and increased WOB. Imaging was done at  rehab showing worsening pleural effusions EMS was called and patient was noted to be tachypneic to 30s and placed on 10L NRB.        Recently admitted from 3/15-3/24 for worsening SOB was found to have  significant for a large R pleural effusion with mediastinal and hilar lymphadenopathy, and 3L of fluid was drained. Pleural fluid cultures and blood cultures resulted negative.  Suspicious of underlying malignancy secondary to lymphadenopathy and persistent leukocytosis, Heme/Onc was consulted and recommended transfer to [facility] on 2/24/23 for further workup. On 2/28 he underwent an ultrasound guided biopsy of his R supraclavicular mass/lymph node, which later resulted as classic Hodgkin's lymphoma.  On 3/3, he underwent a R thoracotomy/VATS with Pleurex placement. Left chest wall mediport was placed and received  first cycle of chemotherapy on 3/10. (25 Mar 2023 16:44)      REVIEW OF SYSTEMS  weakness    PAST MEDICAL & SURGICAL HISTORY  No pertinent past medical history    Atrial fibrillation    Hypertension    Urinary retention    No significant past surgical history       CURRENT FUNCTIONAL STATUS  7/23  Bed Mobility: Sit to Supine:     · Level of [place]	Received and left seated in bedside chair    Transfer: Sit to Stand:     · Level of [place]	minimum assist (75% patients effort)  · Physical Assist/Nonphysical Assist	1 person assist; nonverbal cues (demo/gestures); verbal cues  · Assistive Device	rolling walker    Transfer: Stand to Sit:     · Level of [place]	minimum assist (75% patients effort)  · Physical Assist/Nonphysical Assist	1 person assist; nonverbal cues (demo/gestures); verbal cues  · Assistive Device	rolling walker    Sit/Stand Transfer Safety Analysis:     · Transfer Safety Concerns Noted	decreased weight-shifting ability  · Impairments Contributing to Impaired Transfers	impaired balance; decreased strength    Balance Skills Assessment:     · Sitting Balance: Static	good balance  · Sitting Balance: Dynamic	good minus  · Sit-to-Stand Balance	fair plus  · Standing Balance: Static	fair plus  · Standing Balance: Dynamic	fair balance      RECENT LABS/IMAGING  CBC Full  -  ( 03 Apr 2023 05:30 )  WBC Count : 14.48 K/uL  RBC Count : 3.21 M/uL  Hemoglobin : 9.2 g/dL  Hematocrit : 30.4 %  Platelet Count - Automated : 185 K/uL  Mean Cell Volume : 94.7 fL  Mean Cell Hemoglobin : 28.7 pg  Mean Cell Hemoglobin Concentration : 30.3 gm/dL  Auto Neutrophil # : x  Auto Lymphocyte # : x  Auto Monocyte # : x  Auto Eosinophil # : x  Auto Basophil # : x  Auto Neutrophil % : x  Auto Lymphocyte % : x  Auto Monocyte % : x  Auto Eosinophil % : x  Auto Basophil % : x    04-03    133<L>  |  97<L>  |  11  ----------------------------<  87  3.8   |  28  |  0.49<L>    Ca    8.5      03 Apr 2023 05:30  Phos  2.3     04-03  Mg     2.00     04-03          VITALS  T(C): 36.6 (04-03-23 @ 09:55), Max: 36.9 (04-03-23 @ 05:55)  HR: 66 (04-03-23 @ 09:55) (66 - 90)  BP: 102/62 (04-03-23 @ 09:55) (102/62 - 127/75)  RR: 18 (04-03-23 @ 09:55) (16 - 18)  SpO2: 94% (04-03-23 @ 09:55) (94% - 98%)  Wt(kg): --    ALLERGIES  pertussis vaccines (Rash)  shellfish (Rash)      MEDICATIONS   acetaminophen     Tablet .. 650 milliGRAM(s) Oral every 6 hours PRN  acetaminophen     Tablet .. 650 milliGRAM(s) Oral once  albuterol   0.5% 2.5 milliGRAM(s) Nebulizer every 6 hours PRN  atenolol  Tablet 12.5 milliGRAM(s) Oral daily  brentuximab vedotin IVPB (eMAR) 100 milliGRAM(s) IV Intermittent once  cetirizine 10 milliGRAM(s) Oral once  diphenhydrAMINE Injectable 50 milliGRAM(s) IV Push every 6 hours PRN  enoxaparin Injectable 40 milliGRAM(s) SubCutaneous every 24 hours  ertapenem  IVPB 1000 milliGRAM(s) IV Intermittent every 24 hours  escitalopram 10 milliGRAM(s) Oral daily  famotidine    Tablet 20 milliGRAM(s) Oral daily  hydrocortisone sodium succinate Injectable 100 milliGRAM(s) IV Push once PRN  magnesium oxide 400 milliGRAM(s) Oral three times a day with meals  ondansetron Injectable 8 milliGRAM(s) IV Push once  oxyCODONE    IR 2.5 milliGRAM(s) Oral once PRN  polyethylene glycol 3350 17 Gram(s) Oral daily  senna 2 Tablet(s) Oral at bedtime  simvastatin 40 milliGRAM(s) Oral at bedtime  traZODone 25 milliGRAM(s) Oral at bedtime      ----------------------------------------------------------------------------------------   PHYSICAL EXAM  Constitutional - NAD sitting in chair  HEENT - NCAT, EOMI   + nasal cannula  Chest - + pleurex    Cardiovascular - RRR, S1S2   Abdomen -  Soft, NTND  Extremities - No C/C/E, No calf tenderness   Neurologic Exam -                    Cognitive - Awake, Alert      Communication - Fluent, No dysarthria     Cranial Nerves - CN 2-12 intact     Motor -                      LEFT    UE - 4/5                    RIGHT UE - 4/5                    LEFT    LE -  4/5                    RIGHT LE - 4/5        Sensory - Intact to LT      Balance - WNL Static  Psychiatric - Mood stable, Affect WNL  ----------------------------------------------------------------------------------------  ASSESSMENT/PLAN   81 yo m with hodgkin lymphoma admitted from acute rehab, with pleural effusions and SOB  cefepime  diet: regular  plummer  continue bedside PT and OT   DVT PPX - lovenox  Rehab -will monitor for improvement. on schedule for PT today. Anticipate inpatient rehab when medically cleared, (acute vs [facility]isco) pending tolerance.

## 2023-04-04 LAB
ANION GAP SERPL CALC-SCNC: 6 MMOL/L — LOW (ref 7–14)
BASE EXCESS BLDA CALC-SCNC: 8 MMOL/L — HIGH (ref -2–3)
BASOPHILS # BLD AUTO: 0 K/UL — SIGNIFICANT CHANGE UP (ref 0–0.2)
BASOPHILS NFR BLD AUTO: 0 % — SIGNIFICANT CHANGE UP (ref 0–2)
BUN SERPL-MCNC: 10 MG/DL — SIGNIFICANT CHANGE UP (ref 7–23)
CALCIUM SERPL-MCNC: 8.5 MG/DL — SIGNIFICANT CHANGE UP (ref 8.4–10.5)
CHLORIDE SERPL-SCNC: 100 MMOL/L — SIGNIFICANT CHANGE UP (ref 98–107)
CO2 BLDA-SCNC: 36 MMOL/L — HIGH (ref 19–24)
CO2 SERPL-SCNC: 29 MMOL/L — SIGNIFICANT CHANGE UP (ref 22–31)
CREAT SERPL-MCNC: 0.47 MG/DL — LOW (ref 0.5–1.3)
EGFR: 103 ML/MIN/1.73M2 — SIGNIFICANT CHANGE UP
EOSINOPHIL # BLD AUTO: 1.3 K/UL — HIGH (ref 0–0.5)
EOSINOPHIL NFR BLD AUTO: 7.9 % — HIGH (ref 0–6)
GLUCOSE SERPL-MCNC: 104 MG/DL — HIGH (ref 70–99)
HCO3 BLDA-SCNC: 35 MMOL/L — HIGH (ref 21–28)
HCT VFR BLD CALC: 30.6 % — LOW (ref 39–50)
HGB BLD-MCNC: 9.3 G/DL — LOW (ref 13–17)
IANC: 7 K/UL — SIGNIFICANT CHANGE UP (ref 1.8–7.4)
LYMPHOCYTES # BLD AUTO: 1.3 K/UL — SIGNIFICANT CHANGE UP (ref 1–3.3)
LYMPHOCYTES # BLD AUTO: 7.9 % — LOW (ref 13–44)
MAGNESIUM SERPL-MCNC: 1.8 MG/DL — SIGNIFICANT CHANGE UP (ref 1.6–2.6)
MCHC RBC-ENTMCNC: 29.5 PG — SIGNIFICANT CHANGE UP (ref 27–34)
MCHC RBC-ENTMCNC: 30.4 GM/DL — LOW (ref 32–36)
MCV RBC AUTO: 97.1 FL — SIGNIFICANT CHANGE UP (ref 80–100)
MONOCYTES # BLD AUTO: 1.45 K/UL — HIGH (ref 0–0.9)
MONOCYTES NFR BLD AUTO: 8.8 % — SIGNIFICANT CHANGE UP (ref 2–14)
NEUTROPHILS # BLD AUTO: 10.54 K/UL — HIGH (ref 1.8–7.4)
NEUTROPHILS NFR BLD AUTO: 59.6 % — SIGNIFICANT CHANGE UP (ref 43–77)
PCO2 BLDA: 56 MMHG — HIGH (ref 35–48)
PH BLDA: 7.4 — SIGNIFICANT CHANGE UP (ref 7.35–7.45)
PHOSPHATE SERPL-MCNC: 2 MG/DL — LOW (ref 2.5–4.5)
PLATELET # BLD AUTO: 175 K/UL — SIGNIFICANT CHANGE UP (ref 150–400)
PO2 BLDA: 73 MMHG — LOW (ref 83–108)
POTASSIUM SERPL-MCNC: 4.1 MMOL/L — SIGNIFICANT CHANGE UP (ref 3.5–5.3)
POTASSIUM SERPL-SCNC: 4.1 MMOL/L — SIGNIFICANT CHANGE UP (ref 3.5–5.3)
RBC # BLD: 3.15 M/UL — LOW (ref 4.2–5.8)
RBC # FLD: 21.1 % — HIGH (ref 10.3–14.5)
SAO2 % BLDA: 96.3 % — SIGNIFICANT CHANGE UP (ref 94–98)
SODIUM SERPL-SCNC: 135 MMOL/L — SIGNIFICANT CHANGE UP (ref 135–145)
WBC # BLD: 16.47 K/UL — HIGH (ref 3.8–10.5)
WBC # FLD AUTO: 16.47 K/UL — HIGH (ref 3.8–10.5)

## 2023-04-04 PROCEDURE — 99233 SBSQ HOSP IP/OBS HIGH 50: CPT | Mod: GC

## 2023-04-04 PROCEDURE — 71045 X-RAY EXAM CHEST 1 VIEW: CPT | Mod: 26

## 2023-04-04 PROCEDURE — 99232 SBSQ HOSP IP/OBS MODERATE 35: CPT

## 2023-04-04 RX ORDER — ALBUTEROL 90 UG/1
2.5 AEROSOL, METERED ORAL EVERY 6 HOURS
Refills: 0 | Status: DISCONTINUED | OUTPATIENT
Start: 2023-04-04 | End: 2023-04-10

## 2023-04-04 RX ADMIN — MAGNESIUM OXIDE 400 MG ORAL TABLET 400 MILLIGRAM(S): 241.3 TABLET ORAL at 17:10

## 2023-04-04 RX ADMIN — Medication 25 MILLIGRAM(S): at 21:46

## 2023-04-04 RX ADMIN — ENOXAPARIN SODIUM 40 MILLIGRAM(S): 100 INJECTION SUBCUTANEOUS at 21:46

## 2023-04-04 RX ADMIN — ATENOLOL 12.5 MILLIGRAM(S): 25 TABLET ORAL at 05:50

## 2023-04-04 RX ADMIN — ERTAPENEM SODIUM 120 MILLIGRAM(S): 1 INJECTION, POWDER, LYOPHILIZED, FOR SOLUTION INTRAMUSCULAR; INTRAVENOUS at 17:10

## 2023-04-04 RX ADMIN — FAMOTIDINE 20 MILLIGRAM(S): 10 INJECTION INTRAVENOUS at 11:55

## 2023-04-04 RX ADMIN — MAGNESIUM OXIDE 400 MG ORAL TABLET 400 MILLIGRAM(S): 241.3 TABLET ORAL at 11:55

## 2023-04-04 RX ADMIN — ESCITALOPRAM OXALATE 10 MILLIGRAM(S): 10 TABLET, FILM COATED ORAL at 11:56

## 2023-04-04 RX ADMIN — SIMVASTATIN 40 MILLIGRAM(S): 20 TABLET, FILM COATED ORAL at 21:46

## 2023-04-04 RX ADMIN — MAGNESIUM OXIDE 400 MG ORAL TABLET 400 MILLIGRAM(S): 241.3 TABLET ORAL at 08:53

## 2023-04-04 NOTE — PROGRESS NOTE ADULT - SUBJECTIVE AND OBJECTIVE BOX
Name of Patient : ARIK DUMONT  MRN: [id number]  Date of visit: 04-04-23      Subjective: Patient seen and examined. No new events except as noted.   doing okay  chemo completed     REVIEW OF SYSTEMS:    CONSTITUTIONAL: No weakness, fevers or chills  EYES/ENT: No visual changes;  No vertigo or throat pain   NECK: No pain or stiffness  RESPIRATORY: No cough, wheezing, hemoptysis; No shortness of breath  CARDIOVASCULAR: No chest pain or palpitations  GASTROINTESTINAL: No abdominal or epigastric pain. No nausea, vomiting, or hematemesis; No diarrhea or constipation. No melena or hematochezia.  GENITOURINARY: No dysuria, frequency or hematuria  NEUROLOGICAL: No numbness or weakness  SKIN: No itching, burning, rashes, or lesions   All other review of systems is negative unless indicated above.    MEDICATIONS:  MEDICATIONS  (STANDING):  albuterol   0.5% 2.5 milliGRAM(s) Nebulizer every 6 hours  atenolol  Tablet 12.5 milliGRAM(s) Oral daily  enoxaparin Injectable 40 milliGRAM(s) SubCutaneous every 24 hours  escitalopram 10 milliGRAM(s) Oral daily  famotidine    Tablet 20 milliGRAM(s) Oral daily  magnesium oxide 400 milliGRAM(s) Oral three times a day with meals  polyethylene glycol 3350 17 Gram(s) Oral daily  senna 2 Tablet(s) Oral at bedtime  simvastatin 40 milliGRAM(s) Oral at bedtime  traZODone 25 milliGRAM(s) Oral at bedtime      PHYSICAL EXAM:  T(C): 36.7 (04-04-23 @ 21:00), Max: 36.8 (04-04-23 @ 01:30)  HR: 85 (04-04-23 @ 21:00) (72 - 92)  BP: 101/63 (04-04-23 @ 21:00) (101/59 - 128/63)  RR: 18 (04-04-23 @ 21:00) (18 - 18)  SpO2: 97% (04-04-23 @ 21:00) (97% - 99%)  Wt(kg): --  I&O's Summary    03 Apr 2023 07:01  -  04 Apr 2023 07:00  --------------------------------------------------------  IN: 150 mL / OUT: 400 mL / NET: -250 mL          Appearance: Normal	  HEENT:  PERRLA   Lymphatic: No lymphadenopathy   Cardiovascular: Normal S1 S2, no JVD  Respiratory: normal effort , clear  Gastrointestinal:  Soft, Non-tender  Skin: No rashes,  warm to touch  Psychiatry:  Mood & affect appropriate  Musculuskeletal: No edema    recent labs, Imaging and EKGs personally reviewed     04-03-23 @ 07:01  -  04-04-23 @ 07:00  --------------------------------------------------------  IN: 150 mL / OUT: 400 mL / NET: -250 mL                          9.3    16.47 )-----------( 175      ( 04 Apr 2023 04:50 )             30.6               04-04    135  |  100  |  10  ----------------------------<  104<H>  4.1   |  29  |  0.47<L>    Ca    8.5      04 Apr 2023 04:50  Phos  2.0     04-04  Mg     1.80     04-04                       ABG - ( 04 Apr 2023 04:50 )  pH, Arterial: 7.40  pH, Blood: x     /  pCO2: 56    /  pO2: 73    / HCO3: 35    / Base Excess: 8.0   /  SaO2: 96.3

## 2023-04-04 NOTE — PROGRESS NOTE ADULT - SUBJECTIVE AND OBJECTIVE BOX
Patient is a 82y old  Male who presents with a chief complaint of pl effusion (27 Mar 2023 10:54)      s/p chemo yesterday  fatigued today.     REVIEW OF SYSTEMS  fatigue  weakness    PAST MEDICAL & SURGICAL HISTORY     Atrial fibrillation    Hypertension    Urinary retention    No significant past surgical history       CURRENT FUNCTIONAL STATUS  4/4  Sit-Stand Transfer Training  Sit-to-Stand Transfer Training Rehab Potential: good, to achieve stated therapy goals  Comment: Increased assistance needed to perform sit to stand from recliner likely secondary to fatigue s/p chemotherapy yesterday.  Sit-to-Stand Transfer Training Symptoms Noted During/After Treatment: fatigue  Transfer Training Sit-to-Stand Transfer: maximum assist (25% patient effort);  1 person assist;  nonverbal cues (demo/gestures);  verbal cues;  rolling walker  Transfer Training Stand-to-Sit Transfer: moderate assist (50% patient effort);  1 person assist;  nonverbal cues (demo/gestures);  verbal cues;  rolling walker  Sit-to-Stand Transfer Training Transfer Safety Analysis: decreased balance;  decreased sequencing ability;  decreased weight-shifting ability;  decreased strength;  impaired balance;  impaired coordination;  impaired motor control;  impaired postural control    Gait Training  Gait Training Rehab Potential: good, to achieve stated therapy goals  Gait Training Symptoms Noted During/After Treatment: fatigue  Gait Training: moderate assist (50% patient effort);  1 person assist;  nonverbal cues (demo/gestures);  verbal cues;  rolling walker;  5ft forward and backwards  Gait Analysis: 3-point gait   decreased isabella;  crouch;  decreased hip/knee flexion;  decreased velocity of limb motion;  decreased step length;  decreased stride length;  decreased weight-shifting ability;  forward flexed posture;  decreased strength;  impaired balance;  impaired coordination;  impaired motor control;  impaired postural control;  impaired endurance        RECENT LABS/IMAGING  CBC Full  -  ( 04 Apr 2023 04:50 )  WBC Count : 16.47 K/uL  RBC Count : 3.15 M/uL  Hemoglobin : 9.3 g/dL  Hematocrit : 30.6 %  Platelet Count - Automated : 175 K/uL  Mean Cell Volume : 97.1 fL  Mean Cell Hemoglobin : 29.5 pg  Mean Cell Hemoglobin Concentration : 30.4 gm/dL  Auto Neutrophil # : 10.54 K/uL  Auto Lymphocyte # : 1.30 K/uL  Auto Monocyte # : 1.45 K/uL  Auto Eosinophil # : 1.30 K/uL  Auto Basophil # : 0.00 K/uL  Auto Neutrophil % : 59.6 %  Auto Lymphocyte % : 7.9 %  Auto Monocyte % : 8.8 %  Auto Eosinophil % : 7.9 %  Auto Basophil % : 0.0 %    04-04    135  |  100  |  10  ----------------------------<  104<H>  4.1   |  29  |  0.47<L>    Ca    8.5      04 Apr 2023 04:50  Phos  2.0     04-04  Mg     1.80     04-04          VITALS  T(C): 36.7 (04-04-23 @ 11:31), Max: 37.1 (04-03-23 @ 17:15)  HR: 79 (04-04-23 @ 11:31) (64 - 92)  BP: 101/59 (04-04-23 @ 11:31) (101/59 - 128/63)  RR: 18 (04-04-23 @ 11:31) (18 - 18)  SpO2: 99% (04-04-23 @ 11:31) (96% - 99%)  Wt(kg): --    ALLERGIES  pertussis vaccines (Rash)  shellfish (Rash)      MEDICATIONS   acetaminophen     Tablet .. 650 milliGRAM(s) Oral every 6 hours PRN  albuterol   0.5% 2.5 milliGRAM(s) Nebulizer every 6 hours PRN  atenolol  Tablet 12.5 milliGRAM(s) Oral daily  diphenhydrAMINE Injectable 50 milliGRAM(s) IV Push every 6 hours PRN  enoxaparin Injectable 40 milliGRAM(s) SubCutaneous every 24 hours  ertapenem  IVPB 1000 milliGRAM(s) IV Intermittent every 24 hours  escitalopram 10 milliGRAM(s) Oral daily  famotidine    Tablet 20 milliGRAM(s) Oral daily  hydrocortisone sodium succinate Injectable 100 milliGRAM(s) IV Push once PRN  magnesium oxide 400 milliGRAM(s) Oral three times a day with meals  oxyCODONE    IR 2.5 milliGRAM(s) Oral once PRN  polyethylene glycol 3350 17 Gram(s) Oral daily  senna 2 Tablet(s) Oral at bedtime  simvastatin 40 milliGRAM(s) Oral at bedtime  traZODone 25 milliGRAM(s) Oral at bedtime      ----------------------------------------------------------------------------------------   PHYSICAL EXAM  Constitutional - NAD sitting in chair  HEENT - NCAT, EOMI   + nasal cannula  Chest - + pleurex    Cardiovascular - RRR, S1S2   Abdomen -  Soft, NTND  Extremities - No C/C/E, No calf tenderness   Neurologic Exam -                    Cognitive - Awake, Alert      Communication - Fluent, No dysarthria     Cranial Nerves - CN 2-12 intact     Motor -                      LEFT    UE - 4/5                    RIGHT UE - 4/5                    LEFT    LE -  4/5                    RIGHT LE - 4/5        Sensory - Intact to LT      Balance - WNL Static  Psychiatric - Mood stable, Affect WNL  ----------------------------------------------------------------------------------------  ASSESSMENT/PLAN   83 yo m with hodgkin lymphoma admitted from acute rehab, with pleural effusions and SOB  cefepime  diet: regular  plummer  continue bedside PT and OT   DVT PPX - lovenox  Rehab -anticipate subacute rehab when medically cleared, patient max assist to stand with PT today, after chemo yesterday.  will monitor for improvement

## 2023-04-04 NOTE — PROGRESS NOTE ADULT - SUBJECTIVE AND OBJECTIVE BOX
DATE OF SERVICE: 04-04-23 @ 05:28    Subjective: Patient seen and examined. No new events except as noted.     SUBJECTIVE/ROS:  nad      MEDICATIONS:  MEDICATIONS  (STANDING):  atenolol  Tablet 12.5 milliGRAM(s) Oral daily  enoxaparin Injectable 40 milliGRAM(s) SubCutaneous every 24 hours  ertapenem  IVPB 1000 milliGRAM(s) IV Intermittent every 24 hours  escitalopram 10 milliGRAM(s) Oral daily  famotidine    Tablet 20 milliGRAM(s) Oral daily  magnesium oxide 400 milliGRAM(s) Oral three times a day with meals  polyethylene glycol 3350 17 Gram(s) Oral daily  senna 2 Tablet(s) Oral at bedtime  simvastatin 40 milliGRAM(s) Oral at bedtime  traZODone 25 milliGRAM(s) Oral at bedtime      PHYSICAL EXAM:  T(C): 36.9 (04-03-23 @ 21:27), Max: 37.1 (04-03-23 @ 17:15)  HR: 64 (04-03-23 @ 21:27) (64 - 87)  BP: 103/60 (04-03-23 @ 21:27) (99/61 - 127/76)  RR: 18 (04-03-23 @ 21:27) (16 - 18)  SpO2: 97% (04-03-23 @ 21:27) (94% - 97%)  Wt(kg): --  I&O's Summary    02 Apr 2023 07:01  -  03 Apr 2023 07:00  --------------------------------------------------------  IN: 100 mL / OUT: 290 mL / NET: -190 mL    03 Apr 2023 07:01  -  04 Apr 2023 05:28  --------------------------------------------------------  IN: 0 mL / OUT: 0 mL / NET: 0 mL        Weight (kg): 80.2 (04-03 @ 11:13)      JVP: Normal  Neck: supple  Lung: clear   CV: S1 S2 , Murmur:  Abd: soft  Ext: No edema  neuro: Awake   Psych: flat affect  Skin: normal``    LABS/DATA:    CARDIAC MARKERS:                                9.3    16.47 )-----------( 175      ( 04 Apr 2023 04:50 )             30.6     04-03    133<L>  |  97<L>  |  11  ----------------------------<  87  3.8   |  28  |  0.49<L>    Ca    8.5      03 Apr 2023 05:30  Phos  2.3     04-03  Mg     2.00     04-03      proBNP:   Lipid Profile:   HgA1c:   TSH:     TELE:  EKG:

## 2023-04-04 NOTE — PROGRESS NOTE ADULT - SUBJECTIVE AND OBJECTIVE BOX
Date of Service: 04-04-23 @ 14:20    Patient is a 82y old  Male who presents with a chief complaint of pl effusion (27 Mar 2023 10:54)      Any change in ROS: he seems same to me  ; o n 3 L of oxygen : no resp status    MEDICATIONS  (STANDING):  atenolol  Tablet 12.5 milliGRAM(s) Oral daily  enoxaparin Injectable 40 milliGRAM(s) SubCutaneous every 24 hours  ertapenem  IVPB 1000 milliGRAM(s) IV Intermittent every 24 hours  escitalopram 10 milliGRAM(s) Oral daily  famotidine    Tablet 20 milliGRAM(s) Oral daily  magnesium oxide 400 milliGRAM(s) Oral three times a day with meals  polyethylene glycol 3350 17 Gram(s) Oral daily  senna 2 Tablet(s) Oral at bedtime  simvastatin 40 milliGRAM(s) Oral at bedtime  traZODone 25 milliGRAM(s) Oral at bedtime    MEDICATIONS  (PRN):  acetaminophen     Tablet .. 650 milliGRAM(s) Oral every 6 hours PRN Temp greater or equal to 38C (100.4F), Mild Pain (1 - 3)  albuterol   0.5% 2.5 milliGRAM(s) Nebulizer every 6 hours PRN Shortness of Breath  diphenhydrAMINE Injectable 50 milliGRAM(s) IV Push every 6 hours PRN Rash and/or Itching  hydrocortisone sodium succinate Injectable 100 milliGRAM(s) IV Push once PRN Infusion Reaction  oxyCODONE    IR 2.5 milliGRAM(s) Oral once PRN Severe Pain (7 - 10)    Vital Signs Last 24 Hrs  T(C): 36.7 (04 Apr 2023 11:31), Max: 37.1 (03 Apr 2023 17:15)  T(F): 98.1 (04 Apr 2023 11:31), Max: 98.7 (03 Apr 2023 17:15)  HR: 79 (04 Apr 2023 11:31) (64 - 92)  BP: 101/59 (04 Apr 2023 11:31) (101/59 - 128/63)  BP(mean): --  RR: 18 (04 Apr 2023 11:31) (18 - 18)  SpO2: 99% (04 Apr 2023 11:31) (96% - 99%)    Parameters below as of 04 Apr 2023 11:31  Patient On (Oxygen Delivery Method): nasal cannula  O2 Flow (L/min): 3      I&O's Summary    03 Apr 2023 07:01  -  04 Apr 2023 07:00  --------------------------------------------------------  IN: 150 mL / OUT: 400 mL / NET: -250 mL          Physical Exam:   GENERAL: NAD, well-groomed, well-developed  HEENT: CHUCK/   Atraumatic, Normocephalic  ENMT: No tonsillar erythema, exudates, or enlargement; Moist mucous membranes, Good dentition, No lesions  NECK: Supple, No JVD, Normal thyroid  CHEST/LUNG: Clear to auscultaion- no wheezing  CVS: Regular rate and rhythm; No murmurs, rubs, or gallops  GI: : Soft, Nontender, Nondistended; Bowel sounds present  NERVOUS SYSTEM:  Alert & awake and responsive  EXTREMITIES:  - edema  LYMPH: No lymphadenopathy noted  SKIN: No rashes or lesions  ENDOCRINOLOGY: No Thyromegaly  PSYCH: Appropriate    Labs:  ABG - ( 04 Apr 2023 04:50 )  pH, Arterial: 7.40  pH, Blood: x     /  pCO2: 56    /  pO2: 73    / HCO3: 35    / Base Excess: 8.0   /  SaO2: 96.3                                        9.3    16.47 )-----------( 175      ( 04 Apr 2023 04:50 )             30.6                         9.2    14.48 )-----------( 185      ( 03 Apr 2023 05:30 )             30.4                         9.3    13.71 )-----------( 188      ( 02 Apr 2023 06:11 )             30.6                         9.8    15.46 )-----------( 210      ( 01 Apr 2023 03:38 )             32.2     04-04    135  |  100  |  10  ----------------------------<  104<H>  4.1   |  29  |  0.47<L>  04-03    133<L>  |  97<L>  |  11  ----------------------------<  87  3.8   |  28  |  0.49<L>  04-02    132<L>  |  97<L>  |  12  ----------------------------<  93  3.7   |  28  |  0.48<L>  04-01    132<L>  |  96<L>  |  14  ----------------------------<  98  4.3   |  30  |  0.57    Ca    8.5      04 Apr 2023 04:50  Ca    8.5      03 Apr 2023 05:30  Phos  2.0     04-04  Phos  2.3     04-03  Mg     1.80     04-04  Mg     2.00     04-03      CAPILLARY BLOOD GLUCOSE          < from: Xray Chest 1 View- PORTABLE-Routine (Xray Chest 1 View- PORTABLE-Routine in AM.) (04.04.23 @ 07:18) >  pneumothorax.    Left-sided Mediport in place.    Opacity at the left lung base not seen on the previous study could   represent developing effusion/atelectasis. Heart size is stable.        COMPARISON: April 2, 2023        IMPRESSION:  Follow-up small loculated right effusion with chest tube.  New left basilar opacity (developing effusion?)    --- End of Report ---            AMIRAH EARLY MD; Attending Radiologist  This document has been electronically signed. Apr 4 2023 11:23AM    < end of copied text >              RECENT CULTURES:  03-29 @ 15:46 .Blood Blood-Peripheral                No Growth Final    03-29 @ 15:26 .Blood Blood-Peripheral                No Growth Final          RESPIRATORY CULTURES:          Studies  Chest X-RAY  CT SCAN Chest   Venous Dopplers: LE:   CT Abdomen  Others

## 2023-04-04 NOTE — PROGRESS NOTE ADULT - SUBJECTIVE AND OBJECTIVE BOX
Hematology/Oncology Follow-up    INTERVAL HPI/OVERNIGHT EVENTS:  Patient S&E at bedside. No o/n events, patient resting comfortably. No complaints at this time. Patient denies fever, chills, dizziness, weakness, CP, palpitations, SOB, cough, N/V/D/C, dysuria, changes in bowel movements, LE edema.    VITAL SIGNS:  T(F): 98.1 (04-04-23 @ 11:31)  HR: 79 (04-04-23 @ 11:31)  BP: 101/59 (04-04-23 @ 11:31)  RR: 18 (04-04-23 @ 11:31)  SpO2: 99% (04-04-23 @ 11:31)  Wt(kg): --    PHYSICAL EXAM:    Constitutional: AAOx3, NAD,   Eyes: PERRL, EOMI, sclera non-icteric  Neck: supple, no masses, no JVD  Respiratory: CTA b/l, good air entry b/l, no wheezing, rhonchi, rales, with normal respiratory effort and no intercostal retractions  Cardiovascular: RRR, normal S1S2, no M/R/G  Gastrointestinal: soft, NTND, no masses palpable, BS normal in all four quadrants, no HSM  Extremities:  no c/c/e  Neurological: Grossly intact  Skin: No rash or lesion    MEDICATIONS  (STANDING):  atenolol  Tablet 12.5 milliGRAM(s) Oral daily  enoxaparin Injectable 40 milliGRAM(s) SubCutaneous every 24 hours  ertapenem  IVPB 1000 milliGRAM(s) IV Intermittent every 24 hours  escitalopram 10 milliGRAM(s) Oral daily  famotidine    Tablet 20 milliGRAM(s) Oral daily  magnesium oxide 400 milliGRAM(s) Oral three times a day with meals  polyethylene glycol 3350 17 Gram(s) Oral daily  senna 2 Tablet(s) Oral at bedtime  simvastatin 40 milliGRAM(s) Oral at bedtime  traZODone 25 milliGRAM(s) Oral at bedtime    MEDICATIONS  (PRN):  acetaminophen     Tablet .. 650 milliGRAM(s) Oral every 6 hours PRN Temp greater or equal to 38C (100.4F), Mild Pain (1 - 3)  albuterol   0.5% 2.5 milliGRAM(s) Nebulizer every 6 hours PRN Shortness of Breath  diphenhydrAMINE Injectable 50 milliGRAM(s) IV Push every 6 hours PRN Rash and/or Itching  hydrocortisone sodium succinate Injectable 100 milliGRAM(s) IV Push once PRN Infusion Reaction  oxyCODONE    IR 2.5 milliGRAM(s) Oral once PRN Severe Pain (7 - 10)      pertussis vaccines (Rash)  shellfish (Rash)      LABS:                        9.3    16.47 )-----------( 175      ( 04 Apr 2023 04:50 )             30.6     04-04    135  |  100  |  10  ----------------------------<  104<H>  4.1   |  29  |  0.47<L>    Ca    8.5      04 Apr 2023 04:50  Phos  2.0     04-04  Mg     1.80     04-04             RADIOLOGY & ADDITIONAL TESTS:  Studies reviewed.   Hematology/Oncology Follow-up    INTERVAL HPI/OVERNIGHT EVENTS:  Patient S&E at bedside. No o/n events, patient resting comfortably. Continues to endorses fatigue. Tolerating PO. No pain.    VITAL SIGNS:  T(F): 98.1 (04-04-23 @ 11:31)  HR: 79 (04-04-23 @ 11:31)  BP: 101/59 (04-04-23 @ 11:31)  RR: 18 (04-04-23 @ 11:31)  SpO2: 99% (04-04-23 @ 11:31)  Wt(kg): --    PHYSICAL EXAM:    Constitutional: AAOx3, NAD, NC in place  Eyes: PERRL, EOMI, sclera non-icteric  Neck: supple, no masses, no JVD  Respiratory: CTA b/l, good air entry b/l, no wheezing, rhonchi, rales, with normal respiratory effort and no intercostal retractions. CT in place  Cardiovascular: RRR, normal S1S2, no M/R/G  Gastrointestinal: soft, NTND, no masses palpable, BS normal in all four quadrants, no HSM  Extremities:  no c/c/e  Neurological: Grossly intact  Skin: No rash or lesion    MEDICATIONS  (STANDING):  atenolol  Tablet 12.5 milliGRAM(s) Oral daily  enoxaparin Injectable 40 milliGRAM(s) SubCutaneous every 24 hours  ertapenem  IVPB 1000 milliGRAM(s) IV Intermittent every 24 hours  escitalopram 10 milliGRAM(s) Oral daily  famotidine    Tablet 20 milliGRAM(s) Oral daily  magnesium oxide 400 milliGRAM(s) Oral three times a day with meals  polyethylene glycol 3350 17 Gram(s) Oral daily  senna 2 Tablet(s) Oral at bedtime  simvastatin 40 milliGRAM(s) Oral at bedtime  traZODone 25 milliGRAM(s) Oral at bedtime    MEDICATIONS  (PRN):  acetaminophen     Tablet .. 650 milliGRAM(s) Oral every 6 hours PRN Temp greater or equal to 38C (100.4F), Mild Pain (1 - 3)  albuterol   0.5% 2.5 milliGRAM(s) Nebulizer every 6 hours PRN Shortness of Breath  diphenhydrAMINE Injectable 50 milliGRAM(s) IV Push every 6 hours PRN Rash and/or Itching  hydrocortisone sodium succinate Injectable 100 milliGRAM(s) IV Push once PRN Infusion Reaction  oxyCODONE    IR 2.5 milliGRAM(s) Oral once PRN Severe Pain (7 - 10)      pertussis vaccines (Rash)  shellfish (Rash)      LABS:                        9.3    16.47 )-----------( 175      ( 04 Apr 2023 04:50 )             30.6     04-04    135  |  100  |  10  ----------------------------<  104<H>  4.1   |  29  |  0.47<L>    Ca    8.5      04 Apr 2023 04:50  Phos  2.0     04-04  Mg     1.80     04-04             RADIOLOGY & ADDITIONAL TESTS:  Studies reviewed.

## 2023-04-04 NOTE — PROGRESS NOTE ADULT - ASSESSMENT
82 year old man with a past medical history as described above who presented for evaluation of shortness of breath. Presents from [facility]. CXR revealed increased pleural effusions, likely loculated. Thoracics following, PleurX to waterseal. Hematology on board for management of cHL.    # Classical Hodgkin Lymphoma   - S/P Cycle 1 Brentuximab vedotin (BV) 3/10/23  - C2D1 due this Friday 3/31, but given current state and increased lethargy, likely will hold off for now. s/p C2 brentuximab 4/3  - CTS note from yesterday states to continue holding AC - may need to be off for long term   - Thoracic surgery to cap the PleurX when ready for discharge, recommend doing it now.  - Blood culture NGTD, UCx ESBL, on ertapenem  - Received one dose of Zarxio; ANC much improved  - Family updated daily   - CXR daily   - CBC with diff daily   - Thoracic input appreciated, no procedure for now, monitoring serosanguinous output    # Leukocytosis  -coming down  - Zarxio effect       Please page with questions or concerns. Will Follow with you.      Karl Salomon M.D.  Hematology/Oncology Fellow PGY5  Pager [phone]  After 5pm, please contact on-call team.          82 year old man with a past medical history as described above who presented for evaluation of shortness of breath. [facility]. CXR revealed increased pleural effusions, likely loculated. Hematology on board for management of cHL.    # Classical Hodgkin Lymphoma   - S/P Cycle 1 Brentuximab vedotin (BV) 3/10/23  - C2D1 due this Friday 3/31, but given current state and increased lethargy, likely will hold off for now. s/p C2 brentuximab 4/3  - CTS note from yesterday states to continue holding AC - may need to be off for long term   - Thoracic surgery to cap the PleurX when ready for discharge, recommend doing it now.  - Blood culture NGTD, UCx ESBL, on ertapenem  - Received one dose of Zarxio; ANC much improved  - Family updated daily   - CXR daily   - CBC with diff daily   - Thoracic input appreciated, no procedure for now, monitoring serosanguinous output    # Leukocytosis  -coming down  - Zarxio effect       Please page with questions or concerns. Will Follow with you.      Karl Salomon M.D.  Hematology/Oncology Fellow PGY5  Pager [phone]  After 5pm, please contact on-call team.

## 2023-04-04 NOTE — PROGRESS NOTE ADULT - ASSESSMENT
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 Patient is poor historian, history obtained by son Francisco at bedside. States that  patient has been recovering wellat  rehab. States patient was able to walk over 150 ft while at rehab. States that suddenly on Friday patient started to develop acute SOB and increased WOB. Imaging was done at  rehab showing worsening pleural effusions EMS was called and patient was noted to be tachypneic to 30s and placed on 10L NRB.    Recently admitted from 3/15-3/24 for worsening SOB was found to have  significant for a large R pleural effusion with mediastinal and hilar lymphadenopathy, and 3L of fluid was drained. Pleural fluid cultures and blood cultures resulted negative.  Suspicious of underlying malignancy secondary to lymphadenopathy and persistent leukocytosis, Heme/Onc was consulted and recommended transfer to [facility] on 2/24/23 for further workup. On 2/28 he underwent an ultrasound guided biopsy of his R supraclavicular mass/lymph node, which later resulted as classic Hodgkin's lymphoma.  On 3/3, he underwent a R thoracotomy/VATS with Pleurex placement. Left chest wall mediport was placed and received  first cycle of chemotherapy on 3/10. (25 Mar 2023 16:44)    no wpulm called:  pt has chest tube on rigth side:  has bloody secretions  he looks comfortable:  no sob :      right sided pl effusion:  A fibrillation:   HTN:  Urinary retention     3/27:  right sided pl effusion:  [id number]:  blood pl fluid  ; on ac;  cts surgery to see:  chest xray today looks pretty good to me: not hypercarbic and acidotic on VBG   A fibrillation: off ac at t his time  HTN:  blood pressure is controlled  Urinary retention  ; has chr plummer: :    marah pmd    3/28:    right sided pl effusion:  has lymphoma:  blood pl fluid  ; on ac;  cts surgery to see:  chest xray today looks pretty good to me: not hypercarbic and acidotic on VBG   A fibrillation: off ac at t his time: no tpa done as the pl fluid is very blood:   HTN:  blood pressure is controlled  Urinary retention  ; has chr plummer: :    marah pmd      3/29:    right sided pl effusion:  has lymphoma:  blood pl fluid  ; on ac;  cts surgery to see:  chest xray today looks pretty good to me: not hypercarbic and acidotic on VBG : his wbc is increased today  : ? etiology : ct scan chest awaited:  check procal as well as monitor for fever:   A fibrillation: off ac at t his time: no tpa done as the pl fluid is very blood:   HTN:  blood pressure is controlled  Urinary retention  ; has chr plummer: :    marah pmd      3/30:    right sided pl effusion:  has lymphoma:  blood pl fluid  ; off ac;  rpt ct chest noted from last night:  has mild hemothorax and pneumothorax too ; with alexey effusion; His WBC is high  but he has no fever: Slight jump in his procal : but he doesnto look septic to me ? could it be reactive:  he does have underlying lymphoma:   A fibrillation: off ac at t his time: no tpa done as the pl fluid is very bloody - and now he will be off ac:    HTN:  blood pressure is controlled  Urinary retention  ; has chr plummer: :    marah cts      3/31:    right sided pl effusion:  has lymphoma:  blood pl fluid  ; off ac;  rpt ct chest noted from last night:  has mild hemothorax and pneumothorax too ; with alexey effusion; His WBC is high  but he has no fever: Slight jump in his procal : but he doesnto look septic to me ? could it be reactive:  he does have underlying lymphoma: ct scan pan body yesterday revealed *  Right pleural chest tube in place. Loculated right hemothorax, decreased from the prior chest CT. Trace right pneumothorax, likely  related to the presence of the chest tube. Redemonstrated hyperattenuation centered along the right pleural space  and along the mediastinum, which could reflect residual loculated  hemothorax but is again questioned to represent neoplastic pleural  thickening. Continued follow-up to ensure resolution is recommended.  Peripheral reticular opacities and scattered nodular opacities in the  bilateral lungs, which appear similar to prior imaging and may be  infectious/inflammatory or neoplastic in etiology. Continued follow-up is  recommended. Intermittent opacification of the right lower and middle lobe airways, which may reflect mucoid impaction. *  Mediastinal and hilar lymphadenopathy, overall similar to mildly decreased in size since 3/10/202 Perivesicular fat stranding. Cystitis is considered. Recommend  correlation with urinalysis Unchanged penetrating ulcer of infrarenal abdominalaorta.    A fibrillation: off ac at t his time: no tpa done as the pl fluid is very bloody - and now he will be off ac:    HTN:  blood pressure is controlled  Urinary retention  ; has chr plummer: :      4/1 Awake and responding appropriately. CT management as per thoracic    4/3;    right sided pl effusion:  has lymphoma:  blood pl fluid  ; off ac;  rpt ct chest noted from last night:  has mild hemothorax and pneumothorax too ; with alexey effusion; His WBC is high  but he has no fever: Slight jump in his procal : but he doesnto look septic to me ? could it be reactive:  he does have underlying lymphoma: ct scan pan body yesterday revealed *  Right pleural chest tube in place. Loculated right hemothorax, decreased from the prior chest CT. Trace right pneumothorax, likely  related to the presence of the chest tube. Redemonstrated hyperattenuation centered along the right pleural space  and along the mediastinum, which could reflect residual loculated  hemothorax but is again questioned to represent neoplastic pleural  thickening. Continued follow-up to ensure resolution is recommended.  Peripheral reticular opacities and scattered nodular opacities in the  bilateral lungs, which appear similar to prior imaging and may be  infectious/inflammatory or neoplastic in etiology. Continued follow-up is  recommended. Intermittent opacification of the right lower and middle lobe airways, which may reflect mucoid impaction. *  Mediastinal and hilar lymphadenopathy, overall similar to mildly decreased in size since 3/10/202 Perivesicular fat stranding.:    he is getting chemo per oncology  : for Hodgkins lymphoma: abgin am      A fibrillation: off ac at t his time: no tpa done as the pl fluid is very bloody - and now he will be off ac:    HTN:  blood pressure is controlled  Urinary retention  ; has chr plummer: ::    dw acp       4/4:    right sided pl effusion:  has lymphoma:  blood pl fluid  ; off ac;  rpt ct chest noted from last night:  has mild hemothorax and pneumothorax too ; with alexey effusion; His WBC is high  but he has no fever: Slight jump in his procal : but he doesnto look septic to me ? could it be reactive:  he does have underlying lymphoma: ct scan pan body yesterday revealed *  Right pleural chest tube in place. Loculated right hemothorax, decreased from the prior chest CT. Trace right pneumothorax, likely  related to the presence of the chest tube. Redemonstrated hyperattenuation centered along the right pleural space  and along the mediastinum, which could reflect residual loculated  hemothorax but is again questioned to represent neoplastic pleural  thickening. Continued follow-up to ensure resolution is recommended.  Peripheral reticular opacities and scattered nodular opacities in the  bilateral lungs, which appear similar to prior imaging and may be  infectious/inflammatory or neoplastic in etiology. Continued follow-up is  recommended. Intermittent opacification of the right lower and middle lobe airways, which may reflect mucoid impaction. *  Mediastinal and hilar lymphadenopathy, overall similar to mildly decreased in size since 3/10/202 Perivesicular fat stranding.:  cxr some small effusion on the left side:  small loculated pl effusion on rigth side;     he is getting chemo per oncology  : for Hodgkins lymphoma: abgin am      A fibrillation: off ac at t his time: no tpa done as the pl fluid is very bloody - and now he will be off ac:    HTN:  blood pressure is controlled  Urinary retention  ; has chr plummer: ::    marah acp

## 2023-04-05 LAB
ANION GAP SERPL CALC-SCNC: 7 MMOL/L — SIGNIFICANT CHANGE UP (ref 7–14)
BUN SERPL-MCNC: 11 MG/DL — SIGNIFICANT CHANGE UP (ref 7–23)
CALCIUM SERPL-MCNC: 8.4 MG/DL — SIGNIFICANT CHANGE UP (ref 8.4–10.5)
CHLORIDE SERPL-SCNC: 97 MMOL/L — LOW (ref 98–107)
CO2 SERPL-SCNC: 32 MMOL/L — HIGH (ref 22–31)
CREAT SERPL-MCNC: 0.52 MG/DL — SIGNIFICANT CHANGE UP (ref 0.5–1.3)
EGFR: 100 ML/MIN/1.73M2 — SIGNIFICANT CHANGE UP
GLUCOSE SERPL-MCNC: 101 MG/DL — HIGH (ref 70–99)
HCT VFR BLD CALC: 29.6 % — LOW (ref 39–50)
HGB BLD-MCNC: 9 G/DL — LOW (ref 13–17)
MAGNESIUM SERPL-MCNC: 2 MG/DL — SIGNIFICANT CHANGE UP (ref 1.6–2.6)
MCHC RBC-ENTMCNC: 29.3 PG — SIGNIFICANT CHANGE UP (ref 27–34)
MCHC RBC-ENTMCNC: 30.4 GM/DL — LOW (ref 32–36)
MCV RBC AUTO: 96.4 FL — SIGNIFICANT CHANGE UP (ref 80–100)
NRBC # BLD: 0 /100 WBCS — SIGNIFICANT CHANGE UP (ref 0–0)
NRBC # FLD: 0.05 K/UL — HIGH (ref 0–0)
PHOSPHATE SERPL-MCNC: 1.9 MG/DL — LOW (ref 2.5–4.5)
PLATELET # BLD AUTO: 216 K/UL — SIGNIFICANT CHANGE UP (ref 150–400)
POTASSIUM SERPL-MCNC: 4.2 MMOL/L — SIGNIFICANT CHANGE UP (ref 3.5–5.3)
POTASSIUM SERPL-SCNC: 4.2 MMOL/L — SIGNIFICANT CHANGE UP (ref 3.5–5.3)
RBC # BLD: 3.07 M/UL — LOW (ref 4.2–5.8)
RBC # FLD: 21.3 % — HIGH (ref 10.3–14.5)
SODIUM SERPL-SCNC: 136 MMOL/L — SIGNIFICANT CHANGE UP (ref 135–145)
WBC # BLD: 13.67 K/UL — HIGH (ref 3.8–10.5)
WBC # FLD AUTO: 13.67 K/UL — HIGH (ref 3.8–10.5)

## 2023-04-05 PROCEDURE — 99232 SBSQ HOSP IP/OBS MODERATE 35: CPT

## 2023-04-05 PROCEDURE — 99232 SBSQ HOSP IP/OBS MODERATE 35: CPT | Mod: GC

## 2023-04-05 PROCEDURE — 71045 X-RAY EXAM CHEST 1 VIEW: CPT | Mod: 26

## 2023-04-05 RX ADMIN — ALBUTEROL 2.5 MILLIGRAM(S): 90 AEROSOL, METERED ORAL at 10:50

## 2023-04-05 RX ADMIN — MAGNESIUM OXIDE 400 MG ORAL TABLET 400 MILLIGRAM(S): 241.3 TABLET ORAL at 07:58

## 2023-04-05 RX ADMIN — Medication 25 MILLIGRAM(S): at 21:16

## 2023-04-05 RX ADMIN — ALBUTEROL 2.5 MILLIGRAM(S): 90 AEROSOL, METERED ORAL at 23:23

## 2023-04-05 RX ADMIN — MAGNESIUM OXIDE 400 MG ORAL TABLET 400 MILLIGRAM(S): 241.3 TABLET ORAL at 18:01

## 2023-04-05 RX ADMIN — ATENOLOL 12.5 MILLIGRAM(S): 25 TABLET ORAL at 05:53

## 2023-04-05 RX ADMIN — MAGNESIUM OXIDE 400 MG ORAL TABLET 400 MILLIGRAM(S): 241.3 TABLET ORAL at 13:43

## 2023-04-05 RX ADMIN — Medication 85 MILLIMOLE(S): at 19:26

## 2023-04-05 RX ADMIN — ESCITALOPRAM OXALATE 10 MILLIGRAM(S): 10 TABLET, FILM COATED ORAL at 11:19

## 2023-04-05 RX ADMIN — ENOXAPARIN SODIUM 40 MILLIGRAM(S): 100 INJECTION SUBCUTANEOUS at 23:08

## 2023-04-05 RX ADMIN — POLYETHYLENE GLYCOL 3350 17 GRAM(S): 17 POWDER, FOR SOLUTION ORAL at 11:19

## 2023-04-05 RX ADMIN — FAMOTIDINE 20 MILLIGRAM(S): 10 INJECTION INTRAVENOUS at 11:19

## 2023-04-05 RX ADMIN — ALBUTEROL 2.5 MILLIGRAM(S): 90 AEROSOL, METERED ORAL at 16:27

## 2023-04-05 RX ADMIN — SIMVASTATIN 40 MILLIGRAM(S): 20 TABLET, FILM COATED ORAL at 21:16

## 2023-04-05 NOTE — PROGRESS NOTE ADULT - ASSESSMENT
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 Patient is poor historian, history obtained by son Francisco at bedside. States that  patient has been recovering wellat  rehab. States patient was able to walk over 150 ft while at rehab. States that suddenly on Friday patient started to develop acute SOB and increased WOB. Imaging was done at  rehab showing worsening pleural effusions EMS was called and patient was noted to be tachypneic to 30s and placed on 10L NRB.    Recently admitted from 3/15-3/24 for worsening SOB was found to have  significant for a large R pleural effusion with mediastinal and hilar lymphadenopathy, and 3L of fluid was drained. Pleural fluid cultures and blood cultures resulted negative.  Suspicious of underlying malignancy secondary to lymphadenopathy and persistent leukocytosis, Heme/Onc was consulted and recommended transfer to [facility] on 2/24/23 for further workup. On 2/28 he underwent an ultrasound guided biopsy of his R supraclavicular mass/lymph node, which later resulted as classic Hodgkin's lymphoma.  On 3/3, he underwent a R thoracotomy/VATS with Pleurex placement. Left chest wall mediport was placed and received  first cycle of chemotherapy on 3/10. (25 Mar 2023 16:44)    no wpulm called:  pt has chest tube on rigth side:  has bloody secretions  he looks comfortable:  no sob :      right sided pl effusion:  A fibrillation:   HTN:  Urinary retention     3/27:  right sided pl effusion:  [id number]:  blood pl fluid  ; on ac;  cts surgery to see:  chest xray today looks pretty good to me: not hypercarbic and acidotic on VBG   A fibrillation: off ac at t his time  HTN:  blood pressure is controlled  Urinary retention  ; has chr plummer: :    marah pmd    3/28:    right sided pl effusion:  has lymphoma:  blood pl fluid  ; on ac;  cts surgery to see:  chest xray today looks pretty good to me: not hypercarbic and acidotic on VBG   A fibrillation: off ac at t his time: no tpa done as the pl fluid is very blood:   HTN:  blood pressure is controlled  Urinary retention  ; has chr plummer: :    marah pmd      3/29:    right sided pl effusion:  has lymphoma:  blood pl fluid  ; on ac;  cts surgery to see:  chest xray today looks pretty good to me: not hypercarbic and acidotic on VBG : his wbc is increased today  : ? etiology : ct scan chest awaited:  check procal as well as monitor for fever:   A fibrillation: off ac at t his time: no tpa done as the pl fluid is very blood:   HTN:  blood pressure is controlled  Urinary retention  ; has chr plummer: :    marah pmd      3/30:    right sided pl effusion:  has lymphoma:  blood pl fluid  ; off ac;  rpt ct chest noted from last night:  has mild hemothorax and pneumothorax too ; with alexey effusion; His WBC is high  but he has no fever: Slight jump in his procal : but he doesnto look septic to me ? could it be reactive:  he does have underlying lymphoma:   A fibrillation: off ac at t his time: no tpa done as the pl fluid is very bloody - and now he will be off ac:    HTN:  blood pressure is controlled  Urinary retention  ; has chr plummer: :    marah cts      3/31:    right sided pl effusion:  has lymphoma:  blood pl fluid  ; off ac;  rpt ct chest noted from last night:  has mild hemothorax and pneumothorax too ; with alexey effusion; His WBC is high  but he has no fever: Slight jump in his procal : but he doesnto look septic to me ? could it be reactive:  he does have underlying lymphoma: ct scan pan body yesterday revealed *  Right pleural chest tube in place. Loculated right hemothorax, decreased from the prior chest CT. Trace right pneumothorax, likely  related to the presence of the chest tube. Redemonstrated hyperattenuation centered along the right pleural space  and along the mediastinum, which could reflect residual loculated  hemothorax but is again questioned to represent neoplastic pleural  thickening. Continued follow-up to ensure resolution is recommended.  Peripheral reticular opacities and scattered nodular opacities in the  bilateral lungs, which appear similar to prior imaging and may be  infectious/inflammatory or neoplastic in etiology. Continued follow-up is  recommended. Intermittent opacification of the right lower and middle lobe airways, which may reflect mucoid impaction. *  Mediastinal and hilar lymphadenopathy, overall similar to mildly decreased in size since 3/10/202 Perivesicular fat stranding. Cystitis is considered. Recommend  correlation with urinalysis Unchanged penetrating ulcer of infrarenal abdominalaorta.    A fibrillation: off ac at t his time: no tpa done as the pl fluid is very bloody - and now he will be off ac:    HTN:  blood pressure is controlled  Urinary retention  ; has chr plummer: :      4/1 Awake and responding appropriately. CT management as per thoracic    4/3;    right sided pl effusion:  has lymphoma:  blood pl fluid  ; off ac;  rpt ct chest noted from last night:  has mild hemothorax and pneumothorax too ; with alexey effusion; His WBC is high  but he has no fever: Slight jump in his procal : but he doesnto look septic to me ? could it be reactive:  he does have underlying lymphoma: ct scan pan body yesterday revealed *  Right pleural chest tube in place. Loculated right hemothorax, decreased from the prior chest CT. Trace right pneumothorax, likely  related to the presence of the chest tube. Redemonstrated hyperattenuation centered along the right pleural space  and along the mediastinum, which could reflect residual loculated  hemothorax but is again questioned to represent neoplastic pleural  thickening. Continued follow-up to ensure resolution is recommended.  Peripheral reticular opacities and scattered nodular opacities in the  bilateral lungs, which appear similar to prior imaging and may be  infectious/inflammatory or neoplastic in etiology. Continued follow-up is  recommended. Intermittent opacification of the right lower and middle lobe airways, which may reflect mucoid impaction. *  Mediastinal and hilar lymphadenopathy, overall similar to mildly decreased in size since 3/10/202 Perivesicular fat stranding.:    he is getting chemo per oncology  : for Hodgkins lymphoma: abgin am      A fibrillation: off ac at t his time: no tpa done as the pl fluid is very bloody - and now he will be off ac:    HTN:  blood pressure is controlled  Urinary retention  ; has chr plummer: ::    dw acp       4/4:    right sided pl effusion:  has lymphoma:  blood pl fluid  ; off ac;  rpt ct chest noted from last night:  has mild hemothorax and pneumothorax too ; with alexey effusion; His WBC is high  but he has no fever: Slight jump in his procal : but he doesnto look septic to me ? could it be reactive:  he does have underlying lymphoma: ct scan pan body yesterday revealed *  Right pleural chest tube in place. Loculated right hemothorax, decreased from the prior chest CT. Trace right pneumothorax, likely  related to the presence of the chest tube. Redemonstrated hyperattenuation centered along the right pleural space  and along the mediastinum, which could reflect residual loculated  hemothorax but is again questioned to represent neoplastic pleural  thickening. Continued follow-up to ensure resolution is recommended.  Peripheral reticular opacities and scattered nodular opacities in the  bilateral lungs, which appear similar to prior imaging and may be  infectious/inflammatory or neoplastic in etiology. Continued follow-up is  recommended. Intermittent opacification of the right lower and middle lobe airways, which may reflect mucoid impaction. *  Mediastinal and hilar lymphadenopathy, overall similar to mildly decreased in size since 3/10/202 Perivesicular fat stranding.:  cxr some small effusion on the left side:  small loculated pl effusion on rigth side;     he is getting chemo per oncology  : for Hodgkins lymphoma: abgin am      A fibrillation: off ac at t his time: no tpa done as the pl fluid is very bloody - and now he will be off ac:    HTN:  blood pressure is controlled  Urinary retention  ; has chr plummer: ::    dw acp     4/5:  right sided pl effusion:  has lymphoma:  blood pl fluid  ; off ac;  rpt ct chest noted from last night:  has mild hemothorax and pneumothorax too ; with alexey effusion; His WBC is high  but he has no fever: Slight jump in his procal : but he doesnto look septic to me ? could it be reactive:  he does have underlying lymphoma: ct scan pan body yesterday revealed *  Right pleural chest tube in place. Loculated right hemothorax, decreased from the prior chest CT. Trace right pneumothorax, likely  related to the presence of the chest tube. Redemonstrated hyperattenuation centered along the right pleural space  and along the mediastinum, which could reflect residual loculated  hemothorax but is again questioned to represent neoplastic pleural  thickening. Continued follow-up to ensure resolution is recommended.  Peripheral reticular opacities and scattered nodular opacities in the  bilateral lungs, which appear similar to prior imaging and may be  infectious/inflammatory or neoplastic in etiology. Continued follow-up is  recommended. Intermittent opacification of the right lower and middle lobe airways, which may reflect mucoid impaction. *  Mediastinal and hilar lymphadenopathy, overall similar to mildly decreased in size since 3/10/202 Perivesicular fat stranding.:  cxr some small effusion on the left side:  small loculated pl effusion on rigth side; he is awaitng for new ct scan chest today    he is getting chemo per oncology  : for Hodgkins lymphoma: ABG with normal PH with high co2 and reasonable oxygenation;   A fibrillation: off ac at t his time: no tpa done as the pl fluid is very bloody - and now he will be off ac:  has hemopthorax  HTN:  blood pressure is controlled  Urinary retention  ; has chr plummer: ::    marah acp

## 2023-04-05 NOTE — PROGRESS NOTE ADULT - ASSESSMENT
82 year old man with a past medical history as described above who presented for evaluation of shortness of breath. Pres[facility] from  [facility]. CXR revealed increased pleural effusions, likely loculated. Hematology on board for management of cHL.    # Classical Hodgkin Lymphoma   - S/P Cycle 1 Brentuximab vedotin (BV) 3/10/23  - C2D1 due this Friday 3/31, but given current state and increased lethargy, likely will hold off for now. s/p C2 brentuximab 4/3  - CTS note from yesterday states to continue holding AC - may need to be off for long term   - Received one dose of Zarxio; ANC much improved  - Family updated daily   - CXR daily   - CBC with diff daily   - Thoracic input appreciated, no procedure for now, monitoring serosanguinous output  - Pending auth from [facility], if not accepted, will need subacute rehab. In subacute rehab, he will be unable to receive chemotherapy so we will not plan to administer C3 until after he leaves the rehab facility.     # Leukocytosis  -coming down, Zarxio effect       Del Green MD, PGY-4  Hematology/Medical Oncology Fellow  Pager: [phone]  Available on Microsoft Teams  After 5pm or on weekends please contact  to page on-call fellow           82 year old man with a past medical history as described above who presented for evaluation of shortness of breath. Presents from  [facility]hab [facility]. CXR revealed increased pleural effusions, likely loculated. Hematology on board for management of cHL.    # Classical Hodgkin Lymphoma   - S/P Cycle 1 Brentuximab vedotin (BV) 3/10/23  -  s/p C2 brentuximab 4/3  - CTS note from yesterday states to continue holding AC - may need to be off for long term   - Received one dose of Zarxio; ANC much improved  - Family updated daily   - CXR daily, f/u pending CT chest   - CBC with diff daily   - Thoracic input appreciated, no procedure for now, monitoring serosanguinous output  - Pending auth from [facility], if not accepted, will need subacute rehab. In subacute rehab, he will be unable to receive chemotherapy so we will not plan to administer C3 until after he leaves the rehab facility.     # Leukocytosis  -coming down, Zarxio effect       Del Green MD, PGY-4  Hematology/Medical Oncology Fellow  Pager: [phone]  Available on Microsoft Teams  After 5pm or on weekends please contact  to page on-call fellow

## 2023-04-05 NOTE — PROGRESS NOTE ADULT - SUBJECTIVE AND OBJECTIVE BOX
Name of Patient : ARIK DUMONT  MRN: [id number]  Date of visit: 04-05-23      Subjective: Patient seen and examined. No new events except as noted.   Doing okay       REVIEW OF SYSTEMS:    CONSTITUTIONAL: No weakness, fevers or chills  EYES/ENT: No visual changes;  No vertigo or throat pain   NECK: No pain or stiffness  RESPIRATORY: No cough, wheezing, hemoptysis; No shortness of breath  CARDIOVASCULAR: No chest pain or palpitations  GASTROINTESTINAL: No abdominal or epigastric pain. No nausea, vomiting, or hematemesis; No diarrhea or constipation. No melena or hematochezia.  GENITOURINARY: No dysuria, frequency or hematuria  NEUROLOGICAL: No numbness or weakness  SKIN: No itching, burning, rashes, or lesions   All other review of systems is negative unless indicated above.    MEDICATIONS:  MEDICATIONS  (STANDING):  albuterol   0.5% 2.5 milliGRAM(s) Nebulizer every 6 hours  atenolol  Tablet 12.5 milliGRAM(s) Oral daily  enoxaparin Injectable 40 milliGRAM(s) SubCutaneous every 24 hours  escitalopram 10 milliGRAM(s) Oral daily  famotidine    Tablet 20 milliGRAM(s) Oral daily  magnesium oxide 400 milliGRAM(s) Oral three times a day with meals  polyethylene glycol 3350 17 Gram(s) Oral daily  senna 2 Tablet(s) Oral at bedtime  simvastatin 40 milliGRAM(s) Oral at bedtime  traZODone 25 milliGRAM(s) Oral at bedtime      PHYSICAL EXAM:  T(C): 36.4 (04-05-23 @ 11:45), Max: 36.9 (04-05-23 @ 05:50)  HR: 68 (04-05-23 @ 11:45) (68 - 85)  BP: 100/57 (04-05-23 @ 11:45) (100/57 - 111/62)  RR: 19 (04-05-23 @ 11:45) (18 - 19)  SpO2: 97% (04-05-23 @ 11:45) (97% - 98%)  Wt(kg): --  I&O's Summary    05 Apr 2023 07:01  -  05 Apr 2023 17:49  --------------------------------------------------------  IN: 0 mL / OUT: 300 mL / NET: -300 mL          Appearance: Normal	  HEENT:  PERRLA   Lymphatic: No lymphadenopathy   Cardiovascular: Normal S1 S2, no JVD  Respiratory: normal effort , clear  Gastrointestinal:  Soft, Non-tender  Skin: No rashes,  warm to touch  Psychiatry:  Mood & affect appropriate  Musculuskeletal: No edema    recent labs, Imaging and EKGs personally reviewed     04-05-23 @ 07:01  -  04-05-23 @ 17:49  --------------------------------------------------------  IN: 0 mL / OUT: 300 mL / NET: -300 mL                          9.0    13.67 )-----------( 216      ( 05 Apr 2023 04:44 )             29.6               04-05    136  |  97<L>  |  11  ----------------------------<  101<H>  4.2   |  32<H>  |  0.52    Ca    8.4      05 Apr 2023 04:44  Phos  1.9     04-05  Mg     2.00     04-05                       ABG - ( 04 Apr 2023 04:50 )  pH, Arterial: 7.40  pH, Blood: x     /  pCO2: 56    /  pO2: 73    / HCO3: 35    / Base Excess: 8.0   /  SaO2: 96.3

## 2023-04-05 NOTE — PROGRESS NOTE ADULT - SUBJECTIVE AND OBJECTIVE BOX
ARIK DUMONT 82y Male      Patient is a 82y old  Male who presents with a chief complaint of pl effusion (27 Mar 2023 10:54)        INTERVAL HPI/OVERNIGHT EVENTS: No acute events overnight. Patient was seen and evaluated at the bedside. The patient denies pain. Vitals stable. Patient denies fever/chills, chest pain, shortness of breath, abdominal pain, headaches, nausea/vomiting, and diarrhea/constipation.      PHYSICAL EXAM:  GENERAL: NAD  HEAD:  Normocephalic  EYES:  conjunctiva and sclera clear  ENMT: Moist mucous membranes  NECK: Supple  NERVOUS SYSTEM:  Alert, awake  CHEST/LUNG: Good air exchange bilaterally, no wheeze  HEART: Regular rate and rhythm        Vital Signs Last 24 Hrs  T(C): 36.9 (05 Apr 2023 05:50), Max: 36.9 (05 Apr 2023 05:50)  T(F): 98.5 (05 Apr 2023 05:50), Max: 98.5 (05 Apr 2023 05:50)  HR: 78 (05 Apr 2023 10:50) (78 - 85)  BP: 111/62 (05 Apr 2023 05:50) (101/63 - 111/62)  BP(mean): --  RR: 18 (05 Apr 2023 05:50) (18 - 18)  SpO2: 98% (05 Apr 2023 10:50) (97% - 98%)    Parameters below as of 05 Apr 2023 10:50  Patient On (Oxygen Delivery Method): nasal cannula w/ humidification          MEDICATIONS  (STANDING):  albuterol   0.5% 2.5 milliGRAM(s) Nebulizer every 6 hours  atenolol  Tablet 12.5 milliGRAM(s) Oral daily  enoxaparin Injectable 40 milliGRAM(s) SubCutaneous every 24 hours  escitalopram 10 milliGRAM(s) Oral daily  famotidine    Tablet 20 milliGRAM(s) Oral daily  magnesium oxide 400 milliGRAM(s) Oral three times a day with meals  polyethylene glycol 3350 17 Gram(s) Oral daily  senna 2 Tablet(s) Oral at bedtime  simvastatin 40 milliGRAM(s) Oral at bedtime  traZODone 25 milliGRAM(s) Oral at bedtime    MEDICATIONS  (PRN):  acetaminophen     Tablet .. 650 milliGRAM(s) Oral every 6 hours PRN Temp greater or equal to 38C (100.4F), Mild Pain (1 - 3)  diphenhydrAMINE Injectable 50 milliGRAM(s) IV Push every 6 hours PRN Rash and/or Itching  hydrocortisone sodium succinate Injectable 100 milliGRAM(s) IV Push once PRN Infusion Reaction      Consultant(s) Notes Reviewed:  [X] YES  [ ] NO  Care Discussed with Other Providers [X] YES  [ ] NO  Imaging Personally Reviewed:  [X] YES  [ ] NO      LABS:                        9.0    13.67 )-----------( 216      ( 05 Apr 2023 04:44 )             29.6     04-05    136  |  97<L>  |  11  ----------------------------<  101<H>  4.2   |  32<H>  |  0.52    Ca    8.4      05 Apr 2023 04:44  Phos  1.9     04-05  Mg     2.00     04-05            ABG - ( 04 Apr 2023 04:50 )  pH, Arterial: 7.40  pH, Blood: x     /  pCO2: 56    /  pO2: 73    / HCO3: 35    / Base Excess: 8.0   /  SaO2: 96.3

## 2023-04-05 NOTE — PROGRESS NOTE ADULT - SUBJECTIVE AND OBJECTIVE BOX
Date of Service: 04-05-23 @ 13:21    Patient is a 82y old  Male who presents with a chief complaint of pl effusion (27 Mar 2023 10:54)      Any change in ROS:  doing ok : no sob:  no cough:     MEDICATIONS  (STANDING):  albuterol   0.5% 2.5 milliGRAM(s) Nebulizer every 6 hours  atenolol  Tablet 12.5 milliGRAM(s) Oral daily  enoxaparin Injectable 40 milliGRAM(s) SubCutaneous every 24 hours  escitalopram 10 milliGRAM(s) Oral daily  famotidine    Tablet 20 milliGRAM(s) Oral daily  magnesium oxide 400 milliGRAM(s) Oral three times a day with meals  polyethylene glycol 3350 17 Gram(s) Oral daily  senna 2 Tablet(s) Oral at bedtime  simvastatin 40 milliGRAM(s) Oral at bedtime  traZODone 25 milliGRAM(s) Oral at bedtime    MEDICATIONS  (PRN):  acetaminophen     Tablet .. 650 milliGRAM(s) Oral every 6 hours PRN Temp greater or equal to 38C (100.4F), Mild Pain (1 - 3)  diphenhydrAMINE Injectable 50 milliGRAM(s) IV Push every 6 hours PRN Rash and/or Itching  hydrocortisone sodium succinate Injectable 100 milliGRAM(s) IV Push once PRN Infusion Reaction    Vital Signs Last 24 Hrs  T(C): 36.4 (05 Apr 2023 11:45), Max: 36.9 (05 Apr 2023 05:50)  T(F): 97.5 (05 Apr 2023 11:45), Max: 98.5 (05 Apr 2023 05:50)  HR: 68 (05 Apr 2023 11:45) (68 - 85)  BP: 100/57 (05 Apr 2023 11:45) (100/57 - 111/62)  BP(mean): --  RR: 19 (05 Apr 2023 11:45) (18 - 19)  SpO2: 97% (05 Apr 2023 11:45) (97% - 98%)    Parameters below as of 05 Apr 2023 11:45  Patient On (Oxygen Delivery Method): nasal cannula  O2 Flow (L/min): 3      I&O's Summary        Physical Exam:   GENERAL: Obese  HEENT: CHUCK/   Atraumatic, Normocephalic  ENMT: No tonsillar erythema, exudates, or enlargement; Moist mucous membranes, Good dentition, No lesions  NECK: Supple, No JVD, Normal thyroid  CHEST/LUNG: crackles rigth bases  CVS: Regular rate and rhythm; No murmurs, rubs, or gallops  GI: : Soft, Nontender, Nondistended; Bowel sounds present  NERVOUS SYSTEM:  Alert & Oriented X3  EXTREMITIES:+ edema  LYMPH: No lymphadenopathy noted  SKIN: No rashes or lesions  ENDOCRINOLOGY: No Thyromegaly  PSYCH: Appropriate    Labs:  ABG - ( 04 Apr 2023 04:50 )  pH, Arterial: 7.40  pH, Blood: x     /  pCO2: 56    /  pO2: 73    / HCO3: 35    / Base Excess: 8.0   /  SaO2: 96.3                                        9.0    13.67 )-----------( 216      ( 05 Apr 2023 04:44 )             29.6                         9.3    16.47 )-----------( 175      ( 04 Apr 2023 04:50 )             30.6                         9.2    14.48 )-----------( 185      ( 03 Apr 2023 05:30 )             30.4                         9.3    13.71 )-----------( 188      ( 02 Apr 2023 06:11 )             30.6     04-05    136  |  97<L>  |  11  ----------------------------<  101<H>  4.2   |  32<H>  |  0.52  04-04    135  |  100  |  10  ----------------------------<  104<H>  4.1   |  29  |  0.47<L>  04-03    133<L>  |  97<L>  |  11  ----------------------------<  87  3.8   |  28  |  0.49<L>  04-02    132<L>  |  97<L>  |  12  ----------------------------<  93  3.7   |  28  |  0.48<L>    Ca    8.4      05 Apr 2023 04:44  Ca    8.5      04 Apr 2023 04:50  Phos  1.9     04-05  Phos  2.0     04-04  Mg     2.00     04-05  Mg     1.80     04-04      CAPILLARY BLOOD GLUCOSE            rad< from: Xray Chest 1 View- PORTABLE-Routine (Xray Chest 1 View- PORTABLE-Routine in AM.) (04.05.23 @ 07:50) >        INTERPRETATION:  CLINICAL INFORMATION: Atrial fibrillation, hypertension   and chronic urinary retention; pleural effusion.    TIME OF EXAMINATION: April 5 at 7:06 AM    EXAM: Portable chest    FINDINGS:  Right-sided chest tube and small loculated right effusion unchanged from   the study of the day before. Lungs show no focal consolidations. No   pneumothorax.        COMPARISON: April 4        IMPRESSION: Follow-up with right chest tube and small loculated effusion   unchanged.    --- End of Report ---            AMIRAH EARLY MD; Attending Radiologist  This document has been electronically signed. Apr 5 2023 12:00PM    < end of copied text >            RECENT CULTURES:  03-29 @ 15:46 .Blood Blood-Peripheral                No Growth Final    03-29 @ 15:26 .Blood Blood-Peripheral                No Growth Final          RESPIRATORY CULTURES:          Studies  Chest X-RAY  CT SCAN Chest   Venous Dopplers: LE:   CT Abdomen  Others

## 2023-04-05 NOTE — PROGRESS NOTE ADULT - SUBJECTIVE AND OBJECTIVE BOX
DATE OF SERVICE: 04-05-23 @ 11:03    Subjective: Patient seen and examined. No new events except as noted.     SUBJECTIVE/ROS:  nad    MEDICATIONS:  MEDICATIONS  (STANDING):  albuterol   0.5% 2.5 milliGRAM(s) Nebulizer every 6 hours  atenolol  Tablet 12.5 milliGRAM(s) Oral daily  enoxaparin Injectable 40 milliGRAM(s) SubCutaneous every 24 hours  escitalopram 10 milliGRAM(s) Oral daily  famotidine    Tablet 20 milliGRAM(s) Oral daily  magnesium oxide 400 milliGRAM(s) Oral three times a day with meals  polyethylene glycol 3350 17 Gram(s) Oral daily  senna 2 Tablet(s) Oral at bedtime  simvastatin 40 milliGRAM(s) Oral at bedtime  traZODone 25 milliGRAM(s) Oral at bedtime      PHYSICAL EXAM:  T(C): 36.9 (04-05-23 @ 05:50), Max: 36.9 (04-05-23 @ 05:50)  HR: 79 (04-05-23 @ 05:50) (79 - 85)  BP: 111/62 (04-05-23 @ 05:50) (101/59 - 111/62)  RR: 18 (04-05-23 @ 05:50) (18 - 18)  SpO2: 98% (04-05-23 @ 05:50) (97% - 99%)  Wt(kg): --  I&O's Summary          JVP: Normal  Neck: supple  Lung: clear   CV: S1 S2 , Murmur:  Abd: soft  Ext: No edema  neuro: Awake / alert  Psych: flat affect  Skin: normal``    LABS/DATA:    CARDIAC MARKERS:                                9.0    13.67 )-----------( 216      ( 05 Apr 2023 04:44 )             29.6     04-05    136  |  97<L>  |  11  ----------------------------<  101<H>  4.2   |  32<H>  |  0.52    Ca    8.4      05 Apr 2023 04:44  Phos  1.9     04-05  Mg     2.00     04-05      proBNP:   Lipid Profile:   HgA1c:   TSH:     TELE:  EKG:

## 2023-04-05 NOTE — PROGRESS NOTE ADULT - SUBJECTIVE AND OBJECTIVE BOX
Patient is a 82y old  Male who presents with a chief complaint of pl effusion (27 Mar 2023 10:54)      HPI:  82 year old male with PMH of AFIB, HTN and 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 Patient is poor historian, history obtained by son Francisco at bedside. States that  patient has been recovering wellat  rehab. States patient was able to walk over 150 ft while at rehab. States that suddenly on Friday patient started to develop acute SOB and increased WOB. Imaging was done at  rehab showing worsening pleural effusions EMS was called and patient was noted to be tachypneic to 30s and placed on 10L NRB.        Recently admitted from 3/15-3/24 for worsening SOB was found to have  significant for a large R pleural effusion with mediastinal and hilar lymphadenopathy, and 3L of fluid was drained. Pleural fluid cultures and blood cultures resulted negative.  Suspicious of underlying malignancy secondary to lymphadenopathy and persistent leukocytosis, Heme/Onc was consulted and recommended transfer to [facility] on 2/24/23 for further workup. On 2/28 he underwent an ultrasound guided biopsy of his R supraclavicular mass/lymph node, which later resulted as classic Hodgkin's lymphoma.  On 3/3, he underwent a R thoracotomy/VATS with Pleurex placement. Left chest wall mediport was placed and received  first cycle of chemotherapy on 3/10. (25 Mar 2023 16:44)    appears fatigued but states he feels better today    REVIEW OF SYSTEMS  fatigue, weakness    PAST MEDICAL & SURGICAL HISTORY  No pertinent past medical history    Atrial fibrillation    Hypertension    Urinary retention    No significant past surgical history      CURRENT FUNCTIONAL STATUS  4/4  Sit-Stand Transfer Training  Sit-to-Stand Transfer Training Rehab Potential: good, to achieve stated therapy goals  Comment: Increased assistance needed to perform sit to stand from recliner likely secondary to fatigue s/p chemotherapy yesterday.  Sit-to-Stand Transfer Training Symptoms Noted During/After Treatment: fatigue  Transfer Training Sit-to-Stand Transfer: maximum assist (25% patient effort);  1 person assist;  nonverbal cues (demo/gestures);  verbal cues;  rolling walker  Transfer Training Stand-to-Sit Transfer: moderate assist (50% patient effort);  1 person assist;  nonverbal cues (demo/gestures);  verbal cues;  rolling walker  Sit-to-Stand Transfer Training Transfer Safety Analysis: decreased balance;  decreased sequencing ability;  decreased weight-shifting ability;  decreased strength;  impaired balance;  impaired coordination;  impaired motor control;  impaired postural control    Gait Training  Gait Training Rehab Potential: good, to achieve stated therapy goals  Gait Training Symptoms Noted During/After Treatment: fatigue  Gait Training: moderate assist (50% patient effort);  1 person assist;  nonverbal cues (demo/gestures);  verbal cues;  rolling walker;  5ft forward and backwards  Gait Analysis: 3-point gait   decreased isabella;  crouch;  decreased hip/knee flexion;  decreased velocity of limb motion;  decreased step length;  decreased stride length;  decreased weight-shifting ability;  forward flexed posture;  decreased strength;  impaired balance;  impaired coordination;  impaired motor control;  impaired postural control;  impaired endurance         RECENT LABS/IMAGING  CBC Full  -  ( 05 Apr 2023 04:44 )  WBC Count : 13.67 K/uL  RBC Count : 3.07 M/uL  Hemoglobin : 9.0 g/dL  Hematocrit : 29.6 %  Platelet Count - Automated : 216 K/uL  Mean Cell Volume : 96.4 fL  Mean Cell Hemoglobin : 29.3 pg  Mean Cell Hemoglobin Concentration : 30.4 gm/dL  Auto Neutrophil # : x  Auto Lymphocyte # : x  Auto Monocyte # : x  Auto Eosinophil # : x  Auto Basophil # : x  Auto Neutrophil % : x  Auto Lymphocyte % : x  Auto Monocyte % : x  Auto Eosinophil % : x  Auto Basophil % : x    04-05    136  |  97<L>  |  11  ----------------------------<  101<H>  4.2   |  32<H>  |  0.52    Ca    8.4      05 Apr 2023 04:44  Phos  1.9     04-05  Mg     2.00     04-05          VITALS  T(C): 36.9 (04-05-23 @ 05:50), Max: 36.9 (04-05-23 @ 05:50)  HR: 79 (04-05-23 @ 05:50) (79 - 85)  BP: 111/62 (04-05-23 @ 05:50) (101/59 - 111/62)  RR: 18 (04-05-23 @ 05:50) (18 - 18)  SpO2: 98% (04-05-23 @ 05:50) (97% - 99%)  Wt(kg): --    ALLERGIES  pertussis vaccines (Rash)  shellfish (Rash)      MEDICATIONS   acetaminophen     Tablet .. 650 milliGRAM(s) Oral every 6 hours PRN  albuterol   0.5% 2.5 milliGRAM(s) Nebulizer every 6 hours  atenolol  Tablet 12.5 milliGRAM(s) Oral daily  diphenhydrAMINE Injectable 50 milliGRAM(s) IV Push every 6 hours PRN  enoxaparin Injectable 40 milliGRAM(s) SubCutaneous every 24 hours  escitalopram 10 milliGRAM(s) Oral daily  famotidine    Tablet 20 milliGRAM(s) Oral daily  hydrocortisone sodium succinate Injectable 100 milliGRAM(s) IV Push once PRN  magnesium oxide 400 milliGRAM(s) Oral three times a day with meals  polyethylene glycol 3350 17 Gram(s) Oral daily  senna 2 Tablet(s) Oral at bedtime  simvastatin 40 milliGRAM(s) Oral at bedtime  traZODone 25 milliGRAM(s) Oral at bedtime      ----------------------------------------------------------------------------------------   PHYSICAL EXAM  Constitutional - NAD sitting in chair  HEENT - NCAT, EOMI   + nasal cannula  Chest - + pleurex    Cardiovascular - RRR, S1S2   Abdomen -  Soft, NTND  Extremities - No C/C/E, No calf tenderness   Neurologic Exam -                    Cognitive - Awake, Alert      Communication - Fluent, No dysarthria     Cranial Nerves - CN 2-12 intact     Motor -                      LEFT    UE - 4/5                    RIGHT UE - 4/5                    LEFT    LE -  4/5                    RIGHT LE - 4/5        Sensory - Intact to LT      Balance - WNL Static  Psychiatric - Mood stable, Affect WNL  ----------------------------------------------------------------------------------------  ASSESSMENT/PLAN   81 yo m with hodgkin lymphoma admitted from acute rehab, with pleural effusions and SOB  cefepime  diet: regular  plummer  continue bedside PT and OT   DVT PPX - lovenox  Rehab -recommend acute rehab when medically cleared, patient can tolerate 3 hours per day of therapy with medical supervision.

## 2023-04-06 ENCOUNTER — TRANSCRIPTION ENCOUNTER (OUTPATIENT)
Age: 83
End: 2023-04-06

## 2023-04-06 LAB
ANION GAP SERPL CALC-SCNC: 6 MMOL/L — LOW (ref 7–14)
BASOPHILS # BLD AUTO: 0.07 K/UL — SIGNIFICANT CHANGE UP (ref 0–0.2)
BASOPHILS NFR BLD AUTO: 0.6 % — SIGNIFICANT CHANGE UP (ref 0–2)
BUN SERPL-MCNC: 10 MG/DL — SIGNIFICANT CHANGE UP (ref 7–23)
CALCIUM SERPL-MCNC: 8.3 MG/DL — LOW (ref 8.4–10.5)
CHLORIDE SERPL-SCNC: 99 MMOL/L — SIGNIFICANT CHANGE UP (ref 98–107)
CO2 SERPL-SCNC: 31 MMOL/L — SIGNIFICANT CHANGE UP (ref 22–31)
CREAT SERPL-MCNC: 0.5 MG/DL — SIGNIFICANT CHANGE UP (ref 0.5–1.3)
EGFR: 101 ML/MIN/1.73M2 — SIGNIFICANT CHANGE UP
EOSINOPHIL # BLD AUTO: 0.59 K/UL — HIGH (ref 0–0.5)
EOSINOPHIL NFR BLD AUTO: 4.9 % — SIGNIFICANT CHANGE UP (ref 0–6)
GLUCOSE SERPL-MCNC: 102 MG/DL — HIGH (ref 70–99)
HCT VFR BLD CALC: 28 % — LOW (ref 39–50)
HGB BLD-MCNC: 8.1 G/DL — LOW (ref 13–17)
IANC: 6 K/UL — SIGNIFICANT CHANGE UP (ref 1.8–7.4)
IMM GRANULOCYTES NFR BLD AUTO: 8.5 % — HIGH (ref 0–0.9)
LYMPHOCYTES # BLD AUTO: 2.96 K/UL — SIGNIFICANT CHANGE UP (ref 1–3.3)
LYMPHOCYTES # BLD AUTO: 24.4 % — SIGNIFICANT CHANGE UP (ref 13–44)
MAGNESIUM SERPL-MCNC: 1.9 MG/DL — SIGNIFICANT CHANGE UP (ref 1.6–2.6)
MCHC RBC-ENTMCNC: 28.4 PG — SIGNIFICANT CHANGE UP (ref 27–34)
MCHC RBC-ENTMCNC: 28.9 GM/DL — LOW (ref 32–36)
MCV RBC AUTO: 98.2 FL — SIGNIFICANT CHANGE UP (ref 80–100)
MONOCYTES # BLD AUTO: 1.49 K/UL — HIGH (ref 0–0.9)
MONOCYTES NFR BLD AUTO: 12.3 % — SIGNIFICANT CHANGE UP (ref 2–14)
NEUTROPHILS # BLD AUTO: 6 K/UL — SIGNIFICANT CHANGE UP (ref 1.8–7.4)
NEUTROPHILS NFR BLD AUTO: 49.3 % — SIGNIFICANT CHANGE UP (ref 43–77)
NRBC # BLD: 0 /100 WBCS — SIGNIFICANT CHANGE UP (ref 0–0)
NRBC # FLD: 0.03 K/UL — HIGH (ref 0–0)
PHOSPHATE SERPL-MCNC: 3.5 MG/DL — SIGNIFICANT CHANGE UP (ref 2.5–4.5)
PLATELET # BLD AUTO: 195 K/UL — SIGNIFICANT CHANGE UP (ref 150–400)
POTASSIUM SERPL-MCNC: 4 MMOL/L — SIGNIFICANT CHANGE UP (ref 3.5–5.3)
POTASSIUM SERPL-SCNC: 4 MMOL/L — SIGNIFICANT CHANGE UP (ref 3.5–5.3)
RBC # BLD: 2.85 M/UL — LOW (ref 4.2–5.8)
RBC # FLD: 21.6 % — HIGH (ref 10.3–14.5)
SARS-COV-2 RNA SPEC QL NAA+PROBE: SIGNIFICANT CHANGE UP
SODIUM SERPL-SCNC: 136 MMOL/L — SIGNIFICANT CHANGE UP (ref 135–145)
WBC # BLD: 12.14 K/UL — HIGH (ref 3.8–10.5)
WBC # FLD AUTO: 12.14 K/UL — HIGH (ref 3.8–10.5)

## 2023-04-06 PROCEDURE — 99232 SBSQ HOSP IP/OBS MODERATE 35: CPT | Mod: GC

## 2023-04-06 PROCEDURE — 71250 CT THORAX DX C-: CPT | Mod: 26

## 2023-04-06 PROCEDURE — 71045 X-RAY EXAM CHEST 1 VIEW: CPT | Mod: 26

## 2023-04-06 RX ADMIN — ALBUTEROL 2.5 MILLIGRAM(S): 90 AEROSOL, METERED ORAL at 10:34

## 2023-04-06 RX ADMIN — ALBUTEROL 2.5 MILLIGRAM(S): 90 AEROSOL, METERED ORAL at 17:39

## 2023-04-06 RX ADMIN — FAMOTIDINE 20 MILLIGRAM(S): 10 INJECTION INTRAVENOUS at 11:47

## 2023-04-06 RX ADMIN — MAGNESIUM OXIDE 400 MG ORAL TABLET 400 MILLIGRAM(S): 241.3 TABLET ORAL at 17:39

## 2023-04-06 RX ADMIN — ATENOLOL 12.5 MILLIGRAM(S): 25 TABLET ORAL at 04:41

## 2023-04-06 RX ADMIN — MAGNESIUM OXIDE 400 MG ORAL TABLET 400 MILLIGRAM(S): 241.3 TABLET ORAL at 07:59

## 2023-04-06 RX ADMIN — MAGNESIUM OXIDE 400 MG ORAL TABLET 400 MILLIGRAM(S): 241.3 TABLET ORAL at 11:46

## 2023-04-06 RX ADMIN — ESCITALOPRAM OXALATE 10 MILLIGRAM(S): 10 TABLET, FILM COATED ORAL at 11:46

## 2023-04-06 NOTE — PROGRESS NOTE ADULT - SUBJECTIVE AND OBJECTIVE BOX
Date of Service: 04-06-23 @ 14:56    Patient is a 82y old  Male who presents with a chief complaint of pl effusion (27 Mar 2023 10:54)      Any change in ROS:  doing same: on 23 L of oxygen : no eventds  overngiht     MEDICATIONS  (STANDING):  albuterol   0.5% 2.5 milliGRAM(s) Nebulizer every 6 hours  atenolol  Tablet 12.5 milliGRAM(s) Oral daily  enoxaparin Injectable 40 milliGRAM(s) SubCutaneous every 24 hours  escitalopram 10 milliGRAM(s) Oral daily  famotidine    Tablet 20 milliGRAM(s) Oral daily  magnesium oxide 400 milliGRAM(s) Oral three times a day with meals  polyethylene glycol 3350 17 Gram(s) Oral daily  senna 2 Tablet(s) Oral at bedtime  simvastatin 40 milliGRAM(s) Oral at bedtime  traZODone 25 milliGRAM(s) Oral at bedtime    MEDICATIONS  (PRN):  acetaminophen     Tablet .. 650 milliGRAM(s) Oral every 6 hours PRN Temp greater or equal to 38C (100.4F), Mild Pain (1 - 3)  diphenhydrAMINE Injectable 50 milliGRAM(s) IV Push every 6 hours PRN Rash and/or Itching  hydrocortisone sodium succinate Injectable 100 milliGRAM(s) IV Push once PRN Infusion Reaction    Vital Signs Last 24 Hrs  T(C): 37.1 (06 Apr 2023 11:46), Max: 37.1 (06 Apr 2023 11:46)  T(F): 98.7 (06 Apr 2023 11:46), Max: 98.7 (06 Apr 2023 11:46)  HR: 73 (06 Apr 2023 11:46) (63 - 83)  BP: 101/70 (06 Apr 2023 11:46) (101/70 - 114/78)  BP(mean): --  RR: 18 (06 Apr 2023 11:46) (18 - 20)  SpO2: 98% (06 Apr 2023 11:46) (97% - 100%)    Parameters below as of 06 Apr 2023 11:46  Patient On (Oxygen Delivery Method): nasal cannula  O2 Flow (L/min): 3      I&O's Summary    05 Apr 2023 07:01  -  06 Apr 2023 07:00  --------------------------------------------------------  IN: 0 mL / OUT: 725 mL / NET: -725 mL          Physical Exam:   GENERAL: Obese  HEENT: CHUCK/   Atraumatic, Normocephalic  ENMT: No tonsillar erythema, exudates, or enlargement; Moist mucous membranes, Good dentition, No lesions  NECK: Supple, No JVD, Normal thyroid  CHEST/LUNG: Crackels rigth base: no wheezing  CVS: Regular rate and rhythm; No murmurs, rubs, or gallops  GI: : Soft, Nontender, Nondistended; Bowel sounds present  NERVOUS SYSTEM:  Alert & awake  EXTREMITIES: -+r edema  LYMPH: No lymphadenopathy noted  SKIN: No rashes or lesions  ENDOCRINOLOGY: No Thyromegaly  PSYCH: Appropriate    Labs:                              8.1    12.14 )-----------( 195      ( 06 Apr 2023 06:11 )             28.0                         9.0    13.67 )-----------( 216      ( 05 Apr 2023 04:44 )             29.6                         9.3    16.47 )-----------( 175      ( 04 Apr 2023 04:50 )             30.6                         9.2    14.48 )-----------( 185      ( 03 Apr 2023 05:30 )             30.4     04-06    136  |  99  |  10  ----------------------------<  102<H>  4.0   |  31  |  0.50  04-05    136  |  97<L>  |  11  ----------------------------<  101<H>  4.2   |  32<H>  |  0.52  04-04    135  |  100  |  10  ----------------------------<  104<H>  4.1   |  29  |  0.47<L>  04-03    133<L>  |  97<L>  |  11  ----------------------------<  87  3.8   |  28  |  0.49<L>    Ca    8.3<L>      06 Apr 2023 06:11  Ca    8.4      05 Apr 2023 04:44  Phos  3.5     04-06  Phos  1.9     04-05  Mg     1.90     04-06  Mg     2.00     04-05      CAPILLARY BLOOD GLUCOSE              rad< from: CT Chest No Cont (04.06.23 @ 10:24) >  VISUALIZED UPPER ABDOMEN: Unchanged 3.1 cm lefthepatic lobe cyst.    BONES: Generalized osteopenia. Multilevel degenerative changes of the   spine. No aggressive osseous lesion.    IMPRESSION:  Compared to 3/29/2023, similar loculated, exudative right pleural   effusion with slightly decreased pleural air component and Pleurx   catheter in place. Question malignant effusion in the setting of reported   lymphoma, irregular septal thickening, nodularity and mediastinal pleural   involvement.    Stable multistation thoracic lymphadenopathy.    < end of copied text >          RECENT CULTURES:        RESPIRATORY CULTURES:          Studies  Chest X-RAY  CT SCAN Chest   Venous Dopplers: LE:   CT Abdomen  Others

## 2023-04-06 NOTE — DISCHARGE NOTE PROVIDER - CARE PROVIDER_API CALL
Anselmo Romo)  Surgery; Thoracic Surgery  [address] [facility] [place] [address]  Phone: [phone]  Fax: [phone]  Established Patient  Follow Up Time:

## 2023-04-06 NOTE — PROGRESS NOTE ADULT - SUBJECTIVE AND OBJECTIVE BOX
Covering for Dr. Connor    No acute SOB  CT chest expedited courtesy of medicine [place]    Vital Signs Last 24 Hrs  T(C): 36.8 (06 Apr 2023 04:19), Max: 37 (05 Apr 2023 21:16)  T(F): 98.2 (06 Apr 2023 04:19), Max: 98.6 (05 Apr 2023 21:16)  HR: 66 (06 Apr 2023 04:19) (63 - 83)  BP: 112/69 (06 Apr 2023 04:19) (100/57 - 114/78)  BP(mean): --  RR: 18 (06 Apr 2023 04:19) (18 - 20)  SpO2: 99% (06 Apr 2023 04:19) (97% - 100%)    I&O's Summary    04-05-23 @ 07:01  -  04-06-23 @ 07:00  --------------------------------------------------------  IN: 0 mL / OUT: 725 mL / NET: -725 mL        Appearance: Normal	  HEENT:  PERRLA   Lymphatic: No lymphadenopathy   Cardiovascular: Normal S1 S2, no JVD  Respiratory: normal effort , clear  Gastrointestinal:  Soft, Non-tender  Skin: No rashes,  warm to touch  Psychiatry:  Mood & affect appropriate  Musculuskeletal: No edema    LABS:                        8.1    12.14 )-----------( 195      ( 06 Apr 2023 06:11 )             28.0     04-06    136  |  99  |  10  ----------------------------<  102<H>  4.0   |  31  |  0.50    Ca    8.3<L>      06 Apr 2023 06:11  Phos  3.5     04-06  Mg     1.90     04-06        CAPILLARY BLOOD GLUCOSE                RADIOLOGY & ADDITIONAL TESTS:    Imaging Personally Reviewed:  [x] YES  [ ] NO    Case discussed with NPP:  [X] YES  [ ] NO

## 2023-04-06 NOTE — PROGRESS NOTE ADULT - ASSESSMENT
82 year old man with a past medical history as described above who presented for evaluation of shortness of breath. [facility] from  [facility]. CXR revealed increased pleural effusions, likely loculated. Hematology on board for management of cHL.    # Classical Hodgkin Lymphoma   - S/P Cycle 1 Brentuximab vedotin (BV) 3/10/23  -  s/p C2 brentuximab 4/3  - CTS note from yesterday states to continue holding AC - may need to be off for long term   - Received one dose of Zarxio; ANC much improved  - Family updated daily   -CXR daily  - CBC with diff daily   - Thoracic input appreciated, no procedure for now, monitoring serosanguinous output  - Pending auth from [facility] acute [facility], if not accepted, will need subacute rehab. In subacute rehab, he will be unable to receive chemotherapy so we will not plan to administer C3 until after he leaves the rehab facility.   -CT Chest: Stable disease    # Leukocytosis  -coming down, Zarxio effect       Please page with questions or concerns. Will Follow with you.      Karl Salomon M.D.  Hematology/Oncology Fellow PGY5  Pager [phone]  After 5pm, please contact on-call team.            82 year old man with a past medical history as described above who presented for evaluation of shortness of breath. Presents from  [facility]. CXR revealed increased pleural effusions, likely loculated. Hematology on board for management of cHL.    # Classical Hodgkin Lymphoma   - S/P Cycle 1 Brentuximab vedotin (BV) 3/10/23  -  s/p C2 brentuximab 4/3  - CTS note from yesterday states to continue holding AC - may need to be off for long term   - Received one dose of Zarxio; ANC much improved  - Family updated daily   -CXR daily  - CBC with diff daily   - Thoracic input appreciated, no procedure for now, monitoring serosanguinous output  - Pending auth from [facility] acute re[facility], if not accepted, will need subacute rehab. In subacute rehab, he will be unable to receive chemotherapy so we will not plan to administer C3 until after he leaves the rehab facility.   -CT Chest: Stable disease, no new significant findings     # Leukocytosis  -coming down, Zarxio effect       Please page with questions or concerns. Will Follow with you.      Karl Salomno M.D.  Hematology/Oncology Fellow PGY5  Pager [phone]  After 5pm, please contact on-call team.

## 2023-04-06 NOTE — DISCHARGE NOTE PROVIDER - NSDCMRMEDTOKEN_GEN_ALL_CORE_FT
acetaminophen 325 mg oral tablet: 2 tab(s) orally every 6 hours, As needed, Mild Pain (1 - 3)  ALPRAZolam 0.25 mg oral tablet: 1 tab(s) orally once a day, As needed, anxiety  apixaban 5 mg oral tablet: 1 tab(s) orally 2 times a day  aspirin 81 mg oral tablet, chewable: 1 tab(s) orally once a day  chlorhexidine 2% topical pad: 1 application topically once a day  escitalopram 10 mg oral tablet: 1 tab(s) orally once a day  famotidine 20 mg oral tablet: 1 tab(s) orally once a day  levalbuterol 0.63 mg/3 mL inhalation solution: 3 milliliter(s) inhaled every 6 hours  magnesium oxide 400 mg oral tablet: 1 tab(s) orally 3 times a day (with meals)  oxyCODONE 5 mg oral tablet: 1 tab(s) orally every 8 hours, As needed, Moderate Pain (4 - 6)  polyethylene glycol 3350 oral powder for reconstitution: 17 gram(s) orally once a day  senna leaf extract oral tablet: 2 tab(s) orally once a day (at bedtime)  simvastatin 40 mg oral tablet: 1 tab(s) orally once a day (at bedtime)  traZODone: 25 milligram(s) orally once a day (at bedtime)   acetaminophen 325 mg oral tablet: 2 tab(s) orally every 6 hours, As needed, Mild Pain (1 - 3)  albuterol 5 mg/mL (0.5%) inhalation solution: 0.5 milliliter(s) inhaled every 6 hours  aspirin 81 mg oral tablet, chewable: 1 tab(s) orally once a day  atenolol: 12.5 orally once a day  chlorhexidine 2% topical pad: 1 application topically once a day  enoxaparin: 40 subcutaneous once a day  escitalopram 10 mg oral tablet: 1 tab(s) orally once a day  famotidine 20 mg oral tablet: 1 tab(s) orally once a day  magnesium oxide 400 mg oral tablet: 1 tab(s) orally 3 times a day (with meals)  oxyCODONE 5 mg oral tablet: 1 tab(s) orally every 8 hours, As needed, Moderate Pain (4 - 6)  polyethylene glycol 3350 oral powder for reconstitution: 17 gram(s) orally once a day  senna leaf extract oral tablet: 2 tab(s) orally once a day (at bedtime)  simvastatin 40 mg oral tablet: 1 tab(s) orally once a day (at bedtime)  traZODone: 25 milligram(s) orally once a day (at bedtime)

## 2023-04-06 NOTE — CHART NOTE - NSCHARTNOTEFT_GEN_A_CORE
Patient seen at bedside. Pleurx catheter disconnected from pleurevac and capped.   Recommend VNS and proper pleurx catheter flushes to maintain patency and appropriate drainage once discharged.   Recommend further CXR if patient develops worsening respiratory symptoms to evaluate development of recurrent effusion and need for drainage.     Thoracic surgery to sign off; no other surgerical intervention indicated at this time. Please contact with further questions or concerns.     Plan discussed with and approved by attending on call, Dr. Lawrence Dukes MD PGY-2  Thoracic Surgery  [id number]

## 2023-04-06 NOTE — DISCHARGE NOTE PROVIDER - NSDCCPCAREPLAN_GEN_ALL_CORE_FT
PRINCIPAL DISCHARGE DIAGNOSIS  Diagnosis: Pleural effusion  Assessment and Plan of Treatment: you were closely followued by thoracic surgery  VATS now your PleurX is capped  Close followup with Dr. Romo      SECONDARY DISCHARGE DIAGNOSES  Diagnosis: Permanent atrial fibrillation  Assessment and Plan of Treatment: Please continue your medications as directed and follow-up with your primary provider/cardiologist to further manage your care. Monitor for signs/symptoms of uncontrolled atrial fibrillation, such as, increased heart rate, palpitations, chest pain, dizziness, or shortness of breath - Return to emergency room if these signs/symptoms are present.    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 Romo  Repeat chest xray in 1 week      SECONDARY DISCHARGE DIAGNOSES  Diagnosis: Permanent atrial fibrillation  Assessment and Plan of Treatment: Please continue your medications as directed and follow-up with your primary provider/cardiologist to further manage your care. Monitor for signs/symptoms of uncontrolled atrial fibrillation, such as, increased heart rate, palpitations, chest pain, dizziness, or shortness of breath - Return to emergency room if these signs/symptoms are present.    Diagnosis: Urinary retention  Assessment and Plan of Treatment: chronic plummer    Diagnosis: Hypertension  Assessment and Plan of Treatment: Continue blood pressure medication regimen as directed. Monitor for any visual changes, headaches or dizziness.  Monitor blood pressure regularly.  Follow up with your primary care provider for further management for high blood pressure.      Diagnosis: Hodgkin lymphoma  Assessment and Plan of Treatment: You were seen by the oncologist, you received Zarxio and chemotherapy with C2 brentuximab on 4/3/23, stable  - As per oncology - likely treatment in 3 weeks depending on patient progression        PRINCIPAL DISCHARGE DIAGNOSIS  Diagnosis: Pleural effusion  Assessment and Plan of Treatment: You were closely followed by thoracic surgery  VATS now your PleurX is capped  Close followup with Dr. Romo  Please drain chest tube every other day.   Repeat chest xray in 1 week      SECONDARY DISCHARGE DIAGNOSES  Diagnosis: Permanent atrial fibrillation  Assessment and Plan of Treatment: Please continue your medications as directed and follow-up with your primary provider/cardiologist to further manage your care. Monitor for signs/symptoms of uncontrolled atrial fibrillation, such as, increased heart rate, palpitations, chest pain, dizziness, or shortness of breath - Return to emergency room if these signs/symptoms are present.    Diagnosis: Urinary retention  Assessment and Plan of Treatment: chronic plummer    Diagnosis: Hypertension  Assessment and Plan of Treatment: Continue blood pressure medication regimen as directed. Monitor for any visual changes, headaches or dizziness.  Monitor blood pressure regularly.  Follow up with your primary care provider for further management for high blood pressure.      Diagnosis: Hodgkin lymphoma  Assessment and Plan of Treatment: You were seen by the oncologist, you received Zarxio and chemotherapy with C2 brentuximab on 4/3/23, stable  - As per oncology - likely treatment in 3 weeks depending on patient progression

## 2023-04-06 NOTE — CHART NOTE - NSCHARTNOTEFT_GEN_A_CORE
Called CT [phone] to expedite CT chest, will put in for transport shortly, RN informed. Called CT [phone] to expedite CT chest, will put in for transport shortly, RN informed.    Addendum: 1317  Vital Signs Last 24 Hrs  T(C): 37.1 (06 Apr 2023 11:46), Max: 37.1 (06 Apr 2023 11:46)  T(F): 98.7 (06 Apr 2023 11:46), Max: 98.7 (06 Apr 2023 11:46)  HR: 73 (06 Apr 2023 11:46) (63 - 83)  BP: 101/70 (06 Apr 2023 11:46) (101/70 - 114/78)  BP(mean): --  RR: 18 (06 Apr 2023 11:46) (18 - 20)  SpO2: 98% (06 Apr 2023 11:46) (97% - 100%)    Parameters below as of 06 Apr 2023 11:46  Patient On (Oxygen Delivery Method): nasal cannula  O2 Flow (L/min): 3                        8.1    12.14 )-----------( 195      ( 06 Apr 2023 06:11 )             28.0   04-06    136  |  99  |  10  ----------------------------<  102<H>  4.0   |  31  |  0.50    Ca    8.3<L>      06 Apr 2023 06:11  Phos  3.5     04-06  Mg     1.90     04-06    CT Chest: IMPRESSION:   - 4/6 CT Chest -- Compared to 3/29/2023, similar loculated, exudative right pleural effusion with slightly decreased pleural air component and Pleurx catheter in place. Question malignant effusion in the setting of reported lymphoma, irregular septal thickening, nodularity and mediastinal pleural involvement. Stable multistation thoracic lymphadenopathy.    Writer discussed CT results with Thoracic Sx SARAVANAN Sainz> nothing to do, maintain PleurX. Results and case discussed with Dr. Stewart

## 2023-04-06 NOTE — PROGRESS NOTE ADULT - ASSESSMENT
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 Patient is poor historian, history obtained by son Francisco at bedside. States that  patient has been recovering wellat  rehab. States patient was able to walk over 150 ft while at rehab. States that suddenly on Friday patient started to develop acute SOB and increased WOB. Imaging was done at  rehab showing worsening pleural effusions EMS was called and patient was noted to be tachypneic to 30s and placed on 10L NRB.    Recently admitted from 3/15-3/24 for worsening SOB was found to have  significant for a large R pleural effusion with mediastinal and hilar lymphadenopathy, and 3L of fluid was drained. Pleural fluid cultures and blood cultures resulted negative.  Suspicious of underlying malignancy secondary to lymphadenopathy and persistent leukocytosis, Heme/Onc was consulted and recommended transfer to [facility] on 2/24/23 for further workup. On 2/28 he underwent an ultrasound guided biopsy of his R supraclavicular mass/lymph node, which later resulted as classic Hodgkin's lymphoma.  On 3/3, he underwent a R thoracotomy/VATS with Pleurex placement. Left chest wall mediport was placed and received  first cycle of chemotherapy on 3/10. (25 Mar 2023 16:44)    no wpulm called:  pt has chest tube on rigth side:  has bloody secretions  he looks comfortable:  no sob :      right sided pl effusion:  A fibrillation:   HTN:  Urinary retention     3/27:  right sided pl effusion:  [id number]:  blood pl fluid  ; on ac;  cts surgery to see:  chest xray today looks pretty good to me: not hypercarbic and acidotic on VBG   A fibrillation: off ac at t his time  HTN:  blood pressure is controlled  Urinary retention  ; has chr plummer: :    marah pmd    3/28:    right sided pl effusion:  has lymphoma:  blood pl fluid  ; on ac;  cts surgery to see:  chest xray today looks pretty good to me: not hypercarbic and acidotic on VBG   A fibrillation: off ac at t his time: no tpa done as the pl fluid is very blood:   HTN:  blood pressure is controlled  Urinary retention  ; has chr plummer: :    marah pmd      3/29:    right sided pl effusion:  has lymphoma:  blood pl fluid  ; on ac;  cts surgery to see:  chest xray today looks pretty good to me: not hypercarbic and acidotic on VBG : his wbc is increased today  : ? etiology : ct scan chest awaited:  check procal as well as monitor for fever:   A fibrillation: off ac at t his time: no tpa done as the pl fluid is very blood:   HTN:  blood pressure is controlled  Urinary retention  ; has chr plummer: :    marah pmd      3/30:    right sided pl effusion:  has lymphoma:  blood pl fluid  ; off ac;  rpt ct chest noted from last night:  has mild hemothorax and pneumothorax too ; with alexey effusion; His WBC is high  but he has no fever: Slight jump in his procal : but he doesnto look septic to me ? could it be reactive:  he does have underlying lymphoma:   A fibrillation: off ac at t his time: no tpa done as the pl fluid is very bloody - and now he will be off ac:    HTN:  blood pressure is controlled  Urinary retention  ; has chr plummer: :    marah cts      3/31:    right sided pl effusion:  has lymphoma:  blood pl fluid  ; off ac;  rpt ct chest noted from last night:  has mild hemothorax and pneumothorax too ; with alexey effusion; His WBC is high  but he has no fever: Slight jump in his procal : but he doesnto look septic to me ? could it be reactive:  he does have underlying lymphoma: ct scan pan body yesterday revealed *  Right pleural chest tube in place. Loculated right hemothorax, decreased from the prior chest CT. Trace right pneumothorax, likely  related to the presence of the chest tube. Redemonstrated hyperattenuation centered along the right pleural space  and along the mediastinum, which could reflect residual loculated  hemothorax but is again questioned to represent neoplastic pleural  thickening. Continued follow-up to ensure resolution is recommended.  Peripheral reticular opacities and scattered nodular opacities in the  bilateral lungs, which appear similar to prior imaging and may be  infectious/inflammatory or neoplastic in etiology. Continued follow-up is  recommended. Intermittent opacification of the right lower and middle lobe airways, which may reflect mucoid impaction. *  Mediastinal and hilar lymphadenopathy, overall similar to mildly decreased in size since 3/10/202 Perivesicular fat stranding. Cystitis is considered. Recommend  correlation with urinalysis Unchanged penetrating ulcer of infrarenal abdominalaorta.    A fibrillation: off ac at t his time: no tpa done as the pl fluid is very bloody - and now he will be off ac:    HTN:  blood pressure is controlled  Urinary retention  ; has chr plummer: :      4/1 Awake and responding appropriately. CT management as per thoracic    4/3;    right sided pl effusion:  has lymphoma:  blood pl fluid  ; off ac;  rpt ct chest noted from last night:  has mild hemothorax and pneumothorax too ; with alexey effusion; His WBC is high  but he has no fever: Slight jump in his procal : but he doesnto look septic to me ? could it be reactive:  he does have underlying lymphoma: ct scan pan body yesterday revealed *  Right pleural chest tube in place. Loculated right hemothorax, decreased from the prior chest CT. Trace right pneumothorax, likely  related to the presence of the chest tube. Redemonstrated hyperattenuation centered along the right pleural space  and along the mediastinum, which could reflect residual loculated  hemothorax but is again questioned to represent neoplastic pleural  thickening. Continued follow-up to ensure resolution is recommended.  Peripheral reticular opacities and scattered nodular opacities in the  bilateral lungs, which appear similar to prior imaging and may be  infectious/inflammatory or neoplastic in etiology. Continued follow-up is  recommended. Intermittent opacification of the right lower and middle lobe airways, which may reflect mucoid impaction. *  Mediastinal and hilar lymphadenopathy, overall similar to mildly decreased in size since 3/10/202 Perivesicular fat stranding.:    he is getting chemo per oncology  : for Hodgkins lymphoma: abgin am      A fibrillation: off ac at t his time: no tpa done as the pl fluid is very bloody - and now he will be off ac:    HTN:  blood pressure is controlled  Urinary retention  ; has chr plummer: ::    dw acp       4/4:    right sided pl effusion:  has lymphoma:  blood pl fluid  ; off ac;  rpt ct chest noted from last night:  has mild hemothorax and pneumothorax too ; with alexey effusion; His WBC is high  but he has no fever: Slight jump in his procal : but he doesnto look septic to me ? could it be reactive:  he does have underlying lymphoma: ct scan pan body yesterday revealed *  Right pleural chest tube in place. Loculated right hemothorax, decreased from the prior chest CT. Trace right pneumothorax, likely  related to the presence of the chest tube. Redemonstrated hyperattenuation centered along the right pleural space  and along the mediastinum, which could reflect residual loculated  hemothorax but is again questioned to represent neoplastic pleural  thickening. Continued follow-up to ensure resolution is recommended.  Peripheral reticular opacities and scattered nodular opacities in the  bilateral lungs, which appear similar to prior imaging and may be  infectious/inflammatory or neoplastic in etiology. Continued follow-up is  recommended. Intermittent opacification of the right lower and middle lobe airways, which may reflect mucoid impaction. *  Mediastinal and hilar lymphadenopathy, overall similar to mildly decreased in size since 3/10/202 Perivesicular fat stranding.:  cxr some small effusion on the left side:  small loculated pl effusion on rigth side;     he is getting chemo per oncology  : for Hodgkins lymphoma: abgin am      A fibrillation: off ac at t his time: no tpa done as the pl fluid is very bloody - and now he will be off ac:    HTN:  blood pressure is controlled  Urinary retention  ; has chr plummer: ::    dw acp     4/5:  right sided pl effusion:  has lymphoma:  blood pl fluid  ; off ac;  rpt ct chest noted from last night:  has mild hemothorax and pneumothorax too ; with alexey effusion; His WBC is high  but he has no fever: Slight jump in his procal : but he doesnto look septic to me ? could it be reactive:  he does have underlying lymphoma: ct scan pan body yesterday revealed *  Right pleural chest tube in place. Loculated right hemothorax, decreased from the prior chest CT. Trace right pneumothorax, likely  related to the presence of the chest tube. Redemonstrated hyperattenuation centered along the right pleural space  and along the mediastinum, which could reflect residual loculated  hemothorax but is again questioned to represent neoplastic pleural  thickening. Continued follow-up to ensure resolution is recommended.  Peripheral reticular opacities and scattered nodular opacities in the  bilateral lungs, which appear similar to prior imaging and may be  infectious/inflammatory or neoplastic in etiology. Continued follow-up is  recommended. Intermittent opacification of the right lower and middle lobe airways, which may reflect mucoid impaction. *  Mediastinal and hilar lymphadenopathy, overall similar to mildly decreased in size since 3/10/202 Perivesicular fat stranding.:  cxr some small effusion on the left side:  small loculated pl effusion on rigth side; he is awaitng for new ct scan chest today    he is getting chemo per oncology  : for Hodgkins lymphoma: ABG with normal PH with high co2 and reasonable oxygenation;   A fibrillation: off ac at t his time: no tpa done as the pl fluid is very bloody - and now he will be off ac:  has hemopthorax  HTN:  blood pressure is controlled  Urinary retention  ; has chr plummer: ::    marah lee   4/6:    right sided pl effusion: has classical lymphoma:  requiring 3 L of oxygen : repeat ct scan chest noted:  has less effusion with less air and seems to have pleural involvement too: ABG two days ago was reasonable:  ph was normal and has retention of pco2: no need for bipap   A fibrillation: off ac at t his time: no tpa done as the pl fluid is very bloody - and now he will be off ac:  has hemopthorax : pleural effusion is less on repeat ct scan chest  ;   HTN:  blood pressure is controlled  Urinary retention  ; has chr plummer: ::    marah lee

## 2023-04-06 NOTE — DISCHARGE NOTE PROVIDER - HOSPITAL COURSE
82 year old male with PMH of AFIB, HTN and chronic urinary retention (chronic plummer), recent Dx Hodgkins Lymphoma s/p 1st session chemo on 3/10 (has port) who presents with increased tachypnea and SOB for 24 hours. presents from [facility] due to 24 hours of tachypnea and SOB. Found to have recurrent pleural effusion with loculation Pleurx catheter placed with pleurvac to water seal.     Recurrent pleural effusion.   - patient presents from [facility] for tachypnea to 30s initially requiring 10L NRB, CXR showing recurrent loculated R pleural effusion   - CT chest with small loculated R pleural effusion with new peripheral demarcated high attenuation in the R pleural space   - was seen by CT surgery in the ED, per paper chart, "drained about 100cc of fluid, placed on Pleura vac . PleurX was then flushed and drained about 2180cc fluid  - c/w Pleura vac to water seal,   - need to hold AC for 48 hours for possible TPA into pleurX cath     - CT surgery recs-- hold off MIST for now, no plan for VATS  - CXR 3/26 decreased pleural effusion   - CXR 3/27 -Decreasing pulmonary congestion. No pneumothorax. Hold off MIST for now   - 3/28 CXR -Slight L perihilar infiltrate developing, minimal R pneumothrax, Daily CXR*  - 4/6 CT Chest -- Compared to 3/29/2023, similar loculated, exudative right pleural effusion with slightly decreased pleural air component and Pleurx catheter in place. Question malignant effusion in the setting of reported lymphoma, irregular septal thickening, nodularity and mediastinal pleural involvement. Stable multistation thoracic lymphadenopathy.    Permanent atrial fibrillation.   - on eliquis, currently on hold   - cardilogy following  - as per CTS pt is not a good candidate for a/c due to high risk of hemothorax, started on lovenox for PPX    Hodgkin lymphoma.   - had ultrasound guided biopsy of his R supraclavicular mass/lymph node, which later resulted as classic Hodgkin's lymphoma on 2/28  - now s/p 1st cycle of chemotherapy on 3/10, plan for 2nd round of chemotherapy on 3/31   - oncology consulted    - 3/27 sp one dose of Zarxio; ANC much improved    - S/P C2 brentuximab 4/3.  - As per Heme - likely treatment in 3 weeks depending on patient progression     Chronic urinary retention   -c/w chronic plummer  -+UA on cefepime UCX 3/28-- E.coli ESBL  -3/27 & 3/29 BCX- neg  -sp 5days of ertapenum due to ESBL    PT eval- acute rehab    On_________, case was discussed with Dr. Connor, patient is medically cleared and optimized for discharge today. All medications were reviewed with attending, and sent to mutually agreed upon pharmacy.   82 year old male with PMH of AFIB, HTN and chronic urinary retention (chronic plummer), recent Dx Hodgkins Lymphoma s/p 1st session chemo on 3/10 (has port) who presents with increased tachypnea and SOB for 24 hours. presents from [facility] due to 24 hours of tachypnea and SOB. Found to have recurrent pleural effusion with loculation Pleurx catheter placed with pleurvac to water seal.     Recurrent pleural effusion.   - patient presents from [facility] for tachypnea to 30s initially requiring 10L NRB, CXR showing recurrent loculated R pleural effusion   - CT chest with small loculated R pleural effusion with new peripheral demarcated high attenuation in the R pleural space   - was seen by CT surgery in the ED, per paper chart, "drained about 100cc of fluid, placed on Pleura vac . PleurX was then flushed and drained about 2180cc fluid  - c/w Pleura vac to water seal,   - need to hold AC for 48 hours for possible TPA into pleurX cath     - CT surgery recs-- hold off MIST for now, no plan for VATS  - CXR 3/26 decreased pleural effusion   - CXR 3/27 -Decreasing pulmonary congestion. No pneumothorax. Hold off MIST for now   - 3/28 CXR -Slight L perihilar infiltrate developing, minimal R pneumothrax, Daily CXR*  - 4/6 CT Chest -- Compared to 3/29/2023, similar loculated, exudative right pleural effusion with slightly decreased pleural air component and Pleurx catheter in place. Question malignant effusion in the setting of reported lymphoma, irregular septal thickening, nodularity and mediastinal pleural involvement. Stable multistation thoracic lymphadenopathy.    Permanent atrial fibrillation.   - on eliquis, currently on hold   - cardilogy following  - as per CTS pt is not a good candidate for a/c due to high risk of hemothorax, started on lovenox for PPX    Hodgkin lymphoma.   - had ultrasound guided biopsy of his R supraclavicular mass/lymph node, which later resulted as classic Hodgkin's lymphoma on 2/28  - now s/p 1st cycle of chemotherapy on 3/10, plan for 2nd round of chemotherapy on 3/31   - oncology consulted    - 3/27 sp one dose of Zarxio; ANC much improved    - S/P C2 brentuximab 4/3.  - As per Heme - likely treatment in 3 weeks depending on patient progression     Chronic urinary retention   -c/w chronic plummer  -+UA on cefepime UCX 3/28-- E.coli ESBL  -3/27 & 3/29 BCX- neg  -sp 5days of ertapenum due to ESBL    PT eval- acute rehab    On 4/10/2023  case was discussed with Dr. Connor, patient is medically cleared and optimized for discharge today. All medications were reviewed with attending, and sent to mutually agreed upon pharmacy.

## 2023-04-06 NOTE — DISCHARGE NOTE PROVIDER - DETAILS OF MALNUTRITION DIAGNOSIS/DIAGNOSES
This patient has been assessed with a concern for Malnutrition and was treated during this hospitalization for the following Nutrition diagnosis/diagnoses:     -  03/27/2023: Moderate protein-calorie malnutrition

## 2023-04-06 NOTE — PROGRESS NOTE ADULT - SUBJECTIVE AND OBJECTIVE BOX
Hematology/Oncology Follow-up    INTERVAL HPI/OVERNIGHT EVENTS:  Patient S&E at bedside. No o/n events, patient resting comfortably. No complaints at this time. Patient denies fever, chills, dizziness, weakness, CP, palpitations, SOB, cough, N/V/D/C, dysuria, changes in bowel movements, LE edema.    VITAL SIGNS:  T(F): 98.7 (04-06-23 @ 11:46)  HR: 73 (04-06-23 @ 11:46)  BP: 101/70 (04-06-23 @ 11:46)  RR: 18 (04-06-23 @ 11:46)  SpO2: 98% (04-06-23 @ 11:46)  Wt(kg): --    PHYSICAL EXAM:    Constitutional: AAOx3, NAD,   Eyes: PERRL, EOMI, sclera non-icteric  Neck: supple, no masses, no JVD  Respiratory: CTA b/l, good air entry b/l, no wheezing, rhonchi, rales, with normal respiratory effort and no intercostal retractions  Cardiovascular: RRR, normal S1S2, no M/R/G  Gastrointestinal: soft, NTND, no masses palpable, BS normal in all four quadrants, no HSM  Extremities:  no c/c/e  Neurological: Grossly intact  Skin: No rash or lesion    MEDICATIONS  (STANDING):  albuterol   0.5% 2.5 milliGRAM(s) Nebulizer every 6 hours  atenolol  Tablet 12.5 milliGRAM(s) Oral daily  enoxaparin Injectable 40 milliGRAM(s) SubCutaneous every 24 hours  escitalopram 10 milliGRAM(s) Oral daily  famotidine    Tablet 20 milliGRAM(s) Oral daily  magnesium oxide 400 milliGRAM(s) Oral three times a day with meals  polyethylene glycol 3350 17 Gram(s) Oral daily  senna 2 Tablet(s) Oral at bedtime  simvastatin 40 milliGRAM(s) Oral at bedtime  traZODone 25 milliGRAM(s) Oral at bedtime    MEDICATIONS  (PRN):  acetaminophen     Tablet .. 650 milliGRAM(s) Oral every 6 hours PRN Temp greater or equal to 38C (100.4F), Mild Pain (1 - 3)  diphenhydrAMINE Injectable 50 milliGRAM(s) IV Push every 6 hours PRN Rash and/or Itching  hydrocortisone sodium succinate Injectable 100 milliGRAM(s) IV Push once PRN Infusion Reaction      pertussis vaccines (Rash)  shellfish (Rash)      LABS:                        8.1    12.14 )-----------( 195      ( 06 Apr 2023 06:11 )             28.0     04-06    136  |  99  |  10  ----------------------------<  102<H>  4.0   |  31  |  0.50    Ca    8.3<L>      06 Apr 2023 06:11  Phos  3.5     04-06  Mg     1.90     04-06             RADIOLOGY & ADDITIONAL TESTS:  < from: CT Chest No Cont (04.06.23 @ 10:24) >  IMPRESSION:  Compared to 3/29/2023, similar loculated, exudative right pleural   effusion with slightly decreased pleural air component and Pleurx   catheter in place. Question malignant effusion in the setting of reported   lymphoma, irregular septal thickening, nodularity and mediastinal pleural   involvement.    Stable multistation thoracic lymphadenopathy.          Studies reviewed.

## 2023-04-06 NOTE — PROGRESS NOTE ADULT - SUBJECTIVE AND OBJECTIVE BOX
Subjective: Patient seen and examined. No new events except as noted.     SUBJECTIVE/ROS:  nad      MEDICATIONS:  MEDICATIONS  (STANDING):  albuterol   0.5% 2.5 milliGRAM(s) Nebulizer every 6 hours  atenolol  Tablet 12.5 milliGRAM(s) Oral daily  enoxaparin Injectable 40 milliGRAM(s) SubCutaneous every 24 hours  escitalopram 10 milliGRAM(s) Oral daily  famotidine    Tablet 20 milliGRAM(s) Oral daily  magnesium oxide 400 milliGRAM(s) Oral three times a day with meals  polyethylene glycol 3350 17 Gram(s) Oral daily  senna 2 Tablet(s) Oral at bedtime  simvastatin 40 milliGRAM(s) Oral at bedtime  traZODone 25 milliGRAM(s) Oral at bedtime      PHYSICAL EXAM:  T(C): 37.1 (04-06-23 @ 11:46), Max: 37.1 (04-06-23 @ 11:46)  HR: 73 (04-06-23 @ 11:46) (63 - 83)  BP: 101/70 (04-06-23 @ 11:46) (101/70 - 114/78)  RR: 18 (04-06-23 @ 11:46) (18 - 20)  SpO2: 98% (04-06-23 @ 11:46) (97% - 100%)  Wt(kg): --  I&O's Summary    05 Apr 2023 07:01  -  06 Apr 2023 07:00  --------------------------------------------------------  IN: 0 mL / OUT: 725 mL / NET: -725 mL            JVP: Normal  Neck: supple  Lung: clear   CV: S1 S2 , Murmur:  Abd: soft  Ext: No edema  neuro: Awake / alert  Psych: flat affect  Skin: normal``    LABS/DATA:    CARDIAC MARKERS:                                8.1    12.14 )-----------( 195      ( 06 Apr 2023 06:11 )             28.0     04-06    136  |  99  |  10  ----------------------------<  102<H>  4.0   |  31  |  0.50    Ca    8.3<L>      06 Apr 2023 06:11  Phos  3.5     04-06  Mg     1.90     04-06      proBNP:   Lipid Profile:   HgA1c:   TSH:     TELE:  EKG:

## 2023-04-07 LAB
ANION GAP SERPL CALC-SCNC: 7 MMOL/L — SIGNIFICANT CHANGE UP (ref 7–14)
BASOPHILS # BLD AUTO: 0.12 K/UL — SIGNIFICANT CHANGE UP (ref 0–0.2)
BASOPHILS NFR BLD AUTO: 0.9 % — SIGNIFICANT CHANGE UP (ref 0–2)
BLD GP AB SCN SERPL QL: NEGATIVE — SIGNIFICANT CHANGE UP
BUN SERPL-MCNC: 11 MG/DL — SIGNIFICANT CHANGE UP (ref 7–23)
CALCIUM SERPL-MCNC: 8.8 MG/DL — SIGNIFICANT CHANGE UP (ref 8.4–10.5)
CHLORIDE SERPL-SCNC: 98 MMOL/L — SIGNIFICANT CHANGE UP (ref 98–107)
CO2 SERPL-SCNC: 33 MMOL/L — HIGH (ref 22–31)
CREAT SERPL-MCNC: 0.43 MG/DL — LOW (ref 0.5–1.3)
EGFR: 106 ML/MIN/1.73M2 — SIGNIFICANT CHANGE UP
EOSINOPHIL # BLD AUTO: 0.37 K/UL — SIGNIFICANT CHANGE UP (ref 0–0.5)
EOSINOPHIL NFR BLD AUTO: 2.7 % — SIGNIFICANT CHANGE UP (ref 0–6)
GLUCOSE SERPL-MCNC: 109 MG/DL — HIGH (ref 70–99)
HCT VFR BLD CALC: 29.4 % — LOW (ref 39–50)
HGB BLD-MCNC: 8.7 G/DL — LOW (ref 13–17)
IANC: 8.15 K/UL — HIGH (ref 1.8–7.4)
LYMPHOCYTES # BLD AUTO: 1.45 K/UL — SIGNIFICANT CHANGE UP (ref 1–3.3)
LYMPHOCYTES # BLD AUTO: 10.6 % — LOW (ref 13–44)
MAGNESIUM SERPL-MCNC: 1.9 MG/DL — SIGNIFICANT CHANGE UP (ref 1.6–2.6)
MCHC RBC-ENTMCNC: 28.9 PG — SIGNIFICANT CHANGE UP (ref 27–34)
MCHC RBC-ENTMCNC: 29.6 GM/DL — LOW (ref 32–36)
MCV RBC AUTO: 97.7 FL — SIGNIFICANT CHANGE UP (ref 80–100)
MONOCYTES # BLD AUTO: 1.45 K/UL — HIGH (ref 0–0.9)
MONOCYTES NFR BLD AUTO: 10.6 % — SIGNIFICANT CHANGE UP (ref 2–14)
NEUTROPHILS # BLD AUTO: 10.16 K/UL — HIGH (ref 1.8–7.4)
NEUTROPHILS NFR BLD AUTO: 69.9 % — SIGNIFICANT CHANGE UP (ref 43–77)
PHOSPHATE SERPL-MCNC: 2.1 MG/DL — LOW (ref 2.5–4.5)
PLATELET # BLD AUTO: 242 K/UL — SIGNIFICANT CHANGE UP (ref 150–400)
POTASSIUM SERPL-MCNC: 4.2 MMOL/L — SIGNIFICANT CHANGE UP (ref 3.5–5.3)
POTASSIUM SERPL-SCNC: 4.2 MMOL/L — SIGNIFICANT CHANGE UP (ref 3.5–5.3)
RBC # BLD: 3.01 M/UL — LOW (ref 4.2–5.8)
RBC # FLD: 21.4 % — HIGH (ref 10.3–14.5)
RH IG SCN BLD-IMP: POSITIVE — SIGNIFICANT CHANGE UP
SODIUM SERPL-SCNC: 138 MMOL/L — SIGNIFICANT CHANGE UP (ref 135–145)
WBC # BLD: 13.68 K/UL — HIGH (ref 3.8–10.5)
WBC # FLD AUTO: 13.68 K/UL — HIGH (ref 3.8–10.5)

## 2023-04-07 PROCEDURE — 99232 SBSQ HOSP IP/OBS MODERATE 35: CPT | Mod: GC

## 2023-04-07 PROCEDURE — 71045 X-RAY EXAM CHEST 1 VIEW: CPT | Mod: 26

## 2023-04-07 PROCEDURE — 99232 SBSQ HOSP IP/OBS MODERATE 35: CPT

## 2023-04-07 RX ORDER — SODIUM CHLORIDE 9 MG/ML
1000 INJECTION, SOLUTION INTRAVENOUS ONCE
Refills: 0 | Status: DISCONTINUED | OUTPATIENT
Start: 2023-04-07 | End: 2023-04-07

## 2023-04-07 RX ORDER — SODIUM,POTASSIUM PHOSPHATES 278-250MG
1 POWDER IN PACKET (EA) ORAL ONCE
Refills: 0 | Status: COMPLETED | OUTPATIENT
Start: 2023-04-07 | End: 2023-04-07

## 2023-04-07 RX ADMIN — MAGNESIUM OXIDE 400 MG ORAL TABLET 400 MILLIGRAM(S): 241.3 TABLET ORAL at 08:16

## 2023-04-07 RX ADMIN — MAGNESIUM OXIDE 400 MG ORAL TABLET 400 MILLIGRAM(S): 241.3 TABLET ORAL at 18:03

## 2023-04-07 RX ADMIN — ALBUTEROL 2.5 MILLIGRAM(S): 90 AEROSOL, METERED ORAL at 22:01

## 2023-04-07 RX ADMIN — ESCITALOPRAM OXALATE 10 MILLIGRAM(S): 10 TABLET, FILM COATED ORAL at 12:34

## 2023-04-07 RX ADMIN — ENOXAPARIN SODIUM 40 MILLIGRAM(S): 100 INJECTION SUBCUTANEOUS at 22:18

## 2023-04-07 RX ADMIN — SENNA PLUS 2 TABLET(S): 8.6 TABLET ORAL at 21:22

## 2023-04-07 RX ADMIN — ATENOLOL 12.5 MILLIGRAM(S): 25 TABLET ORAL at 06:46

## 2023-04-07 RX ADMIN — Medication 1 PACKET(S): at 12:33

## 2023-04-07 RX ADMIN — MAGNESIUM OXIDE 400 MG ORAL TABLET 400 MILLIGRAM(S): 241.3 TABLET ORAL at 12:33

## 2023-04-07 RX ADMIN — ALBUTEROL 2.5 MILLIGRAM(S): 90 AEROSOL, METERED ORAL at 17:19

## 2023-04-07 RX ADMIN — Medication 25 MILLIGRAM(S): at 21:22

## 2023-04-07 RX ADMIN — FAMOTIDINE 20 MILLIGRAM(S): 10 INJECTION INTRAVENOUS at 12:34

## 2023-04-07 RX ADMIN — SIMVASTATIN 40 MILLIGRAM(S): 20 TABLET, FILM COATED ORAL at 21:24

## 2023-04-07 NOTE — PROGRESS NOTE ADULT - ASSESSMENT
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 Patient is poor historian, history obtained by son Francisco at bedside. States that  patient has been recovering wellat  rehab. States patient was able to walk over 150 ft while at rehab. States that suddenly on Friday patient started to develop acute SOB and increased WOB. Imaging was done at  rehab showing worsening pleural effusions EMS was called and patient was noted to be tachypneic to 30s and placed on 10L NRB.    Recently admitted from 3/15-3/24 for worsening SOB was found to have  significant for a large R pleural effusion with mediastinal and hilar lymphadenopathy, and 3L of fluid was drained. Pleural fluid cultures and blood cultures resulted negative.  Suspicious of underlying malignancy secondary to lymphadenopathy and persistent leukocytosis, Heme/Onc was consulted and recommended transfer to [facility] on 2/24/23 for further workup. On 2/28 he underwent an ultrasound guided biopsy of his R supraclavicular mass/lymph node, which later resulted as classic Hodgkin's lymphoma.  On 3/3, he underwent a R thoracotomy/VATS with Pleurex placement. Left chest wall mediport was placed and received  first cycle of chemotherapy on 3/10. (25 Mar 2023 16:44)    no wpulm called:  pt has chest tube on rigth side:  has bloody secretions  he looks comfortable:  no sob :      right sided pl effusion:  A fibrillation:   HTN:  Urinary retention     3/27:  right sided pl effusion:  [id number]:  blood pl fluid  ; on ac;  cts surgery to see:  chest xray today looks pretty good to me: not hypercarbic and acidotic on VBG   A fibrillation: off ac at t his time  HTN:  blood pressure is controlled  Urinary retention  ; has chr plummer: :    marah pmd    3/28:    right sided pl effusion:  has lymphoma:  blood pl fluid  ; on ac;  cts surgery to see:  chest xray today looks pretty good to me: not hypercarbic and acidotic on VBG   A fibrillation: off ac at t his time: no tpa done as the pl fluid is very blood:   HTN:  blood pressure is controlled  Urinary retention  ; has chr plummer: :    marah pmd      3/29:    right sided pl effusion:  has lymphoma:  blood pl fluid  ; on ac;  cts surgery to see:  chest xray today looks pretty good to me: not hypercarbic and acidotic on VBG : his wbc is increased today  : ? etiology : ct scan chest awaited:  check procal as well as monitor for fever:   A fibrillation: off ac at t his time: no tpa done as the pl fluid is very blood:   HTN:  blood pressure is controlled  Urinary retention  ; has chr plummer: :    marah pmd      3/30:    right sided pl effusion:  has lymphoma:  blood pl fluid  ; off ac;  rpt ct chest noted from last night:  has mild hemothorax and pneumothorax too ; with alexey effusion; His WBC is high  but he has no fever: Slight jump in his procal : but he doesnto look septic to me ? could it be reactive:  he does have underlying lymphoma:   A fibrillation: off ac at t his time: no tpa done as the pl fluid is very bloody - and now he will be off ac:    HTN:  blood pressure is controlled  Urinary retention  ; has chr plummer: :    marah cts      3/31:    right sided pl effusion:  has lymphoma:  blood pl fluid  ; off ac;  rpt ct chest noted from last night:  has mild hemothorax and pneumothorax too ; with alexey effusion; His WBC is high  but he has no fever: Slight jump in his procal : but he doesnto look septic to me ? could it be reactive:  he does have underlying lymphoma: ct scan pan body yesterday revealed *  Right pleural chest tube in place. Loculated right hemothorax, decreased from the prior chest CT. Trace right pneumothorax, likely  related to the presence of the chest tube. Redemonstrated hyperattenuation centered along the right pleural space  and along the mediastinum, which could reflect residual loculated  hemothorax but is again questioned to represent neoplastic pleural  thickening. Continued follow-up to ensure resolution is recommended.  Peripheral reticular opacities and scattered nodular opacities in the  bilateral lungs, which appear similar to prior imaging and may be  infectious/inflammatory or neoplastic in etiology. Continued follow-up is  recommended. Intermittent opacification of the right lower and middle lobe airways, which may reflect mucoid impaction. *  Mediastinal and hilar lymphadenopathy, overall similar to mildly decreased in size since 3/10/202 Perivesicular fat stranding. Cystitis is considered. Recommend  correlation with urinalysis Unchanged penetrating ulcer of infrarenal abdominalaorta.    A fibrillation: off ac at t his time: no tpa done as the pl fluid is very bloody - and now he will be off ac:    HTN:  blood pressure is controlled  Urinary retention  ; has chr plmumer: :      4/1 Awake and responding appropriately. CT management as per thoracic    4/3;    right sided pl effusion:  has lymphoma:  blood pl fluid  ; off ac;  rpt ct chest noted from last night:  has mild hemothorax and pneumothorax too ; with alexey effusion; His WBC is high  but he has no fever: Slight jump in his procal : but he doesnto look septic to me ? could it be reactive:  he does have underlying lymphoma: ct scan pan body yesterday revealed *  Right pleural chest tube in place. Loculated right hemothorax, decreased from the prior chest CT. Trace right pneumothorax, likely  related to the presence of the chest tube. Redemonstrated hyperattenuation centered along the right pleural space  and along the mediastinum, which could reflect residual loculated  hemothorax but is again questioned to represent neoplastic pleural  thickening. Continued follow-up to ensure resolution is recommended.  Peripheral reticular opacities and scattered nodular opacities in the  bilateral lungs, which appear similar to prior imaging and may be  infectious/inflammatory or neoplastic in etiology. Continued follow-up is  recommended. Intermittent opacification of the right lower and middle lobe airways, which may reflect mucoid impaction. *  Mediastinal and hilar lymphadenopathy, overall similar to mildly decreased in size since 3/10/202 Perivesicular fat stranding.:    he is getting chemo per oncology  : for Hodgkins lymphoma: abgin am      A fibrillation: off ac at t his time: no tpa done as the pl fluid is very bloody - and now he will be off ac:    HTN:  blood pressure is controlled  Urinary retention  ; has chr plummer: ::    dw acp       4/4:    right sided pl effusion:  has lymphoma:  blood pl fluid  ; off ac;  rpt ct chest noted from last night:  has mild hemothorax and pneumothorax too ; with alexey effusion; His WBC is high  but he has no fever: Slight jump in his procal : but he doesnto look septic to me ? could it be reactive:  he does have underlying lymphoma: ct scan pan body yesterday revealed *  Right pleural chest tube in place. Loculated right hemothorax, decreased from the prior chest CT. Trace right pneumothorax, likely  related to the presence of the chest tube. Redemonstrated hyperattenuation centered along the right pleural space  and along the mediastinum, which could reflect residual loculated  hemothorax but is again questioned to represent neoplastic pleural  thickening. Continued follow-up to ensure resolution is recommended.  Peripheral reticular opacities and scattered nodular opacities in the  bilateral lungs, which appear similar to prior imaging and may be  infectious/inflammatory or neoplastic in etiology. Continued follow-up is  recommended. Intermittent opacification of the right lower and middle lobe airways, which may reflect mucoid impaction. *  Mediastinal and hilar lymphadenopathy, overall similar to mildly decreased in size since 3/10/202 Perivesicular fat stranding.:  cxr some small effusion on the left side:  small loculated pl effusion on rigth side;     he is getting chemo per oncology  : for Hodgkins lymphoma: abgin am      A fibrillation: off ac at t his time: no tpa done as the pl fluid is very bloody - and now he will be off ac:    HTN:  blood pressure is controlled  Urinary retention  ; has chr plummer: ::    dw acp     4/5:  right sided pl effusion:  has lymphoma:  blood pl fluid  ; off ac;  rpt ct chest noted from last night:  has mild hemothorax and pneumothorax too ; with alexey effusion; His WBC is high  but he has no fever: Slight jump in his procal : but he doesnto look septic to me ? could it be reactive:  he does have underlying lymphoma: ct scan pan body yesterday revealed *  Right pleural chest tube in place. Loculated right hemothorax, decreased from the prior chest CT. Trace right pneumothorax, likely  related to the presence of the chest tube. Redemonstrated hyperattenuation centered along the right pleural space  and along the mediastinum, which could reflect residual loculated  hemothorax but is again questioned to represent neoplastic pleural  thickening. Continued follow-up to ensure resolution is recommended.  Peripheral reticular opacities and scattered nodular opacities in the  bilateral lungs, which appear similar to prior imaging and may be  infectious/inflammatory or neoplastic in etiology. Continued follow-up is  recommended. Intermittent opacification of the right lower and middle lobe airways, which may reflect mucoid impaction. *  Mediastinal and hilar lymphadenopathy, overall similar to mildly decreased in size since 3/10/202 Perivesicular fat stranding.:  cxr some small effusion on the left side:  small loculated pl effusion on rigth side; he is awaitng for new ct scan chest today    he is getting chemo per oncology  : for Hodgkins lymphoma: ABG with normal PH with high co2 and reasonable oxygenation;   A fibrillation: off ac at t his time: no tpa done as the pl fluid is very bloody - and now he will be off ac:  has hemopthorax  HTN:  blood pressure is controlled  Urinary retention  ; has chr plummer: ::    marah acp   4/6:    right sided pl effusion: has classical lymphoma:  requiring 3 L of oxygen : repeat ct scan chest noted:  has less effusion with less air and seems to have pleural involvement too: ABG two days ago was reasonable:  ph was normal and has retention of pco2: no need for bipap   A fibrillation: off ac at t his time: no tpa done as the pl fluid is very bloody - and now he will be off ac:  has hemopthorax : pleural effusion is less on repeat ct scan chest  ;   HTN:  blood pressure is controlled  Urinary retention  ; has chr plummer: ::    marah acp       4/7:    right sided pl effusion: has classical lymphoma:  requiring 3 L of oxygen : repeat ct scan chest noted:  has less effusion with less air and seems to have pleural involvement too: ABG two days ago was reasonable:  ph was normal and has retention of pco2: no need for bipap : he seems to be doing  ok : no sob:  no wheezing:    A fibrillation: off ac at t his time: no tpa done as the pl fluid is very bloody - and now he will be off ac:  has hemopthorax : pleural effusion is less on repeat ct scan chest  ;   HTN:  blood pressure is controlled  Urinary retention  ; has chr plummer: ::    marah acp  : seems pretty weak :

## 2023-04-07 NOTE — PROGRESS NOTE ADULT - ASSESSMENT
82 year old man with a past medical history as described above who presented for evaluation of shortness of breath. [facility] CXR revealed increased pleural effusions, likely loculated. Hematology on board for management of cHL.    # Classical Hodgkin Lymphoma   - S/P Cycle 1 Brentuximab vedotin (BV) 3/10/23  -  s/p C2 brentuximab 4/3, Next cycle 4/24/23  - CTS note from yesterday states to continue holding AC - may need to be off for long term   - Received one dose of Zarxio; ANC much improved  - Family updated daily   -CXR daily, have been stable  - CBC with diff daily   - Thoracic input appreciated, no procedure for now, monitoring serosanguinous output  - Pending auth from [facility] acute re[facility], if not accepted, will need subacute rehab. In subacute rehab, he will be unable to receive chemotherapy so we will not plan to administer C3 until after he leaves the rehab facility.   -CT Chest: Stable disease, no new significant findings     # Leukocytosis  -coming down, this is most likely from Zarxio effect   -BCx negative  -No concern for infection. Treated for ESBL in UTI  -Afebrile      Please page with questions or concerns. Will Follow with you.      Karl Salomon M.D.  Hematology/Oncology Fellow PGY5  Pager [phone]  After 5pm, please contact on-call team.

## 2023-04-07 NOTE — PROGRESS NOTE ADULT - ATTENDING COMMENTS
82 year old man with recent diagnosis of cHL c/b pleural effusions s/p pleurex placement, received BV/steroids previously, readmitted with recurrent pleural effusion in the setting of hemothorax. Overall improving, received C2 BV on 4/4/23.   -appreciate pulm/CTS recs  -continue zarixo   -BV vs. AVD in 3 weeks depending on improvement in performance status    -will need pleurex drainage outpatient, consider addition of CXR to ensure no reaccumulation of fluid   -repeat CT chest with stable to improved disease in chest, CT chest 4/5 overall stable   -plan for discharge to acute rehab
Patient appears to be lethargic today.  Son is present and daughter on phone.  He had one dose of Adcentris ~ 3 weeks ago, and now is neutropenic.  Perhaps he has an early infection, and we should address with cultures, GCSF and antibiotics.  Will consider next dose of Adcentris based on blood count recovery and clinical status.  Also, will consider to lower dose of drug as well.  Son and daughter were informed of all.    Foreign Subramanian MD  cell [phone]
Patient seen with son at bedside.  He is eating a bit better with help from son.  His cough is decreased.  He is mentating well but weak overall.  Urine infection with ESBL has been identified, and antibiotics changed.  He has chronic indwelling Choi which we discussed needs to be addressed by Urology as outpatient in the near future, via urodynamics; and the consideration of various medications or Urolift to be able to remove Choi as soon as feasible.  Thoracic to follow up and provided guidance on right Pleurex.  Will plan to treat with Adcetris with dose reduction on Monday 4/3/23, and rediscussed with patient and son.  Disposition back to rehabilitation is likely.  Will need blood counts weekly while there, and Adcetris will still need to be administered every 3 weeks.    Foreign Subramanian MD  cell [phone]
82 year old man with recent diagnosis of cHL c/b pleural effusions s/p pleurex placement, received BV/steroids previously, readmitted with recurrent pleural effusion in the setting of hemothorax. Overall improving, received C2 BV on 4/4/23.   -appreciate pulm/CTS recs  -continue zarixo   -BV vs. AVD in 3 weeks depending on improvement in performance status    -will need pleurex drainage outpatient, consider addition of CXR to ensure no reaccumulation of fluid   -repeat CT chest with stable to improved disease in chest, follow up CT chest pending for ? L sided atelectasis   -plan for discharge to acute rehab
82 year old man with recent diagnosis of cHL c/b pleural effusions s/p pleurex placement, received BV/steroids previously, readmitted with recurrent pleural effusion in the setting of hemothorax. Overall improving, received C2 BV on 4/4/23.   -appreciate pulm/CTS recs  -continue zarixo while in rehab and monitor CBC  -BV vs. AVD in 3 weeks depending on improvement in performance status, to be determined by Dr. Hays based on progress in rehab  -will need pleurex drainage outpatient, consider addition of CXR to ensure no reaccumulation of fluid   -repeat CT chest with stable to improved disease in chest, CT chest 4/5 overall stable   -plan for discharge to acute rehab
Patient seems to be doing a bit better.  However, still coughing significantly and relatively weak.  Right Pleurex seems to be draining scant fluid.  Would ask thoracic surgery whether this can be capped, and what the ultimate plan with it is.  Patient also remains off of anticoagulation while this is in place.  Perhaps, it can be capped now and CXR observed to confirm no further accumulation, while he is an inpatient at [facility].  Given the likelihood that he will remain as an inpatient and probably transfer to subacute rehabilitation (if not to home with support), then would suggest we treat with second dose of Adcetris on Monday, 4/3/23.  Would reduce dose to 1.2 mg/kg due to neutropenia after dose #1 and ensuing pneumonia.  Case reviewed with son at length as well.    Foreign Subramanian MD  cell [phone]
82 year old man with recent diagnosis of cHL c/b pleural effusions s/p pleurex placement, received BV/steroids previously, readmitted with recurrent pleural effusion. Overall improving, received C2 BV today.   -appreciate pulm/CTS recs  -continue zarixo   -plan for AVD as next chemotherapy   -will need pleurex drainage outpatient, consider addition of CXR to ensure no reaccumulation of lfuid
He does appear to be responding to antibiotics and GCSF, as since yesterday he is more alert and conversational.  However, he does appear weak.  A CXR is suspicious for new pneumonia in the left perihilar space.  Will revisit when to restart anticoagulation with surgeon, as well as when CT tube might be able to be removed.  We also discussed patient's physical ability with PMR and family today.  It is our preference to eventually have him at home so he can follow with his standard doctors, and receive lymphoma treatment and follow-up on time.    Foreign Subramanian MD  cell [phone]
82 year old man with recent diagnosis of cHL c/b pleural effusions s/p pleurex placement, received BV/steroids previously, readmitted with recurrent pleural effusion in the setting of hemothorax. Overall improving, received C2 BV today.   -appreciate pulm/CTS recs  -continue zarixo   -BV vs. AVD in 3 weeks depending on improvement in performance status    -will need pleurex drainage outpatient, consider addition of CXR to ensure no reaccumulation of fluid   -repeat CT chest with stable to improved disease in chest

## 2023-04-07 NOTE — PROGRESS NOTE ADULT - SUBJECTIVE AND OBJECTIVE BOX
Covering for Dr. Nikolas Long capped yesterday  Saturating well on 3LNCO2  No acute SOB    Vital Signs Last 24 Hrs  T(C): 36.7 (07 Apr 2023 06:00), Max: 37.1 (06 Apr 2023 11:46)  T(F): 98.1 (07 Apr 2023 06:00), Max: 98.7 (06 Apr 2023 11:46)  HR: 72 (07 Apr 2023 06:00) (72 - 81)  BP: 106/62 (07 Apr 2023 06:00) (101/70 - 111/69)  BP(mean): 74 (07 Apr 2023 06:00) (74 - 78)  RR: 16 (07 Apr 2023 06:00) (16 - 19)  SpO2: 98% (07 Apr 2023 06:00) (95% - 98%)    I&O's Summary      Appearance: Normal	  HEENT:  PERRLA   Lymphatic: No lymphadenopathy   Cardiovascular: Normal S1 S2, no JVD  Respiratory: normal effort , clear  Gastrointestinal:  Soft, Non-tender  Skin: No rashes,  warm to touch  Psychiatry:  Mood & affect appropriate  Musculuskeletal: No edema    LABS:                        8.7    13.68 )-----------( 242      ( 07 Apr 2023 06:00 )             29.4     04-07    138  |  98  |  11  ----------------------------<  109<H>  4.2   |  33<H>  |  0.43<L>    Ca    8.8      07 Apr 2023 06:00  Phos  2.1     04-07  Mg     1.90     04-07        CAPILLARY BLOOD GLUCOSE                RADIOLOGY & ADDITIONAL TESTS:    Imaging Personally Reviewed:  [x] YES  [ ] NO    Case discussed with NPP:  [X] YES  [ ] NO

## 2023-04-07 NOTE — PROGRESS NOTE ADULT - SUBJECTIVE AND OBJECTIVE BOX
Hematology/Oncology Follow-up    INTERVAL HPI/OVERNIGHT EVENTS:  Patient S&E at bedside. No o/n events, patient resting comfortably. No complaints at this time. Patient denies fever, chills, dizziness, weakness, CP, palpitations, SOB, cough, N/V/D/C, dysuria, changes in bowel movements, LE edema.    VITAL SIGNS:  T(F): 98.1 (04-07-23 @ 06:00)  HR: 72 (04-07-23 @ 06:00)  BP: 106/62 (04-07-23 @ 06:00)  RR: 16 (04-07-23 @ 06:00)  SpO2: 98% (04-07-23 @ 06:00)  Wt(kg): --    PHYSICAL EXAM:    Constitutional: AAOx3, NAD, NC in place  Eyes: PERRL, EOMI, sclera non-icteric  Neck: supple, no masses, no JVD  Respiratory: CTA b/l, good air entry b/l, no wheezing, rhonchi, rales, with normal respiratory effort and no intercostal retractions. CT in place  Cardiovascular: RRR, normal S1S2, no M/R/G  Gastrointestinal: soft, NTND, no masses palpable, BS normal in all four quadrants, no HSM  Extremities:  no c/c/e  Neurological: Grossly intact  Skin: No rash or lesion    MEDICATIONS  (STANDING):  albuterol   0.5% 2.5 milliGRAM(s) Nebulizer every 6 hours  atenolol  Tablet 12.5 milliGRAM(s) Oral daily  enoxaparin Injectable 40 milliGRAM(s) SubCutaneous every 24 hours  escitalopram 10 milliGRAM(s) Oral daily  famotidine    Tablet 20 milliGRAM(s) Oral daily  magnesium oxide 400 milliGRAM(s) Oral three times a day with meals  polyethylene glycol 3350 17 Gram(s) Oral daily  potassium phosphate / sodium phosphate Powder (PHOS-NaK) 1 Packet(s) Oral once  senna 2 Tablet(s) Oral at bedtime  simvastatin 40 milliGRAM(s) Oral at bedtime  traZODone 25 milliGRAM(s) Oral at bedtime    MEDICATIONS  (PRN):  acetaminophen     Tablet .. 650 milliGRAM(s) Oral every 6 hours PRN Temp greater or equal to 38C (100.4F), Mild Pain (1 - 3)  diphenhydrAMINE Injectable 50 milliGRAM(s) IV Push every 6 hours PRN Rash and/or Itching  hydrocortisone sodium succinate Injectable 100 milliGRAM(s) IV Push once PRN Infusion Reaction      pertussis vaccines (Rash)  shellfish (Rash)      LABS:                        8.7    13.68 )-----------( 242      ( 07 Apr 2023 06:00 )             29.4     04-07    138  |  98  |  11  ----------------------------<  109<H>  4.2   |  33<H>  |  0.43<L>    Ca    8.8      07 Apr 2023 06:00  Phos  2.1     04-07  Mg     1.90     04-07             RADIOLOGY & ADDITIONAL TESTS:  Studies reviewed.   PAST MEDICAL HISTORY:  Endometriosis h/o - and HPV    Hypercholesterolemia     Melanoma

## 2023-04-07 NOTE — PROGRESS NOTE ADULT - SUBJECTIVE AND OBJECTIVE BOX
Date of Service: 04-07-23 @ 11:49    Patient is a 82y old  Male who presents with a chief complaint of pl effusion (27 Mar 2023 10:54)      Any change in ROS: seems same:  pretty weak : walked to the bathroom with help:       MEDICATIONS  (STANDING):  albuterol   0.5% 2.5 milliGRAM(s) Nebulizer every 6 hours  atenolol  Tablet 12.5 milliGRAM(s) Oral daily  enoxaparin Injectable 40 milliGRAM(s) SubCutaneous every 24 hours  escitalopram 10 milliGRAM(s) Oral daily  famotidine    Tablet 20 milliGRAM(s) Oral daily  magnesium oxide 400 milliGRAM(s) Oral three times a day with meals  polyethylene glycol 3350 17 Gram(s) Oral daily  potassium phosphate / sodium phosphate Powder (PHOS-NaK) 1 Packet(s) Oral once  senna 2 Tablet(s) Oral at bedtime  simvastatin 40 milliGRAM(s) Oral at bedtime  traZODone 25 milliGRAM(s) Oral at bedtime    MEDICATIONS  (PRN):  acetaminophen     Tablet .. 650 milliGRAM(s) Oral every 6 hours PRN Temp greater or equal to 38C (100.4F), Mild Pain (1 - 3)  diphenhydrAMINE Injectable 50 milliGRAM(s) IV Push every 6 hours PRN Rash and/or Itching  hydrocortisone sodium succinate Injectable 100 milliGRAM(s) IV Push once PRN Infusion Reaction    Vital Signs Last 24 Hrs  T(C): 36.7 (07 Apr 2023 06:00), Max: 36.8 (06 Apr 2023 20:32)  T(F): 98.1 (07 Apr 2023 06:00), Max: 98.2 (06 Apr 2023 20:32)  HR: 72 (07 Apr 2023 06:00) (72 - 81)  BP: 106/62 (07 Apr 2023 06:00) (103/56 - 111/69)  BP(mean): 74 (07 Apr 2023 06:00) (74 - 78)  RR: 16 (07 Apr 2023 06:00) (16 - 19)  SpO2: 98% (07 Apr 2023 06:00) (95% - 98%)    Parameters below as of 07 Apr 2023 06:00  Patient On (Oxygen Delivery Method): nasal cannula  O2 Flow (L/min): 3      I&O's Summary        Physical Exam:   GENERAL: obese  HEENT: CHUCK/   Atraumatic, Normocephalic  ENMT: No tonsillar erythema, exudates, or enlargement; Moist mucous membranes, Good dentition, No lesions  NECK: Supple, No JVD, Normal thyroid  CHEST/LUNG: crackles rigth base ; rigth side chest tube still has some blood   CVS: Regular rate and rhythm; No murmurs, rubs, or gallops  GI: : Soft, Nontender, Nondistended; Bowel sounds present  NERVOUS SYSTEM:  Alert & awake  EXTREMITIES:  2+ Peripheral Pulses, No clubbing, cyanosis, or edema  LYMPH: No lymphadenopathy noted  SKIN: No rashes or lesions  ENDOCRINOLOGY: No Thyromegaly  PSYCH: Appropriate    Labs:                              8.7    13.68 )-----------( 242      ( 07 Apr 2023 06:00 )             29.4                         8.1    12.14 )-----------( 195      ( 06 Apr 2023 06:11 )             28.0                         9.0    13.67 )-----------( 216      ( 05 Apr 2023 04:44 )             29.6                         9.3    16.47 )-----------( 175      ( 04 Apr 2023 04:50 )             30.6     04-07    138  |  98  |  11  ----------------------------<  109<H>  4.2   |  33<H>  |  0.43<L>  04-06    136  |  99  |  10  ----------------------------<  102<H>  4.0   |  31  |  0.50  04-05    136  |  97<L>  |  11  ----------------------------<  101<H>  4.2   |  32<H>  |  0.52  04-04    135  |  100  |  10  ----------------------------<  104<H>  4.1   |  29  |  0.47<L>    Ca    8.8      07 Apr 2023 06:00  Ca    8.3<L>      06 Apr 2023 06:11  Phos  2.1     04-07  Phos  3.5     04-06  Mg     1.90     04-07  Mg     1.90     04-06      CAPILLARY BLOOD GLUCOSE          rad< from: CT Chest No Cont (04.06.23 @ 10:24) >    MEDIASTINUM AND SHANTELLE: Unchanged supraclavicular, hilar and mediastinal   lymphadenopathy. For example, a reference right supraclavicular lymph   node is stable in size and measures 1.3 cm (series 2, image 12).   Unchanged reference right paratracheal node measures 2.1 cm (series 2,   image 49). An additional reference subcarinal node measures 1.7 cm,   unchanged (series 2, image 60).    VESSELS: Left chest wall port with tip in the SVC.    HEART: Heart size is mildly enlarged. Coronary artery calcification. No   pericardial effusion. Hypodensity of the blood pool in relation to left   ventricular myocardium suggests underlying anemia.    VISUALIZED UPPER ABDOMEN: Unchanged 3.1 cm lefthepatic lobe cyst.    BONES: Generalized osteopenia. Multilevel degenerative changes of the   spine. No aggressive osseous lesion.    IMPRESSION:  Compared to 3/29/2023, similar loculated, exudative right pleural   effusion with slightly decreased pleural air component and Pleurx   catheter in place. Question malignant effusion in the setting of reported   lymphoma, irregular septal thickening, nodularity and mediastinal pleural   involvement.    Stable multistation thoracic lymphadenopathy.    < end of copied text >              RECENT CULTURES:        RESPIRATORY CULTURES:          Studies  Chest X-RAY  CT SCAN Chest   Venous Dopplers: LE:   CT Abdomen  Others

## 2023-04-07 NOTE — PROGRESS NOTE ADULT - SUBJECTIVE AND OBJECTIVE BOX
Patient is a 82y old  Male who presents with a chief complaint of pl effusion (27 Mar 2023 10:54)      Pleurex capped yesterday  recommended for further cxr if further respiratory symptoms  per oncology planning for next chemo 4/24     REVIEW OF SYSTEMS   weakness    PAST MEDICAL & SURGICAL HISTORY  No pertinent past medical history    Atrial fibrillation    Hypertension    Urinary retention    No significant past surgical history           CURRENT FUNCTIONAL STATUS  4/7Cognitive/Neuro/Behavioral  Cognitive/Neuro/Behavioral [WDL Definition: Alert; opens eyes spontaneously; arouses to voice or touch; oriented x 4; follows commands; speech spontaneous, logical; purposeful motor response; behavior appropriate to situation]: WDL    Language Assistance  Preferred Language to Address Healthcare Preferred Language to Address Healthcare: English    Therapeutic Interventions      Sit-Stand Transfer Training  Sit-to-Stand Transfer Training Rehab Potential: good, to achieve stated therapy goals  Sit-to-Stand Transfer Training Symptoms Noted During/After Treatment: none  Transfer Training Sit-to-Stand Transfer: minimum assist (75% patient effort);  1 person assist;  verbal cues;  weight-bearing as tolerated   rolling walker  Transfer Training Stand-to-Sit Transfer: minimum assist (75% patient effort);  1 person assist;  weight-bearing as tolerated   rolling walker;  verbal cues  Sit-to-Stand Transfer Training Transfer Safety Analysis: decreased step length;  decreased balance;  decreased strength;  decreased flexibility;  rolling walker    Gait Training  Gait Training Rehab Potential: good, to achieve stated therapy goals  Gait Training Symptoms Noted During/After Treatment: none  Gait Training: rolling walker;  weight-bearing as tolerated   25 feet;  1 person assist;  verbal cues;  minimum assist (75% patient effort)  Gait Analysis: 2-point gait   decreased stride length;  crouch;  decreased isabella;  increased time in double stance;  decreased hip/knee flexion;  impaired balance;  decreased strength;  decreased flexibility;  impaired coordination;  impaired postural control;  25 feet;  rolling walker  Gait Number of Times:: x 1    RECENT LABS/IMAGING  CBC Full  -  ( 07 Apr 2023 06:00 )  WBC Count : 13.68 K/uL  RBC Count : 3.01 M/uL  Hemoglobin : 8.7 g/dL  Hematocrit : 29.4 %  Platelet Count - Automated : 242 K/uL  Mean Cell Volume : 97.7 fL  Mean Cell Hemoglobin : 28.9 pg  Mean Cell Hemoglobin Concentration : 29.6 gm/dL  Auto Neutrophil # : 10.16 K/uL  Auto Lymphocyte # : 1.45 K/uL  Auto Monocyte # : 1.45 K/uL  Auto Eosinophil # : 0.37 K/uL  Auto Basophil # : 0.12 K/uL  Auto Neutrophil % : 69.9 %  Auto Lymphocyte % : 10.6 %  Auto Monocyte % : 10.6 %  Auto Eosinophil % : 2.7 %  Auto Basophil % : 0.9 %    04-07    138  |  98  |  11  ----------------------------<  109<H>  4.2   |  33<H>  |  0.43<L>    Ca    8.8      07 Apr 2023 06:00  Phos  2.1     04-07  Mg     1.90     04-07          VITALS  T(C): 36.3 (04-07-23 @ 11:40), Max: 36.8 (04-06-23 @ 20:32)  HR: 81 (04-07-23 @ 11:40) (72 - 81)  BP: 106/63 (04-07-23 @ 11:40) (103/56 - 111/69)  RR: 22 (04-07-23 @ 11:40) (16 - 22)  SpO2: 98% (04-07-23 @ 11:40) (95% - 98%)  Wt(kg): --    ALLERGIES  pertussis vaccines (Rash)  shellfish (Rash)      MEDICATIONS   acetaminophen     Tablet .. 650 milliGRAM(s) Oral every 6 hours PRN  albuterol   0.5% 2.5 milliGRAM(s) Nebulizer every 6 hours  atenolol  Tablet 12.5 milliGRAM(s) Oral daily  diphenhydrAMINE Injectable 50 milliGRAM(s) IV Push every 6 hours PRN  enoxaparin Injectable 40 milliGRAM(s) SubCutaneous every 24 hours  escitalopram 10 milliGRAM(s) Oral daily  famotidine    Tablet 20 milliGRAM(s) Oral daily  hydrocortisone sodium succinate Injectable 100 milliGRAM(s) IV Push once PRN  magnesium oxide 400 milliGRAM(s) Oral three times a day with meals  polyethylene glycol 3350 17 Gram(s) Oral daily  senna 2 Tablet(s) Oral at bedtime  simvastatin 40 milliGRAM(s) Oral at bedtime  traZODone 25 milliGRAM(s) Oral at bedtime      ----------------------------------------------------------------------------------------  PHYSICAL EXAM  Constitutional - NAD sitting in chair  HEENT - NCAT, EOMI   + nasal cannula  Chest - + pleurex capped  Cardiovascular - RRR, S1S2   Abdomen -  Soft, NTND  Extremities - No C/C/E, No calf tenderness   Neurologic Exam -                    Cognitive - Awake, Alert      Communication - Fluent, No dysarthria     Cranial Nerves - CN 2-12 intact     Motor -                      LEFT    UE - 4/5                    RIGHT UE - 4/5                    LEFT    LE -  4/5                    RIGHT LE - 4/5        Sensory - Intact to LT      Balance - WNL Static  Psychiatric - Mood stable, Affect WNL  ----------------------------------------------------------------------------------------  ASSESSMENT/PLAN   83 yo m with hodgkin lymphoma admitted from acute rehab, with pleural effusions and SOB  cefepime  diet: regular  plummer  continue bedside PT and OT   DVT PPX - lovenox  Rehab -recommend acute rehab when medically cleared, patient can tolerate 3 hours per day of therapy with medical supervision.

## 2023-04-07 NOTE — CHART NOTE - NSCHARTNOTEFT_GEN_A_CORE
Contacted by RN due to patient's oliguria throughout the course of the day. Choi last replaced ~ 2 weeks ago. Choi flushed with piston and still not draining. Choi replaced by nursing staff and draining well. Will continue to monitor.

## 2023-04-08 LAB
ANION GAP SERPL CALC-SCNC: 5 MMOL/L — LOW (ref 7–14)
BASOPHILS # BLD AUTO: 0.05 K/UL — SIGNIFICANT CHANGE UP (ref 0–0.2)
BASOPHILS NFR BLD AUTO: 0.5 % — SIGNIFICANT CHANGE UP (ref 0–2)
BUN SERPL-MCNC: 9 MG/DL — SIGNIFICANT CHANGE UP (ref 7–23)
CALCIUM SERPL-MCNC: 8.4 MG/DL — SIGNIFICANT CHANGE UP (ref 8.4–10.5)
CHLORIDE SERPL-SCNC: 98 MMOL/L — SIGNIFICANT CHANGE UP (ref 98–107)
CO2 SERPL-SCNC: 34 MMOL/L — HIGH (ref 22–31)
CREAT SERPL-MCNC: 0.42 MG/DL — LOW (ref 0.5–1.3)
EGFR: 107 ML/MIN/1.73M2 — SIGNIFICANT CHANGE UP
EOSINOPHIL # BLD AUTO: 0.4 K/UL — SIGNIFICANT CHANGE UP (ref 0–0.5)
EOSINOPHIL NFR BLD AUTO: 4 % — SIGNIFICANT CHANGE UP (ref 0–6)
GLUCOSE SERPL-MCNC: 107 MG/DL — HIGH (ref 70–99)
HCT VFR BLD CALC: 28.1 % — LOW (ref 39–50)
HGB BLD-MCNC: 8.4 G/DL — LOW (ref 13–17)
IANC: 6.28 K/UL — SIGNIFICANT CHANGE UP (ref 1.8–7.4)
IMM GRANULOCYTES NFR BLD AUTO: 1.5 % — HIGH (ref 0–0.9)
LYMPHOCYTES # BLD AUTO: 1.79 K/UL — SIGNIFICANT CHANGE UP (ref 1–3.3)
LYMPHOCYTES # BLD AUTO: 17.8 % — SIGNIFICANT CHANGE UP (ref 13–44)
MAGNESIUM SERPL-MCNC: 1.9 MG/DL — SIGNIFICANT CHANGE UP (ref 1.6–2.6)
MCHC RBC-ENTMCNC: 28.8 PG — SIGNIFICANT CHANGE UP (ref 27–34)
MCHC RBC-ENTMCNC: 29.9 GM/DL — LOW (ref 32–36)
MCV RBC AUTO: 96.2 FL — SIGNIFICANT CHANGE UP (ref 80–100)
MONOCYTES # BLD AUTO: 1.41 K/UL — HIGH (ref 0–0.9)
MONOCYTES NFR BLD AUTO: 14 % — SIGNIFICANT CHANGE UP (ref 2–14)
NEUTROPHILS # BLD AUTO: 6.28 K/UL — SIGNIFICANT CHANGE UP (ref 1.8–7.4)
NEUTROPHILS NFR BLD AUTO: 62.2 % — SIGNIFICANT CHANGE UP (ref 43–77)
NRBC # BLD: 0 /100 WBCS — SIGNIFICANT CHANGE UP (ref 0–0)
NRBC # FLD: 0.02 K/UL — HIGH (ref 0–0)
PHOSPHATE SERPL-MCNC: 2.7 MG/DL — SIGNIFICANT CHANGE UP (ref 2.5–4.5)
PLATELET # BLD AUTO: 205 K/UL — SIGNIFICANT CHANGE UP (ref 150–400)
POTASSIUM SERPL-MCNC: 4.1 MMOL/L — SIGNIFICANT CHANGE UP (ref 3.5–5.3)
POTASSIUM SERPL-SCNC: 4.1 MMOL/L — SIGNIFICANT CHANGE UP (ref 3.5–5.3)
RBC # BLD: 2.92 M/UL — LOW (ref 4.2–5.8)
RBC # FLD: 21.2 % — HIGH (ref 10.3–14.5)
SODIUM SERPL-SCNC: 137 MMOL/L — SIGNIFICANT CHANGE UP (ref 135–145)
WBC # BLD: 10.08 K/UL — SIGNIFICANT CHANGE UP (ref 3.8–10.5)
WBC # FLD AUTO: 10.08 K/UL — SIGNIFICANT CHANGE UP (ref 3.8–10.5)

## 2023-04-08 PROCEDURE — 71045 X-RAY EXAM CHEST 1 VIEW: CPT | Mod: 26

## 2023-04-08 RX ADMIN — FAMOTIDINE 20 MILLIGRAM(S): 10 INJECTION INTRAVENOUS at 11:52

## 2023-04-08 RX ADMIN — MAGNESIUM OXIDE 400 MG ORAL TABLET 400 MILLIGRAM(S): 241.3 TABLET ORAL at 11:51

## 2023-04-08 RX ADMIN — ALBUTEROL 2.5 MILLIGRAM(S): 90 AEROSOL, METERED ORAL at 15:35

## 2023-04-08 RX ADMIN — ESCITALOPRAM OXALATE 10 MILLIGRAM(S): 10 TABLET, FILM COATED ORAL at 11:52

## 2023-04-08 RX ADMIN — MAGNESIUM OXIDE 400 MG ORAL TABLET 400 MILLIGRAM(S): 241.3 TABLET ORAL at 18:37

## 2023-04-08 RX ADMIN — ATENOLOL 12.5 MILLIGRAM(S): 25 TABLET ORAL at 04:43

## 2023-04-08 RX ADMIN — SIMVASTATIN 40 MILLIGRAM(S): 20 TABLET, FILM COATED ORAL at 21:52

## 2023-04-08 RX ADMIN — ALBUTEROL 2.5 MILLIGRAM(S): 90 AEROSOL, METERED ORAL at 11:04

## 2023-04-08 RX ADMIN — ENOXAPARIN SODIUM 40 MILLIGRAM(S): 100 INJECTION SUBCUTANEOUS at 21:52

## 2023-04-08 RX ADMIN — MAGNESIUM OXIDE 400 MG ORAL TABLET 400 MILLIGRAM(S): 241.3 TABLET ORAL at 07:46

## 2023-04-08 RX ADMIN — ALBUTEROL 2.5 MILLIGRAM(S): 90 AEROSOL, METERED ORAL at 22:20

## 2023-04-08 RX ADMIN — ALBUTEROL 2.5 MILLIGRAM(S): 90 AEROSOL, METERED ORAL at 03:04

## 2023-04-08 RX ADMIN — Medication 25 MILLIGRAM(S): at 21:52

## 2023-04-08 NOTE — PROGRESS NOTE ADULT - SUBJECTIVE AND OBJECTIVE BOX
Covering for Dr. Connor    Saturating well on 3LNCO2  No acute SOB    Vital Signs Last 24 Hrs  T(C): 36.8 (08 Apr 2023 04:30), Max: 36.8 (08 Apr 2023 04:30)  T(F): 98.3 (08 Apr 2023 04:30), Max: 98.3 (08 Apr 2023 04:30)  HR: 89 (08 Apr 2023 04:30) (80 - 96)  BP: 107/65 (08 Apr 2023 04:30) (101/62 - 107/65)  BP(mean): --  RR: 19 (08 Apr 2023 04:30) (18 - 22)  SpO2: 99% (08 Apr 2023 04:30) (98% - 100%)    I&O's Summary    04-07-23 @ 07:01  -  04-08-23 @ 07:00  --------------------------------------------------------  IN: 0 mL / OUT: 1600 mL / NET: -1600 mL        Appearance: Normal	  HEENT:  PERRLA   Lymphatic: No lymphadenopathy   Cardiovascular: Normal S1 S2, no JVD  Respiratory: normal effort , clear  Gastrointestinal:  Soft, Non-tender  Skin: No rashes,  warm to touch  Psychiatry:  Mood & affect appropriate  Musculuskeletal: No edema    LABS:                        8.4    10.08 )-----------( 205      ( 08 Apr 2023 07:00 )             28.1     04-08    137  |  98  |  9   ----------------------------<  107<H>  4.1   |  34<H>  |  0.42<L>    Ca    8.4      08 Apr 2023 07:00  Phos  2.7     04-08  Mg     1.90     04-08        CAPILLARY BLOOD GLUCOSE                RADIOLOGY & ADDITIONAL TESTS:    Imaging Personally Reviewed:  [x] YES  [ ] NO    Case discussed with NPP:  [X] YES  [ ] NO

## 2023-04-08 NOTE — PROGRESS NOTE ADULT - ASSESSMENT
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 Patient is poor historian, history obtained by son Francisco at bedside. States that  patient has been recovering wellat  rehab. States patient was able to walk over 150 ft while at rehab. States that suddenly on Friday patient started to develop acute SOB and increased WOB. Imaging was done at  rehab showing worsening pleural effusions EMS was called and patient was noted to be tachypneic to 30s and placed on 10L NRB.    Recently admitted from 3/15-3/24 for worsening SOB was found to have  significant for a large R pleural effusion with mediastinal and hilar lymphadenopathy, and 3L of fluid was drained. Pleural fluid cultures and blood cultures resulted negative.  Suspicious of underlying malignancy secondary to lymphadenopathy and persistent leukocytosis, Heme/Onc was consulted and recommended transfer to [facility] on 2/24/23 for further workup. On 2/28 he underwent an ultrasound guided biopsy of his R supraclavicular mass/lymph node, which later resulted as classic Hodgkin's lymphoma.  On 3/3, he underwent a R thoracotomy/VATS with Pleurex placement. Left chest wall mediport was placed and received  first cycle of chemotherapy on 3/10. (25 Mar 2023 16:44)    no wpulm called:  pt has chest tube on rigth side:  has bloody secretions  he looks comfortable:  no sob :      right sided pl effusion:  A fibrillation:   HTN:  Urinary retention     3/27:  right sided pl effusion:  [id number]:  blood pl fluid  ; on ac;  cts surgery to see:  chest xray today looks pretty good to me: not hypercarbic and acidotic on VBG   A fibrillation: off ac at t his time  HTN:  blood pressure is controlled  Urinary retention  ; has chr plummer: :    marah pmd    3/28:    right sided pl effusion:  has lymphoma:  blood pl fluid  ; on ac;  cts surgery to see:  chest xray today looks pretty good to me: not hypercarbic and acidotic on VBG   A fibrillation: off ac at t his time: no tpa done as the pl fluid is very blood:   HTN:  blood pressure is controlled  Urinary retention  ; has chr plummer: :    marah pmd      3/29:    right sided pl effusion:  has lymphoma:  blood pl fluid  ; on ac;  cts surgery to see:  chest xray today looks pretty good to me: not hypercarbic and acidotic on VBG : his wbc is increased today  : ? etiology : ct scan chest awaited:  check procal as well as monitor for fever:   A fibrillation: off ac at t his time: no tpa done as the pl fluid is very blood:   HTN:  blood pressure is controlled  Urinary retention  ; has chr plummer: :    marah pmd      3/30:    right sided pl effusion:  has lymphoma:  blood pl fluid  ; off ac;  rpt ct chest noted from last night:  has mild hemothorax and pneumothorax too ; with alexey effusion; His WBC is high  but he has no fever: Slight jump in his procal : but he doesnto look septic to me ? could it be reactive:  he does have underlying lymphoma:   A fibrillation: off ac at t his time: no tpa done as the pl fluid is very bloody - and now he will be off ac:    HTN:  blood pressure is controlled  Urinary retention  ; has chr plummer: :    marah cts      3/31:    right sided pl effusion:  has lymphoma:  blood pl fluid  ; off ac;  rpt ct chest noted from last night:  has mild hemothorax and pneumothorax too ; with alexey effusion; His WBC is high  but he has no fever: Slight jump in his procal : but he doesnto look septic to me ? could it be reactive:  he does have underlying lymphoma: ct scan pan body yesterday revealed *  Right pleural chest tube in place. Loculated right hemothorax, decreased from the prior chest CT. Trace right pneumothorax, likely  related to the presence of the chest tube. Redemonstrated hyperattenuation centered along the right pleural space  and along the mediastinum, which could reflect residual loculated  hemothorax but is again questioned to represent neoplastic pleural  thickening. Continued follow-up to ensure resolution is recommended.  Peripheral reticular opacities and scattered nodular opacities in the  bilateral lungs, which appear similar to prior imaging and may be  infectious/inflammatory or neoplastic in etiology. Continued follow-up is  recommended. Intermittent opacification of the right lower and middle lobe airways, which may reflect mucoid impaction. *  Mediastinal and hilar lymphadenopathy, overall similar to mildly decreased in size since 3/10/202 Perivesicular fat stranding. Cystitis is considered. Recommend  correlation with urinalysis Unchanged penetrating ulcer of infrarenal abdominalaorta.    A fibrillation: off ac at t his time: no tpa done as the pl fluid is very bloody - and now he will be off ac:    HTN:  blood pressure is controlled  Urinary retention  ; has chr plummer: :      4/1 Awake and responding appropriately. CT management as per thoracic    4/3;    right sided pl effusion:  has lymphoma:  blood pl fluid  ; off ac;  rpt ct chest noted from last night:  has mild hemothorax and pneumothorax too ; with alexey effusion; His WBC is high  but he has no fever: Slight jump in his procal : but he doesnto look septic to me ? could it be reactive:  he does have underlying lymphoma: ct scan pan body yesterday revealed *  Right pleural chest tube in place. Loculated right hemothorax, decreased from the prior chest CT. Trace right pneumothorax, likely  related to the presence of the chest tube. Redemonstrated hyperattenuation centered along the right pleural space  and along the mediastinum, which could reflect residual loculated  hemothorax but is again questioned to represent neoplastic pleural  thickening. Continued follow-up to ensure resolution is recommended.  Peripheral reticular opacities and scattered nodular opacities in the  bilateral lungs, which appear similar to prior imaging and may be  infectious/inflammatory or neoplastic in etiology. Continued follow-up is  recommended. Intermittent opacification of the right lower and middle lobe airways, which may reflect mucoid impaction. *  Mediastinal and hilar lymphadenopathy, overall similar to mildly decreased in size since 3/10/202 Perivesicular fat stranding.:    he is getting chemo per oncology  : for Hodgkins lymphoma: abgin am      A fibrillation: off ac at t his time: no tpa done as the pl fluid is very bloody - and now he will be off ac:    HTN:  blood pressure is controlled  Urinary retention  ; has chr plummer: ::    dw acp       4/4:    right sided pl effusion:  has lymphoma:  blood pl fluid  ; off ac;  rpt ct chest noted from last night:  has mild hemothorax and pneumothorax too ; with alexey effusion; His WBC is high  but he has no fever: Slight jump in his procal : but he doesnto look septic to me ? could it be reactive:  he does have underlying lymphoma: ct scan pan body yesterday revealed *  Right pleural chest tube in place. Loculated right hemothorax, decreased from the prior chest CT. Trace right pneumothorax, likely  related to the presence of the chest tube. Redemonstrated hyperattenuation centered along the right pleural space  and along the mediastinum, which could reflect residual loculated  hemothorax but is again questioned to represent neoplastic pleural  thickening. Continued follow-up to ensure resolution is recommended.  Peripheral reticular opacities and scattered nodular opacities in the  bilateral lungs, which appear similar to prior imaging and may be  infectious/inflammatory or neoplastic in etiology. Continued follow-up is  recommended. Intermittent opacification of the right lower and middle lobe airways, which may reflect mucoid impaction. *  Mediastinal and hilar lymphadenopathy, overall similar to mildly decreased in size since 3/10/202 Perivesicular fat stranding.:  cxr some small effusion on the left side:  small loculated pl effusion on rigth side;     he is getting chemo per oncology  : for Hodgkins lymphoma: abgin am      A fibrillation: off ac at t his time: no tpa done as the pl fluid is very bloody - and now he will be off ac:    HTN:  blood pressure is controlled  Urinary retention  ; has chr plummer: ::    dw acp     4/5:  right sided pl effusion:  has lymphoma:  blood pl fluid  ; off ac;  rpt ct chest noted from last night:  has mild hemothorax and pneumothorax too ; with alexey effusion; His WBC is high  but he has no fever: Slight jump in his procal : but he doesnto look septic to me ? could it be reactive:  he does have underlying lymphoma: ct scan pan body yesterday revealed *  Right pleural chest tube in place. Loculated right hemothorax, decreased from the prior chest CT. Trace right pneumothorax, likely  related to the presence of the chest tube. Redemonstrated hyperattenuation centered along the right pleural space  and along the mediastinum, which could reflect residual loculated  hemothorax but is again questioned to represent neoplastic pleural  thickening. Continued follow-up to ensure resolution is recommended.  Peripheral reticular opacities and scattered nodular opacities in the  bilateral lungs, which appear similar to prior imaging and may be  infectious/inflammatory or neoplastic in etiology. Continued follow-up is  recommended. Intermittent opacification of the right lower and middle lobe airways, which may reflect mucoid impaction. *  Mediastinal and hilar lymphadenopathy, overall similar to mildly decreased in size since 3/10/202 Perivesicular fat stranding.:  cxr some small effusion on the left side:  small loculated pl effusion on rigth side; he is awaitng for new ct scan chest today    he is getting chemo per oncology  : for Hodgkins lymphoma: ABG with normal PH with high co2 and reasonable oxygenation;   A fibrillation: off ac at t his time: no tpa done as the pl fluid is very bloody - and now he will be off ac:  has hemopthorax  HTN:  blood pressure is controlled  Urinary retention  ; has chr plummer: ::    marah acp   4/6:    right sided pl effusion: has classical lymphoma:  requiring 3 L of oxygen : repeat ct scan chest noted:  has less effusion with less air and seems to have pleural involvement too: ABG two days ago was reasonable:  ph was normal and has retention of pco2: no need for bipap   A fibrillation: off ac at t his time: no tpa done as the pl fluid is very bloody - and now he will be off ac:  has hemopthorax : pleural effusion is less on repeat ct scan chest  ;   HTN:  blood pressure is controlled  Urinary retention  ; has chr plummer: ::    marah acp       4/7:    right sided pl effusion: has classical lymphoma:  requiring 3 L of oxygen : repeat ct scan chest noted:  has less effusion with less air and seems to have pleural involvement too: ABG two days ago was reasonable:  ph was normal and has retention of pco2: no need for bipap : he seems to be doing  ok : no sob:  no wheezing:    A fibrillation: off ac at t his time: no tpa done as the pl fluid is very bloody - and now he will be off ac:  has hemothorax : pleural effusion is less on repeat ct scan chest  ;   HTN:  blood pressure is controlled  Urinary retention  ; has chr plummer: ::    marah acp  : seems pretty weak :     4/8:    right sided pl effusion: has classical lymphoma:  requiring 3 L of oxygen : repeat ct scan chest noted:  has less effusion with less air and seems to have pleural involvement too: ABG two days ago was reasonable:  ph was normal and has retention of pco2: no need for bipap : he seems to be doing  ok : no sob:  no wheezing:  doing same:  dw son at bedside:    A fibrillation: off ac at t his time: no tpa done as the pl fluid is very bloody - and now he will be off ac:  has hemopthorax : pleural effusion is less on repeat ct scan chest  ;   HTN:  blood pressure is controlled  Urinary retention  ; has chr plummer: ::    marah acp  : seems pretty weak :  now awaiting dc to rehab

## 2023-04-08 NOTE — PROGRESS NOTE ADULT - SUBJECTIVE AND OBJECTIVE BOX
Subjective: Patient seen and examined. No new events except as noted.     SUBJECTIVE/ROS:        MEDICATIONS:  MEDICATIONS  (STANDING):  albuterol   0.5% 2.5 milliGRAM(s) Nebulizer every 6 hours  atenolol  Tablet 12.5 milliGRAM(s) Oral daily  enoxaparin Injectable 40 milliGRAM(s) SubCutaneous every 24 hours  escitalopram 10 milliGRAM(s) Oral daily  famotidine    Tablet 20 milliGRAM(s) Oral daily  magnesium oxide 400 milliGRAM(s) Oral three times a day with meals  polyethylene glycol 3350 17 Gram(s) Oral daily  senna 2 Tablet(s) Oral at bedtime  simvastatin 40 milliGRAM(s) Oral at bedtime  traZODone 25 milliGRAM(s) Oral at bedtime      PHYSICAL EXAM:  T(C): 36.8 (04-08-23 @ 04:30), Max: 36.8 (04-08-23 @ 04:30)  HR: 89 (04-08-23 @ 04:30) (80 - 96)  BP: 107/65 (04-08-23 @ 04:30) (101/62 - 107/65)  RR: 19 (04-08-23 @ 04:30) (18 - 22)  SpO2: 99% (04-08-23 @ 04:30) (98% - 100%)  Wt(kg): --  I&O's Summary    07 Apr 2023 07:01  -  08 Apr 2023 07:00  --------------------------------------------------------  IN: 0 mL / OUT: 1600 mL / NET: -1600 mL            JVP: Normal  Neck: supple  Lung: clear   CV: S1 S2 , Murmur:  Abd: soft  Ext: No edema  neuro: Awake  Psych: flat affect  Skin: normal``    LABS/DATA:    CARDIAC MARKERS:                                8.7    13.68 )-----------( 242      ( 07 Apr 2023 06:00 )             29.4     04-07    138  |  98  |  11  ----------------------------<  109<H>  4.2   |  33<H>  |  0.43<L>    Ca    8.8      07 Apr 2023 06:00  Phos  2.1     04-07  Mg     1.90     04-07      proBNP:   Lipid Profile:   HgA1c:   TSH:     TELE:  EKG:

## 2023-04-08 NOTE — PROGRESS NOTE ADULT - SUBJECTIVE AND OBJECTIVE BOX
Date of Service: 04-08-23 @ 11:42    Patient is a 82y old  Male who presents with a chief complaint of pl effusion (27 Mar 2023 10:54)      Any change in ROS: seems OK: son at bedside:  discussed with home:   he is on 3 L of oxygen ; no resp distress       MEDICATIONS  (STANDING):  albuterol   0.5% 2.5 milliGRAM(s) Nebulizer every 6 hours  atenolol  Tablet 12.5 milliGRAM(s) Oral daily  enoxaparin Injectable 40 milliGRAM(s) SubCutaneous every 24 hours  escitalopram 10 milliGRAM(s) Oral daily  famotidine    Tablet 20 milliGRAM(s) Oral daily  magnesium oxide 400 milliGRAM(s) Oral three times a day with meals  polyethylene glycol 3350 17 Gram(s) Oral daily  senna 2 Tablet(s) Oral at bedtime  simvastatin 40 milliGRAM(s) Oral at bedtime  traZODone 25 milliGRAM(s) Oral at bedtime    MEDICATIONS  (PRN):  acetaminophen     Tablet .. 650 milliGRAM(s) Oral every 6 hours PRN Temp greater or equal to 38C (100.4F), Mild Pain (1 - 3)  diphenhydrAMINE Injectable 50 milliGRAM(s) IV Push every 6 hours PRN Rash and/or Itching  hydrocortisone sodium succinate Injectable 100 milliGRAM(s) IV Push once PRN Infusion Reaction    Vital Signs Last 24 Hrs  T(C): 36.8 (08 Apr 2023 04:30), Max: 36.8 (08 Apr 2023 04:30)  T(F): 98.3 (08 Apr 2023 04:30), Max: 98.3 (08 Apr 2023 04:30)  HR: 89 (08 Apr 2023 04:30) (80 - 96)  BP: 107/65 (08 Apr 2023 04:30) (101/62 - 107/65)  BP(mean): --  RR: 19 (08 Apr 2023 04:30) (18 - 19)  SpO2: 99% (08 Apr 2023 04:30) (98% - 100%)    Parameters below as of 08 Apr 2023 04:30  Patient On (Oxygen Delivery Method): nasal cannula  O2 Flow (L/min): 3      I&O's Summary    07 Apr 2023 07:01  -  08 Apr 2023 07:00  --------------------------------------------------------  IN: 0 mL / OUT: 1600 mL / NET: -1600 mL          Physical Exam:   GENERAL: NAD, well-groomed, well-developed  HEENT: CHUCK/   Atraumatic, Normocephalic  ENMT: No tonsillar erythema, exudates, or enlargement; Moist mucous membranes, Good dentition, No lesions  NECK: Supple, No JVD, Normal thyroid  CHEST/LUNG: Clear to auscultaion- no wheezing  CVS: Regular rate and rhythm; No murmurs, rubs, or gallops  GI: : Soft, Nontender, Nondistended; Bowel sounds present  NERVOUS SYSTEM:  Alert & awake  EXTREMITIES:  -edema  LYMPH: No lymphadenopathy noted  SKIN: No rashes or lesions  ENDOCRINOLOGY: No Thyromegaly  PSYCH: Appropriate    Labs:                              8.4    10.08 )-----------( 205      ( 08 Apr 2023 07:00 )             28.1                         8.7    13.68 )-----------( 242      ( 07 Apr 2023 06:00 )             29.4                         8.1    12.14 )-----------( 195      ( 06 Apr 2023 06:11 )             28.0                         9.0    13.67 )-----------( 216      ( 05 Apr 2023 04:44 )             29.6     04-08    137  |  98  |  9   ----------------------------<  107<H>  4.1   |  34<H>  |  0.42<L>  04-07    138  |  98  |  11  ----------------------------<  109<H>  4.2   |  33<H>  |  0.43<L>  04-06    136  |  99  |  10  ----------------------------<  102<H>  4.0   |  31  |  0.50  04-05    136  |  97<L>  |  11  ----------------------------<  101<H>  4.2   |  32<H>  |  0.52    Ca    8.4      08 Apr 2023 07:00  Ca    8.8      07 Apr 2023 06:00  Phos  2.7     04-08  Phos  2.1     04-07  Mg     1.90     04-08  Mg     1.90     04-07      CAPILLARY BLOOD GLUCOSE            < from: Xray Chest 1 View- PORTABLE-Routine (Xray Chest 1 View- PORTABLE-Routine in AM.) (04.07.23 @ 07:51) >  AGUAYO     PROCEDURE DATE:  04/07/2023          INTERPRETATION:  CLINICAL INFORMATION: A. fib; hypertension; pleural   effusion    TIME OF EXAMINATION: April 7 at 6:53 AM    EXAM: Portable chest    FINDINGS:  Loculated right effusion with Pleurx catheter in place. Left lung is   clear. Heart appears enlarged but stable. Loss of volume in the right   lung.    No pneumothorax.        COMPARISON: April 6        IMPRESSION: Small loculated right effusion with chest tube.    --- End of Report ---        < end of copied text >            RECENT CULTURES:        RESPIRATORY CULTURES:          Studies  Chest X-RAY  CT SCAN Chest   Venous Dopplers: LE:   CT Abdomen  Others

## 2023-04-09 LAB
ANION GAP SERPL CALC-SCNC: 9 MMOL/L — SIGNIFICANT CHANGE UP (ref 7–14)
BASOPHILS # BLD AUTO: 0.04 K/UL — SIGNIFICANT CHANGE UP (ref 0–0.2)
BASOPHILS NFR BLD AUTO: 0.4 % — SIGNIFICANT CHANGE UP (ref 0–2)
BUN SERPL-MCNC: 10 MG/DL — SIGNIFICANT CHANGE UP (ref 7–23)
CALCIUM SERPL-MCNC: 8.6 MG/DL — SIGNIFICANT CHANGE UP (ref 8.4–10.5)
CHLORIDE SERPL-SCNC: 98 MMOL/L — SIGNIFICANT CHANGE UP (ref 98–107)
CO2 SERPL-SCNC: 30 MMOL/L — SIGNIFICANT CHANGE UP (ref 22–31)
CREAT SERPL-MCNC: 0.46 MG/DL — LOW (ref 0.5–1.3)
EGFR: 104 ML/MIN/1.73M2 — SIGNIFICANT CHANGE UP
EOSINOPHIL # BLD AUTO: 0.46 K/UL — SIGNIFICANT CHANGE UP (ref 0–0.5)
EOSINOPHIL NFR BLD AUTO: 4.6 % — SIGNIFICANT CHANGE UP (ref 0–6)
GLUCOSE SERPL-MCNC: 98 MG/DL — SIGNIFICANT CHANGE UP (ref 70–99)
HCT VFR BLD CALC: 27.7 % — LOW (ref 39–50)
HGB BLD-MCNC: 8.3 G/DL — LOW (ref 13–17)
IANC: 6.25 K/UL — SIGNIFICANT CHANGE UP (ref 1.8–7.4)
IMM GRANULOCYTES NFR BLD AUTO: 0.9 % — SIGNIFICANT CHANGE UP (ref 0–0.9)
LYMPHOCYTES # BLD AUTO: 1.73 K/UL — SIGNIFICANT CHANGE UP (ref 1–3.3)
LYMPHOCYTES # BLD AUTO: 17.3 % — SIGNIFICANT CHANGE UP (ref 13–44)
MAGNESIUM SERPL-MCNC: 1.8 MG/DL — SIGNIFICANT CHANGE UP (ref 1.6–2.6)
MCHC RBC-ENTMCNC: 29.4 PG — SIGNIFICANT CHANGE UP (ref 27–34)
MCHC RBC-ENTMCNC: 30 GM/DL — LOW (ref 32–36)
MCV RBC AUTO: 98.2 FL — SIGNIFICANT CHANGE UP (ref 80–100)
MONOCYTES # BLD AUTO: 1.42 K/UL — HIGH (ref 0–0.9)
MONOCYTES NFR BLD AUTO: 14.2 % — HIGH (ref 2–14)
NEUTROPHILS # BLD AUTO: 6.25 K/UL — SIGNIFICANT CHANGE UP (ref 1.8–7.4)
NEUTROPHILS NFR BLD AUTO: 62.6 % — SIGNIFICANT CHANGE UP (ref 43–77)
NRBC # BLD: 0 /100 WBCS — SIGNIFICANT CHANGE UP (ref 0–0)
NRBC # FLD: 0 K/UL — SIGNIFICANT CHANGE UP (ref 0–0)
PHOSPHATE SERPL-MCNC: 2.4 MG/DL — LOW (ref 2.5–4.5)
PLATELET # BLD AUTO: 241 K/UL — SIGNIFICANT CHANGE UP (ref 150–400)
POTASSIUM SERPL-MCNC: 4 MMOL/L — SIGNIFICANT CHANGE UP (ref 3.5–5.3)
POTASSIUM SERPL-SCNC: 4 MMOL/L — SIGNIFICANT CHANGE UP (ref 3.5–5.3)
RBC # BLD: 2.82 M/UL — LOW (ref 4.2–5.8)
RBC # FLD: 21 % — HIGH (ref 10.3–14.5)
SARS-COV-2 RNA SPEC QL NAA+PROBE: SIGNIFICANT CHANGE UP
SODIUM SERPL-SCNC: 137 MMOL/L — SIGNIFICANT CHANGE UP (ref 135–145)
WBC # BLD: 9.99 K/UL — SIGNIFICANT CHANGE UP (ref 3.8–10.5)
WBC # FLD AUTO: 9.99 K/UL — SIGNIFICANT CHANGE UP (ref 3.8–10.5)

## 2023-04-09 PROCEDURE — 71045 X-RAY EXAM CHEST 1 VIEW: CPT | Mod: 26

## 2023-04-09 RX ADMIN — ALBUTEROL 2.5 MILLIGRAM(S): 90 AEROSOL, METERED ORAL at 16:39

## 2023-04-09 RX ADMIN — ATENOLOL 12.5 MILLIGRAM(S): 25 TABLET ORAL at 04:10

## 2023-04-09 RX ADMIN — ALBUTEROL 2.5 MILLIGRAM(S): 90 AEROSOL, METERED ORAL at 10:58

## 2023-04-09 RX ADMIN — FAMOTIDINE 20 MILLIGRAM(S): 10 INJECTION INTRAVENOUS at 12:06

## 2023-04-09 RX ADMIN — MAGNESIUM OXIDE 400 MG ORAL TABLET 400 MILLIGRAM(S): 241.3 TABLET ORAL at 17:13

## 2023-04-09 RX ADMIN — ALBUTEROL 2.5 MILLIGRAM(S): 90 AEROSOL, METERED ORAL at 04:44

## 2023-04-09 RX ADMIN — ALBUTEROL 2.5 MILLIGRAM(S): 90 AEROSOL, METERED ORAL at 22:58

## 2023-04-09 RX ADMIN — MAGNESIUM OXIDE 400 MG ORAL TABLET 400 MILLIGRAM(S): 241.3 TABLET ORAL at 12:04

## 2023-04-09 RX ADMIN — Medication 25 MILLIGRAM(S): at 21:35

## 2023-04-09 RX ADMIN — ESCITALOPRAM OXALATE 10 MILLIGRAM(S): 10 TABLET, FILM COATED ORAL at 12:04

## 2023-04-09 RX ADMIN — MAGNESIUM OXIDE 400 MG ORAL TABLET 400 MILLIGRAM(S): 241.3 TABLET ORAL at 07:59

## 2023-04-09 RX ADMIN — SIMVASTATIN 40 MILLIGRAM(S): 20 TABLET, FILM COATED ORAL at 21:35

## 2023-04-09 RX ADMIN — ENOXAPARIN SODIUM 40 MILLIGRAM(S): 100 INJECTION SUBCUTANEOUS at 21:35

## 2023-04-09 NOTE — PROGRESS NOTE ADULT - SUBJECTIVE AND OBJECTIVE BOX
Date of Service: 04-09-23 @ 10:48    Patient is a 82y old  Male who presents with a chief complaint of pl effusion (27 Mar 2023 10:54)      Any change in ROS: Son Francisco at bedside:   pt is doing same:  sitting on chair   on  3L of oxygen with sao2 at 98%:     MEDICATIONS  (STANDING):  albuterol   0.5% 2.5 milliGRAM(s) Nebulizer every 6 hours  atenolol  Tablet 12.5 milliGRAM(s) Oral daily  enoxaparin Injectable 40 milliGRAM(s) SubCutaneous every 24 hours  escitalopram 10 milliGRAM(s) Oral daily  famotidine    Tablet 20 milliGRAM(s) Oral daily  magnesium oxide 400 milliGRAM(s) Oral three times a day with meals  polyethylene glycol 3350 17 Gram(s) Oral daily  senna 2 Tablet(s) Oral at bedtime  simvastatin 40 milliGRAM(s) Oral at bedtime  traZODone 25 milliGRAM(s) Oral at bedtime    MEDICATIONS  (PRN):  acetaminophen     Tablet .. 650 milliGRAM(s) Oral every 6 hours PRN Temp greater or equal to 38C (100.4F), Mild Pain (1 - 3)  diphenhydrAMINE Injectable 50 milliGRAM(s) IV Push every 6 hours PRN Rash and/or Itching  hydrocortisone sodium succinate Injectable 100 milliGRAM(s) IV Push once PRN Infusion Reaction    Vital Signs Last 24 Hrs  T(C): 36.7 (09 Apr 2023 04:00), Max: 36.8 (08 Apr 2023 20:15)  T(F): 98.1 (09 Apr 2023 04:00), Max: 98.3 (08 Apr 2023 20:15)  HR: 62 (09 Apr 2023 04:44) (62 - 83)  BP: 126/61 (09 Apr 2023 04:00) (100/61 - 135/75)  BP(mean): --  RR: 19 (09 Apr 2023 04:00) (18 - 19)  SpO2: 96% (09 Apr 2023 04:44) (96% - 98%)    Parameters below as of 09 Apr 2023 04:44  Patient On (Oxygen Delivery Method): nasal cannula        I&O's Summary    08 Apr 2023 07:01  -  09 Apr 2023 07:00  --------------------------------------------------------  IN: 450 mL / OUT: 950 mL / NET: -500 mL          Physical Exam:   GENERAL: NAD, well-groomed, well-developed  HEENT: CHUCK/   Atraumatic, Normocephalic  ENMT: No tonsillar erythema, exudates, or enlargement; Moist mucous membranes, Good dentition, No lesions  NECK: Supple, No JVD, Normal thyroid  CHEST/LUNG:decreased air entry right base:   CVS: Regular rate and rhythm; No murmurs, rubs, or gallops  GI: : Soft, Nontender, Nondistended; Bowel sounds present  NERVOUS SYSTEM:  Alert & Oriented X3  EXTREMITIES:  - edema  LYMPH: No lymphadenopathy noted  SKIN: No rashes or lesions  ENDOCRINOLOGY: No Thyromegaly  PSYCH: Appropriate    Labs:                              8.3    9.99  )-----------( 241      ( 09 Apr 2023 06:19 )             27.7                         8.4    10.08 )-----------( 205      ( 08 Apr 2023 07:00 )             28.1                         8.7    13.68 )-----------( 242      ( 07 Apr 2023 06:00 )             29.4                         8.1    12.14 )-----------( 195      ( 06 Apr 2023 06:11 )             28.0     04-09    137  |  98  |  10  ----------------------------<  98  4.0   |  30  |  0.46<L>  04-08    137  |  98  |  9   ----------------------------<  107<H>  4.1   |  34<H>  |  0.42<L>  04-07    138  |  98  |  11  ----------------------------<  109<H>  4.2   |  33<H>  |  0.43<L>  04-06    136  |  99  |  10  ----------------------------<  102<H>  4.0   |  31  |  0.50    Ca    8.6      09 Apr 2023 06:19  Ca    8.4      08 Apr 2023 07:00  Phos  2.4     04-09  Phos  2.7     04-08  Mg     1.80     04-09  Mg     1.90     04-08      CAPILLARY BLOOD GLUCOSE                  rad< from: Xray Chest 1 View- PORTABLE-Routine (Xray Chest 1 View- PORTABLE-Routine .) (04.08.23 @ 11:15) >    ACC: [id number] EXAM:  XR CHEST PORTABLE ROUTINE 1V   ORDERED BY: NNEKA NOLASCO     PROCEDURE DATE:  04/08/2023          INTERPRETATION:  CLINICAL INDICATION: dyspnea; follow-up Pleurx catheter    EXAM:  Single frontal chest from 4/8/2023 at 11:15. Compared to prior study from   4/7/2023.    IMPRESSION:  Low lung volumes.    Right Pleurx catheter remains in place. No pneumothorax. Slightly   decreased right hemithorax opacification. Persistent lateral right chest   wall pleural thickening. Nonspecific focal indistinct rounded opacity in   right lung base.    No gross left pleural effusion.    Slightly hazy increased bilateral lung markings could be due in part to   crowding from underinflation and/or mild congestion/edema.    Stable prominent appearing cardiac and mediastinal silhouettes.  Accessed left chest wall infusion port again noted.    --- End of Report ---            LAURA HERRERA MD; Attending Radiologist  This document has been electronically signed. Apr 8 2023  4:17PM    < end of copied text >  < from: Xray Chest 1 View- PORTABLE-Routine (Xray Chest 1 View- PORTABLE-Routine .) (04.08.23 @ 11:15) >    ACC: [id number] EXAM:  XR CHEST PORTABLE ROUTINE 1V   ORDERED BY: NNEKA NOLASCO     PROCEDURE DATE:  04/08/2023          INTERPRETATION:  CLINICAL INDICATION: dyspnea; follow-up Pleurx catheter    EXAM:  Single frontal chest from 4/8/2023 at 11:15. Compared to prior study from   4/7/2023.    IMPRESSION:  Low lung volumes.    Right Pleurx catheter remains in place. No pneumothorax. Slightly   decreased right hemithorax opacification. Persistent lateral right chest   wall pleural thickening. Nonspecific focal indistinct rounded opacity in   right lung base.    No gross left pleural effusion.    Slightly hazy increased bilateral lung markings could be due in part to   crowding from underinflation and/or mild congestion/edema.    Stable prominent appearing cardiac and mediastinal silhouettes.  Accessed left chest wall infusion port again noted.    --- End of Report ---            LAURA HERRERA MD; Attending Radiologist  This document has been electronically signed. Apr 8 2023  4:17PM    < end of copied text >  < from: CT Chest No Cont (04.06.23 @ 10:24) >  node is stable in size and measures 1.3 cm (series 2, image 12).   Unchanged reference right paratracheal node measures 2.1 cm (series 2,   image 49). An additional reference subcarinal node measures 1.7 cm,   unchanged (series 2, image 60).    VESSELS: Left chest wall port with tip in the SVC.    HEART: Heart size is mildly enlarged. Coronary artery calcification. No   pericardial effusion. Hypodensity of the blood pool in relation to left   ventricular myocardium suggests underlying anemia.    VISUALIZED UPPER ABDOMEN: Unchanged 3.1 cm lefthepatic lobe cyst.    BONES: Generalized osteopenia. Multilevel degenerative changes of the   spine. No aggressive osseous lesion.    IMPRESSION:  Compared to 3/29/2023, similar loculated, exudative right pleural   effusion with slightly decreased pleural air component and Pleurx   catheter in place. Question malignant effusion in the setting of reported   lymphoma, irregular septal thickening, nodularity and mediastinal pleural   involvement.    Stable multistation thoracic lymphadenopathy.    < end of copied text >      RECENT CULTURES:        RESPIRATORY CULTURES:          Studies  Chest X-RAY  CT SCAN Chest   Venous Dopplers: LE:   CT Abdomen  Others

## 2023-04-09 NOTE — PROGRESS NOTE ADULT - SUBJECTIVE AND OBJECTIVE BOX
Subjective: Patient seen and examined. No new events except as noted.     SUBJECTIVE/ROS:  MEDICATIONS:  MEDICATIONS  (STANDING):  albuterol   0.5% 2.5 milliGRAM(s) Nebulizer every 6 hours  atenolol  Tablet 12.5 milliGRAM(s) Oral daily  enoxaparin Injectable 40 milliGRAM(s) SubCutaneous every 24 hours  escitalopram 10 milliGRAM(s) Oral daily  famotidine    Tablet 20 milliGRAM(s) Oral daily  magnesium oxide 400 milliGRAM(s) Oral three times a day with meals  polyethylene glycol 3350 17 Gram(s) Oral daily  senna 2 Tablet(s) Oral at bedtime  simvastatin 40 milliGRAM(s) Oral at bedtime  traZODone 25 milliGRAM(s) Oral at bedtime      PHYSICAL EXAM:  T(C): 36.5 (04-09-23 @ 12:13), Max: 36.8 (04-08-23 @ 20:15)  HR: 61 (04-09-23 @ 12:13) (61 - 83)  BP: 102/63 (04-09-23 @ 12:13) (100/61 - 135/75)  RR: 18 (04-09-23 @ 12:13) (18 - 19)  SpO2: 97% (04-09-23 @ 12:13) (96% - 98%)  Wt(kg): --  I&O's Summary    08 Apr 2023 07:01  -  09 Apr 2023 07:00  --------------------------------------------------------  IN: 450 mL / OUT: 950 mL / NET: -500 mL    09 Apr 2023 07:01  -  09 Apr 2023 12:30  --------------------------------------------------------  IN: 0 mL / OUT: 400 mL / NET: -400 mL            JVP: Normal  Neck: supple  Lung: clear   CV: S1 S2 , Murmur:  Abd: soft  Ext: No edema  neuro: Awake / alert  Psych: flat affect  Skin: normal``    LABS/DATA:    CARDIAC MARKERS:                                8.3    9.99  )-----------( 241      ( 09 Apr 2023 06:19 )             27.7     04-09    137  |  98  |  10  ----------------------------<  98  4.0   |  30  |  0.46<L>    Ca    8.6      09 Apr 2023 06:19  Phos  2.4     04-09  Mg     1.80     04-09      proBNP:   Lipid Profile:   HgA1c:   TSH:     TELE:  EKG:

## 2023-04-09 NOTE — PROGRESS NOTE ADULT - ASSESSMENT
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 Patient is poor historian, history obtained by son Francisco at bedside. States that  patient has been recovering wellat  rehab. States patient was able to walk over 150 ft while at rehab. States that suddenly on Friday patient started to develop acute SOB and increased WOB. Imaging was done at  rehab showing worsening pleural effusions EMS was called and patient was noted to be tachypneic to 30s and placed on 10L NRB.    Recently admitted from 3/15-3/24 for worsening SOB was found to have  significant for a large R pleural effusion with mediastinal and hilar lymphadenopathy, and 3L of fluid was drained. Pleural fluid cultures and blood cultures resulted negative.  Suspicious of underlying malignancy secondary to lymphadenopathy and persistent leukocytosis, Heme/Onc was consulted and recommended transfer to [facility] on 2/24/23 for further workup. On 2/28 he underwent an ultrasound guided biopsy of his R supraclavicular mass/lymph node, which later resulted as classic Hodgkin's lymphoma.  On 3/3, he underwent a R thoracotomy/VATS with Pleurex placement. Left chest wall mediport was placed and received  first cycle of chemotherapy on 3/10. (25 Mar 2023 16:44)    no wpulm called:  pt has chest tube on rigth side:  has bloody secretions  he looks comfortable:  no sob :      right sided pl effusion:  A fibrillation:   HTN:  Urinary retention     3/27:  right sided pl effusion:  [id number]:  blood pl fluid  ; on ac;  cts surgery to see:  chest xray today looks pretty good to me: not hypercarbic and acidotic on VBG   A fibrillation: off ac at t his time  HTN:  blood pressure is controlled  Urinary retention  ; has chr plummer: :    marah pmd    3/28:    right sided pl effusion:  has lymphoma:  blood pl fluid  ; on ac;  cts surgery to see:  chest xray today looks pretty good to me: not hypercarbic and acidotic on VBG   A fibrillation: off ac at t his time: no tpa done as the pl fluid is very blood:   HTN:  blood pressure is controlled  Urinary retention  ; has chr plummer: :    marah pmd      3/29:    right sided pl effusion:  has lymphoma:  blood pl fluid  ; on ac;  cts surgery to see:  chest xray today looks pretty good to me: not hypercarbic and acidotic on VBG : his wbc is increased today  : ? etiology : ct scan chest awaited:  check procal as well as monitor for fever:   A fibrillation: off ac at t his time: no tpa done as the pl fluid is very blood:   HTN:  blood pressure is controlled  Urinary retention  ; has chr plummer: :    marah pmd      3/30:    right sided pl effusion:  has lymphoma:  blood pl fluid  ; off ac;  rpt ct chest noted from last night:  has mild hemothorax and pneumothorax too ; with alexey effusion; His WBC is high  but he has no fever: Slight jump in his procal : but he doesnto look septic to me ? could it be reactive:  he does have underlying lymphoma:   A fibrillation: off ac at t his time: no tpa done as the pl fluid is very bloody - and now he will be off ac:    HTN:  blood pressure is controlled  Urinary retention  ; has chr plummer: :    marah cts      3/31:    right sided pl effusion:  has lymphoma:  blood pl fluid  ; off ac;  rpt ct chest noted from last night:  has mild hemothorax and pneumothorax too ; with alexey effusion; His WBC is high  but he has no fever: Slight jump in his procal : but he doesnto look septic to me ? could it be reactive:  he does have underlying lymphoma: ct scan pan body yesterday revealed *  Right pleural chest tube in place. Loculated right hemothorax, decreased from the prior chest CT. Trace right pneumothorax, likely  related to the presence of the chest tube. Redemonstrated hyperattenuation centered along the right pleural space  and along the mediastinum, which could reflect residual loculated  hemothorax but is again questioned to represent neoplastic pleural  thickening. Continued follow-up to ensure resolution is recommended.  Peripheral reticular opacities and scattered nodular opacities in the  bilateral lungs, which appear similar to prior imaging and may be  infectious/inflammatory or neoplastic in etiology. Continued follow-up is  recommended. Intermittent opacification of the right lower and middle lobe airways, which may reflect mucoid impaction. *  Mediastinal and hilar lymphadenopathy, overall similar to mildly decreased in size since 3/10/202 Perivesicular fat stranding. Cystitis is considered. Recommend  correlation with urinalysis Unchanged penetrating ulcer of infrarenal abdominalaorta.    A fibrillation: off ac at t his time: no tpa done as the pl fluid is very bloody - and now he will be off ac:    HTN:  blood pressure is controlled  Urinary retention  ; has chr plummer: :      4/1 Awake and responding appropriately. CT management as per thoracic    4/3;    right sided pl effusion:  has lymphoma:  blood pl fluid  ; off ac;  rpt ct chest noted from last night:  has mild hemothorax and pneumothorax too ; with alexey effusion; His WBC is high  but he has no fever: Slight jump in his procal : but he doesnto look septic to me ? could it be reactive:  he does have underlying lymphoma: ct scan pan body yesterday revealed *  Right pleural chest tube in place. Loculated right hemothorax, decreased from the prior chest CT. Trace right pneumothorax, likely  related to the presence of the chest tube. Redemonstrated hyperattenuation centered along the right pleural space  and along the mediastinum, which could reflect residual loculated  hemothorax but is again questioned to represent neoplastic pleural  thickening. Continued follow-up to ensure resolution is recommended.  Peripheral reticular opacities and scattered nodular opacities in the  bilateral lungs, which appear similar to prior imaging and may be  infectious/inflammatory or neoplastic in etiology. Continued follow-up is  recommended. Intermittent opacification of the right lower and middle lobe airways, which may reflect mucoid impaction. *  Mediastinal and hilar lymphadenopathy, overall similar to mildly decreased in size since 3/10/202 Perivesicular fat stranding.:    he is getting chemo per oncology  : for Hodgkins lymphoma: abgin am      A fibrillation: off ac at t his time: no tpa done as the pl fluid is very bloody - and now he will be off ac:    HTN:  blood pressure is controlled  Urinary retention  ; has chr plummer: ::    dw acp       4/4:    right sided pl effusion:  has lymphoma:  blood pl fluid  ; off ac;  rpt ct chest noted from last night:  has mild hemothorax and pneumothorax too ; with alexey effusion; His WBC is high  but he has no fever: Slight jump in his procal : but he doesnto look septic to me ? could it be reactive:  he does have underlying lymphoma: ct scan pan body yesterday revealed *  Right pleural chest tube in place. Loculated right hemothorax, decreased from the prior chest CT. Trace right pneumothorax, likely  related to the presence of the chest tube. Redemonstrated hyperattenuation centered along the right pleural space  and along the mediastinum, which could reflect residual loculated  hemothorax but is again questioned to represent neoplastic pleural  thickening. Continued follow-up to ensure resolution is recommended.  Peripheral reticular opacities and scattered nodular opacities in the  bilateral lungs, which appear similar to prior imaging and may be  infectious/inflammatory or neoplastic in etiology. Continued follow-up is  recommended. Intermittent opacification of the right lower and middle lobe airways, which may reflect mucoid impaction. *  Mediastinal and hilar lymphadenopathy, overall similar to mildly decreased in size since 3/10/202 Perivesicular fat stranding.:  cxr some small effusion on the left side:  small loculated pl effusion on rigth side;     he is getting chemo per oncology  : for Hodgkins lymphoma: abgin am      A fibrillation: off ac at t his time: no tpa done as the pl fluid is very bloody - and now he will be off ac:    HTN:  blood pressure is controlled  Urinary retention  ; has chr plummer: ::    dw acp     4/5:  right sided pl effusion:  has lymphoma:  blood pl fluid  ; off ac;  rpt ct chest noted from last night:  has mild hemothorax and pneumothorax too ; with alexey effusion; His WBC is high  but he has no fever: Slight jump in his procal : but he doesnto look septic to me ? could it be reactive:  he does have underlying lymphoma: ct scan pan body yesterday revealed *  Right pleural chest tube in place. Loculated right hemothorax, decreased from the prior chest CT. Trace right pneumothorax, likely  related to the presence of the chest tube. Redemonstrated hyperattenuation centered along the right pleural space  and along the mediastinum, which could reflect residual loculated  hemothorax but is again questioned to represent neoplastic pleural  thickening. Continued follow-up to ensure resolution is recommended.  Peripheral reticular opacities and scattered nodular opacities in the  bilateral lungs, which appear similar to prior imaging and may be  infectious/inflammatory or neoplastic in etiology. Continued follow-up is  recommended. Intermittent opacification of the right lower and middle lobe airways, which may reflect mucoid impaction. *  Mediastinal and hilar lymphadenopathy, overall similar to mildly decreased in size since 3/10/202 Perivesicular fat stranding.:  cxr some small effusion on the left side:  small loculated pl effusion on rigth side; he is awaitng for new ct scan chest today    he is getting chemo per oncology  : for Hodgkins lymphoma: ABG with normal PH with high co2 and reasonable oxygenation;   A fibrillation: off ac at t his time: no tpa done as the pl fluid is very bloody - and now he will be off ac:  has hemopthorax  HTN:  blood pressure is controlled  Urinary retention  ; has chr plummer: ::    marah acp   4/6:    right sided pl effusion: has classical lymphoma:  requiring 3 L of oxygen : repeat ct scan chest noted:  has less effusion with less air and seems to have pleural involvement too: ABG two days ago was reasonable:  ph was normal and has retention of pco2: no need for bipap   A fibrillation: off ac at t his time: no tpa done as the pl fluid is very bloody - and now he will be off ac:  has hemopthorax : pleural effusion is less on repeat ct scan chest  ;   HTN:  blood pressure is controlled  Urinary retention  ; has chr plummer: ::    marah acp       4/7:    right sided pl effusion: has classical lymphoma:  requiring 3 L of oxygen : repeat ct scan chest noted:  has less effusion with less air and seems to have pleural involvement too: ABG two days ago was reasonable:  ph was normal and has retention of pco2: no need for bipap : he seems to be doing  ok : no sob:  no wheezing:    A fibrillation: off ac at t his time: no tpa done as the pl fluid is very bloody - and now he will be off ac:  has hemothorax : pleural effusion is less on repeat ct scan chest  ;   HTN:  blood pressure is controlled  Urinary retention  ; has chr plummer: ::    marah acp  : seems pretty weak :     4/8:    right sided pl effusion: has classical lymphoma:  requiring 3 L of oxygen : repeat ct scan chest noted:  has less effusion with less air and seems to have pleural involvement too: ABG two days ago was reasonable:  ph was normal and has retention of pco2: no need for bipap : he seems to be doing  ok : no sob:  no wheezing:  doing same:  dw son at bedside:    A fibrillation: off ac at t his time: no tpa done as the pl fluid is very bloody - and now he will be off ac:  has hemopthorax : pleural effusion is less on repeat ct scan chest  ;   HTN:  blood pressure is controlled  Urinary retention  ; has chr plummer: ::    marah acp  : seems pretty weak :  now awaiting dc to rehab     4/9:    right sided pl effusion: has classical lymphoma:  requiring 3 L of oxygen : repeat ct scan chest noted:  has less effusion with less air and seems to have pleural involvement too: ABG two  was reasonable:  ph was normal and has retention of pco2: no need for bipap : he seems to be doing  ok : no sob:  no wheezing:  doing same:  dw son at bedside:  cxr yesterday: Low lung volumes,  Right Pleurx catheter remains in place. No pneumothorax. Slightly  decreased right hemithorax opacification. Persistent lateral right chest  wall pleural thickening. Nonspecific focal indistinct rounded opacity in  right lung base. No gross left pleural effusion. Slightly hazy increased bilateral lung markings could be due in part to  crowding from underinflation and/or mild congestion/edema. it was probably present before also but was obscured with effusion and ? atelectasis   A fibrillation: off ac at t his time: no tpa done as the pl fluid is very bloody - and now he will be off ac:  has hemothorax : pleural effusion is less on repeat ct scan chest  ;   HTN:  blood pressure is controlled  Urinary retention  ; has chr plummer: ::    marah acp  : seems pretty weak :  now awaiting dc to rehab :    dw son Francisco at bedside:

## 2023-04-09 NOTE — PROGRESS NOTE ADULT - SUBJECTIVE AND OBJECTIVE BOX
Name of Patient : ARIK DUMONT  MRN: [id number]  Date of visit: 04-09-23       Subjective: Patient seen and examined. No new events except as noted.     REVIEW OF SYSTEMS:    CONSTITUTIONAL: No weakness, fevers or chills  EYES/ENT: No visual changes;  No vertigo or throat pain   NECK: No pain or stiffness  RESPIRATORY: No cough, wheezing, hemoptysis; No shortness of breath  CARDIOVASCULAR: No chest pain or palpitations  GASTROINTESTINAL: No abdominal or epigastric pain. No nausea, vomiting, or hematemesis; No diarrhea or constipation. No melena or hematochezia.  GENITOURINARY: No dysuria, frequency or hematuria  NEUROLOGICAL: No numbness or weakness  SKIN: No itching, burning, rashes, or lesions   All other review of systems is negative unless indicated above.    MEDICATIONS:  MEDICATIONS  (STANDING):  albuterol   0.5% 2.5 milliGRAM(s) Nebulizer every 6 hours  atenolol  Tablet 12.5 milliGRAM(s) Oral daily  enoxaparin Injectable 40 milliGRAM(s) SubCutaneous every 24 hours  escitalopram 10 milliGRAM(s) Oral daily  famotidine    Tablet 20 milliGRAM(s) Oral daily  magnesium oxide 400 milliGRAM(s) Oral three times a day with meals  polyethylene glycol 3350 17 Gram(s) Oral daily  senna 2 Tablet(s) Oral at bedtime  simvastatin 40 milliGRAM(s) Oral at bedtime  traZODone 25 milliGRAM(s) Oral at bedtime      PHYSICAL EXAM:  T(C): 37.1 (04-09-23 @ 20:15), Max: 37.1 (04-09-23 @ 20:15)  HR: 94 (04-09-23 @ 20:15) (61 - 94)  BP: 99/85 (04-09-23 @ 20:15) (99/85 - 126/61)  RR: 18 (04-09-23 @ 20:15) (17 - 19)  SpO2: 97% (04-09-23 @ 20:15) (96% - 98%)  Wt(kg): --  I&O's Summary    08 Apr 2023 07:01  -  09 Apr 2023 07:00  --------------------------------------------------------  IN: 450 mL / OUT: 950 mL / NET: -500 mL    09 Apr 2023 07:01  -  09 Apr 2023 22:32  --------------------------------------------------------  IN: 0 mL / OUT: 400 mL / NET: -400 mL          Appearance: Normal	  HEENT:  PERRLA   Lymphatic: No lymphadenopathy   Cardiovascular: Normal S1 S2, no JVD  Respiratory: normal effort , clear  Gastrointestinal:  Soft, Non-tender  Skin: No rashes,  warm to touch  Psychiatry:  Mood & affect appropriate  Musculuskeletal: No edema    recent labs, Imaging and EKGs personally reviewed       04-08-23 @ 07:01  -  04-09-23 @ 07:00  --------------------------------------------------------  IN: 450 mL / OUT: 950 mL / NET: -500 mL    04-09-23 @ 07:01  -  04-09-23 @ 22:32  --------------------------------------------------------  IN: 0 mL / OUT: 400 mL / NET: -400 mL                            8.3    9.99  )-----------( 241      ( 09 Apr 2023 06:19 )             27.7               04-09    137  |  98  |  10  ----------------------------<  98  4.0   |  30  |  0.46<L>    Ca    8.6      09 Apr 2023 06:19  Phos  2.4     04-09  Mg     1.80     04-09

## 2023-04-10 ENCOUNTER — TRANSCRIPTION ENCOUNTER (OUTPATIENT)
Age: 83
End: 2023-04-10

## 2023-04-10 ENCOUNTER — INPATIENT (INPATIENT)
Facility: HOSPITAL | Age: 83
LOS: 12 days | Discharge: HOME HEALTH | DRG: 949 | End: 2023-04-23
Attending: INTERNAL MEDICINE | Admitting: INTERNAL MEDICINE
Payer: MEDICARE

## 2023-04-10 VITALS
HEART RATE: 89 BPM | DIASTOLIC BLOOD PRESSURE: 71 MMHG | WEIGHT: 184.97 LBS | TEMPERATURE: 98 F | OXYGEN SATURATION: 95 % | RESPIRATION RATE: 20 BRPM | HEIGHT: 70 IN | SYSTOLIC BLOOD PRESSURE: 114 MMHG

## 2023-04-10 VITALS — OXYGEN SATURATION: 96 %

## 2023-04-10 DIAGNOSIS — Z99.81 DEPENDENCE ON SUPPLEMENTAL OXYGEN: ICD-10-CM

## 2023-04-10 DIAGNOSIS — Z79.01 LONG TERM (CURRENT) USE OF ANTICOAGULANTS: ICD-10-CM

## 2023-04-10 DIAGNOSIS — F32.9 MAJOR DEPRESSIVE DISORDER, SINGLE EPISODE, UNSPECIFIED: ICD-10-CM

## 2023-04-10 DIAGNOSIS — Z87.891 PERSONAL HISTORY OF NICOTINE DEPENDENCE: ICD-10-CM

## 2023-04-10 DIAGNOSIS — C81.91 HODGKIN LYMPHOMA, UNSPECIFIED, LYMPH NODES OF HEAD, FACE, AND NECK: ICD-10-CM

## 2023-04-10 DIAGNOSIS — Z92.21 PERSONAL HISTORY OF ANTINEOPLASTIC CHEMOTHERAPY: ICD-10-CM

## 2023-04-10 DIAGNOSIS — R31.9 HEMATURIA, UNSPECIFIED: ICD-10-CM

## 2023-04-10 DIAGNOSIS — J98.11 ATELECTASIS: ICD-10-CM

## 2023-04-10 DIAGNOSIS — J96.01 ACUTE RESPIRATORY FAILURE WITH HYPOXIA: ICD-10-CM

## 2023-04-10 DIAGNOSIS — R00.0 TACHYCARDIA, UNSPECIFIED: ICD-10-CM

## 2023-04-10 DIAGNOSIS — E78.5 HYPERLIPIDEMIA, UNSPECIFIED: ICD-10-CM

## 2023-04-10 DIAGNOSIS — H91.90 UNSPECIFIED HEARING LOSS, UNSPECIFIED EAR: ICD-10-CM

## 2023-04-10 DIAGNOSIS — N36.8 OTHER SPECIFIED DISORDERS OF URETHRA: ICD-10-CM

## 2023-04-10 DIAGNOSIS — E44.0 MODERATE PROTEIN-CALORIE MALNUTRITION: ICD-10-CM

## 2023-04-10 DIAGNOSIS — R53.83 OTHER FATIGUE: ICD-10-CM

## 2023-04-10 DIAGNOSIS — Z51.89 ENCOUNTER FOR OTHER SPECIFIED AFTERCARE: ICD-10-CM

## 2023-04-10 DIAGNOSIS — I10 ESSENTIAL (PRIMARY) HYPERTENSION: ICD-10-CM

## 2023-04-10 DIAGNOSIS — Z48.89 ENCOUNTER FOR OTHER SPECIFIED SURGICAL AFTERCARE: ICD-10-CM

## 2023-04-10 DIAGNOSIS — I48.91 UNSPECIFIED ATRIAL FIBRILLATION: ICD-10-CM

## 2023-04-10 DIAGNOSIS — J90 PLEURAL EFFUSION, NOT ELSEWHERE CLASSIFIED: ICD-10-CM

## 2023-04-10 DIAGNOSIS — R53.81 OTHER MALAISE: ICD-10-CM

## 2023-04-10 DIAGNOSIS — N31.2 FLACCID NEUROPATHIC BLADDER, NOT ELSEWHERE CLASSIFIED: ICD-10-CM

## 2023-04-10 DIAGNOSIS — C81.90 HODGKIN LYMPHOMA, UNSPECIFIED, UNSPECIFIED SITE: ICD-10-CM

## 2023-04-10 DIAGNOSIS — R33.9 RETENTION OF URINE, UNSPECIFIED: ICD-10-CM

## 2023-04-10 DIAGNOSIS — R26.9 UNSPECIFIED ABNORMALITIES OF GAIT AND MOBILITY: ICD-10-CM

## 2023-04-10 DIAGNOSIS — Z48.813 ENCOUNTER FOR SURGICAL AFTERCARE FOLLOWING SURGERY ON THE RESPIRATORY SYSTEM: ICD-10-CM

## 2023-04-10 LAB
ANION GAP SERPL CALC-SCNC: 7 MMOL/L — SIGNIFICANT CHANGE UP (ref 7–14)
BASOPHILS # BLD AUTO: 0.06 K/UL — SIGNIFICANT CHANGE UP (ref 0–0.2)
BASOPHILS NFR BLD AUTO: 0.7 % — SIGNIFICANT CHANGE UP (ref 0–2)
BUN SERPL-MCNC: 8 MG/DL — SIGNIFICANT CHANGE UP (ref 7–23)
CALCIUM SERPL-MCNC: 8.6 MG/DL — SIGNIFICANT CHANGE UP (ref 8.4–10.5)
CHLORIDE SERPL-SCNC: 102 MMOL/L — SIGNIFICANT CHANGE UP (ref 98–107)
CO2 SERPL-SCNC: 31 MMOL/L — SIGNIFICANT CHANGE UP (ref 22–31)
CREAT SERPL-MCNC: 0.42 MG/DL — LOW (ref 0.5–1.3)
EGFR: 107 ML/MIN/1.73M2 — SIGNIFICANT CHANGE UP
EOSINOPHIL # BLD AUTO: 0.57 K/UL — HIGH (ref 0–0.5)
EOSINOPHIL NFR BLD AUTO: 6.3 % — HIGH (ref 0–6)
GLUCOSE SERPL-MCNC: 101 MG/DL — HIGH (ref 70–99)
HCT VFR BLD CALC: 27.3 % — LOW (ref 39–50)
HGB BLD-MCNC: 8.3 G/DL — LOW (ref 13–17)
IANC: 5.36 K/UL — SIGNIFICANT CHANGE UP (ref 1.8–7.4)
IMM GRANULOCYTES NFR BLD AUTO: 0.7 % — SIGNIFICANT CHANGE UP (ref 0–0.9)
LYMPHOCYTES # BLD AUTO: 1.58 K/UL — SIGNIFICANT CHANGE UP (ref 1–3.3)
LYMPHOCYTES # BLD AUTO: 17.6 % — SIGNIFICANT CHANGE UP (ref 13–44)
MAGNESIUM SERPL-MCNC: 1.7 MG/DL — SIGNIFICANT CHANGE UP (ref 1.6–2.6)
MCHC RBC-ENTMCNC: 29.7 PG — SIGNIFICANT CHANGE UP (ref 27–34)
MCHC RBC-ENTMCNC: 30.4 GM/DL — LOW (ref 32–36)
MCV RBC AUTO: 97.8 FL — SIGNIFICANT CHANGE UP (ref 80–100)
MONOCYTES # BLD AUTO: 1.35 K/UL — HIGH (ref 0–0.9)
MONOCYTES NFR BLD AUTO: 15 % — HIGH (ref 2–14)
NEUTROPHILS # BLD AUTO: 5.36 K/UL — SIGNIFICANT CHANGE UP (ref 1.8–7.4)
NEUTROPHILS NFR BLD AUTO: 59.7 % — SIGNIFICANT CHANGE UP (ref 43–77)
NRBC # BLD: 0 /100 WBCS — SIGNIFICANT CHANGE UP (ref 0–0)
NRBC # FLD: 0 K/UL — SIGNIFICANT CHANGE UP (ref 0–0)
PHOSPHATE SERPL-MCNC: 2.3 MG/DL — LOW (ref 2.5–4.5)
PLATELET # BLD AUTO: 239 K/UL — SIGNIFICANT CHANGE UP (ref 150–400)
POTASSIUM SERPL-MCNC: 4 MMOL/L — SIGNIFICANT CHANGE UP (ref 3.5–5.3)
POTASSIUM SERPL-SCNC: 4 MMOL/L — SIGNIFICANT CHANGE UP (ref 3.5–5.3)
RBC # BLD: 2.79 M/UL — LOW (ref 4.2–5.8)
RBC # FLD: 20.7 % — HIGH (ref 10.3–14.5)
SARS-COV-2 RNA SPEC QL NAA+PROBE: SIGNIFICANT CHANGE UP
SODIUM SERPL-SCNC: 140 MMOL/L — SIGNIFICANT CHANGE UP (ref 135–145)
WBC # BLD: 8.98 K/UL — SIGNIFICANT CHANGE UP (ref 3.8–10.5)
WBC # FLD AUTO: 8.98 K/UL — SIGNIFICANT CHANGE UP (ref 3.8–10.5)

## 2023-04-10 PROCEDURE — 99232 SBSQ HOSP IP/OBS MODERATE 35: CPT

## 2023-04-10 RX ORDER — ALBUTEROL 90 UG/1
2.5 AEROSOL, METERED ORAL EVERY 6 HOURS
Refills: 0 | Status: DISCONTINUED | OUTPATIENT
Start: 2023-04-10 | End: 2023-04-23

## 2023-04-10 RX ORDER — OXYCODONE HYDROCHLORIDE 5 MG/1
5 TABLET ORAL EVERY 4 HOURS
Refills: 0 | Status: DISCONTINUED | OUTPATIENT
Start: 2023-04-10 | End: 2023-04-14

## 2023-04-10 RX ORDER — MAGNESIUM OXIDE 400 MG ORAL TABLET 241.3 MG
400 TABLET ORAL
Refills: 0 | Status: DISCONTINUED | OUTPATIENT
Start: 2023-04-10 | End: 2023-04-23

## 2023-04-10 RX ORDER — SENNA PLUS 8.6 MG/1
2 TABLET ORAL AT BEDTIME
Refills: 0 | Status: DISCONTINUED | OUTPATIENT
Start: 2023-04-10 | End: 2023-04-23

## 2023-04-10 RX ORDER — ESCITALOPRAM OXALATE 10 MG/1
10 TABLET, FILM COATED ORAL DAILY
Refills: 0 | Status: DISCONTINUED | OUTPATIENT
Start: 2023-04-11 | End: 2023-04-23

## 2023-04-10 RX ORDER — ENOXAPARIN SODIUM 100 MG/ML
40 INJECTION SUBCUTANEOUS
Qty: 0 | Refills: 0 | DISCHARGE
Start: 2023-04-10

## 2023-04-10 RX ORDER — SOD,AMMONIUM,POTASSIUM LACTATE
1 CREAM (GRAM) TOPICAL
Refills: 0 | Status: DISCONTINUED | OUTPATIENT
Start: 2023-04-10 | End: 2023-04-23

## 2023-04-10 RX ORDER — ATENOLOL 25 MG/1
12.5 TABLET ORAL
Qty: 0 | Refills: 0 | DISCHARGE
Start: 2023-04-10

## 2023-04-10 RX ORDER — ALBUTEROL 90 UG/1
0.5 AEROSOL, METERED ORAL
Qty: 0 | Refills: 0 | DISCHARGE
Start: 2023-04-10

## 2023-04-10 RX ORDER — ATENOLOL 25 MG/1
12.5 TABLET ORAL DAILY
Refills: 0 | Status: DISCONTINUED | OUTPATIENT
Start: 2023-04-11 | End: 2023-04-22

## 2023-04-10 RX ORDER — TRAZODONE HCL 50 MG
25 TABLET ORAL AT BEDTIME
Refills: 0 | Status: DISCONTINUED | OUTPATIENT
Start: 2023-04-10 | End: 2023-04-13

## 2023-04-10 RX ORDER — LANOLIN ALCOHOL/MO/W.PET/CERES
3 CREAM (GRAM) TOPICAL AT BEDTIME
Refills: 0 | Status: DISCONTINUED | OUTPATIENT
Start: 2023-04-10 | End: 2023-04-10

## 2023-04-10 RX ORDER — ALBUTEROL 90 UG/1
2.5 AEROSOL, METERED ORAL EVERY 6 HOURS
Refills: 0 | Status: DISCONTINUED | OUTPATIENT
Start: 2023-04-10 | End: 2023-04-10

## 2023-04-10 RX ORDER — ENOXAPARIN SODIUM 100 MG/ML
40 INJECTION SUBCUTANEOUS EVERY 24 HOURS
Refills: 0 | Status: DISCONTINUED | OUTPATIENT
Start: 2023-04-10 | End: 2023-04-18

## 2023-04-10 RX ORDER — FAMOTIDINE 10 MG/ML
20 INJECTION INTRAVENOUS DAILY
Refills: 0 | Status: DISCONTINUED | OUTPATIENT
Start: 2023-04-11 | End: 2023-04-23

## 2023-04-10 RX ORDER — SIMVASTATIN 20 MG/1
40 TABLET, FILM COATED ORAL AT BEDTIME
Refills: 0 | Status: DISCONTINUED | OUTPATIENT
Start: 2023-04-10 | End: 2023-04-23

## 2023-04-10 RX ORDER — ACETAMINOPHEN 500 MG
650 TABLET ORAL EVERY 6 HOURS
Refills: 0 | Status: DISCONTINUED | OUTPATIENT
Start: 2023-04-10 | End: 2023-04-23

## 2023-04-10 RX ORDER — POLYETHYLENE GLYCOL 3350 17 G/17G
17 POWDER, FOR SOLUTION ORAL DAILY
Refills: 0 | Status: DISCONTINUED | OUTPATIENT
Start: 2023-04-10 | End: 2023-04-23

## 2023-04-10 RX ADMIN — ESCITALOPRAM OXALATE 10 MILLIGRAM(S): 10 TABLET, FILM COATED ORAL at 12:27

## 2023-04-10 RX ADMIN — ENOXAPARIN SODIUM 40 MILLIGRAM(S): 100 INJECTION SUBCUTANEOUS at 21:08

## 2023-04-10 RX ADMIN — SIMVASTATIN 40 MILLIGRAM(S): 20 TABLET, FILM COATED ORAL at 21:08

## 2023-04-10 RX ADMIN — ALBUTEROL 2.5 MILLIGRAM(S): 90 AEROSOL, METERED ORAL at 16:43

## 2023-04-10 RX ADMIN — Medication 25 MILLIGRAM(S): at 21:08

## 2023-04-10 RX ADMIN — MAGNESIUM OXIDE 400 MG ORAL TABLET 400 MILLIGRAM(S): 241.3 TABLET ORAL at 08:56

## 2023-04-10 RX ADMIN — ATENOLOL 12.5 MILLIGRAM(S): 25 TABLET ORAL at 06:03

## 2023-04-10 RX ADMIN — MAGNESIUM OXIDE 400 MG ORAL TABLET 400 MILLIGRAM(S): 241.3 TABLET ORAL at 12:27

## 2023-04-10 RX ADMIN — FAMOTIDINE 20 MILLIGRAM(S): 10 INJECTION INTRAVENOUS at 12:27

## 2023-04-10 RX ADMIN — ALBUTEROL 2.5 MILLIGRAM(S): 90 AEROSOL, METERED ORAL at 22:09

## 2023-04-10 RX ADMIN — ALBUTEROL 2.5 MILLIGRAM(S): 90 AEROSOL, METERED ORAL at 03:15

## 2023-04-10 RX ADMIN — ALBUTEROL 2.5 MILLIGRAM(S): 90 AEROSOL, METERED ORAL at 10:05

## 2023-04-10 NOTE — PROGRESS NOTE ADULT - PROBLEM SELECTOR PLAN 3
- on eliquis, currently on hold   - Monitor HR, card eval PRN  - card eval appreciated  - cna not resume AC, contraindicarted due to bleed, discussed with CTS and cardiology
- had ultrasound guided biopsy of his R supraclavicular mass/lymph node, which later resulted as classic Hodgkin's lymphoma on 2/28  - now s/p 1st cycle of chemotherapy on 3/10, plan for 2nd round of chemotherapy on 3/31   - oncology follow up
- on eliquis, currently on hold   - Monitor HR, card eval PRN  - card eval appreciated  - cna not resume AC, contraindicarted due to bleed, discussed with CTS and cardiology
- had ultrasound guided biopsy of his R supraclavicular mass/lymph node, which later resulted as classic Hodgkin's lymphoma on 2/28  - now s/p 1st cycle of chemotherapy on 3/10, plan for 2nd round of chemotherapy on 3/31   - oncology follow up
- on eliquis, currently on hold   - Monitor HR, card eval PRN  - card eval appreciated  - cna not resume AC, contraindicarted due to bleed, discussed with CTS and cardiology
- had ultrasound guided biopsy of his R supraclavicular mass/lymph node, which later resulted as classic Hodgkin's lymphoma on 2/28  - now s/p 1st cycle of chemotherapy on 3/10, plan for 2nd round of chemotherapy on 3/31   - oncology follow up
- on eliquis, currently on hold   - Monitor HR, card eval PRN  - card eval appreciated  - cna not resume AC, contraindicarted due to bleed, discussed with CTS and cardiology
- on eliquis, currently on hold   - Monitor HR, card eval PRN  - card eval called
- on eliquis, currently on hold   - Monitor HR, card eval PRN  - card eval called

## 2023-04-10 NOTE — PROGRESS NOTE ADULT - PROBLEM SELECTOR PLAN 4
- had ultrasound guided biopsy of his R supraclavicular mass/lymph node, which later resulted as classic Hodgkin's lymphoma on 2/28  - now s/p 1st cycle of chemotherapy on 3/10, plan for 2nd round of chemotherapy on 3/31, on hold for now   - oncology follow up  .- S/P chemo, follow up onc recs
- had ultrasound guided biopsy of his R supraclavicular mass/lymph node, which later resulted as classic Hodgkin's lymphoma on 2/28  - now s/p 1st cycle of chemotherapy on 3/10, plan for 2nd round of chemotherapy on 3/31, on hold for now   - oncology follow up  .- plan for 2nd dose of chemo , defer to Heme/Onc
DVT ppx: SCD for now,  Diet: Reg Diet  Dispo: admitted
DVT ppx: SCD for now,  Diet: Reg Diet  Dispo: admitted
- had ultrasound guided biopsy of his R supraclavicular mass/lymph node, which later resulted as classic Hodgkin's lymphoma on 2/28  - now s/p 1st cycle of chemotherapy on 3/10, plan for 2nd round of chemotherapy on 3/31, on hold for now   - oncology follow up
- had ultrasound guided biopsy of his R supraclavicular mass/lymph node, which later resulted as classic Hodgkin's lymphoma on 2/28  - now s/p 1st cycle of chemotherapy on 3/10, plan for 2nd round of chemotherapy on 3/31, on hold for now   - oncology follow up  .- S/P chemo, follow up onc recs
DVT ppx: SCD for now, eliquis and AC on hold for tPA on Mnday   Diet: Reg Diet  Dispo: admitted
- had ultrasound guided biopsy of his R supraclavicular mass/lymph node, which later resulted as classic Hodgkin's lymphoma on 2/28  - now s/p 1st cycle of chemotherapy on 3/10, plan for 2nd round of chemotherapy on 3/31, on hold for now   - oncology follow up  .- S/P chemo, follow up onc recs
- had ultrasound guided biopsy of his R supraclavicular mass/lymph node, which later resulted as classic Hodgkin's lymphoma on 2/28  - now s/p 1st cycle of chemotherapy on 3/10, plan for 2nd round of chemotherapy on 3/31, on hold for now   - oncology follow up  .- plan for 2nd dose of chemo on monday
- had ultrasound guided biopsy of his R supraclavicular mass/lymph node, which later resulted as classic Hodgkin's lymphoma on 2/28  - now s/p 1st cycle of chemotherapy on 3/10, plan for 2nd round of chemotherapy on 3/31, on hold for now   - oncology follow up  .- plan for 2nd dose of chemo on monday
- had ultrasound guided biopsy of his R supraclavicular mass/lymph node, which later resulted as classic Hodgkin's lymphoma on 2/28  - now s/p 1st cycle of chemotherapy on 3/10, plan for 2nd round of chemotherapy on 3/31, on hold for now   - oncology follow up
- had ultrasound guided biopsy of his R supraclavicular mass/lymph node, which later resulted as classic Hodgkin's lymphoma on 2/28  - now s/p 1st cycle of chemotherapy on 3/10, plan for 2nd round of chemotherapy on 3/31, on hold for now   - oncology follow up  .- plan for 2nd dose of chemo on monday

## 2023-04-10 NOTE — H&P ADULT - NSHPSOCIALHISTORY_GEN_ALL_CORE
Smoking -  EtOH -   Drugs -     Marital status:    Patient lives   PTA: Independent in ADLs and ambulation     CURRENT FUNCTIONAL STATUS  Date:   Bed Mobility:   Transfers:   Gait:   Upper Body Dressing:  Lower Body Dressing:  Toileting:  Bathing: Smoking -  EtOH -   Drugs -     Marital status:     Patient lives in a house with his wife with 5 steps to enter and a flight of 10 steps once inside.  PTA: Independent in ADLs and ambulation     CURRENT FUNCTIONAL STATUS  Date: 4/10  Bed Mobility:   Transfers: min a , 1 person  Gait: min a, 1 person, 25ft with RW, O2nc 2ltrs Smoking - Denies  EtOH - Denies  Drugs - Denies    Marital status:     Patient lives in a house with his wife with 5 steps to enter and a flight of 10 steps once inside.  PTA: Independent in ADLs and ambulation     CURRENT FUNCTIONAL STATUS  Date: 4/10  Bed Mobility:   Transfers: min a , 1 person  Gait: min a, 1 person, 25ft with RW, O2nc 2ltrs

## 2023-04-10 NOTE — DISCHARGE NOTE NURSING/CASE MANAGEMENT/SOCIAL WORK - PATIENT PORTAL LINK FT
You can access the FollowMyHealth Patient Portal offered by [facility] by registering at the following website: [email or web address]. By joining [facility]’s FollowMyHealth portal, you will also be able to view your health information using other applications (apps) compatible with our system.

## 2023-04-10 NOTE — H&P ADULT - ATTENDING COMMENTS
Seen and examined    81 y/o M,  recent Dx of Hodgkin's lymphoma s/p cycles of Brenuximab, most recently 4/3, due Cycle 3 on 4/24  Had interval acute rehab treatment before transfer to [facility] for progressive Afib with RVR, dyspnea, increasing effusion  He had pleurax drain, at [facility]-- output 100cc + 2,180 cc). stabilized and T/f to Acute rehab 4/10. Anticoagulation on hold, On lovenox ppx     Earlier today, had episodic oxy desaturation, requiring oxy delivery via  non-rebreed mask with improvement  Now on NC oxy, 3-4L  Still has mild dyspnea, preferring to lying on left side    AAO X 3  Able to speak clearly, moderately fatigued  Chest--decreased air entry b/l  Pleural drain Rt lower chest  Abd soft  Ext--trace leg edema      RECENT LABS/IMAGING                        8.3    10.33 )-----------( 268      ( 11 Apr 2023 06:50 )             27.6     04-11    139  |  100  |  7   ----------------------------<  108<H>  3.7   |  37<H>  |  0.60    Ca    8.5      11 Apr 2023 06:50  Phos  2.3     04-10  Mg     1.70     04-10    TPro  6.3  /  Alb  1.8<L>  /  TBili  0.6  /  DBili  x   /  AST  26  /  ALT  33  /  AlkPhos  68  04-11      < from: Xray Chest 1 View-PORTABLE IMMEDIATE (Xray Chest 1 View-PORTABLE IMMEDIATE .) (04.11.23 @ 06:52) >    INTERPRETATION:  TIME OF EXAM: April 11, 2023 at 6:50 AM.    CLINICAL INFORMATION: History of right pleural effusion. Hypoxia.    COMPARISON:  April 9, 2023.    Heart size and the mediastinum cannot be accurately evaluated on this   projection.  Elevated right hemidiaphragm again seen.  Left subclavian approach port and right Pleurx catheter are unchanged in   position.  No significant interval change in loculated right pleural effusion with   likely associated passive atelectasis.  Patchy left mid and lower lung opacities again seen with some improvement   noted.  No definite left pleural effusion.  No pneumothorax.  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    IMPRESSION:  Right Pleurx catheter are unchanged in position. No   pneumothorax.    Loculated right pleural effusion with likely associated passive   atelectasis, not significantly changed. Other underlying pathology not   excluded.    Patchy left mid and lower lung opacities again seen with some improvement   noted.    Dx: Debility due to Hodgkin's lymphoma  Pleural effusion s/p VATs, on pleurax drainage  Atelectasis and lung opacities  Commence PT/OT including Chest PT  Pulmonary consult    Clarify from [facility] Cardiothoracic team, when full anticoagulation could be recommenced  c/w lovenox ppx for now    Pleural drainage as recommended  NC oxy  Continue all supportive treatments  Will discuss treatment plan and obtain collateral from family  (Daughter  [phone])   Est dc to be determined at next IDT Seen and examined    83 y/o M,  recent Dx of Hodgkin's lymphoma s/p cycles of Brenuximab, most recently 4/3, due Cycle 3 on 4/24  Had interval acute rehab treatment before transfer to [facility] for progressive Afib with RVR, dyspnea, increasing effusion  He had pleurax drain, at [facility]-- output 100cc + 2,180 cc). stabilized and T/f to Acute rehab 4/10. Anticoagulation on hold, On lovenox ppx     Earlier today, had episodic oxy desaturation, requiring oxy delivery via  non-rebreed mask with improvement  Now on NC oxy, 3-4L  Still has mild dyspnea, preferring to lying on left side    AAO X 3  Able to speak clearly, moderately fatigued  Chest--decreased air entry b/l  Pleural drain Rt lower chest  Abd soft  Ext--trace leg edema      RECENT LABS/IMAGING                        8.3    10.33 )-----------( 268      ( 11 Apr 2023 06:50 )             27.6     04-11    139  |  100  |  7   ----------------------------<  108<H>  3.7   |  37<H>  |  0.60    Ca    8.5      11 Apr 2023 06:50  Phos  2.3     04-10  Mg     1.70     04-10    TPro  6.3  /  Alb  1.8<L>  /  TBili  0.6  /  DBili  x   /  AST  26  /  ALT  33  /  AlkPhos  68  04-11      < from: Xray Chest 1 View-PORTABLE IMMEDIATE (Xray Chest 1 View-PORTABLE IMMEDIATE .) (04.11.23 @ 06:52) >    INTERPRETATION:  TIME OF EXAM: April 11, 2023 at 6:50 AM.    CLINICAL INFORMATION: History of right pleural effusion. Hypoxia.    COMPARISON:  April 9, 2023.    Heart size and the mediastinum cannot be accurately evaluated on this   projection.  Elevated right hemidiaphragm again seen.  Left subclavian approach port and right Pleurx catheter are unchanged in   position.  No significant interval change in loculated right pleural effusion with   likely associated passive atelectasis.  Patchy left mid and lower lung opacities again seen with some improvement   noted.  No definite left pleural effusion.  No pneumothorax.  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    IMPRESSION:  Right Pleurx catheter are unchanged in position. No   pneumothorax.    Loculated right pleural effusion with likely associated passive   atelectasis, not significantly changed. Other underlying pathology not   excluded.    Patchy left mid and lower lung opacities again seen with some improvement   noted.    Dx: Debility due to Hodgkin's lymphoma  Pleural effusion s/p VATs, on pleurax drainage  Atelectasis and lung opacities  Commence PT/OT including Chest PT  Pulmonary consult  Continue Choi decompression and routine urology f/u    Clarify from [facility] Cardiothoracic team, when full anticoagulation could be recommenced  c/w lovenox ppx for now    Pleural drainage as recommended  NC oxy  Continue all supportive treatments  Will discuss treatment plan and obtain collateral from family  (Daughter  [phone])   Est dc to be determined at next IDT

## 2023-04-10 NOTE — PATIENT PROFILE ADULT - FALL HARM RISK - HARM RISK INTERVENTIONS

## 2023-04-10 NOTE — PROGRESS NOTE ADULT - PROBLEM SELECTOR PROBLEM 3
Permanent atrial fibrillation
Hodgkin lymphoma
Permanent atrial fibrillation
Hodgkin lymphoma
Permanent atrial fibrillation
Hodgkin lymphoma
Permanent atrial fibrillation
pulse oximetry

## 2023-04-10 NOTE — H&P ADULT - HISTORY OF PRESENT ILLNESS
This is a 81 YO male with PMH of AFIB, HTN and chronic urinary retention (chronic plummer); who presented to [facility] via ambulance on 2/17 with progressively worsening SOB.  He was intubated in the field and brought to [facility] ED and admitted to ICU for acute respiratory failure with hypercapnia. CTA chest was negative for PE but significant for a large R pleural effusion with mediastinal and hilar lymphadenopathy, and 3L of fluid was drained.  He completed a 5 day course of empiric antibiotics. Pleural fluid cultures and blood cultures resulted negative.  Suspicious of underlying malignancy secondary to lymphadenopathy and persistent leukocytosis, Heme/Onc was consulted and recommended transfer to [facility] on 2/24/23 for further workup. On 2/28 he underwent an ultrasound guided biopsy of his R supraclavicular mass/lymph node, which later resulted as classic Hodgkin's lymphoma.  On 3/3, he underwent a R thoracotomy/VATS with Pleurex placement. Postoperatively, he was hypotensive and started on Midodrine. A Left chest wall mediport was placed for ongoing chemotherapy treatment on 3/15. He was admitted  to [facility] on 3/24.    While at rehab, he developed chest discomfort., low systolic BP and mild tachycardia. EKG showed Afib with rapid ventricular rythmn. On exam, he had increased respiratory rate. CXR and  CT chest showed increased Rt sided effusion with loculation and increasing atelectasis. He was transferred to [facility]. In the ED, CXR showed recurrent loculated R pleural effusion. CT chest with small loculated R pleural effusion with new peripheral demarcated high attenuation in the R pleural space. He  was seen by CT surgery in the ED, per paper chart, "drained about 100cc of fluid, placed on Pleura vac . PleurX was then flushed and drained about 2180cc fluid  need to hold AC for 48 hours for possible TPA into pleurX cath.  MIST held per CT surgery.  No plan for VATS. CXR  on 3/27, Decreasing pulmonary congestion, No pneumothorax.   On 4/6 CT Chest -- Compared to 3/29/2023, similar loculated, exudative right pleural effusion with slightly decreased pleural air component and Pleurx catheter in place. Question malignant effusion in the setting of reported lymphoma, irregular septal thickening, nodularity and mediastinal pleural involvement.     Patient was evaluated by PM&R and therapy for functional deficits, gait/ADL impairments and acute rehabilitation was recommended. Patient was medically optimized for discharge to [facility] IRU on 4/10/23.   This is a 83 YO male with PMH of AFIB, HTN and chronic urinary retention (chronic plummer); who presented to [facility] via ambulance on 2/17 with progressively worsening SOB.  He was intubated in the field and brought to [facility] ED and admitted to ICU for acute respiratory failure with hypercapnia. CTA chest was negative for PE but significant for a large R pleural effusion with mediastinal and hilar lymphadenopathy, and 3L of fluid was drained.  He completed a 5 day course of empiric antibiotics. Pleural fluid cultures and blood cultures resulted negative.  Suspicious of underlying malignancy secondary to lymphadenopathy and persistent leukocytosis, Heme/Onc was consulted and recommended transfer to [facility] on 2/24/23 for further workup. On 2/28 he underwent an ultrasound guided biopsy of his R supraclavicular mass/lymph node, which later resulted as classic Hodgkin's lymphoma.  On 3/3, he underwent a R thoracotomy/VATS with Pleurex placement. Postoperatively, he was hypotensive and started on Midodrine. A Left chest wall mediport was placed for ongoing chemotherapy treatment on 3/15. He was admitted  to [facility] on 3/24.    While at rehab, he developed chest discomfort., low systolic BP and mild tachycardia. EKG showed Afib with rapid ventricular rythmn. On exam, he had increased respiratory rate. CXR and  CT chest showed increased Rt sided effusion with loculation and increasing atelectasis. He was transferred to [facility]. In the ED, CXR showed recurrent loculated R pleural effusion. CT chest with small loculated R pleural effusion with new peripheral demarcated high attenuation in the R pleural space. He  was seen by CT surgery in the ED, per paper chart, "drained about 100cc of fluid, placed on Pleura vac . PleurX was then flushed and drained about 2180cc fluid  need to hold AC for 48 hours for possible TPA into pleurX cath.  MIST held per CT surgery.  No plan for VATS. CXR  on 3/27, Decreasing pulmonary congestion, No pneumothorax. On 4/6 CT Chest -- Compared to 3/29/2023, similar loculated, exudative right pleural effusion with slightly decreased pleural air component and Pleurx catheter in place. Question malignant effusion in the setting of reported lymphoma, irregular septal thickening, nodularity and mediastinal pleural involvement.     Patient was evaluated by PM&R and therapy for functional deficits, gait/ADL impairments and acute rehabilitation was recommended. Patient was medically optimized for discharge to [facility] IRU on 4/10/23.

## 2023-04-10 NOTE — CHART NOTE - NSCHARTNOTEFT_GEN_A_CORE
Source: Patient [ ]    Family [ ]     other [ ]    Diet : Diet, Regular:   Supplement Feeding Modality:  Oral  Ensure Compact Cans or Servings Per Day:  1       Frequency:  Two Times a day (03-27-23 @ 21:11) [Active]        82 year old male with PMH of AFIB, HTN and chronic urinary retention (chronic plummer), recent Dx Hodgkins Lymphoma s/p 1st session chemo on 3/10 (has port) who presents with increased tachypnea and SOB for 24 hours. presents from  rehab due to 24 hours of tachypnea and SOB. Found to have recurrent pleural effusion with loculation Pleurx catheter placed with pleurvac to water seal.     At time of visit, Pt awake, appears weak. Pt's son at bed side. Per son, Pt eats well; no chewing or swallowing difficulty; no nausea, vomiting or diarrhea @ this time. Pt likes to drink PO supplement: Ensure, reported. Plan for Pt to be discharged today.       Current Weight:   % Weight Change    Pertinent Medications: MEDICATIONS  (STANDING):  albuterol   0.5% 2.5 milliGRAM(s) Nebulizer every 6 hours  atenolol  Tablet 12.5 milliGRAM(s) Oral daily  enoxaparin Injectable 40 milliGRAM(s) SubCutaneous every 24 hours  escitalopram 10 milliGRAM(s) Oral daily  famotidine    Tablet 20 milliGRAM(s) Oral daily  magnesium oxide 400 milliGRAM(s) Oral three times a day with meals  polyethylene glycol 3350 17 Gram(s) Oral daily  senna 2 Tablet(s) Oral at bedtime  simvastatin 40 milliGRAM(s) Oral at bedtime  traZODone 25 milliGRAM(s) Oral at bedtime    MEDICATIONS  (PRN):  acetaminophen     Tablet .. 650 milliGRAM(s) Oral every 6 hours PRN Temp greater or equal to 38C (100.4F), Mild Pain (1 - 3)  diphenhydrAMINE Injectable 50 milliGRAM(s) IV Push every 6 hours PRN Rash and/or Itching  hydrocortisone sodium succinate Injectable 100 milliGRAM(s) IV Push once PRN Infusion Reaction    Pertinent Labs:  04-10 Na140 mmol/L Glu 101 mg/dL<H> K+ 4.0 mmol/L Cr  0.42 mg/dL<L> BUN 8 mg/dL 04-10 Phos 2.3 mg/dL<L> 03-26 Chol 117 mg/dL LDL --    HDL 53 mg/dL Trig 65 mg/dL      Skin:     Estimated Needs:   [x] no change since previous assessment  [ ] recalculated:       Previous Nutrition Diagnosis:     [ ] Inadequate Energy Intake [ ]Inadequate Oral Intake [ ] Excessive Energy Intake     [ ] Underweight [ ] Increased Nutrient Needs [ ] Overweight/Obesity     [ ] Altered GI Function [ ] Unintended Weight Loss [ ] Food & Nutrition Related Knowledge Deficit [x] Malnutrition, moderate      Nutrition Diagnosis is [x] ongoing  [ ] resolved [ ] not applicable          Recommend  1. Encourage & assist Pt with meals; Monitor PO diet tolerance; Honor food preferences;   2. Add Multivitamins 1 tab daily for micronutrient coverage;   3. Monitor labs, weekly weights, hydration status; Source:   Family [x] son at bed side     other [x] nurse, medical chart     Diet : Diet, Regular:   Supplement Feeding Modality:  Oral   Ensure Compact Cans or Servings Per Day:  1       Frequency:  Two Times a day (03-27-23 @ 21:11) [Active]    Pt 83 yo male with PMHx of AFIB, HTN, chronic urinary retention (chronic plummer), recent Dx Hodgkins Lymphoma s/p 1st session chemo on 3/10 presented from  rehab - per chart review.  At time of visit, Pt awake, appears weak. Pt's son at bed side. Per son, Pt eats well; no chewing or swallowing difficulty; no vomiting or diarrhea @ this time. +Last BM (4/10) per flow sheet. Pt likes to drink PO supplement: Ensure, reported as well. Son also stated, plan for Pt to be discharged today. Case discussed with nurse. RDN remains available.     Pt's height: 70" (3/26)      IBW: 166#+/-10%      Pt's weights: 80.2 kg (4/3), 84.6 kg (3/26)  -->> weight loss of 4.4 kg  Pertinent Medications: Pepcid, Lovenox, Mag-Ox, Miralax, Senna,   Pertinent Labs: (4/10) H/H 8.3/27.3 L, phosphorus 2.3 L, Creat 0.42 L, Glu 101 H;    (3/26) Albumin 2.9 L, HbA1c 5.3%    Skin: no report of pressure injury at present    Estimated Needs: [x] no change since previous assessment  Previous Nutrition Diagnosis: [x] Malnutrition, moderate   Nutrition Diagnosis is [x] ongoing      Nutrition Interventions/Recommendations;   1. Encourage & assist Pt with meals; Monitor PO diet tolerance;   2. Honor food preferences;   3. Add Multivitamins 1 tab daily for micronutrient coverage;   4. Monitor labs, weekly weights, hydration status;

## 2023-04-10 NOTE — H&P ADULT - NSHPREVIEWOFSYSTEMS_GEN_ALL_CORE
REVIEW OF SYSTEMS  Constitutional: No fever, No Chills, + fatigue  HEENT: No eye pain, No visual disturbances, + difficulty hearing  Pulm: No cough,  No shortness of breath  Cardio: No chest pain, No palpitations  GI:  No abdominal pain, No nausea, No vomiting, No diarrhea, No constipation  : + plummer  Neuro: No headaches, No memory loss, + loss of strength, no numbness, No tremors  Skin: No itching, No rashes, No lesions   Endo: No temperature intolerance  MSK: No joint pain, No joint swelling, No muscle pain, No Neck pain,  No back pain  Psych:  No depression, No anxiety

## 2023-04-10 NOTE — CHART NOTE - NSCHARTNOTESELECT_GEN_ALL_CORE
Event Note
Event Note
Thoracic surgery
Thoracic surgery/Event Note
Choi Replaced/Event Note
Event Note
Follow-up/Nutrition Services
Thoracic surgery
Thoracic surgery

## 2023-04-10 NOTE — PROGRESS NOTE ADULT - PROBLEM SELECTOR PLAN 1
- patient presents from  rehab for tachypnea to 30s initially requiring 10L NRB, CXR showing recurrent loculated R pleural effusion   - CT chest with small loculated R pleural effusion with new peripheral demarcated high attenuation in the R pleural space   - was seen by CT surgery in the ED, per paper chart, "drained about 100cc of fluid, placed on Pleura vac . PleurX was then flushed and drained about 2180cc fluid  - c/w Pleura vac to water seal,   - need to hold AC for 48 hours for possible TPA into pleurX cath  - F/u CT surgery recs, MIST procedure
- patient presents from  rehab for tachypnea to 30s initially requiring 10L NRB, CXR showing recurrent loculated R pleural effusion   - CT chest with small loculated R pleural effusion with new peripheral demarcated high attenuation in the R pleural space   - was seen by CT surgery in the ED, per paper chart, "drained about 100cc of fluid, placed on Pleura vac . PleurX was then flushed and drained about 2180cc fluid  - c/w Pleura vac to water seal,   - need to hold AC for now  - F/u CT surgery recs, MIST procedure, on hold as per vascualr, patient stable,   - TS recommended to hold AC in vie wof hemothorax on imaging, will discuss with yara
- patient presents from  rehab for tachypnea to 30s initially requiring 10L NRB, CXR showing recurrent loculated R pleural effusion   - CT chest with small loculated R pleural effusion with new peripheral demarcated high attenuation in the R pleural space   - was seen by CT surgery in the ED, per paper chart, "drained about 100cc of fluid, placed on Pleura vac . PleurX was then flushed and drained about 2180cc fluid  - c/w Pleura vac to water seal,   - need to hold AC for now  - F/u CT surgery recs, MIST procedure, on hold as per vascualr, patient stable,   - TS recommended to hold AC in view of hemothorax on imaging, will discuss with card
- patient presents from  rehab for tachypnea to 30s initially requiring 10L NRB, CXR showing recurrent loculated R pleural effusion   - CT chest with small loculated R pleural effusion with new peripheral demarcated high attenuation in the R pleural space   - was seen by CT surgery in the ED, per paper chart, "drained about 100cc of fluid, placed on Pleura vac . PleurX was then flushed and drained about 2180cc fluid. plan for caping the pleurx  - c/w Pleura vac to water seal,   - need to hold AC for now  - F/u CT surgery recs, MIST procedure, on hold as per vascualr, patient stable,   - TS recommended to hold AC in view of hemothorax on imaging, will discuss with card  - repeat CT chest 4/6/23 --> similar loculated, exudative right pleural effusion with slightly decreased pleural air component  - pleurX capped 4/7/23
- patient presents from  rehab for tachypnea to 30s initially requiring 10L NRB, CXR showing recurrent loculated R pleural effusion   - CT chest with small loculated R pleural effusion with new peripheral demarcated high attenuation in the R pleural space   - was seen by CT surgery in the ED, per paper chart, "drained about 100cc of fluid, placed on Pleura vac . PleurX was then flushed and drained about 2180cc fluid  - c/w Pleura vac to water seal,   - need to hold AC for now  - F/u CT surgery recs, MIST procedure, on hold as per vascualr, patient stable,   - TS recommended to hold AC in vie wof hemothorax on imaging, will discuss with yara
- patient presents from  rehab for tachypnea to 30s initially requiring 10L NRB, CXR showing recurrent loculated R pleural effusion   - CT chest with small loculated R pleural effusion with new peripheral demarcated high attenuation in the R pleural space   - was seen by CT surgery in the ED, per paper chart, "drained about 100cc of fluid, placed on Pleura vac . PleurX was then flushed and drained about 2180cc fluid. plan for caping the pleurx  - c/w Pleura vac to water seal,   - need to hold AC for now  - F/u CT surgery recs, MIST procedure, on hold as per vascualr, patient stable,   - TS recommended to hold AC in view of hemothorax on imaging, will discuss with card  - repeat CT chest 4/6/23 --> similar loculated, exudative right pleural effusion with slightly decreased pleural air component  - pleurX capped 4/7/23
- patient presents from  rehab for tachypnea to 30s initially requiring 10L NRB, CXR showing recurrent loculated R pleural effusion   - CT chest with small loculated R pleural effusion with new peripheral demarcated high attenuation in the R pleural space   - was seen by CT surgery in the ED, per paper chart, "drained about 100cc of fluid, placed on Pleura vac . PleurX was then flushed and drained about 2180cc fluid  - c/w Pleura vac to water seal,   - need to hold AC for now  - F/u CT surgery recs, MIST procedure, on hold as per vascualr, patient stable,   - TS recommended to hold AC in view of hemothorax on imaging, will discuss with yara
- patient presents from  rehab for tachypnea to 30s initially requiring 10L NRB, CXR showing recurrent loculated R pleural effusion   - CT chest with small loculated R pleural effusion with new peripheral demarcated high attenuation in the R pleural space   - was seen by CT surgery in the ED, per paper chart, "drained about 100cc of fluid, placed on Pleura vac . PleurX was then flushed and drained about 2180cc fluid  - c/w Pleura vac to water seal,   - need to hold AC for now  - F/u CT surgery recs, MIST procedure, on hold as per vascualr, patient stable,   - TS recommended to hold AC in view of hemothorax on imaging, will discuss with yara
- patient presents from  rehab for tachypnea to 30s initially requiring 10L NRB, CXR showing recurrent loculated R pleural effusion   - CT chest with small loculated R pleural effusion with new peripheral demarcated high attenuation in the R pleural space   - was seen by CT surgery in the ED, per paper chart, "drained about 100cc of fluid, placed on Pleura vac . PleurX was then flushed and drained about 2180cc fluid. plan for caping the pleurx  - c/w Pleura vac to water seal,   - need to hold AC for now  - F/u CT surgery recs, MIST procedure, on hold as per vascualr, patient stable,   - TS recommended to hold AC in view of hemothorax on imaging, will discuss with card  - repeat CT chest 4/6/23, follow up with pulm and tS team
- patient presents from  rehab for tachypnea to 30s initially requiring 10L NRB, CXR showing recurrent loculated R pleural effusion   - CT chest with small loculated R pleural effusion with new peripheral demarcated high attenuation in the R pleural space   - was seen by CT surgery in the ED, per paper chart, "drained about 100cc of fluid, placed on Pleura vac . PleurX was then flushed and drained about 2180cc fluid  - c/w Pleura vac to water seal,   - need to hold AC for now  - F/u CT surgery recs, MIST procedure, on hold as per vascualr, patient stable,   - TS recommended to hold AC in view of hemothorax on imaging, will discuss with card
- patient presents from  rehab for tachypnea to 30s initially requiring 10L NRB, CXR showing recurrent loculated R pleural effusion   - CT chest with small loculated R pleural effusion with new peripheral demarcated high attenuation in the R pleural space   - was seen by CT surgery in the ED, per paper chart, "drained about 100cc of fluid, placed on Pleura vac . PleurX was then flushed and drained about 2180cc fluid. plan for caping the pleurx  - c/w Pleura vac to water seal,   - need to hold AC for now  - F/u CT surgery recs, MIST procedure, on hold as per vascualr, patient stable,   - TS recommended to hold AC in view of hemothorax on imaging, will discuss with card
- patient presents from  rehab for tachypnea to 30s initially requiring 10L NRB, CXR showing recurrent loculated R pleural effusion   - CT chest with small loculated R pleural effusion with new peripheral demarcated high attenuation in the R pleural space   - was seen by CT surgery in the ED, per paper chart, "drained about 100cc of fluid, placed on Pleura vac . PleurX was then flushed and drained about 2180cc fluid  - c/w Pleura vac to water seal,   - need to hold AC for now  - F/u CT surgery recs, MIST procedure, on hold as per vascualr, patient stable,   - TS recommended to hold AC in view of hemothorax on imaging, will discuss with yara
- patient presents from  rehab for tachypnea to 30s initially requiring 10L NRB, CXR showing recurrent loculated R pleural effusion   - CT chest with small loculated R pleural effusion with new peripheral demarcated high attenuation in the R pleural space   - was seen by CT surgery in the ED, per paper chart, "drained about 100cc of fluid, placed on Pleura vac . PleurX was then flushed and drained about 2180cc fluid. plan for caping the pleurx  - c/w Pleura vac to water seal,   - need to hold AC for now  - F/u CT surgery recs, MIST procedure, on hold as per vascualr, patient stable,   - TS recommended to hold AC in view of hemothorax on imaging, will discuss with card  - repeat CT chest 4/6/23, pleurX capped 4/7/23
- patient presents from  rehab for tachypnea to 30s initially requiring 10L NRB, CXR showing recurrent loculated R pleural effusion   - CT chest with small loculated R pleural effusion with new peripheral demarcated high attenuation in the R pleural space   - was seen by CT surgery in the ED, per paper chart, "drained about 100cc of fluid, placed on Pleura vac . PleurX was then flushed and drained about 2180cc fluid. plan for caping the pleurx  - c/w Pleura vac to water seal,   - need to hold AC for now  - F/u CT surgery recs, MIST procedure, on hold as per vascualr, patient stable,   - TS recommended to hold AC in view of hemothorax on imaging, will discuss with card  - repeat CT chest, follow up with pulm and tS team
- patient presents from  rehab for tachypnea to 30s initially requiring 10L NRB, CXR showing recurrent loculated R pleural effusion   - CT chest with small loculated R pleural effusion with new peripheral demarcated high attenuation in the R pleural space   - was seen by CT surgery in the ED, per paper chart, "drained about 100cc of fluid, placed on Pleura vac . PleurX was then flushed and drained about 2180cc fluid. plan for caping the pleurx  - c/w Pleura vac to water seal,   - need to hold AC for now  - F/u CT surgery recs, MIST procedure, on hold as per vascualr, patient stable,   - TS recommended to hold AC in view of hemothorax on imaging, will discuss with card  - repeat CT chest 4/6/23 --> similar loculated, exudative right pleural effusion with slightly decreased pleural air component  - pleurX capped 4/7/23
- patient presents from  rehab for tachypnea to 30s initially requiring 10L NRB, CXR showing recurrent loculated R pleural effusion   - CT chest with small loculated R pleural effusion with new peripheral demarcated high attenuation in the R pleural space   - was seen by CT surgery in the ED, per paper chart, "drained about 100cc of fluid, placed on Pleura vac . PleurX was then flushed and drained about 2180cc fluid. plan for caping the pleurx  - c/w Pleura vac to water seal,   - need to hold AC for now  - F/u CT surgery recs, MIST procedure, on hold as per vascualr, patient stable,   - TS recommended to hold AC in view of hemothorax on imaging, will discuss with card

## 2023-04-10 NOTE — PROGRESS NOTE ADULT - PROBLEM SELECTOR PROBLEM 4
Hodgkin lymphoma
Need for prophylactic measure
Hodgkin lymphoma
Hodgkin lymphoma
Need for prophylactic measure
Hodgkin lymphoma
Need for prophylactic measure
Hodgkin lymphoma

## 2023-04-10 NOTE — PROGRESS NOTE ADULT - NUTRITIONAL ASSESSMENT
This patient has been assessed with a concern for Malnutrition and has been determined to have a diagnosis/diagnoses of Moderate protein-calorie malnutrition.    This patient is being managed with:   Diet Regular-  Supplement Feeding Modality:  Oral  Ensure Compact Cans or Servings Per Day:  1       Frequency:  Two Times a day  Entered: Mar 27 2023  9:11PM  
This patient has been assessed with a concern for Malnutrition and has been determined to have a diagnosis/diagnoses of Moderate protein-calorie malnutrition.    This patient is being managed with:   Diet NPO after Midnight-     NPO Start Date: 28-Mar-2023   NPO Start Time: 23:59  Entered: Mar 28 2023  5:35PM    Diet Regular-  Supplement Feeding Modality:  Oral  Ensure Compact Cans or Servings Per Day:  1       Frequency:  Two Times a day  Entered: Mar 27 2023  9:11PM  
This patient has been assessed with a concern for Malnutrition and has been determined to have a diagnosis/diagnoses of Moderate protein-calorie malnutrition.    This patient is being managed with:   Diet Regular-  Supplement Feeding Modality:  Oral  Ensure Compact Cans or Servings Per Day:  1       Frequency:  Two Times a day  Entered: Mar 27 2023  9:11PM  

## 2023-04-10 NOTE — H&P ADULT - NSHPPHYSICALEXAM_GEN_ALL_CORE
PHYSICAL EXAM  VITALS  T(C): 36.6 (04-10-23 @ 18:08), Max: 37.1 (04-09-23 @ 20:15)  HR: 89 (04-10-23 @ 18:08) (71 - 94)  BP: 114/71 (04-10-23 @ 18:08) (99/85 - 116/65)  RR: 20 (04-10-23 @ 18:08) (18 - 20)  SpO2: 95% (04-10-23 @ 18:08) (95% - 100%)    Gen - NAD, Comfortable  HEENT - NCAT, EOMI, MMM, + nasal cannula  Neck - Supple, No limited ROM  Pulm - decreased breathe sounds  Cardiovascular - RRR, S1S2  Chest - + pleurex drain to right anterior chest wall    Abdomen - Soft, NT/ND, +BS  Extremities - No Cyanosis, no clubbing, no edema, no calf tenderness  Neuro-     Cognitive - awake, alert, oriented x 3.  Able to follow command     Communication - Fluent     Attention: Intact     Cranial Nerves - CN 2-12 intact.     Motor -                     LEFT    UE - 4/5                    RIGHT UE - 4/5                    LEFT    LE - 4/5                    RIGHT LE - 4/5        Sensory - Intact  to LT      Tone - normal  Psychiatric - Mood stable, Affect WNL  Skin:  all skin intact

## 2023-04-10 NOTE — PROGRESS NOTE ADULT - PROBLEM SELECTOR PROBLEM 1
Recurrent pleural effusion

## 2023-04-10 NOTE — PROGRESS NOTE ADULT - PROBLEM SELECTOR PLAN 5
DVT and gI PPX           D/C planing when clear by oncology team
DVT ppx: SCD for now,  Diet: Reg Diet  Dispo: admitted
DVT and gI PPX           D/C planing when clear by oncology team
DVT and gI PPX           D/C planing when clear by oncology team
DVT ppx: SCD for now,  Diet: Reg Diet  Dispo: admitted
DVT ppx: SCD for now,  Diet: Reg Diet  Dispo: admitted
DVT and gI PPX           D/C planing when clear by oncology team
DVT and gI PPX           D/C planing when clear by oncology team
DVT ppx: SCD for now,  Diet: Reg Diet  Dispo: admitted
DVT ppx: SCD for now,  Diet: Reg Diet  Dispo: admitted
DVT and gI PPX           D/C planing when clear by oncology team

## 2023-04-10 NOTE — H&P ADULT - NSHPLABSRESULTS_GEN_ALL_CORE
RECENT LABS/IMAGING                        8.3    8.98  )-----------( 239      ( 10 Apr 2023 05:59 )             27.3     04-10    140  |  102  |  8   ----------------------------<  101<H>  4.0   |  31  |  0.42<L>    Ca    8.6      10 Apr 2023 05:59  Phos  2.3     04-10  Mg     1.70     04-10      X-ray Chest 1 View- PORTABLE-Urgent (Xray Chest 1 View- PORTABLE-Urgent .) (04.09.23 @ 20:48) >    Impression:    No significant interval changes allowing for different positioning andtechnique.  There is no significant change in the right pleural effusion with Pleurx   catheter in place and adjacent airspace disease. Scattered patchy   airspace disease is seen on the left.    CT Chest No Cont (04.06.23 @ 10:24)     IMPRESSION:  Compared to 3/29/2023, similar loculated, exudative right pleural   effusion with slightly decreased pleural air component and Pleurx   catheter in place. Question malignant effusion in the setting of reported   lymphoma, irregular septal thickening, nodularity and mediastinal pleural   involvement.  Stable multistation thoracic lymphadenopathy.

## 2023-04-10 NOTE — PROGRESS NOTE ADULT - SUBJECTIVE AND OBJECTIVE BOX
Patient is a 82y old  Male who presents with a chief complaint of pl effusion (27 Mar 2023 10:54)      HPI:  82 year old male with PMH of AFIB, HTN and 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 Patient is poor historian, history obtained by son Francisco at bedside. States that  patient has been recovering wellat  rehab. States patient was able to walk over 150 ft while at rehab. States that suddenly on Friday patient started to develop acute SOB and increased WOB. Imaging was done at  rehab showing worsening pleural effusions EMS was called and patient was noted to be tachypneic to 30s and placed on 10L NRB.        Recently admitted from 3/15-3/24 for worsening SOB was found to have  significant for a large R pleural effusion with mediastinal and hilar lymphadenopathy, and 3L of fluid was drained. Pleural fluid cultures and blood cultures resulted negative.  Suspicious of underlying malignancy secondary to lymphadenopathy and persistent leukocytosis, Heme/Onc was consulted and recommended transfer to [facility] on 2/24/23 for further workup. On 2/28 he underwent an ultrasound guided biopsy of his R supraclavicular mass/lymph node, which later resulted as classic Hodgkin's lymphoma.  On 3/3, he underwent a R thoracotomy/VATS with Pleurex placement. Left chest wall mediport was placed and received  first cycle of chemotherapy on 3/10. (25 Mar 2023 16:44)      REVIEW OF SYSTEMS  weakness    PAST MEDICAL & SURGICAL HISTORY   Atrial fibrillation  Hypertension  Urinary retention  No significant past surgical history    CURRENT FUNCTIONAL STATUS  4/7 Therapeutic Interventions      Sit-Stand Transfer Training  Sit-to-Stand Transfer Training Rehab Potential: good, to achieve stated therapy goals  Sit-to-Stand Transfer Training Symptoms Noted During/After Treatment: none  Transfer Training Sit-to-Stand Transfer: minimum assist (75% patient effort);  1 person assist;  verbal cues;  weight-bearing as tolerated   rolling walker  Transfer Training Stand-to-Sit Transfer: minimum assist (75% patient effort);  1 person assist;  weight-bearing as tolerated   rolling walker;  verbal cues  Sit-to-Stand Transfer Training Transfer Safety Analysis: decreased step length;  decreased balance;  decreased strength;  decreased flexibility;  rolling walker    Gait Training  Gait Training Rehab Potential: good, to achieve stated therapy goals  Gait Training Symptoms Noted During/After Treatment: none  Gait Training: rolling walker;  weight-bearing as tolerated   25 feet;  1 person assist;  verbal cues;  minimum assist (75% patient effort)  Gait Analysis: 2-point gait   decreased stride length;  crouch;  decreased isabella;  increased time in double stance;  decreased hip/knee flexion;  impaired balance;  decreased strength;  decreased flexibility;  impaired coordination;  impaired postural control;  25 feet;  rolling walker  Gait Number of Times:: x 1        RECENT LABS/IMAGING  CBC Full  -  ( 10 Apr 2023 05:59 )  WBC Count : 8.98 K/uL  RBC Count : 2.79 M/uL  Hemoglobin : 8.3 g/dL  Hematocrit : 27.3 %  Platelet Count - Automated : 239 K/uL  Mean Cell Volume : 97.8 fL  Mean Cell Hemoglobin : 29.7 pg  Mean Cell Hemoglobin Concentration : 30.4 gm/dL  Auto Neutrophil # : 5.36 K/uL  Auto Lymphocyte # : 1.58 K/uL  Auto Monocyte # : 1.35 K/uL  Auto Eosinophil # : 0.57 K/uL  Auto Basophil # : 0.06 K/uL  Auto Neutrophil % : 59.7 %  Auto Lymphocyte % : 17.6 %  Auto Monocyte % : 15.0 %  Auto Eosinophil % : 6.3 %  Auto Basophil % : 0.7 %    04-10    140  |  102  |  8   ----------------------------<  101<H>  4.0   |  31  |  0.42<L>    Ca    8.6      10 Apr 2023 05:59  Phos  2.3     04-10  Mg     1.70     04-10          VITALS  T(C): 36.5 (04-10-23 @ 12:04), Max: 37.1 (04-09-23 @ 20:15)  HR: 71 (04-10-23 @ 12:04) (71 - 94)  BP: 100/61 (04-10-23 @ 12:04) (99/85 - 116/65)  RR: 18 (04-10-23 @ 12:04) (17 - 19)  SpO2: 100% (04-10-23 @ 12:04) (96% - 100%)  Wt(kg): --    ALLERGIES  pertussis vaccines (Rash)  shellfish (Rash)      MEDICATIONS   acetaminophen     Tablet .. 650 milliGRAM(s) Oral every 6 hours PRN  albuterol   0.5% 2.5 milliGRAM(s) Nebulizer every 6 hours  atenolol  Tablet 12.5 milliGRAM(s) Oral daily  diphenhydrAMINE Injectable 50 milliGRAM(s) IV Push every 6 hours PRN  enoxaparin Injectable 40 milliGRAM(s) SubCutaneous every 24 hours  escitalopram 10 milliGRAM(s) Oral daily  famotidine    Tablet 20 milliGRAM(s) Oral daily  hydrocortisone sodium succinate Injectable 100 milliGRAM(s) IV Push once PRN  magnesium oxide 400 milliGRAM(s) Oral three times a day with meals  polyethylene glycol 3350 17 Gram(s) Oral daily  senna 2 Tablet(s) Oral at bedtime  simvastatin 40 milliGRAM(s) Oral at bedtime  traZODone 25 milliGRAM(s) Oral at bedtime      ----------------------------------------------------------------------------------------  PHYSICAL EXAM  Constitutional - NAD sitting in chair  HEENT - NCAT, EOMI   + nasal cannula  Chest - + pleurex capped  Cardiovascular - RRR, S1S2   Abdomen -  Soft, NTND  Extremities - No C/C/E, No calf tenderness   Neurologic Exam -                    Cognitive - Awake, Alert      Communication - Fluent, No dysarthria     Cranial Nerves - CN 2-12 intact     Motor -                      LEFT    UE - 4/5                    RIGHT UE - 4/5                    LEFT    LE -  4/5                    RIGHT LE - 4/5        Sensory - Intact to LT      Balance - WNL Static  Psychiatric - Mood stable, Affect WNL  ----------------------------------------------------------------------------------------  ASSESSMENT/PLAN   81 yo m with hodgkin lymphoma admitted from acute rehab, with pleural effusions and SOB  s/p 2nd chemo during admission, now on hold  diet: regular  plummer  continue bedside PT and OT   DVT PPX - lovenox- eliquis held due to contraindication  Rehab -recommend acute rehab when medically cleared, patient can tolerate 3 hours per day of therapy with medical supervision.

## 2023-04-10 NOTE — PROGRESS NOTE ADULT - ASSESSMENT
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 Patient is poor historian, history obtained by son Francisco at bedside. States that  patient has been recovering wellat  rehab. States patient was able to walk over 150 ft while at rehab. States that suddenly on Friday patient started to develop acute SOB and increased WOB. Imaging was done at  rehab showing worsening pleural effusions EMS was called and patient was noted to be tachypneic to 30s and placed on 10L NRB.    Recently admitted from 3/15-3/24 for worsening SOB was found to have  significant for a large R pleural effusion with mediastinal and hilar lymphadenopathy, and 3L of fluid was drained. Pleural fluid cultures and blood cultures resulted negative.  Suspicious of underlying malignancy secondary to lymphadenopathy and persistent leukocytosis, Heme/Onc was consulted and recommended transfer to [facility] on 2/24/23 for further workup. On 2/28 he underwent an ultrasound guided biopsy of his R supraclavicular mass/lymph node, which later resulted as classic Hodgkin's lymphoma.  On 3/3, he underwent a R thoracotomy/VATS with Pleurex placement. Left chest wall mediport was placed and received  first cycle of chemotherapy on 3/10. (25 Mar 2023 16:44)    no wpulm called:  pt has chest tube on rigth side:  has bloody secretions  he looks comfortable:  no sob :      right sided pl effusion:  A fibrillation:   HTN:  Urinary retention     3/27:  right sided pl effusion:  [id number]:  blood pl fluid  ; on ac;  cts surgery to see:  chest xray today looks pretty good to me: not hypercarbic and acidotic on VBG   A fibrillation: off ac at t his time  HTN:  blood pressure is controlled  Urinary retention  ; has chr plummer: :    marah pmd    3/28:    right sided pl effusion:  has lymphoma:  blood pl fluid  ; on ac;  cts surgery to see:  chest xray today looks pretty good to me: not hypercarbic and acidotic on VBG   A fibrillation: off ac at t his time: no tpa done as the pl fluid is very blood:   HTN:  blood pressure is controlled  Urinary retention  ; has chr plummer: :    marah pmd      3/29:    right sided pl effusion:  has lymphoma:  blood pl fluid  ; on ac;  cts surgery to see:  chest xray today looks pretty good to me: not hypercarbic and acidotic on VBG : his wbc is increased today  : ? etiology : ct scan chest awaited:  check procal as well as monitor for fever:   A fibrillation: off ac at t his time: no tpa done as the pl fluid is very blood:   HTN:  blood pressure is controlled  Urinary retention  ; has chr plummer: :    marah pmd      3/30:    right sided pl effusion:  has lymphoma:  blood pl fluid  ; off ac;  rpt ct chest noted from last night:  has mild hemothorax and pneumothorax too ; with alexey effusion; His WBC is high  but he has no fever: Slight jump in his procal : but he doesnto look septic to me ? could it be reactive:  he does have underlying lymphoma:   A fibrillation: off ac at t his time: no tpa done as the pl fluid is very bloody - and now he will be off ac:    HTN:  blood pressure is controlled  Urinary retention  ; has chr plummer: :    marah cts      3/31:    right sided pl effusion:  has lymphoma:  blood pl fluid  ; off ac;  rpt ct chest noted from last night:  has mild hemothorax and pneumothorax too ; with alexey effusion; His WBC is high  but he has no fever: Slight jump in his procal : but he doesnto look septic to me ? could it be reactive:  he does have underlying lymphoma: ct scan pan body yesterday revealed *  Right pleural chest tube in place. Loculated right hemothorax, decreased from the prior chest CT. Trace right pneumothorax, likely  related to the presence of the chest tube. Redemonstrated hyperattenuation centered along the right pleural space  and along the mediastinum, which could reflect residual loculated  hemothorax but is again questioned to represent neoplastic pleural  thickening. Continued follow-up to ensure resolution is recommended.  Peripheral reticular opacities and scattered nodular opacities in the  bilateral lungs, which appear similar to prior imaging and may be  infectious/inflammatory or neoplastic in etiology. Continued follow-up is  recommended. Intermittent opacification of the right lower and middle lobe airways, which may reflect mucoid impaction. *  Mediastinal and hilar lymphadenopathy, overall similar to mildly decreased in size since 3/10/202 Perivesicular fat stranding. Cystitis is considered. Recommend  correlation with urinalysis Unchanged penetrating ulcer of infrarenal abdominalaorta.    A fibrillation: off ac at t his time: no tpa done as the pl fluid is very bloody - and now he will be off ac:    HTN:  blood pressure is controlled  Urinary retention  ; has chr plummer: :      4/1 Awake and responding appropriately. CT management as per thoracic    4/3;    right sided pl effusion:  has lymphoma:  blood pl fluid  ; off ac;  rpt ct chest noted from last night:  has mild hemothorax and pneumothorax too ; with alexey effusion; His WBC is high  but he has no fever: Slight jump in his procal : but he doesnto look septic to me ? could it be reactive:  he does have underlying lymphoma: ct scan pan body yesterday revealed *  Right pleural chest tube in place. Loculated right hemothorax, decreased from the prior chest CT. Trace right pneumothorax, likely  related to the presence of the chest tube. Redemonstrated hyperattenuation centered along the right pleural space  and along the mediastinum, which could reflect residual loculated  hemothorax but is again questioned to represent neoplastic pleural  thickening. Continued follow-up to ensure resolution is recommended.  Peripheral reticular opacities and scattered nodular opacities in the  bilateral lungs, which appear similar to prior imaging and may be  infectious/inflammatory or neoplastic in etiology. Continued follow-up is  recommended. Intermittent opacification of the right lower and middle lobe airways, which may reflect mucoid impaction. *  Mediastinal and hilar lymphadenopathy, overall similar to mildly decreased in size since 3/10/202 Perivesicular fat stranding.:    he is getting chemo per oncology  : for Hodgkins lymphoma: abgin am      A fibrillation: off ac at t his time: no tpa done as the pl fluid is very bloody - and now he will be off ac:    HTN:  blood pressure is controlled  Urinary retention  ; has chr plummer: ::    dw acp       4/4:    right sided pl effusion:  has lymphoma:  blood pl fluid  ; off ac;  rpt ct chest noted from last night:  has mild hemothorax and pneumothorax too ; with alexey effusion; His WBC is high  but he has no fever: Slight jump in his procal : but he doesnto look septic to me ? could it be reactive:  he does have underlying lymphoma: ct scan pan body yesterday revealed *  Right pleural chest tube in place. Loculated right hemothorax, decreased from the prior chest CT. Trace right pneumothorax, likely  related to the presence of the chest tube. Redemonstrated hyperattenuation centered along the right pleural space  and along the mediastinum, which could reflect residual loculated  hemothorax but is again questioned to represent neoplastic pleural  thickening. Continued follow-up to ensure resolution is recommended.  Peripheral reticular opacities and scattered nodular opacities in the  bilateral lungs, which appear similar to prior imaging and may be  infectious/inflammatory or neoplastic in etiology. Continued follow-up is  recommended. Intermittent opacification of the right lower and middle lobe airways, which may reflect mucoid impaction. *  Mediastinal and hilar lymphadenopathy, overall similar to mildly decreased in size since 3/10/202 Perivesicular fat stranding.:  cxr some small effusion on the left side:  small loculated pl effusion on rigth side;     he is getting chemo per oncology  : for Hodgkins lymphoma: abgin am      A fibrillation: off ac at t his time: no tpa done as the pl fluid is very bloody - and now he will be off ac:    HTN:  blood pressure is controlled  Urinary retention  ; has chr plummer: ::    dw acp     4/5:  right sided pl effusion:  has lymphoma:  blood pl fluid  ; off ac;  rpt ct chest noted from last night:  has mild hemothorax and pneumothorax too ; with alexey effusion; His WBC is high  but he has no fever: Slight jump in his procal : but he doesnto look septic to me ? could it be reactive:  he does have underlying lymphoma: ct scan pan body yesterday revealed *  Right pleural chest tube in place. Loculated right hemothorax, decreased from the prior chest CT. Trace right pneumothorax, likely  related to the presence of the chest tube. Redemonstrated hyperattenuation centered along the right pleural space  and along the mediastinum, which could reflect residual loculated  hemothorax but is again questioned to represent neoplastic pleural  thickening. Continued follow-up to ensure resolution is recommended.  Peripheral reticular opacities and scattered nodular opacities in the  bilateral lungs, which appear similar to prior imaging and may be  infectious/inflammatory or neoplastic in etiology. Continued follow-up is  recommended. Intermittent opacification of the right lower and middle lobe airways, which may reflect mucoid impaction. *  Mediastinal and hilar lymphadenopathy, overall similar to mildly decreased in size since 3/10/202 Perivesicular fat stranding.:  cxr some small effusion on the left side:  small loculated pl effusion on rigth side; he is awaitng for new ct scan chest today    he is getting chemo per oncology  : for Hodgkins lymphoma: ABG with normal PH with high co2 and reasonable oxygenation;   A fibrillation: off ac at t his time: no tpa done as the pl fluid is very bloody - and now he will be off ac:  has hemopthorax  HTN:  blood pressure is controlled  Urinary retention  ; has chr plummer: ::    marah acp   4/6:    right sided pl effusion: has classical lymphoma:  requiring 3 L of oxygen : repeat ct scan chest noted:  has less effusion with less air and seems to have pleural involvement too: ABG two days ago was reasonable:  ph was normal and has retention of pco2: no need for bipap   A fibrillation: off ac at t his time: no tpa done as the pl fluid is very bloody - and now he will be off ac:  has hemopthorax : pleural effusion is less on repeat ct scan chest  ;   HTN:  blood pressure is controlled  Urinary retention  ; has chr plummer: ::    marah acp       4/7:    right sided pl effusion: has classical lymphoma:  requiring 3 L of oxygen : repeat ct scan chest noted:  has less effusion with less air and seems to have pleural involvement too: ABG two days ago was reasonable:  ph was normal and has retention of pco2: no need for bipap : he seems to be doing  ok : no sob:  no wheezing:    A fibrillation: off ac at t his time: no tpa done as the pl fluid is very bloody - and now he will be off ac:  has hemothorax : pleural effusion is less on repeat ct scan chest  ;   HTN:  blood pressure is controlled  Urinary retention  ; has chr plummer: ::    marah acp  : seems pretty weak :     4/8:    right sided pl effusion: has classical lymphoma:  requiring 3 L of oxygen : repeat ct scan chest noted:  has less effusion with less air and seems to have pleural involvement too: ABG two days ago was reasonable:  ph was normal and has retention of pco2: no need for bipap : he seems to be doing  ok : no sob:  no wheezing:  doing same:  dw son at bedside:    A fibrillation: off ac at t his time: no tpa done as the pl fluid is very bloody - and now he will be off ac:  has hemopthorax : pleural effusion is less on repeat ct scan chest  ;   HTN:  blood pressure is controlled  Urinary retention  ; has chr plummer: ::    marah acp  : seems pretty weak :  now awaiting dc to rehab     4/9:    right sided pl effusion: has classical lymphoma:  requiring 3 L of oxygen : repeat ct scan chest noted:  has less effusion with less air and seems to have pleural involvement too: ABG two  was reasonable:  ph was normal and has retention of pco2: no need for bipap : he seems to be doing  ok : no sob:  no wheezing:  doing same:  dw son at bedside:  cxr yesterday: Low lung volumes,  Right Pleurx catheter remains in place. No pneumothorax. Slightly  decreased right hemithorax opacification. Persistent lateral right chest  wall pleural thickening. Nonspecific focal indistinct rounded opacity in  right lung base. No gross left pleural effusion. Slightly hazy increased bilateral lung markings could be due in part to  crowding from underinflation and/or mild congestion/edema. it was probably present before also but was obscured with effusion and ? atelectasis   A fibrillation: off ac at t his time: no tpa done as the pl fluid is very bloody - and now he will be off ac:  has hemothorax : pleural effusion is less on repeat ct scan chest  ;   HTN:  blood pressure is controlled  Urinary retention  ; has chr plummer: ::    dw acp  : seems pretty weak :  now awaiting dc to rehab :    dw son Francisco at bedside:     4/10:    right sided pl effusion: has classical lymphoma:  requiring 3 L of oxygen : repeat ct scan chest noted:  has less effusion with less air and seems to have pleural involvement too: ABG two  was reasonable:  ph was normal and has retention of pco2: no need for bipap : he seems to be doing  ok : no sob:  no wheezing:  doing same:  dw son at bedside:  cxr yesterday: Low lung volumes,  Right Pleurx catheter remains in place. No pneumothorax. Slightly  decreased right hemithorax opacification. Persistent lateral right chest  wall pleural thickening. Nonspecific focal indistinct rounded opacity in  right lung base. No gross left pleural effusion. Slightly hazy increased bilateral lung markings could be due in part to  crowding from underinflation and/or mild congestion/edema. it was probably present before also but was obscured with effusion and ? atelectasis ; seems same to me today  : for dc today  : can repeat chest xray at [facility] in a few days time ? 7 days    A fibrillation: off ac at t his time: no tpa done as the pl fluid is very bloody - and now he will be off ac:  has hemothorax : pleural effusion is less on repeat ct scan chest  ;   HTN:  blood pressure is controlled  Urinary retention  ; has chr plummer: ::    marah acp  : seems pretty weak :   dc to rehab :

## 2023-04-10 NOTE — PROGRESS NOTE ADULT - SUBJECTIVE AND OBJECTIVE BOX
Name of Patient : ARIK DUMONT  MRN: [id number]  Date of visit: 04-10-23       Subjective: Patient seen and examined. No new events except as noted.   Doing okay     REVIEW OF SYSTEMS:    CONSTITUTIONAL: No weakness, fevers or chills  EYES/ENT: No visual changes;  No vertigo or throat pain   NECK: No pain or stiffness  RESPIRATORY: No cough, wheezing, hemoptysis; No shortness of breath  CARDIOVASCULAR: No chest pain or palpitations  GASTROINTESTINAL: No abdominal or epigastric pain. No nausea, vomiting, or hematemesis; No diarrhea or constipation. No melena or hematochezia.  GENITOURINARY: No dysuria, frequency or hematuria  NEUROLOGICAL: No numbness or weakness  SKIN: No itching, burning, rashes, or lesions   All other review of systems is negative unless indicated above.    MEDICATIONS:  MEDICATIONS  (STANDING):  albuterol   0.5% 2.5 milliGRAM(s) Nebulizer every 6 hours  atenolol  Tablet 12.5 milliGRAM(s) Oral daily  enoxaparin Injectable 40 milliGRAM(s) SubCutaneous every 24 hours  escitalopram 10 milliGRAM(s) Oral daily  famotidine    Tablet 20 milliGRAM(s) Oral daily  magnesium oxide 400 milliGRAM(s) Oral three times a day with meals  polyethylene glycol 3350 17 Gram(s) Oral daily  senna 2 Tablet(s) Oral at bedtime  simvastatin 40 milliGRAM(s) Oral at bedtime  traZODone 25 milliGRAM(s) Oral at bedtime      PHYSICAL EXAM:  T(C): 37.3 (04-10-23 @ 20:25), Max: 37.3 (04-10-23 @ 20:25)  HR: 82 (04-10-23 @ 20:25) (71 - 89)  BP: 117/70 (04-10-23 @ 20:25) (100/61 - 117/70)  RR: 18 (04-10-23 @ 20:25) (18 - 20)  SpO2: 95% (04-10-23 @ 20:25) (95% - 100%)  Wt(kg): --  I&O's Summary    09 Apr 2023 07:01  -  10 Apr 2023 07:00  --------------------------------------------------------  IN: 200 mL / OUT: 1150 mL / NET: -950 mL    10 Apr 2023 07:01  -  10 Apr 2023 21:46  --------------------------------------------------------  IN: 0 mL / OUT: 1000 mL / NET: -1000 mL      Height (cm): 177.8 (04-10 @ 18:08)  Weight (kg): 83.9 (04-10 @ 18:08)  BMI (kg/m2): 26.5 (04-10 @ 18:08)  BSA (m2): 2.02 (04-10 @ 18:08)    Appearance: Normal	  HEENT:  PERRLA   Lymphatic: No lymphadenopathy   Cardiovascular: Normal S1 S2, no JVD  Respiratory: normal effort , clear  Gastrointestinal:  Soft, Non-tender  Skin: No rashes,  warm to touch  Psychiatry:  Mood & affect appropriate  Musculuskeletal: No edema    recent labs, Imaging and EKGs personally reviewed       04-09-23 @ 07:01  -  04-10-23 @ 07:00  --------------------------------------------------------  IN: 200 mL / OUT: 1150 mL / NET: -950 mL    04-10-23 @ 07:01  -  04-10-23 @ 21:46  --------------------------------------------------------  IN: 0 mL / OUT: 1000 mL / NET: -1000 mL                            8.3    8.98  )-----------( 239      ( 10 Apr 2023 05:59 )             27.3               04-10    140  |  102  |  8   ----------------------------<  101<H>  4.0   |  31  |  0.42<L>    Ca    8.6      10 Apr 2023 05:59  Phos  2.3     04-10  Mg     1.70     04-10

## 2023-04-10 NOTE — PROGRESS NOTE ADULT - SUBJECTIVE AND OBJECTIVE BOX
Date of Service: 04-10-23 @ 16:06    Patient is a 82y old  Male who presents with a chief complaint of pl effusion (27 Mar 2023 10:54)      Any change in ROS: seems OK ON 2 l OF OXYGEN  : FOR DC TODAY      MEDICATIONS  (STANDING):  albuterol   0.5% 2.5 milliGRAM(s) Nebulizer every 6 hours  atenolol  Tablet 12.5 milliGRAM(s) Oral daily  enoxaparin Injectable 40 milliGRAM(s) SubCutaneous every 24 hours  escitalopram 10 milliGRAM(s) Oral daily  famotidine    Tablet 20 milliGRAM(s) Oral daily  magnesium oxide 400 milliGRAM(s) Oral three times a day with meals  polyethylene glycol 3350 17 Gram(s) Oral daily  senna 2 Tablet(s) Oral at bedtime  simvastatin 40 milliGRAM(s) Oral at bedtime  traZODone 25 milliGRAM(s) Oral at bedtime    MEDICATIONS  (PRN):  acetaminophen     Tablet .. 650 milliGRAM(s) Oral every 6 hours PRN Temp greater or equal to 38C (100.4F), Mild Pain (1 - 3)  diphenhydrAMINE Injectable 50 milliGRAM(s) IV Push every 6 hours PRN Rash and/or Itching  hydrocortisone sodium succinate Injectable 100 milliGRAM(s) IV Push once PRN Infusion Reaction    Vital Signs Last 24 Hrs  T(C): 36.5 (10 Apr 2023 12:04), Max: 37.1 (09 Apr 2023 20:15)  T(F): 97.7 (10 Apr 2023 12:04), Max: 98.7 (09 Apr 2023 20:15)  HR: 71 (10 Apr 2023 12:04) (71 - 94)  BP: 100/61 (10 Apr 2023 12:04) (99/85 - 116/65)  BP(mean): --  RR: 18 (10 Apr 2023 12:04) (17 - 19)  SpO2: 100% (10 Apr 2023 12:04) (96% - 100%)    Parameters below as of 10 Apr 2023 12:04  Patient On (Oxygen Delivery Method): nasal cannula  O2 Flow (L/min): 3      I&O's Summary    09 Apr 2023 07:01  -  10 Apr 2023 07:00  --------------------------------------------------------  IN: 200 mL / OUT: 1150 mL / NET: -950 mL          Physical Exam:   GENERAL: Obese  HEENT: CHUCK/   Atraumatic, Normocephalic  ENMT: No tonsillar erythema, exudates, or enlargement; Moist mucous membranes, Good dentition, No lesions  NECK: Supple, No JVD, Normal thyroid  CHEST/LUNG: decreased air entry rigth base;   CVS: Regular rate and rhythm; No murmurs, rubs, or gallops  GI: : Soft, Nontender, Nondistended; Bowel sounds present  NERVOUS SYSTEM:  Alert & Oriented X3  EXTREMITIES: + edema  LYMPH: No lymphadenopathy noted  SKIN: No rashes or lesions  ENDOCRINOLOGY: No Thyromegaly  PSYCH: Appropriate    Labs:                              8.3    8.98  )-----------( 239      ( 10 Apr 2023 05:59 )             27.3                         8.3    9.99  )-----------( 241      ( 09 Apr 2023 06:19 )             27.7                         8.4    10.08 )-----------( 205      ( 08 Apr 2023 07:00 )             28.1                         8.7    13.68 )-----------( 242      ( 07 Apr 2023 06:00 )             29.4     04-10    140  |  102  |  8   ----------------------------<  101<H>  4.0   |  31  |  0.42<L>  04-09    137  |  98  |  10  ----------------------------<  98  4.0   |  30  |  0.46<L>  04-08    137  |  98  |  9   ----------------------------<  107<H>  4.1   |  34<H>  |  0.42<L>  04-07    138  |  98  |  11  ----------------------------<  109<H>  4.2   |  33<H>  |  0.43<L>    Ca    8.6      10 Apr 2023 05:59  Ca    8.6      09 Apr 2023 06:19  Phos  2.3     04-10  Phos  2.4     04-09  Mg     1.70     04-10  Mg     1.80     04-09      CAPILLARY BLOOD GLUCOSE          rad< from: Xray Chest 1 View- PORTABLE-Urgent (Xray Chest 1 View- PORTABLE-Urgent .) (04.09.23 @ 20:48) >    COMPARISON: 8/20/2023    FINDINGS:  Heart/Vascular:The heart size, mediastinum, hilum and aorta are   enlarged, stable  Pulmonary: Midline trachea. There is no significant change in the right   pleural effusion with Pleurx catheter in place and adjacent airspace   disease. Scattered patchy airspace disease is seen on the left. There is   no pneumothorax.    Bones: There is no fracture.  Lines and catheter: Left Port-A-Cath unchanged in position.    Impression:    No significant interval changes allowing for different positioning and   technique.    There is no significant change in the right pleural effusion with Pleurx   catheter in place and adjacent airspace disease. Scattered patchy   airspace disease is seen on the left.    --- End of Report ---            ROSIE DANIEL DO; Attending Radiologist  This document has been electronically signed. Apr 10 2023 11:48AM    < end of copied text >              RECENT CULTURES:        RESPIRATORY CULTURES:          Studies  Chest X-RAY  CT SCAN Chest   Venous Dopplers: LE:   CT Abdomen  Others

## 2023-04-10 NOTE — PROGRESS NOTE ADULT - SUBJECTIVE AND OBJECTIVE BOX
Subjective: Patient seen and examined. No new events except as noted.     SUBJECTIVE/ROS:  Pt seen and examined early this am       MEDICATIONS:  MEDICATIONS  (STANDING):  albuterol   0.5% 2.5 milliGRAM(s) Nebulizer every 6 hours  atenolol  Tablet 12.5 milliGRAM(s) Oral daily  enoxaparin Injectable 40 milliGRAM(s) SubCutaneous every 24 hours  escitalopram 10 milliGRAM(s) Oral daily  famotidine    Tablet 20 milliGRAM(s) Oral daily  magnesium oxide 400 milliGRAM(s) Oral three times a day with meals  polyethylene glycol 3350 17 Gram(s) Oral daily  senna 2 Tablet(s) Oral at bedtime  simvastatin 40 milliGRAM(s) Oral at bedtime  traZODone 25 milliGRAM(s) Oral at bedtime      PHYSICAL EXAM:  T(C): 36.9 (04-10-23 @ 06:00), Max: 37.1 (04-09-23 @ 20:15)  HR: 89 (04-10-23 @ 06:00) (61 - 94)  BP: 116/65 (04-10-23 @ 06:00) (99/85 - 116/65)  RR: 19 (04-10-23 @ 06:00) (17 - 19)  SpO2: 99% (04-10-23 @ 06:00) (96% - 100%)  Wt(kg): --  I&O's Summary    09 Apr 2023 07:01  -  10 Apr 2023 07:00  --------------------------------------------------------  IN: 200 mL / OUT: 1150 mL / NET: -950 mL            JVP: Normal  Neck: supple  Lung: clear   CV: S1 S2 , Murmur:  Abd: soft  Ext: No edema  neuro: Awake / alert  Psych: flat affect  Skin: normal``    LABS/DATA:    CARDIAC MARKERS:                                8.3    8.98  )-----------( 239      ( 10 Apr 2023 05:59 )             27.3     04-10    140  |  102  |  8   ----------------------------<  101<H>  4.0   |  31  |  0.42<L>    Ca    8.6      10 Apr 2023 05:59  Phos  2.3     04-10  Mg     1.70     04-10      proBNP:   Lipid Profile:   HgA1c:   TSH:     TELE:  EKG:

## 2023-04-10 NOTE — DISCHARGE NOTE NURSING/CASE MANAGEMENT/SOCIAL WORK - NSDCPEFALRISK_GEN_ALL_CORE
For information on Fall & Injury Prevention, visit: [email or web address] OR  [email or web address] OR  https://www.cdc.gov/steadi/patient.html

## 2023-04-10 NOTE — PROGRESS NOTE ADULT - PROVIDER SPECIALTY LIST ADULT
Cardiology
Cardiology
Pulmonology
Rehab Medicine
Thoracic Surgery
Cardiology
Heme/Onc
Internal Medicine
Pulmonology
Rehab Medicine
Cardiology
Heme/Onc
Heme/Onc
Infectious Disease
Infectious Disease
Pulmonology
Internal Medicine

## 2023-04-10 NOTE — H&P ADULT - ASSESSMENT
ASSESSMENT/PLAN  This is a 82 year old male with PMH of AFIB, HTN and chronic urinary retention (chronic plummer); who presented to [facility] via ambulance on 2/17 with progressively worsening SOB, intubated in the field, found to have a large R pleural effusion with mediastinal and hilar lymphadenopathy s/p thoracentesis (~3L), with persistent leukocytosis and concern for suspicious malignancy was transferred to [facility] on 2/24 for further workup.  He underwent a R supraclavicular mass biopsy on 2/28, significant for classic Hodgkin's lymphoma. On 3/3, he underwent a R thoracotomy/VATS with Pleurex placement. Bone marrow negative for involvement,  (s/p L chest wall mediport placement 3/15). Rehab course c/b worsening pleural effusion requiring acute transfer for drainage. Patient now with gait Instability, ADL impairments and Functional impairments.    #Debility  #SOB/R Pleural effusion  #Classic Hodgkin's Lymphoma  - Gait Instability, ADL impairments and Functional impairments: start Comprehensive Rehab Program of PT/OT  - 3 hours a day, 5 days a week  - S/p R supraclavicular mass biopsy-> significant for Classic Hodgkin's Lymphoma  - S/p R thoacotomy/VATS with Pleurex placement on  3/3  - c/w pleurX drainage  - Bone marrow biopsy negative  - L chest wall mediport placed 3/15  - Cycle #1 of BV (Brentuximad Vedotin) given on 3/10  - Plan for cycle #2 after 3 weeks  -  No plan to receive chemo while in rehab  - Await [facility] referral   - F/u with Dr. Hays outpatient  - Ipratropium-Albuterol every 6 hours     #AFIB  - Eliquis  - Resume on 3/16 (held for L CW mediport on 3/15)    #HTN  - Atenolol     #HLD  - Simvastatin    #Sleep/Mood  - Lexapro  - Xanax PRN   - Melatonin PRN     #Skin  - Skin on admission: right lower back  bone biopsy site redness; right Pleurex  - Pressure injury/Skin: OOB to Chair, PT/OT     #Pain Mgmt   - Oxycodone PRN    #GI/Bowel Mgmt   - Continent c/w Senna, Miralax     #/Bladder Mgmt   - Chronic Plummer   - Flomax  - PVR q8h, CIC>400cc  - F/u with Urology outpt     #FEN   - Diet - Regular + Thins  [DASH]      #Precautions / PROPHYLAXIS:   - Falls, aspiration  - ortho: Weight bearing status: WBAT   - Lungs: Aspiration, Incentive Spirometer   - DVT: Lovenox    #GOC  CODE STATUS: FULL CODE    Outpatient Follow-up (Specialty/Name of physician):    Nathaniel Galvan (MD)  Surgery; Thoracic Surgery  [address] [place], Oncology Building  3rd [place] [address]  Phone: [phone]  Fax: [phone]  Established Patient  Follow Up Time: 2 weeks    UROLOGY  MD Saúl Hamm [facility]  [facility]  Scheduled Appointment: 03/27/2023    MEDICAL PROGNOSIS: GOOD            REHAB POTENTIAL: GOOD             ESTIMATED DISPOSITION: HOME WITH HOME CARE            ELOS: 10-14 Days   EXPECTED THERAPY:     P.T. 1hr/day       O.T. 1hr/day      S.L.P. 1hr/day     P&O Unnecessary     EXP FREQUENCY: 5 days per 7 day period     PRESCREEN COMPARISON:   I have reviewed the prescreen information and I have found no relevant changes between the preadmission screening and my post admission evaluation     RATIONALE FOR INPATIENT ADMISSION - Patient demonstrates the following: (check all that apply)  [X] Medically appropriate for rehabilitation admission  [X] Has attainable rehab goals with an appropriate initial discharge plan  [X] Has rehabilitation potential (expected to make a significant improvement within a reasonable period of time)   [X] Requires close medical management by a rehab physician, rehab nursing care, Hospitalist and comprehensive interdisciplinary team (including PT, OT, & or SLP, Prosthetics and Orthotics)   ASSESSMENT/PLAN  This is a 82 year old male with PMH of AFIB, HTN and chronic urinary retention (chronic plummer); who presented to [facility] via ambulance on 2/17 with progressively worsening SOB, intubated in the field, found to have a large R pleural effusion with mediastinal and hilar lymphadenopathy s/p thoracentesis (~3L), with persistent leukocytosis and concern for suspicious malignancy was transferred to [facility] on 2/24 for further workup.  He underwent a R supraclavicular mass biopsy on 2/28, significant for classic Hodgkin's lymphoma. On 3/3, he underwent a R thoracotomy/VATS with Pleurex placement. Bone marrow negative for involvement,  (s/p L chest wall mediport placement 3/15). Rehab course c/b worsening pleural effusion requiring acute transfer for drainage. Patient now with gait Instability, ADL impairments and Functional impairments.    #Debility  #SOB/R Pleural effusion  #Classic Hodgkin's Lymphoma  - Gait Instability, ADL impairments and Functional impairments: start Comprehensive Rehab Program of PT/OT  - 3 hours a day, 5 days a week  - S/p R supraclavicular mass biopsy-> significant for Classic Hodgkin's Lymphoma  - S/p R thoacotomy/VATS with Pleurex placement on  3/3  - c/w pleurX drainage  - Bone marrow biopsy negative  - L chest wall mediport placed 3/15  - Cycle #1 of BV (Brentuximad Vedotin) given on 3/10  - Plan for cycle #2 after 3 weeks  -  No plan to receive chemo while in rehab  - Await [facility] referral   - F/u with Dr. Hays outpatient  - Ipratropium-Albuterol every 6 hours   - serial CXR for monitoring    #AFIB  - Eliquis  - Resume on 3/16 (held for L CW mediport on 3/15)    #HTN  - Atenolol 12.5mg daily    #HLD  - Simvastatin 40mg daily    #Sleep/Mood  - Lexapro 10mg daily  - Melatonin PRN     #Skin  - Skin on admission: right lower back  bone biopsy site redness; right Pleurex  - Pressure injury/Skin: OOB to Chair, PT/OT     #Pain Mgmt   - Tylenol PRN    #GI/Bowel Mgmt   - c/w Senna, Miralax     #/Bladder Mgmt   - Chronic Plummer   - Flomax  - PVR q8h, CIC>400cc  - F/u with Urology outpt     #FEN   - Diet - Regular + Thins  [DASH]      #Precautions / PROPHYLAXIS:   - Falls, aspiration  - ortho: Weight bearing status: WBAT   - Lungs: Aspiration, Incentive Spirometer   - DVT: Lovenox    #GOC  CODE STATUS: FULL CODE    Outpatient Follow-up (Specialty/Name of physician):    Nathaniel Galvan)  Surgery; Thoracic Surgery  [address] [place] [address], Oncology Building  3rd [place] [address]  Phone: [phone]  Fax: [phone]  Established Patient  Follow Up Time: 2 weeks    UROLOGY  MD Saúl Hamm [facility]  [facility]  Scheduled Appointment: 03/27/2023    MEDICAL PROGNOSIS: GOOD            REHAB POTENTIAL: GOOD             ESTIMATED DISPOSITION: HOME WITH HOME CARE            ELOS: 10-14 Days   EXPECTED THERAPY:     P.T. 1hr/day       O.T. 1hr/day      S.L.P. 1hr/day     P&O Unnecessary     EXP FREQUENCY: 5 days per 7 day period     PRESCREEN COMPARISON:   I have reviewed the prescreen information and I have found no relevant changes between the preadmission screening and my post admission evaluation     RATIONALE FOR INPATIENT ADMISSION - Patient demonstrates the following: (check all that apply)  [X] Medically appropriate for rehabilitation admission  [X] Has attainable rehab goals with an appropriate initial discharge plan  [X] Has rehabilitation potential (expected to make a significant improvement within a reasonable period of time)   [X] Requires close medical management by a rehab physician, rehab nursing care, Hospitalist and comprehensive interdisciplinary team (including PT, OT, & or SLP, Prosthetics and Orthotics)   ASSESSMENT/PLAN  This is a 82 year old male with PMH of AFIB, HTN and chronic urinary retention (chronic plummer); who presented to [facility] via ambulance on 2/17 with progressively worsening SOB, intubated in the field, found to have a large R pleural effusion with mediastinal and hilar lymphadenopathy s/p thoracentesis (~3L), with persistent leukocytosis and concern for suspicious malignancy was transferred to [facility] on 2/24 for further workup.  He underwent a R supraclavicular mass biopsy on 2/28, significant for classic Hodgkin's lymphoma. On 3/3, he underwent a R thoracotomy/VATS with Pleurex placement. Bone marrow negative for involvement,  (s/p L chest wall mediport placement 3/15). Rehab course c/b worsening pleural effusion requiring acute transfer for drainage. Patient now with gait Instability, ADL impairments and Functional impairments.    #Debility  #SOB/R Pleural effusion  #Classic Hodgkin's Lymphoma  - Gait Instability, ADL impairments and Functional impairments: start Comprehensive Rehab Program of PT/OT  - 3 hours a day, 5 days a week  - S/p R supraclavicular mass biopsy-> significant for Classic Hodgkin's Lymphoma  - S/p R thoracotomy/VATS with Pleurex placement on  3/3  - c/w pleurX drainage  - Bone marrow biopsy negative  - L chest wall mediport placed 3/15  - Cycle #1 of BV (Brentuximad Vedotin) given on 3/10/23  - s/p C2 brentuximab 4/3/23  - Plan for cycle #3  on 4/24/23  -  No plan to receive chemo while in rehab  - Await [facility] referral   - F/u with Dr. Hays outpatient  - Ipratropium-Albuterol every 6 hours   - serial CXR for monitoring    #AFIB  - Eliquis on hold    #HTN  - Atenolol 12.5mg daily    #HLD  - Simvastatin 40mg daily    #Sleep/Mood  - Lexapro 10mg daily  - Trazodone 25mg nightly    #Skin  - Skin on admission: right chest wall Pleurex drain site + dressing  - Pressure injury/Skin: OOB to Chair, PT/OT     #Pain Mgmt   - Tylenol PRN    #GI/Bowel Mgmt   - c/w Senna, Miralax     #/Bladder Mgmt   - Chronic Plummer last changed 4/7/23  - F/u with Urology outpt     #FEN   - Diet - Regular + Thins  [DASH]      #Precautions / PROPHYLAXIS:   - Falls, aspiration  - ortho: Weight bearing status: WBAT   - Lungs: Aspiration, Incentive Spirometer   - DVT: Lovenox    #GOC  CODE STATUS: FULL CODE    Outpatient Follow-up (Specialty/Name of physician):    Nathaniel Galvan (MD)  Surgery; Thoracic Surgery  [address] [place] [address], Oncology Building  3rd [place] [address]  Phone: [phone]  Fax: [phone]  Established Patient  Follow Up Time: 2 weeks    UROLOGY  MD Saúl Hamm [facility]  [facility]  Scheduled Appointment: 03/27/2023    MEDICAL PROGNOSIS: GOOD            REHAB POTENTIAL: GOOD             ESTIMATED DISPOSITION: HOME WITH HOME CARE            ELOS: 10-14 Days   EXPECTED THERAPY:     P.T. 1hr/day       O.T. 1hr/day      S.L.P. 1hr/day     P&O Unnecessary     EXP FREQUENCY: 5 days per 7 day period     PRESCREEN COMPARISON:   I have reviewed the prescreen information and I have found no relevant changes between the preadmission screening and my post admission evaluation     RATIONALE FOR INPATIENT ADMISSION - Patient demonstrates the following: (check all that apply)  [X] Medically appropriate for rehabilitation admission  [X] Has attainable rehab goals with an appropriate initial discharge plan  [X] Has rehabilitation potential (expected to make a significant improvement within a reasonable period of time)   [X] Requires close medical management by a rehab physician, rehab nursing care, Hospitalist and comprehensive interdisciplinary team (including PT, OT, & or SLP, Prosthetics and Orthotics)   ASSESSMENT/PLAN  This is a 82 year old male with PMH of AFIB, HTN and chronic urinary retention (chronic plummer); who presented to [facility] via ambulance on 2/17 with progressively worsening SOB, intubated in the field, found to have a large R pleural effusion with mediastinal and hilar lymphadenopathy s/p thoracentesis (~3L), with persistent leukocytosis and concern for suspicious malignancy was transferred to [facility] on 2/24 for further workup.  He underwent a R supraclavicular mass biopsy on 2/28, significant for classic Hodgkin's lymphoma. On 3/3, he underwent a R thoracotomy/VATS with Pleurex placement. Bone marrow negative for involvement,  (s/p L chest wall mediport placement 3/15). Rehab course c/b worsening pleural effusion requiring acute transfer for drainage. Patient now with gait Instability, ADL impairments and Functional impairments.    * Pulmonary consult for dyspnea and increased oxy requirement  * Confirmation of  timing for commencement of AC for A fib treatment, will c/w lovenox ppx for now  * Pleural fluid drain as recommended  * Update to family   * CXR 4/11--No acute changes, Rt effusion with atelectasis, Left mid and lower opacities    #Debility  #SOB/R Pleural effusion  #Classic Hodgkin's Lymphoma  - Gait Instability, ADL impairments and Functional impairments: start Comprehensive Rehab Program of PT/OT  - 3 hours a day, 5 days a week  - S/p R supraclavicular mass biopsy-> significant for Classic Hodgkin's Lymphoma  - S/p R thoracotomy/VATS with Pleurex placement on  3/3  - c/w pleurX drainage  - Bone marrow biopsy negative  - L chest wall mediport placed 3/15  - Cycle #1 of BV (Brentuximad Vedotin) given on 3/10/23  - s/p C2 brentuximab 4/3/23  - Plan for cycle #3  on 4/24/23  -  No plan to receive chemo while in rehab  - Await [facility] referral   - F/u with Dr. Hays outpatient  - Ipratropium-Albuterol every 6 hours   - serial CXR for monitoring    #AFIB  - Eliquis on hold  --Clarify from [facility] cardiothoracic team when this could be recommenced     #HTN  - Atenolol 12.5mg daily    #HLD  - Simvastatin 40mg daily    #Sleep/Mood  - Lexapro 10mg daily  - Trazodone 25mg nightly    #Skin  - Skin --right chest wall Pleurex drain site + dressing  - Pressure injury/Skin: OOB to Chair, PT/OT     #Pain Mgmt   - Tylenol PRN    #GI/Bowel Mgmt   - c/w Senna, Miralax     #/Bladder Mgmt   - Chronic Plummer last changed 4/7/23  - F/u with Urology outpt     #FEN   - Diet - Regular + Thins  [DASH]      #Precautions / PROPHYLAXIS:   - Falls, aspiration  - ortho: Weight bearing status: WBAT   - Lungs: Aspiration, Incentive Spirometer   - DVT: Lovenox    #GOC  CODE STATUS: FULL CODE      Next of kin--(Daughter  [phone]) a physician    Outpatient Follow-up (Specialty/Name of physician):    Nathaniel Galvan)  Surgery; Thoracic Surgery  [address] [place] [address], Oncology [facility]  3rd [place] [address]  Phone: [phone]  Fax: [phone]  Established Patient  Follow Up Time: 2 weeks    UROLOGY  MD Saúl Hamm [facility]  [facility]  Scheduled Appointment: 03/27/2023    MEDICAL PROGNOSIS: GOOD            REHAB POTENTIAL: GOOD             ESTIMATED DISPOSITION: HOME WITH HOME CARE            ELOS: 10-14 Days   EXPECTED THERAPY:     P.T. 1hr/day       O.T. 1hr/day      S.L.P. 1hr/day     P&O Unnecessary     EXP FREQUENCY: 5 days per 7 day period     PRESCREEN COMPARISON:   I have reviewed the prescreen information and I have found no relevant changes between the preadmission screening and my post admission evaluation     RATIONALE FOR INPATIENT ADMISSION - Patient demonstrates the following: (check all that apply)  [X] Medically appropriate for rehabilitation admission  [X] Has attainable rehab goals with an appropriate initial discharge plan  [X] Has rehabilitation potential (expected to make a significant improvement within a reasonable period of time)   [X] Requires close medical management by a rehab physician, rehab nursing care, Hospitalist and comprehensive interdisciplinary team (including PT, OT, & or SLP, Prosthetics and Orthotics)

## 2023-04-11 LAB
ALBUMIN SERPL ELPH-MCNC: 1.8 G/DL — LOW (ref 3.3–5)
ALP SERPL-CCNC: 68 U/L — SIGNIFICANT CHANGE UP (ref 40–120)
ALT FLD-CCNC: 33 U/L — SIGNIFICANT CHANGE UP (ref 10–45)
ANION GAP SERPL CALC-SCNC: 2 MMOL/L — LOW (ref 5–17)
AST SERPL-CCNC: 26 U/L — SIGNIFICANT CHANGE UP (ref 10–40)
BASOPHILS # BLD AUTO: 0.06 K/UL — SIGNIFICANT CHANGE UP (ref 0–0.2)
BASOPHILS NFR BLD AUTO: 0.6 % — SIGNIFICANT CHANGE UP (ref 0–2)
BILIRUB SERPL-MCNC: 0.6 MG/DL — SIGNIFICANT CHANGE UP (ref 0.2–1.2)
BUN SERPL-MCNC: 7 MG/DL — SIGNIFICANT CHANGE UP (ref 7–23)
CALCIUM SERPL-MCNC: 8.5 MG/DL — SIGNIFICANT CHANGE UP (ref 8.4–10.5)
CHLORIDE SERPL-SCNC: 100 MMOL/L — SIGNIFICANT CHANGE UP (ref 96–108)
CO2 SERPL-SCNC: 37 MMOL/L — HIGH (ref 22–31)
CREAT SERPL-MCNC: 0.6 MG/DL — SIGNIFICANT CHANGE UP (ref 0.5–1.3)
EGFR: 96 ML/MIN/1.73M2 — SIGNIFICANT CHANGE UP
EOSINOPHIL # BLD AUTO: 0.65 K/UL — HIGH (ref 0–0.5)
EOSINOPHIL NFR BLD AUTO: 6.3 % — HIGH (ref 0–6)
GLUCOSE SERPL-MCNC: 108 MG/DL — HIGH (ref 70–99)
HCT VFR BLD CALC: 27.6 % — LOW (ref 39–50)
HGB BLD-MCNC: 8.3 G/DL — LOW (ref 13–17)
IMM GRANULOCYTES NFR BLD AUTO: 0.4 % — SIGNIFICANT CHANGE UP (ref 0–0.9)
LYMPHOCYTES # BLD AUTO: 1.74 K/UL — SIGNIFICANT CHANGE UP (ref 1–3.3)
LYMPHOCYTES # BLD AUTO: 16.8 % — SIGNIFICANT CHANGE UP (ref 13–44)
MCHC RBC-ENTMCNC: 29.5 PG — SIGNIFICANT CHANGE UP (ref 27–34)
MCHC RBC-ENTMCNC: 30.1 GM/DL — LOW (ref 32–36)
MCV RBC AUTO: 98.2 FL — SIGNIFICANT CHANGE UP (ref 80–100)
MONOCYTES # BLD AUTO: 1.43 K/UL — HIGH (ref 0–0.9)
MONOCYTES NFR BLD AUTO: 13.8 % — SIGNIFICANT CHANGE UP (ref 2–14)
NEUTROPHILS # BLD AUTO: 6.41 K/UL — SIGNIFICANT CHANGE UP (ref 1.8–7.4)
NEUTROPHILS NFR BLD AUTO: 62.1 % — SIGNIFICANT CHANGE UP (ref 43–77)
NRBC # BLD: 0 /100 WBCS — SIGNIFICANT CHANGE UP (ref 0–0)
PLATELET # BLD AUTO: 268 K/UL — SIGNIFICANT CHANGE UP (ref 150–400)
POTASSIUM SERPL-MCNC: 3.7 MMOL/L — SIGNIFICANT CHANGE UP (ref 3.5–5.3)
POTASSIUM SERPL-SCNC: 3.7 MMOL/L — SIGNIFICANT CHANGE UP (ref 3.5–5.3)
PROT SERPL-MCNC: 6.3 G/DL — SIGNIFICANT CHANGE UP (ref 6–8.3)
RBC # BLD: 2.81 M/UL — LOW (ref 4.2–5.8)
RBC # FLD: 20.6 % — HIGH (ref 10.3–14.5)
SODIUM SERPL-SCNC: 139 MMOL/L — SIGNIFICANT CHANGE UP (ref 135–145)
WBC # BLD: 10.33 K/UL — SIGNIFICANT CHANGE UP (ref 3.8–10.5)
WBC # FLD AUTO: 10.33 K/UL — SIGNIFICANT CHANGE UP (ref 3.8–10.5)

## 2023-04-11 PROCEDURE — 99233 SBSQ HOSP IP/OBS HIGH 50: CPT

## 2023-04-11 PROCEDURE — 71045 X-RAY EXAM CHEST 1 VIEW: CPT | Mod: 26

## 2023-04-11 PROCEDURE — 99223 1ST HOSP IP/OBS HIGH 75: CPT

## 2023-04-11 RX ORDER — SODIUM CHLORIDE 9 MG/ML
4 INJECTION INTRAMUSCULAR; INTRAVENOUS; SUBCUTANEOUS EVERY 12 HOURS
Refills: 0 | Status: DISCONTINUED | OUTPATIENT
Start: 2023-04-11 | End: 2023-04-14

## 2023-04-11 RX ADMIN — SIMVASTATIN 40 MILLIGRAM(S): 20 TABLET, FILM COATED ORAL at 21:12

## 2023-04-11 RX ADMIN — MAGNESIUM OXIDE 400 MG ORAL TABLET 400 MILLIGRAM(S): 241.3 TABLET ORAL at 07:55

## 2023-04-11 RX ADMIN — Medication 1 APPLICATION(S): at 05:09

## 2023-04-11 RX ADMIN — MAGNESIUM OXIDE 400 MG ORAL TABLET 400 MILLIGRAM(S): 241.3 TABLET ORAL at 11:54

## 2023-04-11 RX ADMIN — Medication 25 MILLIGRAM(S): at 21:12

## 2023-04-11 RX ADMIN — FAMOTIDINE 20 MILLIGRAM(S): 10 INJECTION INTRAVENOUS at 11:54

## 2023-04-11 RX ADMIN — ALBUTEROL 2.5 MILLIGRAM(S): 90 AEROSOL, METERED ORAL at 06:24

## 2023-04-11 RX ADMIN — SODIUM CHLORIDE 4 MILLILITER(S): 9 INJECTION INTRAMUSCULAR; INTRAVENOUS; SUBCUTANEOUS at 21:32

## 2023-04-11 RX ADMIN — ENOXAPARIN SODIUM 40 MILLIGRAM(S): 100 INJECTION SUBCUTANEOUS at 21:12

## 2023-04-11 RX ADMIN — ALBUTEROL 2.5 MILLIGRAM(S): 90 AEROSOL, METERED ORAL at 08:57

## 2023-04-11 RX ADMIN — Medication 1 APPLICATION(S): at 17:13

## 2023-04-11 RX ADMIN — ALBUTEROL 2.5 MILLIGRAM(S): 90 AEROSOL, METERED ORAL at 21:14

## 2023-04-11 RX ADMIN — MAGNESIUM OXIDE 400 MG ORAL TABLET 400 MILLIGRAM(S): 241.3 TABLET ORAL at 17:21

## 2023-04-11 RX ADMIN — ESCITALOPRAM OXALATE 10 MILLIGRAM(S): 10 TABLET, FILM COATED ORAL at 11:54

## 2023-04-11 RX ADMIN — ATENOLOL 12.5 MILLIGRAM(S): 25 TABLET ORAL at 05:10

## 2023-04-11 RX ADMIN — ALBUTEROL 2.5 MILLIGRAM(S): 90 AEROSOL, METERED ORAL at 16:09

## 2023-04-11 NOTE — DIETITIAN INITIAL EVALUATION ADULT - OTHER INFO
82 year old male with PMH of AFIB, HTN and chronic urinary retention (chronic plummer); who presented to [facility] via ambulance on 2/17 with progressively worsening SOB, intubated in the field, found to have a large R pleural effusion with mediastinal and hilar lymphadenopathy s/p thoracentesis (~3L), with persistent leukocytosis and concern for suspicious malignancy was transferred to [facility] on 2/24 for further workup.  He underwent a R supraclavicular mass biopsy on 2/28, significant for classic Hodgkin's lymphoma. On 3/3, he underwent a R thoracotomy/VATS with Pleurex placement. Bone marrow negative for involvement,  (s/p L chest wall mediport placement 3/15). Rehab course c/b worsening pleural effusion requiring acute transfer for drainage. Patient now with gait Instability, ADL impairments and Functional impairments.    Pt is on a regular diet, receiving Ensure Compact BID. Will change to Ensure Plus High Protein BID (provides 350 kcal, 20 g protein / serving). Discussed food preferences to optimize PO intake. Pt educated on the importance of adequate nutrition to assist in goals of rehab. Denies nausea, vomiting, diarrhea, constipation. Pt denies chewing/swallowing difficulties. Pt reports UBW ~202 lbs prior to admission.

## 2023-04-11 NOTE — DIETITIAN INITIAL EVALUATION ADULT - PERTINENT MEDS FT
MEDICATIONS  (STANDING):  albuterol    0.083% 2.5 milliGRAM(s) Nebulizer every 6 hours  ammonium lactate 12% Lotion 1 Application(s) Topical two times a day  atenolol  Tablet 12.5 milliGRAM(s) Oral daily  enoxaparin Injectable 40 milliGRAM(s) SubCutaneous every 24 hours  escitalopram 10 milliGRAM(s) Oral daily  famotidine    Tablet 20 milliGRAM(s) Oral daily  magnesium oxide 400 milliGRAM(s) Oral three times a day with meals  polyethylene glycol 3350 17 Gram(s) Oral daily  senna 2 Tablet(s) Oral at bedtime  simvastatin 40 milliGRAM(s) Oral at bedtime  sodium chloride 3%  Inhalation 4 milliLiter(s) Inhalation every 12 hours  traZODone 25 milliGRAM(s) Oral at bedtime    MEDICATIONS  (PRN):  acetaminophen     Tablet .. 650 milliGRAM(s) Oral every 6 hours PRN Temp greater or equal to 38C (100.4F), Mild Pain (1 - 3)  oxyCODONE    IR 5 milliGRAM(s) Oral every 4 hours PRN Moderate Pain (4 - 6)

## 2023-04-11 NOTE — DIETITIAN INITIAL EVALUATION ADULT - ORAL INTAKE PTA/DIET HISTORY
Pt reports good appetite/PO intake prior to hospitalization. Pt has been in the hospital since February, has noticed a decline in PO intake, appetite, & weight. At home, pt ate 3 meals/day. Took a MVI. Noted with allergy to shellfish.

## 2023-04-11 NOTE — CONSULT NOTE ADULT - SUBJECTIVE AND OBJECTIVE BOX
Patient is a 82y old  Male who presents with a chief complaint of Lymphoma (10 Apr 2023 15:09)    HPI, per admission H&P:  This is a 81 YO male with PMH of AFIB, HTN and chronic urinary retention (chronic plummer); who presented to [facility] via ambulance on 2/17 with progressively worsening SOB.  He was intubated in the field and brought to [facility] ED and admitted to ICU for acute respiratory failure with hypercapnia. CTA chest was negative for PE but significant for a large R pleural effusion with mediastinal and hilar lymphadenopathy, and 3L of fluid was drained.  He completed a 5 day course of empiric antibiotics. Pleural fluid cultures and blood cultures resulted negative.  Suspicious of underlying malignancy secondary to lymphadenopathy and persistent leukocytosis, Heme/Onc was consulted and recommended transfer to [facility] on 2/24/23 for further workup. On 2/28 he underwent an ultrasound guided biopsy of his R supraclavicular mass/lymph node, which later resulted as classic Hodgkin's lymphoma.  On 3/3, he underwent a R thoracotomy/VATS with Pleurex placement. Postoperatively, he was hypotensive and started on Midodrine. A Left chest wall mediport was placed for ongoing chemotherapy treatment on 3/15. He was admitted  to [facility] on 3/24.    While at rehab, he developed chest discomfort., low systolic BP and mild tachycardia. EKG showed Afib with rapid ventricular rythmn. On exam, he had increased respiratory rate. CXR and  CT chest showed increased Rt sided effusion with loculation and increasing atelectasis. He was transferred to [facility]. In the ED, CXR showed recurrent loculated R pleural effusion. CT chest with small loculated R pleural effusion with new peripheral demarcated high attenuation in the R pleural space. He  was seen by CT surgery in the ED, per paper chart, "drained about 100cc of fluid, placed on Pleura vac . PleurX was then flushed and drained about 2180cc fluid  need to hold AC for 48 hours for possible TPA into pleurX cath.  MIST held per CT surgery.  No plan for VATS. CXR  on 3/27, Decreasing pulmonary congestion, No pneumothorax. On 4/6 CT Chest -- Compared to 3/29/2023, similar loculated, exudative right pleural effusion with slightly decreased pleural air component and Pleurx catheter in place. Question malignant effusion in the setting of reported lymphoma, irregular septal thickening, nodularity and mediastinal pleural involvement.     Patient was evaluated by PM&R and therapy for functional deficits, gait/ADL impairments and acute rehabilitation was recommended. Patient was medically optimized for discharge to [facility] IRU on 4/10/23. (10 Apr 2023 15:09)    Subjective 4/11  Patient seen and examined at bedside. Reviewed medical history and recent hospitalization with patient. Episode of hypoxia this morning, was placed on 10L venti mask. During evaluation he was weaned back to 5L nc and saturating 95%. States he feels better after am nebulization. Denies fever, chills.     PAST MEDICAL & SURGICAL HISTORY:  Atrial fibrillation  Hypertension  Urinary retention    No significant past surgical history    SOCIAL HISTORY: former smoker. denies alcohol or illicit drug use  FAMILY HISTORY: no FH of heart disease, diabetes     ALLERGIES:  pertussis vaccines (Rash)  shellfish (Rash)    MEDICATIONS  (STANDING):  albuterol    0.083% 2.5 milliGRAM(s) Nebulizer every 6 hours  ammonium lactate 12% Lotion 1 Application(s) Topical two times a day  atenolol  Tablet 12.5 milliGRAM(s) Oral daily  enoxaparin Injectable 40 milliGRAM(s) SubCutaneous every 24 hours  escitalopram 10 milliGRAM(s) Oral daily  famotidine    Tablet 20 milliGRAM(s) Oral daily  magnesium oxide 400 milliGRAM(s) Oral three times a day with meals  polyethylene glycol 3350 17 Gram(s) Oral daily  senna 2 Tablet(s) Oral at bedtime  simvastatin 40 milliGRAM(s) Oral at bedtime  traZODone 25 milliGRAM(s) Oral at bedtime    MEDICATIONS  (PRN):  acetaminophen     Tablet .. 650 milliGRAM(s) Oral every 6 hours PRN Temp greater or equal to 38C (100.4F), Mild Pain (1 - 3)  oxyCODONE    IR 5 milliGRAM(s) Oral every 4 hours PRN Moderate Pain (4 - 6)    Review of Systems: Refer to HPI for pertinent positives and negatives. All other ROS reviewed and negative except as otherwise stated above.    Vital Signs Last 24 Hrs  T(F): 99.1 (10 Apr 2023 20:25), Max: 99.1 (10 Apr 2023 20:25)  HR: 105 (11 Apr 2023 06:24) (71 - 107)  BP: 112/61 (11 Apr 2023 05:07) (100/61 - 117/70)  RR: 18 (10 Apr 2023 20:25) (18 - 20)  SpO2: 94% (11 Apr 2023 06:24) (94% - 100%)  I&O's Summary    10 Apr 2023 07:01  -  11 Apr 2023 07:00  --------------------------------------------------------  IN: 0 mL / OUT: 300 mL / NET: -300 mL    PHYSICAL EXAM:  GENERAL: NAD, well-groomed  HEAD:  Atraumatic, Normocephalic  EYES: EOMI, PERRL, conjunctiva and sclera clear  ENMT: Moist mucous membranes, Good dentition  NECK: Supple, No JVD  CHEST/LUNG: Decreased breath sounds on R side. rhonchi and wheezing on left side. pleurex drain on right anterior chest wall  HEART: RRR; S1/S2, No murmur  ABDOMEN: Soft, Nontender, Nondistended; Bowel sounds present  : Plummer in place, yellow urine in bag  VASCULAR: Normal pulses, Normal capillary refill  EXTREMITIES: No cyanosis, No edema  LYMPH: No lymphadenopathy noted  SKIN: Warm, Intact  PSYCH: Normal mood and affect  NERVOUS SYSTEM:  A/O x3, Good concentration; CN 2-12 intact, No focal deficits, moves all extremities    LABS:                        8.3    10.33 )-----------( 268      ( 11 Apr 2023 06:50 )             27.6     04-11    139  |  100  |  7   ----------------------------<  108  3.7   |  37  |  0.60    Ca    8.5      11 Apr 2023 06:50  Phos  2.3     04-10  Mg     1.70     04-10    TPro  6.3  /  Alb  1.8  /  TBili  0.6  /  DBili  x   /  AST  26  /  ALT  33  /  AlkPhos  68  04-11    COVID-19 PCR: Pooja (04-10-23 @ 19:00)  COVID-19 PCR: NotDetec (04-09-23 @ 06:00)  COVID-19 PCR: NotDetec (04-06-23 @ 05:11)  COVID-19 PCR: NotDetec (04-02-23 @ 06:40)  COVID-19 PCR: NotDetec (03-28-23 @ 23:17)    RADIOLOGY & ADDITIONAL TESTS:    Care Discussed with Consultants/Other Providers: Patient is a 82y old  Male who presents with a chief complaint of Lymphoma (10 Apr 2023 15:09)    HPI, per admission H&P:  This is a 81 YO male with PMH of AFIB, HTN and chronic urinary retention (chronic plummer); who presented to [facility] via ambulance on 2/17 with progressively worsening SOB.  He was intubated in the field and brought to [facility] ED and admitted to ICU for acute respiratory failure with hypercapnia. CTA chest was negative for PE but significant for a large R pleural effusion with mediastinal and hilar lymphadenopathy, and 3L of fluid was drained.  He completed a 5 day course of empiric antibiotics. Pleural fluid cultures and blood cultures resulted negative.  Suspicious of underlying malignancy secondary to lymphadenopathy and persistent leukocytosis, Heme/Onc was consulted and recommended transfer to [facility] on 2/24/23 for further workup. On 2/28 he underwent an ultrasound guided biopsy of his R supraclavicular mass/lymph node, which later resulted as classic Hodgkin's lymphoma.  On 3/3, he underwent a R thoracotomy/VATS with Pleurex placement. Postoperatively, he was hypotensive and started on Midodrine. A Left chest wall mediport was placed for ongoing chemotherapy treatment on 3/15. He was admitted  to [facility] on 3/24.    While at rehab, he developed chest discomfort., low systolic BP and mild tachycardia. EKG showed Afib with rapid ventricular rythmn. On exam, he had increased respiratory rate. CXR and  CT chest showed increased Rt sided effusion with loculation and increasing atelectasis. He was transferred to [facility]. In the ED, CXR showed recurrent loculated R pleural effusion. CT chest with small loculated R pleural effusion with new peripheral demarcated high attenuation in the R pleural space. He  was seen by CT surgery in the ED, per paper chart, "drained about 100cc of fluid, placed on Pleura vac . PleurX was then flushed and drained about 2180cc fluid  need to hold AC for 48 hours for possible TPA into pleurX cath.  MIST held per CT surgery.  No plan for VATS. CXR  on 3/27, Decreasing pulmonary congestion, No pneumothorax. On 4/6 CT Chest -- Compared to 3/29/2023, similar loculated, exudative right pleural effusion with slightly decreased pleural air component and Pleurx catheter in place. Question malignant effusion in the setting of reported lymphoma, irregular septal thickening, nodularity and mediastinal pleural involvement.     Patient was evaluated by PM&R and therapy for functional deficits, gait/ADL impairments and acute rehabilitation was recommended. Patient was medically optimized for discharge to [facility] IRU on 4/10/23. (10 Apr 2023 15:09)    Subjective 4/11  Patient seen and examined at bedside. Reviewed medical history and recent hospitalization with patient. Episode of hypoxia this morning, was placed on 10L venti mask. During evaluation he was weaned back to 5L nc and saturating 95%. States he feels better after am nebulization. Denies fever, chills.     PAST MEDICAL & SURGICAL HISTORY:  Atrial fibrillation  Hypertension  Urinary retention    No significant past surgical history    SOCIAL HISTORY: former smoker. denies alcohol or illicit drug use  FAMILY HISTORY: no FH of heart disease, diabetes     ALLERGIES:  pertussis vaccines (Rash)  shellfish (Rash)    MEDICATIONS  (STANDING):  albuterol    0.083% 2.5 milliGRAM(s) Nebulizer every 6 hours  ammonium lactate 12% Lotion 1 Application(s) Topical two times a day  atenolol  Tablet 12.5 milliGRAM(s) Oral daily  enoxaparin Injectable 40 milliGRAM(s) SubCutaneous every 24 hours  escitalopram 10 milliGRAM(s) Oral daily  famotidine    Tablet 20 milliGRAM(s) Oral daily  magnesium oxide 400 milliGRAM(s) Oral three times a day with meals  polyethylene glycol 3350 17 Gram(s) Oral daily  senna 2 Tablet(s) Oral at bedtime  simvastatin 40 milliGRAM(s) Oral at bedtime  traZODone 25 milliGRAM(s) Oral at bedtime    MEDICATIONS  (PRN):  acetaminophen     Tablet .. 650 milliGRAM(s) Oral every 6 hours PRN Temp greater or equal to 38C (100.4F), Mild Pain (1 - 3)  oxyCODONE    IR 5 milliGRAM(s) Oral every 4 hours PRN Moderate Pain (4 - 6)    Review of Systems: Refer to HPI for pertinent positives and negatives. All other ROS reviewed and negative except as otherwise stated above.    Vital Signs Last 24 Hrs  T(F): 99.1 (10 Apr 2023 20:25), Max: 99.1 (10 Apr 2023 20:25)  HR: 105 (11 Apr 2023 06:24) (71 - 107)  BP: 112/61 (11 Apr 2023 05:07) (100/61 - 117/70)  RR: 18 (10 Apr 2023 20:25) (18 - 20)  SpO2: 94% (11 Apr 2023 06:24) (94% - 100%)  I&O's Summary    10 Apr 2023 07:01  -  11 Apr 2023 07:00  --------------------------------------------------------  IN: 0 mL / OUT: 300 mL / NET: -300 mL    PHYSICAL EXAM:  GENERAL: NAD, well-groomed  HEAD:  Atraumatic, Normocephalic  EYES: EOMI, PERRL, conjunctiva and sclera clear  ENMT: Moist mucous membranes, Good dentition  NECK: Supple, No JVD  CHEST/LUNG: Decreased breath sounds on R side. rhonchi and wheezing on left side. pleurex drain on right anterior chest wall  HEART: RRR; S1/S2, No murmur  ABDOMEN: Soft, Nontender, Nondistended; Bowel sounds present  : Plummer in place, yellow urine in bag  VASCULAR: Normal pulses, Normal capillary refill  EXTREMITIES: No cyanosis, No edema  LYMPH: No lymphadenopathy noted  SKIN: Warm, Intact  PSYCH: Normal mood and affect  NERVOUS SYSTEM:  A/O x3, Good concentration; CN 2-12 intact, No focal deficits, moves all extremities    LABS:                        8.3    10.33 )-----------( 268      ( 11 Apr 2023 06:50 )             27.6     04-11    139  |  100  |  7   ----------------------------<  108  3.7   |  37  |  0.60    Ca    8.5      11 Apr 2023 06:50  Phos  2.3     04-10  Mg     1.70     04-10    TPro  6.3  /  Alb  1.8  /  TBili  0.6  /  DBili  x   /  AST  26  /  ALT  33  /  AlkPhos  68  04-11    COVID-19 PCR: Pooja (04-10-23 @ 19:00)  COVID-19 PCR: NotDetec (04-09-23 @ 06:00)  COVID-19 PCR: NotDetec (04-06-23 @ 05:11)  COVID-19 PCR: NotDetec (04-02-23 @ 06:40)  COVID-19 PCR: NotDetec (03-28-23 @ 23:17)    RADIOLOGY & ADDITIONAL TESTS:  3/25 EKG (personally reviewed by me): A-fib at 97bpm    Care Discussed with Consultants/Other Providers: d/w Dr. Yulia Culp

## 2023-04-11 NOTE — DIETITIAN NUTRITION RISK NOTIFICATION - TREATMENT: THE FOLLOWING DIET HAS BEEN RECOMMENDED
Diet, Regular:   Supplement Feeding Modality:  Oral  Ensure Compact Cans or Servings Per Day:  1       Frequency:  Two Times a day (04-10-23 @ 16:59) [Active]

## 2023-04-11 NOTE — DIETITIAN INITIAL EVALUATION ADULT - NS FNS DIET ORDER
Diet, Regular:   Supplement Feeding Modality:  Oral  Ensure Compact Cans or Servings Per Day:  1       Frequency:  Two Times a day (04-10-23 @ 16:59)

## 2023-04-11 NOTE — CONSULT NOTE ADULT - SUBJECTIVE AND OBJECTIVE BOX
Time of visit:    CHIEF COMPLAINT: Patient is a 82y old  Male who presents with a chief complaint of Lymphoma (11 Apr 2023 09:32)      HPI:  This is a 81 YO male with  AFIB, HTN and chronic urinary retention (chronic plummer); who presented to [facility] via ambulance on 2/17 with progressively worsening SOB.  He was intubated in the field and brought to [facility] ED and admitted to ICU for acute respiratory failure with hypercapnia. CTA chest was negative for PE but significant for a large R pleural effusion with mediastinal and hilar lymphadenopathy, and 3L of fluid was drained.  He completed a 5 day course of empiric antibiotics. Pleural fluid cultures and blood cultures resulted negative.  Suspicious of underlying malignancy secondary to lymphadenopathy and persistent leukocytosis, Heme/Onc was consulted and recommended transfer to [facility] on 2/24/23 for further workup. On 2/28 he underwent an ultrasound guided biopsy of his R supraclavicular mass/lymph node, which later resulted as classic Hodgkin's lymphoma.  On 3/3, he underwent a R thoracotomy/VATS with Pleurex placement. Postoperatively, he was hypotensive and started on Midodrine. A Left chest wall mediport was placed for ongoing chemotherapy treatment on 3/15. He was admitted  to [facility] on 3/24.    While at rehab, he developed chest discomfort., low systolic BP and mild tachycardia. EKG showed Afib with rapid ventricular rythmn. On exam, he had increased respiratory rate. CXR and  CT chest showed increased Rt sided effusion with loculation and increasing atelectasis. He was transferred to [facility]. In the ED, CXR showed recurrent loculated R pleural effusion. CT chest with small loculated R pleural effusion with new peripheral demarcated high attenuation in the R pleural space. He  was seen by CT surgery in the ED, per paper chart, "drained about 100cc of fluid, placed on Pleura vac . PleurX was then flushed and drained about 2180cc fluid  need to hold AC for 48 hours for possible TPA into pleurX cath.  MIST held per CT surgery.  No plan for VATS. CXR  on 3/27, Decreasing pulmonary congestion, No pneumothorax. On 4/6 CT Chest -- Compared to 3/29/2023, similar loculated, exudative right pleural effusion with slightly decreased pleural air component and Pleurx catheter in place. Question malignant effusion in the setting of reported lymphoma, irregular septal thickening, nodularity and mediastinal pleural involvement.     Patient was evaluated by PM&R and therapy for functional deficits, gait/ADL impairments and acute rehabilitation was recommended. Patient was medically optimized for discharge to [facility] IRU on 4/10/23.   (10 Apr 2023 15:09)   Patient seen and examined.     PAST MEDICAL & SURGICAL HISTORY:  Atrial fibrillation      Hypertension      Urinary retention      No significant past surgical history          Allergies    pertussis vaccines (Rash)  shellfish (Rash)    Intolerances        MEDICATIONS  (STANDING):  albuterol    0.083% 2.5 milliGRAM(s) Nebulizer every 6 hours  ammonium lactate 12% Lotion 1 Application(s) Topical two times a day  atenolol  Tablet 12.5 milliGRAM(s) Oral daily  enoxaparin Injectable 40 milliGRAM(s) SubCutaneous every 24 hours  escitalopram 10 milliGRAM(s) Oral daily  famotidine    Tablet 20 milliGRAM(s) Oral daily  magnesium oxide 400 milliGRAM(s) Oral three times a day with meals  polyethylene glycol 3350 17 Gram(s) Oral daily  senna 2 Tablet(s) Oral at bedtime  simvastatin 40 milliGRAM(s) Oral at bedtime  traZODone 25 milliGRAM(s) Oral at bedtime      MEDICATIONS  (PRN):  acetaminophen     Tablet .. 650 milliGRAM(s) Oral every 6 hours PRN Temp greater or equal to 38C (100.4F), Mild Pain (1 - 3)  oxyCODONE    IR 5 milliGRAM(s) Oral every 4 hours PRN Moderate Pain (4 - 6)   Medications up to date at time of exam.    Medications up to date at time of exam.    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    CARDIOVASCULAR:  denies pressure, squeezing, tightness, or heaviness about the chest; no palpitations.    RESPIRATORY:  +  SOB, cough,    GASTROINTESTINAL:  denies abdominal pain, nausea, vomiting or diarrhea.    GENITOURINARY: denies dysuria, frequency or urgency.    NEUROLOGIC:  denies numbness, tingling, seizures or weakness.    PSYCHIATRIC:  denies disorder of thought or mood.    MSK: denies swelling, redness      PHYSICAL EXAMINATION:    GENERAL: The patient is a well-developed, well-nourished, in no apparent distress . Sitting in chair wit son at bedside     Vital Signs Last 24 Hrs  T(C): 37.3 (10 Apr 2023 20:25), Max: 37.3 (10 Apr 2023 20:25)  T(F): 99.1 (10 Apr 2023 20:25), Max: 99.1 (10 Apr 2023 20:25)  HR: 105 (11 Apr 2023 06:24) (76 - 107)  BP: 112/61 (11 Apr 2023 05:07) (112/61 - 117/70)  BP(mean): --  RR: 18 (10 Apr 2023 20:25) (18 - 20)  SpO2: 94% (11 Apr 2023 06:24) (94% - 96%)    Parameters below as of 11 Apr 2023 06:24  Patient On (Oxygen Delivery Method): mask, Venturi       (if applicable)    Chest Tube (if applicable)    HEENT: Head is normocephalic and atraumatic. Extraocular muscles are intact. Mucous membranes are moist. O2 3 L NC     NECK: Supple, no palpable adenopathy.    LUNGS: Clear to auscultation, no wheezing, rales, or rhonchi. right lateral chest with pleurX cath , covered with dressing     HEART: Regular rate and rhythm without murmur. L SC chemo-port     ABDOMEN: Soft, nontender, and nondistended.  No hepatosplenomegaly is noted.    RENAL: No difficulty voiding, no pelvic pain    EXTREMITIES: Without any cyanosis, clubbing, rash, lesions or edema.    NEUROLOGIC: Awake, alert, oriented, grossly intact    SKIN: Warm, dry, good turgor.      LABS:                        8.3    10.33 )-----------( 268      ( 11 Apr 2023 06:50 )             27.6     04-11    139  |  100  |  7   ----------------------------<  108<H>  3.7   |  37<H>  |  0.60    Ca    8.5      11 Apr 2023 06:50  Phos  2.3     04-10  Mg     1.70     04-10    TPro  6.3  /  Alb  1.8<L>  /  TBili  0.6  /  DBili  x   /  AST  26  /  ALT  33  /  AlkPhos  68  04-11                        MICROBIOLOGY: (if applicable)    RADIOLOGY & ADDITIONAL STUDIES:  EKG:   CXR:< from: Xray Chest 1 View-PORTABLE IMMEDIATE (Xray Chest 1 View-PORTABLE IMMEDIATE .) (04.11.23 @ 06:52) >    ACC: [id number] EXAM:  XR CHEST PORTABLE IMMED 1V   ORDERED BY: DAVE GUAJARDO     PROCEDURE DATE:  04/11/2023          INTERPRETATION:  TIME OF EXAM: April 11, 2023 at 6:50 AM.    CLINICAL INFORMATION: History of right pleural effusion. Hypoxia.    COMPARISON:  April 9, 2023.    TECHNIQUE:   AP Portable chest x-ray.    INTERPRETATION:    Heart size and the mediastinum cannot be accurately evaluated on this   projection.  Elevated right hemidiaphragm again seen.  Left subclavian approach port and right Pleurx catheter are unchanged in   position.  No significant interval change in loculated right pleural effusion with   likely associated passive atelectasis.  Patchy left mid and lower lung opacities again seen with some improvement   noted.  No definite left pleural effusion.  No pneumothorax.  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      IMPRESSION:  Right Pleurx catheter are unchanged in position. No   pneumothorax.    Loculated right pleural effusion with likely associated passive   atelectasis, not significantly changed. Other underlying pathology not   excluded.    Patchy left mid and lower lung opacities again seen with some improvement   noted.    --- End of Report ---            KATI HUGGINS MD; Attending Radiologist  This document has been electronically signed. Apr 11 2023  9:18AM    < end of copied text >    ECHO:    IMPRESSION: 82y Male PAST MEDICAL & SURGICAL HISTORY:  Atrial fibrillation      Hypertension      Urinary retention      No significant past surgical history       p/w           IMP This is an 82 yr old man , non smoker with multiple medical condition s/p right pleurX cath placement for pleural effusion due to Hodgkin's Lymphoma . CXR 4/11 with  loculated right effusion .  Staff reported episode of hypoxia requiring O2 10 L , seen receiving 3 L at time of my examination .  Acute hypoxic resp failure probable due to atelectasis , improved with pat sitting in chair . No pleural effusion requiring drainage at this time       Sugg  - Continue O2 supp as needed to maintain sat >90%  - Chest PT   - Start duonebs q6h while awake   - NaCl 3% neb q12h   - OOB to chair as tolerated   - DVT GI prophy  Time of visit:    CHIEF COMPLAINT: Patient is a 82y old  Male who presents with a chief complaint of Lymphoma (11 Apr 2023 09:32)      HPI:  This is a 83 YO male with  AFIB, HTN and chronic urinary retention (chronic plummer); who presented to [facility] via ambulance on 2/17 with progressively worsening SOB.  He was intubated in the field and brought to [facility] ED and admitted to ICU for acute respiratory failure with hypercapnia. CTA chest was negative for PE but significant for a large R pleural effusion with mediastinal and hilar lymphadenopathy, and 3L of fluid was drained.  He completed a 5 day course of empiric antibiotics. Pleural fluid cultures and blood cultures resulted negative.  Suspicious of underlying malignancy secondary to lymphadenopathy and persistent leukocytosis, Heme/Onc was consulted and recommended transfer to [facility] on 2/24/23 for further workup. On 2/28 he underwent an ultrasound guided biopsy of his R supraclavicular mass/lymph node, which later resulted as classic Hodgkin's lymphoma.  On 3/3, he underwent a R thoracotomy/VATS with Pleurex placement. Postoperatively, he was hypotensive and started on Midodrine. A Left chest wall mediport was placed for ongoing chemotherapy treatment on 3/15. He was admitted  to [facility] on 3/24.    While at rehab, he developed chest discomfort., low systolic BP and mild tachycardia. EKG showed Afib with rapid ventricular rythmn. On exam, he had increased respiratory rate. CXR and  CT chest showed increased Rt sided effusion with loculation and increasing atelectasis. He was transferred to [facility]. In the ED, CXR showed recurrent loculated R pleural effusion. CT chest with small loculated R pleural effusion with new peripheral demarcated high attenuation in the R pleural space. He  was seen by CT surgery in the ED, per paper chart, "drained about 100cc of fluid, placed on Pleura vac . PleurX was then flushed and drained about 2180cc fluid  need to hold AC for 48 hours for possible TPA into pleurX cath.  MIST held per CT surgery.  No plan for VATS. CXR  on 3/27, Decreasing pulmonary congestion, No pneumothorax. On 4/6 CT Chest -- Compared to 3/29/2023, similar loculated, exudative right pleural effusion with slightly decreased pleural air component and Pleurx catheter in place. Question malignant effusion in the setting of reported lymphoma, irregular septal thickening, nodularity and mediastinal pleural involvement.     Patient was evaluated by PM&R and therapy for functional deficits, gait/ADL impairments and acute rehabilitation was recommended. Patient was medically optimized for discharge to [facility] IRU on 4/10/23.   (10 Apr 2023 15:09)   Patient seen and examined.     PAST MEDICAL & SURGICAL HISTORY:  Atrial fibrillation      Hypertension      Urinary retention      No significant past surgical history          Allergies    pertussis vaccines (Rash)  shellfish (Rash)    Intolerances        MEDICATIONS  (STANDING):  albuterol    0.083% 2.5 milliGRAM(s) Nebulizer every 6 hours  ammonium lactate 12% Lotion 1 Application(s) Topical two times a day  atenolol  Tablet 12.5 milliGRAM(s) Oral daily  enoxaparin Injectable 40 milliGRAM(s) SubCutaneous every 24 hours  escitalopram 10 milliGRAM(s) Oral daily  famotidine    Tablet 20 milliGRAM(s) Oral daily  magnesium oxide 400 milliGRAM(s) Oral three times a day with meals  polyethylene glycol 3350 17 Gram(s) Oral daily  senna 2 Tablet(s) Oral at bedtime  simvastatin 40 milliGRAM(s) Oral at bedtime  traZODone 25 milliGRAM(s) Oral at bedtime      MEDICATIONS  (PRN):  acetaminophen     Tablet .. 650 milliGRAM(s) Oral every 6 hours PRN Temp greater or equal to 38C (100.4F), Mild Pain (1 - 3)  oxyCODONE    IR 5 milliGRAM(s) Oral every 4 hours PRN Moderate Pain (4 - 6)   Medications up to date at time of exam.    Medications up to date at time of exam.    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    CARDIOVASCULAR:  denies pressure, squeezing, tightness, or heaviness about the chest; no palpitations.    RESPIRATORY:  +  SOB, cough,    GASTROINTESTINAL:  denies abdominal pain, nausea, vomiting or diarrhea.    GENITOURINARY: denies dysuria, frequency or urgency.    NEUROLOGIC:  denies numbness, tingling, seizures or weakness.    PSYCHIATRIC:  denies disorder of thought or mood.    MSK: denies swelling, redness      PHYSICAL EXAMINATION:    GENERAL: The patient is a well-developed, well-nourished, in no apparent distress . Sitting in chair wit son at bedside     Vital Signs Last 24 Hrs  T(C): 37.3 (10 Apr 2023 20:25), Max: 37.3 (10 Apr 2023 20:25)  T(F): 99.1 (10 Apr 2023 20:25), Max: 99.1 (10 Apr 2023 20:25)  HR: 105 (11 Apr 2023 06:24) (76 - 107)  BP: 112/61 (11 Apr 2023 05:07) (112/61 - 117/70)  BP(mean): --  RR: 18 (10 Apr 2023 20:25) (18 - 20)  SpO2: 94% (11 Apr 2023 06:24) (94% - 96%)    Parameters below as of 11 Apr 2023 06:24  Patient On (Oxygen Delivery Method): mask, Venturi       (if applicable)    Chest Tube (if applicable)    HEENT: Head is normocephalic and atraumatic. Extraocular muscles are intact. Mucous membranes are moist. O2 3 L NC     NECK: Supple, no palpable adenopathy.    LUNGS: Clear to auscultation, no wheezing, rales, or rhonchi. right lateral chest with pleurX cath , covered with dressing     HEART: Regular rate and rhythm without murmur. L SC chemo-port     ABDOMEN: Soft, nontender, and nondistended.  No hepatosplenomegaly is noted.    RENAL: No difficulty voiding, no pelvic pain    EXTREMITIES: Without any cyanosis, clubbing, rash, lesions or edema.    NEUROLOGIC: Awake, alert, oriented, grossly intact    SKIN: Warm, dry, good turgor.      LABS:                        8.3    10.33 )-----------( 268      ( 11 Apr 2023 06:50 )             27.6     04-11    139  |  100  |  7   ----------------------------<  108<H>  3.7   |  37<H>  |  0.60    Ca    8.5      11 Apr 2023 06:50  Phos  2.3     04-10  Mg     1.70     04-10    TPro  6.3  /  Alb  1.8<L>  /  TBili  0.6  /  DBili  x   /  AST  26  /  ALT  33  /  AlkPhos  68  04-11                        MICROBIOLOGY: (if applicable)    RADIOLOGY & ADDITIONAL STUDIES:  EKG:   CXR:< from: Xray Chest 1 View-PORTABLE IMMEDIATE (Xray Chest 1 View-PORTABLE IMMEDIATE .) (04.11.23 @ 06:52) >    ACC: [id number] EXAM:  XR CHEST PORTABLE IMMED 1V   ORDERED BY: DAVE GUAJARDO     PROCEDURE DATE:  04/11/2023          INTERPRETATION:  TIME OF EXAM: April 11, 2023 at 6:50 AM.    CLINICAL INFORMATION: History of right pleural effusion. Hypoxia.    COMPARISON:  April 9, 2023.    TECHNIQUE:   AP Portable chest x-ray.    INTERPRETATION:    Heart size and the mediastinum cannot be accurately evaluated on this   projection.  Elevated right hemidiaphragm again seen.  Left subclavian approach port and right Pleurx catheter are unchanged in   position.  No significant interval change in loculated right pleural effusion with   likely associated passive atelectasis.  Patchy left mid and lower lung opacities again seen with some improvement   noted.  No definite left pleural effusion.  No pneumothorax.  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      IMPRESSION:  Right Pleurx catheter are unchanged in position. No   pneumothorax.    Loculated right pleural effusion with likely associated passive   atelectasis, not significantly changed. Other underlying pathology not   excluded.    Patchy left mid and lower lung opacities again seen with some improvement   noted.    --- End of Report ---            KATI HUGGINS MD; Attending Radiologist  This document has been electronically signed. Apr 11 2023  9:18AM    < end of copied text >    ECHO:    IMPRESSION: 82y Male PAST MEDICAL & SURGICAL HISTORY:  Atrial fibrillation      Hypertension      Urinary retention      No significant past surgical history       p/w           IMP This is an 82 yr old man , non smoker with multiple medical condition s/p right pleurX cath placement for pleural effusion due to Hodgkin's Lymphoma . CXR 4/11 with  loculated right effusion .  Staff reported episode of hypoxia requiring O2 10 L , seen receiving 3 L at time of my examination .  Acute hypoxic resp failure probable due to atelectasis , improved with pat sitting in chair . No pleural effusion requiring drainage at this time       Sugg  - Continue O2 supp as needed to maintain sat >90%  - Chest PT   - Start duonebs q6h while awake   - NaCl 3% neb q12h   - OOB to chair as tolerated   - Incentive spirometry   - DVT JOHNNY hartmann     Spoke with son Francisco at bedside

## 2023-04-11 NOTE — CONSULT NOTE ADULT - ASSESSMENT
81 yo M with PMH of A-fib, Hypertension, chronic plummer who was initially admitted to [facility] 2/17 with progressively worsening SOB, intubated in the field, found to have a large R pleural effusion with mediastinal and hilar lymphadenopathy s/p thoracentesis (~3L), with persistent leukocytosis and concern for suspicious malignancy was transferred to [facility] on 2/24 for further workup.  He underwent a R supraclavicular mass biopsy on 2/28, significant for classic Hodgkin's lymphoma. On 3/3, he underwent a R thoracotomy/VATS with Pleurex placement. Bone marrow negative for involvement,  (s/p L chest wall mediport placement 3/15). Discharged to [facility] acute rehab, course c/b by worsening pleural effusion requiring transfer to [facility] for thoracic evaluation and pleurex placement. Now re-admitted to [facility] for rehab.     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  He underwent a R supraclavicular mass biopsy on 2/28, significant for classic Hodgkin's lymphoma. On 3/3, he underwent a R thoracotomy/VATS with Pleurex placement. Bone marrow negative for involvement,  (s/p L chest wall mediport placement 3/15). Discharged to [facility] acute rehab, course c/b by worsening pleural effusion requiring transfer to [facility] for thoracic evaluation and pleurex placement. Now re-admitted to [facility] for rehab.     # R pleural effusion  # Acute hypoxic respiratory failure  # Classic Hodgkin's Lymphoma  - PT/OT per PMR  - SOB with R pleural effusion requiring drainage (approx 3L)  - S/p R supraclavicular mass biopsy-> significant for Classic Hodgkin's Lymphoma  - S/p R thoacotomy/VATS with Pleurex placement 3/3  - Bone marrow biopsy negative  - Pulm eval pending. CXR from am unchanged from previous     # A-fib  - Eliquis on hold d/t hemothorax, unclear when can be re-started  - continue rate control with atenolol    # Hypertension  - Atenolol     # Hyperlipidemia   - Simvastatin 40mg     DVT Prophylaxis:  - Lovenox     Thank you for involving us in the care of this patient. Medicine service will follow.

## 2023-04-12 ENCOUNTER — TRANSCRIPTION ENCOUNTER (OUTPATIENT)
Age: 83
End: 2023-04-12

## 2023-04-12 DIAGNOSIS — R53.81 OTHER MALAISE: ICD-10-CM

## 2023-04-12 PROCEDURE — 99232 SBSQ HOSP IP/OBS MODERATE 35: CPT

## 2023-04-12 RX ADMIN — Medication 1 APPLICATION(S): at 05:07

## 2023-04-12 RX ADMIN — ALBUTEROL 2.5 MILLIGRAM(S): 90 AEROSOL, METERED ORAL at 08:47

## 2023-04-12 RX ADMIN — ALBUTEROL 2.5 MILLIGRAM(S): 90 AEROSOL, METERED ORAL at 20:45

## 2023-04-12 RX ADMIN — ENOXAPARIN SODIUM 40 MILLIGRAM(S): 100 INJECTION SUBCUTANEOUS at 22:02

## 2023-04-12 RX ADMIN — ALBUTEROL 2.5 MILLIGRAM(S): 90 AEROSOL, METERED ORAL at 03:42

## 2023-04-12 RX ADMIN — MAGNESIUM OXIDE 400 MG ORAL TABLET 400 MILLIGRAM(S): 241.3 TABLET ORAL at 17:45

## 2023-04-12 RX ADMIN — ATENOLOL 12.5 MILLIGRAM(S): 25 TABLET ORAL at 05:07

## 2023-04-12 RX ADMIN — ESCITALOPRAM OXALATE 10 MILLIGRAM(S): 10 TABLET, FILM COATED ORAL at 11:25

## 2023-04-12 RX ADMIN — SENNA PLUS 2 TABLET(S): 8.6 TABLET ORAL at 22:01

## 2023-04-12 RX ADMIN — Medication 25 MILLIGRAM(S): at 22:03

## 2023-04-12 RX ADMIN — MAGNESIUM OXIDE 400 MG ORAL TABLET 400 MILLIGRAM(S): 241.3 TABLET ORAL at 11:48

## 2023-04-12 RX ADMIN — MAGNESIUM OXIDE 400 MG ORAL TABLET 400 MILLIGRAM(S): 241.3 TABLET ORAL at 07:46

## 2023-04-12 RX ADMIN — FAMOTIDINE 20 MILLIGRAM(S): 10 INJECTION INTRAVENOUS at 11:25

## 2023-04-12 RX ADMIN — Medication 1 APPLICATION(S): at 17:45

## 2023-04-12 RX ADMIN — SIMVASTATIN 40 MILLIGRAM(S): 20 TABLET, FILM COATED ORAL at 22:03

## 2023-04-12 NOTE — PROGRESS NOTE ADULT - ASSESSMENT
ASSESSMENT/PLAN  This is a 82 year old male with PMH of AFIB, HTN and chronic urinary retention (chronic plummer); who presented to [facility] via ambulance on 2/17 with progressively worsening SOB, intubated in the field, found to have a large R pleural effusion with mediastinal and hilar lymphadenopathy s/p thoracentesis (~3L), with persistent leukocytosis and concern for suspicious malignancy was transferred to [facility] on 2/24 for further workup.  He underwent a R supraclavicular mass biopsy on 2/28, significant for classic Hodgkin's lymphoma. On 3/3, he underwent a R thoracotomy/VATS with Pleurex placement. Bone marrow negative for involvement,  (s/p L chest wall mediport placement 3/15). Rehab course c/b worsening pleural effusion requiring acute transfer for drainage. Patient now with gait Instability, ADL impairments and Functional impairments.    * Pulmonary consult for dyspnea and increased oxy requirement  * Confirmation of  timing for commencement of AC for A fib treatment, will c/w lovenox ppx for now  * Pleural fluid drain as recommended  * Update to family   * CXR 4/11--No acute changes, Rt effusion with atelectasis, Left mid and lower opacities    #Debility  #SOB/R Pleural effusion  #Classic Hodgkin's Lymphoma  - Gait Instability, ADL impairments and Functional impairments: start Comprehensive Rehab Program of PT/OT  - 3 hours a day, 5 days a week  - S/p R supraclavicular mass biopsy-> significant for Classic Hodgkin's Lymphoma  - S/p R thoracotomy/VATS with Pleurex placement on  3/3  - c/w pleurX drainage  - Bone marrow biopsy negative  - L chest wall mediport placed 3/15  - Cycle #1 of BV (Brentuximad Vedotin) given on 3/10/23  - s/p C2 brentuximab 4/3/23  - Plan for cycle #3  on 4/24/23  -  No plan to receive chemo while in rehab  - Await [facility] referral   - F/u with Dr. Hays outpatient  - Ipratropium-Albuterol every 6 hours   - serial CXR for monitoring    #AFIB  - Eliquis on hold  --Clarify from [facility] cardiothoracic team when this could be recommenced     #HTN  - Atenolol 12.5mg daily    #HLD  - Simvastatin 40mg daily    #Sleep/Mood  - Lexapro 10mg daily  - Trazodone 25mg nightly    #Skin  - Skin --right chest wall Pleurex drain site + dressing  - Pressure injury/Skin: OOB to Chair, PT/OT     #Pain Mgmt   - Tylenol PRN    #GI/Bowel Mgmt   - c/w Senna, Miralax     #/Bladder Mgmt   - Chronic Plummer last changed 4/7/23  - F/u with Urology outpt     #FEN   - Diet - Regular + Thins  [DASH]      #Precautions / PROPHYLAXIS:   - Falls, aspiration  - ortho: Weight bearing status: WBAT   - Lungs: Aspiration, Incentive Spirometer   - DVT: Lovenox    #GOC  CODE STATUS: FULL CODE      Next of kin--(Daughter  [phone]) a physician    Outpatient Follow-up (Specialty/Name of physician):    Nathaniel Galvan)  Surgery; Thoracic Surgery  [phone] [place] [address], Oncology [facility]  3rd [place] [address]  Phone: [phone]  Fax: [phone]  Established Patient  Follow Up Time: 2 weeks    UROLOGY  MD Saúl Hamm [facility]  [facility]  Scheduled Appointment: 03/27/2023    MEDICAL PROGNOSIS: GOOD            REHAB POTENTIAL: GOOD             ESTIMATED DISPOSITION: HOME WITH HOME CARE            ELOS: 10-14 Days   EXPECTED THERAPY:     P.T. 1hr/day       O.T. 1hr/day      S.L.P. 1hr/day     P&O Unnecessary     EXP FREQUENCY: 5 days per 7 day period     PRESCREEN COMPARISON:   I have reviewed the prescreen information and I have found no relevant changes between the preadmission screening and my post admission evaluation     RATIONALE FOR INPATIENT ADMISSION - Patient demonstrates the following: (check all that apply)  [X] Medically appropriate for rehabilitation admission  [X] Has attainable rehab goals with an appropriate initial discharge plan  [X] Has rehabilitation potential (expected to make a significant improvement within a reasonable period of time)   [X] Requires close medical management by a rehab physician, rehab nursing care, Hospitalist and comprehensive interdisciplinary team (including PT, OT, & or SLP, Prosthetics and Orthotics)   ASSESSMENT/PLAN  This is a 82 year old male with PMH of AFIB, HTN and chronic urinary retention (chronic plummer); who presented to [facility] via ambulance on 2/17 with progressively worsening SOB, intubated in the field, found to have a large R pleural effusion with mediastinal and hilar lymphadenopathy s/p thoracentesis (~3L), with persistent leukocytosis and concern for suspicious malignancy was transferred to [facility] on 2/24 for further workup.  He underwent a R supraclavicular mass biopsy on 2/28, significant for classic Hodgkin's lymphoma. On 3/3, he underwent a R thoracotomy/VATS with Pleurex placement. Bone marrow negative for involvement,  (s/p L chest wall mediport placement 3/15). Rehab course c/b worsening pleural effusion requiring acute transfer for drainage. Patient now with gait Instability, ADL impairments and Functional impairments.    * Pulmonary following - R pleurex cath for M/W/F * Confirmation of  timing for commencement of AC for A fib treatment, will c/w lovenox ppx for now * Continue to monitor rehab progress *     #Debility  #SOB/R Pleural effusion  #Classic Hodgkin's Lymphoma  - Gait Instability, ADL impairments and Functional impairments: start Comprehensive Rehab Program of PT/OT  - 3 hours a day, 5 days a week  - S/p R supraclavicular mass biopsy-> significant for Classic Hodgkin's Lymphoma  - S/p R thoracotomy/VATS with Pleurex placement on  3/3  - c/w pleurX drainage  - Bone marrow biopsy negative  - L chest wall mediport placed 3/15  - Cycle #1 of BV (Brentuximad Vedotin) given on 3/10/23  - s/p C2 brentuximab 4/3/23  - Plan for cycle #3  on 4/24/23  -  No plan to receive chemo while in rehab  - Await [facility] referral   - F/u with Dr. Hays outpatient  - 4/11: Pulm consult ->  Maintain 02 >90%, Ipratropium-Albuterol every 6 hours, Sodium chloride nebuizer BID , IS, Chest PT.  -- Pulm following  * CXR 4/11--No acute changes, Rt effusion with atelectasis, Left mid and lower opacities  - serial CXR for monitoring    #AFIB  - Eliquis on hold  --Clarify from [facility] cardiothoracic team when this could be recommenced     #HTN  - Atenolol 12.5mg daily    #HLD  - Simvastatin 40mg daily    #Sleep/Mood  - Lexapro 10mg daily  - Trazodone 25mg nightly    #Skin  - Skin --right chest wall Pleurex drain site + dressing  - Pressure injury/Skin: OOB to Chair, PT/OT     #Pain Mgmt   - Tylenol PRN    #GI/Bowel Mgmt   - c/w Senna, Miralax     #/Bladder Mgmt   - Chronic Plummer last changed 4/7/23  - F/u with Urology outpt     #FEN   - Diet - Regular + Thins  [DASH]      #Precautions / PROPHYLAXIS:   - Falls, aspiration  - ortho: Weight bearing status: WBAT   - Lungs: Aspiration, Incentive Spirometer   - DVT: Lovenox    #GOC  CODE STATUS: FULL CODE      Next of kin--(Daughter  [phone]) a physician    Outpatient Follow-up (Specialty/Name of physician):    Nathaniel Galvan (MD)  Surgery; Thoracic Surgery  [facility] [place] [address], Oncology Building  3rd [place] [address]  Phone: [phone]  Fax: [phone]  Established Patient  Follow Up Time: 2 weeks    UROLOGY  MD Saúl Hamm [facility]  [facility] V[facility]quez  Scheduled Appointment: 03/27/2023    MEDICAL PROGNOSIS: GOOD            REHAB POTENTIAL: GOOD             ESTIMATED DISPOSITION: HOME WITH HOME CARE            ELOS: 10-14 Days   EXPECTED THERAPY:     P.T. 1hr/day       O.T. 1hr/day      S.L.P. 1hr/day     P&O Unnecessary     EXP FREQUENCY: 5 days per 7 day period     PRESCREEN COMPARISON:   I have reviewed the prescreen information and I have found no relevant changes between the preadmission screening and my post admission evaluation     RATIONALE FOR INPATIENT ADMISSION - Patient demonstrates the following: (check all that apply)  [X] Medically appropriate for rehabilitation admission  [X] Has attainable rehab goals with an appropriate initial discharge plan  [X] Has rehabilitation potential (expected to make a significant improvement within a reasonable period of time)   [X] Requires close medical management by a rehab physician, rehab nursing care, Hospitalist and comprehensive interdisciplinary team (including PT, OT, & or SLP, Prosthetics and Orthotics)   ASSESSMENT/PLAN  This is a 82 year old male with PMH of AFIB, HTN and chronic urinary retention (chronic plummer); who presented to [facility] via ambulance on 2/17 with progressively worsening SOB, intubated in the field, found to have a large R pleural effusion with mediastinal and hilar lymphadenopathy s/p thoracentesis (~3L), with persistent leukocytosis and concern for suspicious malignancy was transferred to [facility] on 2/24 for further workup.  He underwent a R supraclavicular mass biopsy on 2/28, significant for classic Hodgkin's lymphoma. On 3/3, he underwent a R thoracotomy/VATS with Pleurex placement. Bone marrow negative for involvement,  (s/p L chest wall mediport placement 3/15). Rehab course c/b worsening pleural effusion requiring acute transfer for drainage. Patient now with gait Instability, ADL impairments and Functional impairments.    * Pulmonary following - R pleurex cath for M/W/F * Confirmation of  timing for commencement of AC for A fib treatment, will c/w lovenox ppx for now * Continue to monitor rehab progress *     #Debility  #SOB/R Pleural effusion  #Classic Hodgkin's Lymphoma  - Gait Instability, ADL impairments and Functional impairments: start Comprehensive Rehab Program of PT/OT  - 3 hours a day, 5 days a week  - S/p R supraclavicular mass biopsy-> significant for Classic Hodgkin's Lymphoma  - S/p R thoracotomy/VATS with Pleurex placement on  3/3  - c/w pleurX drainage  - Bone marrow biopsy negative  - L chest wall mediport placed 3/15  - Cycle #1 of BV (Brentuximad Vedotin) given on 3/10/23  - s/p C2 brentuximab 4/3/23  - Plan for cycle #3  on 4/24/23  -  No plan to receive chemo while in rehab  - Await [facility] referral   - F/u with Dr. Hays outpatient  - 4/11: Pulm consult ->  Maintain 02 >90%, Ipratropium-Albuterol every 6 hours, Sodium chloride nebuizer BID , IS, Chest PT.  -- Pulm following  * CXR 4/11--No acute changes, Rt effusion with atelectasis, Left mid and lower opacities  - serial CXR for monitoring    #AFIB  - Eliquis on hold  --Clarify from [facility] cardiothoracic team when this could be recommenced     #HTN  - Atenolol 12.5mg daily    #HLD  - Simvastatin 40mg daily    #Sleep/Mood  - Lexapro 10mg daily  - Trazodone 25mg nightly    #Skin  - Skin --right chest wall Pleurex drain site + dressing  - Pressure injury/Skin: OOB to Chair, PT/OT     #Pain Mgmt   - Tylenol PRN    #GI/Bowel Mgmt   - c/w Senna, Miralax     #/Bladder Mgmt   - Chronic Plummer last changed 4/7/23  - F/u with Urology outpt     #FEN   - Diet - Regular + Thins  [DASH]      #Precautions / PROPHYLAXIS:   - Falls, aspiration  - ortho: Weight bearing status: WBAT   - Lungs: Aspiration, Incentive Spirometer   - DVT: Lovenox    #GOC  CODE STATUS: FULL CODE      Next of kin--(Daughter  [phone]) a physician    4/12--I called on phone and d/w daughter Dr Gasca, She stated recommendations as requested by cardiothoracic unit of [facility] prior to patient's dc from there and T/f to acute rehab  --pleurax drain MWF, regular CXR, ,Parameters for BP meds,and family request for, consult reviews  All requests being adhered to      Outpatient Follow-up (Specialty/Name of physician):    Nathaniel Galvan)  Surgery; Thoracic Surgery  [phone] [place] [address], Oncology Building  3rd [place] [address]  Phone: [phone]  Fax: [phone]  Established Patient  Follow Up Time: 2 weeks    UROLOGY  MD Saúl Hamm [facility]  [facility]  Scheduled Appointment: 03/27/2023

## 2023-04-12 NOTE — BH CONSULTATION LIAISON ASSESSMENT NOTE - NSBHCHARTREVIEWVS_PSY_A_CORE FT
Vital Signs Last 24 Hrs  T(C): 37 (12 Apr 2023 07:18), Max: 37.2 (11 Apr 2023 20:09)  T(F): 98.6 (12 Apr 2023 07:18), Max: 98.9 (11 Apr 2023 20:09)  HR: 64 (12 Apr 2023 08:53) (64 - 88)  BP: 122/52 (12 Apr 2023 07:18) (107/64 - 122/52)  BP(mean): --  RR: 16 (12 Apr 2023 07:18) (16 - 18)  SpO2: 100% (12 Apr 2023 08:53) (94% - 100%)    Parameters below as of 12 Apr 2023 08:53  Patient On (Oxygen Delivery Method): nasal cannula

## 2023-04-12 NOTE — BH CONSULTATION LIAISON ASSESSMENT NOTE - SUMMARY
Patient with 3 month of hospitalization, upset with his condition, no prior h/o Depression or anxiety, not seen by psychiatrist before hospitalization. , started recently on lexapro 10 mg and trazodone 25 mg . As per daughter MD , he is doing ok and wants to leave him now on the same dose.

## 2023-04-12 NOTE — PROGRESS NOTE ADULT - SUBJECTIVE AND OBJECTIVE BOX
Time of Visit:  Patient seen and examined.     MEDICATIONS  (STANDING):  albuterol    0.083% 2.5 milliGRAM(s) Nebulizer every 6 hours  ammonium lactate 12% Lotion 1 Application(s) Topical two times a day  atenolol  Tablet 12.5 milliGRAM(s) Oral daily  enoxaparin Injectable 40 milliGRAM(s) SubCutaneous every 24 hours  escitalopram 10 milliGRAM(s) Oral daily  famotidine    Tablet 20 milliGRAM(s) Oral daily  magnesium oxide 400 milliGRAM(s) Oral three times a day with meals  polyethylene glycol 3350 17 Gram(s) Oral daily  senna 2 Tablet(s) Oral at bedtime  simvastatin 40 milliGRAM(s) Oral at bedtime  sodium chloride 3%  Inhalation 4 milliLiter(s) Inhalation every 12 hours  traZODone 25 milliGRAM(s) Oral at bedtime      MEDICATIONS  (PRN):  acetaminophen     Tablet .. 650 milliGRAM(s) Oral every 6 hours PRN Temp greater or equal to 38C (100.4F), Mild Pain (1 - 3)  oxyCODONE    IR 5 milliGRAM(s) Oral every 4 hours PRN Moderate Pain (4 - 6)       Medications up to date at time of exam.      PHYSICAL EXAMINATION:  Patient has no new complaints.  GENERAL: The patient is a well-developed, well-nourished, in no apparent distress.     Vital Signs Last 24 Hrs  T(C): 37 (12 Apr 2023 07:18), Max: 37.2 (11 Apr 2023 20:09)  T(F): 98.6 (12 Apr 2023 07:18), Max: 98.9 (11 Apr 2023 20:09)  HR: 64 (12 Apr 2023 08:53) (64 - 88)  BP: 122/52 (12 Apr 2023 07:18) (107/64 - 122/52)  BP(mean): --  RR: 16 (12 Apr 2023 07:18) (16 - 18)  SpO2: 100% (12 Apr 2023 08:53) (94% - 100%)    Parameters below as of 12 Apr 2023 08:53  Patient On (Oxygen Delivery Method): nasal cannula       (if applicable)    Chest Tube (if applicable)    HEENT: Head is normocephalic and atraumatic. Extraocular muscles are intact. Mucous membranes are moist.     NECK: Supple, no palpable adenopathy.    LUNGS: Clear to auscultation, no wheezing, rales, or rhonchi. Right pleurX cath with drained bottle attached     HEART: Regular rate and rhythm without murmur.    ABDOMEN: Soft, nontender, and nondistended.  No hepatosplenomegaly is noted.    EXTREMITIES: Without any cyanosis, clubbing, rash, lesions or edema.    NEUROLOGIC: Awake, alert, oriented, grossly intact    SKIN: Warm, dry, good turgor.      LABS:                        8.3    10.33 )-----------( 268      ( 11 Apr 2023 06:50 )             27.6     04-11    139  |  100  |  7   ----------------------------<  108<H>  3.7   |  37<H>  |  0.60    Ca    8.5      11 Apr 2023 06:50    TPro  6.3  /  Alb  1.8<L>  /  TBili  0.6  /  DBili  x   /  AST  26  /  ALT  33  /  AlkPhos  68  04-11                        MICROBIOLOGY: (if applicable)    RADIOLOGY & ADDITIONAL STUDIES:  EKG:   CXR:  ECHO:    IMPRESSION: 82y Male PAST MEDICAL & SURGICAL HISTORY:  Atrial fibrillation      Hypertension      Urinary retention      No significant past surgical history       p/w         IMP This is an 82 yr old man , non smoker with multiple medical condition s/p right pleurX cath placement for pleural effusion due to Hodgkin's Lymphoma . CXR 4/11 with  loculated right effusion .  Staff reported episode of hypoxia requiring O2 10 L , seen receiving 3 L at time of my examination .  Acute hypoxic resp failure probable due to atelectasis , improved with pat sitting in chair . No pleural effusion requiring drainage at this time       Sugg  - Continue O2 supp as needed to maintain sat >90%  - Chest PT   - Start duonebs q6h while awake   - NaCl 3% neb q12h   - OOB to chair as tolerated   - Incentive spirometry   - DVT GI prophy   - No indication for drainage of pleurX cath at this time , pat ia requiring less O2 and no symptomatic . Pleural effusion is loculated .        spoke with daughter ( retired pediatrician at bedside )

## 2023-04-12 NOTE — CONSULT NOTE ADULT - SUBJECTIVE AND OBJECTIVE BOX
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  He was intubated in the field and brought to [facility] ED and admitted to ICU for acute respiratory failure with hypercapnia. CTA chest was negative for PE but significant for a large R pleural effusion with mediastinal and hilar lymphadenopathy, and 3L of fluid was drained.  He completed a 5 day course of empiric antibiotics. Pleural fluid cultures and blood cultures resulted negative.  Suspicious of underlying malignancy secondary to lymphadenopathy and persistent leukocytosis, Heme/Onc was consulted and recommended transfer to [facility] on 2/24/23 for further workup. On 2/28 he underwent an ultrasound guided biopsy of his R supraclavicular mass/lymph node, which later resulted as classic Hodgkin's lymphoma.  On 3/3, he underwent a R thoracotomy/VATS with Pleurex placement. Postoperatively, he was hypotensive and started on Midodrine. A Left chest wall mediport was placed for ongoing chemotherapy treatment on 3/15. He was admitted  to [facility] on 3/24.    While at rehab, he developed chest discomfort., low systolic BP and mild tachycardia. EKG showed Afib with rapid ventricular rythmn. On exam, he had increased respiratory rate. CXR and  CT chest showed increased Rt sided effusion with loculation and increasing atelectasis. He was transferred to [facility]. In the ED, CXR showed recurrent loculated R pleural effusion. CT chest with small loculated R pleural effusion with new peripheral demarcated high attenuation in the R pleural space. He  was seen by CT surgery in the ED, per paper chart, "drained about 100cc of fluid, placed on Pleura vac . PleurX was then flushed and drained about 2180cc fluid  need to hold AC for 48 hours for possible TPA into pleurX cath.  MIST held per CT surgery.  No plan for VATS. CXR  on 3/27, Decreasing pulmonary congestion, No pneumothorax. On 4/6 CT Chest -- Compared to 3/29/2023, similar loculated, exudative right pleural effusion with slightly decreased pleural air component and Pleurx catheter in place. Question malignant effusion in the setting of reported lymphoma, irregular septal thickening, nodularity and mediastinal pleural involvement.     Patient was evaluated by PM&R and therapy for functional deficits, gait/ADL impairments and acute rehabilitation was recommended. Patient was medically optimized for discharge to [facility] IRU on 4/10/23.    H/o atonic bladder and urinary retention. On self caths at home and now with Plummer. Doing well with Plummer. Improved s/p chemoTx.      PAST MEDICAL & SURGICAL HISTORY:  Atrial fibrillation      Hypertension      Urinary retention      No significant past surgical history          REVIEW OF SYSTEMS:    CONSTITUTIONAL:  no fevers or chills  CARDIOVASCULAR: No chest pain  GASTROINTESTINAL: No abdominal or epigastric pain.    MEDICATIONS  (STANDING):  albuterol    0.083% 2.5 milliGRAM(s) Nebulizer every 6 hours  ammonium lactate 12% Lotion 1 Application(s) Topical two times a day  atenolol  Tablet 12.5 milliGRAM(s) Oral daily  enoxaparin Injectable 40 milliGRAM(s) SubCutaneous every 24 hours  escitalopram 10 milliGRAM(s) Oral daily  famotidine    Tablet 20 milliGRAM(s) Oral daily  magnesium oxide 400 milliGRAM(s) Oral three times a day with meals  polyethylene glycol 3350 17 Gram(s) Oral daily  senna 2 Tablet(s) Oral at bedtime  simvastatin 40 milliGRAM(s) Oral at bedtime  sodium chloride 3%  Inhalation 4 milliLiter(s) Inhalation every 12 hours  traZODone 25 milliGRAM(s) Oral at bedtime    MEDICATIONS  (PRN):  acetaminophen     Tablet .. 650 milliGRAM(s) Oral every 6 hours PRN Temp greater or equal to 38C (100.4F), Mild Pain (1 - 3)  oxyCODONE    IR 5 milliGRAM(s) Oral every 4 hours PRN Moderate Pain (4 - 6)      Allergies    pertussis vaccines (Rash)  shellfish (Rash)    Intolerances        SOCIAL HISTORY: No illicit drug use    FAMILY HISTORY:      Vital Signs Last 24 Hrs  T(C): 37 (12 Apr 2023 07:18), Max: 37.2 (11 Apr 2023 20:09)  T(F): 98.6 (12 Apr 2023 07:18), Max: 98.9 (11 Apr 2023 20:09)  HR: 64 (12 Apr 2023 08:53) (64 - 88)  BP: 122/52      PHYSICAL EXAM:    Constitutional: NAD  Abd: Soft, NT/ND  : Plummer clear  Neurological: A/O x 3  Psychiatric: Normal mood, normal affect        LABS:                        8.3    10.33 )-----------( 268      ( 11 Apr 2023 06:50 )             27.6     04-11    139  |  100  |  7   ----------------------------<  108<H>  3.7   |  37<H>  |  0.60

## 2023-04-12 NOTE — PROGRESS NOTE ADULT - SUBJECTIVE AND OBJECTIVE BOX
CC:       Today's Subjective & Objective Findings:      Denies headache, dizziness, visual changes, chest pain, SOB/CRUZ, abdominal pain, nausea, vomiting, diarrhea, dysuria, numbness or tingling.    Therapy-- engaging, motivated          Vital Signs Last 24 Hrs  T(C): 37 (12 Apr 2023 07:18), Max: 37.2 (11 Apr 2023 20:09)  T(F): 98.6 (12 Apr 2023 07:18), Max: 98.9 (11 Apr 2023 20:09)  HR: 64 (12 Apr 2023 08:53) (64 - 88)  BP: 122/52 (12 Apr 2023 07:18) (107/64 - 122/52)  BP(mean): --  RR: 16 (12 Apr 2023 07:18) (16 - 18)  SpO2: 100% (12 Apr 2023 08:53) (94% - 100%)CC:    PHYSICAL EXAM:  Gen - NAD, Comfortable  HEENT - NCAT, EOMI, MMM, + nasal cannula  Neck - Supple, No limited ROM  Pulm - decreased breathe sounds  Cardiovascular - RRR, S1S2  Chest - + pleurex drain to right anterior chest wall    Abdomen - Soft, NT/ND, +BS  Extremities - No Cyanosis, no clubbing, no edema, no calf tenderness  Neuro-     Cognitive - awake, alert, oriented x 3.  Able to follow command     Communication - Fluent     Attention: Intact     Cranial Nerves - CN 2-12 intact.     Motor -                     LEFT    UE - 4/5                    RIGHT UE - 4/5                    LEFT    LE - 4/5                    RIGHT LE - 4/5        Sensory - Intact  to LT      Tone - normal  Psychiatric - Mood stable, Affect WNL  Skin:  all skin intact      LABS:                        8.3    10.33 )-----------( 268      ( 11 Apr 2023 06:50 )             27.6     04-11    139  |  100  |  7   ----------------------------<  108<H>  3.7   |  37<H>  |  0.60    Ca    8.5      11 Apr 2023 06:50    TPro  6.3  /  Alb  1.8<L>  /  TBili  0.6  /  DBili  x   /  AST  26  /  ALT  33  /  AlkPhos  68  04-11      MEDICATIONS  (STANDING):  albuterol    0.083% 2.5 milliGRAM(s) Nebulizer every 6 hours  ammonium lactate 12% Lotion 1 Application(s) Topical two times a day  atenolol  Tablet 12.5 milliGRAM(s) Oral daily  enoxaparin Injectable 40 milliGRAM(s) SubCutaneous every 24 hours  escitalopram 10 milliGRAM(s) Oral daily  famotidine    Tablet 20 milliGRAM(s) Oral daily  magnesium oxide 400 milliGRAM(s) Oral three times a day with meals  polyethylene glycol 3350 17 Gram(s) Oral daily  senna 2 Tablet(s) Oral at bedtime  simvastatin 40 milliGRAM(s) Oral at bedtime  sodium chloride 3%  Inhalation 4 milliLiter(s) Inhalation every 12 hours  traZODone 25 milliGRAM(s) Oral at bedtime    MEDICATIONS  (PRN):  acetaminophen     Tablet .. 650 milliGRAM(s) Oral every 6 hours PRN Temp greater or equal to 38C (100.4F), Mild Pain (1 - 3)  oxyCODONE    IR 5 milliGRAM(s) Oral every 4 hours PRN Moderate Pain (4 - 6) CC: Hodgkin Lymphoma    Today's Subjective & Objective Findings:  Patient seen and examined at bedside, this AM.  Sitting up in recliner during encounter.  Admits to sleeping well.  Experiences CRUZ, but comfortable at rest.  Choi catheter in place.   No discomfort at Choi site.   Last BM on 4/12, per chart review.   No other complaints at this time.    Denies headache, dizziness, visual changes, chest pain, abdominal pain, nausea, vomiting, diarrhea, dysuria, numbness or tingling.    Therapy-- engaging, motivated  CRUZ is a limiting factor.    Vital Signs Last 24 Hrs  T(C): 37 (12 Apr 2023 07:18), Max: 37.2 (11 Apr 2023 20:09)  T(F): 98.6 (12 Apr 2023 07:18), Max: 98.9 (11 Apr 2023 20:09)  HR: 64 (12 Apr 2023 08:53) (64 - 88)  BP: 122/52 (12 Apr 2023 07:18) (107/64 - 122/52)  BP(mean): --  RR: 16 (12 Apr 2023 07:18) (16 - 18)  SpO2: 100% (12 Apr 2023 08:53) (94% - 100%)CC:    PHYSICAL EXAM:  Gen - NAD, Comfortable  HEENT - NCAT, EOMI, MMM, + nasal cannula  Neck - Supple, No limited ROM  Pulm - decreased breathe sounds  Cardiovascular - RRR, S1S2  Chest - + pleurex drain to right anterior chest wall    Abdomen - Soft, NT/ND, +BS  Extremities - No Cyanosis, no clubbing, no edema, no calf tenderness  Neuro-     Cognitive - awake, alert, oriented x 3.  Able to follow command     Communication - Fluent     Attention: Intact     Cranial Nerves - CN 2-12 intact.     Motor -                     LEFT    UE - 4/5                    RIGHT UE - 4/5                    LEFT    LE - 4/5                    RIGHT LE - 4/5        Sensory - Intact  to LT      Tone - normal  Psychiatric - Mood stable, Affect WNL  Skin:  all skin intact      LABS:                        8.3    10.33 )-----------( 268      ( 11 Apr 2023 06:50 )             27.6     04-11    139  |  100  |  7   ----------------------------<  108<H>  3.7   |  37<H>  |  0.60    Ca    8.5      11 Apr 2023 06:50    TPro  6.3  /  Alb  1.8<L>  /  TBili  0.6  /  DBili  x   /  AST  26  /  ALT  33  /  AlkPhos  68  04-11      MEDICATIONS  (STANDING):  albuterol    0.083% 2.5 milliGRAM(s) Nebulizer every 6 hours  ammonium lactate 12% Lotion 1 Application(s) Topical two times a day  atenolol  Tablet 12.5 milliGRAM(s) Oral daily  enoxaparin Injectable 40 milliGRAM(s) SubCutaneous every 24 hours  escitalopram 10 milliGRAM(s) Oral daily  famotidine    Tablet 20 milliGRAM(s) Oral daily  magnesium oxide 400 milliGRAM(s) Oral three times a day with meals  polyethylene glycol 3350 17 Gram(s) Oral daily  senna 2 Tablet(s) Oral at bedtime  simvastatin 40 milliGRAM(s) Oral at bedtime  sodium chloride 3%  Inhalation 4 milliLiter(s) Inhalation every 12 hours  traZODone 25 milliGRAM(s) Oral at bedtime    MEDICATIONS  (PRN):  acetaminophen     Tablet .. 650 milliGRAM(s) Oral every 6 hours PRN Temp greater or equal to 38C (100.4F), Mild Pain (1 - 3)  oxyCODONE    IR 5 milliGRAM(s) Oral every 4 hours PRN Moderate Pain (4 - 6) CC: Hodgkin Lymphoma    Today's Subjective & Objective Findings:  Patient seen and examined at bedside, this AM.  Sitting up in recliner during encounter.  Admits to sleeping well.  Experiences CRUZ, but comfortable at rest.  Choi catheter in place.   No discomfort at Choi site.   Last BM on 4/12, per chart review.   No other complaints at this time.    Denies headache, dizziness, visual changes, chest pain, abdominal pain, nausea, vomiting, diarrhea, dysuria, numbness or tingling.    Therapy-- engaging, motivated  Working on muscle strengthening and endurance  Has mild exertional dyspnea, but no acute distress    Vital Signs Last 24 Hrs  T(C): 37 (12 Apr 2023 07:18), Max: 37.2 (11 Apr 2023 20:09)  T(F): 98.6 (12 Apr 2023 07:18), Max: 98.9 (11 Apr 2023 20:09)  HR: 64 (12 Apr 2023 08:53) (64 - 88)  BP: 122/52 (12 Apr 2023 07:18) (107/64 - 122/52)  RR: 16 (12 Apr 2023 07:18) (16 - 18)  SpO2: 100% (12 Apr 2023 08:53) (94% - 100%)CC:    PHYSICAL EXAM:  Gen - NAD, Comfortable  HEENT - NCAT, EOMI, MMM, + nasal cannula  Neck - Supple, No limited ROM  Pulm - decreased breathe sounds  Cardiovascular - RRR, S1S2  Chest - + pleurex drain to right anterior chest wall    Abdomen - Soft, NT/ND, +BS  Extremities - No Cyanosis, no clubbing, no edema, no calf tenderness  Neuro-     Cognitive - awake, alert, oriented x 3.  Able to follow command     Communication - Fluent     Attention: Intact     Cranial Nerves - CN 2-12 intact.     Motor -                     LEFT    UE - 4/5                    RIGHT UE - 4/5                    LEFT    LE - 4/5                    RIGHT LE - 4/5        Sensory - Intact  to LT      Tone - normal  Psychiatric - Mood stable, Affect WNL  Skin:  all skin intact      LABS:                        8.3    10.33 )-----------( 268      ( 11 Apr 2023 06:50 )             27.6     04-11    139  |  100  |  7   ----------------------------<  108<H>  3.7   |  37<H>  |  0.60    Ca    8.5      11 Apr 2023 06:50    TPro  6.3  /  Alb  1.8<L>  /  TBili  0.6  /  DBili  x   /  AST  26  /  ALT  33  /  AlkPhos  68  04-11      MEDICATIONS  (STANDING):  albuterol    0.083% 2.5 milliGRAM(s) Nebulizer every 6 hours  ammonium lactate 12% Lotion 1 Application(s) Topical two times a day  atenolol  Tablet 12.5 milliGRAM(s) Oral daily  enoxaparin Injectable 40 milliGRAM(s) SubCutaneous every 24 hours  escitalopram 10 milliGRAM(s) Oral daily  famotidine    Tablet 20 milliGRAM(s) Oral daily  magnesium oxide 400 milliGRAM(s) Oral three times a day with meals  polyethylene glycol 3350 17 Gram(s) Oral daily  senna 2 Tablet(s) Oral at bedtime  simvastatin 40 milliGRAM(s) Oral at bedtime  sodium chloride 3%  Inhalation 4 milliLiter(s) Inhalation every 12 hours  traZODone 25 milliGRAM(s) Oral at bedtime    MEDICATIONS  (PRN):  acetaminophen     Tablet .. 650 milliGRAM(s) Oral every 6 hours PRN Temp greater or equal to 38C (100.4F), Mild Pain (1 - 3)  oxyCODONE    IR 5 milliGRAM(s) Oral every 4 hours PRN Moderate Pain (4 - 6)

## 2023-04-12 NOTE — BH CONSULTATION LIAISON ASSESSMENT NOTE - HPI (INCLUDE ILLNESS QUALITY, SEVERITY, DURATION, TIMING, CONTEXT, MODIFYING FACTORS, ASSOCIATED SIGNS AND SYMPTOMS)
Reason for Admission: Debility due to Hodgkin's Lymphoma     This is a 81 YO male with PMH of AFIB, HTN and chronic urinary retention (chronic plummer); who presented to [facility] via ambulance on 2/17 with progressively worsening SOB.  He was intubated in the field and brought to [facility] ED and admitted to ICU for acute respiratory failure with hypercapnia. CTA chest was negative for PE but significant for a large R pleural effusion with mediastinal and hilar lymphadenopathy, and 3L of fluid was drained.  He completed a 5 day course of empiric antibiotics. Pleural fluid cultures and blood cultures resulted negative.  Suspicious of underlying malignancy secondary to lymphadenopathy and persistent leukocytosis, Heme/Onc was consulted and recommended transfer to [facility] on 2/24/23 for further workup. On 2/28 he underwent an ultrasound guided biopsy of his R supraclavicular mass/lymph node, which later resulted as classic Hodgkin's lymphoma.  On 3/3, he underwent a R thoracotomy/VATS with Pleurex placement. Postoperatively, he was hypotensive and started on Midodrine. A Left chest wall mediport was placed for ongoing chemotherapy treatment on 3/15. He was admitted  to [facility] on 3/24.    While at rehab, he developed chest discomfort., low systolic BP and mild tachycardia. EKG showed Afib with rapid ventricular rythmn. On exam, he had increased respiratory rate. CXR and  CT chest showed increased Rt sided effusion with loculation and increasing atelectasis. He was transferred to [facility]. In the ED, CXR showed recurrent loculated R pleural effusion. CT chest with small loculated R pleural effusion with new peripheral demarcated high attenuation in the R pleural space. He  was seen by CT surgery in the ED, per paper chart, "drained about 100cc of fluid, placed on Pleura vac . PleurX was then flushed and drained about 2180cc fluid  need to hold AC for 48 hours for possible TPA into pleurX cath.  MIST held per CT surgery.  No plan for VATS. CXR  on 3/27, Decreasing pulmonary congestion, No pneumothorax. On 4/6 CT Chest -- Compared to 3/29/2023, similar loculated, exudative right pleural effusion with slightly decreased pleural air component and Pleurx catheter in place. Question malignant effusion in the setting of reported lymphoma, irregular septal thickening, nodularity and mediastinal pleural involvement.     Patient was evaluated by PM&R and therapy for functional deficits, gait/ADL impairments and acute rehabilitation was recommended. Patient was medically optimized for discharge to [facility]/4/10. Psychiatry aske to evaluate for anxiety and depression with optimization of meds,

## 2023-04-12 NOTE — CONSULT NOTE ADULT - ASSESSMENT
Rehab s/p hospitalizations for lymphoma. Atonic bladder / retention. Doing well with Choi. Choi recently changed    - continue Choi  - to resume self caths at home after discharge

## 2023-04-13 DIAGNOSIS — C81.90 HODGKIN LYMPHOMA, UNSPECIFIED, UNSPECIFIED SITE: ICD-10-CM

## 2023-04-13 LAB
ALBUMIN SERPL ELPH-MCNC: 1.8 G/DL — LOW (ref 3.3–5)
ALP SERPL-CCNC: 68 U/L — SIGNIFICANT CHANGE UP (ref 40–120)
ALT FLD-CCNC: 31 U/L — SIGNIFICANT CHANGE UP (ref 10–45)
ANION GAP SERPL CALC-SCNC: 5 MMOL/L — SIGNIFICANT CHANGE UP (ref 5–17)
AST SERPL-CCNC: 25 U/L — SIGNIFICANT CHANGE UP (ref 10–40)
BILIRUB SERPL-MCNC: 0.5 MG/DL — SIGNIFICANT CHANGE UP (ref 0.2–1.2)
BUN SERPL-MCNC: 9 MG/DL — SIGNIFICANT CHANGE UP (ref 7–23)
CALCIUM SERPL-MCNC: 8.6 MG/DL — SIGNIFICANT CHANGE UP (ref 8.4–10.5)
CHLORIDE SERPL-SCNC: 100 MMOL/L — SIGNIFICANT CHANGE UP (ref 96–108)
CO2 SERPL-SCNC: 33 MMOL/L — HIGH (ref 22–31)
CREAT SERPL-MCNC: 0.58 MG/DL — SIGNIFICANT CHANGE UP (ref 0.5–1.3)
EGFR: 97 ML/MIN/1.73M2 — SIGNIFICANT CHANGE UP
GLUCOSE SERPL-MCNC: 106 MG/DL — HIGH (ref 70–99)
HCT VFR BLD CALC: 27.9 % — LOW (ref 39–50)
HGB BLD-MCNC: 8.7 G/DL — LOW (ref 13–17)
MCHC RBC-ENTMCNC: 29.9 PG — SIGNIFICANT CHANGE UP (ref 27–34)
MCHC RBC-ENTMCNC: 31.2 GM/DL — LOW (ref 32–36)
MCV RBC AUTO: 95.9 FL — SIGNIFICANT CHANGE UP (ref 80–100)
NRBC # BLD: 0 /100 WBCS — SIGNIFICANT CHANGE UP (ref 0–0)
PLATELET # BLD AUTO: 305 K/UL — SIGNIFICANT CHANGE UP (ref 150–400)
POTASSIUM SERPL-MCNC: 3.8 MMOL/L — SIGNIFICANT CHANGE UP (ref 3.5–5.3)
POTASSIUM SERPL-SCNC: 3.8 MMOL/L — SIGNIFICANT CHANGE UP (ref 3.5–5.3)
PROT SERPL-MCNC: 6.5 G/DL — SIGNIFICANT CHANGE UP (ref 6–8.3)
RBC # BLD: 2.91 M/UL — LOW (ref 4.2–5.8)
RBC # FLD: 20.7 % — HIGH (ref 10.3–14.5)
SODIUM SERPL-SCNC: 138 MMOL/L — SIGNIFICANT CHANGE UP (ref 135–145)
WBC # BLD: 7.83 K/UL — SIGNIFICANT CHANGE UP (ref 3.8–10.5)
WBC # FLD AUTO: 7.83 K/UL — SIGNIFICANT CHANGE UP (ref 3.8–10.5)

## 2023-04-13 PROCEDURE — 99232 SBSQ HOSP IP/OBS MODERATE 35: CPT

## 2023-04-13 PROCEDURE — 93010 ELECTROCARDIOGRAM REPORT: CPT

## 2023-04-13 PROCEDURE — 99222 1ST HOSP IP/OBS MODERATE 55: CPT

## 2023-04-13 RX ORDER — TRAZODONE HCL 50 MG
50 TABLET ORAL AT BEDTIME
Refills: 0 | Status: DISCONTINUED | OUTPATIENT
Start: 2023-04-13 | End: 2023-04-23

## 2023-04-13 RX ADMIN — MAGNESIUM OXIDE 400 MG ORAL TABLET 400 MILLIGRAM(S): 241.3 TABLET ORAL at 08:36

## 2023-04-13 RX ADMIN — MAGNESIUM OXIDE 400 MG ORAL TABLET 400 MILLIGRAM(S): 241.3 TABLET ORAL at 17:26

## 2023-04-13 RX ADMIN — ALBUTEROL 2.5 MILLIGRAM(S): 90 AEROSOL, METERED ORAL at 20:44

## 2023-04-13 RX ADMIN — ALBUTEROL 2.5 MILLIGRAM(S): 90 AEROSOL, METERED ORAL at 09:25

## 2023-04-13 RX ADMIN — ALBUTEROL 2.5 MILLIGRAM(S): 90 AEROSOL, METERED ORAL at 15:42

## 2023-04-13 RX ADMIN — Medication 1 APPLICATION(S): at 17:25

## 2023-04-13 RX ADMIN — ENOXAPARIN SODIUM 40 MILLIGRAM(S): 100 INJECTION SUBCUTANEOUS at 21:57

## 2023-04-13 RX ADMIN — Medication 1 APPLICATION(S): at 05:42

## 2023-04-13 RX ADMIN — FAMOTIDINE 20 MILLIGRAM(S): 10 INJECTION INTRAVENOUS at 11:23

## 2023-04-13 RX ADMIN — ATENOLOL 12.5 MILLIGRAM(S): 25 TABLET ORAL at 05:43

## 2023-04-13 RX ADMIN — ESCITALOPRAM OXALATE 10 MILLIGRAM(S): 10 TABLET, FILM COATED ORAL at 11:23

## 2023-04-13 RX ADMIN — Medication 50 MILLIGRAM(S): at 21:57

## 2023-04-13 RX ADMIN — SIMVASTATIN 40 MILLIGRAM(S): 20 TABLET, FILM COATED ORAL at 21:56

## 2023-04-13 NOTE — PROGRESS NOTE ADULT - SUBJECTIVE AND OBJECTIVE BOX
Time of Visit:  Patient seen and examined.     MEDICATIONS  (STANDING):  albuterol    0.083% 2.5 milliGRAM(s) Nebulizer every 6 hours  ammonium lactate 12% Lotion 1 Application(s) Topical two times a day  atenolol  Tablet 12.5 milliGRAM(s) Oral daily  enoxaparin Injectable 40 milliGRAM(s) SubCutaneous every 24 hours  escitalopram 10 milliGRAM(s) Oral daily  famotidine    Tablet 20 milliGRAM(s) Oral daily  magnesium oxide 400 milliGRAM(s) Oral three times a day with meals  polyethylene glycol 3350 17 Gram(s) Oral daily  senna 2 Tablet(s) Oral at bedtime  simvastatin 40 milliGRAM(s) Oral at bedtime  sodium chloride 3%  Inhalation 4 milliLiter(s) Inhalation every 12 hours  traZODone 50 milliGRAM(s) Oral at bedtime      MEDICATIONS  (PRN):  acetaminophen     Tablet .. 650 milliGRAM(s) Oral every 6 hours PRN Temp greater or equal to 38C (100.4F), Mild Pain (1 - 3)  oxyCODONE    IR 5 milliGRAM(s) Oral every 4 hours PRN Moderate Pain (4 - 6)       Medications up to date at time of exam.      PHYSICAL EXAMINATION:  Patient has no new complaints.  GENERAL: The patient is a well-developed, well-nourished, in no apparent distress.     Vital Signs Last 24 Hrs  T(C): 36.3 (13 Apr 2023 10:17), Max: 36.7 (12 Apr 2023 20:09)  T(F): 97.3 (13 Apr 2023 10:17), Max: 98.1 (12 Apr 2023 20:09)  HR: 88 (13 Apr 2023 10:17) (77 - 88)  BP: 110/70 (13 Apr 2023 10:17) (104/66 - 127/61)  BP(mean): --  RR: 18 (13 Apr 2023 10:17) (16 - 18)  SpO2: 95% (13 Apr 2023 16:37) (94% - 96%)    Parameters below as of 13 Apr 2023 10:20  Patient On (Oxygen Delivery Method): nasal cannula       (if applicable)    Chest Tube (if applicable)    HEENT: Head is normocephalic and atraumatic. Extraocular muscles are intact. Mucous membranes are moist.     NECK: Supple, no palpable adenopathy.    LUNGS: Clear to auscultation, no wheezing, rales, or rhonchi. Right pleurX  cath     HEART: Regular rate and rhythm without murmur.    ABDOMEN: Soft, nontender, and nondistended.  No hepatosplenomegaly is noted.    EXTREMITIES: Without any cyanosis, clubbing, rash, lesions or edema.    NEUROLOGIC: Awake     SKIN: Warm, dry, good turgor.      LABS:                        8.7    7.83  )-----------( 305      ( 13 Apr 2023 07:15 )             27.9     04-13    138  |  100  |  9   ----------------------------<  106<H>  3.8   |  33<H>  |  0.58    Ca    8.6      13 Apr 2023 07:15    TPro  6.5  /  Alb  1.8<L>  /  TBili  0.5  /  DBili  x   /  AST  25  /  ALT  31  /  AlkPhos  68  04-13                        MICROBIOLOGY: (if applicable)    RADIOLOGY & ADDITIONAL STUDIES:  EKG:   CXR:  ECHO:    IMPRESSION: 82y Male PAST MEDICAL & SURGICAL HISTORY:  Atrial fibrillation      Hypertension      Urinary retention      No significant past surgical history       p/w           IMP This is an 82 yr old man , non smoker with multiple medical condition s/p right pleurX cath placement for pleural effusion due to Hodgkin's Lymphoma . CXR 4/11 with  loculated right effusion .  Staff reported episode of hypoxia requiring O2 10 L , seen receiving 3 L at time of my examination .  Acute hypoxic resp failure probable due to atelectasis , improved with pat sitting in chair . No pleural effusion requiring drainage at this time       Sugg  - Continue O2 supp as needed to maintain sat >90%  - Chest PT   - Start duonebs q6h while awake   - NaCl 3% neb q12h   - OOB to chair as tolerated   - Incentive spirometry   - DVT GI prophy   - No indication for drainage of pleurX cath at this time , pat ia requiring less O2 and no symptomatic  Pleural effusion is loculated .

## 2023-04-13 NOTE — CONSULT NOTE ADULT - SUBJECTIVE AND OBJECTIVE BOX
ARIK DUMONT  82y  Male  Admitting: S. Yulia    HPI:  This is a 83 YO male with PMH of AFIB, HTN and chronic urinary retention (chronic plummer); who presented to [facility] via ambulance on 2/17 with progressively worsening SOB.  He was intubated in the field and brought to [facility] ED and admitted to ICU for acute respiratory failure with hypercapnia. CTA chest was negative for PE but significant for a large R pleural effusion with mediastinal and hilar lymphadenopathy, and 3L of fluid was drained.  He completed a 5 day course of empiric antibiotics. Pleural fluid cultures and blood cultures resulted negative.  Suspicious of underlying malignancy secondary to lymphadenopathy and persistent leukocytosis, Heme/Onc was consulted and recommended transfer to [facility] on 2/24/23 for further workup. On 2/28 he underwent an ultrasound guided biopsy of his R supraclavicular mass/lymph node, which later resulted as classic Hodgkin's lymphoma.  On 3/3, he underwent a R thoracotomy/VATS with Pleurex placement. Postoperatively, he was hypotensive and started on Midodrine. A Left chest wall mediport was placed for ongoing chemotherapy treatment on 3/15. He was admitted  to [facility] on 3/24.    While at rehab, he developed chest discomfort., low systolic BP and mild tachycardia. EKG showed Afib with rapid ventricular rythmn. On exam, he had increased respiratory rate. CXR and  CT chest showed increased Rt sided effusion with loculation and increasing atelectasis. He was transferred to [facility]. In the ED, CXR showed recurrent loculated R pleural effusion. CT chest with small loculated R pleural effusion with new peripheral demarcated high attenuation in the R pleural space. He  was seen by CT surgery in the ED, per paper chart, "drained about 100cc of fluid, placed on Pleura vac . PleurX was then flushed and drained about 2180cc fluid  need to hold AC for 48 hours for possible TPA into pleurX cath.  MIST held per CT surgery.  No plan for VATS. CXR  on 3/27, Decreasing pulmonary congestion, No pneumothorax. On 4/6 CT Chest -- Compared to 3/29/2023, similar loculated, exudative right pleural effusion with slightly decreased pleural air component and Pleurx catheter in place. Question malignant effusion in the setting of reported lymphoma, irregular septal thickening, nodularity and mediastinal pleural involvement.     Patient was evaluated by PM&R and therapy for functional deficits, gait/ADL impairments and acute rehabilitation was recommended. Patient was medically optimized for discharge to [facility]U on 4/10/23.      PAST MEDICAL & SURGICAL HISTORY:  Atrial fibrillation      Hypertension      Urinary retention      MEDICATIONS  (STANDING):  albuterol    0.083% 2.5 milliGRAM(s) Nebulizer every 6 hours  ammonium lactate 12% Lotion 1 Application(s) Topical two times a day  atenolol  Tablet 12.5 milliGRAM(s) Oral daily  enoxaparin Injectable 40 milliGRAM(s) SubCutaneous every 24 hours  escitalopram 10 milliGRAM(s) Oral daily  famotidine    Tablet 20 milliGRAM(s) Oral daily  magnesium oxide 400 milliGRAM(s) Oral three times a day with meals  polyethylene glycol 3350 17 Gram(s) Oral daily  senna 2 Tablet(s) Oral at bedtime  simvastatin 40 milliGRAM(s) Oral at bedtime  sodium chloride 3%  Inhalation 4 milliLiter(s) Inhalation every 12 hours  traZODone 25 milliGRAM(s) Oral at bedtime    MEDICATIONS  (PRN):  acetaminophen     Tablet .. 650 milliGRAM(s) Oral every 6 hours PRN Temp greater or equal to 38C (100.4F), Mild Pain (1 - 3)  oxyCODONE    IR 5 milliGRAM(s) Oral every 4 hours PRN Moderate Pain (4 - 6)    Allergies    pertussis vaccines (Rash)  shellfish (Rash)    FAMILY HISTORY: NC in first degree relatives      SOCIAL HISTORY: No EtOH, no tobacco    REVIEW OF SYSTEMS:    CONSTITUTIONAL: No fevers or chills  EYES/ENT: No visual changes;  No vertigo or throat pain   NECK: No pain or stiffness  RESPIRATORY: No hemoptysis  CARDIOVASCULAR: No chest pain or palpitations  GASTROINTESTINAL: No hematemesis; No melena or hematochezia.  GENITOURINARY: No hematuria  NEUROLOGICAL: +weakness  SKIN: No itching   All other review of systems is negative unless indicated above.      T(F): 98.1 (04-12-23 @ 20:09), Max: 98.1 (04-12-23 @ 20:09)  HR: 77 (04-13-23 @ 05:45)  BP: 104/66 (04-13-23 @ 05:45)  RR: 16 (04-12-23 @ 20:09)  SpO2: 96% (04-12-23 @ 21:20)      GENERAL: NAD, well-developed  HEAD:  Atraumatic, Normocephalic  EYES: EOMI, PERRLA, conjunctiva and sclera clear  NECK: Supple, No JVD  CHEST/LUNG: decreased BS right base  HEART: Regular rate and rhythm; No murmurs, rubs, or gallops  ABDOMEN: Soft, Nontender, Nondistended; Bowel sounds present  EXTREMITIES: no calf tenderness  NEUROLOGY: awake, alert  SKIN: No rashes       Labs:             8.7    7.83  )-----------( 305      ( 04-13 @ 07:15 )             27.9                8.3    10.33 )-----------( 268      ( 04-11 @ 06:50 )             27.6     Consultant notes reviewed : YES [x ] ; NO [ ]      Radiology and additional tests:  < from: Xray Chest 1 View-PORTABLE IMMEDIATE (Xray Chest 1 View-PORTABLE IMMEDIATE .) (04.11.23 @ 06:52) >  IMPRESSION:  Right Pleurx catheter are unchanged in position. No   pneumothorax.    Loculated right pleural effusion with likely associated passive   atelectasis, not significantly changed. Other underlying pathology not   excluded.    Patchy left mid and lower lung opacities again seen with some improvement   noted.    < end of copied text >    < from: CT Chest No Cont (04.06.23 @ 10:24) >  IMPRESSION:  Compared to 3/29/2023, similar loculated, exudative right pleural   effusion with slightly decreased pleural air component and Pleurx   catheter in place. Question malignant effusion in the setting of reported   lymphoma, irregular septal thickening, nodularity and mediastinal pleural   involvement.    Stable multistation thoracic lymphadenopathy.      < end of copied text >

## 2023-04-13 NOTE — CONSULT NOTE ADULT - PROBLEM SELECTOR RECOMMENDATION 9
Patient with Hodgkin Lymphoma/pleural effusion/LAD.  - S/P Cycle 1 Brentuximab vedotin (BV) 3/10/23  -s/p C2 Brentuximab 4/3  Now on acute rehab for functional deficits.  DVT prophylaxis. Monitor CBC with differential.  Supportive H/O care while on rehab. Outpatient H/O f/u at the [facility] (Dr. Hays).

## 2023-04-13 NOTE — PROGRESS NOTE ADULT - SUBJECTIVE AND OBJECTIVE BOX
CC: Hodgkin Lymphoma    Today's Subjective & Objective Findings:  Patient seen and examined at bedside, this AM.  Sitting up in recliner during encounter.  Admits to intermittent sleep, feels like sleep can be better.  Experiences CRUZ, but comfortable at rest.  Choi catheter in place.   No discomfort at Choi site.   Last BM on 4/13, per patient.  No other complaints at this time.    Denies headache, dizziness, visual changes, chest pain, abdominal pain, nausea, vomiting, diarrhea, dysuria, numbness or tingling.    Therapy-- engaging, motivated  Working on muscle strengthening and endurance  Has mild exertional dyspnea, but no acute distress      Vital Signs Last 24 Hrs  T(C): 36.3 (13 Apr 2023 10:17), Max: 36.7 (12 Apr 2023 20:09)  T(F): 97.3 (13 Apr 2023 10:17), Max: 98.1 (12 Apr 2023 20:09)  HR: 88 (13 Apr 2023 10:17) (77 - 88)  BP: 110/70 (13 Apr 2023 10:17) (104/66 - 127/61)  BP(mean): --  RR: 18 (13 Apr 2023 10:17) (16 - 18)  SpO2: 94% (13 Apr 2023 10:50) (91% - 96%)      PHYSICAL EXAM:  Gen - NAD, Comfortable  HEENT - NCAT, EOMI, MMM, + nasal cannula  Neck - Supple, No limited ROM  Pulm - decreased breathe sounds  Cardiovascular - RRR, S1S2  Chest - + pleurex drain to right anterior chest wall    Abdomen - Soft, NT/ND, +BS  Extremities - No Cyanosis, no clubbing, no edema, no calf tenderness  Neuro-     Cognitive - awake, alert, oriented x 3.  Able to follow command     Communication - Fluent     Attention: Intact     Cranial Nerves - CN 2-12 intact.     Motor -                     LEFT    UE - 4/5                    RIGHT UE - 4/5                    LEFT    LE - 4/5                    RIGHT LE - 4/5        Sensory - Intact  to LT      Tone - normal  Psychiatric - Mood stable, Affect WNL  Skin:  all skin intact      LABS:                        8.7    7.83  )-----------( 305      ( 13 Apr 2023 07:15 )             27.9     04-13    138  |  100  |  9   ----------------------------<  106<H>  3.8   |  33<H>  |  0.58    Ca    8.6      13 Apr 2023 07:15    TPro  6.5  /  Alb  1.8<L>  /  TBili  0.5  /  DBili  x   /  AST  25  /  ALT  31  /  AlkPhos  68  04-13        MEDICATIONS  (STANDING):  albuterol    0.083% 2.5 milliGRAM(s) Nebulizer every 6 hours  ammonium lactate 12% Lotion 1 Application(s) Topical two times a day  atenolol  Tablet 12.5 milliGRAM(s) Oral daily  enoxaparin Injectable 40 milliGRAM(s) SubCutaneous every 24 hours  escitalopram 10 milliGRAM(s) Oral daily  famotidine    Tablet 20 milliGRAM(s) Oral daily  magnesium oxide 400 milliGRAM(s) Oral three times a day with meals  polyethylene glycol 3350 17 Gram(s) Oral daily  senna 2 Tablet(s) Oral at bedtime  simvastatin 40 milliGRAM(s) Oral at bedtime  sodium chloride 3%  Inhalation 4 milliLiter(s) Inhalation every 12 hours  traZODone 25 milliGRAM(s) Oral at bedtime    MEDICATIONS  (PRN):  acetaminophen     Tablet .. 650 milliGRAM(s) Oral every 6 hours PRN Temp greater or equal to 38C (100.4F), Mild Pain (1 - 3)  oxyCODONE    IR 5 milliGRAM(s) Oral every 4 hours PRN Moderate Pain (4 - 6) CC: Hodgkin Lymphoma    Today's Subjective & Objective Findings:  Patient seen and examined at bedside, this AM.  Sitting up in recliner during encounter.  Admits to intermittent sleep, feels like sleep can be better.  No dyespnea, comfortable at rest.  Choi catheter in place.   No discomfort at Choi site.   Last BM on 4/13, per patient.  No other complaints at this time.    Denies headache, dizziness, visual changes, chest pain, abdominal pain, nausea, vomiting, diarrhea, dysuria, numbness or tingling.    Therapy-- engaging, motivated  Working on muscle strengthening and endurance  Has mild exertional dyspnea, but no acute distress      Vital Signs Last 24 Hrs  T(C): 36.3 (13 Apr 2023 10:17), Max: 36.7 (12 Apr 2023 20:09)  T(F): 97.3 (13 Apr 2023 10:17), Max: 98.1 (12 Apr 2023 20:09)  HR: 88 (13 Apr 2023 10:17) (77 - 88)  BP: 110/70 (13 Apr 2023 10:17) (104/66 - 127/61)  RR: 18 (13 Apr 2023 10:17) (16 - 18)  SpO2: 94% (13 Apr 2023 10:50) (91% - 96%)      PHYSICAL EXAM:  Gen - Comfortable  HEENT - NCAT, EOMI, MMM, + nasal cannula  Neck - Supple, No limited ROM  Pulm - decreased breathe sounds  Cardiovascular - RRR, S1S2  Chest - + pleurex drain to right anterior chest wall    Abdomen - Soft, non tender +BS  Extremities - No Cyanosis, no clubbing, no edema, no calf tenderness    Neuro-     Cognitive - awake, alert, oriented x 3.  Able to follow command     Communication - Fluent     Attention: Intact     Cranial Nerves - CN 2-12 intact.     Motor -                     LEFT    UE - 4/5                    RIGHT UE - 4/5                    LEFT    LE - 4/5                    RIGHT LE - 4/5        Sensory - Intact  to LT      Tone - normal  Psychiatric - Mood stable, Affect WNL  Skin:  all skin intact    LABS:                        8.7    7.83  )-----------( 305      ( 13 Apr 2023 07:15 )             27.9     04-13    138  |  100  |  9   ----------------------------<  106<H>  3.8   |  33<H>  |  0.58    Ca    8.6      13 Apr 2023 07:15    TPro  6.5  /  Alb  1.8<L>  /  TBili  0.5  /  DBili  x   /  AST  25  /  ALT  31  /  AlkPhos  68  04-13      MEDICATIONS  (STANDING):  albuterol    0.083% 2.5 milliGRAM(s) Nebulizer every 6 hours  ammonium lactate 12% Lotion 1 Application(s) Topical two times a day  atenolol  Tablet 12.5 milliGRAM(s) Oral daily  enoxaparin Injectable 40 milliGRAM(s) SubCutaneous every 24 hours  escitalopram 10 milliGRAM(s) Oral daily  famotidine    Tablet 20 milliGRAM(s) Oral daily  magnesium oxide 400 milliGRAM(s) Oral three times a day with meals  polyethylene glycol 3350 17 Gram(s) Oral daily  senna 2 Tablet(s) Oral at bedtime  simvastatin 40 milliGRAM(s) Oral at bedtime  sodium chloride 3%  Inhalation 4 milliLiter(s) Inhalation every 12 hours  traZODone 25 milliGRAM(s) Oral at bedtime    MEDICATIONS  (PRN):  acetaminophen     Tablet .. 650 milliGRAM(s) Oral every 6 hours PRN Temp greater or equal to 38C (100.4F), Mild Pain (1 - 3)  oxyCODONE    IR 5 milliGRAM(s) Oral every 4 hours PRN Moderate Pain (4 - 6)

## 2023-04-13 NOTE — CONSULT NOTE ADULT - REASON FOR ADMISSION
Debility due to Hodgkin's Lymphoma
Lymphoma
Debility due to Hodgkin's Lymphoma
Debility due to Hodgkin's Lymphoma
Head atraumatic, normal cephalic shape.

## 2023-04-13 NOTE — PROGRESS NOTE ADULT - ASSESSMENT
ASSESSMENT/PLAN  This is a 82 year old male with PMH of AFIB, HTN and chronic urinary retention (chronic plummer); who presented to [facility] via ambulance on 2/17 with progressively worsening SOB, intubated in the field, found to have a large R pleural effusion with mediastinal and hilar lymphadenopathy s/p thoracentesis (~3L), with persistent leukocytosis and concern for suspicious malignancy was transferred to [facility] on 2/24 for further workup.  He underwent a R supraclavicular mass biopsy on 2/28, significant for classic Hodgkin's lymphoma. On 3/3, he underwent a R thoracotomy/VATS with Pleurex placement. Bone marrow negative for involvement,  (s/p L chest wall mediport placement 3/15). Rehab course c/b worsening pleural effusion requiring acute transfer for drainage. Patient now with gait Instability, ADL impairments and Functional impairments.    * Consider increasing Trazodone for sleep - assess QTc * Pulmonary following - R pleurex cath for M/W/F * Confirmation of  timing for commencement of AC for A fib treatment, will c/w lovenox ppx for now * Await following consults: Urology, Psych * Repeat CXR biweekly * Continue to monitor rehab progress *     #Debility  #SOB/R Pleural effusion  #Classic Hodgkin's Lymphoma  - Gait Instability, ADL impairments and Functional impairments: start Comprehensive Rehab Program of PT/OT  - 3 hours a day, 5 days a week  - S/p R supraclavicular mass biopsy-> significant for Classic Hodgkin's Lymphoma  - S/p R thoracotomy/VATS with Pleurex placement on  3/3  - c/w pleurX drainage  -- Drain on M/W/F no more than 1000mL at a time - strict I&O with documentation  - Bone marrow biopsy negative  - L chest wall mediport placed 3/15  - Cycle #1 of BV (Brentuximad Vedotin) given on 3/10/23  - s/p C2 brentuximab 4/3/23  - Plan for cycle #3  on 4/24/23  -  No plan to receive chemo while in rehab  - Await [facility] referral   - F/u with Dr. Hays outpatient  - 4/11: Pulm consult ->  Maintain 02 >90%, Ipratropium-Albuterol every 6 hours, Sodium chloride nebuizer BID , IS, Chest PT.  -- Pulm following  * CXR 4/11--No acute changes, Rt effusion with atelectasis, Left mid and lower opacities  - serial CXR for monitoring      #AFIB  - Eliquis on hold  --Clarify from [facility] cardiothoracic team when this could be recommenced     #HTN  - Atenolol 12.5mg daily    #HLD  - Simvastatin 40mg daily    #Sleep/Mood  - Lexapro 10mg daily  - 4/12: Psych consult- await recs   - 4/13:  Trazodone 25mg nightly - consider increasing to 50 if QTc allows    #Skin  - Skin --right chest wall Pleurex drain site + dressing  - Pressure injury/Skin: OOB to Chair, PT/OT     #Pain Mgmt   - Tylenol PRN    #GI/Bowel Mgmt   - c/w Senna, Miralax     #/Bladder Mgmt   - Chronic Plummer last changed 4/7/23  - F/u with Urology outpt   - 4/12: Urology consult, pt known to Dr. Hamm - await recs     #FEN   - Diet - Regular + Thins  [DASH]      #Precautions / PROPHYLAXIS:   - Falls, aspiration  - ortho: Weight bearing status: WBAT   - Lungs: Aspiration, Incentive Spirometer   - DVT: Lovenox    #GOC  CODE STATUS: FULL CODE      Next of kin--(Daughter  [phone]) a physician    4/12--I called on phone and d/w daughter Dr Gasca, She stated recommendations as requested by cardiothoracic unit of [facility] prior to patient's dc from there and T/f to acute rehab  --pleurax drain MWF, regular CXR, ,Parameters for BP meds,and family request for, consult reviews  All requests being adhered to      Outpatient Follow-up (Specialty/Name of physician):    Nathaniel Galvan)  Surgery; Thoracic Surgery  [address], Oncology Building  3rd [place] [address]  Phone: [phone]  Fax: [phone]  Established Patient  Follow Up Time: 2 weeks    UROLOGY  MD Saúl Hamm [facility]  Chloémami Vazquez  Scheduled Appointment: 03/27/2023     ASSESSMENT/PLAN  This is a 82 year old male with PMH of AFIB, HTN and chronic urinary retention (chronic plummer); who presented to [facility] via ambulance on 2/17 with progressively worsening SOB, intubated in the field, found to have a large R pleural effusion with mediastinal and hilar lymphadenopathy s/p thoracentesis (~3L), with persistent leukocytosis and concern for suspicious malignancy was transferred to [facility] on 2/24 for further workup.  He underwent a R supraclavicular mass biopsy on 2/28, significant for classic Hodgkin's lymphoma. On 3/3, he underwent a R thoracotomy/VATS with Pleurex placement. Bone marrow negative for involvement,  (s/p L chest wall mediport placement 3/15). Rehab course c/b worsening pleural effusion requiring acute transfer for drainage. Patient now with gait Instability, ADL impairments and Functional impairments.    * Consider increasing Trazodone for sleep - assess QTc * Pulmonary following - R pleurex cath for M/W/F * Confirmation of  timing for commencement of AC for A fib treatment, will c/w lovenox ppx for now * Await following consults: Urology, Psych * Repeat CXR biweekly * Continue to monitor rehab progress *     #Debility  #SOB/R Pleural effusion  #Classic Hodgkin's Lymphoma  - Gait Instability, ADL impairments and Functional impairments: start Comprehensive Rehab Program of PT/OT  - 3 hours a day, 5 days a week  - S/p R supraclavicular mass biopsy-> significant for Classic Hodgkin's Lymphoma  - S/p R thoracotomy/VATS with Pleurex placement on  3/3  - c/w pleurX drainage  -- Drain on M/W/F no more than 1000mL at a time - strict I&O with documentation  - Bone marrow biopsy negative  - L chest wall mediport placed 3/15  - Cycle #1 of BV (Brentuximad Vedotin) given on 3/10/23  - s/p C2 brentuximab 4/3/23  - Plan for cycle #3  on 4/24/23  -  No plan to receive chemo while in rehab  - Await [facility] referral   - F/u with Dr. Hays outpatient  - 4/11: Pulm consult ->  Maintain 02 >90%, Ipratropium-Albuterol every 6 hours, Sodium chloride nebuizer BID , IS, Chest PT.  -- Pulm following  * CXR 4/11--No acute changes, Rt effusion with atelectasis, Left mid and lower opacities  - serial CXR for monitoring      #AFIB  - Eliquis on hold  --Clarify from [facility] cardiothoracic team when this could be recommenced     #HTN  - Atenolol 12.5mg daily    #HLD  - Simvastatin 40mg daily    #Sleep/Mood  - Lexapro 10mg daily  - 4/12: Psych consult- await recs   - 4/13:  Trazodone 25mg nightly - consider increasing to 50 if QTc allows    #Skin  - Skin --right chest wall Pleurex drain site + dressing  - Pressure injury/Skin: OOB to Chair, PT/OT     #Pain Mgmt   - Tylenol PRN    #GI/Bowel Mgmt   - c/w Senna, Miralax     #/Bladder Mgmt   - Chronic Plummer last changed 4/7/23  - F/u with Urology outpt   - 4/12: Urology consult, pt known to Dr. Hamm - await recs     #FEN   - Diet - Regular + Thins  [DASH]      #Precautions / PROPHYLAXIS:   - Falls, aspiration  - ortho: Weight bearing status: WBAT   - Lungs: Aspiration, Incentive Spirometer   - DVT: Lovenox    #GOC  CODE STATUS: FULL CODE  --------------------------------------------------------------------  Dr. Bender's Liason with family/providers:    Next of kin--(Daughter  [phone]) a physician    4/12--I called on phone and d/w daughter Dr Gasca, She stated recommendations as requested by cardiothoracic unit of [facility] prior to patient's dc from there and T/f to acute rehab  --pleurax drain MWF, regular CXR, ,Parameters for BP meds,and family request for, consult reviews  All requests being adhered to  --------------------------------------------------------------------  IDT conference on 4/13  TDD: 4/22 to home  Barriers: [place], fatigue, poor endurance, poor energy, R pleurex drain, Chronic plummer, 02 requirements.   Social Work: Lives in  with spouse, with 2 DEIRDRE/0HR. 1st floor setup available. Support from spouse and daughter.  OT: Setup/supervision for eating/grooming. Incidental assist for UBD. Mod A for LBD/toileting. Max A for bathing. CG/min A for transfer.   PT: CG/Cs for transfers. Ambuated 25-40 ft with RW. Stairs not yet assessed.  SLP: --  --------------------------------------------------------------------  Outpatient Follow-up (Specialty/Name of physician):    Nathaniel Galvan)  Surgery; Thoracic Surgery  [phone] [place] [address]  [address] [place] [address]  Phone: [phone]  Fax: [phone]  Established Patient  Follow Up Time: 2 weeks    UROLOGY  MD Saúl Hamm [facility]  Chloé Vazquez  Scheduled Appointment: 03/27/2023     ASSESSMENT/PLAN  This is a 82 year old male with PMH of AFIB, HTN and chronic urinary retention (chronic plummer); who presented to [facility] via ambulance on 2/17 with progressively worsening SOB, intubated in the field, found to have a large R pleural effusion with mediastinal and hilar lymphadenopathy s/p thoracentesis (~3L), with persistent leukocytosis and concern for suspicious malignancy was transferred to [facility] on 2/24 for further workup.  He underwent a R supraclavicular mass biopsy on 2/28, significant for classic Hodgkin's lymphoma. On 3/3, he underwent a R thoracotomy/VATS with Pleurex placement. Bone marrow negative for involvement,  (s/p L chest wall mediport placement 3/15). Rehab course c/b worsening pleural effusion requiring acute transfer for drainage. Patient now with gait Instability, ADL impairments and Functional impairments.    * Consider increasing Trazodone for sleep - assess QTc * Pulmonary following - R pleurex cath for M/W/F * Confirmation of  timing for commencement of AC for A fib treatment, will c/w lovenox ppx for now * Await following consults: Urology, Psych * Repeat CXR biweekly * Continue to monitor rehab progress *     #Debility  #SOB/R Pleural effusion  #Classic Hodgkin's Lymphoma  - Gait Instability, ADL impairments and Functional impairments: start Comprehensive Rehab Program of PT/OT  - 3 hours a day, 5 days a week  - S/p R supraclavicular mass biopsy-> significant for Classic Hodgkin's Lymphoma  - S/p R thoracotomy/VATS with Pleurex placement on  3/3  - c/w pleurX drainage  -- Drain on M/W/F no more than 1000mL at a time - strict I&O with documentation  - Bone marrow biopsy negative  - L chest wall mediport placed 3/15  - Cycle #1 of BV (Brentuximad Vedotin) given on 3/10/23  - s/p C2 brentuximab 4/3/23  - Plan for cycle #3  on 4/24/23  -  No plan to receive chemo while in rehab  - Await [facility] referral   - F/u with Dr. Hays outpatient  - 4/11: Pulm consult ->  Maintain 02 >90%, Ipratropium-Albuterol every 6 hours, Sodium chloride nebuizer BID , IS, Chest PT.  -- Pulm following  * CXR 4/11--No acute changes, Rt effusion with atelectasis, Left mid and lower opacities  - serial CXR for monitoring      #AFIB  - Eliquis on hold  --Clarify from [facility] cardiothoracic team when this could be recommenced     #HTN  - Atenolol 12.5mg daily    #HLD  - Simvastatin 40mg daily    #Sleep/Mood  - Lexapro 10mg daily  - 4/12: Psych consult- await recs   - 4/13:  Trazodone 25mg nightly - consider increasing to 50 if QTc allows    #Skin  - Skin --right chest wall Pleurex drain site + dressing  - Pressure injury/Skin: OOB to Chair, PT/OT     #Pain Mgmt   - Tylenol PRN    #GI/Bowel Mgmt   - c/w Senna, Miralax     #/Bladder Mgmt   - Chronic Plummer last changed 4/7/23  - F/u with Urology outpt   - 4/12: Urology consult, pt known to Dr. Hamm - await recs     #FEN   - Diet - Regular + Thins  [DASH]      #Precautions / PROPHYLAXIS:   - Falls, aspiration  - ortho: Weight bearing status: WBAT   - Lungs: Aspiration, Incentive Spirometer   - DVT: Lovenox    #GOC  CODE STATUS: FULL CODE  --------------------------------------------------------------------     Liason with family/providers:    Next of kin--(Daughter  [phone]) a physician    4/12--I called on phone and d/w daughter Dr Gasca, She stated recommendations as requested by cardiothoracic unit of [facility] prior to patient's dc from there and T/f to acute rehab  --pleurax drain MWF, regular CXR, ,Parameters for BP meds,and family request for, consult reviews  All requests being adhered to  --------------------------------------------------------------------  IDT conference on 4/13  TDD: 4/22 to home  Barriers: [place], fatigue, poor endurance, poor energy, R pleurex drain, Chronic plummer, 02 requirements.   Social Work: Lives in  with spouse, with 2 DEIRDRE/0HR. 1st floor setup available. Support from spouse and daughter.  OT: Setup/supervision for eating/grooming. Incidental assist for UBD. Mod A for LBD/toileting. Max A for bathing. CG/min A for transfer.   PT: CG/Cs for transfers. Ambuated 25-40 ft with RW. Stairs not yet assessed.  SLP: --  --------------------------------------------------------------------  Outpatient Follow-up (Specialty/Name of physician):    Nathaniel Galvan)  Surgery; Thoracic Surgery  [phone] [place] [address], Oncology [facility]  3rd [place] [address]  Phone: [phone]  Fax: [phone]  Established Patient  Follow Up Time: 2 weeks    UROLOGY  MD Saúl Hamm [facility]  Chloé Vazquez  Scheduled Appointment: 03/27/2023

## 2023-04-13 NOTE — CONSULT NOTE ADULT - ASSESSMENT
This is a 83 YO male with PMH of AFIB, HTN and chronic urinary retention (chronic plummer); who presented to [facility] via ambulance on 2/17 with progressively worsening SOB.  He was intubated in the field and brought to [facility] ED and admitted to ICU for acute respiratory failure with hypercapnia. CTA chest was negative for PE but significant for a large R pleural effusion with mediastinal and hilar lymphadenopathy, and 3L of fluid was drained.  He completed a 5 day course of empiric antibiotics. Pleural fluid cultures and blood cultures resulted negative.  Suspicious of underlying malignancy secondary to lymphadenopathy and persistent leukocytosis, Heme/Onc was consulted and recommended transfer to [facility] on 2/24/23 for further workup. On 2/28 he underwent an ultrasound guided biopsy of his R supraclavicular mass/lymph node, which later resulted as classic Hodgkin's lymphoma.  On 3/3, he underwent a R thoracotomy/VATS with Pleurex placement. Postoperatively, he was hypotensive and started on Midodrine. A Left chest wall mediport was placed for ongoing chemotherapy treatment on 3/15. He was admitted  to [facility] on 3/24.    While at rehab, he developed chest discomfort., low systolic BP and mild tachycardia. EKG showed Afib with rapid ventricular rythmn. On exam, he had increased respiratory rate. CXR and  CT chest showed increased Rt sided effusion with loculation and increasing atelectasis. He was transferred to [facility]. In the ED, CXR showed recurrent loculated R pleural effusion. CT chest with small loculated R pleural effusion with new peripheral demarcated high attenuation in the R pleural space. He  was seen by CT surgery in the ED, per paper chart, "drained about 100cc of fluid, placed on Pleura vac . PleurX was then flushed and drained about 2180cc fluid  need to hold AC for 48 hours for possible TPA into pleurX cath.  MIST held per CT surgery.  No plan for VATS. CXR  on 3/27, Decreasing pulmonary congestion, No pneumothorax. On 4/6 CT Chest -- Compared to 3/29/2023, similar loculated, exudative right pleural effusion with slightly decreased pleural air component and Pleurx catheter in place. Question malignant effusion in the setting of reported lymphoma, irregular septal thickening, nodularity and mediastinal pleural involvement.     Patient was evaluated by PM&R and therapy for functional deficits, gait/ADL impairments and acute rehabilitation was recommended. Patient was medically optimized for discharge to [facility] IRU on 4/10/23.

## 2023-04-14 PROCEDURE — 71045 X-RAY EXAM CHEST 1 VIEW: CPT | Mod: 26

## 2023-04-14 PROCEDURE — 99232 SBSQ HOSP IP/OBS MODERATE 35: CPT

## 2023-04-14 RX ORDER — BUDESONIDE AND FORMOTEROL FUMARATE DIHYDRATE 160; 4.5 UG/1; UG/1
2 AEROSOL RESPIRATORY (INHALATION)
Refills: 0 | Status: DISCONTINUED | OUTPATIENT
Start: 2023-04-14 | End: 2023-04-23

## 2023-04-14 RX ORDER — OXYCODONE HYDROCHLORIDE 5 MG/1
5 TABLET ORAL EVERY 4 HOURS
Refills: 0 | Status: ACTIVE | OUTPATIENT
Start: 2023-04-14 | End: 2023-04-21

## 2023-04-14 RX ORDER — TIOTROPIUM BROMIDE 18 UG/1
2 CAPSULE ORAL; RESPIRATORY (INHALATION) DAILY
Refills: 0 | Status: DISCONTINUED | OUTPATIENT
Start: 2023-04-14 | End: 2023-04-23

## 2023-04-14 RX ADMIN — MAGNESIUM OXIDE 400 MG ORAL TABLET 400 MILLIGRAM(S): 241.3 TABLET ORAL at 08:51

## 2023-04-14 RX ADMIN — ESCITALOPRAM OXALATE 10 MILLIGRAM(S): 10 TABLET, FILM COATED ORAL at 11:40

## 2023-04-14 RX ADMIN — ALBUTEROL 2.5 MILLIGRAM(S): 90 AEROSOL, METERED ORAL at 05:18

## 2023-04-14 RX ADMIN — ALBUTEROL 2.5 MILLIGRAM(S): 90 AEROSOL, METERED ORAL at 09:01

## 2023-04-14 RX ADMIN — BUDESONIDE AND FORMOTEROL FUMARATE DIHYDRATE 2 PUFF(S): 160; 4.5 AEROSOL RESPIRATORY (INHALATION) at 20:34

## 2023-04-14 RX ADMIN — Medication 1 APPLICATION(S): at 19:03

## 2023-04-14 RX ADMIN — Medication 50 MILLIGRAM(S): at 21:07

## 2023-04-14 RX ADMIN — ATENOLOL 12.5 MILLIGRAM(S): 25 TABLET ORAL at 05:10

## 2023-04-14 RX ADMIN — SENNA PLUS 2 TABLET(S): 8.6 TABLET ORAL at 21:07

## 2023-04-14 RX ADMIN — MAGNESIUM OXIDE 400 MG ORAL TABLET 400 MILLIGRAM(S): 241.3 TABLET ORAL at 18:19

## 2023-04-14 RX ADMIN — SODIUM CHLORIDE 4 MILLILITER(S): 9 INJECTION INTRAMUSCULAR; INTRAVENOUS; SUBCUTANEOUS at 09:01

## 2023-04-14 RX ADMIN — Medication 1 APPLICATION(S): at 05:09

## 2023-04-14 RX ADMIN — ENOXAPARIN SODIUM 40 MILLIGRAM(S): 100 INJECTION SUBCUTANEOUS at 21:07

## 2023-04-14 RX ADMIN — MAGNESIUM OXIDE 400 MG ORAL TABLET 400 MILLIGRAM(S): 241.3 TABLET ORAL at 11:40

## 2023-04-14 RX ADMIN — ALBUTEROL 2.5 MILLIGRAM(S): 90 AEROSOL, METERED ORAL at 15:26

## 2023-04-14 RX ADMIN — ALBUTEROL 2.5 MILLIGRAM(S): 90 AEROSOL, METERED ORAL at 20:34

## 2023-04-14 RX ADMIN — FAMOTIDINE 20 MILLIGRAM(S): 10 INJECTION INTRAVENOUS at 11:40

## 2023-04-14 RX ADMIN — SIMVASTATIN 40 MILLIGRAM(S): 20 TABLET, FILM COATED ORAL at 21:07

## 2023-04-14 NOTE — PROGRESS NOTE ADULT - SUBJECTIVE AND OBJECTIVE BOX
CC: Hodgkin Lymphoma    Today's Subjective & Objective Findings:  Patient seen and examined at bedside, this AM.  Sitting up in recliner during encounter.  Admits to intermittent sleep.  No dyspnea, comfortable at rest.  Experiences CRUZ with therapies.   Choi catheter in place.   No discomfort at Cohi site.   Last BM on 4/14, per patient.  No other complaints at this time.    Denies headache, dizziness, visual changes, chest pain, abdominal pain, nausea, vomiting, diarrhea, dysuria, numbness or tingling.    Therapy-- engaging, motivated  Working on muscle strengthening and endurance  Has mild exertional dyspnea, but no acute distress      Vital Signs Last 24 Hrs  T(C): 36.6 (13 Apr 2023 20:56), Max: 36.6 (13 Apr 2023 20:56)  T(F): 97.8 (13 Apr 2023 20:56), Max: 97.8 (13 Apr 2023 20:56)  HR: 87 (14 Apr 2023 05:09) (76 - 102)  BP: 121/70 (14 Apr 2023 05:09) (118/73 - 121/70)  BP(mean): --  RR: 18 (14 Apr 2023 05:09) (18 - 26)  SpO2: 91% (14 Apr 2023 05:09) (88% - 96%)      PHYSICAL EXAM:  Gen - Comfortable  HEENT - NCAT, EOMI, MMM, + nasal cannula  Neck - Supple, No limited ROM  Pulm - decreased breath sounds  Cardiovascular - RRR, S1S2  Chest - + pleurex drain to right anterior chest wall    Abdomen - Soft, non tender +BS  Extremities - No Cyanosis, no clubbing, no edema, no calf tenderness    Neuro-     Cognitive - awake, alert, oriented x 3.  Able to follow command     Communication - Fluent     Attention: Intact     Cranial Nerves - CN 2-12 intact.     Motor -                     LEFT    UE - 4/5                    RIGHT UE - 4/5                    LEFT    LE - 4/5                    RIGHT LE - 4/5        Sensory - Intact  to LT      Tone - normal  Psychiatric - Mood stable, Affect WNL  Skin:  all skin intact    LABS:                        8.7    7.83  )-----------( 305      ( 13 Apr 2023 07:15 )             27.9     04-13    138  |  100  |  9   ----------------------------<  106<H>  3.8   |  33<H>  |  0.58    Ca    8.6      13 Apr 2023 07:15    TPro  6.5  /  Alb  1.8<L>  /  TBili  0.5  /  DBili  x   /  AST  25  /  ALT  31  /  AlkPhos  68  04-13      MEDICATIONS  (STANDING):  albuterol    0.083% 2.5 milliGRAM(s) Nebulizer every 6 hours  ammonium lactate 12% Lotion 1 Application(s) Topical two times a day  atenolol  Tablet 12.5 milliGRAM(s) Oral daily  enoxaparin Injectable 40 milliGRAM(s) SubCutaneous every 24 hours  escitalopram 10 milliGRAM(s) Oral daily  famotidine    Tablet 20 milliGRAM(s) Oral daily  magnesium oxide 400 milliGRAM(s) Oral three times a day with meals  polyethylene glycol 3350 17 Gram(s) Oral daily  senna 2 Tablet(s) Oral at bedtime  simvastatin 40 milliGRAM(s) Oral at bedtime  sodium chloride 3%  Inhalation 4 milliLiter(s) Inhalation every 12 hours  traZODone 25 milliGRAM(s) Oral at bedtime    MEDICATIONS  (PRN):  acetaminophen     Tablet .. 650 milliGRAM(s) Oral every 6 hours PRN Temp greater or equal to 38C (100.4F), Mild Pain (1 - 3)  oxyCODONE    IR 5 milliGRAM(s) Oral every 4 hours PRN Moderate Pain (4 - 6) CC: Hodgkin Lymphoma    Today's Subjective & Objective Findings:  Patient seen and examined at bedside, this AM.  Sitting up in recliner during encounter.  Admits to intermittent sleep.  No dyspnea, comfortable at rest.  Experiences CRUZ with therapies.   Choi catheter in place.   No discomfort at Choi site.   Last BM on 4/14, per patient.  No other complaints at this time.    Denies headache, dizziness, visual changes, chest pain, abdominal pain, nausea, vomiting, diarrhea, dysuria, numbness or tingling.    Therapy-- engaging, motivated  Worked on muscle strengthening and endurance exercises    Minimal output (2cc) from pleurax drain, pulmonary team informed  Patient clinically stable    Vital Signs Last 24 Hrs  T(C): 36.6 (13 Apr 2023 20:56), Max: 36.6 (13 Apr 2023 20:56)  T(F): 97.8 (13 Apr 2023 20:56), Max: 97.8 (13 Apr 2023 20:56)  HR: 87 (14 Apr 2023 05:09) (76 - 102)  BP: 121/70 (14 Apr 2023 05:09) (118/73 - 121/70)  BP(mean): --  RR: 18 (14 Apr 2023 05:09) (18 - 26)  SpO2: 91% (14 Apr 2023 05:09) (88% - 96%)      PHYSICAL EXAM:  Gen - Comfortable  HEENT - NCAT, EOMI, MMM, + nasal cannula  Neck - Supple, No limited ROM  Pulm - decreased breath sounds  Cardiovascular - RRR, S1S2  Chest - + pleurex drain to right anterior chest wall    Abdomen - Soft, non tender +BS  Extremities - No Cyanosis, no clubbing, no edema, no calf tenderness    Neuro-     Cognitive - awake, alert, oriented x 3.  Able to follow command     Communication - Fluent     Attention: Intact     Cranial Nerves - CN 2-12 intact.     Motor -                     LEFT    UE - 4/5                    RIGHT UE - 4/5                    LEFT    LE - 4/5                    RIGHT LE - 4/5        Sensory - Intact  to LT      Tone - normal  Psychiatric - Mood stable, Affect WNL  Skin:  all skin intact    LABS:                        8.7    7.83  )-----------( 305      ( 13 Apr 2023 07:15 )             27.9     04-13    138  |  100  |  9   ----------------------------<  106<H>  3.8   |  33<H>  |  0.58    Ca    8.6      13 Apr 2023 07:15    TPro  6.5  /  Alb  1.8<L>  /  TBili  0.5  /  DBili  x   /  AST  25  /  ALT  31  /  AlkPhos  68  04-13      MEDICATIONS  (STANDING):  albuterol    0.083% 2.5 milliGRAM(s) Nebulizer every 6 hours  ammonium lactate 12% Lotion 1 Application(s) Topical two times a day  atenolol  Tablet 12.5 milliGRAM(s) Oral daily  enoxaparin Injectable 40 milliGRAM(s) SubCutaneous every 24 hours  escitalopram 10 milliGRAM(s) Oral daily  famotidine    Tablet 20 milliGRAM(s) Oral daily  magnesium oxide 400 milliGRAM(s) Oral three times a day with meals  polyethylene glycol 3350 17 Gram(s) Oral daily  senna 2 Tablet(s) Oral at bedtime  simvastatin 40 milliGRAM(s) Oral at bedtime  sodium chloride 3%  Inhalation 4 milliLiter(s) Inhalation every 12 hours  traZODone 25 milliGRAM(s) Oral at bedtime    MEDICATIONS  (PRN):  acetaminophen     Tablet .. 650 milliGRAM(s) Oral every 6 hours PRN Temp greater or equal to 38C (100.4F), Mild Pain (1 - 3)  oxyCODONE    IR 5 milliGRAM(s) Oral every 4 hours PRN Moderate Pain (4 - 6)    < from: Xray Chest 1 View- PORTABLE-Routine (Xray Chest 1 View- PORTABLE-Routine in AM.) (04.14.23 @ 09:29) >    INTERPRETATION:  AP chest on April 14, 2023 at 8:20 AM. Patient has   lymphoma.    Heart enlargement, left-sided port, and right Pleurx catheter remain.    Small loculated effusion right upper outer chest and bandlike atelectasis   possibly with slight fluid over the right hilum again noted.    Slight central infiltrates remain. No pneumothorax.    Chest is similar to April 11.    IMPRESSION: No change from prior.

## 2023-04-14 NOTE — PROGRESS NOTE ADULT - ASSESSMENT
ASSESSMENT/PLAN  This is a 82 year old male with PMH of AFIB, HTN and chronic urinary retention (chronic plummer); who presented to [facility] via ambulance on 2/17 with progressively worsening SOB, intubated in the field, found to have a large R pleural effusion with mediastinal and hilar lymphadenopathy s/p thoracentesis (~3L), with persistent leukocytosis and concern for suspicious malignancy was transferred to [facility] on 2/24 for further workup.  He underwent a R supraclavicular mass biopsy on 2/28, significant for classic Hodgkin's lymphoma. On 3/3, he underwent a R thoracotomy/VATS with Pleurex placement. Bone marrow negative for involvement,  (s/p L chest wall mediport placement 3/15). Rehab course c/b worsening pleural effusion requiring acute transfer for drainage. Patient now with gait Instability, ADL impairments and Functional impairments.    * QTc of 426 - Trazodone to 50mg @ HS * Pulmonary following - R pleurex cath for M/W/F * Confirmation of  timing for commencement of AC for A fib treatment, will c/w lovenox ppx for now * Await Urology recs * Repeat CXR biweekly * Continue to monitor rehab progress *     #Debility  #SOB/R Pleural effusion  #Classic Hodgkin's Lymphoma  - Gait Instability, ADL impairments and Functional impairments: start Comprehensive Rehab Program of PT/OT  - 3 hours a day, 5 days a week  - S/p R supraclavicular mass biopsy-> significant for Classic Hodgkin's Lymphoma  - S/p R thoracotomy/VATS with Pleurex placement on  3/3  - c/w pleurX drainage  -- Drain on M/W/F no more than 1000mL at a time - strict I&O with documentation  - Bone marrow biopsy negative  - L chest wall mediport placed 3/15  - Cycle #1 of BV (Brentuximad Vedotin) given on 3/10/23  - s/p C2 brentuximab 4/3/23  - Plan for cycle #3  on 4/24/23  -  No plan to receive chemo while in rehab  - Await [facility] referral   - F/u with Dr. Hays outpatient  - 4/11: Pulm consult ->  Maintain 02 >90%, Ipratropium-Albuterol every 6 hours, Sodium chloride nebuizer BID , IS, Chest PT.  -- Pulm following  * CXR 4/11--No acute changes, Rt effusion with atelectasis, Left mid and lower opacities  - serial CXR for monitoring- biweekly      #AFIB  - Eliquis on hold  --Clarify from [facility] cardiothoracic team when this could be recommenced     #HTN  - Atenolol 12.5mg daily    #HLD  - Simvastatin 40mg daily    #Sleep/Mood  - Lexapro 10mg daily  - 4/12: Psych consult- Keep Lexapro 10 and Trazodone 25mg @ HS (per daughter's request)  - 4/13:  Trazodone 25mg nightly - consider increasing to 50 if QTc allows    #Skin  - Skin --right chest wall Pleurex drain site + dressing  - Pressure injury/Skin: OOB to Chair, PT/OT     #Pain Mgmt   - Tylenol PRN    #GI/Bowel Mgmt   - c/w Senna, Miralax     #/Bladder Mgmt   - Chronic Plummer last changed 4/7/23  - F/u with Urology outpt   - 4/12: Urology consult, pt known to Dr. Hamm - await recs     #FEN   - Diet - Regular + Thins  [DASH]      #Precautions / PROPHYLAXIS:   - Falls, aspiration  - ortho: Weight bearing status: WBAT   - Lungs: Aspiration, Incentive Spirometer   - DVT: Lovenox    #GOC  CODE STATUS: FULL CODE  --------------------------------------------------------------------     Liason with family/providers:    Next of kin--(Daughter  [phone]) a physician    4/12--I called on phone and d/w daughter Dr Gasca, She stated recommendations as requested by cardiothoracic unit of [facility] prior to patient's dc from there and T/f to acute rehab  --pleurax drain MWF, regular CXR, ,Parameters for BP meds,and family request for, consult reviews  All requests being adhered to  --------------------------------------------------------------------  IDT conference on 4/13  TDD: 4/22 to home  Barriers: "[employer] fatigue, poor endurance, poor energy, R pleurex drain, Chronic plummer, 02 requirements.   Social Work: Lives in  with spouse, with 2 DEIRDRE/0HR. 1st floor setup available. Support from spouse and daughter.  OT: Setup/supervision for eating/grooming. Incidental assist for UBD. Mod A for LBD/toileting. Max A for bathing. CG/min A for transfer.   PT: CG/Cs for transfers. Ambuated 25-40 ft with RW. Stairs not yet assessed.  SLP: --  --------------------------------------------------------------------  Outpatient Follow-up (Specialty/Name of physician):    Nathaniel Galvan)  Surgery; Thoracic Surgery  [address] [place] [address]  3rd [place] [address]  Phone: [phone]  Fax: [phone]  Established Patient  Follow Up Time: 2 weeks    UROLOGY  MD Saúl Hamm [facility]  Chloé Vazquez  Scheduled Appointment: 03/27/2023     ASSESSMENT/PLAN  This is a 82 year old male with PMH of AFIB, HTN and chronic urinary retention (chronic plummer); who presented to [facility] via ambulance on 2/17 with progressively worsening SOB, intubated in the field, found to have a large R pleural effusion with mediastinal and hilar lymphadenopathy s/p thoracentesis (~3L), with persistent leukocytosis and concern for suspicious malignancy was transferred to [facility] on 2/24 for further workup.  He underwent a R supraclavicular mass biopsy on 2/28, significant for classic Hodgkin's lymphoma. On 3/3, he underwent a R thoracotomy/VATS with Pleurex placement. Bone marrow negative for involvement,  (s/p L chest wall mediport placement 3/15). Rehab course c/b worsening pleural effusion requiring acute transfer for drainage. Patient now with gait Instability, ADL impairments and Functional impairments.    * QTc of 426 - Trazodone to 50mg @ HS * Pulmonary following - R pleurex cath for M/W/F * Confirmation of  timing for commencement of AC for A fib treatment, will c/w lovenox ppx for now * Await Urology recs   * Repeat CXR biweekly--no interval change 4/14, minimal output from pleurax drain      * Continue to monitor rehab progress   * c/w lovenox ppx, will clarify from Cardiothoracic unit at [facility] when to recommence Eliquis     #Debility  #SOB/R Pleural effusion  #Classic Hodgkin's Lymphoma  - Gait Instability, ADL impairments and Functional impairments: start Comprehensive Rehab Program of PT/OT  - 3 hours a day, 5 days a week  - S/p R supraclavicular mass biopsy-> significant for Classic Hodgkin's Lymphoma  - S/p R thoracotomy/VATS with Pleurex placement on  3/3  - c/w pleurX drainage  -- Drain on M/W/F no more than 1000mL at a time - strict I&O with documentation  - Bone marrow biopsy negative  - L chest wall mediport placed 3/15  - Cycle #1 of BV (Brentuximad Vedotin) given on 3/10/23  - s/p C2 brentuximab 4/3/23  - Plan for cycle #3  on 4/24/23  -  No plan to receive chemo while in rehab  - Await [facility] referral   - F/u with Dr. Hays outpatient  - 4/11: Pulm consult ->  Maintain 02 >90%, Ipratropium-Albuterol every 6 hours, Sodium chloride nebuizer BID , IS, Chest PT.  -- Pulm following  * CXR 4/11--No acute changes, Rt effusion with atelectasis, Left mid and lower opacities  - serial CXR biweekly--no interval change 4/14, minimal output from pleurax drain       #AFIB  - Eliquis on hold  --Clarify from [facility] cardiothoracic team when this could be recommenced     #HTN  - Atenolol 12.5mg daily    #HLD  - Simvastatin 40mg daily    #Sleep/Mood  - Lexapro 10mg daily  - 4/12: Psych consult- Keep Lexapro 10 and Trazodone 25mg @ HS (per daughter's request)  - 4/13:  Trazodone 25mg nightly - consider increasing to 50 if QTc allows    #Skin  - Skin --right chest wall Pleurex drain site + dressing  - Pressure injury/Skin: OOB to Chair, PT/OT     #Pain Mgmt   - Tylenol PRN    #GI/Bowel Mgmt   - c/w Senna, Miralax     #/Bladder Mgmt   - Chronic Plummer last changed 4/7/23  - F/u with Urology outpt   - 4/12: Urology consult, pt known to Dr. Hamm - await recs     #FEN   - Diet - Regular + Thins  [DASH]      #Precautions / PROPHYLAXIS:   - Falls, aspiration  - ortho: Weight bearing status: WBAT   - Lungs: Aspiration, Incentive Spirometer   - DVT: Lovenox    #GOC  CODE STATUS: FULL CODE  --------------------------------------------------------------------     Liason with family/providers:    Next of kin--(Daughter  [phone]) a physician    4/12--I called on phone and d/w daughter Dr Gasca, She stated recommendations as requested by cardiothoracic unit of [facility] prior to patient's dc from there and T/f to acute rehab  --pleurax drain MWF, regular CXR, ,Parameters for BP meds,and family request for, consult reviews  All requests being adhered to  4/13--Functional update and clinical status d/w son present at bed side. Wife came earlier, but was not present at that time  --------------------------------------------------------------------  IDT conference on 4/13  TDD: 4/22 to home  Barriers: [place], fatigue, poor endurance, poor energy, R pleurex drain, Chronic plummer, 02 requirements.   Social Work: Lives in  with spouse, with 2 DEIRDRE/0HR. 1st floor setup available. Support from spouse and daughter.  OT: Setup/supervision for eating/grooming. Incidental assist for UBD. Mod A for LBD/toileting. Max A for bathing. CG/min A for transfer.   PT: CG/Cs for transfers. Ambuated 25-40 ft with RW. Stairs not yet assessed.  SLP: --  --------------------------------------------------------------------  Outpatient Follow-up (Specialty/Name of physician):    Nathaniel Galvan)  Surgery; Thoracic Surgery  [phone] [place] [address] Oncology [facility]  3rd [place] [address]  Phone: [phone]  Fax: [phone]  Established Patient  Follow Up Time: 2 weeks    UROLOGY  MD Saúl Hamm [facility]  Chloé Vazquez  Scheduled Appointment: 03/27/2023

## 2023-04-14 NOTE — PROGRESS NOTE ADULT - ASSESSMENT
This is a 83 YO male with PMH of AFIB, HTN and chronic urinary retention (chronic plummer); who presented to [facility] via ambulance on 2/17 with progressively worsening SOB.  He was intubated in the field and brought to [facility] ED and admitted to ICU for acute respiratory failure with hypercapnia. CTA chest was negative for PE but significant for a large R pleural effusion with mediastinal and hilar lymphadenopathy, and 3L of fluid was drained.  He completed a 5 day course of empiric antibiotics. Pleural fluid cultures and blood cultures resulted negative.  Suspicious of underlying malignancy secondary to lymphadenopathy and persistent leukocytosis, Heme/Onc was consulted and recommended transfer to [facility] on 2/24/23 for further workup. On 2/28 he underwent an ultrasound guided biopsy of his R supraclavicular mass/lymph node, which later resulted as classic Hodgkin's lymphoma.  On 3/3, he underwent a R thoracotomy/VATS with Pleurex placement. Postoperatively, he was hypotensive and started on Midodrine. A Left chest wall mediport was placed for ongoing chemotherapy treatment on 3/15. He was admitted  to [facility] on 3/24.    While at rehab, he developed chest discomfort., low systolic BP and mild tachycardia. EKG showed Afib with rapid ventricular rythmn. On exam, he had increased respiratory rate. CXR and  CT chest showed increased Rt sided effusion with loculation and increasing atelectasis. He was transferred to [facility]. In the ED, CXR showed recurrent loculated R pleural effusion. CT chest with small loculated R pleural effusion with new peripheral demarcated high attenuation in the R pleural space. He  was seen by CT surgery in the ED, per paper chart, "drained about 100cc of fluid, placed on Pleura vac . PleurX was then flushed and drained about 2180cc fluid  need to hold AC for 48 hours for possible TPA into pleurX cath.  MIST held per CT surgery.  No plan for VATS. CXR  on 3/27, Decreasing pulmonary congestion, No pneumothorax. On 4/6 CT Chest -- Compared to 3/29/2023, similar loculated, exudative right pleural effusion with slightly decreased pleural air component and Pleurx catheter in place. Question malignant effusion in the setting of reported lymphoma, irregular septal thickening, nodularity and mediastinal pleural involvement.     Patient was evaluated by PM&R and therapy for functional deficits, gait/ADL impairments and acute rehabilitation was recommended. Patient was medically optimized for discharge to [facility] IRU on 4/10/23.

## 2023-04-14 NOTE — PROGRESS NOTE ADULT - SUBJECTIVE AND OBJECTIVE BOX
ARIK DUMONT   82y   Male    Admitting: S. Yulia  HPI:  This is a 81 YO male with PMH of AFIB, HTN and chronic urinary retention (chronic plummer); who presented to [facility] via ambulance on 2/17 with progressively worsening SOB.  He was intubated in the field and brought to [facility] ED and admitted to ICU for acute respiratory failure with hypercapnia. CTA chest was negative for PE but significant for a large R pleural effusion with mediastinal and hilar lymphadenopathy, and 3L of fluid was drained.  He completed a 5 day course of empiric antibiotics. Pleural fluid cultures and blood cultures resulted negative.  Suspicious of underlying malignancy secondary to lymphadenopathy and persistent leukocytosis, Heme/Onc was consulted and recommended transfer to [facility] on 2/24/23 for further workup. On 2/28 he underwent an ultrasound guided biopsy of his R supraclavicular mass/lymph node, which later resulted as classic Hodgkin's lymphoma.  On 3/3, he underwent a R thoracotomy/VATS with Pleurex placement. Postoperatively, he was hypotensive and started on Midodrine. A Left chest wall mediport was placed for ongoing chemotherapy treatment on 3/15. He was admitted  to [facility] on 3/24.    While at rehab, he developed chest discomfort., low systolic BP and mild tachycardia. EKG showed Afib with rapid ventricular rythmn. On exam, he had increased respiratory rate. CXR and  CT chest showed increased Rt sided effusion with loculation and increasing atelectasis. He was transferred to [facility]. In the ED, CXR showed recurrent loculated R pleural effusion. CT chest with small loculated R pleural effusion with new peripheral demarcated high attenuation in the R pleural space. He  was seen by CT surgery in the ED, per paper chart, "drained about 100cc of fluid, placed on Pleura vac . PleurX was then flushed and drained about 2180cc fluid  need to hold AC for 48 hours for possible TPA into pleurX cath.  MIST held per CT surgery.  No plan for VATS. CXR  on 3/27, Decreasing pulmonary congestion, No pneumothorax. On 4/6 CT Chest -- Compared to 3/29/2023, similar loculated, exudative right pleural effusion with slightly decreased pleural air component and Pleurx catheter in place. Question malignant effusion in the setting of reported lymphoma, irregular septal thickening, nodularity and mediastinal pleural involvement.     Patient was evaluated by PM&R and therapy for functional deficits, gait/ADL impairments and acute rehabilitation was recommended. Patient was medically optimized for discharge to [facility] IRU on 4/10/23.    PAST MEDICAL & SURGICAL HISTORY:  Atrial fibrillation      Hypertension      Urinary retention      HEALTH ISSUES - PROBLEM Dx:  Hodgkin lymphoma      MEDICATIONS  (STANDING):  albuterol    0.083% 2.5 milliGRAM(s) Nebulizer every 6 hours  ammonium lactate 12% Lotion 1 Application(s) Topical two times a day  atenolol  Tablet 12.5 milliGRAM(s) Oral daily  enoxaparin Injectable 40 milliGRAM(s) SubCutaneous every 24 hours  escitalopram 10 milliGRAM(s) Oral daily  famotidine    Tablet 20 milliGRAM(s) Oral daily  magnesium oxide 400 milliGRAM(s) Oral three times a day with meals  polyethylene glycol 3350 17 Gram(s) Oral daily  senna 2 Tablet(s) Oral at bedtime  simvastatin 40 milliGRAM(s) Oral at bedtime  sodium chloride 3%  Inhalation 4 milliLiter(s) Inhalation every 12 hours  traZODone 50 milliGRAM(s) Oral at bedtime    MEDICATIONS  (PRN):  acetaminophen     Tablet .. 650 milliGRAM(s) Oral every 6 hours PRN Temp greater or equal to 38C (100.4F), Mild Pain (1 - 3)  oxyCODONE    IR 5 milliGRAM(s) Oral every 4 hours PRN Moderate Pain (4 - 6)    Allergies    pertussis vaccines (Rash)  shellfish (Rash)    INTERVAL HPI/OVERNIGHT EVENTS:  Patient S&E at bedside. No current c/o SOB, CP    VITAL SIGNS:  T(F): 97.8 (04-13-23 @ 20:56)  HR: 87 (04-14-23 @ 05:09)  BP: 121/70 (04-14-23 @ 05:09)  RR: 18 (04-14-23 @ 05:09)  SpO2: 91% (04-14-23 @ 05:09)      PHYSICAL EXAM:  Constitutional: NAD  Eyes: sclera non-icteric  Neck: no JVD  Respiratory: decreased BS right base ant.  Cardiovascular: RRR, no M/R/G  Gastrointestinal: soft, NTND  Extremities: no calf tenderness  Neurological: Awake, alert.    Labs:             8.7    7.83  )-----------( 305      ( 04-13 @ 07:15 )             27.9       04-13    138  |  100  |  9   ----------------------------<  106<H>  3.8   |  33<H>  |  0.58    Ca    8.6      13 Apr 2023 07:15    TPro  6.5  /  Alb  1.8<L>  /  TBili  0.5  /  DBili  x   /  AST  25  /  ALT  31  /  AlkPhos  68  04-13      Consultant notes reviewed : YES [x ] ; NO [ ]

## 2023-04-15 RX ADMIN — BUDESONIDE AND FORMOTEROL FUMARATE DIHYDRATE 2 PUFF(S): 160; 4.5 AEROSOL RESPIRATORY (INHALATION) at 09:52

## 2023-04-15 RX ADMIN — ALBUTEROL 2.5 MILLIGRAM(S): 90 AEROSOL, METERED ORAL at 09:51

## 2023-04-15 RX ADMIN — ENOXAPARIN SODIUM 40 MILLIGRAM(S): 100 INJECTION SUBCUTANEOUS at 20:42

## 2023-04-15 RX ADMIN — ALBUTEROL 2.5 MILLIGRAM(S): 90 AEROSOL, METERED ORAL at 15:28

## 2023-04-15 RX ADMIN — SENNA PLUS 2 TABLET(S): 8.6 TABLET ORAL at 20:42

## 2023-04-15 RX ADMIN — MAGNESIUM OXIDE 400 MG ORAL TABLET 400 MILLIGRAM(S): 241.3 TABLET ORAL at 07:31

## 2023-04-15 RX ADMIN — TIOTROPIUM BROMIDE 2 PUFF(S): 18 CAPSULE ORAL; RESPIRATORY (INHALATION) at 09:52

## 2023-04-15 RX ADMIN — SIMVASTATIN 40 MILLIGRAM(S): 20 TABLET, FILM COATED ORAL at 20:42

## 2023-04-15 RX ADMIN — Medication 50 MILLIGRAM(S): at 20:43

## 2023-04-15 RX ADMIN — ALBUTEROL 2.5 MILLIGRAM(S): 90 AEROSOL, METERED ORAL at 20:48

## 2023-04-15 RX ADMIN — Medication 1 APPLICATION(S): at 17:01

## 2023-04-15 RX ADMIN — ATENOLOL 12.5 MILLIGRAM(S): 25 TABLET ORAL at 05:39

## 2023-04-15 RX ADMIN — MAGNESIUM OXIDE 400 MG ORAL TABLET 400 MILLIGRAM(S): 241.3 TABLET ORAL at 17:01

## 2023-04-15 RX ADMIN — ESCITALOPRAM OXALATE 10 MILLIGRAM(S): 10 TABLET, FILM COATED ORAL at 11:16

## 2023-04-15 RX ADMIN — Medication 1 APPLICATION(S): at 05:39

## 2023-04-15 RX ADMIN — MAGNESIUM OXIDE 400 MG ORAL TABLET 400 MILLIGRAM(S): 241.3 TABLET ORAL at 12:02

## 2023-04-15 RX ADMIN — BUDESONIDE AND FORMOTEROL FUMARATE DIHYDRATE 2 PUFF(S): 160; 4.5 AEROSOL RESPIRATORY (INHALATION) at 20:48

## 2023-04-15 RX ADMIN — FAMOTIDINE 20 MILLIGRAM(S): 10 INJECTION INTRAVENOUS at 11:16

## 2023-04-15 RX ADMIN — ALBUTEROL 2.5 MILLIGRAM(S): 90 AEROSOL, METERED ORAL at 04:57

## 2023-04-15 NOTE — PROGRESS NOTE ADULT - SUBJECTIVE AND OBJECTIVE BOX
Cc: Gait dysfunction    HPI:  without hearing aids he relates having trouble carrying normal conversation   Patient with no new medical issues overnight.  Pain controlled, no chest pain, no N/V, no Fevers/Chills. No other new ROS  Has been tolerating rehabilitation program.    acetaminophen     Tablet .. 650 milliGRAM(s) Oral every 6 hours PRN  albuterol    0.083% 2.5 milliGRAM(s) Nebulizer every 6 hours  ammonium lactate 12% Lotion 1 Application(s) Topical two times a day  atenolol  Tablet 12.5 milliGRAM(s) Oral daily  budesonide 160 MICROgram(s)/formoterol 4.5 MICROgram(s) Inhaler 2 Puff(s) Inhalation two times a day  enoxaparin Injectable 40 milliGRAM(s) SubCutaneous every 24 hours  escitalopram 10 milliGRAM(s) Oral daily  famotidine    Tablet 20 milliGRAM(s) Oral daily  magnesium oxide 400 milliGRAM(s) Oral three times a day with meals  oxyCODONE    IR 5 milliGRAM(s) Oral every 4 hours PRN  polyethylene glycol 3350 17 Gram(s) Oral daily  senna 2 Tablet(s) Oral at bedtime  simvastatin 40 milliGRAM(s) Oral at bedtime  tiotropium 2.5 MICROgram(s) Inhaler 2 Puff(s) Inhalation daily  traZODone 50 milliGRAM(s) Oral at bedtime      T(C): 36.3 (04-15-23 @ 07:29), Max: 36.6 (04-14-23 @ 21:10)  HR: 91 (04-15-23 @ 07:29) (86 - 91)  BP: 108/60 (04-15-23 @ 07:29) (108/60 - 133/72)  RR: 18 (04-15-23 @ 07:29) (18 - 18)  SpO2: 98% (04-15-23 @ 10:02) (92% - 98%)    In NAD  HEENT- EOMI  Heart- RRR, S1S2  Lungs- CTA on the left, decreased lung sounds on the right s/p vats  Abd- + BS, NT  Ext- No calf pain  trace B/L lower limb edema, non-pitting  Neuro- Exam unchanged          Imp: Patient with diagnosis of     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    Plan:  - Continue PT/OT/SLP  - DVT prophylaxis  - Skin- Turn q2h, check skin daily  - Continue current medications; patient medically stable.   -Active issues-   - Patient is stable to continue current rehabilitation program.

## 2023-04-16 RX ADMIN — Medication 1 APPLICATION(S): at 18:05

## 2023-04-16 RX ADMIN — ALBUTEROL 2.5 MILLIGRAM(S): 90 AEROSOL, METERED ORAL at 22:13

## 2023-04-16 RX ADMIN — Medication 50 MILLIGRAM(S): at 21:35

## 2023-04-16 RX ADMIN — ATENOLOL 12.5 MILLIGRAM(S): 25 TABLET ORAL at 04:58

## 2023-04-16 RX ADMIN — BUDESONIDE AND FORMOTEROL FUMARATE DIHYDRATE 2 PUFF(S): 160; 4.5 AEROSOL RESPIRATORY (INHALATION) at 22:13

## 2023-04-16 RX ADMIN — MAGNESIUM OXIDE 400 MG ORAL TABLET 400 MILLIGRAM(S): 241.3 TABLET ORAL at 12:37

## 2023-04-16 RX ADMIN — ALBUTEROL 2.5 MILLIGRAM(S): 90 AEROSOL, METERED ORAL at 04:56

## 2023-04-16 RX ADMIN — BUDESONIDE AND FORMOTEROL FUMARATE DIHYDRATE 2 PUFF(S): 160; 4.5 AEROSOL RESPIRATORY (INHALATION) at 09:19

## 2023-04-16 RX ADMIN — MAGNESIUM OXIDE 400 MG ORAL TABLET 400 MILLIGRAM(S): 241.3 TABLET ORAL at 17:29

## 2023-04-16 RX ADMIN — TIOTROPIUM BROMIDE 2 PUFF(S): 18 CAPSULE ORAL; RESPIRATORY (INHALATION) at 09:19

## 2023-04-16 RX ADMIN — ALBUTEROL 2.5 MILLIGRAM(S): 90 AEROSOL, METERED ORAL at 15:52

## 2023-04-16 RX ADMIN — SIMVASTATIN 40 MILLIGRAM(S): 20 TABLET, FILM COATED ORAL at 21:35

## 2023-04-16 RX ADMIN — ENOXAPARIN SODIUM 40 MILLIGRAM(S): 100 INJECTION SUBCUTANEOUS at 21:35

## 2023-04-16 RX ADMIN — FAMOTIDINE 20 MILLIGRAM(S): 10 INJECTION INTRAVENOUS at 12:37

## 2023-04-16 RX ADMIN — MAGNESIUM OXIDE 400 MG ORAL TABLET 400 MILLIGRAM(S): 241.3 TABLET ORAL at 08:27

## 2023-04-16 RX ADMIN — ALBUTEROL 2.5 MILLIGRAM(S): 90 AEROSOL, METERED ORAL at 09:22

## 2023-04-16 RX ADMIN — ESCITALOPRAM OXALATE 10 MILLIGRAM(S): 10 TABLET, FILM COATED ORAL at 12:37

## 2023-04-16 RX ADMIN — Medication 1 APPLICATION(S): at 04:57

## 2023-04-16 NOTE — PROGRESS NOTE ADULT - SUBJECTIVE AND OBJECTIVE BOX
Follow-up Pulmonary Progress Note  Chief Complaint : Other malaise      Patient seen and examined  comfortable on n/c o2  no cp, sob, palp, n/v      Allergies :pertussis vaccines (Rash)  shellfish (Rash)      PAST MEDICAL & SURGICAL HISTORY:  Atrial fibrillation  Hypertension  Urinary retention    No significant past surgical history      Medications:  MEDICATIONS  (STANDING):  albuterol    0.083% 2.5 milliGRAM(s) Nebulizer every 6 hours  ammonium lactate 12% Lotion 1 Application(s) Topical two times a day  atenolol  Tablet 12.5 milliGRAM(s) Oral daily  budesonide 160 MICROgram(s)/formoterol 4.5 MICROgram(s) Inhaler 2 Puff(s) Inhalation two times a day  enoxaparin Injectable 40 milliGRAM(s) SubCutaneous every 24 hours  escitalopram 10 milliGRAM(s) Oral daily  famotidine    Tablet 20 milliGRAM(s) Oral daily  magnesium oxide 400 milliGRAM(s) Oral three times a day with meals  polyethylene glycol 3350 17 Gram(s) Oral daily  senna 2 Tablet(s) Oral at bedtime  simvastatin 40 milliGRAM(s) Oral at bedtime  tiotropium 2.5 MICROgram(s) Inhaler 2 Puff(s) Inhalation daily  traZODone 50 milliGRAM(s) Oral at bedtime    MEDICATIONS  (PRN):  acetaminophen     Tablet .. 650 milliGRAM(s) Oral every 6 hours PRN Temp greater or equal to 38C (100.4F), Mild Pain (1 - 3)  oxyCODONE    IR 5 milliGRAM(s) Oral every 4 hours PRN Moderate Pain (4 - 6)      Antibiotics History      Heme Medications   enoxaparin Injectable 40 milliGRAM(s) SubCutaneous every 24 hours, 04-10-23 @ 22:00      GI Medications  famotidine    Tablet 20 milliGRAM(s) Oral daily, 04-11-23 @ 00:00, Routine  polyethylene glycol 3350 17 Gram(s) Oral daily, 04-10-23 @ 18:17, Routine  senna 2 Tablet(s) Oral at bedtime, 04-10-23 @ 18:17, Routine          RADIOLOGY  CXR:   < from: Xray Chest 1 View- PORTABLE-Routine (Xray Chest 1 View- PORTABLE-Routine in AM.) (04.14.23 @ 09:29) >  ACC: [id number] EXAM:  XR CHEST PORTABLE ROUTINE 1V   ORDERED BY:  FAUSTINA KIRKPATRICK     PROCEDURE DATE:  04/14/2023          INTERPRETATION:  AP chest on April 14, 2023 at 8:20 AM. Patient has   lymphoma.    Heart enlargement, left-sided port, and right Pleurx catheter remain.    Small loculated effusion right upper outer chest and bandlike atelectasis   possibly with slight fluid over the right hilum again noted.    Slight central infiltrates remain. No pneumothorax.    Chest is similar to April 11.    IMPRESSION: No change from prior.    --- End of Report ---    < end of copied text >        VITALS:  T(C): 36.4 (04-16-23 @ 08:30), Max: 36.5 (04-15-23 @ 19:40)  T(F): 97.5 (04-16-23 @ 08:30), Max: 97.7 (04-15-23 @ 19:40)  HR: 56 (04-16-23 @ 09:55) (56 - 75)  BP: 100/61 (04-16-23 @ 08:30) (100/61 - 114/66)  BP(mean): --  ABP: --  ABP(mean): --  RR: 16 (04-16-23 @ 08:30) (16 - 19)  SpO2: 96% (04-16-23 @ 09:55) (95% - 98%)  CVP(mm Hg): --  CVP(cm H2O): --    Ins and Outs     04-15-23 @ 07:01  -  04-16-23 @ 07:00  --------------------------------------------------------  IN: 0 mL / OUT: 301 mL / NET: -301 mL         I&O's Detail    15 Apr 2023 07:01  -  16 Apr 2023 07:00  --------------------------------------------------------  IN:  Total IN: 0 mL    OUT:    Indwelling Catheter - Urethral (mL): 300 mL    Stool (mL): 1 mL  Total OUT: 301 mL    Total NET: -301 mL       Physical Examination:  GENERAL:               Alert, Oriented, No acute distress.    HEENT:                    No JVD, Dry MM  PULM:                     Bilateral air entry, Clear to auscultation bilaterally, no significant sputum production, No Rales, No Rhonchi, No Wheezing  CVS:                         S1, S2,  No Murmur  NEURO:                  Alert, oriented, interactive, nonfocal, follows commands  PSYC:                      Calm, + Insight.

## 2023-04-16 NOTE — PROGRESS NOTE ADULT - SUBJECTIVE AND OBJECTIVE BOX
Cc: Gait dysfunction    HPI: Patient with no new medical issues overnight.  Pain controlled, no chest pain, no N/V, no Fevers/Chills. No other new ROS  Has been tolerating rehabilitation program.    acetaminophen     Tablet .. 650 milliGRAM(s) Oral every 6 hours PRN  albuterol    0.083% 2.5 milliGRAM(s) Nebulizer every 6 hours  ammonium lactate 12% Lotion 1 Application(s) Topical two times a day  atenolol  Tablet 12.5 milliGRAM(s) Oral daily  budesonide 160 MICROgram(s)/formoterol 4.5 MICROgram(s) Inhaler 2 Puff(s) Inhalation two times a day  enoxaparin Injectable 40 milliGRAM(s) SubCutaneous every 24 hours  escitalopram 10 milliGRAM(s) Oral daily  famotidine    Tablet 20 milliGRAM(s) Oral daily  magnesium oxide 400 milliGRAM(s) Oral three times a day with meals  oxyCODONE    IR 5 milliGRAM(s) Oral every 4 hours PRN  polyethylene glycol 3350 17 Gram(s) Oral daily  senna 2 Tablet(s) Oral at bedtime  simvastatin 40 milliGRAM(s) Oral at bedtime  tiotropium 2.5 MICROgram(s) Inhaler 2 Puff(s) Inhalation daily  traZODone 50 milliGRAM(s) Oral at bedtime      T(C): 36.4 (04-16-23 @ 08:30), Max: 36.5 (04-15-23 @ 19:40)  HR: 56 (04-16-23 @ 09:55) (56 - 75)  BP: 100/61 (04-16-23 @ 08:30) (100/61 - 114/66)  RR: 16 (04-16-23 @ 08:30) (16 - 19)  SpO2: 96% (04-16-23 @ 09:55) (95% - 98%)    In NAD  HEENT- EOMI  Heart- RRR, S1S2  Lungs- CTA bl.  Abd- + BS, NT  Ext- No calf pain  Neuro- Exam unchanged    plummer draining concentrated appearing urine

## 2023-04-16 NOTE — PROGRESS NOTE ADULT - ASSESSMENT
Assessment    82 yr old man , non smoker with multiple medical condition s/p right pleurX cath placement for pleural effusion due to Hodgkin's Lymphoma . CXR 4/11 with  loculated right effusion .  Staff reported episode of hypoxia requiring O2 10 L ,     Problem List  1. 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 Hypoxemia requiring n/c o2       Sugg  - Continue O2 supp as needed to maintain sat >90%  - Chest PT   - Start duonebs q6h while awake   - Drain Pleurex twice weekly  - if not draining monitor cxr if loculation increase in size will need thoracic surg evaluation.   - OOB to chair as tolerated   - Incentive spirometry   - DVT GI prophy

## 2023-04-16 NOTE — PROGRESS NOTE ADULT - ASSESSMENT
Imp: Patient with diagnosis of    debility due to complex medical conditions including Lymphoma      admitted for comprehensive acute rehabilitation.    Plan:  - Continue PT/OT/SLP  - DVT prophylaxis  - Skin- Turn q2h, check skin daily  - Continue current medications; patient medically stable.   -Active issues-   - Patient is stable to continue current rehabilitation program.   - pulmonary recommends continue draining twice per week and outpatient follow up for their imaging changes, note pending  - encouraged PO intake given hemoconcentrated urine

## 2023-04-17 ENCOUNTER — TRANSCRIPTION ENCOUNTER (OUTPATIENT)
Age: 83
End: 2023-04-17

## 2023-04-17 LAB
ALBUMIN SERPL ELPH-MCNC: 1.8 G/DL — LOW (ref 3.3–5)
ALP SERPL-CCNC: 65 U/L — SIGNIFICANT CHANGE UP (ref 40–120)
ALT FLD-CCNC: 26 U/L — SIGNIFICANT CHANGE UP (ref 10–45)
ANION GAP SERPL CALC-SCNC: 4 MMOL/L — LOW (ref 5–17)
AST SERPL-CCNC: 21 U/L — SIGNIFICANT CHANGE UP (ref 10–40)
BILIRUB SERPL-MCNC: 0.4 MG/DL — SIGNIFICANT CHANGE UP (ref 0.2–1.2)
BUN SERPL-MCNC: 10 MG/DL — SIGNIFICANT CHANGE UP (ref 7–23)
CALCIUM SERPL-MCNC: 8.6 MG/DL — SIGNIFICANT CHANGE UP (ref 8.4–10.5)
CHLORIDE SERPL-SCNC: 101 MMOL/L — SIGNIFICANT CHANGE UP (ref 96–108)
CO2 SERPL-SCNC: 33 MMOL/L — HIGH (ref 22–31)
CREAT SERPL-MCNC: 0.58 MG/DL — SIGNIFICANT CHANGE UP (ref 0.5–1.3)
EGFR: 97 ML/MIN/1.73M2 — SIGNIFICANT CHANGE UP
GLUCOSE SERPL-MCNC: 106 MG/DL — HIGH (ref 70–99)
HCT VFR BLD CALC: 27.1 % — LOW (ref 39–50)
HGB BLD-MCNC: 8.5 G/DL — LOW (ref 13–17)
MCHC RBC-ENTMCNC: 30.6 PG — SIGNIFICANT CHANGE UP (ref 27–34)
MCHC RBC-ENTMCNC: 31.4 GM/DL — LOW (ref 32–36)
MCV RBC AUTO: 97.5 FL — SIGNIFICANT CHANGE UP (ref 80–100)
NRBC # BLD: 0 /100 WBCS — SIGNIFICANT CHANGE UP (ref 0–0)
PLATELET # BLD AUTO: 317 K/UL — SIGNIFICANT CHANGE UP (ref 150–400)
POTASSIUM SERPL-MCNC: 4.2 MMOL/L — SIGNIFICANT CHANGE UP (ref 3.5–5.3)
POTASSIUM SERPL-SCNC: 4.2 MMOL/L — SIGNIFICANT CHANGE UP (ref 3.5–5.3)
PROT SERPL-MCNC: 6.6 G/DL — SIGNIFICANT CHANGE UP (ref 6–8.3)
RBC # BLD: 2.78 M/UL — LOW (ref 4.2–5.8)
RBC # FLD: 20.5 % — HIGH (ref 10.3–14.5)
SODIUM SERPL-SCNC: 138 MMOL/L — SIGNIFICANT CHANGE UP (ref 135–145)
WBC # BLD: 6.95 K/UL — SIGNIFICANT CHANGE UP (ref 3.8–10.5)
WBC # FLD AUTO: 6.95 K/UL — SIGNIFICANT CHANGE UP (ref 3.8–10.5)

## 2023-04-17 PROCEDURE — 99232 SBSQ HOSP IP/OBS MODERATE 35: CPT

## 2023-04-17 PROCEDURE — 71045 X-RAY EXAM CHEST 1 VIEW: CPT | Mod: 26

## 2023-04-17 RX ORDER — CHLORHEXIDINE GLUCONATE 213 G/1000ML
1 SOLUTION TOPICAL DAILY
Refills: 0 | Status: DISCONTINUED | OUTPATIENT
Start: 2023-04-17 | End: 2023-04-23

## 2023-04-17 RX ADMIN — MAGNESIUM OXIDE 400 MG ORAL TABLET 400 MILLIGRAM(S): 241.3 TABLET ORAL at 12:06

## 2023-04-17 RX ADMIN — Medication 50 MILLIGRAM(S): at 21:32

## 2023-04-17 RX ADMIN — FAMOTIDINE 20 MILLIGRAM(S): 10 INJECTION INTRAVENOUS at 12:05

## 2023-04-17 RX ADMIN — ENOXAPARIN SODIUM 40 MILLIGRAM(S): 100 INJECTION SUBCUTANEOUS at 21:32

## 2023-04-17 RX ADMIN — MAGNESIUM OXIDE 400 MG ORAL TABLET 400 MILLIGRAM(S): 241.3 TABLET ORAL at 16:30

## 2023-04-17 RX ADMIN — BUDESONIDE AND FORMOTEROL FUMARATE DIHYDRATE 2 PUFF(S): 160; 4.5 AEROSOL RESPIRATORY (INHALATION) at 21:48

## 2023-04-17 RX ADMIN — ALBUTEROL 2.5 MILLIGRAM(S): 90 AEROSOL, METERED ORAL at 21:48

## 2023-04-17 RX ADMIN — ESCITALOPRAM OXALATE 10 MILLIGRAM(S): 10 TABLET, FILM COATED ORAL at 12:05

## 2023-04-17 RX ADMIN — ALBUTEROL 2.5 MILLIGRAM(S): 90 AEROSOL, METERED ORAL at 15:29

## 2023-04-17 RX ADMIN — ALBUTEROL 2.5 MILLIGRAM(S): 90 AEROSOL, METERED ORAL at 04:41

## 2023-04-17 RX ADMIN — ATENOLOL 12.5 MILLIGRAM(S): 25 TABLET ORAL at 05:05

## 2023-04-17 RX ADMIN — MAGNESIUM OXIDE 400 MG ORAL TABLET 400 MILLIGRAM(S): 241.3 TABLET ORAL at 08:20

## 2023-04-17 RX ADMIN — SIMVASTATIN 40 MILLIGRAM(S): 20 TABLET, FILM COATED ORAL at 21:32

## 2023-04-17 RX ADMIN — CHLORHEXIDINE GLUCONATE 1 APPLICATION(S): 213 SOLUTION TOPICAL at 12:07

## 2023-04-17 RX ADMIN — Medication 1 APPLICATION(S): at 16:30

## 2023-04-17 RX ADMIN — Medication 1 APPLICATION(S): at 05:05

## 2023-04-17 NOTE — PROGRESS NOTE ADULT - SUBJECTIVE AND OBJECTIVE BOX
CC: Hodgkin Lymphoma    Today's Subjective & Objective Findings:  Patient seen and examined at bedside, this AM.  Sitting up in recliner during encounter.  Admits to intermittent sleep.  No dyspnea, comfortable at rest.  Experiences CRUZ with therapies.   Choi catheter in place.   No discomfort at Choi site.   Last BM on 4/16, per patient.  No other complaints at this time.    Denies headache, dizziness, visual changes, chest pain, abdominal pain, nausea, vomiting, diarrhea, dysuria, numbness or tingling.    Therapy-- engaging, motivated  Worked on muscle strengthening and endurance exercises      Vital Signs Last 24 Hrs  T(C): 37 (17 Apr 2023 08:22), Max: 37 (17 Apr 2023 08:22)  T(F): 98.6 (17 Apr 2023 08:22), Max: 98.6 (17 Apr 2023 08:22)  HR: 65 (17 Apr 2023 08:22) (65 - 83)  BP: 97/62 (17 Apr 2023 08:22) (97/62 - 114/70)  BP(mean): --  RR: 18 (17 Apr 2023 08:22) (18 - 18)  SpO2: 95% (17 Apr 2023 08:22) (95% - 97%)      PHYSICAL EXAM:  Gen - Comfortable  HEENT - NCAT, EOMI, MMM, + nasal cannula  Neck - Supple, No limited ROM  Pulm - decreased breath sounds  Cardiovascular - RRR, S1S2  Chest - + pleurex drain to right anterior chest wall    Abdomen - Soft, non tender +BS  Extremities - No Cyanosis, no clubbing, no edema, no calf tenderness    Neuro-     Cognitive - awake, alert, oriented x 3.  Able to follow command     Communication - Fluent     Attention: Intact     Cranial Nerves - CN 2-12 intact.     Motor -                     LEFT    UE - 4/5                    RIGHT UE - 4/5                    LEFT    LE - 4/5                    RIGHT LE - 4/5        Sensory - Intact  to LT      Tone - normal  Psychiatric - Mood stable, Affect WNL  Skin:  all skin intact    LABS:                        8.5    6.95  )-----------( 317      ( 17 Apr 2023 06:30 )             27.1     04-17    138  |  101  |  10  ----------------------------<  106<H>  4.2   |  33<H>  |  0.58    Ca    8.6      17 Apr 2023 06:30    TPro  6.6  /  Alb  1.8<L>  /  TBili  0.4  /  DBili  x   /  AST  21  /  ALT  26  /  AlkPhos  65  04-17      MEDICATIONS  (STANDING):  albuterol    0.083% 2.5 milliGRAM(s) Nebulizer every 6 hours  ammonium lactate 12% Lotion 1 Application(s) Topical two times a day  atenolol  Tablet 12.5 milliGRAM(s) Oral daily  budesonide 160 MICROgram(s)/formoterol 4.5 MICROgram(s) Inhaler 2 Puff(s) Inhalation two times a day  chlorhexidine 2% Cloths 1 Application(s) Topical daily  enoxaparin Injectable 40 milliGRAM(s) SubCutaneous every 24 hours  escitalopram 10 milliGRAM(s) Oral daily  famotidine    Tablet 20 milliGRAM(s) Oral daily  magnesium oxide 400 milliGRAM(s) Oral three times a day with meals  polyethylene glycol 3350 17 Gram(s) Oral daily  senna 2 Tablet(s) Oral at bedtime  simvastatin 40 milliGRAM(s) Oral at bedtime  tiotropium 2.5 MICROgram(s) Inhaler 2 Puff(s) Inhalation daily  traZODone 50 milliGRAM(s) Oral at bedtime    MEDICATIONS  (PRN):  acetaminophen     Tablet .. 650 milliGRAM(s) Oral every 6 hours PRN Temp greater or equal to 38C (100.4F), Mild Pain (1 - 3)  oxyCODONE    IR 5 milliGRAM(s) Oral every 4 hours PRN Moderate Pain (4 - 6) CC: Hodgkin Lymphoma    Today's Subjective & Objective Findings:  Patient seen and examined at bedside, this AM.  Sitting up in recliner during encounter.  Admits to intermittent sleep.  No dyspnea, comfortable at rest.  Experiences CRUZ with therapies.   Choi catheter in place.   No discomfort at Choi site.   Last BM on 4/16, per patient.  No other complaints at this time.    Denies headache, dizziness, visual changes, chest pain, abdominal pain, nausea, vomiting, diarrhea, dysuria, numbness or tingling.  LBM 4/16    Therapy-- engaging, motivated  Reports improvement with his endurance       Vital Signs Last 24 Hrs  T(C): 37 (17 Apr 2023 08:22), Max: 37 (17 Apr 2023 08:22)  T(F): 98.6 (17 Apr 2023 08:22), Max: 98.6 (17 Apr 2023 08:22)  HR: 65 (17 Apr 2023 08:22) (65 - 83)  BP: 97/62 (17 Apr 2023 08:22) (97/62 - 114/70)  BP(mean): --  RR: 18 (17 Apr 2023 08:22) (18 - 18)  SpO2: 95% (17 Apr 2023 08:22) (95% - 97%)      PHYSICAL EXAM:  Gen - Comfortable  HEENT - NCAT, EOMI, MMM, + nasal cannula  Neck - Supple, No limited ROM  Pulm - decreased breath sounds  Cardiovascular - RRR, S1S2  Chest - + pleurex drain to right anterior chest wall    Abdomen - Soft, non tender +BS  Extremities - No Cyanosis, no clubbing, no edema, no calf tenderness    Neuro-     Cognitive - awake, alert, oriented x 3.  Able to follow command     Communication - Fluent     Attention: Intact     Cranial Nerves - CN 2-12 intact.     Motor -                     LEFT    UE - 4/5                    RIGHT UE - 4/5                    LEFT    LE - 4/5                    RIGHT LE - 4/5        Sensory - Intact  to LT      Tone - normal  Psychiatric - Mood stable, Affect WNL  Skin:  all skin intact    LABS:                        8.5    6.95  )-----------( 317      ( 17 Apr 2023 06:30 )             27.1     04-17    138  |  101  |  10  ----------------------------<  106<H>  4.2   |  33<H>  |  0.58    Ca    8.6      17 Apr 2023 06:30    TPro  6.6  /  Alb  1.8<L>  /  TBili  0.4  /  DBili  x   /  AST  21  /  ALT  26  /  AlkPhos  65  04-17      MEDICATIONS  (STANDING):  albuterol    0.083% 2.5 milliGRAM(s) Nebulizer every 6 hours  ammonium lactate 12% Lotion 1 Application(s) Topical two times a day  atenolol  Tablet 12.5 milliGRAM(s) Oral daily  budesonide 160 MICROgram(s)/formoterol 4.5 MICROgram(s) Inhaler 2 Puff(s) Inhalation two times a day  chlorhexidine 2% Cloths 1 Application(s) Topical daily  enoxaparin Injectable 40 milliGRAM(s) SubCutaneous every 24 hours  escitalopram 10 milliGRAM(s) Oral daily  famotidine    Tablet 20 milliGRAM(s) Oral daily  magnesium oxide 400 milliGRAM(s) Oral three times a day with meals  polyethylene glycol 3350 17 Gram(s) Oral daily  senna 2 Tablet(s) Oral at bedtime  simvastatin 40 milliGRAM(s) Oral at bedtime  tiotropium 2.5 MICROgram(s) Inhaler 2 Puff(s) Inhalation daily  traZODone 50 milliGRAM(s) Oral at bedtime    MEDICATIONS  (PRN):  acetaminophen     Tablet .. 650 milliGRAM(s) Oral every 6 hours PRN Temp greater or equal to 38C (100.4F), Mild Pain (1 - 3)  oxyCODONE    IR 5 milliGRAM(s) Oral every 4 hours PRN Moderate Pain (4 - 6)

## 2023-04-17 NOTE — DISCHARGE NOTE PROVIDER - NSDCCPCAREPLAN_GEN_ALL_CORE_FT
PRINCIPAL DISCHARGE DIAGNOSIS  Diagnosis: Hodgkin lymphoma  Assessment and Plan of Treatment: You were found to have Hodgkin's lymphoma and you underwent a R thoracotomy/VATS with pleurex placement. Please continue your treatments as directed from your Oncology team for ongoing management. Please follow up with your Cardiothoracic surgery team to determine when your Pleurex drain could possible be removed.      SECONDARY DISCHARGE DIAGNOSES  Diagnosis: Shortness of breath  Assessment and Plan of Treatment: Please continue to take your inhalers as prescribed. Please followup with Pulmonology upon discharge from rehab. Please continue to use supplemental oxygen as needed, and take rest breaks when performing ADLs or physical activity.    Diagnosis: Urinary retention  Assessment and Plan of Treatment: You are a know patient of Dr. Hamm. You had a Choi catheter during your hospital course. Please perform self catheterizations upon discharge from rehab.  Please follow up with him for further management.

## 2023-04-17 NOTE — DISCHARGE NOTE PROVIDER - NSDCFUADDINST_GEN_ALL_CORE_FT
** Please cover Pleurex drain if you wish to shower**  ** Please cover Pleurex drain if you wish to shower**  A home care agency will come to drain your Pleurex drain ** It has had very minimal output <5mLs per session for the last 10 days ** Please follow up with Dr. Galvan, early next week to discuss removal of tube **

## 2023-04-17 NOTE — DISCHARGE NOTE PROVIDER - NSDCMRMEDTOKEN_GEN_ALL_CORE_FT
acetaminophen 325 mg oral tablet: 2 tab(s) orally every 6 hours, As needed, Mild Pain (1 - 3)  albuterol 5 mg/mL (0.5%) inhalation solution: 0.5 milliliter(s) inhaled every 6 hours  aspirin 81 mg oral tablet, chewable: 1 tab(s) orally once a day  atenolol: 12.5 orally once a day  chlorhexidine 2% topical pad: 1 application topically once a day  enoxaparin: 40 subcutaneous once a day  escitalopram 10 mg oral tablet: 1 tab(s) orally once a day  famotidine 20 mg oral tablet: 1 tab(s) orally once a day  magnesium oxide 400 mg oral tablet: 1 tab(s) orally 3 times a day (with meals)  oxyCODONE 5 mg oral tablet: 1 tab(s) orally every 8 hours, As needed, Moderate Pain (4 - 6)  polyethylene glycol 3350 oral powder for reconstitution: 17 gram(s) orally once a day  senna leaf extract oral tablet: 2 tab(s) orally once a day (at bedtime)  simvastatin 40 mg oral tablet: 1 tab(s) orally once a day (at bedtime)  traZODone: 25 milligram(s) orally once a day (at bedtime)   acetaminophen 325 mg oral tablet: 2 tab(s) orally every 6 hours As needed Temp greater or equal to 38C (100.4F), Mild Pain (1 - 3)  albuterol 2.5 mg/3 mL (0.083%) inhalation solution: 1 application inhaled every 6 hours Bronchodilator.  ammonium lactate 12% topical lotion: Apply topically to affected area 2 times a day 1 Apply topically to affected area 2 times a day  apixaban 5 mg oral tablet: 1 tab(s) orally 2 times a day MDD: 2 tabs  aspirin 81 mg oral tablet, chewable: 1 tab(s) orally once a day  atenolol 25 mg oral tablet: 0.5 tab(s) orally once a day  budesonide-formoterol 160 mcg-4.5 mcg/inh inhalation aerosol: 2 puff(s) inhaled 2 times a day  escitalopram 10 mg oral tablet: 1 tab(s) orally once a day MDD: 1  Facemask for Nebulizer: Facemask for Albuterol Nebulizer treatments  Facemask for Nebulizer treatment: Facemask for Nebulizer treatment  famotidine 20 mg oral tablet: 1 tab(s) orally once a day  magnesium oxide 400 mg oral tablet: 1 tab(s) orally 3 times a day (with meals)  polyethylene glycol 3350 oral powder for reconstitution: 17 gram(s) orally once a day  senna leaf extract oral tablet: 2 tab(s) orally once a day (at bedtime)  simvastatin 40 mg oral tablet: 1 tab(s) orally once a day (at bedtime)  tiotropium 2.5 mcg/inh inhalation aerosol: 2 puff(s) inhaled once a day  traZODone 50 mg oral tablet: 1 tab(s) orally once a day (at bedtime) MDD: 1   acetaminophen 325 mg oral tablet: 2 tab(s) orally every 6 hours As needed Temp greater or equal to 38C (100.4F), Mild Pain (1 - 3)  albuterol 2.5 mg/3 mL (0.083%) inhalation solution: 1 application inhaled every 6 hours Bronchodilator.  ammonium lactate 12% topical lotion: Apply topically to affected area 2 times a day 1 Apply topically to affected area 2 times a day  apixaban 5 mg oral tablet: 1 tab(s) orally 2 times a day MDD: 2 tabs  aspirin 81 mg oral tablet, chewable: 1 tab(s) orally once a day  atenolol 25 mg oral tablet: 0.5 tab(s) orally once a day  budesonide-formoterol 160 mcg-4.5 mcg/inh inhalation aerosol: 2 puff(s) inhaled 2 times a day  escitalopram 10 mg oral tablet: 1 tab(s) orally once a day MDD: 1  Facemask for Nebulizer treatment: Facemask for Nebulizer treatment  famotidine 20 mg oral tablet: 1 tab(s) orally once a day  magnesium oxide 400 mg oral tablet: 1 tab(s) orally 3 times a day (with meals)  Nebulizer: Nebulizer  polyethylene glycol 3350 oral powder for reconstitution: 17 gram(s) orally once a day  senna leaf extract oral tablet: 2 tab(s) orally once a day (at bedtime)  simvastatin 40 mg oral tablet: 1 tab(s) orally once a day (at bedtime)  tiotropium 2.5 mcg/inh inhalation aerosol: 2 puff(s) inhaled once a day  traZODone 50 mg oral tablet: 1 tab(s) orally once a day (at bedtime) MDD: 1   acetaminophen 325 mg oral tablet: 2 tab(s) orally every 6 hours As needed Temp greater or equal to 38C (100.4F), Mild Pain (1 - 3)  albuterol 2.5 mg/3 mL (0.083%) inhalation solution: 1 application inhaled every 6 hours Bronchodilator.  ammonium lactate 12% topical lotion: Apply topically to affected area 2 times a day 1 Apply topically to affected area 2 times a day  apixaban 5 mg oral tablet: 1 tab(s) orally 2 times a day MDD: 2 tabs  atenolol 25 mg oral tablet: 0.5 tab(s) orally once a day  budesonide-formoterol 160 mcg-4.5 mcg/inh inhalation aerosol: 2 puff(s) inhaled 2 times a day  escitalopram 10 mg oral tablet: 1 tab(s) orally once a day MDD: 1  Facemask for Nebulizer treatment: Facemask for Nebulizer treatment  famotidine 20 mg oral tablet: 1 tab(s) orally once a day  magnesium oxide 400 mg oral tablet: 1 tab(s) orally 3 times a day (with meals)  Nebulizer: Nebulizer  polyethylene glycol 3350 oral powder for reconstitution: 17 gram(s) orally once a day  senna leaf extract oral tablet: 2 tab(s) orally once a day (at bedtime)  simvastatin 40 mg oral tablet: 1 tab(s) orally once a day (at bedtime)  tiotropium 2.5 mcg/inh inhalation aerosol: 2 puff(s) inhaled once a day  traZODone 50 mg oral tablet: 1 tab(s) orally once a day (at bedtime) MDD: 1

## 2023-04-17 NOTE — DISCHARGE NOTE PROVIDER - PROVIDER TOKENS
PROVIDER:[TOKEN:[[id number]],FOLLOWUP:[1 month]],PROVIDER:[TOKEN:[[id number]],FOLLOWUP:[1 week]],PROVIDER:[TOKEN:[[id number]],FOLLOWUP:[1 week]],PROVIDER:[TOKEN:[[id number]],FOLLOWUP:[1 week]],PROVIDER:[TOKEN:[[id number]],FOLLOWUP:[1 week]]

## 2023-04-17 NOTE — DISCHARGE NOTE PROVIDER - DETAILS OF MALNUTRITION DIAGNOSIS/DIAGNOSES
This patient has been assessed with a concern for Malnutrition and was treated during this hospitalization for the following Nutrition diagnosis/diagnoses:     -  04/11/2023: Moderate protein-calorie malnutrition

## 2023-04-17 NOTE — DISCHARGE NOTE PROVIDER - HOSPITAL COURSE
HPI:  This is a 83 YO male with PMH of AFIB, HTN and chronic urinary retention (chronic plummer); who presented to [facility] via ambulance on 2/17 with progressively worsening SOB.  He was intubated in the field and brought to [facility] ED and admitted to ICU for acute respiratory failure with hypercapnia. CTA chest was negative for PE but significant for a large R pleural effusion with mediastinal and hilar lymphadenopathy, and 3L of fluid was drained.  He completed a 5 day course of empiric antibiotics. Pleural fluid cultures and blood cultures resulted negative.  Suspicious of underlying malignancy secondary to lymphadenopathy and persistent leukocytosis, Heme/Onc was consulted and recommended transfer to [facility] on 2/24/23 for further workup. On 2/28 he underwent an ultrasound guided biopsy of his R supraclavicular mass/lymph node, which later resulted as classic Hodgkin's lymphoma.  On 3/3, he underwent a R thoracotomy/VATS with Pleurex placement. Postoperatively, he was hypotensive and started on Midodrine. A Left chest wall mediport was placed for ongoing chemotherapy treatment on 3/15. He was admitted  to [facility] on 3/24.    While at rehab, he developed chest discomfort., low systolic BP and mild tachycardia. EKG showed Afib with rapid ventricular rythmn. On exam, he had increased respiratory rate. CXR and  CT chest showed increased Rt sided effusion with loculation and increasing atelectasis. He was transferred to [facility]. In the ED, CXR showed recurrent loculated R pleural effusion. CT chest with small loculated R pleural effusion with new peripheral demarcated high attenuation in the R pleural space. He  was seen by CT surgery in the ED, per paper chart, "drained about 100cc of fluid, placed on Pleura vac . PleurX was then flushed and drained about 2180cc fluid  need to hold AC for 48 hours for possible TPA into pleurX cath.  MIST held per CT surgery.  No plan for VATS. CXR  on 3/27, Decreasing pulmonary congestion, No pneumothorax. On 4/6 CT Chest -- Compared to 3/29/2023, similar loculated, exudative right pleural effusion with slightly decreased pleural air component and Pleurx catheter in place. Question malignant effusion in the setting of reported lymphoma, irregular septal thickening, nodularity and mediastinal pleural involvement.     Patient was evaluated by PM&R and therapy for functional deficits, gait/ADL impairments and acute rehabilitation was recommended. Patient was medically optimized for discharge to [facility] IRU on 4/10/23.   (10 Apr 2023 15:09)      Patient was evaluated by PM&R and therapy for gait/ADL impairments and recommended acute rehabilitation. Patient was medically optimized for discharge to [facility] on 4/10/23. Admitted with gait instability, ADL, and functional impairments.     Rehab course significant for:    All other medical co-morbidities were stable. Patient tolerated course of inpatient PT/OT/SLP rehab with significant improvements and met rehab goals prior to discharge. Patient was medically cleared on ___ for discharge to home with home care. HPI:  This is a 81 YO male with PMH of AFIB, HTN and chronic urinary retention (chronic plummer); who presented to [facility] via ambulance on 2/17 with progressively worsening SOB.  He was intubated in the field and brought to [facility] ED and admitted to ICU for acute respiratory failure with hypercapnia. CTA chest was negative for PE but significant for a large R pleural effusion with mediastinal and hilar lymphadenopathy, and 3L of fluid was drained.  He completed a 5 day course of empiric antibiotics. Pleural fluid cultures and blood cultures resulted negative.  Suspicious of underlying malignancy secondary to lymphadenopathy and persistent leukocytosis, Heme/Onc was consulted and recommended transfer to [facility] on 2/24/23 for further workup. On 2/28 he underwent an ultrasound guided biopsy of his R supraclavicular mass/lymph node, which later resulted as classic Hodgkin's lymphoma.  On 3/3, he underwent a R thoracotomy/VATS with Pleurex placement. Postoperatively, he was hypotensive and started on Midodrine. A Left chest wall mediport was placed for ongoing chemotherapy treatment on 3/15. He was admitted  to [facility] on 3/24.    While at rehab, he developed chest discomfort., low systolic BP and mild tachycardia. EKG showed Afib with rapid ventricular rythmn. On exam, he had increased respiratory rate. CXR and  CT chest showed increased Rt sided effusion with loculation and increasing atelectasis. He was transferred to [facility]. In the ED, CXR showed recurrent loculated R pleural effusion. CT chest with small loculated R pleural effusion with new peripheral demarcated high attenuation in the R pleural space. He  was seen by CT surgery in the ED, per paper chart, "drained about 100cc of fluid, placed on Pleura vac . PleurX was then flushed and drained about 2180cc fluid  need to hold AC for 48 hours for possible TPA into pleurX cath.  MIST held per CT surgery.  No plan for VATS. CXR  on 3/27, Decreasing pulmonary congestion, No pneumothorax. On 4/6 CT Chest -- Compared to 3/29/2023, similar loculated, exudative right pleural effusion with slightly decreased pleural air component and Pleurx catheter in place. Question malignant effusion in the setting of reported lymphoma, irregular septal thickening, nodularity and mediastinal pleural involvement.     Patient was evaluated by PM&R and therapy for functional deficits, gait/ADL impairments and acute rehabilitation was recommended. Patient was medically optimized for discharge to [facility] IRU on 4/10/23.   (10 Apr 2023 15:09)      Patient was evaluated by PM&R and therapy for gait/ADL impairments and recommended acute rehabilitation. Patient was medically optimized for discharge to [facility] on 4/10/23. Admitted with gait instability, ADL, and functional impairments.     Rehab course significant for:  4/11: Appreciate pulm recs. Maintain 02>90%   4/12: Plan for pleurex drain- M/W/F. Consults- Urology, Hematology, Psychiatry. BP params to atenolol. Repeat CXr 4/14 and then weekly on Mon/Thurs.   4/13:PSYCH: Daughter wants to keep pt on Lexapro 10 mg and Trazodone 25mg.  QTc of 426. Trazodone to 50 mg for sleep.   4/14: Respiratory overnight for 02 down to 88%, s/p duoneb with improvement. Symbicort and Spiriva per pulm.   4/17: Chlorhexidine. F/u CXR. Pleurex to be drained on Mon & Thurs 4/18: Per Dr Galvan (CT SX) okay to resume Eliquis    All other medical co-morbidities were stable. Patient tolerated course of inpatient PT/OT/SLP rehab with significant improvements and met rehab goals prior to discharge. Patient was medically cleared on 4/23/23 for discharge to home with home care. HPI:  This is a 83 YO male with PMH of AFIB, HTN and chronic urinary retention (chronic plummer); who presented to [facility] via ambulance on 2/17 with progressively worsening SOB.  He was intubated in the field and brought to [facility] ED and admitted to ICU for acute respiratory failure with hypercapnia. CTA chest was negative for PE but significant for a large R pleural effusion with mediastinal and hilar lymphadenopathy, and 3L of fluid was drained.  He completed a 5 day course of empiric antibiotics. Pleural fluid cultures and blood cultures resulted negative.  Suspicious of underlying malignancy secondary to lymphadenopathy and persistent leukocytosis, Heme/Onc was consulted and recommended transfer to [facility] on 2/24/23 for further workup. On 2/28 he underwent an ultrasound guided biopsy of his R supraclavicular mass/lymph node, which later resulted as classic Hodgkin's lymphoma.  On 3/3, he underwent a R thoracotomy/VATS with Pleurex placement. Postoperatively, he was hypotensive and started on Midodrine. A Left chest wall mediport was placed for ongoing chemotherapy treatment on 3/15. He was admitted  to [facility] on 3/24.    While at rehab, he developed chest discomfort., low systolic BP and mild tachycardia. EKG showed Afib with rapid ventricular rythmn. On exam, he had increased respiratory rate. CXR and  CT chest showed increased Rt sided effusion with loculation and increasing atelectasis. He was transferred to [facility]. In the ED, CXR showed recurrent loculated R pleural effusion. CT chest with small loculated R pleural effusion with new peripheral demarcated high attenuation in the R pleural space. He  was seen by CT surgery in the ED, per paper chart, "drained about 100cc of fluid, placed on Pleura vac . PleurX was then flushed and drained about 2180cc fluid  need to hold AC for 48 hours for possible TPA into pleurX cath.  MIST held per CT surgery.  No plan for VATS. CXR  on 3/27, Decreasing pulmonary congestion, No pneumothorax. On 4/6 CT Chest -- Compared to 3/29/2023, similar loculated, exudative right pleural effusion with slightly decreased pleural air component and Pleurx catheter in place. Question malignant effusion in the setting of reported lymphoma, irregular septal thickening, nodularity and mediastinal pleural involvement.     Patient was evaluated by PM&R and therapy for functional deficits, gait/ADL impairments and acute rehabilitation was recommended. Patient was medically optimized for discharge to [facility] IRU on 4/10/23.   (10 Apr 2023 15:09)      Patient was evaluated by PM&R and therapy for gait/ADL impairments and recommended acute rehabilitation. Patient was medically optimized for discharge to [facility] on 4/10/23. Admitted with gait instability, ADL, and functional impairments.      Rehab course significant for:  4/11: Appreciate pulm recs. Maintain 02>90%   4/12: Plan for pleurex drain- M/W/F. Consults- Urology, Hematology, Psychiatry. BP params to atenolol. Repeat CXr 4/14 and then weekly on Mon/Thurs.   4/13:PSYCH: Daughter wants to keep pt on Lexapro 10 mg and Trazodone 25mg.  QTc of 426. Trazodone to 50 mg for sleep.   4/14: Respiratory overnight for 02 down to 88%, s/p duoneb with improvement. Symbicort and Spiriva per pulm.   4/17: Chlorhexidine. F/u CXR. Pleurex to be drained on Mon & Thurs 4/18: Per Dr Galvan (CT SX) okay to resume Eliquis    All other medical co-morbidities were stable. Patient tolerated course of inpatient PT/OT/SLP rehab with significant improvements and met rehab goals prior to discharge. Patient was medically cleared on 4/23/23 for discharge to home with home care. HPI:  This is a 83 YO male with PMH of AFIB, HTN and chronic urinary retention (chronic plummer); who presented to [facility] via ambulance on 2/17 with progressively worsening SOB.  He was intubated in the field and brought to [facility] ED and admitted to ICU for acute respiratory failure with hypercapnia. CTA chest was negative for PE but significant for a large R pleural effusion with mediastinal and hilar lymphadenopathy, and 3L of fluid was drained.  He completed a 5 day course of empiric antibiotics. Pleural fluid cultures and blood cultures resulted negative.  Suspicious of underlying malignancy secondary to lymphadenopathy and persistent leukocytosis, Heme/Onc was consulted and recommended transfer to [facility] on 2/24/23 for further workup. On 2/28 he underwent an ultrasound guided biopsy of his R supraclavicular mass/lymph node, which later resulted as classic Hodgkin's lymphoma.  On 3/3, he underwent a R thoracotomy/VATS with Pleurex placement. Postoperatively, he was hypotensive and started on Midodrine. A Left chest wall mediport was placed for ongoing chemotherapy treatment on 3/15. He was admitted  to [facility] on 3/24.    While at rehab, he developed chest discomfort., low systolic BP and mild tachycardia. EKG showed Afib with rapid ventricular rythmn. On exam, he had increased respiratory rate. CXR and  CT chest showed increased Rt sided effusion with loculation and increasing atelectasis. He was transferred to [facility]. In the ED, CXR showed recurrent loculated R pleural effusion. CT chest with small loculated R pleural effusion with new peripheral demarcated high attenuation in the R pleural space. He  was seen by CT surgery in the ED, per paper chart, "drained about 100cc of fluid, placed on Pleura vac . PleurX was then flushed and drained about 2180cc fluid  need to hold AC for 48 hours for possible TPA into pleurX cath.  MIST held per CT surgery.  No plan for VATS. CXR  on 3/27, Decreasing pulmonary congestion, No pneumothorax. On 4/6 CT Chest -- Compared to 3/29/2023, similar loculated, exudative right pleural effusion with slightly decreased pleural air component and Pleurx catheter in place. Question malignant effusion in the setting of reported lymphoma, irregular septal thickening, nodularity and mediastinal pleural involvement.     Patient was evaluated by PM&R and therapy for functional deficits, gait/ADL impairments and acute rehabilitation was recommended. Patient was medically optimized for discharge to [facility] IRU on 4/10/23.   (10 Apr 2023 15:09)      Patient was evaluated by PM&R and therapy for gait/ADL impairments and recommended acute rehabilitation. Patient was medically optimized for discharge to [facility] on 4/10/23. Admitted with gait instability, ADL, and functional impairments.      Rehab course significant for:  4/11: Appreciate pulm recs. Maintain 02>90%   4/12: Plan for pleurex drain- M/W/F. Consults- Urology, Hematology, Psychiatry. BP params to atenolol. Repeat CXr 4/14 and then weekly on Mon/Thurs.   4/13:PSYCH: Daughter wants to keep pt on Lexapro 10 mg and Trazodone 25mg.  QTc of 426. Trazodone to 50 mg for sleep.   4/14: Respiratory overnight for 02 down to 88%, s/p duoneb with improvement. Symbicort and Spiriva per pulm.   4/17: Chlorhexidine. F/u CXR. Pleurex to be drained on Mon & Thurs 4/18: Per Dr Galvan (CT SX) okay to resume Eliquis    You made sustained progress in therapy,as noted below  Your daugher Dr Wong and family were very involved in your care   Your Pleurax drain for Rt pleural effusion was draining very minimal fluid  Your serial CXR showed sustained improvement   You were recommenced on oral anticoagulation for your A fib during this admission   Urology team, pulmonary, psychiatry and other specialties were involved in your care   Oxygen delivery device was provided as your on 24hr oxy via nasal canula   They plan with urology is that you will recommence intermittent bladder catheterization post discharge    IDT conference on 4/20  TDD: 4/22 to home  Barriers: [place], fatigue, poor endurance, poor energy, R pleurex drain, Chronic plummer, 02 requirements.   Social Work: Lives in  with spouse, with 2 DEIRDRE/0HR. 1st floor setup available. Support from spouse and daughter. Willing to private hire.   OT: Sup/min A for ADLstransfers.  PT: CG/Cs for transfers. Ambuated 50 ft with RW.   SLP: --n/a  All other medical co-morbidities were stable. Patient tolerated course of inpatient PT/OT/SLP rehab with significant improvements and met rehab goals prior to discharge. Patient was medically cleared on 4/23/23 for discharge to home with home care.

## 2023-04-17 NOTE — DISCHARGE NOTE PROVIDER - NSDCFUSCHEDAPPT_GEN_ALL_CORE_FT
[facility]  Chloé Vazquez  Scheduled Appointment: 04/18/2023     [facility]  Chloé Boone  Scheduled Appointment: 04/24/2023     Nathaniel Galvan  [facility]  [place] [id number]  Scheduled Appointment: 04/24/2023    [facility]  Chloé GUPTA [place]  Scheduled Appointment: 04/24/2023     [facility]  [facility]  Chloé GUPTA Practic  Scheduled Appointment: 05/01/2023    Karl Hays  [facility]  Chloé GUPTA Practic  Scheduled Appointment: 05/01/2023    Nathaniel Galvan  [facility] [address]  Scheduled Appointment: 05/31/2023

## 2023-04-17 NOTE — DISCHARGE NOTE PROVIDER - CARE PROVIDERS DIRECT ADDRESSES
,DirectAddress_Unknown,[email or web address],DirectAddress_Unknown

## 2023-04-18 ENCOUNTER — NON-APPOINTMENT (OUTPATIENT)
Age: 83
End: 2023-04-18

## 2023-04-18 ENCOUNTER — APPOINTMENT (OUTPATIENT)
Dept: HEMATOLOGY ONCOLOGY | Facility: CLINIC | Age: 83
End: 2023-04-18
Payer: MEDICARE

## 2023-04-18 DIAGNOSIS — D64.9 ANEMIA, UNSPECIFIED: ICD-10-CM

## 2023-04-18 DIAGNOSIS — Z80.7 FAMILY HISTORY OF OTHER MALIGNANT NEOPLASMS OF LYMPHOID, HEMATOPOIETIC AND RELATED TISSUES: ICD-10-CM

## 2023-04-18 PROCEDURE — 99215 OFFICE O/P EST HI 40 MIN: CPT | Mod: 95

## 2023-04-18 PROCEDURE — 99232 SBSQ HOSP IP/OBS MODERATE 35: CPT

## 2023-04-18 RX ORDER — OXYCODONE HYDROCHLORIDE 5 MG/1
5 TABLET ORAL EVERY 4 HOURS
Refills: 0 | Status: DISCONTINUED | OUTPATIENT
Start: 2023-04-18 | End: 2023-04-20

## 2023-04-18 RX ORDER — APIXABAN 2.5 MG/1
5 TABLET, FILM COATED ORAL
Refills: 0 | Status: DISCONTINUED | OUTPATIENT
Start: 2023-04-18 | End: 2023-04-20

## 2023-04-18 RX ADMIN — ALBUTEROL 2.5 MILLIGRAM(S): 90 AEROSOL, METERED ORAL at 15:07

## 2023-04-18 RX ADMIN — SIMVASTATIN 40 MILLIGRAM(S): 20 TABLET, FILM COATED ORAL at 21:29

## 2023-04-18 RX ADMIN — ESCITALOPRAM OXALATE 10 MILLIGRAM(S): 10 TABLET, FILM COATED ORAL at 11:29

## 2023-04-18 RX ADMIN — Medication 1 APPLICATION(S): at 17:52

## 2023-04-18 RX ADMIN — TIOTROPIUM BROMIDE 2 PUFF(S): 18 CAPSULE ORAL; RESPIRATORY (INHALATION) at 08:24

## 2023-04-18 RX ADMIN — MAGNESIUM OXIDE 400 MG ORAL TABLET 400 MILLIGRAM(S): 241.3 TABLET ORAL at 08:15

## 2023-04-18 RX ADMIN — ALBUTEROL 2.5 MILLIGRAM(S): 90 AEROSOL, METERED ORAL at 08:28

## 2023-04-18 RX ADMIN — MAGNESIUM OXIDE 400 MG ORAL TABLET 400 MILLIGRAM(S): 241.3 TABLET ORAL at 17:52

## 2023-04-18 RX ADMIN — BUDESONIDE AND FORMOTEROL FUMARATE DIHYDRATE 2 PUFF(S): 160; 4.5 AEROSOL RESPIRATORY (INHALATION) at 08:24

## 2023-04-18 RX ADMIN — APIXABAN 5 MILLIGRAM(S): 2.5 TABLET, FILM COATED ORAL at 17:52

## 2023-04-18 RX ADMIN — ALBUTEROL 2.5 MILLIGRAM(S): 90 AEROSOL, METERED ORAL at 21:31

## 2023-04-18 RX ADMIN — CHLORHEXIDINE GLUCONATE 1 APPLICATION(S): 213 SOLUTION TOPICAL at 11:28

## 2023-04-18 RX ADMIN — Medication 50 MILLIGRAM(S): at 21:28

## 2023-04-18 RX ADMIN — BUDESONIDE AND FORMOTEROL FUMARATE DIHYDRATE 2 PUFF(S): 160; 4.5 AEROSOL RESPIRATORY (INHALATION) at 21:26

## 2023-04-18 RX ADMIN — FAMOTIDINE 20 MILLIGRAM(S): 10 INJECTION INTRAVENOUS at 11:29

## 2023-04-18 NOTE — PROGRESS NOTE ADULT - SUBJECTIVE AND OBJECTIVE BOX
CC: Hodgkin Lymphoma    Today's Subjective & Objective Findings:  Patient seen and examined at bedside, this AM.  Sitting up in recliner during encounter.  Admits to intermittent sleep.  No dyspnea, comfortable at rest.  Experiences RCUZ with therapies.   Choi catheter in place.   No discomfort at Choi site.   Last BM on 4/18, per patient.  Discussed Urology recs to remove Choi prior to DC and resume straight caths at home.   No other complaints at this time.    Denies headache, dizziness, visual changes, chest pain, abdominal pain, nausea, vomiting, diarrhea, dysuria, numbness or tingling.    Therapy-- engaging, motivated  Reports improvement with his endurance       Vital Signs Last 24 Hrs  T(C): 36.5 (18 Apr 2023 10:21), Max: 36.8 (17 Apr 2023 20:29)  T(F): 97.7 (18 Apr 2023 10:21), Max: 98.2 (17 Apr 2023 20:29)  HR: 68 (18 Apr 2023 10:21) (64 - 81)  BP: 113/65 (18 Apr 2023 10:21) (113/65 - 116/67)  BP(mean): --  RR: 16 (18 Apr 2023 10:21) (16 - 18)  SpO2: 94% (18 Apr 2023 10:21) (93% - 96%)      PHYSICAL EXAM:  Gen - Comfortable  HEENT - NCAT, EOMI, MMM, + nasal cannula  Neck - Supple, No limited ROM  Pulm - decreased breath sounds  Cardiovascular - RRR, S1S2  Chest - + pleurex drain to right anterior chest wall    Abdomen - Soft, non tender +BS  Extremities - No Cyanosis, no clubbing, no edema, no calf tenderness    Neuro-     Cognitive - awake, alert, oriented x 3.  Able to follow command     Communication - Fluent     Attention: Intact     Cranial Nerves - CN 2-12 intact.     Motor -                     LEFT    UE - 4/5                    RIGHT UE - 4/5                    LEFT    LE - 4/5                    RIGHT LE - 4/5        Sensory - Intact  to LT      Tone - normal  Psychiatric - Mood stable, Affect WNL  Skin:  all skin intact    LABS:                        8.5    6.95  )-----------( 317      ( 17 Apr 2023 06:30 )             27.1     04-17    138  |  101  |  10  ----------------------------<  106<H>  4.2   |  33<H>  |  0.58    Ca    8.6      17 Apr 2023 06:30    TPro  6.6  /  Alb  1.8<L>  /  TBili  0.4  /  DBili  x   /  AST  21  /  ALT  26  /  AlkPhos  65  04-17      MEDICATIONS  (STANDING):  albuterol    0.083% 2.5 milliGRAM(s) Nebulizer every 6 hours  ammonium lactate 12% Lotion 1 Application(s) Topical two times a day  atenolol  Tablet 12.5 milliGRAM(s) Oral daily  budesonide 160 MICROgram(s)/formoterol 4.5 MICROgram(s) Inhaler 2 Puff(s) Inhalation two times a day  chlorhexidine 2% Cloths 1 Application(s) Topical daily  enoxaparin Injectable 40 milliGRAM(s) SubCutaneous every 24 hours  escitalopram 10 milliGRAM(s) Oral daily  famotidine    Tablet 20 milliGRAM(s) Oral daily  magnesium oxide 400 milliGRAM(s) Oral three times a day with meals  polyethylene glycol 3350 17 Gram(s) Oral daily  senna 2 Tablet(s) Oral at bedtime  simvastatin 40 milliGRAM(s) Oral at bedtime  tiotropium 2.5 MICROgram(s) Inhaler 2 Puff(s) Inhalation daily  traZODone 50 milliGRAM(s) Oral at bedtime    MEDICATIONS  (PRN):  acetaminophen     Tablet .. 650 milliGRAM(s) Oral every 6 hours PRN Temp greater or equal to 38C (100.4F), Mild Pain (1 - 3)  oxyCODONE    IR 5 milliGRAM(s) Oral every 4 hours PRN Moderate Pain (4 - 6)     CC: Hodgkin Lymphoma    Today's Subjective & Objective Findings:  Patient seen and examined at bedside, this AM.  Sitting up in recliner during encounter.  Admits to intermittent sleep.  No dyspnea, comfortable at rest.  Experiences CRUZ with therapies.   Hcoi catheter in place.   No discomfort at Choi site.   Last BM on 4/18, per patient.  Discussed Urology recs to remove Choi prior to DC and resume straight caths at home.   No other complaints at this time.    Denies headache, dizziness, visual changes, chest pain, abdominal pain, nausea, vomiting, diarrhea, dysuria, numbness or tingling.    Therapy-- engaging, motivated  Engaging in therapy, tolerance and endurance improving        CXR reviewed--improvement noted  Minimal output from Rt peurax drain    Vital Signs Last 24 Hrs  T(C): 36.5 (18 Apr 2023 10:21), Max: 36.8 (17 Apr 2023 20:29)  T(F): 97.7 (18 Apr 2023 10:21), Max: 98.2 (17 Apr 2023 20:29)  HR: 68 (18 Apr 2023 10:21) (64 - 81)  BP: 113/65 (18 Apr 2023 10:21) (113/65 - 116/67)  BP(mean): --  RR: 16 (18 Apr 2023 10:21) (16 - 18)  SpO2: 94% (18 Apr 2023 10:21) (93% - 96%)      PHYSICAL EXAM:  Gen - Comfortable, interactive   HEENT - NCAT, EOMI, MMM, + nasal cannula, hearing deficit  Neck - Supple, No limited ROM  Pulm - decreased breath sounds, Rt Lung, improving  Cardiovascular - RRR, S1S2  Chest - + pleurex drain to right anterior chest wall    Abdomen - Soft, non tender +BS  Extremities - No Cyanosis, no clubbing, no edema, no calf tenderness    Neuro-     Cognitive - awake, alert, oriented x 3.  Able to follow command     Communication - Fluent     Attention: Intact     Cranial Nerves - CN 2-12 intact.     Motor - 4/5     Sensory - Intact  to LT      Tone - normal  Psychiatric - Mood stable, Affect WNL  Skin:  all skin intact    LABS:                        8.5    6.95  )-----------( 317      ( 17 Apr 2023 06:30 )             27.1     04-17    138  |  101  |  10  ----------------------------<  106<H>  4.2   |  33<H>  |  0.58    Ca    8.6      17 Apr 2023 06:30    TPro  6.6  /  Alb  1.8<L>  /  TBili  0.4  /  DBili  x   /  AST  21  /  ALT  26  /  AlkPhos  65  04-17      MEDICATIONS  (STANDING):  albuterol    0.083% 2.5 milliGRAM(s) Nebulizer every 6 hours  ammonium lactate 12% Lotion 1 Application(s) Topical two times a day  atenolol  Tablet 12.5 milliGRAM(s) Oral daily  budesonide 160 MICROgram(s)/formoterol 4.5 MICROgram(s) Inhaler 2 Puff(s) Inhalation two times a day  chlorhexidine 2% Cloths 1 Application(s) Topical daily  enoxaparin Injectable 40 milliGRAM(s) SubCutaneous every 24 hours  escitalopram 10 milliGRAM(s) Oral daily  famotidine    Tablet 20 milliGRAM(s) Oral daily  magnesium oxide 400 milliGRAM(s) Oral three times a day with meals  polyethylene glycol 3350 17 Gram(s) Oral daily  senna 2 Tablet(s) Oral at bedtime  simvastatin 40 milliGRAM(s) Oral at bedtime  tiotropium 2.5 MICROgram(s) Inhaler 2 Puff(s) Inhalation daily  traZODone 50 milliGRAM(s) Oral at bedtime    MEDICATIONS  (PRN):  acetaminophen     Tablet .. 650 milliGRAM(s) Oral every 6 hours PRN Temp greater or equal to 38C (100.4F), Mild Pain (1 - 3)  oxyCODONE    IR 5 milliGRAM(s) Oral every 4 hours PRN Moderate Pain (4 - 6)

## 2023-04-18 NOTE — PROGRESS NOTE ADULT - ASSESSMENT
ASSESSMENT/PLAN  This is a 82 year old male with PMH of AFIB, HTN and chronic urinary retention (chronic plummer); who presented to [facility] via ambulance on 2/17 with progressively worsening SOB, intubated in the field, found to have a large R pleural effusion with mediastinal and hilar lymphadenopathy s/p thoracentesis (~3L), with persistent leukocytosis and concern for suspicious malignancy was transferred to [facility] on 2/24 for further workup.  He underwent a R supraclavicular mass biopsy on 2/28, significant for classic Hodgkin's lymphoma. On 3/3, he underwent a R thoracotomy/VATS with Pleurex placement. Bone marrow negative for involvement,  (s/p L chest wall mediport placement 3/15). Rehab course c/b worsening pleural effusion requiring acute transfer for drainage. Patient now with gait Instability, ADL impairments and Functional impairments.    * Confirmation of  timing for commencement of AC for A fib treatment, will c/w lovenox ppx for now * Pulmonary following - R pleurex cath to be drained Monday & Thursdays * Repeat CXR biweekly- minimal output from pleurax drain * Continue to monitor rehab progress *     #Debility  #SOB/R Pleural effusion  #Classic Hodgkin's Lymphoma  - Gait Instability, ADL impairments and Functional impairments: start Comprehensive Rehab Program of PT/OT  - 3 hours a day, 5 days a week  - S/p R supraclavicular mass biopsy-> significant for Classic Hodgkin's Lymphoma  - S/p R thoracotomy/VATS with Pleurex placement on  3/3  - c/w pleurX drainage  -- Drain on Monday and Thursday, no more than 500mLs at a time - strict I&O with documentation  - Bone marrow biopsy negative  - L chest wall mediport placed 3/15  - Cycle #1 of BV (Brentuximad Vedotin) given on 3/10/23  - s/p C2 brentuximab 4/3/23  - Plan for cycle #3  on 4/24/23  -  No plan to receive chemo while in rehab  - Await [facility] referral   - F/u with Dr. Hays outpatient  - 4/11: Pulm consult ->  Maintain 02 >90%, Ipratropium-Albuterol every 6 hours, Sodium chloride nebuizer BID , IS, Chest PT.  -- Pulm following  * CXR 4/11--No acute changes, Rt effusion with atelectasis, Left mid and lower opacities  - serial CXR biweekly--no interval change 4/14, minimal output from pleurax drain       #AFIB  - Eliquis on hold  --Clarify from [facility] cardiothoracic team when this could be recommenced     #HTN  - Atenolol 12.5mg daily    #HLD  - Simvastatin 40mg daily    #Sleep/Mood  - Lexapro 10mg daily  - 4/12: Psych consult- Keep Lexapro 10 and Trazodone 25mg @ HS (per daughter's request)  - 4/13:  Trazodone 50mg nightly due to insomnia    #Skin  - Skin --right chest wall Pleurex drain site + dressing  - Pressure injury/Skin: OOB to Chair, PT/OT     #Pain Mgmt   - Tylenol PRN    #GI/Bowel Mgmt   - c/w Senna, Miralax     #/Bladder Mgmt   - Chronic Plummer last changed 4/7/23  - F/u with Urology outpt   - 4/12: Urology consult- Dr. Hamm - Plummer to remain in place during rehab stay. Will continue SC upon discharge home.     #FEN   - Diet - Regular + Thins  [DASH]      #Precautions / PROPHYLAXIS:   - Falls, aspiration  - ortho: Weight bearing status: WBAT   - Lungs: Aspiration, Incentive Spirometer   - DVT: Lovenox    #GOC  CODE STATUS: FULL CODE  --------------------------------------------------------------------     Liason with family/providers:    Next of kin--(Daughter  [phone]) a physician    4/12--I called on phone and d/w daughter Dr Gasca, She stated recommendations as requested by cardiothoracic unit of [facility] prior to patient's dc from there and T/f to acute rehab  --pleurax drain MWF, regular CXR, ,Parameters for BP meds,and family request for, consult reviews  All requests being adhered to  4/13--Functional update and clinical status d/w son present at bed side. Wife came earlier, but was not present at that time  --------------------------------------------------------------------  IDT conference on 4/13  TDD: 4/22 to home  Barriers: [employer] [place], fatigue, poor endurance, poor energy, R pleurex drain, Chronic plummer, 02 requirements.   Social Work: Lives in  with spouse, with 2 DEIRDRE/0HR. 1st floor setup available. Support from spouse and daughter.  OT: Setup/supervision for eating/grooming. Incidental assist for UBD. Mod A for LBD/toileting. Max A for bathing. CG/min A for transfer.   PT: CG/Cs for transfers. Ambuated 25-40 ft with RW. Stairs not yet assessed.  SLP: --  --------------------------------------------------------------------  Outpatient Follow-up (Specialty/Name of physician):    Nathaniel Galvan)  Surgery; Thoracic Surgery  [address] [place] [address]  [address]rd [place] [address]  Phone: [phone]  Fax: [phone]  Established Patient  Follow Up Time: 2 weeks    UROLOGY  MD Saúl Hamm [facility]  Chloé Vazquez  Scheduled Appointment: 03/27/2023

## 2023-04-18 NOTE — HISTORY OF PRESENT ILLNESS
[de-identified] : cough around tgiving, resisted seeking med attn, incr tiredness, went to [place] given antibiotics, high WBC\par CT scan showed increased LAD, planned on bx, was d/c home for ouypt bx, desatted, inconclusice; d/c, desatted again\par [place] ICU x 3d on respirator, 2nd inconclusive bx, Dr. 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

## 2023-04-19 LAB
CULTURE RESULTS: SIGNIFICANT CHANGE UP
SPECIMEN SOURCE: SIGNIFICANT CHANGE UP

## 2023-04-19 PROCEDURE — 99232 SBSQ HOSP IP/OBS MODERATE 35: CPT

## 2023-04-19 RX ADMIN — APIXABAN 5 MILLIGRAM(S): 2.5 TABLET, FILM COATED ORAL at 05:21

## 2023-04-19 RX ADMIN — MAGNESIUM OXIDE 400 MG ORAL TABLET 400 MILLIGRAM(S): 241.3 TABLET ORAL at 12:07

## 2023-04-19 RX ADMIN — ALBUTEROL 2.5 MILLIGRAM(S): 90 AEROSOL, METERED ORAL at 20:49

## 2023-04-19 RX ADMIN — BUDESONIDE AND FORMOTEROL FUMARATE DIHYDRATE 2 PUFF(S): 160; 4.5 AEROSOL RESPIRATORY (INHALATION) at 20:49

## 2023-04-19 RX ADMIN — Medication 1 APPLICATION(S): at 05:20

## 2023-04-19 RX ADMIN — SENNA PLUS 2 TABLET(S): 8.6 TABLET ORAL at 20:55

## 2023-04-19 RX ADMIN — SIMVASTATIN 40 MILLIGRAM(S): 20 TABLET, FILM COATED ORAL at 20:56

## 2023-04-19 RX ADMIN — ATENOLOL 12.5 MILLIGRAM(S): 25 TABLET ORAL at 05:21

## 2023-04-19 RX ADMIN — ESCITALOPRAM OXALATE 10 MILLIGRAM(S): 10 TABLET, FILM COATED ORAL at 12:07

## 2023-04-19 RX ADMIN — FAMOTIDINE 20 MILLIGRAM(S): 10 INJECTION INTRAVENOUS at 12:07

## 2023-04-19 RX ADMIN — APIXABAN 5 MILLIGRAM(S): 2.5 TABLET, FILM COATED ORAL at 17:17

## 2023-04-19 RX ADMIN — TIOTROPIUM BROMIDE 2 PUFF(S): 18 CAPSULE ORAL; RESPIRATORY (INHALATION) at 08:23

## 2023-04-19 RX ADMIN — CHLORHEXIDINE GLUCONATE 1 APPLICATION(S): 213 SOLUTION TOPICAL at 12:06

## 2023-04-19 RX ADMIN — BUDESONIDE AND FORMOTEROL FUMARATE DIHYDRATE 2 PUFF(S): 160; 4.5 AEROSOL RESPIRATORY (INHALATION) at 08:23

## 2023-04-19 RX ADMIN — ALBUTEROL 2.5 MILLIGRAM(S): 90 AEROSOL, METERED ORAL at 15:10

## 2023-04-19 RX ADMIN — MAGNESIUM OXIDE 400 MG ORAL TABLET 400 MILLIGRAM(S): 241.3 TABLET ORAL at 17:17

## 2023-04-19 RX ADMIN — Medication 1 APPLICATION(S): at 17:20

## 2023-04-19 RX ADMIN — ALBUTEROL 2.5 MILLIGRAM(S): 90 AEROSOL, METERED ORAL at 08:23

## 2023-04-19 RX ADMIN — Medication 50 MILLIGRAM(S): at 20:56

## 2023-04-19 NOTE — PROGRESS NOTE ADULT - SUBJECTIVE AND OBJECTIVE BOX
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  He underwent a R supraclavicular mass biopsy on 2/28, significant for classic Hodgkin's lymphoma. On 3/3, he underwent a R thoracotomy/VATS with Pleurex placement. Bone marrow negative for involvement,  (s/p L chest wall mediport placement 3/15). Rehab course c/b worsening pleural effusion requiring acute transfer for drainage.      Patient seen and examined at bedside,   No dyspnea, comfortable at rest.  Plummer catheter in place.   Denies headache, dizziness, visual changes, chest pain, abdominal pain, nausea, vomiting, diarrhea, dysuria, numbness or tingling.    Discussed Urology recs to remove Plummer prior to DC and resume straight caths at home.   No other complaints at this time.      CXR reviewed--improvement noted  Minimal output from Rt peurax drain      Vital Signs Last 24 Hrs  T(C): 36.6 (19 Apr 2023 09:58), Max: 37.2 (18 Apr 2023 20:20)  T(F): 97.8 (19 Apr 2023 09:58), Max: 99 (18 Apr 2023 20:20)  HR: 76 (19 Apr 2023 09:58) (56 - 88)  BP: 121/74 (19 Apr 2023 09:58) (105/53 - 127/71)  BP(mean): --  RR: 16 (19 Apr 2023 09:58) (16 - 18)  SpO2: 94% (19 Apr 2023 09:58) (93% - 98%)    Parameters below as of 19 Apr 2023 10:01  Patient On (Oxygen Delivery Method): nasal cannula    GENERAL- NAD  EAR/NOSE/MOUTH/THROAT - no pharyngeal exudates, no oral leisions,  MMM  EYES- HANS, conjunctiva and Sclera clear  NECK- supple  RESPIRATORY-  clear to auscultation bilaterally, non laboured breathing, right pleurex+  CARDIOVASCULAR - SIS2, RRR  GI - soft NT BS present  EXTREMITIES- no pedal edema  NEUROLOGY- no gross focal deficits  PSYCHIATRY- AAO X 3        MEDICATIONS  (STANDING):  albuterol    0.083% 2.5 milliGRAM(s) Nebulizer every 6 hours  ammonium lactate 12% Lotion 1 Application(s) Topical two times a day  apixaban 5 milliGRAM(s) Oral two times a day  atenolol  Tablet 12.5 milliGRAM(s) Oral daily  budesonide 160 MICROgram(s)/formoterol 4.5 MICROgram(s) Inhaler 2 Puff(s) Inhalation two times a day  chlorhexidine 2% Cloths 1 Application(s) Topical daily  escitalopram 10 milliGRAM(s) Oral daily  famotidine    Tablet 20 milliGRAM(s) Oral daily  magnesium oxide 400 milliGRAM(s) Oral three times a day with meals  polyethylene glycol 3350 17 Gram(s) Oral daily  senna 2 Tablet(s) Oral at bedtime  simvastatin 40 milliGRAM(s) Oral at bedtime  tiotropium 2.5 MICROgram(s) Inhaler 2 Puff(s) Inhalation daily  traZODone 50 milliGRAM(s) Oral at bedtime    MEDICATIONS  (PRN):  acetaminophen     Tablet .. 650 milliGRAM(s) Oral every 6 hours PRN Temp greater or equal to 38C (100.4F), Mild Pain (1 - 3)  oxyCODONE    IR 5 milliGRAM(s) Oral every 4 hours PRN Moderate Pain (4 - 6)

## 2023-04-19 NOTE — PROGRESS NOTE ADULT - SUBJECTIVE AND OBJECTIVE BOX
CC: Hodgkin Lymphoma    Today's Subjective & Objective Findings:  Patient seen and examined in therapy, this AM.  Admits to intermittent sleep.  No dyspnea, comfortable at rest.  Experiences CRUZ with therapies.   Choi catheter in place.   No discomfort at Choi site.   Last BM on 4/18, per patient.  No bleeding since resuming Eliquis.   No other complaints at this time.    Denies headache, dizziness, visual changes, chest pain, abdominal pain, nausea, vomiting, diarrhea, dysuria, numbness or tingling.    Therapy-- engaging, motivated  Engaging in therapy, tolerance and endurance improving    Ambulating 50 feet      CXR reviewed--improvement noted  Minimal output from Rt peurax drain    Vital Signs Last 24 Hrs  T(C): 36.6 (19 Apr 2023 09:58), Max: 37.2 (18 Apr 2023 20:20)  T(F): 97.8 (19 Apr 2023 09:58), Max: 99 (18 Apr 2023 20:20)  HR: 76 (19 Apr 2023 09:58) (56 - 88)  BP: 121/74 (19 Apr 2023 09:58) (105/53 - 127/71)  BP(mean): --  RR: 16 (19 Apr 2023 09:58) (16 - 18)  SpO2: 94% (19 Apr 2023 09:58) (93% - 98%)      PHYSICAL EXAM:  Gen - Comfortable, interactive   HEENT - NCAT, EOMI, MMM, + nasal cannula, hearing deficit  Neck - Supple, No limited ROM  Pulm - decreased breath sounds, Rt Lung, improving  Cardiovascular - RRR, S1S2  Chest - + pleurex drain to right anterior chest wall    Abdomen - Soft, non tender +BS  Extremities - No Cyanosis, no clubbing, no edema, no calf tenderness    Neuro-     Cognitive - awake, alert, oriented x 3.  Able to follow command     Communication - Fluent     Attention: Intact     Cranial Nerves - CN 2-12 intact.     Motor - 4/5     Sensory - Intact  to LT      Tone - normal  Psychiatric - Mood stable, Affect WNL  Skin:  all skin intact    LABS:                        8.5    6.95  )-----------( 317      ( 17 Apr 2023 06:30 )             27.1     04-17    138  |  101  |  10  ----------------------------<  106<H>  4.2   |  33<H>  |  0.58    Ca    8.6      17 Apr 2023 06:30    TPro  6.6  /  Alb  1.8<L>  /  TBili  0.4  /  DBili  x   /  AST  21  /  ALT  26  /  AlkPhos  65  04-17      MEDICATIONS  (STANDING):  albuterol    0.083% 2.5 milliGRAM(s) Nebulizer every 6 hours  ammonium lactate 12% Lotion 1 Application(s) Topical two times a day  atenolol  Tablet 12.5 milliGRAM(s) Oral daily  budesonide 160 MICROgram(s)/formoterol 4.5 MICROgram(s) Inhaler 2 Puff(s) Inhalation two times a day  chlorhexidine 2% Cloths 1 Application(s) Topical daily  enoxaparin Injectable 40 milliGRAM(s) SubCutaneous every 24 hours  escitalopram 10 milliGRAM(s) Oral daily  famotidine    Tablet 20 milliGRAM(s) Oral daily  magnesium oxide 400 milliGRAM(s) Oral three times a day with meals  polyethylene glycol 3350 17 Gram(s) Oral daily  senna 2 Tablet(s) Oral at bedtime  simvastatin 40 milliGRAM(s) Oral at bedtime  tiotropium 2.5 MICROgram(s) Inhaler 2 Puff(s) Inhalation daily  traZODone 50 milliGRAM(s) Oral at bedtime    MEDICATIONS  (PRN):  acetaminophen     Tablet .. 650 milliGRAM(s) Oral every 6 hours PRN Temp greater or equal to 38C (100.4F), Mild Pain (1 - 3)  oxyCODONE    IR 5 milliGRAM(s) Oral every 4 hours PRN Moderate Pain (4 - 6)     CC: Hodgkin Lymphoma    Today's Subjective & Objective Findings:  Patient seen and examined in therapy, this AM.  Admits to intermittent sleep.  No dyspnea, comfortable at rest.  Experiences CRUZ with therapies.   Choi catheter in place.   No discomfort at Choi site.   Last BM on 4/18, per patient.  No bleeding since resuming Eliquis.   No other complaints at this time.    Denies headache, dizziness, visual changes, chest pain, abdominal pain, nausea, vomiting, diarrhea, dysuria, numbness or tingling.  LBM 4/19  Therapy-- engaging, motivated  Motivated and engaging in therapy  Observed, ambulating 50ft, limited by fatigue    CXR 4/17 reviewed--improvement noted  Minimal output from Rt peurax drain    Vital Signs Last 24 Hrs  T(C): 36.6 (19 Apr 2023 09:58), Max: 37.2 (18 Apr 2023 20:20)  T(F): 97.8 (19 Apr 2023 09:58), Max: 99 (18 Apr 2023 20:20)  HR: 76 (19 Apr 2023 09:58) (56 - 88)  BP: 121/74 (19 Apr 2023 09:58) (105/53 - 127/71)  RR: 16 (19 Apr 2023 09:58) (16 - 18)  SpO2: 94% (19 Apr 2023 09:58) (93% - 98%)      PHYSICAL EXAM:  Gen - Comfortable, interactive   HEENT - NCAT, EOMI, MMM, + nasal cannula, hearing deficit  Neck - Supple, No limited ROM  Pulm - air entry improved, except for Let lower lobe  Cardiovascular - RRR, S1S2  Chest - + pleurex drain to right anterior chest wall    Abdomen - Soft, non tender +BS  Extremities - No Cyanosis, no clubbing, no edema, no calf tenderness    Neuro-     Cognitive - awake, alert, oriented x 3.  Able to follow command     Communication - Fluent     Attention: Intact     Cranial Nerves - CN 2-12 intact.     Motor - 4/5     Sensory - Intact  to LT      Tone - normal  Psychiatric - Mood stable, Affect WNL  Skin:  all skin intact    LABS:                        8.5    6.95  )-----------( 317      ( 17 Apr 2023 06:30 )             27.1     04-17    138  |  101  |  10  ----------------------------<  106<H>  4.2   |  33<H>  |  0.58    Ca    8.6      17 Apr 2023 06:30    TPro  6.6  /  Alb  1.8<L>  /  TBili  0.4  /  DBili  x   /  AST  21  /  ALT  26  /  AlkPhos  65  04-17      MEDICATIONS  (STANDING):  albuterol    0.083% 2.5 milliGRAM(s) Nebulizer every 6 hours  ammonium lactate 12% Lotion 1 Application(s) Topical two times a day  atenolol  Tablet 12.5 milliGRAM(s) Oral daily  budesonide 160 MICROgram(s)/formoterol 4.5 MICROgram(s) Inhaler 2 Puff(s) Inhalation two times a day  chlorhexidine 2% Cloths 1 Application(s) Topical daily  enoxaparin Injectable 40 milliGRAM(s) SubCutaneous every 24 hours  escitalopram 10 milliGRAM(s) Oral daily  famotidine    Tablet 20 milliGRAM(s) Oral daily  magnesium oxide 400 milliGRAM(s) Oral three times a day with meals  polyethylene glycol 3350 17 Gram(s) Oral daily  senna 2 Tablet(s) Oral at bedtime  simvastatin 40 milliGRAM(s) Oral at bedtime  tiotropium 2.5 MICROgram(s) Inhaler 2 Puff(s) Inhalation daily  traZODone 50 milliGRAM(s) Oral at bedtime    MEDICATIONS  (PRN):  acetaminophen     Tablet .. 650 milliGRAM(s) Oral every 6 hours PRN Temp greater or equal to 38C (100.4F), Mild Pain (1 - 3)  oxyCODONE    IR 5 milliGRAM(s) Oral every 4 hours PRN Moderate Pain (4 - 6)

## 2023-04-19 NOTE — PROGRESS NOTE ADULT - ASSESSMENT
This is a 82 year old male with PMH of AFIB, HTN and chronic urinary retention (chronic plummer); who presented to [facility] via ambulance on 2/17 with progressively worsening SOB, intubated in the field, found to have a large R pleural effusion with mediastinal and hilar lymphadenopathy s/p thoracentesis (~3L), with persistent leukocytosis and concern for suspicious malignancy was transferred to [facility] on 2/24 for further workup.  He underwent a R supraclavicular mass biopsy on 2/28, significant for classic Hodgkin's lymphoma. On 3/3, he underwent a R thoracotomy/VATS with Pleurex placement. Bone marrow negative for involvement,  (s/p L chest wall mediport placement 3/15). Rehab course c/b worsening pleural effusion requiring acute transfer for drainage. Patient now with gait Instability, ADL impairments and Functional impairments- pt/ot/dvt ppx      #SOB/R Pleural effusion, Classic Hodgkin's Lymphoma  - S/p R supraclavicular mass biopsy-> significant for Classic Hodgkin's Lymphoma  - S/p R thoracotomy/VATS with Pleurex placement on  3/3  - c/w pleurX drainage  -- Drain on Monday and Thursday, no more than 500mLs at a time - strict I&O with documentation  - Bone marrow biopsy negative  - L chest wall mediport placed 3/15  - Cycle #1 of BV (Brentuximad Vedotin) given on 3/10/23  - s/p C2 brentuximab 4/3/23  - Plan for cycle #3  on 4/24/23  -  No plan to receive chemo while in rehab  - Await [facility] referral   - F/u with Dr. Hays outpatient  -  [facility]/11--No acute changes, Rt effusion with atelectasis, Left mid and lower opacities  - CXR biweekly--no interval change 4/14, minimal output from pleurex drain       #AFIB  - Eliquis on hold  - to be Clarified with  [facility] cardiothoracic team when this could be recommenced     #HTN  - Atenolol     #HLD  - Simvastatin     #Sleep/Mood  - Lexapro  - Trazodone      #G urine retention  - Chronic Plummer last changed 4/7/23  - F/u with Urology outpt   - 4/12: Urology consult- Dr. Hamm -- continue Plummer to resume self caths at home after discharge    vte ppx- on Eliquis     will follow  d/w dr. guadalupe

## 2023-04-19 NOTE — PROGRESS NOTE ADULT - ASSESSMENT
ASSESSMENT/PLAN  This is a 82 year old male with PMH of AFIB, HTN and chronic urinary retention (chronic plummer); who presented to [facility] via ambulance on 2/17 with progressively worsening SOB, intubated in the field, found to have a large R pleural effusion with mediastinal and hilar lymphadenopathy s/p thoracentesis (~3L), with persistent leukocytosis and concern for suspicious malignancy was transferred to [facility] on 2/24 for further workup.  He underwent a R supraclavicular mass biopsy on 2/28, significant for classic Hodgkin's lymphoma. On 3/3, he underwent a R thoracotomy/VATS with Pleurex placement. Bone marrow negative for involvement,  (s/p L chest wall mediport placement 3/15). Rehab course c/b worsening pleural effusion requiring acute transfer for drainage. Patient now with gait Instability, ADL impairments and Functional impairments.    * Eliquis restarted 4/18 - monitor for bleeding * Pulmonary following - R pleurex to be drained Monday & Thursdays * Repeat CXR biweekly- minimal output from pleurex drain * Continue to monitor rehab progress *     #Debility  #SOB/R Pleural effusion  #Classic Hodgkin's Lymphoma  - Gait Instability, ADL impairments and Functional impairments: start Comprehensive Rehab Program of PT/OT  - 3 hours a day, 5 days a week  - S/p R supraclavicular mass biopsy-> significant for Classic Hodgkin's Lymphoma  - S/p R thoracotomy/VATS with Pleurex placement on  3/3  - c/w pleurX drainage  -- Drain on Monday and Thursday, no more than 500mLs at a time - strict I&O with documentation  - Bone marrow biopsy negative  - L chest wall mediport placed 3/15  - Cycle #1 of BV (Brentuximad Vedotin) given on 3/10/23  - s/p C2 brentuximab 4/3/23  - Plan for cycle #3  on 4/24/23  -  No plan to receive chemo while in rehab  - Await [facility] referral   - F/u with Dr. Hays outpatient  - 4/11: Pulm consult ->  Maintain 02 >90%, Ipratropium-Albuterol every 6 hours, Sodium chloride nebuizer BID , IS, Chest PT.  -- Pulm following  * CXR 4/11--No acute changes, Rt effusion with atelectasis, Left mid and lower opacities  - serial CXR biweekly--no interval change 4/14, minimal output from pleurax drain       #AFIB  - 4/18: Resume Eliquis   -- Per Dr. Galvan's CT surgery team- Okay to resume Eliquis BID on 4/18 (confirmed with Nurse Practitioner)   - Monitor for bleeding     #HTN  - Atenolol 12.5mg daily    #HLD  - Simvastatin 40mg daily    #Sleep/Mood  - Lexapro 10mg daily  - 4/12: Psych consult- Keep Lexapro 10 and Trazodone 25mg @ HS (per daughter's request)  - 4/13:  Trazodone 50mg nightly due to insomnia    #Skin  - Skin --right chest wall Pleurex drain site + dressing  - Pressure injury/Skin: OOB to Chair, PT/OT     #Pain Mgmt   - Tylenol PRN    #GI/Bowel Mgmt   - c/w Senna, Miralax     #/Bladder Mgmt   - Chronic Plummer last changed 4/7/23  - F/u with Urology outpt   - 4/12: Urology consult- Dr. Hamm - Plummer to remain in place during rehab stay. Will continue SC upon discharge home.     #FEN   - Diet - Regular + Thins  [DASH]      #Precautions / PROPHYLAXIS:   - Falls, aspiration  - ortho: Weight bearing status: WBAT   - Lungs: Aspiration, Incentive Spirometer   - DVT: Lovenox    #GOC  CODE STATUS: FULL CODE  --------------------------------------------------------------------     Liason with family/providers:    Next of kin--(Daughter  [phone]) a physician    4/12--I called on phone and d/w daughter Dr Gasca, She stated recommendations as requested by cardiothoracic unit of [facility] prior to patient's dc from there and T/f to acute rehab  --pleurax drain MWF, regular CXR, ,Parameters for BP meds,and family request for, consult reviews  All requests being adhered to  4/13--Functional update and clinical status d/w son present at bed side. Wife came earlier, but was not present at that time  --------------------------------------------------------------------  IDT conference on 4/13  TDD: 4/22 to home  Barriers: [place], fatigue, poor endurance, poor energy, R pleurex drain, Chronic plummer, 02 requirements.   Social Work: Lives in  with spouse, with 2 DEIRDRE/0HR. 1st floor setup available. Support from spouse and daughter.  OT: Setup/supervision for eating/grooming. Incidental assist for UBD. Mod A for LBD/toileting. Max A for bathing. CG/min A for transfer.   PT: CG/Cs for transfers. Ambuated 25-40 ft with RW. Stairs not yet assessed.  SLP: --  --------------------------------------------------------------------  Outpatient Follow-up (Specialty/Name of physician):    Nathaniel Galvan)  Surgery; Thoracic Surgery  [phone] [place] [address], Oncology [facility]  3rd [place] [address]  Phone: [phone]  Fax: [phone]  Established Patient  Follow Up Time: 2 weeks    UROLOGY  MD Saúl Hamm [facility]  Chloé Vazquez  Scheduled Appointment: 03/27/2023     ASSESSMENT/PLAN  This is a 82 year old male with PMH of AFIB, HTN and chronic urinary retention (chronic plummer); who presented to [facility] via ambulance on 2/17 with progressively worsening SOB, intubated in the field, found to have a large R pleural effusion with mediastinal and hilar lymphadenopathy s/p thoracentesis (~3L), with persistent leukocytosis and concern for suspicious malignancy was transferred to [facility] on 2/24 for further workup.  He underwent a R supraclavicular mass biopsy on 2/28, significant for classic Hodgkin's lymphoma. On 3/3, he underwent a R thoracotomy/VATS with Pleurex placement. Bone marrow negative for involvement,  (s/p L chest wall mediport placement 3/15). Rehab course c/b worsening pleural effusion requiring acute transfer for drainage. Patient now with gait Instability, ADL impairments and Functional impairments.    * Eliquis restarted 4/18 - monitor for bleeding * Pulmonary following - R pleurex to be drained Monday & Thursdays * Repeat CXR biweekly- minimal output from pleurex drain * Continue to monitor rehab progress *     #Debility  #SOB/R Pleural effusion  #Classic Hodgkin's Lymphoma  - Gait Instability, ADL impairments and Functional impairments: start Comprehensive Rehab Program of PT/OT  - 3 hours a day, 5 days a week  - S/p R supraclavicular mass biopsy-> significant for Classic Hodgkin's Lymphoma  - S/p R thoracotomy/VATS with Pleurex placement on  3/3  - c/w pleurX drainage  -- Drain on Monday and Thursday, no more than 500mLs at a time - strict I&O with documentation  - Bone marrow biopsy negative  - L chest wall mediport placed 3/15  - Cycle #1 of BV (Brentuximad Vedotin) given on 3/10/23  - s/p C2 brentuximab 4/3/23  - Plan for cycle #3  on 4/24/23  -  No plan to receive chemo while in rehab  - Await [facility] referral   - F/u with Dr. Hays outpatient  - 4/11: Pulm consult ->  Maintain 02 >90%, Ipratropium-Albuterol every 6 hours, Sodium chloride nebuizer BID , IS, Chest PT.  -- Pulm following  * CXR 4/11--No acute changes, Rt effusion with atelectasis, Left mid and lower opacities  - serial CXR biweekly--no interval change 4/14, minimal output from pleurax drain       #AFIB  - 4/18: Resume Eliquis   -- Per Dr. Galvan's CT surgery team- Okay to resume Eliquis BID on 4/18 (confirmed with Nurse Practitioner)   - Monitor for bleeding     #HTN  - Atenolol 12.5mg daily    #HLD  - Simvastatin 40mg daily    #Sleep/Mood  - Lexapro 10mg daily  - 4/12: Psych consult- Keep Lexapro 10 and Trazodone 25mg @ HS (per daughter's request)  - 4/13:  Trazodone 50mg nightly due to insomnia    #Skin  - Skin --right chest wall Pleurex drain site + dressing  - Pressure injury/Skin: OOB to Chair, PT/OT     #Pain Mgmt   - Tylenol PRN    #GI/Bowel Mgmt   - c/w Senna, Miralax     #/Bladder Mgmt   - Chronic Plummer last changed 4/7/23  - F/u with Urology outpt   - 4/12: Urology consult- Dr. Hamm - Plummer to remain in place during rehab stay. Will continue SC upon discharge home.     #FEN   - Diet - Regular + Thins  [DASH]      #Precautions / PROPHYLAXIS:   - Falls, aspiration  - ortho: Weight bearing status: WBAT   - Lungs: Aspiration, Incentive Spirometer   - DVT: Lovenox    #GOC  CODE STATUS: FULL CODE  --------------------------------------------------------------------     Liason with family/providers:    Next of kin--(Daughter  [phone]) a physician    4/12--I called on phone and d/w daughter Dr Gasca, She stated recommendations as requested by cardiothoracic unit of [facility] prior to patient's dc from there and T/f to acute rehab  --pleurax drain MWF, regular CXR, ,Parameters for BP meds,and family request for, consult reviews  All requests being adhered to  4/13--Functional update and clinical status d/w son present at bed side. Wife came earlier, but was not present at that time  --------------------------------------------------------------------  IDT conference on 4/13  TDD: 4/22 to home  Barriers: [place], fatigue, poor endurance, poor energy, R pleurex drain, Chronic plummer, 02 requirements.   Social Work: Lives in  with spouse, with 2 DEIRDRE/0HR. 1st floor setup available. Support from spouse and daughter.  OT: Setup/supervision for eating/grooming. Incidental assist for UBD. Mod A for LBD/toileting. Max A for bathing. CG/min A for transfer.   PT: CG/Cs for transfers. Ambuated 25-40 ft with RW. Stairs not yet assessed.  SLP: --n/a  --------------------------------------------------------------------  Outpatient Follow-up (Specialty/Name of physician):    Nathaniel Galvan)  Surgery; Thoracic Surgery  [phone] [place] [address], Oncology Building  3rd [place] [address]  Phone: [phone]  Fax: [phone]  Established Patient  Follow Up Time: 2 weeks    UROLOGY  MD Saúl Hamm [facility]  Chloé Vazquez  Scheduled Appointment: 03/27/2023

## 2023-04-20 LAB
ALBUMIN SERPL ELPH-MCNC: 1.9 G/DL — LOW (ref 3.3–5)
ALP SERPL-CCNC: 70 U/L — SIGNIFICANT CHANGE UP (ref 40–120)
ALT FLD-CCNC: 34 U/L — SIGNIFICANT CHANGE UP (ref 10–45)
ANION GAP SERPL CALC-SCNC: 3 MMOL/L — LOW (ref 5–17)
AST SERPL-CCNC: 30 U/L — SIGNIFICANT CHANGE UP (ref 10–40)
BILIRUB SERPL-MCNC: 0.4 MG/DL — SIGNIFICANT CHANGE UP (ref 0.2–1.2)
BUN SERPL-MCNC: 12 MG/DL — SIGNIFICANT CHANGE UP (ref 7–23)
CALCIUM SERPL-MCNC: 8.8 MG/DL — SIGNIFICANT CHANGE UP (ref 8.4–10.5)
CHLORIDE SERPL-SCNC: 100 MMOL/L — SIGNIFICANT CHANGE UP (ref 96–108)
CO2 SERPL-SCNC: 34 MMOL/L — HIGH (ref 22–31)
CREAT SERPL-MCNC: 0.56 MG/DL — SIGNIFICANT CHANGE UP (ref 0.5–1.3)
EGFR: 98 ML/MIN/1.73M2 — SIGNIFICANT CHANGE UP
GLUCOSE SERPL-MCNC: 116 MG/DL — HIGH (ref 70–99)
HCT VFR BLD CALC: 28.1 % — LOW (ref 39–50)
HGB BLD-MCNC: 8.5 G/DL — LOW (ref 13–17)
MCHC RBC-ENTMCNC: 29.6 PG — SIGNIFICANT CHANGE UP (ref 27–34)
MCHC RBC-ENTMCNC: 30.2 GM/DL — LOW (ref 32–36)
MCV RBC AUTO: 97.9 FL — SIGNIFICANT CHANGE UP (ref 80–100)
NRBC # BLD: 0 /100 WBCS — SIGNIFICANT CHANGE UP (ref 0–0)
PLATELET # BLD AUTO: 405 K/UL — HIGH (ref 150–400)
POTASSIUM SERPL-MCNC: 4.6 MMOL/L — SIGNIFICANT CHANGE UP (ref 3.5–5.3)
POTASSIUM SERPL-SCNC: 4.6 MMOL/L — SIGNIFICANT CHANGE UP (ref 3.5–5.3)
PROT SERPL-MCNC: 6.7 G/DL — SIGNIFICANT CHANGE UP (ref 6–8.3)
RBC # BLD: 2.87 M/UL — LOW (ref 4.2–5.8)
RBC # FLD: 20.3 % — HIGH (ref 10.3–14.5)
SODIUM SERPL-SCNC: 137 MMOL/L — SIGNIFICANT CHANGE UP (ref 135–145)
WBC # BLD: 7.8 K/UL — SIGNIFICANT CHANGE UP (ref 3.8–10.5)
WBC # FLD AUTO: 7.8 K/UL — SIGNIFICANT CHANGE UP (ref 3.8–10.5)

## 2023-04-20 PROCEDURE — 99233 SBSQ HOSP IP/OBS HIGH 50: CPT

## 2023-04-20 PROCEDURE — 99232 SBSQ HOSP IP/OBS MODERATE 35: CPT

## 2023-04-20 PROCEDURE — 71045 X-RAY EXAM CHEST 1 VIEW: CPT | Mod: 26

## 2023-04-20 RX ORDER — TRAZODONE HCL 50 MG
1 TABLET ORAL
Qty: 30 | Refills: 0
Start: 2023-04-20 | End: 2023-05-19

## 2023-04-20 RX ORDER — BUDESONIDE AND FORMOTEROL FUMARATE DIHYDRATE 160; 4.5 UG/1; UG/1
2 AEROSOL RESPIRATORY (INHALATION)
Qty: 1 | Refills: 0
Start: 2023-04-20 | End: 2023-05-19

## 2023-04-20 RX ORDER — SIMVASTATIN 20 MG/1
1 TABLET, FILM COATED ORAL
Qty: 30 | Refills: 0
Start: 2023-04-20 | End: 2023-05-19

## 2023-04-20 RX ORDER — FAMOTIDINE 10 MG/ML
1 INJECTION INTRAVENOUS
Qty: 0 | Refills: 0 | DISCHARGE
Start: 2023-04-20

## 2023-04-20 RX ORDER — SENNA PLUS 8.6 MG/1
2 TABLET ORAL
Qty: 0 | Refills: 0 | DISCHARGE
Start: 2023-04-20

## 2023-04-20 RX ORDER — ATENOLOL 25 MG/1
0.5 TABLET ORAL
Qty: 15 | Refills: 0
Start: 2023-04-20 | End: 2023-05-19

## 2023-04-20 RX ORDER — SOD,AMMONIUM,POTASSIUM LACTATE
1 CREAM (GRAM) TOPICAL
Qty: 60 | Refills: 0
Start: 2023-04-20 | End: 2023-05-19

## 2023-04-20 RX ORDER — ALBUTEROL 90 UG/1
1 AEROSOL, METERED ORAL
Qty: 120 | Refills: 0
Start: 2023-04-20 | End: 2023-05-19

## 2023-04-20 RX ORDER — MAGNESIUM OXIDE 400 MG ORAL TABLET 241.3 MG
1 TABLET ORAL
Qty: 90 | Refills: 0
Start: 2023-04-20 | End: 2023-05-19

## 2023-04-20 RX ORDER — POLYETHYLENE GLYCOL 3350 17 G/17G
17 POWDER, FOR SOLUTION ORAL
Qty: 0 | Refills: 0 | DISCHARGE
Start: 2023-04-20

## 2023-04-20 RX ORDER — APIXABAN 2.5 MG/1
1 TABLET, FILM COATED ORAL
Qty: 60 | Refills: 0
Start: 2023-04-20 | End: 2023-05-19

## 2023-04-20 RX ORDER — ACETAMINOPHEN 500 MG
2 TABLET ORAL
Qty: 0 | Refills: 0 | DISCHARGE
Start: 2023-04-20

## 2023-04-20 RX ORDER — ALBUTEROL 90 UG/1
1 AEROSOL, METERED ORAL
Qty: 0 | Refills: 0 | DISCHARGE
Start: 2023-04-20

## 2023-04-20 RX ORDER — TIOTROPIUM BROMIDE 18 UG/1
2 CAPSULE ORAL; RESPIRATORY (INHALATION)
Qty: 1 | Refills: 0
Start: 2023-04-20 | End: 2023-05-19

## 2023-04-20 RX ORDER — ESCITALOPRAM OXALATE 10 MG/1
1 TABLET, FILM COATED ORAL
Qty: 30 | Refills: 0
Start: 2023-04-20 | End: 2023-05-19

## 2023-04-20 RX ADMIN — APIXABAN 5 MILLIGRAM(S): 2.5 TABLET, FILM COATED ORAL at 05:10

## 2023-04-20 RX ADMIN — Medication 1 APPLICATION(S): at 17:52

## 2023-04-20 RX ADMIN — FAMOTIDINE 20 MILLIGRAM(S): 10 INJECTION INTRAVENOUS at 12:36

## 2023-04-20 RX ADMIN — BUDESONIDE AND FORMOTEROL FUMARATE DIHYDRATE 2 PUFF(S): 160; 4.5 AEROSOL RESPIRATORY (INHALATION) at 08:48

## 2023-04-20 RX ADMIN — MAGNESIUM OXIDE 400 MG ORAL TABLET 400 MILLIGRAM(S): 241.3 TABLET ORAL at 08:39

## 2023-04-20 RX ADMIN — TIOTROPIUM BROMIDE 2 PUFF(S): 18 CAPSULE ORAL; RESPIRATORY (INHALATION) at 08:49

## 2023-04-20 RX ADMIN — ESCITALOPRAM OXALATE 10 MILLIGRAM(S): 10 TABLET, FILM COATED ORAL at 12:34

## 2023-04-20 RX ADMIN — BUDESONIDE AND FORMOTEROL FUMARATE DIHYDRATE 2 PUFF(S): 160; 4.5 AEROSOL RESPIRATORY (INHALATION) at 19:36

## 2023-04-20 RX ADMIN — Medication 50 MILLIGRAM(S): at 21:21

## 2023-04-20 RX ADMIN — ALBUTEROL 2.5 MILLIGRAM(S): 90 AEROSOL, METERED ORAL at 19:36

## 2023-04-20 RX ADMIN — ALBUTEROL 2.5 MILLIGRAM(S): 90 AEROSOL, METERED ORAL at 08:44

## 2023-04-20 RX ADMIN — MAGNESIUM OXIDE 400 MG ORAL TABLET 400 MILLIGRAM(S): 241.3 TABLET ORAL at 12:34

## 2023-04-20 RX ADMIN — ATENOLOL 12.5 MILLIGRAM(S): 25 TABLET ORAL at 05:09

## 2023-04-20 RX ADMIN — SIMVASTATIN 40 MILLIGRAM(S): 20 TABLET, FILM COATED ORAL at 21:21

## 2023-04-20 RX ADMIN — ALBUTEROL 2.5 MILLIGRAM(S): 90 AEROSOL, METERED ORAL at 15:48

## 2023-04-20 RX ADMIN — CHLORHEXIDINE GLUCONATE 1 APPLICATION(S): 213 SOLUTION TOPICAL at 12:37

## 2023-04-20 RX ADMIN — SENNA PLUS 2 TABLET(S): 8.6 TABLET ORAL at 21:21

## 2023-04-20 RX ADMIN — MAGNESIUM OXIDE 400 MG ORAL TABLET 400 MILLIGRAM(S): 241.3 TABLET ORAL at 17:51

## 2023-04-20 NOTE — PROGRESS NOTE ADULT - ASSESSMENT
This is a 82 year old male with PMH of AFIB, HTN and chronic urinary retention (chronic plummer); who presented to [facility] via ambulance on 2/17 with progressively worsening SOB, intubated in the field, found to have a large R pleural effusion with mediastinal and hilar lymphadenopathy s/p thoracentesis (~3L), with persistent leukocytosis and concern for suspicious malignancy was transferred to [facility] on 2/24 for further workup.  He underwent a R supraclavicular mass biopsy on 2/28, significant for classic Hodgkin's lymphoma. On 3/3, he underwent a R thoracotomy/VATS with Pleurex placement. Bone marrow negative for involvement,  (s/p L chest wall mediport placement 3/15). Rehab course c/b worsening pleural effusion requiring acute transfer for drainage. Patient now with gait Instability, ADL impairments and Functional impairments- pt/ot/dvt ppx      #SOB/R Pleural effusion, Classic Hodgkin's Lymphoma  - S/p R supraclavicular mass biopsy-> significant for Classic Hodgkin's Lymphoma  - S/p R thoracotomy/VATS with Pleurex placement on  3/3  - c/w pleurX drainage  -- Drain on Monday and Thursday, no more than 500mLs at a time - strict I&O with documentation  - Bone marrow biopsy negative  - L chest wall mediport placed 3/15  - Cycle #1 of BV (Brentuximad Vedotin) given on 3/10/23  - s/p C2 brentuximab 4/3/23  - Plan for cycle #3  on 4/24/23  -  No plan to receive chemo while in rehab  - Await [facility] referral   - F/u with Dr. Hays outpatient  -  Pulm consult - recc noted* CXR 4/11--No acute changes, Rt effusion with atelectasis, Left mid and lower opacities  - CXR biweekly--no interval change 4/14, minimal output from pleurex drain       #AFIB  - Eliquis was placed on hold initially- restarted 4/19/23, ok with CT team, now oh hold again 4/20/23 due to bleeding from plummer  - monitor bleeding, consider  Eliquis once bleeding stops  - will reassess in am      #HTN  - Atenolol     #HLD  - Simvastatin     #Sleep/Mood  - Lexapro  - Trazodone      # urine retention bleeding noted from Plummer site 4/20/23  - hold Eliquis, reassess in am to restart  - Chronic Plummer last changed 4/7/23  - F/u with Urology outpt   - 4/12: Urology consult- Dr. Hamm -- continue Plummer to resume self caths at home after discharge  - monitor Hb    vte ppx- on Eliquis     will follow  d/w dr. guadalupe

## 2023-04-20 NOTE — PROGRESS NOTE ADULT - SUBJECTIVE AND OBJECTIVE BOX
CC: Hodgkin Lymphoma    Today's Subjective & Objective Findings:  Patient seen and examined in therapy, this AM.  Admits to intermittent sleep.  No dyspnea, comfortable at rest.  Experiences CRUZ with therapies.   Choi catheter in place.  No discomfort at Choi site.   Last BM on 4/19, per patient.  No bleeding since resuming Eliquis.   No other complaints at this time.    Denies headache, dizziness, visual changes, chest pain, abdominal pain, nausea, vomiting, diarrhea, dysuria, numbness or tingling.  Therapy-- engaging, motivated  Motivated and engaging in therapy  Observed, ambulating 50ft, limited by fatigue    CXR 4/17 reviewed--improvement noted  Minimal output from Rt peurax drain    Vital Signs Last 24 Hrs  T(C): 36.3 (20 Apr 2023 08:39), Max: 36.7 (19 Apr 2023 20:58)  T(F): 97.4 (20 Apr 2023 08:39), Max: 98.1 (19 Apr 2023 20:58)  HR: 55 (20 Apr 2023 08:55) (55 - 79)  BP: 114/66 (20 Apr 2023 08:39) (114/66 - 127/84)  BP(mean): --  RR: 16 (20 Apr 2023 08:39) (16 - 16)  SpO2: 100% (20 Apr 2023 08:55) (93% - 100%)      PHYSICAL EXAM:  Gen - Comfortable, interactive   HEENT - NCAT, EOMI, MMM, + nasal cannula, hearing deficit  Neck - Supple, No limited ROM  Pulm - air entry improved, except for Let lower lobe  Cardiovascular - RRR, S1S2  Chest - + pleurex drain to right anterior chest wall    Abdomen - Soft, non tender +BS  Extremities - No Cyanosis, no clubbing, no edema, no calf tenderness    Neuro-     Cognitive - awake, alert, oriented x 3.  Able to follow command     Communication - Fluent     Attention: Intact     Cranial Nerves - CN 2-12 intact.     Motor - 4/5     Sensory - Intact  to LT      Tone - normal  Psychiatric - Mood stable, Affect WNL  Skin:  all skin intact    LABS:                        8.5    7.80  )-----------( 405      ( 20 Apr 2023 07:20 )             28.1     04-20    137  |  100  |  12  ----------------------------<  116<H>  4.6   |  34<H>  |  0.56    Ca    8.8      20 Apr 2023 07:20    TPro  6.7  /  Alb  1.9<L>  /  TBili  0.4  /  DBili  x   /  AST  30  /  ALT  34  /  AlkPhos  70  04-20        MEDICATIONS  (STANDING):  albuterol    0.083% 2.5 milliGRAM(s) Nebulizer every 6 hours  ammonium lactate 12% Lotion 1 Application(s) Topical two times a day  apixaban 5 milliGRAM(s) Oral two times a day  atenolol  Tablet 12.5 milliGRAM(s) Oral daily  budesonide 160 MICROgram(s)/formoterol 4.5 MICROgram(s) Inhaler 2 Puff(s) Inhalation two times a day  chlorhexidine 2% Cloths 1 Application(s) Topical daily  escitalopram 10 milliGRAM(s) Oral daily  famotidine    Tablet 20 milliGRAM(s) Oral daily  magnesium oxide 400 milliGRAM(s) Oral three times a day with meals  polyethylene glycol 3350 17 Gram(s) Oral daily  senna 2 Tablet(s) Oral at bedtime  simvastatin 40 milliGRAM(s) Oral at bedtime  tiotropium 2.5 MICROgram(s) Inhaler 2 Puff(s) Inhalation daily  traZODone 50 milliGRAM(s) Oral at bedtime    MEDICATIONS  (PRN):  acetaminophen     Tablet .. 650 milliGRAM(s) Oral every 6 hours PRN Temp greater or equal to 38C (100.4F), Mild Pain (1 - 3)  oxyCODONE    IR 5 milliGRAM(s) Oral every 4 hours PRN Moderate Pain (4 - 6)   CC: Hodgkin Lymphoma    Today's Subjective & Objective Findings:  Patient seen and examined in therapy, this AM.  Admits to intermittent sleep.  No dyspnea, comfortable at rest.  Experiences CRUZ with therapies.   Choi catheter in place.  No discomfort at Choi site.   Last BM on 4/19, per patient.  No bleeding since resuming Eliquis.   No other complaints at this time.    Denies headache, dizziness, visual changes, chest pain, abdominal pain, nausea, vomiting, diarrhea, dysuria, numbness or tingling.  Therapy-- engaging, motivated  Motivated and engaging in therapy  Observed, ambulating 50ft, limited by fatigue    CXR 4/17 reviewed--improvement noted, due repeat CXR today   Minimal output from Rt peurax drain    I called on phone and d/w daughter Dr Judith Schumacher, patient's functional update (details from IDT today)  and medical progress, upate with pleural effusion, dc plan and f/u post DC  She was happy with the explanation  Family prefers dc Sun 4/23, instead of 4/22, which we have already agreed to during IDT today     Vital Signs Last 24 Hrs  T(C): 36.3 (20 Apr 2023 08:39), Max: 36.7 (19 Apr 2023 20:58)  T(F): 97.4 (20 Apr 2023 08:39), Max: 98.1 (19 Apr 2023 20:58)  HR: 55 (20 Apr 2023 08:55) (55 - 79)  BP: 114/66 (20 Apr 2023 08:39) (114/66 - 127/84)  BP(mean): --  RR: 16 (20 Apr 2023 08:39) (16 - 16)  SpO2: 100% (20 Apr 2023 08:55) (93% - 100%)      PHYSICAL EXAM:  Gen - Comfortable, interactive   HEENT - NCAT, EOMI, MMM, + nasal cannula, hearing deficit  Neck - Supple, No limited ROM  Pulm - air entry improved, except for Let lower lobe  Cardiovascular - RRR, S1S2  Chest - + pleurex drain to right anterior chest wall    Abdomen - Soft, non tender +BS  Extremities - No Cyanosis, no clubbing, no edema, no calf tenderness    Neuro-     Cognitive - awake, alert, oriented x 3.  Able to follow command     Communication - Fluent     Attention: Intact     Cranial Nerves - CN 2-12 intact.     Motor - 4/5     Sensory - Intact  to LT      Tone - normal  Psychiatric - Mood stable, Affect WNL  Skin:  all skin intact    LABS:                        8.5    7.80  )-----------( 405      ( 20 Apr 2023 07:20 )             28.1     04-20    137  |  100  |  12  ----------------------------<  116<H>  4.6   |  34<H>  |  0.56    Ca    8.8      20 Apr 2023 07:20    TPro  6.7  /  Alb  1.9<L>  /  TBili  0.4  /  DBili  x   /  AST  30  /  ALT  34  /  AlkPhos  70  04-20        MEDICATIONS  (STANDING):  albuterol    0.083% 2.5 milliGRAM(s) Nebulizer every 6 hours  ammonium lactate 12% Lotion 1 Application(s) Topical two times a day  apixaban 5 milliGRAM(s) Oral two times a day  atenolol  Tablet 12.5 milliGRAM(s) Oral daily  budesonide 160 MICROgram(s)/formoterol 4.5 MICROgram(s) Inhaler 2 Puff(s) Inhalation two times a day  chlorhexidine 2% Cloths 1 Application(s) Topical daily  escitalopram 10 milliGRAM(s) Oral daily  famotidine    Tablet 20 milliGRAM(s) Oral daily  magnesium oxide 400 milliGRAM(s) Oral three times a day with meals  polyethylene glycol 3350 17 Gram(s) Oral daily  senna 2 Tablet(s) Oral at bedtime  simvastatin 40 milliGRAM(s) Oral at bedtime  tiotropium 2.5 MICROgram(s) Inhaler 2 Puff(s) Inhalation daily  traZODone 50 milliGRAM(s) Oral at bedtime    MEDICATIONS  (PRN):  acetaminophen     Tablet .. 650 milliGRAM(s) Oral every 6 hours PRN Temp greater or equal to 38C (100.4F), Mild Pain (1 - 3)  oxyCODONE    IR 5 milliGRAM(s) Oral every 4 hours PRN Moderate Pain (4 - 6)

## 2023-04-20 NOTE — PROGRESS NOTE ADULT - ASSESSMENT
ASSESSMENT/PLAN  This is a 82 year old male with PMH of AFIB, HTN and chronic urinary retention (chronic plummer); who presented to [facility] via ambulance on 2/17 with progressively worsening SOB, intubated in the field, found to have a large R pleural effusion with mediastinal and hilar lymphadenopathy s/p thoracentesis (~3L), with persistent leukocytosis and concern for suspicious malignancy was transferred to [facility] on 2/24 for further workup.  He underwent a R supraclavicular mass biopsy on 2/28, significant for classic Hodgkin's lymphoma. On 3/3, he underwent a R thoracotomy/VATS with Pleurex placement. Bone marrow negative for involvement,  (s/p L chest wall mediport placement 3/15). Rehab course c/b worsening pleural effusion requiring acute transfer for drainage. Patient now with gait Instability, ADL impairments and Functional impairments.    * Eliquis restarted 4/18 - monitor for bleeding * Pulmonary following - R pleurex to be drained Monday & Thursdays - minimal output * Repeat CXR biweekly * Continue to monitor rehab progress *     #Debility  #SOB/R Pleural effusion  #Classic Hodgkin's Lymphoma  - Gait Instability, ADL impairments and Functional impairments: start Comprehensive Rehab Program of PT/OT  - 3 hours a day, 5 days a week  - S/p R supraclavicular mass biopsy-> significant for Classic Hodgkin's Lymphoma  - S/p R thoracotomy/VATS with Pleurex placement on  3/3  - c/w pleurX drainage  -- Drain on Monday and Thursday, no more than 500mLs at a time - strict I&O with documentation  - Bone marrow biopsy negative  - L chest wall mediport placed 3/15  - Cycle #1 of BV (Brentuximad Vedotin) given on 3/10/23  - s/p C2 brentuximab 4/3/23  - Plan for cycle #3  on 4/24/23  -  No plan to receive chemo while in rehab  - Await [facility] referral   - F/u with Dr. Hays outpatient  - 4/11: Pulm consult ->  Maintain 02 >90%, Ipratropium-Albuterol every 6 hours, Sodium chloride nebuizer BID , IS, Chest PT.  -- Pulm following  * CXR 4/11--No acute changes, Rt effusion with atelectasis, Left mid and lower opacities  - serial CXR biweekly--no interval change 4/14, minimal output from pleurax drain       #AFIB  - 4/18: Resume Eliquis   -- Per Dr. Galvan's CT surgery team- Okay to resume Eliquis BID on 4/18 (confirmed with Nurse Practitioner)   - Monitor for bleeding     #HTN  - Atenolol 12.5mg daily    #HLD  - Simvastatin 40mg daily    #Sleep/Mood  - Lexapro 10mg daily  - 4/12: Psych consult- Keep Lexapro 10 and Trazodone 25mg @ HS (per daughter's request)  - 4/13:  Trazodone 50mg nightly due to insomnia    #Skin  - Skin --right chest wall Pleurex drain site + dressing  - Pressure injury/Skin: OOB to Chair, PT/OT     #Pain Mgmt   - Tylenol PRN    #GI/Bowel Mgmt   - c/w Senna, Miralax     #/Bladder Mgmt   - Chronic Plummer last changed 4/7/23  - F/u with Urology outpt   - 4/12: Urology consult- Dr. Hamm - Plummer to remain in place during rehab stay. Will continue SC upon discharge home.     #FEN   - Diet - Regular + Thins  [DASH]      #Precautions / PROPHYLAXIS:   - Falls, aspiration  - ortho: Weight bearing status: WBAT   - Lungs: Aspiration, Incentive Spirometer   - DVT: Lovenox    #GOC  CODE STATUS: FULL CODE  --------------------------------------------------------------------     Liason with family/providers:    Next of kin--(Daughter  [phone]) a physician    4/12--I called on phone and d/w daughter Dr Gasca, She stated recommendations as requested by cardiothoracic unit of [facility] prior to patient's dc from there and T/f to acute rehab  --pleurax drain MWF, regular CXR, ,Parameters for BP meds,and family request for, consult reviews  All requests being adhered to  4/13--Functional update and clinical status d/w son present at bed side. Wife came earlier, but was not present at that time  --------------------------------------------------------------------  IDT conference on 4/13  TDD: 4/22 to home  Barriers: [place], fatigue, poor endurance, poor energy, R pleurex drain, Chronic plummer, 02 requirements.   Social Work: Lives in  with spouse, with 2 DEIRDRE/0HR. 1st floor setup available. Support from spouse and daughter.  OT: Setup/supervision for eating/grooming. Incidental assist for UBD. Mod A for LBD/toileting. Max A for bathing. CG/min A for transfer.   PT: CG/Cs for transfers. Ambuated 25-40 ft with RW. Stairs not yet assessed.  SLP: --n/a  --------------------------------------------------------------------  Outpatient Follow-up (Specialty/Name of physician):    Nathaniel Galvan)  Surgery; Thoracic Surgery  [address] [place] [address]  [address]rd [place] [address]  Phone: [phone]  Fax: [phone]  Established Patient  Follow Up Time: 2 weeks    UROLOGY  MD Saúl Hamm [facility]  Chloé Vazquez  Scheduled Appointment: 03/27/2023     ASSESSMENT/PLAN  This is a 82 year old male with PMH of AFIB, HTN and chronic urinary retention (chronic plummer); who presented to [facility] via ambulance on 2/17 with progressively worsening SOB, intubated in the field, found to have a large R pleural effusion with mediastinal and hilar lymphadenopathy s/p thoracentesis (~3L), with persistent leukocytosis and concern for suspicious malignancy was transferred to [facility] on 2/24 for further workup.  He underwent a R supraclavicular mass biopsy on 2/28, significant for classic Hodgkin's lymphoma. On 3/3, he underwent a R thoracotomy/VATS with Pleurex placement. Bone marrow negative for involvement,  (s/p L chest wall mediport placement 3/15). Rehab course c/b worsening pleural effusion requiring acute transfer for drainage. Patient now with gait Instability, ADL impairments and Functional impairments.    * Eliquis restarted 4/18 - monitor for bleeding * Pulmonary following - R pleurex to be drained Monday & Thursdays - minimal output * Repeat CXR biweekly * Continue to monitor rehab progress *     #Debility  #SOB/R Pleural effusion  #Classic Hodgkin's Lymphoma  - Gait Instability, ADL impairments and Functional impairments: start Comprehensive Rehab Program of PT/OT  - 3 hours a day, 5 days a week  - S/p R supraclavicular mass biopsy-> significant for Classic Hodgkin's Lymphoma  - S/p R thoracotomy/VATS with Pleurex placement on  3/3  - c/w pleurX drainage  -- Drain on Monday and Thursday, no more than 500mLs at a time - strict I&O with documentation  - Bone marrow biopsy negative  - L chest wall mediport placed 3/15  - Cycle #1 of BV (Brentuximad Vedotin) given on 3/10/23  - s/p C2 brentuximab 4/3/23  - Plan for cycle #3  on 4/24/23  -  No plan to receive chemo while in rehab  - Await [facility] referral   - F/u with Dr. Hays outpatient  - 4/11: Pulm consult ->  Maintain 02 >90%, Ipratropium-Albuterol every 6 hours, Sodium chloride nebuizer BID , IS, Chest PT.  -- Pulm following  * CXR 4/11--No acute changes, Rt effusion with atelectasis, Left mid and lower opacities  - serial CXR biweekly--no interval change 4/14, minimal output from pleurax drain       #AFIB  - 4/18: Resume Eliquis   -- Per Dr. Galvan's CT surgery team- Okay to resume Eliquis BID on 4/18 (confirmed with Nurse Practitioner)   - Monitor for bleeding     #HTN  - Atenolol 12.5mg daily    #HLD  - Simvastatin 40mg daily    #Sleep/Mood  - Lexapro 10mg daily  - 4/12: Psych consult- Keep Lexapro 10 and Trazodone 25mg @ HS (per daughter's request)  - 4/13:  Trazodone 50mg nightly due to insomnia    #Skin  - Skin --right chest wall Pleurex drain site + dressing  - Pressure injury/Skin: OOB to Chair, PT/OT     #Pain Mgmt   - Tylenol PRN    #GI/Bowel Mgmt   - c/w Senna, Miralax     #/Bladder Mgmt   - Chronic Plummer last changed 4/7/23  - F/u with Urology outpt   - 4/12: Urology consult- Dr. Hamm - Pulmmer to remain in place during rehab stay. Will continue SC upon discharge home.     #FEN   - Diet - Regular + Thins  [DASH]      #Precautions / PROPHYLAXIS:   - Falls, aspiration  - ortho: Weight bearing status: WBAT   - Lungs: Aspiration, Incentive Spirometer   - DVT: Lovenox    #GOC  CODE STATUS: FULL CODE  --------------------------------------------------------------------     Liason with family/providers:    Next of kin--(Daughter  [phone]) a physician    4/12--I called on phone and d/w daughter Dr Gasca, She stated recommendations as requested by cardiothoracic unit of [facility] prior to patient's dc from there and T/f to acute rehab  --pleurax drain MWF, regular CXR, ,Parameters for BP meds,and family request for, consult reviews  All requests being adhered to  4/13--Functional update and clinical status d/w son present at bed side. Wife came earlier, but was not present at that time  --------------------------------------------------------------------  IDT conference on 4/20  TDD: 4/22 to home  Barriers: [place], fatigue, poor endurance, poor energy, R pleurex drain, Chronic plummer, 02 requirements.   Social Work: Lives in  with spouse, with 2 DEIRDRE/0HR. 1st floor setup available. Support from spouse and daughter. Willing to private hire.   OT: Sup/min A for ADLstransfers.  PT: CG/Cs for transfers. Ambuated 50 ft with RW.   SLP: --n/a  --------------------------------------------------------------------  Outpatient Follow-up (Specialty/Name of physician):    Nathaniel Galvan)  Surgery; Thoracic Surgery  [address] [place] [address]  3rd [place] [address]  Phone: [phone]  Fax: [phone]  Established Patient  Follow Up Time: 2 weeks    UROLOGY  MD Saúl Hamm [facility]  Chloé Vazquez  Scheduled Appointment: 03/27/2023     ASSESSMENT/PLAN  This is a 82 year old male with PMH of AFIB, HTN and chronic urinary retention (chronic plummer); who presented to [facility] via ambulance on 2/17 with progressively worsening SOB, intubated in the field, found to have a large R pleural effusion with mediastinal and hilar lymphadenopathy s/p thoracentesis (~3L), with persistent leukocytosis and concern for suspicious malignancy was transferred to [facility] on 2/24 for further workup.  He underwent a R supraclavicular mass biopsy on 2/28, significant for classic Hodgkin's lymphoma. On 3/3, he underwent a R thoracotomy/VATS with Pleurex placement. Bone marrow negative for involvement,  (s/p L chest wall mediport placement 3/15). Rehab course c/b worsening pleural effusion requiring acute transfer for drainage. Patient now with gait Instability, ADL impairments and Functional impairments.    * Eliquis restarted 4/18 - monitor for bleeding * Pulmonary following - R pleurex to be drained Monday & Thursdays - minimal output * Repeat CXR biweekly * Continue to monitor rehab progress * IDT today as noted     #Debility  #SOB/R Pleural effusion  #Classic Hodgkin's Lymphoma  - Gait Instability, ADL impairments and Functional impairments: start Comprehensive Rehab Program of PT/OT  - 3 hours a day, 5 days a week  - S/p R supraclavicular mass biopsy-> significant for Classic Hodgkin's Lymphoma  - S/p R thoracotomy/VATS with Pleurex placement on  3/3  - c/w pleurX drainage  -- Drain on Monday and Thursday, no more than 500mLs at a time - strict I&O with documentation  - Bone marrow biopsy negative  - L chest wall mediport placed 3/15  - Cycle #1 of BV (Brentuximad Vedotin) given on 3/10/23  - s/p C2 brentuximab 4/3/23  - Plan for cycle #3  on 4/24/23  -  No plan to receive chemo while in rehab  - Await [facility] referral   - F/u with Dr. Hays outpatient  - 4/11: Pulm consult ->  Maintain 02 >90%, Ipratropium-Albuterol every 6 hours, Sodium chloride nebuizer BID , IS, Chest PT.  -- Pulm following  * CXR 4/11--No acute changes, Rt effusion with atelectasis, Left mid and lower opacities  - serial CXR biweekly--no interval change 4/14, minimal output from pleurax drain       #AFIB  - 4/18: Resume Eliquis   -- Per Dr. Galvan's CT surgery team- Okay to resume Eliquis BID on 4/18 (confirmed with Nurse Practitioner)   - Monitor for bleeding     #HTN  - Atenolol 12.5mg daily    #HLD  - Simvastatin 40mg daily    #Sleep/Mood  - Lexapro 10mg daily  - 4/12: Psych consult- Keep Lexapro 10 and Trazodone 25mg @ HS (per daughter's request)  - 4/13:  Trazodone 50mg nightly due to insomnia    #Skin  - Skin --right chest wall Pleurex drain site + dressing  - Pressure injury/Skin: OOB to Chair, PT/OT     #Pain Mgmt   - Tylenol PRN    #GI/Bowel Mgmt   - c/w Senna, Miralax     #/Bladder Mgmt   - Chronic Plummer last changed 4/7/23  - F/u with Urology outpt   - 4/12: Urology consult- Dr. Hamm - Plummer to remain in place during rehab stay. Will continue SC upon discharge home.     #FEN   - Diet - Regular + Thins  [DASH]      #Precautions / PROPHYLAXIS:   - Falls, aspiration  - ortho: Weight bearing status: WBAT   - Lungs: Aspiration, Incentive Spirometer   - DVT: Lovenox    #GOC  CODE STATUS: FULL CODE  --------------------------------------------------------------------     Liason with family/providers:    Next of kin--(Daughter  [phone]) a physician    4/12--I called on phone and d/w daughter Dr Gasca, She stated recommendations as requested by cardiothoracic unit of [facility] prior to patient's dc from there and T/f to acute rehab  --pleurax drain MWF, regular CXR, ,Parameters for BP meds,and family request for, consult reviews  All requests being adhered to  4/13--Functional update and clinical status d/w son present at bed side. Wife came earlier, but was not present at that time  4/20--Update d/w family including DC plan, Dr Gasca happy with same   --------------------------------------------------------------------  IDT conference on 4/20  TDD: 4/22 to home  Barriers: [place], fatigue, poor endurance, poor energy, R pleurex drain, Chronic plummer, 02 requirements.   Social Work: Lives in  with spouse, with 2 DEIRDRE/0HR. 1st floor setup available. Support from spouse and daughter. Willing to private hire.   OT: Sup/min A for ADLstransfers.  PT: CG/Cs for transfers. Ambuated 50 ft with RW.   SLP: --n/a  --------------------------------------------------------------------  Outpatient Follow-up (Specialty/Name of physician):    Nathaniel Galvan)  Surgery; Thoracic Surgery  [address] [place] [address]  3rd [place] [address]  Phone: [phone]  Fax: [phone]  Established Patient  Follow Up Time: 2 weeks    UROLOGY  MD Saúl Hamm [facility]  Chloé Vazquez  Scheduled Appointment: 03/27/2023

## 2023-04-20 NOTE — PROGRESS NOTE ADULT - ASSESSMENT
Physical Examination:  GENERAL:               Alert, Oriented, No acute distress.    HEENT:                    No JVD, Dry MM  PULM:                     Bilateral air entry, Clear to auscultation bilaterally, no significant sputum production, No Rales, No Rhonchi, No Wheezing  CVS:                         S1, S2,  No Murmur  NEURO:                  Alert, oriented, interactive, nonfocal, follows commands  PSYC:                      Calm, + Insight.      Assessment    82 yr old man , non smoker with multiple medical condition s/p right pleurX cath placement for pleural effusion due to Hodgkin's Lymphoma . CXR 4/11 with  loculated right effusion .  Staff reported episode of hypoxia requiring O2 10 L ,     Problem List  1. 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 Hypoxemia requiring n/c o2       Sugg  - Continue O2 supp as needed to maintain sat >90%, taper as tolerated,   - Chest PT   - Start duonebs q6h while awake   - Drain Pleurex twice weekly  - if not draining monitor cxr if loculation increase in size will need thoracic surg evaluation.   - OOB to chair as tolerated   - Incentive spirometry   - DVT GI prophy

## 2023-04-20 NOTE — PROGRESS NOTE ADULT - SUBJECTIVE AND OBJECTIVE BOX
Follow-up Pulmonary Progress Note  Chief Complaint : Other malaise      patient seen and examined  comforable  on 4 L sat 97%, tapered to 3 L   no cough, sob palp      Allergies :pertussis vaccines (Rash)  shellfish (Rash)      PAST MEDICAL & SURGICAL HISTORY:  Atrial fibrillation    Hypertension    Urinary retention    No significant past surgical history        Medications:  MEDICATIONS  (STANDING):  albuterol    0.083% 2.5 milliGRAM(s) Nebulizer every 6 hours  ammonium lactate 12% Lotion 1 Application(s) Topical two times a day  atenolol  Tablet 12.5 milliGRAM(s) Oral daily  budesonide 160 MICROgram(s)/formoterol 4.5 MICROgram(s) Inhaler 2 Puff(s) Inhalation two times a day  chlorhexidine 2% Cloths 1 Application(s) Topical daily  escitalopram 10 milliGRAM(s) Oral daily  famotidine    Tablet 20 milliGRAM(s) Oral daily  magnesium oxide 400 milliGRAM(s) Oral three times a day with meals  polyethylene glycol 3350 17 Gram(s) Oral daily  senna 2 Tablet(s) Oral at bedtime  simvastatin 40 milliGRAM(s) Oral at bedtime  tiotropium 2.5 MICROgram(s) Inhaler 2 Puff(s) Inhalation daily  traZODone 50 milliGRAM(s) Oral at bedtime    MEDICATIONS  (PRN):  acetaminophen     Tablet .. 650 milliGRAM(s) Oral every 6 hours PRN Temp greater or equal to 38C (100.4F), Mild Pain (1 - 3)      Antibiotics History      Heme Medications       GI Medications  famotidine    Tablet 20 milliGRAM(s) Oral daily, 04-11-23 @ 00:00, Routine  polyethylene glycol 3350 17 Gram(s) Oral daily, 04-10-23 @ 18:17, Routine  senna 2 Tablet(s) Oral at bedtime, 04-10-23 @ 18:17, Routine        LABS:                        8.5    7.80  )-----------( 405      ( 20 Apr 2023 07:20 )             28.1     04-20    137  |  100  |  12  ----------------------------<  116<H>  4.6   |  34<H>  |  0.56    Ca    8.8      20 Apr 2023 07:20    TPro  6.7  /  Alb  1.9<L>  /  TBili  0.4  /  DBili  x   /  AST  30  /  ALT  34  /  AlkPhos  70  04-20   < from: Xray Chest 1 View- PORTABLE-Routine (Xray Chest 1 View- PORTABLE-Routine in AM.) (04.17.23 @ 09:00) >    ACC: [id number] EXAM:  XR CHEST PORTABLE ROUTINE 1V   ORDERED BY:  FAUSTINA KIRKPATRICK     PROCEDURE DATE:  04/17/2023          INTERPRETATION:  TIME OF EXAM: April 17, 2023 at 8:28 AM.    CLINICAL INFORMATION: Hodgkin's lymphoma with right Pleurx drain.    COMPARISON:  April 14, 2023.    TECHNIQUE:   AP Portable chest x-ray.    INTERPRETATION:    Heart size and the mediastinum cannot be accurately evaluated on this   projection.  Left subclavian approach port with tip in the SVC.  Right Pleurx catheter, not significantly changed in position.  Loculated right pleural effusion/pleural thickening, for the most part   unchanged. Previous thickened bandlike right mid to lower opacity is   decreased with residual 3 cm nodular component seen. May represent   improving loculated pleural fluid within a fissure, however, the nodular   opacity is of indeterminate nature.  There is increased patchy right lower lung opacities.  There is new left midlung linear atelectasis.  No left pleural effusion is seen.  No pneumothorax.  There is osteoarthritic degenerative change of the spine and shoulders.      IMPRESSION:  Right Pleurx catheter, not significantly changed in position.    Loculated right pleural effusion/pleural thickening, for the most part   unchanged.    Decrease in previous bandlike thickening right mid to lower chest with   residual 3 6 cm nodular component noted. Findings may represent improving   loculated pleural fluid within the fissure, however, the nodular opacity   is of indeterminate nature.    Increased patchy right lower lung opacities which could be due to   atelectasis or infection.    New left midlung linear atelectasis.    --- End of Report ---        < end of copied text >      VITALS:  T(C): 36.3 (04-20-23 @ 08:39), Max: 36.7 (04-19-23 @ 20:58)  T(F): 97.4 (04-20-23 @ 08:39), Max: 98.1 (04-19-23 @ 20:58)  HR: 55 (04-20-23 @ 08:55) (55 - 79)  BP: 114/66 (04-20-23 @ 08:39) (114/66 - 127/84)  BP(mean): --  ABP: --  ABP(mean): --  RR: 16 (04-20-23 @ 08:39) (16 - 16)  SpO2: 100% (04-20-23 @ 08:55) (94% - 100%)  CVP(mm Hg): --  CVP(cm H2O): --    Ins and Outs     04-19-23 @ 07:01  -  04-20-23 @ 07:00  --------------------------------------------------------  IN: 0 mL / OUT: 1070 mL / NET: -1070 mL    04-20-23 @ 07:01  -  04-20-23 @ 15:31  --------------------------------------------------------  IN: 0 mL / OUT: 2 mL / NET: -2 mL          Weight (kg): 80 (04-19-23 @ 15:42)        I&O's Detail    19 Apr 2023 07:01  -  20 Apr 2023 07:00  --------------------------------------------------------  IN:  Total IN: 0 mL    OUT:    Indwelling Catheter - Urethral (mL): 1070 mL  Total OUT: 1070 mL    Total NET: -1070 mL      20 Apr 2023 07:01  -  20 Apr 2023 15:31  --------------------------------------------------------  IN:  Total IN: 0 mL    OUT:    Stool (mL): 2 mL  Total OUT: 2 mL    Total NET: -2 mL

## 2023-04-21 ENCOUNTER — TRANSCRIPTION ENCOUNTER (OUTPATIENT)
Age: 83
End: 2023-04-21

## 2023-04-21 PROCEDURE — 99232 SBSQ HOSP IP/OBS MODERATE 35: CPT

## 2023-04-21 RX ORDER — APIXABAN 2.5 MG/1
5 TABLET, FILM COATED ORAL
Refills: 0 | Status: DISCONTINUED | OUTPATIENT
Start: 2023-04-21 | End: 2023-04-23

## 2023-04-21 RX ORDER — ALBUTEROL 90 UG/1
1 AEROSOL, METERED ORAL
Qty: 120 | Refills: 0
Start: 2023-04-21 | End: 2023-05-20

## 2023-04-21 RX ADMIN — ALBUTEROL 2.5 MILLIGRAM(S): 90 AEROSOL, METERED ORAL at 16:02

## 2023-04-21 RX ADMIN — ATENOLOL 12.5 MILLIGRAM(S): 25 TABLET ORAL at 06:24

## 2023-04-21 RX ADMIN — Medication 50 MILLIGRAM(S): at 22:17

## 2023-04-21 RX ADMIN — BUDESONIDE AND FORMOTEROL FUMARATE DIHYDRATE 2 PUFF(S): 160; 4.5 AEROSOL RESPIRATORY (INHALATION) at 08:25

## 2023-04-21 RX ADMIN — APIXABAN 5 MILLIGRAM(S): 2.5 TABLET, FILM COATED ORAL at 17:58

## 2023-04-21 RX ADMIN — SIMVASTATIN 40 MILLIGRAM(S): 20 TABLET, FILM COATED ORAL at 22:17

## 2023-04-21 RX ADMIN — MAGNESIUM OXIDE 400 MG ORAL TABLET 400 MILLIGRAM(S): 241.3 TABLET ORAL at 12:06

## 2023-04-21 RX ADMIN — MAGNESIUM OXIDE 400 MG ORAL TABLET 400 MILLIGRAM(S): 241.3 TABLET ORAL at 09:08

## 2023-04-21 RX ADMIN — ALBUTEROL 2.5 MILLIGRAM(S): 90 AEROSOL, METERED ORAL at 08:24

## 2023-04-21 RX ADMIN — ALBUTEROL 2.5 MILLIGRAM(S): 90 AEROSOL, METERED ORAL at 21:26

## 2023-04-21 RX ADMIN — ALBUTEROL 2.5 MILLIGRAM(S): 90 AEROSOL, METERED ORAL at 05:54

## 2023-04-21 RX ADMIN — MAGNESIUM OXIDE 400 MG ORAL TABLET 400 MILLIGRAM(S): 241.3 TABLET ORAL at 17:58

## 2023-04-21 RX ADMIN — FAMOTIDINE 20 MILLIGRAM(S): 10 INJECTION INTRAVENOUS at 12:07

## 2023-04-21 RX ADMIN — CHLORHEXIDINE GLUCONATE 1 APPLICATION(S): 213 SOLUTION TOPICAL at 12:06

## 2023-04-21 RX ADMIN — BUDESONIDE AND FORMOTEROL FUMARATE DIHYDRATE 2 PUFF(S): 160; 4.5 AEROSOL RESPIRATORY (INHALATION) at 21:26

## 2023-04-21 RX ADMIN — TIOTROPIUM BROMIDE 2 PUFF(S): 18 CAPSULE ORAL; RESPIRATORY (INHALATION) at 08:25

## 2023-04-21 RX ADMIN — ESCITALOPRAM OXALATE 10 MILLIGRAM(S): 10 TABLET, FILM COATED ORAL at 12:07

## 2023-04-21 NOTE — PROGRESS NOTE ADULT - SUBJECTIVE AND OBJECTIVE BOX
Feels well this AM.    Choi draining well - clear urine.    Choi repositioned by staff as wasn't draining - promptly drained 1300 ml.       ROS  General: no fever or chills    VITAL SIGNS  Vital Signs Last 24 Hrs  T(C): 36.3 (21 Apr 2023 07:53), Max: 36.8 (20 Apr 2023 20:58)  T(F): 97.4 (21 Apr 2023 07:53), Max: 98.3 (20 Apr 2023 20:58)  HR: 94 (21 Apr 2023 08:25) (55 - 97)  BP: 133/88       PHYSICAL EXAM:  General: comfortable  : Choi clear. Bladder NT/ND. Penis normal      LABS:                        8.5    7.80  )-----------( 405      ( 20 Apr 2023 07:20 )             28.1     04-20    137  |  100  |  12  ----------------------------<  116<H>  4.6   |  34<H>  |  0.56        Prior notes/chart reviewed.

## 2023-04-21 NOTE — DISCHARGE NOTE NURSING/CASE MANAGEMENT/SOCIAL WORK - PATIENT PORTAL LINK FT
You can access the FollowMyHealth Patient Portal offered by [facility] by registering at the following website: [email or web address]. By joining [facility]’s FollowMyHealth portal, you will also be able to view your health information using other applications (apps) compatible with our system.

## 2023-04-21 NOTE — PROGRESS NOTE ADULT - SUBJECTIVE AND OBJECTIVE BOX
ARIK DUMONT   82y   Male    Admitting: S. Yulia  HPI:  This is a 83 YO male with PMH of AFIB, HTN and chronic urinary retention (chronic plummer); who presented to [facility] via ambulance on 2/17 with progressively worsening SOB.  He was intubated in the field and brought to [facility] ED and admitted to ICU for acute respiratory failure with hypercapnia. CTA chest was negative for PE but significant for a large R pleural effusion with mediastinal and hilar lymphadenopathy, and 3L of fluid was drained.  He completed a 5 day course of empiric antibiotics. Pleural fluid cultures and blood cultures resulted negative.  Suspicious of underlying malignancy secondary to lymphadenopathy and persistent leukocytosis, Heme/Onc was consulted and recommended transfer to [facility] on 2/24/23 for further workup. On 2/28 he underwent an ultrasound guided biopsy of his R supraclavicular mass/lymph node, which later resulted as classic Hodgkin's lymphoma.  On 3/3, he underwent a R thoracotomy/VATS with Pleurex placement. Postoperatively, he was hypotensive and started on Midodrine. A Left chest wall mediport was placed for ongoing chemotherapy treatment on 3/15. He was admitted  to [facility] on 3/24.    While at rehab, he developed chest discomfort., low systolic BP and mild tachycardia. EKG showed Afib with rapid ventricular rythmn. On exam, he had increased respiratory rate. CXR and  CT chest showed increased Rt sided effusion with loculation and increasing atelectasis. He was transferred to [facility]. In the ED, CXR showed recurrent loculated R pleural effusion. CT chest with small loculated R pleural effusion with new peripheral demarcated high attenuation in the R pleural space. He  was seen by CT surgery in the ED, per paper chart, "drained about 100cc of fluid, placed on Pleura vac . PleurX was then flushed and drained about 2180cc fluid  need to hold AC for 48 hours for possible TPA into pleurX cath.  MIST held per CT surgery.  No plan for VATS. CXR  on 3/27, Decreasing pulmonary congestion, No pneumothorax. On 4/6 CT Chest -- Compared to 3/29/2023, similar loculated, exudative right pleural effusion with slightly decreased pleural air component and Pleurx catheter in place. Question malignant effusion in the setting of reported lymphoma, irregular septal thickening, nodularity and mediastinal pleural involvement.     Patient was evaluated by PM&R and therapy for functional deficits, gait/ADL impairments and acute rehabilitation was recommended. Patient was medically optimized for discharge to [facility] IRU on 4/10/23.    PAST MEDICAL & SURGICAL HISTORY:  Atrial fibrillation      Hypertension      Urinary retention      HEALTH ISSUES - PROBLEM Dx:  Hodgkin lymphoma      MEDICATIONS  (STANDING):  albuterol    0.083% 2.5 milliGRAM(s) Nebulizer every 6 hours  ammonium lactate 12% Lotion 1 Application(s) Topical two times a day  atenolol  Tablet 12.5 milliGRAM(s) Oral daily  budesonide 160 MICROgram(s)/formoterol 4.5 MICROgram(s) Inhaler 2 Puff(s) Inhalation two times a day  chlorhexidine 2% Cloths 1 Application(s) Topical daily  escitalopram 10 milliGRAM(s) Oral daily  famotidine    Tablet 20 milliGRAM(s) Oral daily  magnesium oxide 400 milliGRAM(s) Oral three times a day with meals  polyethylene glycol 3350 17 Gram(s) Oral daily  senna 2 Tablet(s) Oral at bedtime  simvastatin 40 milliGRAM(s) Oral at bedtime  tiotropium 2.5 MICROgram(s) Inhaler 2 Puff(s) Inhalation daily  traZODone 50 milliGRAM(s) Oral at bedtime    MEDICATIONS  (PRN):  acetaminophen     Tablet .. 650 milliGRAM(s) Oral every 6 hours PRN Temp greater or equal to 38C (100.4F), Mild Pain (1 - 3)    Allergies    pertussis vaccines (Rash)  shellfish (Rash)    INTERVAL HPI/OVERNIGHT EVENTS:  Patient S&E at bedside. No c/o CP or SOB     VITAL SIGNS:  T(F): 97.4 (04-21-23 @ 07:53)  HR: 94 (04-21-23 @ 08:25)  BP: 133/88 (04-21-23 @ 07:53)  RR: 24 (04-21-23 @ 07:53)  SpO2: 96% (04-21-23 @ 08:25)      PHYSICAL EXAM:  Constitutional: NAD  Eyes: sclera non-icteric  Neck: no JVD  Respiratory: decreased BS right base ant.  Cardiovascular: RRR, no M/R/G  Gastrointestinal: soft, NTND, no masses palpable  Extremities: no calf tenderness  Neurological: Awake, alert.    Labs:             8.5    7.80  )-----------( 405      ( 04-20 @ 07:20 )             28.1       04-20    137  |  100  |  12  ----------------------------<  116<H>  4.6   |  34<H>  |  0.56    Ca    8.8      20 Apr 2023 07:20    TPro  6.7  /  Alb  1.9<L>  /  TBili  0.4  /  DBili  x   /  AST  30  /  ALT  34  /  AlkPhos  70  04-20      RADIOLOGY & ADDITIONAL TESTS:  < from: Xray Chest 1 View- PORTABLE-Routine (Xray Chest 1 View- PORTABLE-Routine in AM.) (04.20.23 @ 08:34) >  INTERPRETATION:  AP chest on April 2023 at 8:13 AM. Patient has Hodgkin's   disease and is being followed for right Pleurx catheter.    Mild right thoracic curve again noted. Heart magnified by technique.   Left-sided port and right Pleurx catheter remain.    Areas of loculated effusion with adjacent right lower lobe infiltrate are   again noted.    No pneumothorax.    Chest is similar to April 17.    IMPRESSION: Stable chest findings as above.    < end of copied text >    Consultant notes reviewed : YES [x ] ; NO [ ]

## 2023-04-21 NOTE — PROGRESS NOTE ADULT - ASSESSMENT
Stable on rehab. Retention / plummer / prior self caths. Plummer repositioned and draining well. D/c home this weekend.    - resume self caths at home  - outpt MARIPOSA f/u

## 2023-04-21 NOTE — DISCHARGE NOTE NURSING/CASE MANAGEMENT/SOCIAL WORK - NSSCCARECORD_GEN_ALL_CORE
Home Care Agency/Durable Medical Equipment Agency Home Care Agency/Durable Medical Equipment Agency/[facility]

## 2023-04-21 NOTE — DISCHARGE NOTE NURSING/CASE MANAGEMENT/SOCIAL WORK - NSDCPEFALRISK_GEN_ALL_CORE
For information on Fall & Injury Prevention, visit: [email or web address] OR  [email or web address] OR  https://www.cdc.gov/steadi/patient.html

## 2023-04-21 NOTE — PROGRESS NOTE ADULT - ASSESSMENT
ASSESSMENT/PLAN  This is a 82 year old male with PMH of AFIB, HTN and chronic urinary retention (chronic plummer); who presented to [facility] via ambulance on 2/17 with progressively worsening SOB, intubated in the field, found to have a large R pleural effusion with mediastinal and hilar lymphadenopathy s/p thoracentesis (~3L), with persistent leukocytosis and concern for suspicious malignancy was transferred to [facility] on 2/24 for further workup.  He underwent a R supraclavicular mass biopsy on 2/28, significant for classic Hodgkin's lymphoma. On 3/3, he underwent a R thoracotomy/VATS with Pleurex placement. Bone marrow negative for involvement,  (s/p L chest wall mediport placement 3/15). Rehab course c/b worsening pleural effusion requiring acute transfer for drainage. Patient now with gait Instability, ADL impairments and Functional impairments.    * Eliquis restarted 4/18, but temporarily held due to bleeding at urethral meatus, since resolved- Eliquis restarted on 4/21 - monitor for bleeding * Pulmonary following - R pleurex to be drained Monday & Thursdays - minimal output * Repeat CXR biweekly * Continue to monitor rehab progress * IDT today as noted * Plan for DC to home on 4/23 *     #Debility  #SOB/R Pleural effusion  #Classic Hodgkin's Lymphoma  - Gait Instability, ADL impairments and Functional impairments: start Comprehensive Rehab Program of PT/OT  - 3 hours a day, 5 days a week  - S/p R supraclavicular mass biopsy-> significant for Classic Hodgkin's Lymphoma  - S/p R thoracotomy/VATS with Pleurex placement on  3/3  - c/w pleurX drainage  -- Drain on Monday and Thursday, no more than 500mLs at a time - strict I&O with documentation  - Bone marrow biopsy negative  - L chest wall mediport placed 3/15  - Cycle #1 of BV (Brentuximad Vedotin) given on 3/10/23  - s/p C2 brentuximab 4/3/23  - Plan for cycle #3  on 4/24/23  -  No plan to receive chemo while in rehab  - Await [facility] referral   - F/u with Dr. Hays outpatient  - 4/11: Pulm consult ->  Maintain 02 >90%, Ipratropium-Albuterol every 6 hours, Sodium chloride nebuizer BID , IS, Chest PT.  -- Pulm following  * CXR 4/11--No acute changes, Rt effusion with atelectasis, Left mid and lower opacities  - serial CXR biweekly--no interval change 4/14, minimal output from pleurax drain       #AFIB  - 4/18: Resume Eliquis   -- Per Dr. Galvan's CT surgery team- Okay to resume Eliquis BID on 4/18 (confirmed with Nurse Practitioner)   - Monitor for bleeding     #HTN  - Atenolol 12.5mg daily    #HLD  - Simvastatin 40mg daily    #Sleep/Mood  - Lexapro 10mg daily  - 4/12: Psych consult- Keep Lexapro 10 and Trazodone 25mg @ HS (per daughter's request)  - 4/13:  Trazodone 50mg nightly due to insomnia    #Skin  - Skin --right chest wall Pleurex drain site + dressing  - Pressure injury/Skin: OOB to Chair, PT/OT     #Pain Mgmt   - Tylenol PRN    #GI/Bowel Mgmt   - c/w Senna, Miralax     #/Bladder Mgmt   - Chronic Plummer last changed 4/7/23  - F/u with Urology outpt   - 4/12: Urology consult- Dr. Hamm - Plummer to remain in place during rehab stay. Will continue SC upon discharge home.     #Urethral bleeding  - Episode of suprapubic distention yesterday afternoon. (4/20)  -Plummer re-advanced by NP with immediate 1300mL output and resolution of suprapubic distention.   -Post procedure meatus bleeding.   -Likely from blood clot on Plummer tube/local urethral irritation.   -Small clot expelled overnight.   -Urine this morning clear yellow on exam.   -All bleeding has resolved.   -Dr. Hamm made aware and to re-evaulate.   -Eliquis resumed.    #FEN   - Diet - Regular + Thins  [DASH]      #Precautions / PROPHYLAXIS:   - Falls, aspiration  - ortho: Weight bearing status: WBAT   - Lungs: Aspiration, Incentive Spirometer   - DVT: Lovenox    #GOC  CODE STATUS: FULL CODE  --------------------------------------------------------------------   Liason with family/providers:    Next of kin--(Daughter  [phone]) a physician    4/12--I called on phone and d/w daughter Dr Gasca, She stated recommendations as requested by cardiothoracic unit of [facility] prior to patient's dc from there and T/f to acute rehab  --pleurax drain MWF, regular CXR, ,Parameters for BP meds,and family request for, consult reviews  All requests being adhered to  4/13--Functional update and clinical status d/w son present at bed side. Wife came earlier, but was not present at that time  4/20--Update d/w family including DC plan, Dr Gasca happy with same   4/21-- I called on phone and d/w daughter Dr Judith Schumacher, patient's functional update (details from IDT today)  and medical progress, upate with pleural effusion, dc plan and f/u post DC  She was happy with the explanation  Family prefers dc Sun 4/23, instead of 4/22, which we have already agreed to during IDT today  ---------------- ----------------------------------------------------  IDT conference on 4/20  TDD: 4/22 to home  Barriers: [place], fatigue, poor endurance, poor energy, R pleurex drain, Chronic plummer, 02 requirements.   Social Work: Lives in  with spouse, with 2 DEIRDRE/0HR. 1st floor setup available. Support from spouse and daughter. Willing to private hire.   OT: Sup/min A for ADLstransfers.  PT: CG/Cs for transfers. Ambuated 50 ft with RW.   SLP: --n/a  --------------------------------------------------------------------  Outpatient Follow-up (Specialty/Name of physician):    Nathaniel Galvan)  Surgery; Thoracic Surgery  [phone] [place] [address]  [address]rd [place] [address]  Phone: [phone]  Fax: [phone]  Established Patient  Follow Up Time: 2 weeks    UROLOGY  MD Saúl Hamm [facility]  Chloé Vazquez  Scheduled Appointment: 03/27/2023     ASSESSMENT/PLAN  This is a 82 year old male with PMH of AFIB, HTN and chronic urinary retention (chronic plummer); who presented to [facility] via ambulance on 2/17 with progressively worsening SOB, intubated in the field, found to have a large R pleural effusion with mediastinal and hilar lymphadenopathy s/p thoracentesis (~3L), with persistent leukocytosis and concern for suspicious malignancy was transferred to [facility] on 2/24 for further workup.  He underwent a R supraclavicular mass biopsy on 2/28, significant for classic Hodgkin's lymphoma. On 3/3, he underwent a R thoracotomy/VATS with Pleurex placement. Bone marrow negative for involvement,  (s/p L chest wall mediport placement 3/15). Rehab course c/b worsening pleural effusion requiring acute transfer for drainage. Patient now with gait Instability, ADL impairments and Functional impairments.    * Eliquis restarted 4/18, but temporarily held due to bleeding at urethral meatus, since resolved- Eliquis restarted on 4/21 - monitor for bleeding * Pulmonary following - R pleurex to be drained Monday & Thursdays - minimal output * Repeat CXR biweekly * Continue to monitor rehab progress * IDT today as noted * Plan for DC to home on 4/23 *     #Debility  #SOB/R Pleural effusion  #Acute hypoxic respiratory failure/Classic Hodgkin's Lymphoma  - Gait Instability, ADL impairments and Functional impairments: start Comprehensive Rehab Program of PT/OT  - 3 hours a day, 5 days a week  - S/p R supraclavicular mass biopsy-> significant for Classic Hodgkin's Lymphoma  - S/p R thoracotomy/VATS with Pleurex placement on  3/3  - c/w pleurX drainage  -- Drain on Monday and Thursday, no more than 500mLs at a time - strict I&O with documentation  - Bone marrow biopsy negative  - L chest wall mediport placed 3/15  - Cycle #1 of BV (Brentuximad Vedotin) given on 3/10/23  - s/p C2 brentuximab 4/3/23  - Plan for cycle #3  on 4/24/23  -  No plan to receive chemo while in rehab  - Await [facility] referral   - F/u with Dr. Hays outpatient  - 4/11: Pulm consult ->  Maintain 02 >90%, Ipratropium-Albuterol every 6 hours, Sodium chloride nebuizer BID , IS, Chest PT.  -- Pulm following  * CXR 4/11--No acute changes, Rt effusion with atelectasis, Left mid and lower opacities  - serial CXR biweekly--no interval change 4/14, minimal output from pleurax drain       #AFIB  - 4/18: Resume Eliquis   -- Per Dr. Galvan's CT surgery team- Okay to resume Eliquis BID on 4/18 (confirmed with Nurse Practitioner)   - Monitor for bleeding     #HTN  - Atenolol 12.5mg daily    #HLD  - Simvastatin 40mg daily    #Sleep/Mood  - Lexapro 10mg daily  - 4/12: Psych consult- Keep Lexapro 10 and Trazodone 25mg @ HS (per daughter's request)  - 4/13:  Trazodone 50mg nightly due to insomnia    #Skin  - Skin --right chest wall Pleurex drain site + dressing  - Pressure injury/Skin: OOB to Chair, PT/OT     #Pain Mgmt   - Tylenol PRN    #GI/Bowel Mgmt   - c/w Senna, Miralax     #/Bladder Mgmt   - Chronic Plummer last changed 4/7/23  - F/u with Urology outpt   - 4/12: Urology consult- Dr. Hamm - Plummer to remain in place during rehab stay. Will continue SC upon discharge home.     #Urethral bleeding  - Episode of suprapubic distention yesterday afternoon. (4/20)  -Plummer re-advanced by NP with immediate 1300mL output and resolution of suprapubic distention.   -Post procedure meatus bleeding.   -Likely from blood clot on Plummer tube/local urethral irritation.   -Small clot expelled overnight.   -Urine this morning clear yellow on exam.   -All bleeding has resolved.   -Dr. Hamm made aware and to re-evaulate.   -Eliquis resumed.    #FEN   - Diet - Regular + Thins  [DASH]      #Precautions / PROPHYLAXIS:   - Falls, aspiration  - ortho: Weight bearing status: WBAT   - Lungs: Aspiration, Incentive Spirometer   - DVT: Lovenox    #GOC  CODE STATUS: FULL CODE  --------------------------------------------------------------------   Liason with family/providers:    Next of kin--(Daughter  [phone]) a physician    4/12--I called on phone and d/w daughter Dr Gasca, She stated recommendations as requested by cardiothoracic unit of [facility] prior to patient's dc from there and T/f to acute rehab  --pleurax drain MWF, regular CXR, ,Parameters for BP meds,and family request for, consult reviews  All requests being adhered to  4/13--Functional update and clinical status d/w son present at bed side. Wife came earlier, but was not present at that time  4/20--Update d/w family including DC plan, Dr Gasca happy with same   4/21-- I called on phone and d/w daughter Dr Judith Schumacher, patient's functional update (details from IDT today)  and medical progress, upate with pleural effusion, dc plan and f/u post DC  She was happy with the explanation  Family prefers dc Sun 4/23, instead of 4/22, which we have already agreed to during IDT today  ---------------- ----------------------------------------------------  IDT conference on 4/20  TDD: 4/22 to home  Barriers: [place], fatigue, poor endurance, poor energy, R pleurex drain, Chronic plummer, 02 requirements.   Social Work: Lives in  with spouse, with 2 DEIRDRE/0HR. 1st floor setup available. Support from spouse and daughter. Willing to private hire.   OT: Sup/min A for ADLstransfers.  PT: CG/Cs for transfers. Ambuated 50 ft with RW.   SLP: --n/a  --------------------------------------------------------------------  Outpatient Follow-up (Specialty/Name of physician):    Nathaniel Galvan)  Surgery; Thoracic Surgery  [phone] [place] [address]  3rd [place] [address]  Phone: [phone]  Fax: [phone]  Established Patient  Follow Up Time: 2 weeks    UROLOGY  MD Saúl Hamm [facility]  Chloé Vazquez  Scheduled Appointment: 03/27/2023

## 2023-04-21 NOTE — PROGRESS NOTE ADULT - ASSESSMENT
This is a 81 YO male with PMH of AFIB, HTN and chronic urinary retention (chronic plummer); who presented to [facility] via ambulance on 2/17 with progressively worsening SOB.  He was intubated in the field and brought to [facility] ED and admitted to ICU for acute respiratory failure with hypercapnia. CTA chest was negative for PE but significant for a large R pleural effusion with mediastinal and hilar lymphadenopathy, and 3L of fluid was drained.  He completed a 5 day course of empiric antibiotics. Pleural fluid cultures and blood cultures resulted negative.  Suspicious of underlying malignancy secondary to lymphadenopathy and persistent leukocytosis, Heme/Onc was consulted and recommended transfer to [facility] on 2/24/23 for further workup. On 2/28 he underwent an ultrasound guided biopsy of his R supraclavicular mass/lymph node, which later resulted as classic Hodgkin's lymphoma.  On 3/3, he underwent a R thoracotomy/VATS with Pleurex placement. Postoperatively, he was hypotensive and started on Midodrine. A Left chest wall mediport was placed for ongoing chemotherapy treatment on 3/15. He was admitted  to [facility] on 3/24.    While at rehab, he developed chest discomfort., low systolic BP and mild tachycardia. EKG showed Afib with rapid ventricular rythmn. On exam, he had increased respiratory rate. CXR and  CT chest showed increased Rt sided effusion with loculation and increasing atelectasis. He was transferred to [facility]. In the ED, CXR showed recurrent loculated R pleural effusion. CT chest with small loculated R pleural effusion with new peripheral demarcated high attenuation in the R pleural space. He  was seen by CT surgery in the ED, per paper chart, "drained about 100cc of fluid, placed on Pleura vac . PleurX was then flushed and drained about 2180cc fluid  need to hold AC for 48 hours for possible TPA into pleurX cath.  MIST held per CT surgery.  No plan for VATS. CXR  on 3/27, Decreasing pulmonary congestion, No pneumothorax. On 4/6 CT Chest -- Compared to 3/29/2023, similar loculated, exudative right pleural effusion with slightly decreased pleural air component and Pleurx catheter in place. Question malignant effusion in the setting of reported lymphoma, irregular septal thickening, nodularity and mediastinal pleural involvement.     Patient was evaluated by PM&R and therapy for functional deficits, gait/ADL impairments and acute rehabilitation was recommended. Patient was medically optimized for discharge to [facility] IRU on 4/10/23.

## 2023-04-21 NOTE — CHART NOTE - NSCHARTNOTEFT_GEN_A_CORE
[facility] Interdisciplinary Plan of Care    REHABILITATION DIAGNOSIS:  Debility due to Hodgkin lymphoma    COMORBIDITIES/COMPLICATING CONDITIONS IMPACTING REHABILITATION:  HEALTH ISSUES - PROBLEM Dx:    Pleural effusion  Atelectasis  Respiratory failure  Atrial Fibrillation    PAST MEDICAL & SURGICAL HISTORY:  Atrial fibrillation    Hypertension      Urinary retention      No significant past surgical history      Based upon consideration of the patient's impairments, functional status, complicating conditions and any other contributing factors and after information garnered from the assessments of all therapy disciplines involved in treating the patient and other pertinent clinicians:    INTERDISCIPLINARY REHABILITATION INTERVENTIONS:    [ X  ] Transfer Training  [ X  ] Bed Mobility  [ X  ] Therapeutic Exercise  [ X ] Balance/Coordination Exercises  [ X ] Locomotion retraining  [ X  ] Stairs  [  X ] Functional Transfer Training  [   ] Bowel/Bladder program  [   ] Pain Management  [   ] Skin/Wound Care  [   ] Visual/Perceptual Training  [   ] Therapeutic Recreation Activities  [x   ] Neuromuscular Re-education  [ X  ] Activities of Daily Living  [   ] Speech Exercise  [   ] Swallowing Exercises  [   ] Vital Stim  [   ] Dietary Supplements  [   ] Calorie Count  [   ] Cognitive Exercises  [   ] Congnitive/Linguistic Treatment  [   ] Behavior Program  [   ] Neuropsych Therapy  [ X  ] Patient/Family Counseling  [ X ] Family Training  [ X  ] Community Re-entry  [   ] Orthotic Evaluation  [   ] Prosthetic Eval/Training    MEDICAL PROGNOSIS:  Guarded    REHAB POTENTIAL:  Fair   EXPECTED DAILY THERAPY:         PT: 1.5 hr        OT: 1.5 hr        ST: n/a        P&O: will be evaluated    EXPECTED INTENSITY OF PROGRAM:  3 hrs / Day    EXPECTED FREQUENCY OF PROGRAM: 5 Days/ Week    ESTIMATED LOS:  [  ] 5-7 Days  [  ] 7-10 Days  [  x] 10- 14 Days  [  ] 14- 18 Days  [  ] 18- 21 Days    ESTIMATED DISPOSITION:  [  ] Home   [ x ] Home with Outpatient Therapies  [  ] Home with Home Therapies  [  ] Assisted Living  [  ] Nursing Home  [  ] Long Term Acute Care    INTERDISCIPLINARY FUNCTIONAL OUTCOMES/GOALS:         Gait/Mobility: 4       Transfers: 4       ADLs: 4       Functional Transfers: 4       Medication Management: 4       Communication: 4       Cognitive: 6       Dysphagia: 6       Bladder 4       Bowel: 4     Functional Independent Measures:   7 = Independent  6 = Modified Independent  5 = Supervision  4 = Minimal Assist/ Contact Guard  3 = Moderate Assistance  2 = Maximum Assistance  1 = Total Assistance  0 = Unable to assess
Nutrition Follow Up Note  Source: Medical Record [X] Patient [X] Family [ ]         Diet: Regular, Ensure Plus High Protein (provides 350 kcal, 20 g protein/serving) BID  Pt tolerating diet with variable PO intake, eating % of meals per nursing flow sheets. Pt reports good PO intake, feels that he is eating adequately. Pt reports fair acceptance of Ensure supplement. Encouraged pt to consume meals + snacks. Observed w/ pineapple during interview - pt needs assistance with opening containers. Denies nausea, vomiting, diarrhea, constipation. Pt denies chewing/swallowing difficulties.     Enteral/Parenteral Nutrition: N/A    Current Weight: 184.7 lbs (4/15)    Pertinent Medications: MEDICATIONS  (STANDING):  albuterol    0.083% 2.5 milliGRAM(s) Nebulizer every 6 hours  ammonium lactate 12% Lotion 1 Application(s) Topical two times a day  atenolol  Tablet 12.5 milliGRAM(s) Oral daily  budesonide 160 MICROgram(s)/formoterol 4.5 MICROgram(s) Inhaler 2 Puff(s) Inhalation two times a day  chlorhexidine 2% Cloths 1 Application(s) Topical daily  enoxaparin Injectable 40 milliGRAM(s) SubCutaneous every 24 hours  escitalopram 10 milliGRAM(s) Oral daily  famotidine    Tablet 20 milliGRAM(s) Oral daily  magnesium oxide 400 milliGRAM(s) Oral three times a day with meals  polyethylene glycol 3350 17 Gram(s) Oral daily  senna 2 Tablet(s) Oral at bedtime  simvastatin 40 milliGRAM(s) Oral at bedtime  tiotropium 2.5 MICROgram(s) Inhaler 2 Puff(s) Inhalation daily  traZODone 50 milliGRAM(s) Oral at bedtime    MEDICATIONS  (PRN):  acetaminophen     Tablet .. 650 milliGRAM(s) Oral every 6 hours PRN Temp greater or equal to 38C (100.4F), Mild Pain (1 - 3)  oxyCODONE    IR 5 milliGRAM(s) Oral every 4 hours PRN Moderate Pain (4 - 6)      Pertinent Labs:  04-17 Na138 mmol/L Glu 106 mg/dL<H> K+ 4.2 mmol/L Cr  0.58 mg/dL BUN 10 mg/dL 04-17 Alb 1.8 g/dL<L> 03-26 Chol 117 mg/dL LDL --    HDL 53 mg/dL Trig 65 mg/dL        Skin: surgical incision per nursing flow sheets     Edema: No edema per nursing flow sheets     Last BM: on 4/17 Per nursing flowsheets     Estimated Needs:   [X] No Change since Previous Assessment  [ ] Recalculated:     Previous Nutrition Diagnosis:   Moderate malnutrition    Nutrition Diagnosis is [X] Ongoing  - addressed with Ensure Plus High Protein (provides 350 kcal, 20 g protein/serving)        New Nutrition Diagnosis: [X] Not Applicable  [ ] Inadequate Protein Energy Intake   [ ] Inadequate Oral Intake   [ ] Excessive Energy Intake   [ ] Increased Nutrient Needs   [ ] Obesity   [ ] Altered GI Function   [ ] Unintended Weight Loss   [ ] Food & Nutrition Related Knowledge Deficit  [ ] Limited Adherence to nutrition related recommendations   [ ] Malnutrition      Interventions:   1. Recommend continuing with current plan of care  2. Encourage PO intake    Monitoring & Evaluation:   [X] Weights   [X] PO Intake   [X] Follow Up (Per Protocol)  [X] Tolerance to Diet Prescription   [X] Other: Labs     RD Remains Available.  Queta Tse RD
Respiratory stat called overhead and patient RN notified called stating patient became acutely hypoxic.  Patient seen at bedside with labored breathing.  O2 sat were low-mid 80s on 3L NC.  Patient was switched from NC to NRB with improvement of O2 sats in the mid 90s.  Patient able to speak and states his Pleurx was drained yesterday prior to admission to .    Vital Signs Last 24 Hrs  T(C): 37.3 (10 Apr 2023 20:25), Max: 37.3 (10 Apr 2023 20:25)  T(F): 99.1 (10 Apr 2023 20:25), Max: 99.1 (10 Apr 2023 20:25)  HR: 105 (11 Apr 2023 06:24) (71 - 107)  BP: 112/61 (11 Apr 2023 05:07) (100/61 - 117/70)  BP(mean): --  RR: 18 (10 Apr 2023 20:25) (18 - 20)  SpO2: 94% (11 Apr 2023 06:24) (94% - 100%)    Parameters below as of 11 Apr 2023 06:24  Patient On (Oxygen Delivery Method): mask, Venturi    Constitutional - uncomfortable with labored breathing  Chest - labored breathing, tachypneic, decreased breath sound RLL  Cardio - tachycardiac  Neurologic Exam: Alert and Awake and able to communicate in sentences    A/P:  This is a 82 year old male with PMH of AFIB, HTN and chronic urinary retention (chronic plummer); who presented to [facility] via ambulance on 2/17 with progressively worsening SOB, intubated in the field, found to have a large R pleural effusion 2/2 Hodkin's Lymphoma.  On 3/3, he underwent a R thoracotomy/VATS with Pleurex placement.  Last drainage of Pleurex was 4/9 prior to  rehab admission.  Patient now acutely hypoxia with increased O2 demand.      -Continue with venturi mask FiO2 50% at 15L/min  -Albuterol nebulizer treatment given  -CXR -- d/w with hospitalist looks unchanged from 4/9 CXR.  Follow-up official read  -Pulm consulted  -Continue left lateral decubitus positioning with elevated HOB  -Per BRISSA pascual paperwork plans for every other day Pleurx drainage
Nutrition Follow Up Note  Source: Medical Record [X] Patient [X] Family [ ]         Diet: Regular, Ensure Plus High Protein BID (provides 350  kcal, 20 g protein/serving)  Pt tolerating diet with variable PO intake per nursing flow sheets, eating % of meals. Pt reports good appetite/PO intake. Encouraged pt to make food preferences known. Pt reports fair acceptance of Ensure Plus High Protein. Discussed benefits of oral nutrition supplement. Denies nausea, vomiting, diarrhea, constipation. Pt denies chewing/swallowing difficulties.     Enteral/Parenteral Nutrition: N/A    Current Weight: 184.9 lbs (4/10)    Pertinent Medications: MEDICATIONS  (STANDING):  albuterol    0.083% 2.5 milliGRAM(s) Nebulizer every 6 hours  ammonium lactate 12% Lotion 1 Application(s) Topical two times a day  atenolol  Tablet 12.5 milliGRAM(s) Oral daily  enoxaparin Injectable 40 milliGRAM(s) SubCutaneous every 24 hours  escitalopram 10 milliGRAM(s) Oral daily  famotidine    Tablet 20 milliGRAM(s) Oral daily  magnesium oxide 400 milliGRAM(s) Oral three times a day with meals  polyethylene glycol 3350 17 Gram(s) Oral daily  senna 2 Tablet(s) Oral at bedtime  simvastatin 40 milliGRAM(s) Oral at bedtime  sodium chloride 3%  Inhalation 4 milliLiter(s) Inhalation every 12 hours  traZODone 50 milliGRAM(s) Oral at bedtime    MEDICATIONS  (PRN):  acetaminophen     Tablet .. 650 milliGRAM(s) Oral every 6 hours PRN Temp greater or equal to 38C (100.4F), Mild Pain (1 - 3)  oxyCODONE    IR 5 milliGRAM(s) Oral every 4 hours PRN Moderate Pain (4 - 6)      Pertinent Labs:  04-13 Na138 mmol/L Glu 106 mg/dL<H> K+ 3.8 mmol/L Cr  0.58 mg/dL BUN 9 mg/dL 04-10 Phos 2.3 mg/dL<L> 04-13 Alb 1.8 g/dL<L> 03-26 Chol 117 mg/dL LDL --    HDL 53 mg/dL Trig 65 mg/dL        Skin: Skin intact per nursing flow sheets     Edema: No edema per nursing flow sheets     Last BM: on 4/12 Per nursing flowsheets     Estimated Needs:   [X] No Change since Previous Assessment  [ ] Recalculated:     Previous Nutrition Diagnosis:   Moderate malnutrition    Nutrition Diagnosis is [X] Ongoing  - addressed with Ensure Plus High Protein (provides 350 kcal, 20 g protein/serving)     New Nutrition Diagnosis: [X] Not Applicable  [ ] Inadequate Protein Energy Intake   [ ] Inadequate Oral Intake   [ ] Excessive Energy Intake   [ ] Increased Nutrient Needs   [ ] Obesity   [ ] Altered GI Function   [ ] Unintended Weight Loss   [ ] Food & Nutrition Related Knowledge Deficit  [ ] Limited Adherence to nutrition related recommendations   [ ] Malnutrition      Interventions:   1. Recommend continuing with current plan of care  2. Encourage PO intake    Monitoring & Evaluation:   [X] Weights   [X] PO Intake   [X] Follow Up (Per Protocol)  [X] Tolerance to Diet Prescription   [X] Other: Labs     RD Remains Available.  Queta Tse RD
Phone call with daughter Dr Schumacher    I called her to clischarge plan for [place] 2/23 dc home    She stated that patient has appointment with Dr Galvan his cardiothoracic surgeon on Monday, next day post dc and they will f/u with him regarding the pleurax drain    She was requesting an xray on Sunday, prior to dc home, but I explained to her that, there will not be a need for this because:  --multiple serial xrays prior to this time has showed sustained improvement,  =-patient is seeing cardiothoracic surgeon next day and will get imaging if needed    SW informed  DC for Sunday as planned

## 2023-04-21 NOTE — PROGRESS NOTE ADULT - PROBLEM SELECTOR PLAN 1
Patient with Hodgkin Lymphoma/pleural effusion/LAD.  - S/P Cycle 1 Brentuximab vedotin (BV) 3/10/23  -s/p C2 Brentuximab 4/3  Now on acute rehab for functional deficits.  DVT prophylaxis. Hemoglobin currently stable. Monitor CBC with differential.  Supportive H/O care while on rehab. Outpatient H/O f/u at the [facility] (Dr. Hays).
Patient with Hodgkin Lymphoma/pleural effusion/LAD.  - S/P Cycle 1 Brentuximab vedotin (BV) 3/10/23  -s/p C2 Brentuximab 4/3  On acute rehab for functional deficits.  hemoglobin currently stable.  Supportive H/O care while on rehab. Outpatient H/O f/u at the [facility] (Dr. Hays).

## 2023-04-21 NOTE — CHART NOTE - NSCHARTNOTESELECT_GEN_ALL_CORE
LIAISON WITH FAMILY/Event Note
Nutrition Services
Hypoxia/Event Note
IPOC/Event Note
Nutrition Services

## 2023-04-21 NOTE — DISCHARGE NOTE NURSING/CASE MANAGEMENT/SOCIAL WORK - NSDCCRNAME_GEN_ALL_CORE_FT
[facility]  [address] [place] [id number] [facility]

## 2023-04-21 NOTE — PROGRESS NOTE ADULT - SUBJECTIVE AND OBJECTIVE BOX
CC: Hodgkin Lymphoma    Today's Subjective & Objective Findings:  Patient seen and examined at bedside this AM.  Admits to intermittent sleep.  No dyspnea, comfortable at rest.  Experiences CRUZ with therapies.   Choi catheter in place.  No discomfort at Choi site.   Last BM on 4/20, per patient.  Episode of suprapubic distention yesterday afternoon. Choi re-advanced by NP with immediate 1300mL output and resolution of suprapubic distention.   Post procedure meatus bleeding. Likely from blood clot on Choi tube/local urethral irritation. Small clot expelled overnight.  Urine this morning clear yellow on exam. All bleeding has resolved.  Dr. Hamm made aware and to evaluate this morning.  Eliquis resumed.  No other complaints at this time.    Denies headache, dizziness, visual changes, chest pain, abdominal pain, nausea, vomiting, diarrhea, dysuria, numbness or tingling.  Therapy-- engaging, motivated  Motivated and engaging in therapy  Observed, ambulating 50ft, limited by fatigue    CXR 4/17 reviewed--improvement noted, due repeat CXR today   Minimal output from Rt peurax drain    Vital Signs Last 24 Hrs  T(C): 36.3 (21 Apr 2023 07:53), Max: 36.8 (20 Apr 2023 20:58)  T(F): 97.4 (21 Apr 2023 07:53), Max: 98.3 (20 Apr 2023 20:58)  HR: 94 (21 Apr 2023 08:25) (70 - 97)  BP: 133/88 (21 Apr 2023 07:53) (111/59 - 133/88)  BP(mean): --  RR: 24 (21 Apr 2023 07:53) (16 - 24)  SpO2: 96% (21 Apr 2023 08:25) (91% - 99%)      PHYSICAL EXAM:  Gen - Comfortable, interactive   HEENT - NCAT, EOMI, MMM, + nasal cannula, hearing deficit  Neck - Supple, No limited ROM  Pulm - air entry improved, except for Let lower lobe  Cardiovascular - RRR, S1S2  Chest - + pleurex drain to right anterior chest wall    Abdomen - Soft, non tender +BS  Extremities - No Cyanosis, no clubbing, no edema, no calf tenderness    Neuro-     Cognitive - awake, alert, oriented x 3.  Able to follow command     Communication - Fluent     Attention: Intact     Cranial Nerves - CN 2-12 intact.     Motor - 4/5     Sensory - Intact  to LT      Tone - normal  Psychiatric - Mood stable, Affect WNL  Skin:  all skin intact    LABS:                        8.5    7.80  )-----------( 405      ( 20 Apr 2023 07:20 )             28.1     04-20    137  |  100  |  12  ----------------------------<  116<H>  4.6   |  34<H>  |  0.56    Ca    8.8      20 Apr 2023 07:20    TPro  6.7  /  Alb  1.9<L>  /  TBili  0.4  /  DBili  x   /  AST  30  /  ALT  34  /  AlkPhos  70  04-20        MEDICATIONS  (STANDING):  albuterol    0.083% 2.5 milliGRAM(s) Nebulizer every 6 hours  ammonium lactate 12% Lotion 1 Application(s) Topical two times a day  apixaban 5 milliGRAM(s) Oral two times a day  atenolol  Tablet 12.5 milliGRAM(s) Oral daily  budesonide 160 MICROgram(s)/formoterol 4.5 MICROgram(s) Inhaler 2 Puff(s) Inhalation two times a day  chlorhexidine 2% Cloths 1 Application(s) Topical daily  escitalopram 10 milliGRAM(s) Oral daily  famotidine    Tablet 20 milliGRAM(s) Oral daily  magnesium oxide 400 milliGRAM(s) Oral three times a day with meals  polyethylene glycol 3350 17 Gram(s) Oral daily  senna 2 Tablet(s) Oral at bedtime  simvastatin 40 milliGRAM(s) Oral at bedtime  tiotropium 2.5 MICROgram(s) Inhaler 2 Puff(s) Inhalation daily  traZODone 50 milliGRAM(s) Oral at bedtime    MEDICATIONS  (PRN):  acetaminophen     Tablet .. 650 milliGRAM(s) Oral every 6 hours PRN Temp greater or equal to 38C (100.4F), Mild Pain (1 - 3)  oxyCODONE    IR 5 milliGRAM(s) Oral every 4 hours PRN Moderate Pain (4 - 6)   CC: Hodgkin Lymphoma    Today's Subjective & Objective Findings:  Patient seen and examined at bedside this AM.  Admits to intermittent sleep.  No dyspnea, comfortable at rest.  Experiences CRUZ with therapies.   Choi catheter in place.  No discomfort at Choi site.   Last BM on 4/20, per patient.  Episode of suprapubic distention yesterday afternoon. Choi re-advanced by NP with immediate 1300mL output and resolution of suprapubic distention.   Post procedure meatus bleeding. Likely from blood clot on Choi tube/local urethral irritation. Small clot expelled overnight.  Urine this morning clear yellow on exam. All bleeding has resolved.  Dr. Hamm made aware and to evaluate this morning.  Eliquis resumed.  No other complaints at this time.    Denies headache, dizziness, visual changes, chest pain, abdominal pain, nausea, vomiting, diarrhea, dysuria, numbness or tingling.  Therapy-- engaging, motivated  Motivated and engaging in therapy  Observed, ambulating 50ft, limited by fatigue    CXR 4/17 reviewed--improvement noted, due repeat CXR today   Minimal output from Rt peurax drain    I called Dr Schumacher, patient's daughter, to discuss issue with DC plan regarding pleurax drain    Vital Signs Last 24 Hrs  T(C): 36.3 (21 Apr 2023 07:53), Max: 36.8 (20 Apr 2023 20:58)  T(F): 97.4 (21 Apr 2023 07:53), Max: 98.3 (20 Apr 2023 20:58)  HR: 94 (21 Apr 2023 08:25) (70 - 97)  BP: 133/88 (21 Apr 2023 07:53) (111/59 - 133/88)  BP(mean): --  RR: 24 (21 Apr 2023 07:53) (16 - 24)  SpO2: 96% (21 Apr 2023 08:25) (91% - 99%)      PHYSICAL EXAM:  Gen - Comfortable, interactive   HEENT - NCAT, EOMI, MMM, + nasal cannula, hearing deficit  Neck - Supple, No limited ROM  Pulm - air entry improved, except for Let lower lobe  Cardiovascular - RRR, S1S2  Chest - + pleurex drain to right anterior chest wall    Abdomen - Soft, non tender +BS  Extremities - No Cyanosis, no clubbing, no edema, no calf tenderness    Neuro-     Cognitive - awake, alert, oriented x 3.  Able to follow command     Communication - Fluent     Attention: Intact     Cranial Nerves - CN 2-12 intact.     Motor - 4/5     Sensory - Intact  to LT      Tone - normal  Psychiatric - Mood stable, Affect WNL  Skin:  all skin intact    LABS:                        8.5    7.80  )-----------( 405      ( 20 Apr 2023 07:20 )             28.1     04-20    137  |  100  |  12  ----------------------------<  116<H>  4.6   |  34<H>  |  0.56    Ca    8.8      20 Apr 2023 07:20    TPro  6.7  /  Alb  1.9<L>  /  TBili  0.4  /  DBili  x   /  AST  30  /  ALT  34  /  AlkPhos  70  04-20        MEDICATIONS  (STANDING):  albuterol    0.083% 2.5 milliGRAM(s) Nebulizer every 6 hours  ammonium lactate 12% Lotion 1 Application(s) Topical two times a day  apixaban 5 milliGRAM(s) Oral two times a day  atenolol  Tablet 12.5 milliGRAM(s) Oral daily  budesonide 160 MICROgram(s)/formoterol 4.5 MICROgram(s) Inhaler 2 Puff(s) Inhalation two times a day  chlorhexidine 2% Cloths 1 Application(s) Topical daily  escitalopram 10 milliGRAM(s) Oral daily  famotidine    Tablet 20 milliGRAM(s) Oral daily  magnesium oxide 400 milliGRAM(s) Oral three times a day with meals  polyethylene glycol 3350 17 Gram(s) Oral daily  senna 2 Tablet(s) Oral at bedtime  simvastatin 40 milliGRAM(s) Oral at bedtime  tiotropium 2.5 MICROgram(s) Inhaler 2 Puff(s) Inhalation daily  traZODone 50 milliGRAM(s) Oral at bedtime    MEDICATIONS  (PRN):  acetaminophen     Tablet .. 650 milliGRAM(s) Oral every 6 hours PRN Temp greater or equal to 38C (100.4F), Mild Pain (1 - 3)  oxyCODONE    IR 5 milliGRAM(s) Oral every 4 hours PRN Moderate Pain (4 - 6)

## 2023-04-21 NOTE — DISCHARGE NOTE NURSING/CASE MANAGEMENT/SOCIAL WORK - NSDCFUADDAPPT_GEN_ALL_CORE_FT
[employer] is $28 and needs payment    Projected soc date 04/24/2023 via [facility] [place]. The RN will call to schedule a time of the evaluation the night before.

## 2023-04-22 PROBLEM — Z80.7 FAMILY HISTORY OF NON-HODGKIN'S LYMPHOMA: Status: ACTIVE | Noted: 2023-04-22

## 2023-04-22 PROBLEM — D64.9 NORMOCHROMIC NORMOCYTIC ANEMIA: Status: ACTIVE | Noted: 2023-04-22

## 2023-04-22 PROCEDURE — 99232 SBSQ HOSP IP/OBS MODERATE 35: CPT

## 2023-04-22 RX ORDER — APIXABAN 5 MG/1
5 TABLET, FILM COATED ORAL
Qty: 180 | Refills: 0 | Status: DISCONTINUED | COMMUNITY
Start: 2023-01-02

## 2023-04-22 RX ORDER — LEVOFLOXACIN 750 MG/1
750 TABLET, FILM COATED ORAL
Qty: 7 | Refills: 0 | Status: COMPLETED | COMMUNITY
Start: 2023-02-01

## 2023-04-22 RX ORDER — SIMVASTATIN 40 MG/1
40 TABLET, FILM COATED ORAL
Qty: 90 | Refills: 0 | Status: ACTIVE | COMMUNITY
Start: 2023-02-11

## 2023-04-22 RX ORDER — ATENOLOL 50 MG/1
50 TABLET ORAL
Qty: 135 | Refills: 0 | Status: DISCONTINUED | COMMUNITY
Start: 2023-01-02 | End: 2023-04-22

## 2023-04-22 RX ORDER — FAMOTIDINE 20 MG/1
20 TABLET, FILM COATED ORAL
Refills: 0 | Status: ACTIVE | COMMUNITY
Start: 2023-04-22

## 2023-04-22 RX ORDER — ATENOLOL 25 MG/1
12.5 TABLET ORAL
Refills: 0 | Status: DISCONTINUED | OUTPATIENT
Start: 2023-04-22 | End: 2023-04-23

## 2023-04-22 RX ADMIN — ESCITALOPRAM OXALATE 10 MILLIGRAM(S): 10 TABLET, FILM COATED ORAL at 11:31

## 2023-04-22 RX ADMIN — FAMOTIDINE 20 MILLIGRAM(S): 10 INJECTION INTRAVENOUS at 11:31

## 2023-04-22 RX ADMIN — ATENOLOL 12.5 MILLIGRAM(S): 25 TABLET ORAL at 05:06

## 2023-04-22 RX ADMIN — ALBUTEROL 2.5 MILLIGRAM(S): 90 AEROSOL, METERED ORAL at 08:13

## 2023-04-22 RX ADMIN — Medication 50 MILLIGRAM(S): at 21:10

## 2023-04-22 RX ADMIN — TIOTROPIUM BROMIDE 2 PUFF(S): 18 CAPSULE ORAL; RESPIRATORY (INHALATION) at 08:14

## 2023-04-22 RX ADMIN — BUDESONIDE AND FORMOTEROL FUMARATE DIHYDRATE 2 PUFF(S): 160; 4.5 AEROSOL RESPIRATORY (INHALATION) at 08:14

## 2023-04-22 RX ADMIN — ALBUTEROL 2.5 MILLIGRAM(S): 90 AEROSOL, METERED ORAL at 15:24

## 2023-04-22 RX ADMIN — MAGNESIUM OXIDE 400 MG ORAL TABLET 400 MILLIGRAM(S): 241.3 TABLET ORAL at 07:33

## 2023-04-22 RX ADMIN — MAGNESIUM OXIDE 400 MG ORAL TABLET 400 MILLIGRAM(S): 241.3 TABLET ORAL at 11:33

## 2023-04-22 RX ADMIN — APIXABAN 5 MILLIGRAM(S): 2.5 TABLET, FILM COATED ORAL at 17:31

## 2023-04-22 RX ADMIN — BUDESONIDE AND FORMOTEROL FUMARATE DIHYDRATE 2 PUFF(S): 160; 4.5 AEROSOL RESPIRATORY (INHALATION) at 22:03

## 2023-04-22 RX ADMIN — CHLORHEXIDINE GLUCONATE 1 APPLICATION(S): 213 SOLUTION TOPICAL at 11:32

## 2023-04-22 RX ADMIN — Medication 1 APPLICATION(S): at 17:31

## 2023-04-22 RX ADMIN — MAGNESIUM OXIDE 400 MG ORAL TABLET 400 MILLIGRAM(S): 241.3 TABLET ORAL at 17:31

## 2023-04-22 RX ADMIN — APIXABAN 5 MILLIGRAM(S): 2.5 TABLET, FILM COATED ORAL at 05:08

## 2023-04-22 RX ADMIN — ALBUTEROL 2.5 MILLIGRAM(S): 90 AEROSOL, METERED ORAL at 22:02

## 2023-04-22 RX ADMIN — SIMVASTATIN 40 MILLIGRAM(S): 20 TABLET, FILM COATED ORAL at 21:23

## 2023-04-22 NOTE — PROGRESS NOTE ADULT - NS ATTEND AMEND GEN_ALL_CORE FT
Seen and examined  Note revised    No medical complaint   Interval bleeding at penile opening yesterday, short interval hematuria after adjustment of Choi, probably due to dislodged blood clot  No further hematuria afterward    Urine in Choi and urine bag, clear on review and has remained clear since yesterday everning    Clinically stable    Observed in therapy, engaged well  Pleurax drain--very minimal output for prolonged period  Sustained improvement on serial CXR    Patient happy with home dc as planned for [place] 4/23  DC plan and f/u with [facility] cardiothoracic team under Dr Nathaniel Galvan was d/w daughter yesterday and she agreed to plan    I left daughter a voice msg today    Continue Therapy  plan for DC stated  Will offer therapy tomorrow if requested by family  Dressing supplies for pleurax drain to be provided (script as needed)
Seen and examined  Note revised    No significant interval events  Reports improvement with his endurance in therapy    Minimal output from pleurax drain, Pulmonary team aware, no acute intervention    Clinically stable    Labs reviewed, stable anemia, normal renal function    Continue PT/OT  Await response from [facility] Cardiothoracic team on when AC could be recommenced for A fib  Discharge planning with family
Seen and examined, note revised    No acute med complaint, tolerating oral diet  Feeding self with pineapples at time of review    Oxy NC in situ, no dyspnea    Choi with clear urine  Engaged in therapy today    Clinically stable  Labs unremarkable, stable anemia    Continue PT/OT  Will clarify with Cardiothoracic unit at [facility] or medicine, when full anticoagulation for A fib can be recommenced  Await consult reviews--Psych/Pulmonary/Urology   Continue Rt pleurax drain as ordered MWF
Seen and examined  Note revised    No medical complaint   Interval bleeding at penile opening yesterday, short interval hematuria after adjustment of Choi, probably due to dislodged blood clot  No further hematuria afterward    Urine in Choi and urine bag, clear on review and has remained clear since yesterday everning    Clinically stable    Observed in therapy, engaged well  Pleurax drain--very minimal output for prolonged period  Sustained improvement on serial CXR    Patient happy with home dc as planned for [place] 4/23  DC plan and f/u with [facility] cardiothoracic team under Dr Nathaniel Galvan was d/w daughter yesterday and she agreed to plan    I left daughter a voice msg today    Continue Therapy  plan for DC stated  Will offer therapy tomorrow if requested by family  Dressing supplies for pleurax drain to be provided (script as needed)
Seen and examined, note revised    Episodic oxy desaturation yesterday evening, otherwise stable  Engaged in therapy, working on endurance    CXR --no interval change  minimal drainage from pleurax drain  Pulmonary team on f/u    Clinically stable    Continue PT/OT  Continue lovenox ppx  Will clarify from Cardiothoracic unit [facility] When to recommence apixiban  Continue continuous NC oxy
Seen and examined  Noted revised    Sustained improvement in therapy, ambulating increasing distance    Minimal drainage from Rt pleurax drain  CXR shows improvement  Tolerating AC, no bleeding     Clinically stable    Continue PT/OT  Discuss dc plan with family
Seen and examined, note revised    No acute med complaint   No dyspnea  Sustained improvement in therapy    Labs results reviewed, unremarkable    Discussed update and dc plan with family    IDT details as noted   Continue therapy  DC planning
Seen and examined, note revised    No medical complaint  No dyspnea  Stable on NC oxy    Engaging in therapy  CXR shows improvement, reduced vol Rt effusion  Drain output minimal    Clinically sable  Choi in situ    Continue PT/OT  No acute psychiatric symptoms,   Choi drainage to be removed prior to DC as recs by Urology  Patient will continue self cath post DC, as he was doing prior to acute care admission  DC plan, to discuss with family
Seen and examined, note revised    Patient reports improved energy  Engaged in therapy, except for occasional exertional fatigue    Tolerating diet  On routine Pleurax drainage MWF as recommended   Requiring oxy NC continuous  Choi in situ     Clinically stable    Pulmonary review and recs noted and and being implemented  Liaison with family as noted     Continue PT/OPT  Specialty consults requested

## 2023-04-22 NOTE — PROGRESS NOTE ADULT - ASSESSMENT
This is a 82 year old male with PMH of AFIB, HTN and chronic urinary retention (chronic plummer); who presented to [facility] via ambulance on 2/17 with progressively worsening SOB, intubated in the field, found to have a large R pleural effusion with mediastinal and hilar lymphadenopathy s/p thoracentesis (~3L), with persistent leukocytosis and concern for suspicious malignancy was transferred to [facility] on 2/24 for further workup.  He underwent a R supraclavicular mass biopsy on 2/28, significant for classic Hodgkin's lymphoma. On 3/3, he underwent a R thoracotomy/VATS with Pleurex placement. Bone marrow negative for involvement,  (s/p L chest wall mediport placement 3/15). Rehab course c/b worsening pleural effusion requiring acute transfer for drainage. Patient now with gait Instability, ADL impairments and Functional impairments- pt/ot/dvt ppx      #SOB/R Pleural effusion, Classic Hodgkin's Lymphoma  - S/p R supraclavicular mass biopsy-> significant for Classic Hodgkin's Lymphoma  - S/p R thoracotomy/VATS with Pleurex placement on  3/3  - c/w pleurX drainage  -- Drain on Monday and Thursday, no more than 500mLs at a time - strict I&O with documentation  - Bone marrow biopsy negative  - L chest wall mediport placed 3/15  - Cycle #1 of BV (Brentuximad Vedotin) given on 3/10/23  - s/p C2 brentuximab 4/3/23  - Plan for cycle #3  on 4/24/23  -  No plan to receive chemo while in rehab  - Await [facility] referral   - F/u with Dr. Hays outpatient  -  Pulm consult - recc noted* CXR 4/11--No acute changes, Rt effusion with atelectasis, Left mid and lower opacities  - CXR biweekly--no interval change 4/14, minimal output from pleurex drain       #AFIB  - Eliquis was placed on hold initially- restarted 4/19/23, ok with CT team, now oh hold again 4/20/23 due to bleeding from plummer  - monitor bleeding, consider  Eliquis once bleeding stops    #HTN  - Atenolol     #HLD  - Simvastatin     #Sleep/Mood  - Lexapro  - Trazodone    # urine retention bleeding noted from Plummer site 4/20/23  - hold Eliquis, reassess in am to restart  - Chronic Plummer last changed 4/7/23  - F/u with Urology outpt   - 4/12: Urology consult- Dr. Hamm -- continue Plummer to resume self caths at home after discharge  - monitor Hb    vte ppx- on Eliquis

## 2023-04-22 NOTE — REVIEW OF SYSTEMS
[Fatigue] : fatigue [Negative] : Allergic/Immunologic [Fever] : no fever [Night Sweats] : no night sweats [FreeTextEntry6] : not mobile, breathing comfortably at rest [FreeTextEntry8] : Choi in place [de-identified] : as above negative

## 2023-04-22 NOTE — PROGRESS NOTE ADULT - ASSESSMENT
From primary team notes:    ASSESSMENT/PLAN  This is a 82 year old male with PMH of AFIB, HTN and chronic urinary retention (chronic plummer); who presented to [facility] via ambulance on 2/17 with progressively worsening SOB, intubated in the field, found to have a large R pleural effusion with mediastinal and hilar lymphadenopathy s/p thoracentesis (~3L), with persistent leukocytosis and concern for suspicious malignancy was transferred to [facility] on 2/24 for further workup.  He underwent a R supraclavicular mass biopsy on 2/28, significant for classic Hodgkin's lymphoma. On 3/3, he underwent a R thoracotomy/VATS with Pleurex placement. Bone marrow negative for involvement,  (s/p L chest wall mediport placement 3/15). Rehab course c/b worsening pleural effusion requiring acute transfer for drainage. Patient now with gait Instability, ADL impairments and Functional impairments.    * Eliquis restarted 4/18, but temporarily held due to bleeding at urethral meatus, since resolved- Eliquis restarted on 4/21 - monitor for bleeding * Pulmonary following - R pleurex to be drained Monday & Thursdays - minimal output * Repeat CXR biweekly * Continue to monitor rehab progress * IDT today as noted * Plan for DC to home on 4/23 *     #Debility  #SOB/R Pleural effusion  #Acute hypoxic respiratory failure/Classic Hodgkin's Lymphoma  - Gait Instability, ADL impairments and Functional impairments: start Comprehensive Rehab Program of PT/OT  - 3 hours a day, 5 days a week  - S/p R supraclavicular mass biopsy-> significant for Classic Hodgkin's Lymphoma  - S/p R thoracotomy/VATS with Pleurex placement on  3/3  - c/w pleurX drainage  -- Drain on Monday and Thursday, no more than 500mLs at a time - strict I&O with documentation  - Bone marrow biopsy negative  - L chest wall mediport placed 3/15  - Cycle #1 of BV (Brentuximad Vedotin) given on 3/10/23  - s/p C2 brentuximab 4/3/23  - Plan for cycle #3  on 4/24/23  -  No plan to receive chemo while in rehab  - Await [facility] referral   - F/u with Dr. Hays outpatient  - 4/11: Pulm consult ->  Maintain 02 >90%, Ipratropium-Albuterol every 6 hours, Sodium chloride nebuizer BID , IS, Chest PT.  -- Pulm following  * CXR 4/11--No acute changes, Rt effusion with atelectasis, Left mid and lower opacities  - serial CXR biweekly--no interval change 4/14, minimal output from pleurax drain       #AFIB  - 4/18: Resume Eliquis   -- Per Dr. Galvan's CT surgery team- Okay to resume Eliquis BID on 4/18 (confirmed with Nurse Practitioner)   - Monitor for bleeding     #HTN  - Atenolol 12.5mg daily    #HLD  - Simvastatin 40mg daily    #Sleep/Mood  - Lexapro 10mg daily  - 4/12: Psych consult- Keep Lexapro 10 and Trazodone 25mg @ HS (per daughter's request)  - 4/13:  Trazodone 50mg nightly due to insomnia    #Skin  - Skin --right chest wall Pleurex drain site + dressing  - Pressure injury/Skin: OOB to Chair, PT/OT     #Pain Mgmt   - Tylenol PRN    #GI/Bowel Mgmt   - c/w Senna, Miralax     #/Bladder Mgmt   - Chronic Plummer last changed 4/7/23  - F/u with Urology outpt   - 4/12: Urology consult- Dr. Hamm - Plummer to remain in place during rehab stay. Will continue SC upon discharge home.     #Urethral bleeding  - Episode of suprapubic distention yesterday afternoon. (4/20)  -Plummer re-advanced by NP with immediate 1300mL output and resolution of suprapubic distention.   -Post procedure meatus bleeding.   -Likely from blood clot on Plummer tube/local urethral irritation.   -Small clot expelled overnight.   -Urine this morning clear yellow on exam.   -All bleeding has resolved.   -Dr. Hamm made aware and to re-evaulate.   -Eliquis resumed.    #FEN   - Diet - Regular + Thins  [DASH]      #Precautions / PROPHYLAXIS:   - Falls, aspiration  - ortho: Weight bearing status: WBAT   - Lungs: Aspiration, Incentive Spirometer   - DVT: Lovenox    #GOC  CODE STATUS: FULL CODE  --------------------------------------------------------------------   Liason with family/providers:    Next of kin--(Daughter  [phone]) a physician    4/12--I called on phone and d/w daughter Dr Gasca, She stated recommendations as requested by cardiothoracic unit of [facility] prior to patient's dc from there and T/f to acute rehab  --pleurax drain MWF, regular CXR, ,Parameters for BP meds,and family request for, consult reviews  All requests being adhered to  4/13--Functional update and clinical status d/w son present at bed side. Wife came earlier, but was not present at that time  4/20--Update d/w family including DC plan, Dr Gasca happy with same   4/21-- I called on phone and d/w daughter Dr Judith Schumacher, patient's functional update (details from IDT today)  and medical progress, upate with pleural effusion, dc plan and f/u post DC  She was happy with the explanation  Family prefers dc Sun 4/23, instead of 4/22, which we have already agreed to during IDT today  ---------------- ----------------------------------------------------  IDT conference on 4/20  TDD: 4/22 to home  Barriers: [place], fatigue, poor endurance, poor energy, R pleurex drain, Chronic plummer, 02 requirements.   Social Work: Lives in  with spouse, with 2 DEIRDRE/0HR. 1st floor setup available. Support from spouse and daughter. Willing to private hire.   OT: Sup/min A for ADLstransfers.  PT: CG/Cs for transfers. Ambuated 50 ft with RW.   SLP: --n/a  --------------------------------------------------------------------  Outpatient Follow-up (Specialty/Name of physician):    Nathaniel Galvan)  Surgery; Thoracic Surgery  [phone] [place] [address]  Phone: [phone]  Fax: [phone]  Established Patient  Follow Up Time: 2 weeks    UROLOGY  MD Saúl Hamm [facility]  Chloé Vazquez  Scheduled Appointment: 03/27/2023

## 2023-04-22 NOTE — ASSESSMENT
[Curative] : Goals of care discussed with patient: Curative [Palliative Care Plan] : not applicable at this time [FreeTextEntry1] : Stage IV classical Hodgkin lymphoma in an 81 yo man who presented with a large pleural effusion and respiratory failure. He has been treated twice with BV, three weeks apart, with good tolerance, no c/o neuropathy. He had myelosuppression transiently after the first dose which rapidly reversed after receiving filgrastim.\par He has a PleurX catheter in place which was obstructed a few weeks ago, was cleared, and is draining minimally now w/o recurrence of respiratory complaints or changes in chrst X-ray, including today, suggesting that to the degree that the effusion is related to Hodgkin lymphoma, pleural implants, he may be responding. There is a suggestion of mild decrease in LAD in recent chest CT.\par Monotherapy with BV will not be with curative intent, so would like to be able to shift to cycles of AVD (doxorubicin, vinblastine and dacarbazine) given q2 weeks as per Sunny et. al. 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  \par Blood counts after therapy will be closely followed.\par He will need intensive PT and hopefully he will start to get about, at first with assistance. We will review his cardiac assessment to date, shruti. with respect to LV function. We will give him at least one more dose of single agent BV: his next dose is coming up and we plan to give it here in [facility].\par He will see Dr. Galvan on 4/24/23, when the PleurX will be reassessed.

## 2023-04-22 NOTE — PHYSICAL EXAM
[Capable of only limited self care, confined to bed or chair more than 50% of waking hours] : Status 3- Capable of only limited self care, confined to bed or chair more than 50% of waking hours [Thin] : thin [Normal] : grossly intact

## 2023-04-22 NOTE — PROGRESS NOTE ADULT - SUBJECTIVE AND OBJECTIVE BOX
Patient is a 82y old  Male who presents with a chief complaint of Debility due to Hodgkin's Lymphoma (22 Apr 2023 11:29)      Patient seen and examined at bedside.  Denies chest pain, dyspnea, abd pain.    ALLERGIES:  pertussis vaccines (Rash)  shellfish (Rash)    MEDICATIONS  (STANDING):  albuterol    0.083% 2.5 milliGRAM(s) Nebulizer every 6 hours  ammonium lactate 12% Lotion 1 Application(s) Topical two times a day  apixaban 5 milliGRAM(s) Oral two times a day  atenolol  Tablet 12.5 milliGRAM(s) Oral daily  budesonide 160 MICROgram(s)/formoterol 4.5 MICROgram(s) Inhaler 2 Puff(s) Inhalation two times a day  chlorhexidine 2% Cloths 1 Application(s) Topical daily  escitalopram 10 milliGRAM(s) Oral daily  famotidine    Tablet 20 milliGRAM(s) Oral daily  magnesium oxide 400 milliGRAM(s) Oral three times a day with meals  polyethylene glycol 3350 17 Gram(s) Oral daily  senna 2 Tablet(s) Oral at bedtime  simvastatin 40 milliGRAM(s) Oral at bedtime  tiotropium 2.5 MICROgram(s) Inhaler 2 Puff(s) Inhalation daily  traZODone 50 milliGRAM(s) Oral at bedtime    MEDICATIONS  (PRN):  acetaminophen     Tablet .. 650 milliGRAM(s) Oral every 6 hours PRN Temp greater or equal to 38C (100.4F), Mild Pain (1 - 3)    Vital Signs Last 24 Hrs  T(F): 97.4 (22 Apr 2023 07:31), Max: 97.5 (21 Apr 2023 22:15)  HR: 63 (22 Apr 2023 08:14) (63 - 88)  BP: 99/63 (22 Apr 2023 07:31) (99/63 - 120/74)  RR: 16 (22 Apr 2023 07:31) (16 - 16)  SpO2: 96% (22 Apr 2023 08:14) (93% - 96%)  I&O's Summary    21 Apr 2023 07:01  -  22 Apr 2023 07:00  --------------------------------------------------------  IN: 0 mL / OUT: 1252 mL / NET: -1252 mL    22 Apr 2023 07:01  -  22 Apr 2023 13:07  --------------------------------------------------------  IN: 0 mL / OUT: 180 mL / NET: -180 mL        PHYSICAL EXAM:  General: NAD, A/O x 3  ENT: MMM  Neck: Supple, No JVD  Lungs: Clear to auscultation bilaterally  Cardio: RRR, S1/S2, No murmurs  Abdomen: Soft, Nontender, Nondistended; Bowel sounds present  Extremities: No calf tenderness, No pitting edema    LABS:                        8.5    7.80  )-----------( 405      ( 20 Apr 2023 07:20 )             28.1       04-20    137  |  100  |  12  ----------------------------<  116  4.6   |  34  |  0.56    Ca    8.8      20 Apr 2023 07:20    TPro  6.7  /  Alb  1.9  /  TBili  0.4  /  DBili  x   /  AST  30  /  ALT  34  /  AlkPhos  70  04-20                03-26 Chol 117 mg/dL LDL -- HDL 53 mg/dL Trig 65 mg/dL                      COVID-19 PCR: NotDetec (04-10-23 @ 19:00)  COVID-19 PCR: NotDetec (04-09-23 @ 06:00)  COVID-19 PCR: NotDetec (04-06-23 @ 05:11)  COVID-19 PCR: NotDetec (04-02-23 @ 06:40)  COVID-19 PCR: NotDetec (03-28-23 @ 23:17)      RADIOLOGY & ADDITIONAL TESTS:    Care Discussed with Consultants/Other Providers:

## 2023-04-22 NOTE — PROGRESS NOTE ADULT - SUBJECTIVE AND OBJECTIVE BOX
See in AM  Patient with no new medical issues today.  Planning for DC on 4/23/23  Pain controlled, no chest pain, no N/V, no Fevers/Chills. No other new ROS  Has been tolerating rehabilitation program.    acetaminophen     Tablet .. 650 milliGRAM(s) Oral every 6 hours PRN  albuterol    0.083% 2.5 milliGRAM(s) Nebulizer every 6 hours  ammonium lactate 12% Lotion 1 Application(s) Topical two times a day  apixaban 5 milliGRAM(s) Oral two times a day  atenolol  Tablet 12.5 milliGRAM(s) Oral daily  budesonide 160 MICROgram(s)/formoterol 4.5 MICROgram(s) Inhaler 2 Puff(s) Inhalation two times a day  chlorhexidine 2% Cloths 1 Application(s) Topical daily  escitalopram 10 milliGRAM(s) Oral daily  famotidine    Tablet 20 milliGRAM(s) Oral daily  magnesium oxide 400 milliGRAM(s) Oral three times a day with meals  polyethylene glycol 3350 17 Gram(s) Oral daily  senna 2 Tablet(s) Oral at bedtime  simvastatin 40 milliGRAM(s) Oral at bedtime  tiotropium 2.5 MICROgram(s) Inhaler 2 Puff(s) Inhalation daily  traZODone 50 milliGRAM(s) Oral at bedtime      T(C): 36.3 (04-22-23 @ 07:31), Max: 36.4 (04-21-23 @ 22:15)  HR: 63 (04-22-23 @ 08:14) (63 - 88)  BP: 99/63 (04-22-23 @ 07:31) (99/63 - 120/74)  RR: 16 (04-22-23 @ 07:31) (16 - 16)  SpO2: 96% (04-22-23 @ 08:14) (93% - 96%)    In NAD  HEENT- EOMI  Heart- Well Perfused  Lungs- No resp distress, no use of accessory resp muscles  Neuro- Exam unchanged  Psych- Affect wnl          Imp: Patient with diagnosis of      Debility due to Hodgkin's Lymphoma    admitted for comprehensive acute rehabilitation.    Plan:  - Continue therapies  - DVT prophylaxis  - Skin- Turn q2h, check skin daily  - Continue current medications; patient medically stable.   - Patient is stable to continue current rehabilitation program.

## 2023-04-22 NOTE — HISTORY OF PRESENT ILLNESS
[Other Location: e.g. School (Enter Location, City,State)___] : at [unfilled], at the time of the visit. [Medical Office: ([facility])___] : at the medical office located in  [Family Member] : family member [Disease:__________________________] : Disease: [unfilled] [de-identified] : Mr. Jeronimo is a 81 yo man with Hodgkin lymphoma undergoing chemotherapy. He had a h/o HTN, AF and chronic urinary retention. Gradually increasing dyspnea developed starting in early 2023. He was admitted to [facility] on 2/17/23 shortly after an immediately prior admission with [facility] at [facility], where he was found to have SCV LAD and \par a large right pleural effusion, which was drained and was reportedly neg for infection and malignancy. A node bx showed atypical CD30+ cells suspicipus for Hodgkin. He went home on oxygen with further w/u planned, then decompensated and was emergently admitted to [facility], where he was intubated and remained on vent for a few days. Over 3L were drained from the right effusion. He was successfully extubated and was transferred to [facility] for further care on 2/24/23. On 3/3/23, Dr. Nathaniel Galvan performed bronchoscopy, right VATS, pleural bx, mediastinal node dissection and placement of PleurX catheter. Pleural nodules showed chronic inflammation, eosinophils, fibrosis, and a level 7 node showed classical Hodgkin lymphoma. Hodgkin cells were large, anaplastic cells +CD30, CD15, dim PAX5, MUM1, dim OCT2, NEFTALI and (-) for CD3, CD45, CD20, CD79a, BOB1, BCL6.\par He went to rehab on 3/15/23, prior to which an Kjefsv-R-Sanl was placed.\par Pretherapy imaging included PET CT  3/8/23 which showed likely right cervical FDG+ LAD, FDG+ paratracheal, prevascular, perihilar, retrocrural, aortocaval LAD some > 2 cm, most 1-2 cm with SUV 3.5-9.2. Prior CT chest showed a 3.5 cm precarinal node. Right pleural effusion with heterogeneous uptake was seen. There was suspected involvement of the right hemisacrum. 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 Cytogenetics was nl. 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 EKG showed Afib with rapid ventricular rythmn.  Imaging showed increase in the effusion with loculation and increased atelectasis. He was readmitted to [facility] on 3/25/2. PleurX catheter was flushed with > 2L drainage of pleural fluid; he improved and was able to return to [facility] 4/10/23. Prior to transfer, a second dose of BV with dose reduced to 1.2 mg/kg was given.\par At Rehab, pt has been bed and wheelchair bound with stable pulm status, no dyspnea or cough at rest. He has had no fevers and has a good appetit.\par Chest x ray obtained today (4/20/23) showed stable findings.\par Pt's son says that there has been minimal to no drainage from the PleurX catheter since the last discharge from [facility]. [de-identified] : IV [de-identified] : initial outpatient visit.

## 2023-04-23 VITALS — OXYGEN SATURATION: 97 %

## 2023-04-23 PROCEDURE — 99232 SBSQ HOSP IP/OBS MODERATE 35: CPT

## 2023-04-23 PROCEDURE — 71045 X-RAY EXAM CHEST 1 VIEW: CPT | Mod: 26

## 2023-04-23 RX ADMIN — APIXABAN 5 MILLIGRAM(S): 2.5 TABLET, FILM COATED ORAL at 05:25

## 2023-04-23 RX ADMIN — TIOTROPIUM BROMIDE 2 PUFF(S): 18 CAPSULE ORAL; RESPIRATORY (INHALATION) at 08:05

## 2023-04-23 RX ADMIN — FAMOTIDINE 20 MILLIGRAM(S): 10 INJECTION INTRAVENOUS at 11:13

## 2023-04-23 RX ADMIN — ESCITALOPRAM OXALATE 10 MILLIGRAM(S): 10 TABLET, FILM COATED ORAL at 11:13

## 2023-04-23 RX ADMIN — CHLORHEXIDINE GLUCONATE 1 APPLICATION(S): 213 SOLUTION TOPICAL at 11:16

## 2023-04-23 RX ADMIN — ATENOLOL 12.5 MILLIGRAM(S): 25 TABLET ORAL at 07:43

## 2023-04-23 RX ADMIN — MAGNESIUM OXIDE 400 MG ORAL TABLET 400 MILLIGRAM(S): 241.3 TABLET ORAL at 07:43

## 2023-04-23 RX ADMIN — MAGNESIUM OXIDE 400 MG ORAL TABLET 400 MILLIGRAM(S): 241.3 TABLET ORAL at 11:55

## 2023-04-23 RX ADMIN — ALBUTEROL 2.5 MILLIGRAM(S): 90 AEROSOL, METERED ORAL at 08:05

## 2023-04-23 RX ADMIN — BUDESONIDE AND FORMOTEROL FUMARATE DIHYDRATE 2 PUFF(S): 160; 4.5 AEROSOL RESPIRATORY (INHALATION) at 08:05

## 2023-04-23 RX ADMIN — Medication 1 APPLICATION(S): at 05:24

## 2023-04-23 NOTE — PROGRESS NOTE ADULT - PROVIDER SPECIALTY LIST ADULT
Hospitalist
Pulmonology
Rehab Medicine
Heme/Onc
Pulmonology
Pulmonology
Rehab Medicine
Hospitalist
Hospitalist
Physiatry
Rehab Medicine
Rehab Medicine
Pulmonology
Rehab Medicine
Rehab Medicine
Urology
Hospitalist
Physiatry
Heme/Onc

## 2023-04-23 NOTE — PROGRESS NOTE ADULT - SUBJECTIVE AND OBJECTIVE BOX
See in AM; Patient with no new medical issues today.  Planning for DC today  Pain controlled, no chest pain, no N/V, no Fevers/Chills. No other new ROS  Has been tolerating rehabilitation program.    acetaminophen     Tablet .. 650 milliGRAM(s) Oral every 6 hours PRN  albuterol    0.083% 2.5 milliGRAM(s) Nebulizer every 6 hours  ammonium lactate 12% Lotion 1 Application(s) Topical two times a day  apixaban 5 milliGRAM(s) Oral two times a day  atenolol  Tablet 12.5 milliGRAM(s) Oral daily  budesonide 160 MICROgram(s)/formoterol 4.5 MICROgram(s) Inhaler 2 Puff(s) Inhalation two times a day  chlorhexidine 2% Cloths 1 Application(s) Topical daily  escitalopram 10 milliGRAM(s) Oral daily  famotidine    Tablet 20 milliGRAM(s) Oral daily  magnesium oxide 400 milliGRAM(s) Oral three times a day with meals  polyethylene glycol 3350 17 Gram(s) Oral daily  senna 2 Tablet(s) Oral at bedtime  simvastatin 40 milliGRAM(s) Oral at bedtime  tiotropium 2.5 MICROgram(s) Inhaler 2 Puff(s) Inhalation daily  traZODone 50 milliGRAM(s) Oral at bedtime      ICU Vital Signs Last 24 Hrs  T(C): 36.4 (23 Apr 2023 07:40), Max: 36.4 (23 Apr 2023 07:40)  T(F): 97.5 (23 Apr 2023 07:40), Max: 97.5 (23 Apr 2023 07:40)  HR: 76 (23 Apr 2023 08:10) (57 - 81)  BP: 120/82 (23 Apr 2023 07:40) (120/82 - 134/86)  RR: 16 (23 Apr 2023 07:40) (16 - 16)  SpO2: 97% (23 Apr 2023 08:10) (94% - 97%)    O2 Parameters below as of 23 Apr 2023 08:10  Patient On (Oxygen Delivery Method): nasal cannula w/ humidification    In NAD  HEENT- EOMI  Heart- Well Perfused  Lungs- No resp distress, no use of accessory resp muscles  Neuro- Exam unchanged  Psych- Affect wnl  Choi in place - clear urine        Imp: Patient with diagnosis of      Debility due to Hodgkin's Lymphoma    admitted for comprehensive acute rehabilitation.    Plan:  - Continue therapies  - DVT prophylaxis  - Skin- Turn q2h, check skin daily  - Continue current medications; patient medically stable.   - Patient is stable to continue current rehabilitation program.

## 2023-04-23 NOTE — PROGRESS NOTE ADULT - ASSESSMENT
From primary team notes:    ASSESSMENT/PLAN  This is a 82 year old male with PMH of AFIB, HTN and chronic urinary retention (chronic plummer); who presented to [facility] via ambulance on 2/17 with progressively worsening SOB, intubated in the field, found to have a large R pleural effusion with mediastinal and hilar lymphadenopathy s/p thoracentesis (~3L), with persistent leukocytosis and concern for suspicious malignancy was transferred to [facility] on 2/24 for further workup.  He underwent a R supraclavicular mass biopsy on 2/28, significant for classic Hodgkin's lymphoma. On 3/3, he underwent a R thoracotomy/VATS with Pleurex placement. Bone marrow negative for involvement,  (s/p L chest wall mediport placement 3/15). Rehab course c/b worsening pleural effusion requiring acute transfer for drainage. Patient now with gait Instability, ADL impairments and Functional impairments.    * Eliquis restarted 4/18, but temporarily held due to bleeding at urethral meatus, since resolved- Eliquis restarted on 4/21 - monitor for bleeding * Pulmonary following - R pleurex to be drained Monday & Thursdays - minimal output * Repeat CXR biweekly * Continue to monitor rehab progress * IDT today as noted * Plan for DC to home on 4/23 *     #Debility  #SOB/R Pleural effusion  #Acute hypoxic respiratory failure/Classic Hodgkin's Lymphoma  - Gait Instability, ADL impairments and Functional impairments: start Comprehensive Rehab Program of PT/OT  - 3 hours a day, 5 days a week  - S/p R supraclavicular mass biopsy-> significant for Classic Hodgkin's Lymphoma  - S/p R thoracotomy/VATS with Pleurex placement on  3/3  - c/w pleurX drainage  -- Drain on Monday and Thursday, no more than 500mLs at a time - strict I&O with documentation  - Bone marrow biopsy negative  - L chest wall mediport placed 3/15  - Cycle #1 of BV (Brentuximad Vedotin) given on 3/10/23  - s/p C2 brentuximab 4/3/23  - Plan for cycle #3  on 4/24/23  -  No plan to receive chemo while in rehab  - Await [facility] referral   - F/u with Dr. Hays outpatient  - 4/11: Pulm consult ->  Maintain 02 >90%, Ipratropium-Albuterol every 6 hours, Sodium chloride nebuizer BID , IS, Chest PT.  -- Pulm following  * CXR 4/11--No acute changes, Rt effusion with atelectasis, Left mid and lower opacities  - serial CXR biweekly--no interval change 4/14, minimal output from pleurax drain       #AFIB  - 4/18: Resume Eliquis   -- Per Dr. Galvan's CT surgery team- Okay to resume Eliquis BID on 4/18 (confirmed with Nurse Practitioner)   - Monitor for bleeding     #HTN  - Atenolol 12.5mg daily    #HLD  - Simvastatin 40mg daily    #Sleep/Mood  - Lexapro 10mg daily  - 4/12: Psych consult- Keep Lexapro 10 and Trazodone 25mg @ HS (per daughter's request)  - 4/13:  Trazodone 50mg nightly due to insomnia    #Skin  - Skin --right chest wall Pleurex drain site + dressing  - Pressure injury/Skin: OOB to Chair, PT/OT     #Pain Mgmt   - Tylenol PRN    #GI/Bowel Mgmt   - c/w Senna, Miralax     #/Bladder Mgmt   - Chronic Plummer last changed 4/7/23  - F/u with Urology outpt   - 4/12: Urology consult- Dr. Hamm - Plummer to remain in place during rehab stay. Will continue SC upon discharge home.     #Urethral bleeding  - Episode of suprapubic distention yesterday afternoon. (4/20)  -Plummer re-advanced by NP with immediate 1300mL output and resolution of suprapubic distention.   -Post procedure meatus bleeding.   -Likely from blood clot on Plummer tube/local urethral irritation.   -Small clot expelled overnight.   -Urine this morning clear yellow on exam.   -All bleeding has resolved.   -Dr. Hamm made aware and to re-evaulate.   -Eliquis resumed.    #FEN   - Diet - Regular + Thins  [DASH]      #Precautions / PROPHYLAXIS:   - Falls, aspiration  - ortho: Weight bearing status: WBAT   - Lungs: Aspiration, Incentive Spirometer   - DVT: Lovenox    #GOC  CODE STATUS: FULL CODE  --------------------------------------------------------------------   Liason with family/providers:    Next of kin--(Daughter  [phone]) a physician    4/12--I called on phone and d/w daughter Dr Gasca, She stated recommendations as requested by cardiothoracic unit of [facility] prior to patient's dc from there and T/f to acute rehab  --pleurax drain MWF, regular CXR, ,Parameters for BP meds,and family request for, consult reviews  All requests being adhered to  4/13--Functional update and clinical status d/w son present at bed side. Wife came earlier, but was not present at that time  4/20--Update d/w family including DC plan, Dr Gasca happy with same   4/21-- I called on phone and d/w daughter Dr Judith Schumacher, patient's functional update (details from IDT today)  and medical progress, upate with pleural effusion, dc plan and f/u post DC  She was happy with the explanation  Family prefers dc Sun 4/23, instead of 4/22, which we have already agreed to during IDT today  ---------------- ----------------------------------------------------  IDT conference on 4/20  TDD: 4/22 to home  Barriers: [place], fatigue, poor endurance, poor energy, R pleurex drain, Chronic plummer, 02 requirements.   Social Work: Lives in  with spouse, with 2 DEIRDRE/0HR. 1st floor setup available. Support from spouse and daughter. Willing to private hire.   OT: Sup/min A for ADLstransfers.  PT: CG/Cs for transfers. Ambuated 50 ft with RW.   SLP: --n/a  --------------------------------------------------------------------  Outpatient Follow-up (Specialty/Name of physician):    Nathaniel Galvan)  Surgery; Thoracic Surgery  [id number] [place] [address]  Phone: [phone]  Fax: [phone]  Established Patient  Follow Up Time: 2 weeks    UROLOGY  MD Saúl Hamm [facility]  Chloé Vazquez  Scheduled Appointment: 03/27/2023

## 2023-04-23 NOTE — PROGRESS NOTE ADULT - REASON FOR ADMISSION
Debility due to Hodgkin's Lymphoma
Hodgkin's Lymphoma
Debility due to Hodgkin's Lymphoma
Hodgkin's Lymphoma
Debility due to Hodgkin's Lymphoma

## 2023-04-23 NOTE — PROGRESS NOTE ADULT - NUTRITIONAL ASSESSMENT
This patient has been assessed with a concern for Malnutrition and has been determined to have a diagnosis/diagnoses of Moderate protein-calorie malnutrition.    This patient is being managed with:   Diet Regular-  Supplement Feeding Modality:  Oral  Ensure Plus High Protein Cans or Servings Per Day:  1       Frequency:  Two Times a day  Entered: Apr 11 2023  3:15PM  
Yes

## 2023-04-23 NOTE — PROGRESS NOTE ADULT - SUBJECTIVE AND OBJECTIVE BOX
Patient is a 82y old  Male who presents with a chief complaint of Debility due to Hodgkin's Lymphoma (23 Apr 2023 10:24)      Patient seen and examined at bedside.  Denies chest pain, dyspnea, abd pain.    ALLERGIES:  pertussis vaccines (Rash)  shellfish (Rash)    MEDICATIONS  (STANDING):  albuterol    0.083% 2.5 milliGRAM(s) Nebulizer every 6 hours  ammonium lactate 12% Lotion 1 Application(s) Topical two times a day  apixaban 5 milliGRAM(s) Oral two times a day  atenolol  Tablet 12.5 milliGRAM(s) Oral <User Schedule>  budesonide 160 MICROgram(s)/formoterol 4.5 MICROgram(s) Inhaler 2 Puff(s) Inhalation two times a day  chlorhexidine 2% Cloths 1 Application(s) Topical daily  escitalopram 10 milliGRAM(s) Oral daily  famotidine    Tablet 20 milliGRAM(s) Oral daily  magnesium oxide 400 milliGRAM(s) Oral three times a day with meals  polyethylene glycol 3350 17 Gram(s) Oral daily  senna 2 Tablet(s) Oral at bedtime  simvastatin 40 milliGRAM(s) Oral at bedtime  tiotropium 2.5 MICROgram(s) Inhaler 2 Puff(s) Inhalation daily  traZODone 50 milliGRAM(s) Oral at bedtime    MEDICATIONS  (PRN):  acetaminophen     Tablet .. 650 milliGRAM(s) Oral every 6 hours PRN Temp greater or equal to 38C (100.4F), Mild Pain (1 - 3)    Vital Signs Last 24 Hrs  T(F): 97.5 (23 Apr 2023 07:40), Max: 97.5 (23 Apr 2023 07:40)  HR: 76 (23 Apr 2023 08:10) (57 - 81)  BP: 120/82 (23 Apr 2023 07:40) (120/82 - 134/86)  RR: 16 (23 Apr 2023 07:40) (16 - 16)  SpO2: 97% (23 Apr 2023 08:10) (94% - 97%)  I&O's Summary    22 Apr 2023 07:01  -  23 Apr 2023 07:00  --------------------------------------------------------  IN: 0 mL / OUT: 180 mL / NET: -180 mL        PHYSICAL EXAM:  General: NAD, A/O x 3  ENT: MMM  Neck: Supple, No JVD  Lungs: Clear to auscultation bilaterally  Cardio: RRR, S1/S2, No murmurs  Abdomen: Soft, Nontender, Nondistended; Bowel sounds present  Extremities: No calf tenderness, No pitting edema    LABS:                        03-26 Chol 117 mg/dL LDL -- HDL 53 mg/dL Trig 65 mg/dL                      COVID-19 PCR: NotDetec (04-10-23 @ 19:00)  COVID-19 PCR: NotDetec (04-09-23 @ 06:00)  COVID-19 PCR: NotDetec (04-06-23 @ 05:11)  COVID-19 PCR: NotDetec (04-02-23 @ 06:40)  COVID-19 PCR: NotDetec (03-28-23 @ 23:17)      RADIOLOGY & ADDITIONAL TESTS:    Care Discussed with Consultants/Other Providers:

## 2023-04-23 NOTE — PROGRESS NOTE ADULT - ASSESSMENT
This is a 82 year old male with PMH of AFIB, HTN and chronic urinary retention (chronic plummer); who presented to [facility] via ambulance on 2/17 with progressively worsening SOB, intubated in the field, found to have a large R pleural effusion with mediastinal and hilar lymphadenopathy s/p thoracentesis (~3L), with persistent leukocytosis and concern for suspicious malignancy was transferred to [facility] on 2/24 for further workup.  He underwent a R supraclavicular mass biopsy on 2/28, significant for classic Hodgkin's lymphoma. On 3/3, he underwent a R thoracotomy/VATS with Pleurex placement. Bone marrow negative for involvement,  (s/p L chest wall mediport placement 3/15). Rehab course c/b worsening pleural effusion requiring acute transfer for drainage. Patient now with gait Instability, ADL impairments and Functional impairments- pt/ot/dvt ppx      #SOB/R Pleural effusion, Classic Hodgkin's Lymphoma  - S/p R supraclavicular mass biopsy-> significant for Classic Hodgkin's Lymphoma  - S/p R thoracotomy/VATS with Pleurex placement on  3/3  - c/w pleurX drainage  -- Drain on Monday and Thursday, no more than 500mLs at a time - strict I&O with documentation  - Bone marrow biopsy negative  - L chest wall mediport placed 3/15  - Cycle #1 of BV (Brentuximad Vedotin) given on 3/10/23  - s/p C2 brentuximab 4/3/23  - Plan for cycle #3  on 4/24/23  -  No plan to receive chemo while in rehab  - Await [facility] referral   - F/u with Dr. Hays outpatient  -  Pulm consult - recc noted* CXR 4/11--No acute changes, Rt effusion with atelectasis, Left mid and lower opacities  - CXR biweekly--no interval change 4/14, minimal output from pleurex drain       #AFIB  - Eliquis was placed on hold initially- restarted 4/19/23, ok with CT team, now oh hold again 4/20/23 due to bleeding from plummer  - monitor bleeding, consider  Eliquis once bleeding stops    #HTN  - Atenolol     #HLD  - Simvastatin     #Sleep/Mood  - Lexapro  - Trazodone    # urine retention bleeding noted from Plummer site 4/20/23  - hold Eliquis, reassess in am to restart  - Chronic Plummer last changed 4/7/23  - F/u with Urology outpt   - 4/12: Urology consult- Dr. Hamm -- continue Plummer to resume self caths at home after discharge  - monitor Hb    vte ppx- on Eliquis

## 2023-04-24 ENCOUNTER — RESULT REVIEW (OUTPATIENT)
Age: 83
End: 2023-04-24

## 2023-04-24 ENCOUNTER — TRANSCRIPTION ENCOUNTER (OUTPATIENT)
Age: 83
End: 2023-04-24

## 2023-04-24 ENCOUNTER — APPOINTMENT (OUTPATIENT)
Dept: INFUSION THERAPY | Facility: HOSPITAL | Age: 83
End: 2023-04-24

## 2023-04-24 ENCOUNTER — APPOINTMENT (OUTPATIENT)
Dept: THORACIC SURGERY | Facility: CLINIC | Age: 83
End: 2023-04-24
Payer: MEDICARE

## 2023-04-24 VITALS
SYSTOLIC BLOOD PRESSURE: 94 MMHG | BODY MASS INDEX: 27.2 KG/M2 | OXYGEN SATURATION: 95 % | RESPIRATION RATE: 15 BRPM | WEIGHT: 190 LBS | HEART RATE: 59 BPM | DIASTOLIC BLOOD PRESSURE: 60 MMHG | HEIGHT: 70 IN

## 2023-04-24 DIAGNOSIS — Z82.3 FAMILY HISTORY OF STROKE: ICD-10-CM

## 2023-04-24 DIAGNOSIS — Z86.79 PERSONAL HISTORY OF OTHER DISEASES OF THE CIRCULATORY SYSTEM: ICD-10-CM

## 2023-04-24 DIAGNOSIS — Z83.438 FAMILY HISTORY OF OTHER DISORDER OF LIPOPROTEIN METABOLISM AND OTHER LIPIDEMIA: ICD-10-CM

## 2023-04-24 DIAGNOSIS — Z86.59 PERSONAL HISTORY OF OTHER MENTAL AND BEHAVIORAL DISORDERS: ICD-10-CM

## 2023-04-24 DIAGNOSIS — Z82.49 FAMILY HISTORY OF ISCHEMIC HEART DISEASE AND OTHER DISEASES OF THE CIRCULATORY SYSTEM: ICD-10-CM

## 2023-04-24 DIAGNOSIS — Z86.39 PERSONAL HISTORY OF OTHER ENDOCRINE, NUTRITIONAL AND METABOLIC DISEASE: ICD-10-CM

## 2023-04-24 LAB
BASOPHILS # BLD AUTO: 0.03 K/UL — SIGNIFICANT CHANGE UP (ref 0–0.2)
BASOPHILS NFR BLD AUTO: 0.5 % — SIGNIFICANT CHANGE UP (ref 0–2)
EOSINOPHIL # BLD AUTO: 0.14 K/UL — SIGNIFICANT CHANGE UP (ref 0–0.5)
EOSINOPHIL NFR BLD AUTO: 2.3 % — SIGNIFICANT CHANGE UP (ref 0–6)
HCT VFR BLD CALC: 30.7 % — LOW (ref 39–50)
HGB BLD-MCNC: 9.5 G/DL — LOW (ref 13–17)
IMM GRANULOCYTES NFR BLD AUTO: 0.3 % — SIGNIFICANT CHANGE UP (ref 0–0.9)
LYMPHOCYTES # BLD AUTO: 1.69 K/UL — SIGNIFICANT CHANGE UP (ref 1–3.3)
LYMPHOCYTES # BLD AUTO: 27.3 % — SIGNIFICANT CHANGE UP (ref 13–44)
MCHC RBC-ENTMCNC: 29.2 PG — SIGNIFICANT CHANGE UP (ref 27–34)
MCHC RBC-ENTMCNC: 30.9 G/DL — LOW (ref 32–36)
MCV RBC AUTO: 94.5 FL — SIGNIFICANT CHANGE UP (ref 80–100)
MONOCYTES # BLD AUTO: 1.42 K/UL — HIGH (ref 0–0.9)
MONOCYTES NFR BLD AUTO: 22.9 % — HIGH (ref 2–14)
NEUTROPHILS # BLD AUTO: 2.9 K/UL — SIGNIFICANT CHANGE UP (ref 1.8–7.4)
NEUTROPHILS NFR BLD AUTO: 46.7 % — SIGNIFICANT CHANGE UP (ref 43–77)
NRBC # BLD: 0 /100 WBCS — SIGNIFICANT CHANGE UP (ref 0–0)
PLATELET # BLD AUTO: 371 K/UL — SIGNIFICANT CHANGE UP (ref 150–400)
RBC # BLD: 3.25 M/UL — LOW (ref 4.2–5.8)
RBC # FLD: 19.3 % — HIGH (ref 10.3–14.5)
T4 FREE+ TSH PNL SERPL: 2.75 UIU/ML — SIGNIFICANT CHANGE UP (ref 0.27–4.2)
WBC # BLD: 6.2 K/UL — SIGNIFICANT CHANGE UP (ref 3.8–10.5)
WBC # FLD AUTO: 6.2 K/UL — SIGNIFICANT CHANGE UP (ref 3.8–10.5)

## 2023-04-24 PROCEDURE — 99214 OFFICE O/P EST MOD 30 MIN: CPT

## 2023-04-25 ENCOUNTER — NON-APPOINTMENT (OUTPATIENT)
Age: 83
End: 2023-04-25

## 2023-04-25 DIAGNOSIS — E86.0 DEHYDRATION: ICD-10-CM

## 2023-04-25 DIAGNOSIS — R11.2 NAUSEA WITH VOMITING, UNSPECIFIED: ICD-10-CM

## 2023-04-25 DIAGNOSIS — C81.70 OTHER HODGKIN LYMPHOMA, UNSPECIFIED SITE: ICD-10-CM

## 2023-04-25 DIAGNOSIS — Z51.11 ENCOUNTER FOR ANTINEOPLASTIC CHEMOTHERAPY: ICD-10-CM

## 2023-04-25 LAB
ALBUMIN SERPL ELPH-MCNC: 2.9 G/DL — LOW (ref 3.3–5)
ALP SERPL-CCNC: 75 U/L — SIGNIFICANT CHANGE UP (ref 40–120)
ALT FLD-CCNC: 33 U/L — SIGNIFICANT CHANGE UP (ref 10–45)
ANION GAP SERPL CALC-SCNC: 9 MMOL/L — SIGNIFICANT CHANGE UP (ref 5–17)
AST SERPL-CCNC: 17 U/L — SIGNIFICANT CHANGE UP (ref 10–40)
BILIRUB SERPL-MCNC: 0.3 MG/DL — SIGNIFICANT CHANGE UP (ref 0.2–1.2)
BUN SERPL-MCNC: 10 MG/DL — SIGNIFICANT CHANGE UP (ref 7–23)
CALCIUM SERPL-MCNC: 9 MG/DL — SIGNIFICANT CHANGE UP (ref 8.4–10.5)
CHLORIDE SERPL-SCNC: 97 MMOL/L — SIGNIFICANT CHANGE UP (ref 96–108)
CO2 SERPL-SCNC: 28 MMOL/L — SIGNIFICANT CHANGE UP (ref 22–31)
CREAT SERPL-MCNC: 0.5 MG/DL — SIGNIFICANT CHANGE UP (ref 0.5–1.3)
EGFR: 101 ML/MIN/1.73M2 — SIGNIFICANT CHANGE UP
GLUCOSE SERPL-MCNC: 99 MG/DL — SIGNIFICANT CHANGE UP (ref 70–99)
LDH SERPL L TO P-CCNC: 192 U/L — SIGNIFICANT CHANGE UP (ref 50–242)
POTASSIUM SERPL-MCNC: 4.6 MMOL/L — SIGNIFICANT CHANGE UP (ref 3.5–5.3)
POTASSIUM SERPL-SCNC: 4.6 MMOL/L — SIGNIFICANT CHANGE UP (ref 3.5–5.3)
PROT SERPL-MCNC: 6.7 G/DL — SIGNIFICANT CHANGE UP (ref 6–8.3)
SODIUM SERPL-SCNC: 135 MMOL/L — SIGNIFICANT CHANGE UP (ref 135–145)
URATE SERPL-MCNC: 4.1 MG/DL — SIGNIFICANT CHANGE UP (ref 3.4–8.8)

## 2023-04-28 NOTE — ASSESSMENT
[FreeTextEntry1] : Mr. ARIK DUMONT, 82 year old male, w/ hx of A Fib, HTN and chronic urinary retention (chronic plummer); who presented to [facility] via ambulance on 2/17/2022 with progressively worsening SOB.  He was intubated in the field and brought to [facility] ED and admitted to ICU for acute respiratory failure with hypercapnia. CTA chest was negative for PE but significant for a large R pleural effusion with mediastinal and hilar lymphadenopathy, and 3L of fluid was drained.  He completed a 5 day course of empiric antibiotics. Pleural fluid cultures and blood cultures resulted negative.  Suspicious of underlying malignancy secondary to lymphadenopathy and persistent leukocytosis, Heme/Onc was consulted and recommended transfer to [facility] on 2/24/23 for further workup. On 2/28 he underwent an ultrasound guided biopsy of his R supraclavicular mass/lymph node, which later resulted as classic Hodgkin's lymphoma.  \par \par On 3/3, he underwent a FB, Right VATS, MLND, placement of Pleurx catheter on 03/03/2023 Postoperatively, he was hypotensive and started on Midodrine. A Left chest wall mediport was placed for ongoing chemotherapy treatment on 3/15/2023 by Dr. Johnson. He was admitted  to [facility] on 3/24.\par \par While at rehab, he developed chest discomfort., low systolic BP and mild tachycardia. EKG showed Afib with rapid ventricular rythmn. On exam, he had increased respiratory rate. CXR and  CT chest showed increased Rt sided effusion with loculation and increasing atelectasis. He was transferred to [facility] In the ED, CXR showed recurrent loculated R pleural effusion. CT chest with small loculated R pleural effusion with new peripheral demarcated high attenuation in the R pleural space. He  was seen by CT surgery in the ED, per paper chart, "drained about 100cc of fluid, placed on Pleura vac . PleurX was then flushed and drained about 2180cc fluid  need to hold AC for 48 hours for possible TPA into pleurX cath.\par MIST held per CT surgery.  No plan for VATS. CXR  on 3/27, Decreasing pulmonary congestion, No pneumothorax. On 4/6 CT Chest -- Compared to 3/29/2023, similar loculated, exudative right pleural effusion with slightly decreased pleural air component and Pleurx catheter in place. Question malignant effusion in the setting of reported lymphoma, irregular septal thickening, nodularity and mediastinal pleural involvement. \par \par All other medical co-morbidities were stable. Patient tolerated course of inpatient PT/OT/SLP rehab with significant improvements and met rehab goals prior to discharge. Patient was medically cleared on 4/23/23 for discharge to home with home care.\par \par I have reviewed the patient's medical records and diagnostic images at time of this office consultation and have made the following recommendation:\par 1. CXR reviewed with patient, stable scan. He is overall doing well. I recommend he returns to clinic 6 weeks with CXR fro re-evaluation.\par 2. Can decrease pleurX Catheter drainage to twice weekly. \par \par Recommendations reviewed with patient during this office visit, and all questions answered; Patient instructed on the importance of follow up and verbalizes understanding. \par \par \par I, KHADIJAH Treviño, personally performed the evaluation and management (E/M) services for this established patient. That E/M includes conducting the examination, assessing all new/exacerbated conditions, and establishing a new plan of care. Today, my ACP, Nate Lee NP, was here to observe my evaluation and management services for this new problem/exacerbated condition to be followed going forward.\par \par  \par

## 2023-04-28 NOTE — HISTORY OF PRESENT ILLNESS
[FreeTextEntry1] : Mr. ARIK DUMONT, 82 year old male, w/ hx of A Fib, HTN and chronic urinary retention (chronic plummer); who presented to [facility] via ambulance on 2/17/2022 with progressively worsening SOB.  He was intubated in the field and brought to [facility] ED and admitted to ICU for acute respiratory failure with hypercapnia. CTA chest was negative for PE but significant for a large R pleural effusion with mediastinal and hilar lymphadenopathy, and 3L of fluid was drained.  He completed a 5 day course of empiric antibiotics. Pleural fluid cultures and blood cultures resulted negative.  Suspicious of underlying malignancy secondary to lymphadenopathy and persistent leukocytosis, Heme/Onc was consulted and recommended transfer to [facility] on 2/24/23 for further workup. On 2/28 he underwent an ultrasound guided biopsy of his R supraclavicular mass/lymph node, which later resulted as classic Hodgkin's lymphoma.  \par \par On 3/3, he underwent a FB, Right VATS, MLND, placement of Pleurx catheter on 03/03/2023 Postoperatively, he was hypotensive and started on Midodrine. A Left chest wall mediport was placed for ongoing chemotherapy treatment on 3/15/2023 by Dr. Johnson. He was admitted  to [facility] on 3/24.\par \par While at rehab, he developed chest discomfort., low systolic BP and mild tachycardia. EKG showed Afib with rapid ventricular rythmn. On exam, he had increased respiratory rate. CXR and  CT chest showed increased Rt sided effusion with loculation and increasing atelectasis. He was transferred to [facility]. In the ED, CXR showed recurrent loculated R pleural effusion. CT chest with small loculated R pleural effusion with new peripheral demarcated high attenuation in the R pleural space. He  was seen by CT surgery in the ED, per paper chart, "drained about 100cc of fluid, placed on Pleura vac . PleurX was then flushed and drained about 2180cc fluid  need to hold AC for 48 hours for possible TPA into pleurX cath.\par MIST held per CT surgery.  No plan for VATS. CXR  on 3/27, Decreasing pulmonary congestion, No pneumothorax. On 4/6 CT Chest -- Compared to 3/29/2023, similar loculated, exudative right pleural effusion with slightly decreased pleural air component and Pleurx catheter in place. Question malignant effusion in the setting of reported lymphoma, irregular septal thickening, nodularity and mediastinal pleural involvement. \par \par Patient was medically optimized for discharge to [facility] IRU on 4/10/23.\par \par Rehab course significant for:\par 4/11: Appreciate pulm recs. Maintain 02>90% \par 4/12: Plan for pleurex drain- M/W/F. Consults- Urology, Hematology, Psychiatry. BP params to atenolol. Repeat CXr 4/14 and then weekly on Mon/Thurs. \par 4/13:PSYCH: Daughter wants to keep pt on Lexapro 10 mg and Trazodone 25mg.  QTc of 426. Trazodone to 50 mg for sleep. \par 4/14: Respiratory overnight for 02 down to 88%, s/p duoneb with improvement. Symbicort and Spiriva per pulm. \par 4/17: Chlorhexidine. F/u CXR. Pleurex to be drained on Mon & Thurs\par 4/18: Per Dr Galvan (CT SX) okay to resume Eliquis\par \par All other medical co-morbidities were stable. Patient tolerated course of inpatient PT/OT/SLP rehab with significant improvements and met rehab goals prior to discharge. Patient was medically cleared on 4/23/23 for discharge to home with home care.\par \par Patient is here today for a follow up. He was discharged from rehab yesterday with plan to decrease pleurX catheter twice weekly as per son due to low output. Overall, he reports to be feeling well. Denies any chest pain, shortness of breath, cough, or hemoptysis. Currently on 3 L NC O2, tolerating well.

## 2023-05-01 ENCOUNTER — RESULT REVIEW (OUTPATIENT)
Age: 83
End: 2023-05-01

## 2023-05-01 ENCOUNTER — NON-APPOINTMENT (OUTPATIENT)
Age: 83
End: 2023-05-01

## 2023-05-01 ENCOUNTER — TRANSCRIPTION ENCOUNTER (OUTPATIENT)
Age: 83
End: 2023-05-01

## 2023-05-01 ENCOUNTER — APPOINTMENT (OUTPATIENT)
Dept: HEMATOLOGY ONCOLOGY | Facility: CLINIC | Age: 83
End: 2023-05-01
Payer: MEDICARE

## 2023-05-01 VITALS
RESPIRATION RATE: 16 BRPM | HEART RATE: 61 BPM | OXYGEN SATURATION: 98 % | SYSTOLIC BLOOD PRESSURE: 119 MMHG | TEMPERATURE: 97.2 F | HEIGHT: 70 IN | DIASTOLIC BLOOD PRESSURE: 67 MMHG | WEIGHT: 182.98 LBS | BODY MASS INDEX: 26.2 KG/M2

## 2023-05-01 LAB
BASOPHILS # BLD AUTO: 0.03 K/UL — SIGNIFICANT CHANGE UP (ref 0–0.2)
BASOPHILS NFR BLD AUTO: 0.4 % — SIGNIFICANT CHANGE UP (ref 0–2)
EOSINOPHIL # BLD AUTO: 0.07 K/UL — SIGNIFICANT CHANGE UP (ref 0–0.5)
EOSINOPHIL NFR BLD AUTO: 0.8 % — SIGNIFICANT CHANGE UP (ref 0–6)
HCT VFR BLD CALC: 30.6 % — LOW (ref 39–50)
HGB BLD-MCNC: 9.7 G/DL — LOW (ref 13–17)
IMM GRANULOCYTES NFR BLD AUTO: 0.6 % — SIGNIFICANT CHANGE UP (ref 0–0.9)
LYMPHOCYTES # BLD AUTO: 1.93 K/UL — SIGNIFICANT CHANGE UP (ref 1–3.3)
LYMPHOCYTES # BLD AUTO: 23.2 % — SIGNIFICANT CHANGE UP (ref 13–44)
MCHC RBC-ENTMCNC: 29.1 PG — SIGNIFICANT CHANGE UP (ref 27–34)
MCHC RBC-ENTMCNC: 31.7 G/DL — LOW (ref 32–36)
MCV RBC AUTO: 91.9 FL — SIGNIFICANT CHANGE UP (ref 80–100)
MONOCYTES # BLD AUTO: 0.88 K/UL — SIGNIFICANT CHANGE UP (ref 0–0.9)
MONOCYTES NFR BLD AUTO: 10.6 % — SIGNIFICANT CHANGE UP (ref 2–14)
NEUTROPHILS # BLD AUTO: 5.35 K/UL — SIGNIFICANT CHANGE UP (ref 1.8–7.4)
NEUTROPHILS NFR BLD AUTO: 64.4 % — SIGNIFICANT CHANGE UP (ref 43–77)
NRBC # BLD: 0 /100 WBCS — SIGNIFICANT CHANGE UP (ref 0–0)
PLATELET # BLD AUTO: 235 K/UL — SIGNIFICANT CHANGE UP (ref 150–400)
RBC # BLD: 3.33 M/UL — LOW (ref 4.2–5.8)
RBC # FLD: 18.7 % — HIGH (ref 10.3–14.5)
WBC # BLD: 8.31 K/UL — SIGNIFICANT CHANGE UP (ref 3.8–10.5)
WBC # FLD AUTO: 8.31 K/UL — SIGNIFICANT CHANGE UP (ref 3.8–10.5)

## 2023-05-01 PROCEDURE — 99215 OFFICE O/P EST HI 40 MIN: CPT

## 2023-05-05 NOTE — ASSESSMENT
[Curative] : Goals of care discussed with patient: Curative [Palliative Care Plan] : not applicable at this time [FreeTextEntry1] : Stage IV classical Hodgkin lymphoma in an 81 yo man who presented with a large pleural effusion and respiratory failure. He has now been treated 3 times with BV, three weeks apart, with good tolerance, no c/o neuropathy. He had myelosuppression transiently after the first dose which rapidly reversed after receiving filgrastim.\par He has a PleurX catheter in place which was obstructed a few weeks ago, was cleared, and is now draining minimally now w/o recurrence of respiratory complaints or changes in chest X-ray, incl 4/24, suggesting that to the degree that the effusion is related to Hodgkin lymphoma, pleural implants, he may be responding. There is a suggestion of mild decrease in LAD in recent chest CT.\par Recent echo shows nl LV function.\par Monotherapy with BV will not be with curative intent, so intend to shift to cycles of AVD (doxorubicin, vinblastine and dacarbazine) given q2 weeks as per Jarreds et. al. 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  \par Blood counts after therapy need to be closely followed.\par cont to need intensive PT.\par He will get one more dose of single agent BV: if stable will give first cycle AVD 3 wks after that.\par AVD will be given q2 wk.\par Cont. f/u with Dr. 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 LMW heparin ppx.

## 2023-05-05 NOTE — REVIEW OF SYSTEMS
[Fatigue] : fatigue [Fever] : no fever [Night Sweats] : no night sweats [Negative] : Heme/Lymph [FreeTextEntry6] : not mobile, breathing comfortably at rest [FreeTextEntry8] : Choi in place

## 2023-05-05 NOTE — HISTORY OF PRESENT ILLNESS
[Disease:__________________________] : Disease: [unfilled] [Medical Office: ([facility])___] : at the medical office located in  [de-identified] : Mr. Jeronimo is a 83 yo man with Hodgkin lymphoma undergoing chemotherapy. He had a h/o HTN, AF and chronic urinary retention. Gradually increasing dyspnea developed starting in early 2023. [facility] after an immediately prior admission with CRUZ at [facility], where he was found to have SCV LAD and \par a large right pleural effusion, which was drained and was reportedly neg for infection and malignancy. A node bx showed atypical CD30+ cells suspicipus for Hodgkin. He went home on oxygen with further w/u planned, then decompensated and [facility] where he was intubated and remained on vent for a few days. Over 3L were drained from the right effusion. He was successfully extubated and was transferred to [facility] for further care on 2/24/23. On 3/3/23, Dr. Nathaniel Galvan performed bronchoscopy, right VATS, pleural bx, mediastinal node dissection and placement of PleurX catheter. Pleural nodules showed chronic inflammation, eosinophils, fibrosis, and a level 7 node showed classical Hodgkin lymphoma. Hodgkin cells were large, anaplastic cells +CD30, CD15, dim PAX5, MUM1, dim OCT2, NEFTALI and (-) for CD3, CD45, CD20, CD79a, BOB1, BCL6.\par He went to rehab on 3/15/23, prior to which an Uvpdxg-V-Rdan was placed.\par Pretherapy imaging included PET CT  3/8/23 which showed likely right cervical FDG+ LAD, FDG+ paratracheal, prevascular, perihilar, retrocrural, aortocaval LAD some > 2 cm, most 1-2 cm with SUV 3.5-9.2. Prior CT chest showed a 3.5 cm precarinal node. Right pleural effusion with heterogeneous uptake was seen. There was suspected involvement of the right hemisacrum. 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 Cytogenetics was nl. 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 EKG showed Afib with rapid ventricular rythmn.  Imaging showed increase in the effusion with loculation and increased atelectasis. He was readmitted to [facility] on 3/25/2. PleurX catheter was flushed with > 2L drainage of pleural fluid; he improved and was able to return to [facility] 4/10/23. Prior to transfer, a second dose of BV with dose reduced to 1.2 mg/kg was given.\par At Rehab, pt has been bed and wheelchair bound with stable pulm status, no dyspnea or cough at rest. He has had no fevers and has a good appetit.\par Chest x ray obtained today (4/20/23) showed stable findings.\par Pt's son says that there has been minimal to no drainage from the PleurX catheter since the last discharge from [facility]. [de-identified] : IV [de-identified] : D/C from Rehab\par received third dose BV, back at 1.8 mg/kg, on 4/24/23 w/o incident\par pulm status stable\par minimal to no drainage from PleurX catheter\par Afebrile\par \par Echocardiogram:\par \par  1. Left ventricular ejection fraction, by visual estimation, is 50 to 55%.\par  2. Normal global left ventricular systolic function.\par  3. The left ventricular diastolic function could not be assessed in this study.\par  4. Moderately enlarged left atrium.\par  5. Moderately enlarged right atrium.\par  6. Mild mitral annular calcification.\par  7. Mild mitral valve regurgitation.\par  8. Thickening of the anterior and posterior mitral valve leaflets.

## 2023-05-09 ENCOUNTER — OUTPATIENT (OUTPATIENT)
Dept: OUTPATIENT SERVICES | Facility: HOSPITAL | Age: 83
LOS: 1 days | Discharge: HOME | End: 2023-05-09

## 2023-05-09 DIAGNOSIS — C81.90 HODGKIN LYMPHOMA, UNSPECIFIED, UNSPECIFIED SITE: ICD-10-CM

## 2023-05-11 ENCOUNTER — NON-APPOINTMENT (OUTPATIENT)
Age: 83
End: 2023-05-11

## 2023-05-15 ENCOUNTER — RESULT REVIEW (OUTPATIENT)
Age: 83
End: 2023-05-15

## 2023-05-15 ENCOUNTER — APPOINTMENT (OUTPATIENT)
Dept: HEMATOLOGY ONCOLOGY | Facility: CLINIC | Age: 83
End: 2023-05-15

## 2023-05-15 ENCOUNTER — APPOINTMENT (OUTPATIENT)
Dept: INFUSION THERAPY | Facility: HOSPITAL | Age: 83
End: 2023-05-15

## 2023-05-15 LAB
BASOPHILS # BLD AUTO: 0.02 K/UL — SIGNIFICANT CHANGE UP (ref 0–0.2)
BASOPHILS NFR BLD AUTO: 0.5 % — SIGNIFICANT CHANGE UP (ref 0–2)
ELLIPTOCYTES BLD QL SMEAR: SLIGHT — SIGNIFICANT CHANGE UP
EOSINOPHIL # BLD AUTO: 0.02 K/UL — SIGNIFICANT CHANGE UP (ref 0–0.5)
EOSINOPHIL NFR BLD AUTO: 0.5 % — SIGNIFICANT CHANGE UP (ref 0–6)
HCT VFR BLD CALC: 34.7 % — LOW (ref 39–50)
HGB BLD-MCNC: 10.9 G/DL — LOW (ref 13–17)
HYPOCHROMIA BLD QL: SLIGHT — SIGNIFICANT CHANGE UP
LYMPHOCYTES # BLD AUTO: 1.9 K/UL — SIGNIFICANT CHANGE UP (ref 1–3.3)
LYMPHOCYTES # BLD AUTO: 46.5 % — HIGH (ref 13–44)
MCHC RBC-ENTMCNC: 28.9 PG — SIGNIFICANT CHANGE UP (ref 27–34)
MCHC RBC-ENTMCNC: 31.4 G/DL — LOW (ref 32–36)
MCV RBC AUTO: 92 FL — SIGNIFICANT CHANGE UP (ref 80–100)
MONOCYTES # BLD AUTO: 1.31 K/UL — HIGH (ref 0–0.9)
MONOCYTES NFR BLD AUTO: 32 % — HIGH (ref 2–14)
NEUTROPHILS # BLD AUTO: 0.84 K/UL — LOW (ref 1.8–7.4)
NEUTROPHILS NFR BLD AUTO: 20.5 % — LOW (ref 43–77)
NRBC # BLD: 0 /100 — SIGNIFICANT CHANGE UP (ref 0–0)
NRBC # BLD: SIGNIFICANT CHANGE UP /100 WBCS (ref 0–0)
PLAT MORPH BLD: NORMAL — SIGNIFICANT CHANGE UP
PLATELET # BLD AUTO: 283 K/UL — SIGNIFICANT CHANGE UP (ref 150–400)
POIKILOCYTOSIS BLD QL AUTO: SLIGHT — SIGNIFICANT CHANGE UP
RBC # BLD: 3.77 M/UL — LOW (ref 4.2–5.8)
RBC # FLD: 18.1 % — HIGH (ref 10.3–14.5)
RBC BLD AUTO: ABNORMAL
STOMATOCYTES BLD QL SMEAR: SLIGHT — SIGNIFICANT CHANGE UP
WBC # BLD: 4.09 K/UL — SIGNIFICANT CHANGE UP (ref 3.8–10.5)
WBC # FLD AUTO: 4.09 K/UL — SIGNIFICANT CHANGE UP (ref 3.8–10.5)

## 2023-05-15 RX ORDER — ALPRAZOLAM 0.25 MG/1
0.25 TABLET ORAL
Qty: 30 | Refills: 0 | Status: COMPLETED | COMMUNITY
Start: 2023-02-14 | End: 2023-05-15

## 2023-05-15 RX ORDER — ESCITALOPRAM OXALATE 10 MG/1
10 TABLET ORAL
Qty: 30 | Refills: 5 | Status: ACTIVE | COMMUNITY
Start: 2023-05-15 | End: 1900-01-01

## 2023-05-15 RX ORDER — ENOXAPARIN SODIUM 40 MG/.4ML
40 INJECTION SUBCUTANEOUS
Refills: 0 | Status: COMPLETED | COMMUNITY
Start: 2023-04-22 | End: 2023-05-15

## 2023-05-15 RX ORDER — ATENOLOL 25 MG/1
25 TABLET ORAL
Qty: 30 | Refills: 1 | Status: ACTIVE | COMMUNITY
Start: 2023-04-22

## 2023-05-15 RX ORDER — BENZONATATE 200 MG/1
200 CAPSULE ORAL
Qty: 21 | Refills: 0 | Status: COMPLETED | COMMUNITY
Start: 2023-02-01 | End: 2023-05-15

## 2023-05-15 RX ORDER — ESCITALOPRAM OXALATE 5 MG/1
5 TABLET ORAL
Qty: 30 | Refills: 0 | Status: DISCONTINUED | COMMUNITY
Start: 2023-02-14 | End: 2023-05-15

## 2023-05-15 RX ORDER — ENOXAPARIN SODIUM 60 MG/.6ML
60 INJECTION, SOLUTION SUBCUTANEOUS
Refills: 0 | Status: COMPLETED | COMMUNITY
Start: 2023-04-22 | End: 2023-05-15

## 2023-05-15 RX ORDER — TRAZODONE HYDROCHLORIDE 50 MG/1
50 TABLET ORAL
Qty: 30 | Refills: 3 | Status: ACTIVE | COMMUNITY
Start: 2023-05-15 | End: 1900-01-01

## 2023-05-15 RX ORDER — APIXABAN 5 MG/1
5 TABLET, FILM COATED ORAL
Qty: 60 | Refills: 3 | Status: ACTIVE | COMMUNITY
Start: 2023-05-15 | End: 1900-01-01

## 2023-05-18 ENCOUNTER — LABORATORY RESULT (OUTPATIENT)
Age: 83
End: 2023-05-18

## 2023-05-22 ENCOUNTER — APPOINTMENT (OUTPATIENT)
Dept: INFUSION THERAPY | Facility: HOSPITAL | Age: 83
End: 2023-05-22

## 2023-05-22 ENCOUNTER — RESULT REVIEW (OUTPATIENT)
Age: 83
End: 2023-05-22

## 2023-05-22 PROCEDURE — 97116 GAIT TRAINING THERAPY: CPT

## 2023-05-22 PROCEDURE — 97112 NEUROMUSCULAR REEDUCATION: CPT

## 2023-05-22 PROCEDURE — 87635 SARS-COV-2 COVID-19 AMP PRB: CPT

## 2023-05-22 PROCEDURE — 87040 BLOOD CULTURE FOR BACTERIA: CPT

## 2023-05-22 PROCEDURE — 85379 FIBRIN DEGRADATION QUANT: CPT

## 2023-05-22 PROCEDURE — 84145 PROCALCITONIN (PCT): CPT

## 2023-05-22 PROCEDURE — 83735 ASSAY OF MAGNESIUM: CPT

## 2023-05-22 PROCEDURE — 85025 COMPLETE CBC W/AUTO DIFF WBC: CPT

## 2023-05-22 PROCEDURE — 92523 SPEECH SOUND LANG COMPREHEN: CPT

## 2023-05-22 PROCEDURE — 80048 BASIC METABOLIC PNL TOTAL CA: CPT

## 2023-05-22 PROCEDURE — 71250 CT THORAX DX C-: CPT

## 2023-05-22 PROCEDURE — 83880 ASSAY OF NATRIURETIC PEPTIDE: CPT

## 2023-05-22 PROCEDURE — 82553 CREATINE MB FRACTION: CPT

## 2023-05-22 PROCEDURE — 80053 COMPREHEN METABOLIC PANEL: CPT

## 2023-05-22 PROCEDURE — 93005 ELECTROCARDIOGRAM TRACING: CPT

## 2023-05-22 PROCEDURE — 93970 EXTREMITY STUDY: CPT

## 2023-05-22 PROCEDURE — 71045 X-RAY EXAM CHEST 1 VIEW: CPT

## 2023-05-22 PROCEDURE — 97530 THERAPEUTIC ACTIVITIES: CPT

## 2023-05-22 PROCEDURE — 97110 THERAPEUTIC EXERCISES: CPT

## 2023-05-22 PROCEDURE — 82550 ASSAY OF CK (CPK): CPT

## 2023-05-22 PROCEDURE — 92610 EVALUATE SWALLOWING FUNCTION: CPT

## 2023-05-22 PROCEDURE — 97535 SELF CARE MNGMENT TRAINING: CPT

## 2023-05-22 PROCEDURE — 36415 COLL VENOUS BLD VENIPUNCTURE: CPT

## 2023-05-22 PROCEDURE — 94640 AIRWAY INHALATION TREATMENT: CPT

## 2023-05-22 PROCEDURE — 97167 OT EVAL HIGH COMPLEX 60 MIN: CPT

## 2023-05-22 PROCEDURE — 97163 PT EVAL HIGH COMPLEX 45 MIN: CPT

## 2023-05-22 PROCEDURE — 83605 ASSAY OF LACTIC ACID: CPT

## 2023-05-22 PROCEDURE — 84484 ASSAY OF TROPONIN QUANT: CPT

## 2023-05-23 ENCOUNTER — NON-APPOINTMENT (OUTPATIENT)
Age: 83
End: 2023-05-23

## 2023-05-23 DIAGNOSIS — R11.2 NAUSEA WITH VOMITING, UNSPECIFIED: ICD-10-CM

## 2023-05-23 DIAGNOSIS — C81.70 OTHER HODGKIN LYMPHOMA, UNSPECIFIED SITE: ICD-10-CM

## 2023-05-23 DIAGNOSIS — Z51.11 ENCOUNTER FOR ANTINEOPLASTIC CHEMOTHERAPY: ICD-10-CM

## 2023-05-23 DIAGNOSIS — E86.0 DEHYDRATION: ICD-10-CM

## 2023-05-23 LAB
ALBUMIN SERPL ELPH-MCNC: 3.3 G/DL — SIGNIFICANT CHANGE UP (ref 3.3–5)
ALP SERPL-CCNC: 71 U/L — SIGNIFICANT CHANGE UP (ref 40–120)
ALT FLD-CCNC: 10 U/L — SIGNIFICANT CHANGE UP (ref 10–45)
ANION GAP SERPL CALC-SCNC: 14 MMOL/L — SIGNIFICANT CHANGE UP (ref 5–17)
AST SERPL-CCNC: 13 U/L — SIGNIFICANT CHANGE UP (ref 10–40)
BILIRUB SERPL-MCNC: 0.3 MG/DL — SIGNIFICANT CHANGE UP (ref 0.2–1.2)
BUN SERPL-MCNC: 14 MG/DL — SIGNIFICANT CHANGE UP (ref 7–23)
CALCIUM SERPL-MCNC: 9 MG/DL — SIGNIFICANT CHANGE UP (ref 8.4–10.5)
CHLORIDE SERPL-SCNC: 95 MMOL/L — LOW (ref 96–108)
CO2 SERPL-SCNC: 27 MMOL/L — SIGNIFICANT CHANGE UP (ref 22–31)
CREAT SERPL-MCNC: 0.54 MG/DL — SIGNIFICANT CHANGE UP (ref 0.5–1.3)
EGFR: 99 ML/MIN/1.73M2 — SIGNIFICANT CHANGE UP
GLUCOSE SERPL-MCNC: 123 MG/DL — HIGH (ref 70–99)
LDH SERPL L TO P-CCNC: 193 U/L — SIGNIFICANT CHANGE UP (ref 50–242)
POTASSIUM SERPL-MCNC: 4.1 MMOL/L — SIGNIFICANT CHANGE UP (ref 3.5–5.3)
POTASSIUM SERPL-SCNC: 4.1 MMOL/L — SIGNIFICANT CHANGE UP (ref 3.5–5.3)
PROT SERPL-MCNC: 6.9 G/DL — SIGNIFICANT CHANGE UP (ref 6–8.3)
SODIUM SERPL-SCNC: 136 MMOL/L — SIGNIFICANT CHANGE UP (ref 135–145)
T4 FREE+ TSH PNL SERPL: 2.65 UIU/ML — SIGNIFICANT CHANGE UP (ref 0.27–4.2)
URATE SERPL-MCNC: 4.8 MG/DL — SIGNIFICANT CHANGE UP (ref 3.4–8.8)

## 2023-05-30 ENCOUNTER — APPOINTMENT (OUTPATIENT)
Dept: HEMATOLOGY ONCOLOGY | Facility: CLINIC | Age: 83
End: 2023-05-30

## 2023-05-30 ENCOUNTER — APPOINTMENT (OUTPATIENT)
Dept: HEMATOLOGY ONCOLOGY | Facility: CLINIC | Age: 83
End: 2023-05-30
Payer: MEDICARE

## 2023-05-30 ENCOUNTER — RESULT REVIEW (OUTPATIENT)
Age: 83
End: 2023-05-30

## 2023-05-30 VITALS
TEMPERATURE: 97.9 F | OXYGEN SATURATION: 99 % | WEIGHT: 177.03 LBS | BODY MASS INDEX: 25.4 KG/M2 | DIASTOLIC BLOOD PRESSURE: 74 MMHG | SYSTOLIC BLOOD PRESSURE: 114 MMHG | RESPIRATION RATE: 20 BRPM | HEART RATE: 70 BPM

## 2023-05-30 LAB
BASOPHILS # BLD AUTO: 0.03 K/UL — SIGNIFICANT CHANGE UP (ref 0–0.2)
BASOPHILS NFR BLD AUTO: 0.2 % — SIGNIFICANT CHANGE UP (ref 0–2)
EOSINOPHIL # BLD AUTO: 0.81 K/UL — HIGH (ref 0–0.5)
EOSINOPHIL NFR BLD AUTO: 5.1 % — SIGNIFICANT CHANGE UP (ref 0–6)
HCT VFR BLD CALC: 36.8 % — LOW (ref 39–50)
HGB BLD-MCNC: 11.8 G/DL — LOW (ref 13–17)
IMM GRANULOCYTES NFR BLD AUTO: 2.3 % — HIGH (ref 0–0.9)
LYMPHOCYTES # BLD AUTO: 1.57 K/UL — SIGNIFICANT CHANGE UP (ref 1–3.3)
LYMPHOCYTES # BLD AUTO: 9.8 % — LOW (ref 13–44)
MCHC RBC-ENTMCNC: 28.5 PG — SIGNIFICANT CHANGE UP (ref 27–34)
MCHC RBC-ENTMCNC: 32.1 G/DL — SIGNIFICANT CHANGE UP (ref 32–36)
MCV RBC AUTO: 88.9 FL — SIGNIFICANT CHANGE UP (ref 80–100)
MONOCYTES # BLD AUTO: 1.23 K/UL — HIGH (ref 0–0.9)
MONOCYTES NFR BLD AUTO: 7.7 % — SIGNIFICANT CHANGE UP (ref 2–14)
NEUTROPHILS # BLD AUTO: 11.98 K/UL — HIGH (ref 1.8–7.4)
NEUTROPHILS NFR BLD AUTO: 74.9 % — SIGNIFICANT CHANGE UP (ref 43–77)
NRBC # BLD: 0 /100 WBCS — SIGNIFICANT CHANGE UP (ref 0–0)
PLATELET # BLD AUTO: 140 K/UL — LOW (ref 150–400)
RBC # BLD: 4.14 M/UL — LOW (ref 4.2–5.8)
RBC # FLD: 18 % — HIGH (ref 10.3–14.5)
WBC # BLD: 15.99 K/UL — HIGH (ref 3.8–10.5)
WBC # FLD AUTO: 15.99 K/UL — HIGH (ref 3.8–10.5)

## 2023-05-30 PROCEDURE — 99214 OFFICE O/P EST MOD 30 MIN: CPT

## 2023-05-31 ENCOUNTER — APPOINTMENT (OUTPATIENT)
Dept: THORACIC SURGERY | Facility: CLINIC | Age: 83
End: 2023-05-31
Payer: MEDICARE

## 2023-05-31 ENCOUNTER — APPOINTMENT (OUTPATIENT)
Dept: PULMONOLOGY | Facility: CLINIC | Age: 83
End: 2023-05-31
Payer: MEDICARE

## 2023-05-31 VITALS
HEART RATE: 71 BPM | SYSTOLIC BLOOD PRESSURE: 110 MMHG | WEIGHT: 177 LBS | BODY MASS INDEX: 25.34 KG/M2 | DIASTOLIC BLOOD PRESSURE: 73 MMHG | HEIGHT: 70 IN | OXYGEN SATURATION: 95 %

## 2023-05-31 PROCEDURE — 71046 X-RAY EXAM CHEST 2 VIEWS: CPT

## 2023-05-31 PROCEDURE — 99215 OFFICE O/P EST HI 40 MIN: CPT

## 2023-06-01 NOTE — ASSESSMENT
[FreeTextEntry1] : Mr. 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  He was intubated in the field and brought to [facility] ED and admitted to ICU for acute respiratory failure with hypercapnia. CTA chest was negative for PE but significant for a large R pleural effusion with mediastinal and hilar lymphadenopathy, and 3L of fluid was drained.  He completed a 5 day course of empiric antibiotics. Pleural fluid cultures and blood cultures resulted negative.  Suspicious of underlying malignancy secondary to lymphadenopathy and persistent leukocytosis, Heme/Onc was consulted and recommended transfer to [facility] on 2/24/23 for further workup. On 2/28 he underwent an ultrasound guided biopsy of his R supraclavicular mass/lymph node, which later resulted as classic Hodgkin's lymphoma.  \par \par On 3/3, he underwent a FB, Right VATS, MLND, placement of Pleurx catheter on 03/03/2023 Postoperatively, he was hypotensive and started on Midodrine. A Left chest wall mediport was placed for ongoing chemotherapy treatment on 3/15/2023 by Dr. Johnson. He was admitted  to [facility] on 3/24.\par \par \par I have reviewed the patient's medical records and diagnostic images at time of this office consultation and have made the following recommendation:\par 1. Rt PleurX has no drainage, CXR showed no Rt pleural effusion, I recommended a Removal of Rt PleurX catheter at [facility]. \par 2. HOLD Eliquis x 3 days prior to procedure\par  \par \par I, KHADIJAH Treviño, personally performed the evaluation and management (E/M) services for this established patient who presents today with (a) new problem(s)/exacerbation of (an) existing condition(s). That E/M includes conducting the examination, assessing all new/exacerbated conditions, and establishing a new plan of care. Today, my ACP, Saman Rodney NP was here to observe my evaluation and management services for this new problem/exacerbated condition to be followed going forward.\par \par

## 2023-06-01 NOTE — HISTORY OF PRESENT ILLNESS
[FreeTextEntry1] : Mr. 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  He was intubated in the field and brought to [facility] ED and admitted to ICU for acute respiratory failure with hypercapnia. CTA chest was negative for PE but significant for a large R pleural effusion with mediastinal and hilar lymphadenopathy, and 3L of fluid was drained.  He completed a 5 day course of empiric antibiotics. Pleural fluid cultures and blood cultures resulted negative.  Suspicious of underlying malignancy secondary to lymphadenopathy and persistent leukocytosis, Heme/Onc was consulted and recommended transfer to [facility] on 2/24/23 for further workup. On 2/28 he underwent an ultrasound guided biopsy of his R supraclavicular mass/lymph node, which later resulted as classic Hodgkin's lymphoma.  \par \par On 3/3, he underwent a FB, Right VATS, MLND, placement of Pleurx catheter on 03/03/2023 Postoperatively, he was hypotensive and started on Midodrine. A Left chest wall mediport was placed for ongoing chemotherapy treatment on 3/15/2023 by Dr. Johnson. He was admitted  to [facility] on 3/24.\par \par While at rehab, he developed chest discomfort., low systolic BP and mild tachycardia. EKG showed Afib with rapid ventricular rhythms. On exam, he had increased respiratory rate. CXR and  CT chest showed increased Rt sided effusion with loculation and increasing atelectasis. He was transferred to [facility]. In the ED, CXR showed recurrent loculated R pleural effusion. CT chest with small loculated R pleural effusion with new peripheral demarcated high attenuation in the R pleural space. He  was seen by CT surgery in the ED, per paper chart, "drained about 100cc of fluid, placed on Pleura vac . PleurX was then flushed and drained about 2180cc fluid need to hold AC for 48 hours for possible TPA into PleurX cath.\par MIST held per CT surgery.  No plan for VATS. CXR  on 3/27, Decreasing pulmonary congestion, No pneumothorax. On 4/6 CT Chest -- Compared to 3/29/2023, similar loculated, exudative right pleural effusion with slightly decreased pleural air component and Pleurx catheter in place. Question malignant effusion in the setting of reported lymphoma, irregular septal thickening, nodularity and mediastinal pleural involvement. \par \par Patient was medically optimized for discharge to [facility] IRU on 4/10/23.\par \par Rehab course significant for:\par 4/11: Appreciate pulm recs. Maintain 02>90% \par 4/12: Plan for PleurX drain- M/W/F. Consults- Urology, Hematology, Psychiatry. BP params to atenolol. Repeat CXR 4/14 and then weekly on Mon/Thurs. \par 4/13:PSYCH: Daughter wants to keep pt on Lexapro 10 mg and Trazodone 25mg.  QTc of 426. Trazodone to 50 mg for sleep. \par 4/14: Respiratory overnight for 02 down to 88%, s/p duoneb with improvement. Symbicort and Spiriva per pulm. \par 4/17: Chlorhexidine. F/u CXR. Pleurex to be drained on Mon & Thurs\par 4/18: Per Dr Galvan (CT SX) okay to resume Eliquis\par \par All other medical co-morbidities were stable. Patient tolerated course of inpatient PT/OT/SLP rehab with significant improvements and met rehab goals prior to discharge. Patient was medically cleared on 4/23/23 for discharge to home with home care.\par \par Ongoing treatment with Hem/Onc Dr. Karl Hays for lymphoma.\par \par Patient is currently drained once a week: last drained few drops on 5/24.\par \par Patient is here today for a follow up. Denies SOB, CP, cough. Drained Rt PleurX today in office with no drainages.

## 2023-06-01 NOTE — PHYSICAL EXAM
[] : no respiratory distress [Auscultation Breath Sounds / Voice Sounds] : lungs were clear to auscultation bilaterally [Heart Rate And Rhythm] : heart rate was normal and rhythm regular [Heart Sounds] : normal S1 and S2 [Heart Sounds Gallop] : no gallops [Murmurs] : no murmurs [Heart Sounds Pericardial Friction Rub] : no pericardial rub [Examination Of The Chest] : the chest was normal in appearance [Chest Visual Inspection Thoracic Asymmetry] : no chest asymmetry [Diminished Respiratory Excursion] : normal chest expansion [FreeTextEntry1] : wheelchair for long distance, uses a walker for short distance

## 2023-06-02 RX ORDER — METOCLOPRAMIDE 10 MG/1
10 TABLET ORAL
Qty: 60 | Refills: 3 | Status: ACTIVE | COMMUNITY
Start: 2023-06-02 | End: 1900-01-01

## 2023-06-06 ENCOUNTER — EMERGENCY (EMERGENCY)
Facility: HOSPITAL | Age: 83
LOS: 1 days | Discharge: HOME | End: 2023-06-06
Attending: EMERGENCY MEDICINE | Admitting: EMERGENCY MEDICINE
Payer: MEDICARE

## 2023-06-06 VITALS
RESPIRATION RATE: 18 BRPM | WEIGHT: 179.02 LBS | HEIGHT: 70 IN | OXYGEN SATURATION: 90 % | TEMPERATURE: 98 F | SYSTOLIC BLOOD PRESSURE: 130 MMHG | DIASTOLIC BLOOD PRESSURE: 80 MMHG | HEART RATE: 92 BPM

## 2023-06-06 VITALS
TEMPERATURE: 98 F | SYSTOLIC BLOOD PRESSURE: 126 MMHG | DIASTOLIC BLOOD PRESSURE: 81 MMHG | OXYGEN SATURATION: 96 % | HEART RATE: 88 BPM | RESPIRATION RATE: 17 BRPM

## 2023-06-06 PROCEDURE — 99282 EMERGENCY DEPT VISIT SF MDM: CPT

## 2023-06-06 PROCEDURE — 99283 EMERGENCY DEPT VISIT LOW MDM: CPT

## 2023-06-06 NOTE — ED PROVIDER NOTE - PATIENT PORTAL LINK FT
You can access the FollowMyHealth Patient Portal offered by [facility] by registering at the following website: [email or web address]. By joining [employer]’s FollowMyHealth portal, you will also be able to view your health information using other applications (apps) compatible with our system.

## 2023-06-06 NOTE — ED PROVIDER NOTE - CARE PROVIDER_API CALL
Aamir Hamm  Urology  [address] [place] [address], [address]  [place], [place] [id number]  Phone: [phone]  Fax: [phone]  Follow Up Time: Routine

## 2023-06-06 NOTE — ED ADULT TRIAGE NOTE - CHIEF COMPLAINT QUOTE
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 Alerrt and oriented x 4.

## 2023-06-06 NOTE — ED PROVIDER NOTE - CLINICAL SUMMARY MEDICAL DECISION MAKING FREE TEXT BOX
82-year-old male with history of A-fib, hypertension, chronic urinary retention, chronic Choi, Hodgkin's lymphoma with right pleural effusion and Pleurx placement, complaining of no drainage from Choi since about 9 PM.  Associated with urge to urinate and lower abdominal pain/discomfort since about 11 PM.  No nausea vomiting.  No fever chills no hematuria 82-year-old male with history of A-fib, hypertension, chronic urinary retention, chronic Choi, Hodgkin's lymphoma with right pleural effusion and Pleurx placement, complaining of no drainage from Choi since about 9 PM.  Associated with urge to urinate and lower abdominal pain/discomfort since about 11 PM.  No nausea vomiting.  No fever chills no hematuria    Patient appears uncomfortable.  Bladder moderately distended on exam with minimal urine output and Choi.  Most likely obstructed Choi.  No symptoms of UTI or renal failure.  Will change out Choi reassess 82-year-old male with history of A-fib, hypertension, chronic urinary retention, chronic Choi, Hodgkin's lymphoma with right pleural effusion and Pleurx placement, complaining of no drainage from Choi since about 9 PM.  Associated with urge to urinate and lower abdominal pain/discomfort since about 11 PM.  No nausea vomiting.  No fever chills no hematuria    Patient appears uncomfortable.  Bladder moderately distended on exam with minimal urine output and Choi.  Most likely obstructed Choi.  No symptoms of UTI or renal failure.  Will change out Choi reassess    Choi replaced with new one.  1500 cc clear yellow urine initial output.  Patient feeling much better.  Vital signs stable.  Follow-up with urology

## 2023-06-06 NOTE — ED PROVIDER NOTE - OBJECTIVE STATEMENT
82-year-old male with history of A-fib, hypertension, chronic urinary retention, chronic Choi, Hodgkin's lymphoma with right pleural effusion and Pleurx placement, complaining of no drainage from Choi since about 9 PM.  Associated with urge to urinate and lower abdominal pain/discomfort since about 11 PM.  No nausea vomiting.  No fever chills no hematuria

## 2023-06-06 NOTE — ED PROVIDER NOTE - PHYSICAL EXAMINATION
exam:   General: uncomfortable appearing  HEENT: eyes perrl,   cor: RRR, s1s2, 2+rad pulses.   lungs: ctabl, no resp distress.   abd: soft, ntnd.   : moderately distended bladder. no cvat  neuro: a&ox3, cn2-12 intact, BAUTISTA, 5/5 strength c nl sensation all extremities, nl coordination.   MSK: no extremity swelling.  Skin: R chest pleurex catheter. site normal

## 2023-06-06 NOTE — ED ADULT NURSE NOTE - NSFALLRISKINTERV_ED_ALL_ED

## 2023-06-06 NOTE — ED ADULT NURSE NOTE - BOWEL SOUNDS LUQ
Positive Nitrites on UA - awaiting cultures for urine  Start Keflex 500 mg po BID x 7 days  We will let her know if we need to change the antibiotics once the cultures are finalized  present

## 2023-06-06 NOTE — ED ADULT NURSE NOTE - NSSEPSISNEWALTERMENTAL_ED_A_ED

## 2023-06-06 NOTE — ED ADULT NURSE NOTE - OBJECTIVE STATEMENT
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 Alerrt and oriented x 4. No signs or symptoms of nausea, vomiting, dizziness or SOB.

## 2023-06-06 NOTE — ED PROVIDER NOTE - NSFOLLOWUPINSTRUCTIONS_ED_ALL_ED_FT
Follow-up with your urologist as needed    return for worse pain, no urine output, fever or other concerns      Choi Catheter Placement and Care    WHAT YOU NEED TO KNOW:    A Chio catheter is a sterile tube that is inserted into your bladder to drain urine. It is also called an indwelling urinary catheter.     DISCHARGE INSTRUCTIONS:    Return to the emergency department if:   •Your catheter comes out.       •You suddenly have material that looks like sand in the tubing or drainage bag.      •No urine is draining into the bag and you have checked the system.      •You have pain in your hip, back, pelvis, or lower abdomen.      •You are confused or cannot think clearly.      Call your doctor or urologist if:   •You have a fever.      •You have bladder spasms for more than 1 day after the catheter is placed.      •You see blood in the tubing or drainage bag.      •You have a rash or itching where the catheter tube is secured to your skin.      •Urine leaks from or around the catheter, tubing, or drainage bag.      •The closed drainage system has accidently come open or apart.       •You see a layer of crystals inside the tubing.      •You have questions or concerns about your condition or care.      Care for your catheter and drainage bag: You can reduce your risk for infection and injury by caring for your catheter and drainage bag properly.  •Wash your hands often. Wash before and after you touch your catheter, tubing, or drainage bag. Use soap and water. Wear clean disposable gloves when you care for your catheter or disconnect the drainage bag. Wash your hands before you prepare or eat food.   Handwashing           •Clean your genital area 2 times every day. Clean your catheter area and anal opening after every bowel movement. ?For men: Use a soapy cloth to clean the tip of your penis. Start where the catheter enters. Wipe backward making sure to pull back the foreskin. Then use a cloth with clear water in the same direction to clean away the soap.       ?For women: Use a soapy cloth to clean the area that the catheter enters your body. Make sure to separate your labia and wipe toward the anus. Then use a cloth with clear water and wipe in the same direction.       •Secure the catheter tube so you do not pull or move the catheter. This helps prevent pain and bladder spasms. Healthcare providers will show you how to use medical tape or a strap to secure the catheter tube to your body.       •Keep a closed drainage system. Your catheter should always be attached to the drainage bag to form a closed system. Do not disconnect any part of the closed system unless you need to change the bag.      •Keep the drainage bag below the level of your waist. This helps stop urine from moving back up the tubing and into your bladder. Do not loop or kink the tubing. This can cause urine to back up and collect in your bladder. Do not let the drainage bag touch or lie on the floor.      •Empty the drainage bag when needed. The weight of a full drainage bag can be painful. Empty the drainage bag every 3 to 6 hours or when it is ? full.       •Clean and change the drainage bag as directed. Ask your healthcare provider how often you should change the drainage bag and what cleaning solution to use. Wear disposable gloves when you change the bag. Do not allow the end of the catheter or tubing to touch anything. Clean the ends with an alcohol pad before you reconnect them.      What to do if problems develop:   •No urine is draining into the bag: ?Check for kinks in the tubing and straighten them out.       ?Check the tape or strap used to secure the catheter tube to your skin. Make sure it is not blocking the tube.       ?Make sure you are not sitting or lying on the tubing.      ?Make sure the urine bag is hanging below the level of your waist.      •Urine leaks from or around the catheter, tubing, or drainage bag: Check if the closed drainage system has accidently come open or apart. Clean the catheter and tubing ends with a new alcohol pad and reconnect them.       Follow up with your doctor or urologist as directed: Write down your questions so you remember to ask them during your visits.

## 2023-06-08 ENCOUNTER — RESULT REVIEW (OUTPATIENT)
Age: 83
End: 2023-06-08

## 2023-06-08 ENCOUNTER — APPOINTMENT (OUTPATIENT)
Dept: RADIOLOGY | Facility: IMAGING CENTER | Age: 83
End: 2023-06-08
Payer: MEDICARE

## 2023-06-08 ENCOUNTER — APPOINTMENT (OUTPATIENT)
Dept: HEMATOLOGY ONCOLOGY | Facility: CLINIC | Age: 83
End: 2023-06-08
Payer: MEDICARE

## 2023-06-08 ENCOUNTER — OUTPATIENT (OUTPATIENT)
Dept: OUTPATIENT SERVICES | Facility: HOSPITAL | Age: 83
LOS: 1 days | End: 2023-06-08
Payer: MEDICARE

## 2023-06-08 VITALS
RESPIRATION RATE: 17 BRPM | SYSTOLIC BLOOD PRESSURE: 102 MMHG | HEART RATE: 65 BPM | DIASTOLIC BLOOD PRESSURE: 60 MMHG | OXYGEN SATURATION: 99 % | WEIGHT: 176.99 LBS

## 2023-06-08 DIAGNOSIS — C81.70 OTHER HODGKIN LYMPHOMA, UNSPECIFIED SITE: ICD-10-CM

## 2023-06-08 LAB
BASOPHILS # BLD AUTO: 0.04 K/UL — SIGNIFICANT CHANGE UP (ref 0–0.2)
BASOPHILS NFR BLD AUTO: 0.6 % — SIGNIFICANT CHANGE UP (ref 0–2)
EOSINOPHIL # BLD AUTO: 0.08 K/UL — SIGNIFICANT CHANGE UP (ref 0–0.5)
EOSINOPHIL NFR BLD AUTO: 1.1 % — SIGNIFICANT CHANGE UP (ref 0–6)
ERYTHROCYTE [SEDIMENTATION RATE] IN BLOOD: 63 MM/HR — HIGH (ref 0–20)
HCT VFR BLD CALC: 37 % — LOW (ref 39–50)
HGB BLD-MCNC: 11.6 G/DL — LOW (ref 13–17)
IMM GRANULOCYTES NFR BLD AUTO: 0.7 % — SIGNIFICANT CHANGE UP (ref 0–0.9)
LYMPHOCYTES # BLD AUTO: 1.7 K/UL — SIGNIFICANT CHANGE UP (ref 1–3.3)
LYMPHOCYTES # BLD AUTO: 23.5 % — SIGNIFICANT CHANGE UP (ref 13–44)
MCHC RBC-ENTMCNC: 28.4 PG — SIGNIFICANT CHANGE UP (ref 27–34)
MCHC RBC-ENTMCNC: 31.4 G/DL — LOW (ref 32–36)
MCV RBC AUTO: 90.5 FL — SIGNIFICANT CHANGE UP (ref 80–100)
MONOCYTES # BLD AUTO: 1.65 K/UL — HIGH (ref 0–0.9)
MONOCYTES NFR BLD AUTO: 22.9 % — HIGH (ref 2–14)
NEUTROPHILS # BLD AUTO: 3.7 K/UL — SIGNIFICANT CHANGE UP (ref 1.8–7.4)
NEUTROPHILS NFR BLD AUTO: 51.2 % — SIGNIFICANT CHANGE UP (ref 43–77)
NRBC # BLD: 0 /100 WBCS — SIGNIFICANT CHANGE UP (ref 0–0)
PLATELET # BLD AUTO: 248 K/UL — SIGNIFICANT CHANGE UP (ref 150–400)
RBC # BLD: 4.09 M/UL — LOW (ref 4.2–5.8)
RBC # FLD: 18.1 % — HIGH (ref 10.3–14.5)
WBC # BLD: 7.22 K/UL — SIGNIFICANT CHANGE UP (ref 3.8–10.5)
WBC # FLD AUTO: 7.22 K/UL — SIGNIFICANT CHANGE UP (ref 3.8–10.5)

## 2023-06-08 PROCEDURE — 99213 OFFICE O/P EST LOW 20 MIN: CPT

## 2023-06-08 PROCEDURE — 71046 X-RAY EXAM CHEST 2 VIEWS: CPT

## 2023-06-08 PROCEDURE — 71046 X-RAY EXAM CHEST 2 VIEWS: CPT | Mod: 26

## 2023-06-08 RX ORDER — ASPIRIN 81 MG/1
81 TABLET, CHEWABLE ORAL DAILY
Refills: 0 | Status: DISCONTINUED | COMMUNITY
Start: 2023-04-22 | End: 2023-06-08

## 2023-06-10 NOTE — CHART NOTE - NSCHARTNOTEFT_GEN_A_CORE
SW called pt to discuss and assist with follow  up care.  Pt is an 81 y/o male presented to ED for Urinary Cath complication.  Daughter reports that pt is seeing other doctor, declined SW assistance with appt, encouraged pt to call SW if further assistance is needed.

## 2023-06-12 ENCOUNTER — RESULT REVIEW (OUTPATIENT)
Age: 83
End: 2023-06-12

## 2023-06-12 ENCOUNTER — APPOINTMENT (OUTPATIENT)
Dept: HEMATOLOGY ONCOLOGY | Facility: CLINIC | Age: 83
End: 2023-06-12

## 2023-06-12 ENCOUNTER — APPOINTMENT (OUTPATIENT)
Dept: INFUSION THERAPY | Facility: HOSPITAL | Age: 83
End: 2023-06-12

## 2023-06-12 LAB
ALBUMIN SERPL ELPH-MCNC: 3.5 G/DL — SIGNIFICANT CHANGE UP (ref 3.3–5)
ALP SERPL-CCNC: 85 U/L — SIGNIFICANT CHANGE UP (ref 40–120)
ALT FLD-CCNC: 12 U/L — SIGNIFICANT CHANGE UP (ref 10–45)
ANION GAP SERPL CALC-SCNC: 10 MMOL/L — SIGNIFICANT CHANGE UP (ref 5–17)
AST SERPL-CCNC: 15 U/L — SIGNIFICANT CHANGE UP (ref 10–40)
BASOPHILS # BLD AUTO: 0.05 K/UL — SIGNIFICANT CHANGE UP (ref 0–0.2)
BASOPHILS NFR BLD AUTO: 0.5 % — SIGNIFICANT CHANGE UP (ref 0–2)
BILIRUB SERPL-MCNC: 0.4 MG/DL — SIGNIFICANT CHANGE UP (ref 0.2–1.2)
BUN SERPL-MCNC: 11 MG/DL — SIGNIFICANT CHANGE UP (ref 7–23)
CALCIUM SERPL-MCNC: 9.2 MG/DL — SIGNIFICANT CHANGE UP (ref 8.4–10.5)
CHLORIDE SERPL-SCNC: 92 MMOL/L — LOW (ref 96–108)
CO2 SERPL-SCNC: 30 MMOL/L — SIGNIFICANT CHANGE UP (ref 22–31)
CREAT SERPL-MCNC: 0.51 MG/DL — SIGNIFICANT CHANGE UP (ref 0.5–1.3)
EGFR: 101 ML/MIN/1.73M2 — SIGNIFICANT CHANGE UP
EOSINOPHIL # BLD AUTO: 0.01 K/UL — SIGNIFICANT CHANGE UP (ref 0–0.5)
EOSINOPHIL NFR BLD AUTO: 0.1 % — SIGNIFICANT CHANGE UP (ref 0–6)
GLUCOSE SERPL-MCNC: 101 MG/DL — HIGH (ref 70–99)
HCT VFR BLD CALC: 31.2 % — LOW (ref 39–50)
HGB BLD-MCNC: 9.6 G/DL — LOW (ref 13–17)
IMM GRANULOCYTES NFR BLD AUTO: 0.8 % — SIGNIFICANT CHANGE UP (ref 0–0.9)
LDH SERPL L TO P-CCNC: 323 U/L — HIGH (ref 50–242)
LYMPHOCYTES # BLD AUTO: 1.61 K/UL — SIGNIFICANT CHANGE UP (ref 1–3.3)
LYMPHOCYTES # BLD AUTO: 16.5 % — SIGNIFICANT CHANGE UP (ref 13–44)
MCHC RBC-ENTMCNC: 27.8 PG — SIGNIFICANT CHANGE UP (ref 27–34)
MCHC RBC-ENTMCNC: 30.8 G/DL — LOW (ref 32–36)
MCV RBC AUTO: 90.4 FL — SIGNIFICANT CHANGE UP (ref 80–100)
MONOCYTES # BLD AUTO: 2.59 K/UL — HIGH (ref 0–0.9)
MONOCYTES NFR BLD AUTO: 26.5 % — HIGH (ref 2–14)
NEUTROPHILS # BLD AUTO: 5.44 K/UL — SIGNIFICANT CHANGE UP (ref 1.8–7.4)
NEUTROPHILS NFR BLD AUTO: 55.6 % — SIGNIFICANT CHANGE UP (ref 43–77)
NRBC # BLD: 0 /100 WBCS — SIGNIFICANT CHANGE UP (ref 0–0)
PLATELET # BLD AUTO: 198 K/UL — SIGNIFICANT CHANGE UP (ref 150–400)
POTASSIUM SERPL-MCNC: 4.3 MMOL/L — SIGNIFICANT CHANGE UP (ref 3.5–5.3)
POTASSIUM SERPL-SCNC: 4.3 MMOL/L — SIGNIFICANT CHANGE UP (ref 3.5–5.3)
PROT SERPL-MCNC: 6.8 G/DL — SIGNIFICANT CHANGE UP (ref 6–8.3)
RBC # BLD: 3.45 M/UL — LOW (ref 4.2–5.8)
RBC # FLD: 18.1 % — HIGH (ref 10.3–14.5)
SODIUM SERPL-SCNC: 132 MMOL/L — LOW (ref 135–145)
WBC # BLD: 9.78 K/UL — SIGNIFICANT CHANGE UP (ref 3.8–10.5)
WBC # FLD AUTO: 9.78 K/UL — SIGNIFICANT CHANGE UP (ref 3.8–10.5)

## 2023-06-13 ENCOUNTER — NON-APPOINTMENT (OUTPATIENT)
Age: 83
End: 2023-06-13

## 2023-06-13 DIAGNOSIS — Z51.89 ENCOUNTER FOR OTHER SPECIFIED AFTERCARE: ICD-10-CM

## 2023-06-20 ENCOUNTER — NON-APPOINTMENT (OUTPATIENT)
Age: 83
End: 2023-06-20

## 2023-06-20 RX ORDER — LACTULOSE 10 G/15ML
10 SOLUTION ORAL
Qty: 240 | Refills: 2 | Status: ACTIVE | COMMUNITY
Start: 2023-06-20 | End: 1900-01-01

## 2023-06-21 ENCOUNTER — EMERGENCY (EMERGENCY)
Facility: HOSPITAL | Age: 83
LOS: 1 days | Discharge: HOME | End: 2023-06-21
Attending: EMERGENCY MEDICINE | Admitting: EMERGENCY MEDICINE
Payer: MEDICARE

## 2023-06-21 VITALS
RESPIRATION RATE: 16 BRPM | SYSTOLIC BLOOD PRESSURE: 115 MMHG | DIASTOLIC BLOOD PRESSURE: 87 MMHG | OXYGEN SATURATION: 96 % | HEART RATE: 85 BPM

## 2023-06-21 VITALS
HEIGHT: 70 IN | RESPIRATION RATE: 18 BRPM | HEART RATE: 87 BPM | SYSTOLIC BLOOD PRESSURE: 89 MMHG | DIASTOLIC BLOOD PRESSURE: 41 MMHG | TEMPERATURE: 98 F | OXYGEN SATURATION: 97 % | WEIGHT: 169.98 LBS

## 2023-06-21 LAB
ALBUMIN SERPL ELPH-MCNC: 2.9 G/DL — LOW (ref 3.3–5)
ALP SERPL-CCNC: 99 U/L — SIGNIFICANT CHANGE UP (ref 40–120)
ALT FLD-CCNC: 19 U/L — SIGNIFICANT CHANGE UP (ref 10–45)
ANION GAP SERPL CALC-SCNC: 2 MMOL/L — LOW (ref 5–17)
ANION GAP SERPL CALC-SCNC: 4 MMOL/L — LOW (ref 5–17)
APPEARANCE UR: ABNORMAL
APTT BLD: 31.4 SEC — SIGNIFICANT CHANGE UP (ref 27.5–35.5)
AST SERPL-CCNC: 18 U/L — SIGNIFICANT CHANGE UP (ref 10–40)
BACTERIA # UR AUTO: ABNORMAL /HPF
BASOPHILS # BLD AUTO: 0 K/UL — SIGNIFICANT CHANGE UP (ref 0–0.2)
BASOPHILS NFR BLD AUTO: 0 % — SIGNIFICANT CHANGE UP (ref 0–2)
BILIRUB SERPL-MCNC: 0.7 MG/DL — SIGNIFICANT CHANGE UP (ref 0.2–1.2)
BILIRUB UR-MCNC: NEGATIVE — SIGNIFICANT CHANGE UP
BLD GP AB SCN SERPL QL: SIGNIFICANT CHANGE UP
BUN SERPL-MCNC: 10 MG/DL — SIGNIFICANT CHANGE UP (ref 7–23)
BUN SERPL-MCNC: 12 MG/DL — SIGNIFICANT CHANGE UP (ref 7–23)
CALCIUM SERPL-MCNC: 8.7 MG/DL — SIGNIFICANT CHANGE UP (ref 8.4–10.5)
CALCIUM SERPL-MCNC: 9 MG/DL — SIGNIFICANT CHANGE UP (ref 8.4–10.5)
CHLORIDE SERPL-SCNC: 89 MMOL/L — LOW (ref 96–108)
CHLORIDE SERPL-SCNC: 91 MMOL/L — LOW (ref 96–108)
CO2 SERPL-SCNC: 32 MMOL/L — HIGH (ref 22–31)
CO2 SERPL-SCNC: 33 MMOL/L — HIGH (ref 22–31)
COLOR SPEC: SIGNIFICANT CHANGE UP
CREAT SERPL-MCNC: 0.46 MG/DL — LOW (ref 0.5–1.3)
CREAT SERPL-MCNC: 0.54 MG/DL — SIGNIFICANT CHANGE UP (ref 0.5–1.3)
DIFF PNL FLD: ABNORMAL
EGFR: 103 ML/MIN/1.73M2 — SIGNIFICANT CHANGE UP
EGFR: 99 ML/MIN/1.73M2 — SIGNIFICANT CHANGE UP
ELLIPTOCYTES BLD QL SMEAR: SLIGHT — SIGNIFICANT CHANGE UP
EOSINOPHIL # BLD AUTO: 0.02 K/UL — SIGNIFICANT CHANGE UP (ref 0–0.5)
EOSINOPHIL NFR BLD AUTO: 1 % — SIGNIFICANT CHANGE UP (ref 0–6)
EPI CELLS # UR: 1 — SIGNIFICANT CHANGE UP
GIANT PLATELETS BLD QL SMEAR: PRESENT — SIGNIFICANT CHANGE UP
GLUCOSE SERPL-MCNC: 110 MG/DL — HIGH (ref 70–99)
GLUCOSE SERPL-MCNC: 112 MG/DL — HIGH (ref 70–99)
GLUCOSE UR QL: NEGATIVE MG/DL — SIGNIFICANT CHANGE UP
HCT VFR BLD CALC: 36.5 % — LOW (ref 39–50)
HGB BLD-MCNC: 12 G/DL — LOW (ref 13–17)
INR BLD: 1.35 RATIO — HIGH (ref 0.88–1.16)
KETONES UR-MCNC: NEGATIVE MG/DL — SIGNIFICANT CHANGE UP
LACTATE SERPL-SCNC: 1 MMOL/L — SIGNIFICANT CHANGE UP (ref 0.7–2)
LEUKOCYTE ESTERASE UR-ACNC: ABNORMAL
LYMPHOCYTES # BLD AUTO: 1.08 K/UL — SIGNIFICANT CHANGE UP (ref 1–3.3)
LYMPHOCYTES # BLD AUTO: 64 % — HIGH (ref 13–44)
MANUAL SMEAR VERIFICATION: SIGNIFICANT CHANGE UP
MCHC RBC-ENTMCNC: 28.4 PG — SIGNIFICANT CHANGE UP (ref 27–34)
MCHC RBC-ENTMCNC: 32.9 GM/DL — SIGNIFICANT CHANGE UP (ref 32–36)
MCV RBC AUTO: 86.3 FL — SIGNIFICANT CHANGE UP (ref 80–100)
MONOCYTES # BLD AUTO: 0.45 K/UL — SIGNIFICANT CHANGE UP (ref 0–0.9)
MONOCYTES NFR BLD AUTO: 27 % — HIGH (ref 2–14)
NEUTROPHILS # BLD AUTO: 0.1 K/UL — LOW (ref 1.8–7.4)
NEUTROPHILS NFR BLD AUTO: 6 % — LOW (ref 43–77)
NITRITE UR-MCNC: NEGATIVE — SIGNIFICANT CHANGE UP
NRBC # BLD: 0 /100 — SIGNIFICANT CHANGE UP (ref 0–0)
OVALOCYTES BLD QL SMEAR: SLIGHT — SIGNIFICANT CHANGE UP
PH UR: 6.5 — SIGNIFICANT CHANGE UP (ref 5–8)
PLAT MORPH BLD: NORMAL — SIGNIFICANT CHANGE UP
PLATELET # BLD AUTO: 94 K/UL — LOW (ref 150–400)
POTASSIUM SERPL-MCNC: 3.8 MMOL/L — SIGNIFICANT CHANGE UP (ref 3.5–5.3)
POTASSIUM SERPL-MCNC: 5.1 MMOL/L — SIGNIFICANT CHANGE UP (ref 3.5–5.3)
POTASSIUM SERPL-SCNC: 3.8 MMOL/L — SIGNIFICANT CHANGE UP (ref 3.5–5.3)
POTASSIUM SERPL-SCNC: 5.1 MMOL/L — SIGNIFICANT CHANGE UP (ref 3.5–5.3)
PROT SERPL-MCNC: 7.1 G/DL — SIGNIFICANT CHANGE UP (ref 6–8.3)
PROT UR-MCNC: 100 MG/DL
PROTHROM AB SERPL-ACNC: 15.7 SEC — HIGH (ref 10.5–13.4)
RAPID RVP RESULT: SIGNIFICANT CHANGE UP
RBC # BLD: 4.23 M/UL — SIGNIFICANT CHANGE UP (ref 4.2–5.8)
RBC # FLD: 17.2 % — HIGH (ref 10.3–14.5)
RBC BLD AUTO: SIGNIFICANT CHANGE UP
RBC CASTS # UR COMP ASSIST: 0 /HPF — SIGNIFICANT CHANGE UP (ref 0–4)
SARS-COV-2 RNA SPEC QL NAA+PROBE: SIGNIFICANT CHANGE UP
SCHISTOCYTES BLD QL AUTO: SLIGHT — SIGNIFICANT CHANGE UP
SMUDGE CELLS # BLD: PRESENT — SIGNIFICANT CHANGE UP
SODIUM SERPL-SCNC: 125 MMOL/L — LOW (ref 135–145)
SODIUM SERPL-SCNC: 126 MMOL/L — LOW (ref 135–145)
SP GR SPEC: 1.02 — SIGNIFICANT CHANGE UP (ref 1–1.03)
UROBILINOGEN FLD QL: 1 MG/DL — SIGNIFICANT CHANGE UP (ref 0.2–1)
VARIANT LYMPHS # BLD: 2 % — SIGNIFICANT CHANGE UP (ref 0–6)
WBC # BLD: 1.68 K/UL — LOW (ref 3.8–10.5)
WBC # FLD AUTO: 1.68 K/UL — LOW (ref 3.8–10.5)
WBC UR QL: 3 /HPF — SIGNIFICANT CHANGE UP (ref 0–5)

## 2023-06-21 PROCEDURE — 87186 SC STD MICRODIL/AGAR DIL: CPT

## 2023-06-21 PROCEDURE — 83605 ASSAY OF LACTIC ACID: CPT

## 2023-06-21 PROCEDURE — 86850 RBC ANTIBODY SCREEN: CPT

## 2023-06-21 PROCEDURE — 0225U NFCT DS DNA&RNA 21 SARSCOV2: CPT

## 2023-06-21 PROCEDURE — 87040 BLOOD CULTURE FOR BACTERIA: CPT

## 2023-06-21 PROCEDURE — 80048 BASIC METABOLIC PNL TOTAL CA: CPT

## 2023-06-21 PROCEDURE — 85610 PROTHROMBIN TIME: CPT

## 2023-06-21 PROCEDURE — 99285 EMERGENCY DEPT VISIT HI MDM: CPT | Mod: FS

## 2023-06-21 PROCEDURE — 96365 THER/PROPH/DIAG IV INF INIT: CPT | Mod: XU

## 2023-06-21 PROCEDURE — 87086 URINE CULTURE/COLONY COUNT: CPT

## 2023-06-21 PROCEDURE — 93010 ELECTROCARDIOGRAM REPORT: CPT

## 2023-06-21 PROCEDURE — 80053 COMPREHEN METABOLIC PANEL: CPT

## 2023-06-21 PROCEDURE — 86901 BLOOD TYPING SEROLOGIC RH(D): CPT

## 2023-06-21 PROCEDURE — 51702 INSERT TEMP BLADDER CATH: CPT

## 2023-06-21 PROCEDURE — 85730 THROMBOPLASTIN TIME PARTIAL: CPT

## 2023-06-21 PROCEDURE — 85025 COMPLETE CBC W/AUTO DIFF WBC: CPT

## 2023-06-21 PROCEDURE — 71045 X-RAY EXAM CHEST 1 VIEW: CPT | Mod: 26

## 2023-06-21 PROCEDURE — 36415 COLL VENOUS BLD VENIPUNCTURE: CPT

## 2023-06-21 PROCEDURE — 99285 EMERGENCY DEPT VISIT HI MDM: CPT | Mod: 25

## 2023-06-21 PROCEDURE — 81001 URINALYSIS AUTO W/SCOPE: CPT

## 2023-06-21 PROCEDURE — 71045 X-RAY EXAM CHEST 1 VIEW: CPT

## 2023-06-21 PROCEDURE — 86900 BLOOD TYPING SEROLOGIC ABO: CPT

## 2023-06-21 PROCEDURE — 93005 ELECTROCARDIOGRAM TRACING: CPT

## 2023-06-21 RX ORDER — SODIUM CHLORIDE 9 MG/ML
500 INJECTION INTRAMUSCULAR; INTRAVENOUS; SUBCUTANEOUS ONCE
Refills: 0 | Status: COMPLETED | OUTPATIENT
Start: 2023-06-21 | End: 2023-06-21

## 2023-06-21 RX ORDER — CEFEPIME 1 G/1
1000 INJECTION, POWDER, FOR SOLUTION INTRAMUSCULAR; INTRAVENOUS ONCE
Refills: 0 | Status: COMPLETED | OUTPATIENT
Start: 2023-06-21 | End: 2023-06-21

## 2023-06-21 RX ORDER — SODIUM CHLORIDE 9 MG/ML
2000 INJECTION INTRAMUSCULAR; INTRAVENOUS; SUBCUTANEOUS ONCE
Refills: 0 | Status: COMPLETED | OUTPATIENT
Start: 2023-06-21 | End: 2023-06-21

## 2023-06-21 RX ADMIN — CEFEPIME 100 MILLIGRAM(S): 1 INJECTION, POWDER, FOR SOLUTION INTRAMUSCULAR; INTRAVENOUS at 21:25

## 2023-06-21 RX ADMIN — SODIUM CHLORIDE 500 MILLILITER(S): 9 INJECTION INTRAMUSCULAR; INTRAVENOUS; SUBCUTANEOUS at 21:00

## 2023-06-21 RX ADMIN — CEFEPIME 1000 MILLIGRAM(S): 1 INJECTION, POWDER, FOR SOLUTION INTRAMUSCULAR; INTRAVENOUS at 22:00

## 2023-06-21 RX ADMIN — SODIUM CHLORIDE 1000 MILLILITER(S): 9 INJECTION INTRAMUSCULAR; INTRAVENOUS; SUBCUTANEOUS at 20:35

## 2023-06-21 RX ADMIN — SODIUM CHLORIDE 2000 MILLILITER(S): 9 INJECTION INTRAMUSCULAR; INTRAVENOUS; SUBCUTANEOUS at 21:02

## 2023-06-21 NOTE — ED PROVIDER NOTE - CLINICAL SUMMARY MEDICAL DECISION MAKING FREE TEXT BOX
82-year-old male with history of A-fib, hypertension, chronic urinary retention, chronic Hcoi, Hodgkin's lymphoma with right pleural effusion and Pleurx placement, complaining of no drainage from Choi since about 9 PM.  Associated with urge to urinate and lower abdominal pain/discomfort since about 11 PM.  No nausea vomiting.  No fever chills no hematuria. last chemo 2 weeks ago 82-year-old male with history of A-fib, hypertension, chronic urinary retention, chronic Choi, Hodgkin's lymphoma with right pleural effusion and Pleurx placement, complaining of no drainage from Choi since about 9 PM.  Associated with urge to urinate and lower abdominal pain/discomfort since about 11 PM.  No nausea vomiting.  No fever chills no hematuria. last chemo 2 weeks ago  spoke to pts daughter. pt has ambulatory procedure tomorrow to remove chest tube 82-year-old male with history of A-fib, hypertension, chronic urinary retention, chronic Choi, Hodgkin's lymphoma with right pleural effusion and Pleurx placement, complaining of no drainage from Choi since about 9 PM.  Associated with urge to urinate and lower abdominal pain/discomfort since about 11 PM.  No nausea vomiting.  No fever chills no hematuria. last chemo 2 weeks ago  spoke to pts daughter. pt has ambulatory procedure tomorrow to remove chest tube  pt's clinical condition was discussed with daughter who is physician , she agreed with plan of care , pt will f/u with his PMD

## 2023-06-21 NOTE — ED PROVIDER NOTE - PATIENT PORTAL LINK FT
You can access the FollowMyHealth Patient Portal offered by [facility] by registering at the following website: [email or web address]. By joining [employer]’s FollowMyHealth portal, you will also be able to view your health information using other applications (apps) compatible with our system.

## 2023-06-21 NOTE — ED PROVIDER NOTE - OBJECTIVE STATEMENT
82-year-old male with history of A-fib, hypertension, chronic urinary retention, chronic Choi, Hodgkin's lymphoma with right pleural effusion and Pleurx placement, complaining of no drainage from Choi since about 9 PM.  Associated with urge to urinate and lower abdominal pain/discomfort since about 11 PM.  No nausea vomiting.  No fever chills no hematuria. last chemo 2 weeks ago none

## 2023-06-21 NOTE — ED PROVIDER NOTE - NS ED ATTENDING STATEMENT MOD
This was a shared visit with the BRIDGET. I reviewed and verified the documentation and independently performed the documented:

## 2023-06-21 NOTE — ED ADULT NURSE NOTE - NSFALLHARMRISKINTERV_ED_ALL_ED

## 2023-06-21 NOTE — ED ADULT TRIAGE NOTE - TEMPERATURE IN FAHRENHEIT (DEGREES F)
RX PROGRESS NOTE: Vancomycin Therapeutic Drug Monitoring    Day of therapy: 5    Indication and target trough: Septic shock with unclear source (15-20 mcg/mL)    Current vancomycin dosing regimen: 1250 mg X 1 dose    Most recent height and weight information:  Weight: 66.9 kg (11/02/17 0426)  Height: 5' 7\" (170.2 cm) (11/01/17 1540)    The Following are the Calculated  Current Weights for Dougie Torres     Adjusted Ideal        63.7 kg 61.6 kg            Labs:  Serum Creatinine and Creatinine Clearance:  Serum creatinine: 4.78 mg/dL High 11/02/17 0540  Estimated creatinine clearance: 9.6 mL/min    Vancomycin Serum Concentrations:  VANCOMYCIN, RANDOM (mcg/mL)   Date/Time Value   11/02/2017 0540 18.3       Assessment:    Based on the serum concentration, will give Vancomcyin 750 mg IVPB X 1 dose.     Additional serum concentrations may be necessary depending on pathogen identified, risk factors for adverse events, and/or duration of therapy.  Pharmacy will continue to follow and adjust as needed.    Thank you,    Jennie Vallecillo, PharmD   11/2/2017 7:08 AM  Contact # [phone]      97.9

## 2023-06-21 NOTE — ASU PATIENT PROFILE, ADULT - URINARY CATHETER
Report given to [facility] EMS personnel at bedside.       Everton Mcgovern RN  08/20/18 0995 elian 16 FR which changed 6/21/2023/yes

## 2023-06-21 NOTE — ASU PATIENT PROFILE, ADULT - FALL HARM RISK - RISK INTERVENTIONS

## 2023-06-21 NOTE — ED PROVIDER NOTE - PHYSICAL EXAMINATION
General:     NAD, pale  Eyes: PERRL  Head:     NC/AT, dry oral mucosa  Neck:     trachea midline  Lungs:     CTA b/l. chest tube in place  CVS:     RRR  Abd:     plummer in place, not draining, mild tenderness, no distension  Ext:   no deformities   Neuro: Awake and alert

## 2023-06-21 NOTE — ED PROVIDER NOTE - ATTENDING APP SHARED VISIT CONTRIBUTION OF CARE
This was shared visit with BRIDGET. I have reviewed and verified the documentation and independently performed the documented history, exam and mdm  82-year-old male with history of A-fib, hypertension, chronic urinary retention, chronic Choi, Hodgkin's lymphoma with right pleural effusion and Pleurx placement, complaining of no drainage from Choi since about 9 PM.  Associated with urge to urinate and lower abdominal pain/discomfort since about 11 PM.  No nausea vomiting.  No fever chills no hematuria. last chemo 2 weeks ago

## 2023-06-22 ENCOUNTER — APPOINTMENT (OUTPATIENT)
Dept: THORACIC SURGERY | Facility: HOSPITAL | Age: 83
End: 2023-06-22

## 2023-06-22 ENCOUNTER — TRANSCRIPTION ENCOUNTER (OUTPATIENT)
Age: 83
End: 2023-06-22

## 2023-06-22 ENCOUNTER — APPOINTMENT (OUTPATIENT)
Dept: INFUSION THERAPY | Facility: HOSPITAL | Age: 83
End: 2023-06-22

## 2023-06-22 ENCOUNTER — OUTPATIENT (OUTPATIENT)
Dept: OUTPATIENT SERVICES | Facility: HOSPITAL | Age: 83
LOS: 1 days | Discharge: HOME | End: 2023-06-22
Payer: MEDICARE

## 2023-06-22 ENCOUNTER — NON-APPOINTMENT (OUTPATIENT)
Age: 83
End: 2023-06-22

## 2023-06-22 VITALS
SYSTOLIC BLOOD PRESSURE: 110 MMHG | OXYGEN SATURATION: 100 % | DIASTOLIC BLOOD PRESSURE: 75 MMHG | RESPIRATION RATE: 20 BRPM | HEART RATE: 64 BPM

## 2023-06-22 VITALS
OXYGEN SATURATION: 100 % | RESPIRATION RATE: 22 BRPM | SYSTOLIC BLOOD PRESSURE: 118 MMHG | TEMPERATURE: 98 F | DIASTOLIC BLOOD PRESSURE: 88 MMHG | HEART RATE: 70 BPM | WEIGHT: 169.09 LBS | HEIGHT: 70 IN

## 2023-06-22 DIAGNOSIS — J90 PLEURAL EFFUSION, NOT ELSEWHERE CLASSIFIED: ICD-10-CM

## 2023-06-22 DIAGNOSIS — Z98.890 OTHER SPECIFIED POSTPROCEDURAL STATES: Chronic | ICD-10-CM

## 2023-06-22 DIAGNOSIS — C81.90 HODGKIN LYMPHOMA, UNSPECIFIED, UNSPECIFIED SITE: ICD-10-CM

## 2023-06-22 PROCEDURE — 32552 REMOVE LUNG CATHETER: CPT

## 2023-06-22 PROCEDURE — 71045 X-RAY EXAM CHEST 1 VIEW: CPT | Mod: 26

## 2023-06-22 RX ORDER — ACETAMINOPHEN 500 MG
1000 TABLET ORAL ONCE
Refills: 0 | Status: DISCONTINUED | OUTPATIENT
Start: 2023-06-22 | End: 2023-07-06

## 2023-06-22 RX ORDER — SODIUM CHLORIDE 9 MG/ML
1000 INJECTION, SOLUTION INTRAVENOUS
Refills: 0 | Status: DISCONTINUED | OUTPATIENT
Start: 2023-06-22 | End: 2023-07-06

## 2023-06-22 NOTE — H&P ADULT - HISTORY OF PRESENT ILLNESS
Mr. ARIK DUMONT, 82 year old male, w/ hx of A Fib, HTN and chronic urinary retention (chronic plummer); who presented to [facility] via ambulance on 2/17/2022 with progressively worsening SOB. He was intubated in the field and brought to [facility] ED and admitted to ICU for acute respiratory failure with hypercapnia. CTA chest was negative for PE but significant for a large R pleural effusion with mediastinal and hilar lymphadenopathy, and 3L of fluid was drained. He completed a 5 day course of empiric antibiotics. Pleural fluid cultures and blood cultures resulted negative. Suspicious of underlying malignancy secondary to lymphadenopathy and persistent leukocytosis, Heme/Onc was consulted and recommended transfer to [facility] on 2/24/23 for further workup. On 2/28 he underwent an ultrasound guided biopsy of his R supraclavicular mass/lymph node, which later resulted as classic Hodgkin's lymphoma.     On 3/3, he underwent a FB, Right VATS, MLND, placement of Pleurx catheter on 03/03/2023 Postoperatively, he was hypotensive and started on Midodrine. A Left chest wall mediport was placed for ongoing chemotherapy treatment on 3/15/2023 by Dr. Johnson. He was admitted to [facility] on 3/24.    While at rehab, he developed chest discomfort., low systolic BP and mild tachycardia. EKG showed Afib with rapid ventricular rhythms. On exam, he had increased respiratory rate. CXR and CT chest showed increased Rt sided effusion with loculation and increasing atelectasis. He was transferred to [facility]. In the ED, CXR showed recurrent loculated R pleural effusion. CT chest with small loculated R pleural effusion with new peripheral demarcated high attenuation in the R pleural space. He was seen by CT surgery in the ED, per paper chart, "drained about 100cc of fluid, placed on Pleura vac . PleurX was then flushed and drained about 2180cc fluid need to hold AC for 48 hours for possible TPA into PleurX cath.  MIST held per CT surgery. No plan for VATS. CXR on 3/27, Decreasing pulmonary congestion, No pneumothorax. On 4/6 CT Chest -- Compared to 3/29/2023, similar loculated, exudative right pleural effusion with slightly decreased pleural air component and Pleurx catheter in place. Question malignant effusion in the setting of reported lymphoma, irregular septal thickening, nodularity and mediastinal pleural involvement.     Patient was medically optimized for discharge to [facility] IRU on 4/10/23.    Rehab course significant for:  4/11: Appreciate pulm recs. Maintain 02>90%   4/12: Plan for PleurX drain- M/W/F. Consults- Urology, Hematology, Psychiatry. BP params to atenolol. Repeat CXR 4/14 and then weekly on Mon/Thurs.   4/13:PSYCH: Daughter wants to keep pt on Lexapro 10 mg and Trazodone 25mg. QTc of 426. Trazodone to 50 mg for sleep.   4/14: Respiratory overnight for 02 down to 88%, s/p duoneb with improvement. Symbicort and Spiriva per pulm.   4/17: Chlorhexidine. F/u CXR. Pleurex to be drained on Mon & Thurs 4/18: Per Dr Galvan (CT SX) okay to resume Eliquis    All other medical co-morbidities were stable. Patient tolerated course of inpatient PT/OT/SLP rehab with significant improvements and met rehab goals prior to discharge. Patient was medically cleared on 4/23/23 for discharge to home with home care.    Ongoing treatment with Hem/Onc Dr. Karl Hays for lymphoma.    Patient is currently drained once a week: last drained few drops on 5/24.    Patient is here today for a follow up. Denies SOB, CP, cough. Drained Rt PleurX today in office with no drainages.

## 2023-06-22 NOTE — ASU DISCHARGE PLAN (ADULT/PEDIATRIC) - ASU DC SPECIAL INSTRUCTIONSFT
Please keep the dressing in place for 24 hours. In 24 hours from the procedure, you may remove the dressing and shower. Allow soapy water to wash over the wound, do not scrub at the wound or apply any ointments or creams.     Please follow up with Dr. Galvan in 1 week to have a suture removed and for a wound check. Please keep the dressing in place for 24 hours. In 24 hours from the procedure, you may remove the dressing and shower. Allow soapy water to wash over the wound, do not scrub at the wound or apply any ointments or creams.     You may resume your home dose of Eliquis tomorrow.     Please follow up with Dr. Galvan in 1 week to have a suture removed and for a wound check.

## 2023-06-22 NOTE — ASU DISCHARGE PLAN (ADULT/PEDIATRIC) - CARE PROVIDER_API CALL
Nathaniel Galvan  Thoracic Surgery  [address] [place] [id number]  Phone: [phone]  Fax: [phone]  Follow Up Time: 1 week

## 2023-06-22 NOTE — ASU PREOP CHECKLIST - 4.
MALATHI cherry, SINGH left at home SAMANTHA Sheridan left at home/ Pt report from STAS Nicholas RN at 1056. SAMANTHA Sheridan left at home/ Pt report from YuSTAS choe RN at 1056./ Pt report back to [facility], at 1135.

## 2023-06-22 NOTE — H&P ADULT - ASSESSMENT
Mr. 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 He was intubated in the field and brought to [facility] ED and admitted to ICU for acute respiratory failure with hypercapnia. CTA chest was negative for PE but significant for a large R pleural effusion with mediastinal and hilar lymphadenopathy, and 3L of fluid was drained. He completed a 5 day course of empiric antibiotics. Pleural fluid cultures and blood cultures resulted negative. Suspicious of underlying malignancy secondary to lymphadenopathy and persistent leukocytosis, Heme/Onc was consulted and recommended transfer to [facility] on 2/24/23 for further workup. On 2/28 he underwent an ultrasound guided biopsy of his R supraclavicular mass/lymph node, which later resulted as classic Hodgkin's lymphoma.     On 3/3, he underwent a FB, Right VATS, MLND, placement of Pleurx catheter on 03/03/2023 Postoperatively, he was hypotensive and started on Midodrine. A Left chest wall mediport was placed for ongoing chemotherapy treatment on 3/15/2023 by Dr. Johnson. He was admitted to [facility] on 3/24.    Pleurx catheter is no longer draining and pt is here today to have it removed by Dr Galvan.

## 2023-06-22 NOTE — H&P ADULT - NSHPSOCIALHISTORY_GEN_ALL_CORE
Family history of cerebrovascular accident (CVA) (V17.1) (Z82.3) : Mother  Family history of essential hypertension (V17.49) (Z82.49) : Mother  Family history of hyperlipidemia (V18.19) (Z83.438) : Mother  Family history of non-Hodgkin's lymphoma (V16.7) (Z80.7)    Social History  Former smoker (V15.82) (Z87.891)  Four children    Never a smoker  Occupation  Social alcohol use (V49.89) (Z78.9)

## 2023-06-22 NOTE — H&P ADULT - NSICDXPASTMEDICALHX_GEN_ALL_CORE_FT
PAST MEDICAL HISTORY:  Atrial fibrillation     Depression, major     Hyperlipidemia     Hypertension     Urinary retention

## 2023-06-22 NOTE — ASU DISCHARGE PLAN (ADULT/PEDIATRIC) - NS MD DC FALL RISK RISK
For information on Fall & Injury Prevention, visit: [email or web address] OR  [email or web address] OR  https://www.cdc.gov/steadi/patient.html

## 2023-06-22 NOTE — H&P ADULT - NSHPPHYSICALEXAM_GEN_ALL_CORE
Pulmonary: no respiratory distress and lungs were clear to auscultation bilaterally.   Heart: heart rate was normal and rhythm regular, normal S1 and S2, no gallops, no murmurs and no pericardial rub.   Chest: the chest was normal in appearance, no chest asymmetry and normal chest expansion.   Musculoskeletal:. wheelchair for long distance, uses a walker for short distance.

## 2023-06-22 NOTE — ASU DISCHARGE PLAN (ADULT/PEDIATRIC) - FOLLOW UP APPOINTMENTS
118 may also call Recovery Room (PACU) 24/7 @ [phone]/[facility]

## 2023-06-22 NOTE — H&P ADULT - NS PANP COMMENT GEN_ALL_CORE FT
Patient seen and examined agree with above note as modified, where appropriate, by me. The risks, benefits and alternatives of the procedure were explained to the patient including but not limited to bleeding, infection and recurrence of the effusion. All of the patients questions were answered, he demonstrated understanding and freely consented to the procedure.

## 2023-06-22 NOTE — ASU PREOP CHECKLIST - BLOOD AVAILABLE
"Telephone Encounter by Carlos Scales at 08/18/18 08:51 AM     Author:  Carlos Scales Service:  (none) Author Type:  Patient      Filed:  08/18/18 08:54 AM Encounter Date:  8/18/2018 Status:  Signed     :  Carlos Scales (Patient )              Parul HORTON    Patient Age: 63year old    ACCT STATUS:   MESSAGE:[JP1.1T]   Pt had some blood work done at work and is dropping off the results for doctor to have and to then put in her records. pleaced in neeru's bin. [JP1.1M]    Next and Last Visit with Provider and Department  Next visit with Carmen Masters is on No match found  Next visit with INTERNAL MEDICINE is on No match found  Last visit with Carmen Masters was on 01/09/2017 at  2:45 PM in [address] [place] [address]  Last visit with INTERNAL MEDICINE was on 01/09/2017 at  2:45 PM in [facility]     WEIGHT AND HEIGHT: As of 10/23/2017 weight is 123 lbs. (55.792 kg). Height is 5' 2""(1.575 m). BMI is 22.49 kg/(m^2) calculated from:     Height 5' 2\"" (1.575 m) as of 10/23/17     Weight 123 lb (55.792 kg) as of 10/23/17[JP1.1T]      Allergies      Allergen   Reactions   â¢ Clindamycin  Other - See Comments     tongue felt on fire and was a little swollen[JP1.2T]      Current outpatient prescriptions       Medication  Sig Dispense Refill   â¢ estrogen, conjugated,-medroxyprogesterone (PREMPRO) 0.625-2.5 MG per tablet Take 1 Tab by mouth daily. 30 Tab 9   â¢ Calcium-Vitamin D (CALCIUM 500/D OR) Take  by mouth. â¢ buPROPion (WELLBUTRIN SR) 150 MG 12 hr tablet Take 1 Tab by mouth daily. 30 Tab 3   â¢ estradiol (ESTRACE VAGINAL) 0.1 MG/GM vaginal cream Place 1 g vaginally twice a week. 1 Tube 3   â¢ FLUCELVAX QUADRIVALENT 0.5 ML GRACIELA   0   â¢ Fexofenadine HCl (ALLEGRA OR) Take  by mouth. â¢ mometasone (NASONEX) 50 MCG/ACT nasal spray Use 2 Sprays in each nostril daily. 17 g 2   â¢ Meclizine HCl 25 MG TABS three times daily as needed.  45 Tab 0   â¢ OMEPRAZOLE " OR 1 tablet daily (OTC)        PHARMACY to use:           Pharmacy preference(s) on file: [address] INFO:[JP1.1T] Linda Toth to leave response (including medical information) with family member or on answering machine[JP1.1M]  ROUTING:[JP1.1T] Patient's physician/staff[JP1.1M]        PCP: Manjula Cortez DO         INS: Payor: [employer] / Plan: *No Plan* / Product Type: *No Product type* / Note: This is the primary coverage, but no account was found for this location or the patient's primary location.    ADDRESS:  [address] [place] [address][JP1.1T]         Revision History        User Key Date/Time User Provider Type Action    > JP1.2 08/18/18 08:54 AM Victorino Baylee Patient  Sign     JP1.1 08/18/18 08:51 AM Victorino Baylee Patient      M - Manual, T - Template n/a no

## 2023-06-23 ENCOUNTER — NON-APPOINTMENT (OUTPATIENT)
Age: 83
End: 2023-06-23

## 2023-06-23 ENCOUNTER — APPOINTMENT (OUTPATIENT)
Dept: INFUSION THERAPY | Facility: HOSPITAL | Age: 83
End: 2023-06-23

## 2023-06-23 PROCEDURE — 97530 THERAPEUTIC ACTIVITIES: CPT

## 2023-06-23 PROCEDURE — 97116 GAIT TRAINING THERAPY: CPT

## 2023-06-23 PROCEDURE — 87635 SARS-COV-2 COVID-19 AMP PRB: CPT

## 2023-06-23 PROCEDURE — 97110 THERAPEUTIC EXERCISES: CPT

## 2023-06-23 PROCEDURE — 94668 MNPJ CHEST WALL SBSQ: CPT

## 2023-06-23 PROCEDURE — 93005 ELECTROCARDIOGRAM TRACING: CPT

## 2023-06-23 PROCEDURE — 36415 COLL VENOUS BLD VENIPUNCTURE: CPT

## 2023-06-23 PROCEDURE — 94640 AIRWAY INHALATION TREATMENT: CPT

## 2023-06-23 PROCEDURE — 97163 PT EVAL HIGH COMPLEX 45 MIN: CPT

## 2023-06-23 PROCEDURE — 71045 X-RAY EXAM CHEST 1 VIEW: CPT

## 2023-06-23 PROCEDURE — 97112 NEUROMUSCULAR REEDUCATION: CPT

## 2023-06-23 PROCEDURE — 97535 SELF CARE MNGMENT TRAINING: CPT

## 2023-06-23 PROCEDURE — 85025 COMPLETE CBC W/AUTO DIFF WBC: CPT

## 2023-06-23 PROCEDURE — 80053 COMPREHEN METABOLIC PANEL: CPT

## 2023-06-23 PROCEDURE — 85027 COMPLETE CBC AUTOMATED: CPT

## 2023-06-23 PROCEDURE — 97167 OT EVAL HIGH COMPLEX 60 MIN: CPT

## 2023-06-23 PROCEDURE — 92610 EVALUATE SWALLOWING FUNCTION: CPT

## 2023-06-24 ENCOUNTER — APPOINTMENT (OUTPATIENT)
Dept: INFUSION THERAPY | Facility: HOSPITAL | Age: 83
End: 2023-06-24

## 2023-06-24 PROBLEM — F32.9 MAJOR DEPRESSIVE DISORDER, SINGLE EPISODE, UNSPECIFIED: Chronic | Status: ACTIVE | Noted: 2023-06-22

## 2023-06-24 PROBLEM — E78.5 HYPERLIPIDEMIA, UNSPECIFIED: Chronic | Status: ACTIVE | Noted: 2023-06-22

## 2023-06-24 LAB
-  AMIKACIN: SIGNIFICANT CHANGE UP
-  AMOXICILLIN/CLAVULANIC ACID: SIGNIFICANT CHANGE UP
-  AMPICILLIN/SULBACTAM: SIGNIFICANT CHANGE UP
-  AMPICILLIN: SIGNIFICANT CHANGE UP
-  AZTREONAM: SIGNIFICANT CHANGE UP
-  CEFAZOLIN: SIGNIFICANT CHANGE UP
-  CEFEPIME: SIGNIFICANT CHANGE UP
-  CEFTRIAXONE: SIGNIFICANT CHANGE UP
-  CEFUROXIME: SIGNIFICANT CHANGE UP
-  CIPROFLOXACIN: SIGNIFICANT CHANGE UP
-  ERTAPENEM: SIGNIFICANT CHANGE UP
-  GENTAMICIN: SIGNIFICANT CHANGE UP
-  IMIPENEM: SIGNIFICANT CHANGE UP
-  LEVOFLOXACIN: SIGNIFICANT CHANGE UP
-  MEROPENEM: SIGNIFICANT CHANGE UP
-  NITROFURANTOIN: SIGNIFICANT CHANGE UP
-  PIPERACILLIN/TAZOBACTAM: SIGNIFICANT CHANGE UP
-  TOBRAMYCIN: SIGNIFICANT CHANGE UP
-  TRIMETHOPRIM/SULFAMETHOXAZOLE: SIGNIFICANT CHANGE UP
CULTURE RESULTS: SIGNIFICANT CHANGE UP
METHOD TYPE: SIGNIFICANT CHANGE UP
ORGANISM # SPEC MICROSCOPIC CNT: SIGNIFICANT CHANGE UP
ORGANISM # SPEC MICROSCOPIC CNT: SIGNIFICANT CHANGE UP
SPECIMEN SOURCE: SIGNIFICANT CHANGE UP

## 2023-06-24 NOTE — HISTORY OF PRESENT ILLNESS
[Disease:__________________________] : Disease: [unfilled] [de-identified] : Mr. Jeronimo is a 81 yo man with Hodgkin lymphoma undergoing chemotherapy. He had a h/o HTN, AF and chronic urinary retention. Gradually increasing dyspnea developed starting in early 2023. He was admitted to [facility] on 2/17/23 shortly after an immediately prior admission with CRUZ at [facility], where he was found to have SCV LAD and \par a large right pleural effusion, which was drained and was reportedly neg for infection and malignancy. A node bx showed atypical CD30+ cells suspicipus for Hodgkin. He went home on oxygen with further w/u planned, then decompensated and was emergently admitted to [facility], where he was intubated and remained on vent for a few days. Over 3L were drained from the right effusion. He was successfully extubated and was transferred to [facility] for further care on 2/24/23. On 3/3/23, Dr. Nathaniel Galvan performed bronchoscopy, right VATS, pleural bx, mediastinal node dissection and placement of PleurX catheter. Pleural nodules showed chronic inflammation, eosinophils, fibrosis, and a level 7 node showed classical Hodgkin lymphoma. Hodgkin cells were large, anaplastic cells +CD30, CD15, dim PAX5, MUM1, dim OCT2, NEFTALI and (-) for CD3, CD45, CD20, CD79a, BOB1, BCL6.\par He went to rehab on 3/15/23, prior to which an Ajkszp-F-Hqyz was placed.\par Pretherapy imaging included PET CT  3/8/23 which showed likely right cervical FDG+ LAD, FDG+ paratracheal, prevascular, perihilar, retrocrural, aortocaval LAD some > 2 cm, most 1-2 cm with SUV 3.5-9.2. Prior CT chest showed a 3.5 cm precarinal node. Right pleural effusion with heterogeneous uptake was seen. There was suspected involvement of the right hemisacrum. 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 Cytogenetics was nl. 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 to [facility].\par While at rehab, he developed chest discomfort., low systolic BP and mild tachycardia. EKG showed Afib with rapid ventricular rythmn.  Imaging showed increase in the effusion with loculation and increased atelectasis. He was readmitted to [facility] on 3/25/2. PleurX catheter was flushed with > 2L drainage of pleural fluid; he improved and was able to return to [facility] 4/10/23. Prior to transfer, a second dose of BV with dose reduced to 1.2 mg/kg was given.\par At Rehab, pt has been bed and wheelchair bound with stable pulm status, no dyspnea or cough at rest. He has had no fevers and has a good appetit.\par Chest x ray obtained today (4/20/23) showed stable findings.\par Pt's son says that there has been minimal to no drainage from the PleurX catheter since the last discharge from [facility] [de-identified] : IV [FreeTextEntry1] : BV q3 weeks x 4  [de-identified] : D/C from Rehab\par received 4th  dose BV, at 1.8 mg/kg, on 4/24/23 w/o incident with Onpro support\par pulm status - recently son notes cough with yellow mucus  \par minimal to no drainage from PleurX catheter, seeing thoracic surg Dr Galvan \par Afebrile\par \par Echocardiogram:1. Left ventricular ejection fraction, by visual estimation, is 50 to 55%. 2. Normal global left ventricular systolic function. 3. The left ventricular diastolic function could not be assessed in this study. 4. Moderately enlarged left atrium. 5. Moderately enlarged right atrium. 6. Mild mitral annular calcification. 7. Mild mitral valve regurgitation.\par  8. Thickening of the anterior and posterior mitral valve leaflets.\par \par

## 2023-06-24 NOTE — CHART NOTE - NSCHARTNOTEFT_GEN_A_CORE
SW called pt to discuss and assist with follow up care.  Pt is an 83 y/o male presented to for HTN and urinary catheter complication.  Pt did not respond to the call, left VM, encouraged to call SW if further assistance is needed.

## 2023-06-24 NOTE — ASSESSMENT
[FreeTextEntry1] : Stage IV classical Hodgkin lymphoma in an 83 yo man who presented with a large pleural effusion and respiratory failure. He has now been treated 3 times with BV, three weeks apart, with good tolerance, no c/o neuropathy. He had myelosuppression transiently after the first dose which rapidly reversed after receiving filgrastim.\par He has a PleurX catheter in place which was obstructed a few weeks ago, was cleared, and is now draining minimally now w/o recurrence of respiratory complaints or changes in chest X-ray, incl 4/24, suggesting that to the degree that the effusion is related to Hodgkin lymphoma, pleural implants, he may be responding. There is a suggestion of mild decrease in LAD in recent chest CT.\par Recent echo shows nl LV function.\par Monotherapy with BV will not be with curative intent, so intend to shift to cycles of AVD (doxorubicin, vinblastine and dacarbazine) given q2 weeks as per Sunny et. al. 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  \par Blood counts after therapy need to be closely followed.\par cont to need intensive PT.\par first cycle AVD 3 wks after that.\par AVD will be given q2 wk.\par Cont. f/u with Dr. 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 LMW heparin ppx.\par obtain CXR \par plan AVD on monday as planned [Curative] : Goals of care discussed with patient: Curative [Palliative Care Plan] : not applicable at this time

## 2023-06-24 NOTE — REVIEW OF SYSTEMS
[Diarrhea: Grade 0] : Diarrhea: Grade 0 [Negative] : Allergic/Immunologic [FreeTextEntry6] : cough with yellow mucus

## 2023-06-24 NOTE — PHYSICAL EXAM
[Ambulatory and capable of all self care but unable to carry out any work activities] : Status 2- Ambulatory and capable of all self care but unable to carry out any work activities. Up and about more than 50% of waking hours [Normal] : affect appropriate [de-identified] : cta no cough during office visit  [de-identified] : plummer cath yellow urine

## 2023-06-25 NOTE — HISTORY OF PRESENT ILLNESS
[Disease:__________________________] : Disease: [unfilled] [de-identified] : Mr. Jeronimo is a 81 yo man with Hodgkin lymphoma undergoing chemotherapy. He had a h/o HTN, AF and chronic urinary retention. Gradually increasing dyspnea developed starting in early 2023. He was admitted to [facility] on 2/17/23 shortly after an immediately prior admission with CRUZ at [facility], where he was found to have SCV LAD and \par a large right pleural effusion, which was drained and was reportedly neg for infection and malignancy. A node bx showed atypical CD30+ cells suspicipus for Hodgkin. He went home on oxygen with further w/u planned, then decompensated and was emergently admitted to [facility], where he was intubated and remained on vent for a few days. Over 3L were drained from the right effusion. He was successfully extubated and was transferred to [facility] for further care on 2/24/23. On 3/3/23, Dr. Nathaniel Galvan performed bronchoscopy, right VATS, pleural bx, mediastinal node dissection and placement of PleurX catheter. Pleural nodules showed chronic inflammation, eosinophils, fibrosis, and a level 7 node showed classical Hodgkin lymphoma. Hodgkin cells were large, anaplastic cells +CD30, CD15, dim PAX5, MUM1, dim OCT2, NEFTALI and (-) for CD3, CD45, CD20, CD79a, BOB1, BCL6.\par He went to rehab on 3/15/23, prior to which an [id number]-Z-[id number] was placed.\par Pretherapy imaging included PET CT  3/8/23 which showed likely right cervical FDG+ LAD, FDG+ paratracheal, prevascular, perihilar, retrocrural, aortocaval LAD some > 2 cm, most 1-2 cm with SUV 3.5-9.2. Prior CT chest showed a 3.5 cm precarinal node. Right pleural effusion with heterogeneous uptake was seen. There was suspected involvement of the right hemisacrum. 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 Cytogenetics was nl. 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 EKG showed Afib with rapid ventricular rythmn.  Imaging showed increase in the effusion with loculation and increased atelectasis. He was readmitted to [facility] on 3/25/2. PleurX catheter was flushed with > 2L drainage of pleural fluid; he improved and was able to return to [facility] 4/10/23. Prior to transfer, a second dose of BV with dose reduced to 1.2 mg/kg was given.\par At Rehab, pt has been bed and wheelchair bound with stable pulm status, no dyspnea or cough at rest. He has had no fevers and has a good appetit.\par Chest x ray obtained today (4/20/23) showed stable findings.\par Pt's son says that there has been minimal to no drainage from the PleurX catheter since the last discharge from [facility]. [de-identified] : IV [FreeTextEntry1] : BV q3 weeks x 4  [de-identified] : D/C from Rehab\par received 4th  dose BV, at 1.8 mg/kg, on 4/24/23 w/o incident with Onpro support\par pulm status stable\par minimal to no drainage from PleurX catheter, seeing thoracic surg Dr Galvan tomorrow\par Afebrile\par \par Echocardiogram:1. Left ventricular ejection fraction, by visual estimation, is 50 to 55%. 2. Normal global left ventricular systolic function. 3. The left ventricular diastolic function could not be assessed in this study. 4. Moderately enlarged left atrium. 5. Moderately enlarged right atrium. 6. Mild mitral annular calcification. 7. Mild mitral valve regurgitation.\par  8. Thickening of the anterior and posterior mitral valve leaflets.\par \par

## 2023-06-25 NOTE — ASSESSMENT
[Palliative Care Plan] : not applicable at this time [Curative] : Goals of care discussed with patient: Curative [FreeTextEntry1] : Stage IV classical Hodgkin lymphoma in an 83 yo man who presented with a large pleural effusion and respiratory failure. He has now been treated 3 times with BV, three weeks apart, with good tolerance, no c/o neuropathy. He had myelosuppression transiently after the first dose which rapidly reversed after receiving filgrastim.\par He has a PleurX catheter in place which was obstructed a few weeks ago, was cleared, and is now draining minimally now w/o recurrence of respiratory complaints or changes in chest X-ray, incl 4/24, suggesting that to the degree that the effusion is related to Hodgkin lymphoma, pleural implants, he may be responding. There is a suggestion of mild decrease in LAD in recent chest CT.\par Recent echo shows nl LV function.\par Monotherapy with BV will not be with curative intent, so intend to shift to cycles of AVD (doxorubicin, vinblastine and dacarbazine) given q2 weeks as per Jarreds et. al. 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  \par Blood counts after therapy need to be closely followed.\par cont to need intensive PT.\par : if stable will give first cycle AVD 3 wks after that.\par AVD will be given q2 wk.\par Cont. f/u with Dr. Galvan re PleurX catheter, assessment when it can be removed.\par Cont. LMW heparin ppx.

## 2023-06-25 NOTE — ED POST DISCHARGE NOTE - ADDITIONAL DOCUMENTATION
Contacted daughter. Noted that all cell lines were low (WBC HGB and PLT). Daughter is a physician. Says he just had chemo that day. He has close f/u with Oncology. Pt is now on levaquin. Urine C&S report shows resistance. I sent Augmentin 875mg bid to pharmacy. Daughter made aware. All questions answered

## 2023-06-26 ENCOUNTER — RESULT REVIEW (OUTPATIENT)
Age: 83
End: 2023-06-26

## 2023-06-26 ENCOUNTER — APPOINTMENT (OUTPATIENT)
Dept: HEMATOLOGY ONCOLOGY | Facility: CLINIC | Age: 83
End: 2023-06-26
Payer: MEDICARE

## 2023-06-26 ENCOUNTER — APPOINTMENT (OUTPATIENT)
Dept: INFUSION THERAPY | Facility: HOSPITAL | Age: 83
End: 2023-06-26

## 2023-06-26 ENCOUNTER — APPOINTMENT (OUTPATIENT)
Dept: HEMATOLOGY ONCOLOGY | Facility: CLINIC | Age: 83
End: 2023-06-26

## 2023-06-26 VITALS
RESPIRATION RATE: 16 BRPM | DIASTOLIC BLOOD PRESSURE: 63 MMHG | OXYGEN SATURATION: 95 % | HEART RATE: 64 BPM | TEMPERATURE: 96.6 F | SYSTOLIC BLOOD PRESSURE: 96 MMHG

## 2023-06-26 LAB
ALBUMIN SERPL ELPH-MCNC: 3.1 G/DL — LOW (ref 3.3–5)
ALP SERPL-CCNC: 91 U/L — SIGNIFICANT CHANGE UP (ref 40–120)
ALT FLD-CCNC: 12 U/L — SIGNIFICANT CHANGE UP (ref 10–45)
ANION GAP SERPL CALC-SCNC: 10 MMOL/L — SIGNIFICANT CHANGE UP (ref 5–17)
AST SERPL-CCNC: 12 U/L — SIGNIFICANT CHANGE UP (ref 10–40)
BASOPHILS # BLD AUTO: 0 K/UL — SIGNIFICANT CHANGE UP (ref 0–0.2)
BASOPHILS NFR BLD AUTO: 0 % — SIGNIFICANT CHANGE UP (ref 0–2)
BILIRUB SERPL-MCNC: 0.2 MG/DL — SIGNIFICANT CHANGE UP (ref 0.2–1.2)
BUN SERPL-MCNC: 11 MG/DL — SIGNIFICANT CHANGE UP (ref 7–23)
CALCIUM SERPL-MCNC: 8.1 MG/DL — LOW (ref 8.4–10.5)
CHLORIDE SERPL-SCNC: 99 MMOL/L — SIGNIFICANT CHANGE UP (ref 96–108)
CO2 SERPL-SCNC: 27 MMOL/L — SIGNIFICANT CHANGE UP (ref 22–31)
CREAT SERPL-MCNC: 0.41 MG/DL — LOW (ref 0.5–1.3)
EGFR: 107 ML/MIN/1.73M2 — SIGNIFICANT CHANGE UP
EOSINOPHIL # BLD AUTO: 0.29 K/UL — SIGNIFICANT CHANGE UP (ref 0–0.5)
EOSINOPHIL NFR BLD AUTO: 2 % — SIGNIFICANT CHANGE UP (ref 0–6)
GLUCOSE SERPL-MCNC: 129 MG/DL — HIGH (ref 70–99)
HCT VFR BLD CALC: 38.6 % — LOW (ref 39–50)
HGB BLD-MCNC: 12.2 G/DL — LOW (ref 13–17)
LDH SERPL L TO P-CCNC: 167 U/L — SIGNIFICANT CHANGE UP (ref 50–242)
LYMPHOCYTES # BLD AUTO: 14 % — SIGNIFICANT CHANGE UP (ref 13–44)
LYMPHOCYTES # BLD AUTO: 2.04 K/UL — SIGNIFICANT CHANGE UP (ref 1–3.3)
MCHC RBC-ENTMCNC: 27.9 PG — SIGNIFICANT CHANGE UP (ref 27–34)
MCHC RBC-ENTMCNC: 31.6 G/DL — LOW (ref 32–36)
MCV RBC AUTO: 88.3 FL — SIGNIFICANT CHANGE UP (ref 80–100)
METAMYELOCYTES # FLD: 2 % — HIGH (ref 0–0)
MONOCYTES # BLD AUTO: 2.19 K/UL — HIGH (ref 0–0.9)
MONOCYTES NFR BLD AUTO: 15 % — HIGH (ref 2–14)
MYELOCYTES NFR BLD: 2 % — HIGH (ref 0–0)
NEUTROPHILS # BLD AUTO: 9.48 K/UL — HIGH (ref 1.8–7.4)
NEUTROPHILS NFR BLD AUTO: 65 % — SIGNIFICANT CHANGE UP (ref 43–77)
NRBC # BLD: 0 /100 — SIGNIFICANT CHANGE UP (ref 0–0)
NRBC # BLD: SIGNIFICANT CHANGE UP /100 WBCS (ref 0–0)
PLAT MORPH BLD: NORMAL — SIGNIFICANT CHANGE UP
PLATELET # BLD AUTO: 290 K/UL — SIGNIFICANT CHANGE UP (ref 150–400)
POTASSIUM SERPL-MCNC: 4.1 MMOL/L — SIGNIFICANT CHANGE UP (ref 3.5–5.3)
POTASSIUM SERPL-SCNC: 4.1 MMOL/L — SIGNIFICANT CHANGE UP (ref 3.5–5.3)
PROT SERPL-MCNC: 5.4 G/DL — LOW (ref 6–8.3)
RBC # BLD: 4.37 M/UL — SIGNIFICANT CHANGE UP (ref 4.2–5.8)
RBC # FLD: 18.6 % — HIGH (ref 10.3–14.5)
RBC BLD AUTO: SIGNIFICANT CHANGE UP
SODIUM SERPL-SCNC: 136 MMOL/L — SIGNIFICANT CHANGE UP (ref 135–145)
WBC # BLD: 14.59 K/UL — HIGH (ref 3.8–10.5)
WBC # FLD AUTO: 14.59 K/UL — HIGH (ref 3.8–10.5)

## 2023-06-26 PROCEDURE — 99214 OFFICE O/P EST MOD 30 MIN: CPT

## 2023-06-26 NOTE — HISTORY OF PRESENT ILLNESS
[Disease:__________________________] : Disease: [unfilled] [de-identified] : Mr. Jeronimo is a 81 yo man with Hodgkin lymphoma undergoing chemotherapy. He had a h/o HTN, AF and chronic urinary retention. Gradually increasing dyspnea developed starting in early 2023. He was admitted to [facility] on 2/17/23 shortly after an immediately prior admission with CRUZ at [facility], where he was found to have SCV LAD and \par a large right pleural effusion, which was drained and was reportedly neg for infection and malignancy. A node bx showed atypical CD30+ cells suspicipus for Hodgkin. He went home on oxygen with further w/u planned, then decompensated and was emergently admitted to [facility], where he was intubated and remained on vent for a few days. Over 3L were drained from the right effusion. He was successfully extubated and was transferred to [facility] for further care on 2/24/23. On 3/3/23, Dr. Nathaniel Galvan performed bronchoscopy, right VATS, pleural bx, mediastinal node dissection and placement of PleurX catheter. Pleural nodules showed chronic inflammation, eosinophils, fibrosis, and a level 7 node showed classical Hodgkin lymphoma. Hodgkin cells were large, anaplastic cells +CD30, CD15, dim PAX5, MUM1, dim OCT2, NEFTALI and (-) for CD3, CD45, CD20, CD79a, BOB1, BCL6.\par He went to rehab on 3/15/23, prior to which an Msxmad-Y-Jpre was placed.\par Pretherapy imaging included PET CT  3/8/23 which showed likely right cervical FDG+ LAD, FDG+ paratracheal, prevascular, perihilar, retrocrural, aortocaval LAD some > 2 cm, most 1-2 cm with SUV 3.5-9.2. Prior CT chest showed a 3.5 cm precarinal node. Right pleural effusion with heterogeneous uptake was seen. There was suspected involvement of the right hemisacrum. 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 Cytogenetics was nl. 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 to [facility].\par While at rehab, he developed chest discomfort., low systolic BP and mild tachycardia. EKG showed Afib with rapid ventricular rythmn.  Imaging showed increase in the effusion with loculation and increased atelectasis. He was readmitted to [facility] on 3/25/2. PleurX catheter was flushed with > 2L drainage of pleural fluid; he improved and was able to return to [facility] 4/10/23. Prior to transfer, a second dose of BV with dose reduced to 1.2 mg/kg was given.\par At Rehab, pt has been bed and wheelchair bound with stable pulm status, no dyspnea or cough at rest. He has had no fevers and has a good appetit.\par Chest x ray obtained today (4/20/23) showed stable findings.\par Pt's son says that there has been minimal to no drainage from the PleurX catheter since the last discharge from [facility] [de-identified] : IV [FreeTextEntry1] : BV q3 weeks x 4 AVD x2 6/26/23  [de-identified] : D/C from Rehab\par received 4th  dose BV, at 1.8 mg/kg, on 4/24/23 w/o incident with Onpro support\par pulm status - recently son notes cough with yellow mucus  cxr fine \par minimal to no drainage from PleurX catheter, seeing thoracic surg Dr Galvan pleura cath removed\par constipation post AVD - given Lactulose discussed  bowel regimen MiraLAX daily no bm x 2 days then lactulose as needed \par Afebrile\par to Er plummer cath clogged changed UA c/s showed Ecoli on Augmentin q 12 \par now has formal care taker on continous o2 via nasal cannula\par

## 2023-06-26 NOTE — REASON FOR VISIT
[Follow-Up Visit] : a follow-up visit for [Lymphoma] : lymphoma [Family Member] : family member [Formal Caregiver] : formal caregiver [FreeTextEntry2] : Hodgkin

## 2023-06-26 NOTE — ASSESSMENT
[FreeTextEntry1] : Stage IV classical Hodgkin lymphoma in an 83 yo man who presented with a large pleural effusion and respiratory failure. He has now been treated 3 times with BV, three weeks apart, with good tolerance, no c/o neuropathy. He had myelosuppression transiently after the first dose which rapidly reversed after receiving filgrastim.\par He has a PleurX catheter in place which was obstructed a few weeks ago, was cleared, and is now draining minimally now w/o recurrence of respiratory complaints or changes in chest X-ray, incl 4/24, suggesting that to the degree that the effusion is related to Hodgkin lymphoma, pleural implants, he may be responding. There is a suggestion of mild decrease in LAD in recent chest CT.\par Recent echo shows nl LV function.\par Monotherapy with BV will not be with curative intent, so intend to shift to cycles of AVD (doxorubicin, vinblastine and dacarbazine) given q2 weeks as per Jarreds et. al. 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  \par Blood counts after therapy need to be closely followed.\par cont to need intensive PT.\par : \par AVD will be given q2 wk.today is second AVD check Petct scan \par Cont. f/u with Dr. Galvan post pleura cath removal suture in place\par  LMW heparin ppx.

## 2023-06-27 ENCOUNTER — OUTPATIENT (OUTPATIENT)
Dept: OUTPATIENT SERVICES | Facility: HOSPITAL | Age: 83
LOS: 1 days | Discharge: HOME | End: 2023-06-27

## 2023-06-27 ENCOUNTER — NON-APPOINTMENT (OUTPATIENT)
Age: 83
End: 2023-06-27

## 2023-06-27 DIAGNOSIS — Z98.890 OTHER SPECIFIED POSTPROCEDURAL STATES: Chronic | ICD-10-CM

## 2023-06-27 DIAGNOSIS — C81.90 HODGKIN LYMPHOMA, UNSPECIFIED, UNSPECIFIED SITE: ICD-10-CM

## 2023-06-27 LAB
CULTURE RESULTS: SIGNIFICANT CHANGE UP
CULTURE RESULTS: SIGNIFICANT CHANGE UP
SPECIMEN SOURCE: SIGNIFICANT CHANGE UP
SPECIMEN SOURCE: SIGNIFICANT CHANGE UP

## 2023-07-02 ENCOUNTER — OUTPATIENT (OUTPATIENT)
Dept: OUTPATIENT SERVICES | Facility: HOSPITAL | Age: 83
LOS: 1 days | End: 2023-07-02
Payer: MEDICARE

## 2023-07-02 ENCOUNTER — APPOINTMENT (OUTPATIENT)
Dept: NUCLEAR MEDICINE | Facility: IMAGING CENTER | Age: 83
End: 2023-07-02
Payer: MEDICARE

## 2023-07-02 DIAGNOSIS — C81.70 OTHER HODGKIN LYMPHOMA, UNSPECIFIED SITE: ICD-10-CM

## 2023-07-02 DIAGNOSIS — Z98.890 OTHER SPECIFIED POSTPROCEDURAL STATES: Chronic | ICD-10-CM

## 2023-07-02 PROCEDURE — 78815 PET IMAGE W/CT SKULL-THIGH: CPT

## 2023-07-02 PROCEDURE — 78815 PET IMAGE W/CT SKULL-THIGH: CPT | Mod: 26,PS,MH

## 2023-07-02 PROCEDURE — A9552: CPT

## 2023-07-05 ENCOUNTER — APPOINTMENT (OUTPATIENT)
Dept: THORACIC SURGERY | Facility: CLINIC | Age: 83
End: 2023-07-05
Payer: MEDICARE

## 2023-07-05 VITALS
HEART RATE: 69 BPM | WEIGHT: 166 LBS | HEIGHT: 70 IN | SYSTOLIC BLOOD PRESSURE: 114 MMHG | DIASTOLIC BLOOD PRESSURE: 69 MMHG | OXYGEN SATURATION: 95 % | BODY MASS INDEX: 23.77 KG/M2

## 2023-07-05 PROCEDURE — 99214 OFFICE O/P EST MOD 30 MIN: CPT

## 2023-07-06 NOTE — ASSESSMENT
[FreeTextEntry1] : Mr. 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  He was intubated in the field and brought to [facility] ED and admitted to ICU for acute respiratory failure with hypercapnia. CTA chest was negative for PE but significant for a large R pleural effusion with mediastinal and hilar lymphadenopathy, and 3L of fluid was drained.  He completed a 5 day course of empiric antibiotics. Pleural fluid cultures and blood cultures resulted negative.  Suspicious of underlying malignancy secondary to lymphadenopathy and persistent leukocytosis, Heme/Onc was consulted and recommended transfer to [facility] on 2/24/23 for further workup. On 2/28 he underwent an ultrasound guided biopsy of his R supraclavicular mass/lymph node, which later resulted as classic Hodgkin's lymphoma.  \par \par On 3/3, he underwent a FB, Right VATS, MLND, placement of Pleurx catheter on 03/03/2023 Postoperatively, he was hypotensive and started on Midodrine. A Left chest wall mediport was placed for ongoing chemotherapy treatment on 3/15/2023 by Dr. Johnson. He was admitted  to [facility] on 3/24.\par \par Patient is s/p removal of PleurX catheter on 06/22/2023. \par \par I have reviewed the patient's medical records and diagnostic images at time of this office consultation and have made the following recommendation:\par 1. Stitch removed today, pt is doing well, RTC PRN\par 2. F/u with Dr. Karl Hays for lymphoma. \par \par \par I, KHADIJAH Treviño, personally performed the evaluation and management (E/M) services for this established patient who presents today with (a) new problem(s)/exacerbation of (an) existing condition(s).  That E/M includes conducting the examination, assessing all new/exacerbated conditions, and establishing a new plan of care.  Today, my ACP, Alicia Menendez NP was here to observe my evaluation and management services for this new problem/exacerbated condition to be followed going forward.\par \par \par \par \par

## 2023-07-06 NOTE — HISTORY OF PRESENT ILLNESS
[FreeTextEntry1] : Mr. 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  He was intubated in the field and brought to [facility] ED and admitted to ICU for acute respiratory failure with hypercapnia. CTA chest was negative for PE but significant for a large R pleural effusion with mediastinal and hilar lymphadenopathy, and 3L of fluid was drained.  He completed a 5 day course of empiric antibiotics. Pleural fluid cultures and blood cultures resulted negative.  Suspicious of underlying malignancy secondary to lymphadenopathy and persistent leukocytosis, Heme/Onc was consulted and recommended transfer to [facility] on 2/24/23 for further workup. On 2/28 he underwent an ultrasound guided biopsy of his R supraclavicular mass/lymph node, which later resulted as classic Hodgkin's lymphoma.  \par \par On 3/3, he underwent a FB, Right VATS, MLND, placement of Pleurx catheter on 03/03/2023 Postoperatively, he was hypotensive and started on Midodrine. A Left chest wall mediport was placed for ongoing chemotherapy treatment on 3/15/2023 by Dr. Johnson. He was admitted  to [facility] on 3/24.\par \par While at rehab, he developed chest discomfort., low systolic BP and mild tachycardia. EKG showed Afib with rapid ventricular rhythms. On exam, he had increased respiratory rate. CXR and  CT chest showed increased Rt sided effusion with loculation and increasing atelectasis. He was transferred to [facility]. In the ED, CXR showed recurrent loculated R pleural effusion. CT chest with small loculated R pleural effusion with new peripheral demarcated high attenuation in the R pleural space. He  was seen by CT surgery in the ED, per paper chart, "drained about 100cc of fluid, placed on Pleura vac . PleurX was then flushed and drained about 2180cc fluid need to hold AC for 48 hours for possible TPA into PleurX cath.\par MIST held per CT surgery.  No plan for VATS. CXR  on 3/27, Decreasing pulmonary congestion, No pneumothorax. On 4/6 CT Chest -- Compared to 3/29/2023, similar loculated, exudative right pleural effusion with slightly decreased pleural air component and Pleurx catheter in place. Question malignant effusion in the setting of reported lymphoma, irregular septal thickening, nodularity and mediastinal pleural involvement. \par \par Patient was medically optimized for discharge to [facility] IRU on 4/10/23.\par \par Rehab course significant for:\par 4/11: Appreciate pulm recs. Maintain 02>90% \par 4/12: Plan for PleurX drain- M/W/F. Consults- Urology, Hematology, Psychiatry. BP params to atenolol. Repeat CXR 4/14 and then weekly on Mon/Thurs. \par 4/13:PSYCH: Daughter wants to keep pt on Lexapro 10 mg and Trazodone 25mg.  QTc of 426. Trazodone to 50 mg for sleep. \par 4/14: Respiratory overnight for 02 down to 88%, s/p duoneb with improvement. Symbicort and Spiriva per pulm. \par 4/17: Chlorhexidine. F/u CXR. Pleurex to be drained on Mon & Thurs\par 4/18: Per Dr Galvan (CT SX) okay to resume Eliquis\par \par All other medical co-morbidities were stable. Patient tolerated course of inpatient PT/OT/SLP rehab with significant improvements and met rehab goals prior to discharge. Patient was medically cleared on 4/23/23 for discharge to home with home care.\par \par Ongoing treatment with Hem/Onc Dr. Karl Hays for lymphoma.\par \par Patient is s/p removal of PleurX catheter on 06/22/2023. \par \par Patient is here today for a follow up. Pt reports breathing well, will start treatment with Dr. Hays next wk.

## 2023-07-07 ENCOUNTER — LABORATORY RESULT (OUTPATIENT)
Age: 83
End: 2023-07-07

## 2023-07-10 ENCOUNTER — APPOINTMENT (OUTPATIENT)
Dept: HEMATOLOGY ONCOLOGY | Facility: CLINIC | Age: 83
End: 2023-07-10
Payer: MEDICARE

## 2023-07-10 ENCOUNTER — APPOINTMENT (OUTPATIENT)
Dept: HEMATOLOGY ONCOLOGY | Facility: CLINIC | Age: 83
End: 2023-07-10

## 2023-07-10 ENCOUNTER — RESULT REVIEW (OUTPATIENT)
Age: 83
End: 2023-07-10

## 2023-07-10 ENCOUNTER — APPOINTMENT (OUTPATIENT)
Dept: INFUSION THERAPY | Facility: HOSPITAL | Age: 83
End: 2023-07-10

## 2023-07-10 VITALS
RESPIRATION RATE: 16 BRPM | HEART RATE: 75 BPM | WEIGHT: 167.11 LBS | SYSTOLIC BLOOD PRESSURE: 111 MMHG | DIASTOLIC BLOOD PRESSURE: 75 MMHG | BODY MASS INDEX: 23.98 KG/M2 | TEMPERATURE: 96.7 F | OXYGEN SATURATION: 95 %

## 2023-07-10 DIAGNOSIS — Z78.9 OTHER SPECIFIED HEALTH STATUS: ICD-10-CM

## 2023-07-10 LAB
ANISOCYTOSIS BLD QL: SLIGHT — SIGNIFICANT CHANGE UP
BASOPHILS # BLD AUTO: 0 K/UL — SIGNIFICANT CHANGE UP (ref 0–0.2)
BASOPHILS NFR BLD AUTO: 0 % — SIGNIFICANT CHANGE UP (ref 0–2)
DACRYOCYTES BLD QL SMEAR: SLIGHT — SIGNIFICANT CHANGE UP
ELLIPTOCYTES BLD QL SMEAR: SLIGHT — SIGNIFICANT CHANGE UP
EOSINOPHIL # BLD AUTO: 0.39 K/UL — SIGNIFICANT CHANGE UP (ref 0–0.5)
EOSINOPHIL NFR BLD AUTO: 2 % — SIGNIFICANT CHANGE UP (ref 0–6)
HCT VFR BLD CALC: 35.7 % — LOW (ref 39–50)
HGB BLD-MCNC: 11.2 G/DL — LOW (ref 13–17)
LYMPHOCYTES # BLD AUTO: 1.37 K/UL — SIGNIFICANT CHANGE UP (ref 1–3.3)
LYMPHOCYTES # BLD AUTO: 7 % — LOW (ref 13–44)
MCHC RBC-ENTMCNC: 27.9 PG — SIGNIFICANT CHANGE UP (ref 27–34)
MCHC RBC-ENTMCNC: 31.4 G/DL — LOW (ref 32–36)
MCV RBC AUTO: 89 FL — SIGNIFICANT CHANGE UP (ref 80–100)
METAMYELOCYTES # FLD: 1 % — HIGH (ref 0–0)
MONOCYTES # BLD AUTO: 0.98 K/UL — HIGH (ref 0–0.9)
MONOCYTES NFR BLD AUTO: 5 % — SIGNIFICANT CHANGE UP (ref 2–14)
MYELOCYTES NFR BLD: 9 % — HIGH (ref 0–0)
NEUTROPHILS # BLD AUTO: 14.91 K/UL — HIGH (ref 1.8–7.4)
NEUTROPHILS NFR BLD AUTO: 75 % — SIGNIFICANT CHANGE UP (ref 43–77)
NEUTS BAND # BLD: 1 % — SIGNIFICANT CHANGE UP (ref 0–8)
NRBC # BLD: 1 /100 — HIGH (ref 0–0)
NRBC # BLD: SIGNIFICANT CHANGE UP /100 WBCS (ref 0–0)
PLAT MORPH BLD: NORMAL — SIGNIFICANT CHANGE UP
PLATELET # BLD AUTO: 199 K/UL — SIGNIFICANT CHANGE UP (ref 150–400)
POIKILOCYTOSIS BLD QL AUTO: SLIGHT — SIGNIFICANT CHANGE UP
POLYCHROMASIA BLD QL SMEAR: SLIGHT — SIGNIFICANT CHANGE UP
RBC # BLD: 4.01 M/UL — LOW (ref 4.2–5.8)
RBC # FLD: 20.7 % — HIGH (ref 10.3–14.5)
RBC BLD AUTO: ABNORMAL
WBC # BLD: 19.62 K/UL — HIGH (ref 3.8–10.5)
WBC # FLD AUTO: 19.62 K/UL — HIGH (ref 3.8–10.5)

## 2023-07-10 PROCEDURE — 99213 OFFICE O/P EST LOW 20 MIN: CPT

## 2023-07-10 RX ORDER — LEVOFLOXACIN 500 MG/1
500 TABLET, FILM COATED ORAL DAILY
Qty: 7 | Refills: 0 | Status: DISCONTINUED | COMMUNITY
Start: 2023-06-22 | End: 2023-07-10

## 2023-07-10 NOTE — HISTORY OF PRESENT ILLNESS
[Disease:__________________________] : Disease: [unfilled] [de-identified] : Mr. Jeronimo is a 83 yo man with Hodgkin lymphoma undergoing chemotherapy. He had a h/o HTN, AF and chronic urinary retention. Gradually increasing dyspnea developed starting in early 2023. He was admitted to [facility] on 2/17/23 shortly after an immediately prior admission with CRUZ at [facility], where he was found to have SCV LAD and \par a large right pleural effusion, which was drained and was reportedly neg for infection and malignancy. A node bx showed atypical CD30+ cells suspicipus for Hodgkin. He went home on oxygen with further w/u planned, then decompensated and was emergently admitted to [facility], where he was intubated and remained on vent for a few days. Over 3L were drained from the right effusion. He was successfully extubated and was transferred to [facility] for further care on 2/24/23. On 3/3/23, Dr. Nathaniel Galvan performed bronchoscopy, right VATS, pleural bx, mediastinal node dissection and placement of PleurX catheter. Pleural nodules showed chronic inflammation, eosinophils, fibrosis, and a level 7 node showed classical Hodgkin lymphoma. Hodgkin cells were large, anaplastic cells +CD30, CD15, dim PAX5, MUM1, dim OCT2, NEFTALI and (-) for CD3, CD45, CD20, CD79a, BOB1, BCL6.\par He went to rehab on 3/15/23, prior to which an Jbbczi-G-Tmir was placed.\par Pretherapy imaging included PET CT  3/8/23 which showed likely right cervical FDG+ LAD, FDG+ paratracheal, prevascular, perihilar, retrocrural, aortocaval LAD some > 2 cm, most 1-2 cm with SUV 3.5-9.2. Prior CT chest showed a 3.5 cm precarinal node. Right pleural effusion with heterogeneous uptake was seen. There was suspected involvement of the right hemisacrum. 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 Cytogenetics was nl. NGS OnkoMyeloid panel showed TET2 and ASXL1 mutations.\par Pretherapy CBC showed NC/NC anemia, Hgb in 9-10 range, mild leukocytosis, nl creat and LFTs and LDH.\par Therapy with brentuximab vedotin (BV) 1.8 mg/kg was given iv before the 3/15 d/c from [facility] to [facility].\par While at rehab, he developed chest discomfort., low systolic BP and mild tachycardia. EKG showed Afib with rapid ventricular rythmn.  Imaging showed increase in the effusion with loculation and increased atelectasis. He was readmitted to [facility] on 3/25/2. PleurX catheter was flushed with > 2L drainage of pleural fluid; he improved and was able to return to [facility] 4/10/23. Prior to transfer, a second dose of BV with dose reduced to 1.2 mg/kg was given.\par At Rehab, pt has been bed and wheelchair bound with stable pulm status, no dyspnea or cough at rest. He has had no fevers and has a good appetit.\par Chest x ray obtained today (4/20/23) showed stable findings.\par Pt's son says that there has been minimal to no drainage from the PleurX catheter since the last discharge from [facility]. [de-identified] : IV [FreeTextEntry1] : BV q3 weeks x 4 AVD x2 6/26/23  [de-identified] : D/C from Rehab\par received 4th  dose BV, at 1.8 mg/kg, on 4/24/23 w/o incident with Onpro support\par pulm status - pluervac cath removed on Pet/cT scan no pleura effusion denies SOB or cough . last night while on O2 @ 3 liter SAt 85 eventually up to 91 % has crusty drainage in nostril will give nasal saline drops . recommendation for pulmonary MD  given\par constipation post AVD - given Lactulose discussed  bowel regimen MiraLAX daily no bm x 2 days then lactulose as needed \par Afebrile\par to Er plummer cath clogged changed UA c/s showed Ecoli on Augmentin q 12 \par now has formal care takes on Continous o2 via nasal  3 liters\par PET/CT scan reviewed \par

## 2023-07-10 NOTE — REASON FOR VISIT
[Follow-Up Visit] : a follow-up visit for [Lymphoma] : lymphoma [Family Member] : family member [Formal Caregiver] : formal caregiver [FreeTextEntry2] : hodgkins

## 2023-07-10 NOTE — ASSESSMENT
[FreeTextEntry1] : Stage IV classical Hodgkin lymphoma in an 83 yo man who presented with a large pleural effusion and respiratory failure. He has now been treated 3 times with BV, three weeks apart, with good tolerance, no c/o neuropathy. He had myelosuppression transiently after the first dose which rapidly reversed after receiving filgrastim.\par He has a PleurX catheter in place which was obstructed a few weeks ago, was cleared, and is now draining minimally now w/o recurrence of respiratory complaints or changes in chest X-ray, incl 4/24, suggesting that to the degree that the effusion is related to Hodgkin lymphoma, pleural implants, he may be responding. There is a suggestion of mild decrease in LAD in recent chest CT.\par Recent echo shows nl LV function.\par Monotherapy with BV will not be with curative intent, so intend to shift to cycles of AVD (doxorubicin, vinblastine and dacarbazine) given q2 weeks as per Jarreds et. al. 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  \par Blood counts after therapy need to be closely followed.\par cont to need intensive PT.\par : \par AVD will be given q2 wk. today is #4 \par  LMW heparin ppx.on Eliquis\par CBC discussed along with PET/CT \par follow up 2 weeks  [Curative] : Goals of care discussed with patient: Curative [Palliative Care Plan] : not applicable at this time

## 2023-07-11 DIAGNOSIS — Z51.89 ENCOUNTER FOR OTHER SPECIFIED AFTERCARE: ICD-10-CM

## 2023-07-11 DIAGNOSIS — E86.0 DEHYDRATION: ICD-10-CM

## 2023-07-11 DIAGNOSIS — R11.2 NAUSEA WITH VOMITING, UNSPECIFIED: ICD-10-CM

## 2023-07-11 DIAGNOSIS — Z51.11 ENCOUNTER FOR ANTINEOPLASTIC CHEMOTHERAPY: ICD-10-CM

## 2023-07-11 DIAGNOSIS — C81.70 OTHER HODGKIN LYMPHOMA, UNSPECIFIED SITE: ICD-10-CM

## 2023-07-11 LAB
ALBUMIN SERPL ELPH-MCNC: 3.5 G/DL — SIGNIFICANT CHANGE UP (ref 3.3–5)
ALP SERPL-CCNC: 117 U/L — SIGNIFICANT CHANGE UP (ref 40–120)
ALT FLD-CCNC: 13 U/L — SIGNIFICANT CHANGE UP (ref 10–45)
ANION GAP SERPL CALC-SCNC: 11 MMOL/L — SIGNIFICANT CHANGE UP (ref 5–17)
AST SERPL-CCNC: 18 U/L — SIGNIFICANT CHANGE UP (ref 10–40)
BILIRUB SERPL-MCNC: 0.2 MG/DL — SIGNIFICANT CHANGE UP (ref 0.2–1.2)
BUN SERPL-MCNC: 10 MG/DL — SIGNIFICANT CHANGE UP (ref 7–23)
CALCIUM SERPL-MCNC: 9.1 MG/DL — SIGNIFICANT CHANGE UP (ref 8.4–10.5)
CHLORIDE SERPL-SCNC: 95 MMOL/L — LOW (ref 96–108)
CO2 SERPL-SCNC: 28 MMOL/L — SIGNIFICANT CHANGE UP (ref 22–31)
CREAT SERPL-MCNC: 0.48 MG/DL — LOW (ref 0.5–1.3)
EGFR: 102 ML/MIN/1.73M2 — SIGNIFICANT CHANGE UP
GLUCOSE SERPL-MCNC: 114 MG/DL — HIGH (ref 70–99)
LDH SERPL L TO P-CCNC: 303 U/L — HIGH (ref 50–242)
POTASSIUM SERPL-MCNC: 4.3 MMOL/L — SIGNIFICANT CHANGE UP (ref 3.5–5.3)
POTASSIUM SERPL-SCNC: 4.3 MMOL/L — SIGNIFICANT CHANGE UP (ref 3.5–5.3)
PROT SERPL-MCNC: 6.1 G/DL — SIGNIFICANT CHANGE UP (ref 6–8.3)
SODIUM SERPL-SCNC: 134 MMOL/L — LOW (ref 135–145)

## 2023-07-13 NOTE — DIETITIAN INITIAL EVALUATION ADULT - WEIGHT CHANGE
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 Additional Monitors:   Airway Plan:           Plan Factors-Exercise tolerance (METS): >4 METS. Chart reviewed. Patient summary reviewed. Patient is not a current smoker. Induction- intravenous. Postoperative Plan-     Informed Consent- Anesthetic plan and risks discussed with patient.
yes

## 2023-07-14 ENCOUNTER — APPOINTMENT (OUTPATIENT)
Dept: PULMONOLOGY | Facility: CLINIC | Age: 83
End: 2023-07-14
Payer: MEDICARE

## 2023-07-14 VITALS — BODY MASS INDEX: 24.2 KG/M2 | HEIGHT: 70 IN | WEIGHT: 169 LBS

## 2023-07-14 DIAGNOSIS — Z72.820 SLEEP DEPRIVATION: ICD-10-CM

## 2023-07-14 DIAGNOSIS — Z80.9 FAMILY HISTORY OF MALIGNANT NEOPLASM, UNSPECIFIED: ICD-10-CM

## 2023-07-14 PROCEDURE — 95012 NITRIC OXIDE EXP GAS DETER: CPT

## 2023-07-14 PROCEDURE — 94729 DIFFUSING CAPACITY: CPT

## 2023-07-14 PROCEDURE — ZZZZZ: CPT

## 2023-07-14 PROCEDURE — 99204 OFFICE O/P NEW MOD 45 MIN: CPT | Mod: 25

## 2023-07-14 PROCEDURE — 94727 GAS DIL/WSHOT DETER LNG VOL: CPT

## 2023-07-14 PROCEDURE — 94060 EVALUATION OF WHEEZING: CPT

## 2023-07-14 PROCEDURE — 71046 X-RAY EXAM CHEST 2 VIEWS: CPT

## 2023-07-14 PROCEDURE — 94618 PULMONARY STRESS TESTING: CPT

## 2023-07-14 RX ORDER — BUDESONIDE AND FORMOTEROL FUMARATE DIHYDRATE 160; 4.5 UG/1; UG/1
160-4.5 AEROSOL RESPIRATORY (INHALATION)
Qty: 10 | Refills: 0 | Status: ACTIVE | COMMUNITY
Start: 2023-04-20

## 2023-07-14 RX ORDER — INHALER,ASSIST DEVICE,ACCESORY
EACH MISCELLANEOUS
Qty: 1 | Refills: 0 | Status: ACTIVE | COMMUNITY
Start: 2023-04-22

## 2023-07-14 RX ORDER — LEVALBUTEROL HYDROCHLORIDE 0.63 MG/3ML
0.63 SOLUTION RESPIRATORY (INHALATION)
Qty: 120 | Refills: 5 | Status: ACTIVE | COMMUNITY
Start: 2023-07-14 | End: 1900-01-01

## 2023-07-14 RX ORDER — TIOTROPIUM BROMIDE INHALATION SPRAY 3.12 UG/1
2.5 SPRAY, METERED RESPIRATORY (INHALATION)
Qty: 4 | Refills: 0 | Status: ACTIVE | COMMUNITY
Start: 2023-04-20

## 2023-07-14 NOTE — HISTORY OF PRESENT ILLNESS
[TextBox_4] : Mr. Jeronimo is a 83 yo man with Hodgkin lymphoma undergoing chemotherapy. He had a h/o HTN, AF and chronic urinary retention. Gradually increasing dyspnea developed starting in early 2023. He was admitted to [facility] on 2/17/23 shortly after an immediately prior admission with CRUZ at [facility], where he was found to have SCV LAD and \par a large right pleural effusion, which was drained and was reportedly neg for infection and malignancy. A node bx showed atypical CD30+ cells suspicipus for Hodgkin. He went home on oxygen with further w/u planned, then decompensated and was emergently admitted to [facility], where he was intubated and remained on vent for a few days. Over 3L were drained from the right effusion. He was successfully extubated and was transferred to [facility] for further care on 2/24/23. On 3/3/23, Dr. Nathaniel Galvan performed bronchoscopy, right VATS, pleural bx, mediastinal node dissection and placement of PleurX catheter. Pleural nodules showed chronic inflammation, eosinophils, fibrosis, and a level 7 node showed classical Hodgkin lymphoma. Hodgkin cells were large, anaplastic cells +CD30, CD15, dim PAX5, MUM1, dim OCT2, NEFTALI and (-) for CD3, CD45, CD20, CD79a, BOB1, BCL6.\par He went to rehab on 3/15/23, prior to which an Vukeif-A-Ivrd was placed.\par Pretherapy imaging included PET CT  3/8/23 which showed likely right cervical FDG+ LAD, FDG+ paratracheal, prevascular, perihilar, retrocrural, aortocaval LAD some > 2 cm, most 1-2 cm with SUV 3.5-9.2. Prior CT chest showed a 3.5 cm precarinal node. Right pleural effusion with heterogeneous uptake was seen. There was suspected involvement of the right hemisacrum. 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 Cytogenetics was nl. NGS OnkoMyeloid panel showed TET2 and ASXL1 mutations.\par Pretherapy CBC showed NC/NC anemia, Hgb in 9-10 range, mild leukocytosis, nl creat and LFTs and LDH.\par Therapy with brentuximab vedotin (BV) 1.8 mg/kg was given iv before the 3/15 d/c from [facility] to [facility].\par While at rehab, he developed chest discomfort., low systolic BP and mild tachycardia. EKG showed Afib with rapid ventricular rythmn.  Imaging showed increase in the effusion with loculation and increased atelectasis. He was readmitted to [facility] on 3/25/2. PleurX catheter was flushed with > 2L drainage of pleural fluid; he improved and was able to return to [facility] 4/10/23. Prior to transfer, a second dose of BV with dose reduced to 1.2 mg/kg was given.\par At Rehab, pt has been bed and wheelchair bound with stable pulm status, no dyspnea or cough at rest. He has had no fevers and has a good appetite.\par \par Mr. JERONIMO is a 82 year old male with a history of A-fib, anemia, aortic ulcer, hearing issues, HTN, urinary retention Hodgkins Lymphoma, s/p respiratory failure complicated by right pleural effusion requiring a chest tube placement from april 2023-july 2023 presenting to the office today s/p multiple hospital visits, currently on AVD therapy for Stage 4 Hodgkins-Lymphoma  for an initial pulmonary evaluation for  SOB, His chief complaint is\par \par -he notes feeling generally well\par -he notes bowels are regular \par - he denies SOB in supine position \par -he notes diet is good \par -he notes appetite is stable\par - he denies snoring \par -he notes good quality of sleep \par - he notes waking up once a night due to anxiety \par - he notes tingling sensation in toes \par  -he notes his senses of smell and taste are stable \par - he denies PND \par - he notes sores in nose \par -he notes energy levels are good \par - he notes his strength is ok\par -he notes exercising (dumbbells and foot peddle)\par -\par \par \par \par -he denies any headaches, nausea, emesis, fever, chills, sweats, chest pain, chest pressure, coughing, wheezing, palpitations, constipation, diarrhea, vertigo, dysphagia, heartburn, reflux, itchy eyes, itchy ears, leg swelling, leg pain, arthralgias, myalgias, or sour taste in the mouth.

## 2023-07-14 NOTE — PHYSICAL EXAM
[No Acute Distress] : no acute distress [Normal Oropharynx] : normal oropharynx [Normal Appearance] : normal appearance [No Neck Mass] : no neck mass [Normal Rate/Rhythm] : normal rate/rhythm [Normal S1, S2] : normal s1, s2 [No Resp Distress] : no resp distress [Clear to Auscultation Bilaterally] : clear to auscultation bilaterally [Benign] : benign [Normal Gait] : normal gait [No Clubbing] : no clubbing [No Cyanosis] : no cyanosis [FROM] : FROM [Normal Color/ Pigmentation] : normal color/ pigmentation [No Focal Deficits] : no focal deficits [Oriented x3] : oriented x3 [Normal Affect] : normal affect [III] : Mallampati Class: III [TextBox_54] : 1/6 systolic murmur  [TextBox_68] : I:E ratio 1:3; decreased breath sound bilaterally at the bases  [TextBox_80] : mild kyphosis  [TextBox_105] : trace LE edema

## 2023-07-14 NOTE — PROCEDURE
[FreeTextEntry1] : CXR reveals a normal sized heart; no evidence of infiltrate or effusion- left port in place, scarring right mid lungs; mild interstitial markings B/L \par \par Full PFT reveals moderate restrictive dysfunction; FEV1 was  1.61L which is 55% of predicted, with 22% improvement on BD at mid to low volumes; severely reduced lung volumes; moderately reduced diffusion at 9.6, which is 56% of predicted; abnormal inspiratory limb\par PFTs were performed to evaluate for SOB, ILDz\par \par NEETU test revealed severely reduced PI max at 19 which is 20% of predicted; severely reduced PE max of 45 which is 25% of predicted  \par \par 6 minute walk test reveals a low saturation of 87 % with slight dyspnea or fatigue; walked 32.6  meters. The test was paused before 6 minutes due to fatigue \par \par FENO was 36 ; a normal value being less than 25\par Fractional exhaled nitric oxide (FENO) is regarded as a simple, noninvasive method for assessing eosinophilic airway inflammation. Produced by a variety of cells within the lung, nitric oxide (NO) concentrations are generally low in healthy individuals. However, high concentrations of NO appear to be involved in nonspecific host defense mechanisms and chronic inflammatory diseases such as asthma. The American Thoracic Society (ATS) therefore has recommended using FENO to aid in the diagnosis and monitoring of eosinophilic airway inflammation and asthma, and for identifying steroid responsive individuals whose chronic respiratory symptoms may be caused by airway inflammation.

## 2023-07-14 NOTE — ASSESSMENT
[FreeTextEntry1] : Mr. DUMONT is a 82 year old male with a history of A-fib, anemia, aortic ulcer, hearing issues, HTN, urinary retention Hodgkins Lymphoma, s/p respiratory failure complicated by right pleural effusion requiring a chest tube placement from april 2023-july 2023 presenting to the office today s/p multiple hospital visits, currently on AVD therapy for Stage 4 Hodgkins-Lymphoma  for an initial pulmonary evaluation for  SOB, chronic respiratory failure ?ILDZ, ?PE/PAH, respiratory muscle weakness (confirmed)\par \par His shortness of breath is multifactorial due to:\par -poor mechanics of breathing \par -out of shape \par -pulmonary disease\par    respiratory failure due to lymphoma consider HRCT pending blood work results \par   ?ILDz\par   ?PE/PAH \par    respiratory muscle weakness \par - obstructive dysfunction \par -cardiac disease ([place])\par    \par \par problem 1: ?Anemia \par -complete blood work to include: iron studies, Ferritin level\par -recommended consultation with Dr. Hays\par \par \par \par problem 2: ?ILDz, ?PE/PAH \par -complete bloodwork to include; D-dimer, BNP \par - complete echocardiogram ([facility]) to rule out PAH \par - if the D-dimer elevated pursue a CTPA/VQ scan \par - complete pulmonary rehab at [facility]\par - ?consider HRCT pending blood work results \par \par Problem 2A: Obstructive dysfunction (mixed obstructive/restrictive dysfunction)\par -add Xopenex 0.63 via nebulizer q6H prn \par -add Symbicort 160 2 inhalations BID \par -add Spiriva at 2 inhalations QAM  \par \par Problem 3: CRF-resp failure\par -on oxygen currently keep Oxygen above 88%\par -recommend NasoGel \par - recommended boroleum ointment \par -Requires 3 L nasal cannula pulse dose - portable\par -Tests results (overnight oximetry, 6 minute walk test, exercise study, arterial blood gas, etc.) reveal that oxygen is necessary for daily life- optimally it should be used with any activity and during sleep \par \par Problem 4: Stage 4 Hodgkins-Lymphoma \par -recommended SaNOtize nasal spray \par \par Problem 5: poor sleep ?MING (elevated mallampati, poor quality sleep)\par -complete home sleep study \par -Sleep apnea is associated with adverse clinical consequences which can affect most organ systems. Cardiovascular disease risk includes arrhythmias, atrial fibrillation, hypertension, coronary artery disease, and stroke. Metabolic disorders include diabetes type 2, non-alcoholic fatty liver disease. Mood disorder especially depression; and cognitive decline especially in the elderly. Associations with chronic reflux/Sahni’s esophagus some but not all inclusive.\par -Reasons include arousal consistent with hypopnea; respiratory events most prominent in REM sleep or supine position; therefore sleep staging and body position are important for accurate diagnosis and estimation of AHI.\par  \par problem 6 : cardiac disease\par -recommended to continue to follow up with Cardiologist (Raffaele)\par \par problem 7 : poor breathing mechanics\par -Proper breathing techniques were reviewed with an emphasis of exhalation. Patient instructed to breathe in for 1 second and out for 4 seconds. Patient was encouraged to not talk while walking. \par \par problem 8 : out of shape\par -Weight loss, exercise, and diet control were discussed and are highly encouraged. Treatment options are given such as, aqua therapy, and contacting a nutritionist. Recommended to use the elliptical, stationary bike, less use of treadmill. \par \par problem 9: health maintenance \par -recommended yearly flu shot after October 15, 2022\par -recommended strep pneumonia vaccines: Prevnar-20 vaccine, followed by Pneumo vaccine 23 one year following after 65 years old. \par -recommended early intervention for Upper Respiratory Infections (URIs)\par -recommended regular osteoporosis evaluations\par -recommended early dermatological evaluations\par -recommended after the age of 50 to the age of 70, colonoscopy every 5 years\par \par F/P in 6-8 weeks.\par He is encouraged to call with any changes, concerns, or questions

## 2023-07-14 NOTE — REASON FOR VISIT
Pre-Visit Chart Review  For Appointment Scheduled on 1-11-18    Health Maintenance Due   Topic Date Due    TETANUS VACCINE  10/15/1954    Zoster Vaccine  10/15/1995    Pneumococcal (65+) (1 of 2 - PCV13) 10/15/2000       [Initial] : an initial visit [TextBox_44] : SOB, chronic respiratory failure, ?ILDZ, ?PE/PAH, respiratory muscle weakness

## 2023-07-14 NOTE — ADDENDUM
[FreeTextEntry1] : Documented by Alicia Klein acting as a scribe for Dr. Unruly Vasquez on 07/13/2023. All medical record entries made by the Scribe were at my, Dr. Unruly Vasquez's, direction and personally dictated by me on 07/13/2023. I have reviewed the chart and agree that the record accurately reflects my personal performance of the history, physical exam, assessment and plan. I have also personally directed, reviewed, and agree with the discharge instructions.\par \par

## 2023-07-17 ENCOUNTER — LABORATORY RESULT (OUTPATIENT)
Age: 83
End: 2023-07-17

## 2023-07-21 ENCOUNTER — LABORATORY RESULT (OUTPATIENT)
Age: 83
End: 2023-07-21

## 2023-07-24 ENCOUNTER — NON-APPOINTMENT (OUTPATIENT)
Age: 83
End: 2023-07-24

## 2023-07-24 ENCOUNTER — APPOINTMENT (OUTPATIENT)
Dept: HEMATOLOGY ONCOLOGY | Facility: CLINIC | Age: 83
End: 2023-07-24
Payer: MEDICARE

## 2023-07-24 ENCOUNTER — RESULT REVIEW (OUTPATIENT)
Age: 83
End: 2023-07-24

## 2023-07-24 ENCOUNTER — APPOINTMENT (OUTPATIENT)
Dept: INFUSION THERAPY | Facility: HOSPITAL | Age: 83
End: 2023-07-24

## 2023-07-24 ENCOUNTER — APPOINTMENT (OUTPATIENT)
Dept: HEMATOLOGY ONCOLOGY | Facility: CLINIC | Age: 83
End: 2023-07-24

## 2023-07-24 VITALS
OXYGEN SATURATION: 99 % | RESPIRATION RATE: 20 BRPM | DIASTOLIC BLOOD PRESSURE: 67 MMHG | SYSTOLIC BLOOD PRESSURE: 111 MMHG | BODY MASS INDEX: 23.82 KG/M2 | HEART RATE: 60 BPM | WEIGHT: 166.01 LBS | TEMPERATURE: 97.3 F

## 2023-07-24 DIAGNOSIS — I71.9 AORTIC ANEURYSM OF UNSPECIFIED SITE, W/OUT RUPTURE: ICD-10-CM

## 2023-07-24 DIAGNOSIS — Z87.09 PERSONAL HISTORY OF OTHER DISEASES OF THE RESPIRATORY SYSTEM: ICD-10-CM

## 2023-07-24 LAB
ALBUMIN SERPL ELPH-MCNC: 3.6 G/DL — SIGNIFICANT CHANGE UP (ref 3.3–5)
ALP SERPL-CCNC: 109 U/L — SIGNIFICANT CHANGE UP (ref 40–120)
ALT FLD-CCNC: 7 U/L — LOW (ref 10–45)
ANION GAP SERPL CALC-SCNC: 7 MMOL/L — SIGNIFICANT CHANGE UP (ref 5–17)
AST SERPL-CCNC: 24 U/L — SIGNIFICANT CHANGE UP (ref 10–40)
BILIRUB SERPL-MCNC: 0.2 MG/DL — SIGNIFICANT CHANGE UP (ref 0.2–1.2)
BUN SERPL-MCNC: 13 MG/DL — SIGNIFICANT CHANGE UP (ref 7–23)
CALCIUM SERPL-MCNC: 9.1 MG/DL — SIGNIFICANT CHANGE UP (ref 8.4–10.5)
CHLORIDE SERPL-SCNC: 99 MMOL/L — SIGNIFICANT CHANGE UP (ref 96–108)
CO2 SERPL-SCNC: 30 MMOL/L — SIGNIFICANT CHANGE UP (ref 22–31)
CREAT SERPL-MCNC: 0.46 MG/DL — LOW (ref 0.5–1.3)
EGFR: 104 ML/MIN/1.73M2 — SIGNIFICANT CHANGE UP
GLUCOSE SERPL-MCNC: 139 MG/DL — HIGH (ref 70–99)
LDH SERPL L TO P-CCNC: 263 U/L — HIGH (ref 50–242)
POTASSIUM SERPL-MCNC: 3.6 MMOL/L — SIGNIFICANT CHANGE UP (ref 3.5–5.3)
POTASSIUM SERPL-SCNC: 3.6 MMOL/L — SIGNIFICANT CHANGE UP (ref 3.5–5.3)
PROT SERPL-MCNC: 6.3 G/DL — SIGNIFICANT CHANGE UP (ref 6–8.3)
SODIUM SERPL-SCNC: 136 MMOL/L — SIGNIFICANT CHANGE UP (ref 135–145)

## 2023-07-24 PROCEDURE — 99214 OFFICE O/P EST MOD 30 MIN: CPT

## 2023-07-27 PROBLEM — I71.9 PENETRATING ULCER OF AORTA: Status: ACTIVE | Noted: 2023-04-22

## 2023-07-27 PROBLEM — Z87.09 HISTORY OF RESPIRATORY FAILURE: Status: ACTIVE | Noted: 2023-04-22

## 2023-07-27 NOTE — HISTORY OF PRESENT ILLNESS
[Disease:__________________________] : Disease: [unfilled] [de-identified] : Mr. Jeronimo is a 83 yo man with Hodgkin lymphoma undergoing chemotherapy. He had a h/o HTN, AF and chronic urinary retention. Gradually increasing dyspnea developed starting in early 2023. He was admitted to [facility] on 2/17/23 shortly after an immediately prior admission with CRUZ at [facility], where he was found to have SCV LAD and \par a large right pleural effusion, which was drained and was reportedly neg for infection and malignancy. A node bx showed atypical CD30+ cells suspicipus for Hodgkin. He went home on oxygen with further w/u planned, then decompensated and was emergently admitted to [facility], where he was intubated and remained on vent for a few days. Over 3L were drained from the right effusion. He was successfully extubated and was transferred to [facility] for further care on 2/24/23. On 3/3/23, Dr. Nathaniel Galvan performed bronchoscopy, right VATS, pleural bx, mediastinal node dissection and placement of PleurX catheter. Pleural nodules showed chronic inflammation, eosinophils, fibrosis, and a level 7 node showed classical Hodgkin lymphoma. Hodgkin cells were large, anaplastic cells +CD30, CD15, dim PAX5, MUM1, dim OCT2, NEFTALI and (-) for CD3, CD45, CD20, CD79a, BOB1, BCL6.\par He went to rehab on 3/15/23, prior to which an Acgzmc-E-Hikl was placed.\par Pretherapy imaging included PET CT  3/8/23 which showed likely right cervical FDG+ LAD, FDG+ paratracheal, prevascular, perihilar, retrocrural, aortocaval LAD some > 2 cm, most 1-2 cm with SUV 3.5-9.2. Prior CT chest showed a 3.5 cm precarinal node. Right pleural effusion with heterogeneous uptake was seen. There was suspected involvement of the right hemisacrum. 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 Cytogenetics was nl. 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 EKG showed Afib with rapid ventricular rythmn.  Imaging showed increase in the effusion with loculation and increased atelectasis. He was readmitted to [facility] on 3/25/2. PleurX catheter was flushed with > 2L drainage of pleural fluid; he improved and was able to return to [facility] 4/10/23. Prior to transfer, a second dose of BV with dose reduced to 1.2 mg/kg was given.\par At Rehab, pt has been bed and wheelchair bound with stable pulm status, no dyspnea or cough at rest. He has had no fevers and has a good appetit.\par Chest x ray obtained today (4/20/23) showed stable findings.\par Pt's son says that there has been minimal to no drainage from the PleurX catheter since the last discharge from [facility]. [de-identified] : IV [FreeTextEntry1] : BV q3 weeks x 4 AVD x2 6/26/23  [de-identified] : D/C from Rehab\par received 4th  dose BV, at 1.8 mg/kg, on 4/24/23 w/o incident with Onpro support then started q2 wk AVD with good\par tolerance. Minor dose adjustments.\par \par PET/CT 7/2/23: Decr or resolution of FDG+ dz above and below diaphragm (Deauville 2)' decr  in FDG(-) bilateral\par reticular opacities in lung, resolved right pleural effusion.\par pulm status -PleurX catheter d/c'd, pulm status improved\par constipation post AVD better\par \par no constitutional c/o\par \par \par Afebrile\par to Er plummer cath clogged changed UA c/s showed Ecoli on Augmentin q 12 \par now has formal care takes on Continous o2 via nasal  3 liters\par PET/CT scan reviewed \par

## 2023-07-27 NOTE — PHYSICAL EXAM
[Restricted in physically strenuous activity but ambulatory and able to carry out work of a light or sedentary nature] : Status 1- Restricted in physically strenuous activity but ambulatory and able to carry out work of a light or sedentary nature, e.g., light house work, office work [Normal] : affect appropriate [de-identified] : +mild LE edema

## 2023-07-27 NOTE — ASSESSMENT
[Curative] : Goals of care discussed with patient: Curative [Palliative Care Plan] : not applicable at this time [FreeTextEntry1] : Stage IV classical Hodgkin lymphoma in an 81 yo man who presented with a large pleural effusion and respiratory failure. He has now been treated 3 times with BV, three weeks apart, with good tolerance, no c/o neuropathy. He had myelosuppression transiently after the first dose which rapidly reversed after receiving filgrastim.\par Now receiving q2 wk AVD as per  Sunny et. al. JCO 36:3015, 2018.\par Had  PleurX catheter - now removed\par PET/CT shows major response, Deauville 2, with resolved pleural effusion, decr pulm infiltrates\par pulm status improving \par  \par Presence of two mutations in bone marrow cells identified by NGS (ASXL1 and TET2) may be bystander mutations or may signify propensity to develop cytopenias related to abnormal clonal hematopoiesis.  \par Blood counts after therapy have been closely followed.\par \par Will receive total of three full cycles of AVD then two more doses of brentuximab so that total BV dosing will end up being same as in Sunny et al. No neuropathy c/o to date.\par \par Next cycle AVD today\par Check CMP, LDH\par RV 2 wks\par  \par  \par  \par  \par

## 2023-08-07 ENCOUNTER — RESULT REVIEW (OUTPATIENT)
Age: 83
End: 2023-08-07

## 2023-08-07 ENCOUNTER — APPOINTMENT (OUTPATIENT)
Dept: INFUSION THERAPY | Facility: HOSPITAL | Age: 83
End: 2023-08-07

## 2023-08-07 ENCOUNTER — APPOINTMENT (OUTPATIENT)
Dept: HEMATOLOGY ONCOLOGY | Facility: CLINIC | Age: 83
End: 2023-08-07
Payer: MEDICARE

## 2023-08-07 VITALS
WEIGHT: 167 LBS | TEMPERATURE: 97.3 F | OXYGEN SATURATION: 96 % | SYSTOLIC BLOOD PRESSURE: 121 MMHG | BODY MASS INDEX: 23.91 KG/M2 | HEIGHT: 70 IN | RESPIRATION RATE: 18 BRPM | DIASTOLIC BLOOD PRESSURE: 74 MMHG | HEART RATE: 54 BPM

## 2023-08-07 LAB
ALBUMIN SERPL ELPH-MCNC: 3.6 G/DL
ALBUMIN SERPL ELPH-MCNC: 3.7 G/DL — SIGNIFICANT CHANGE UP (ref 3.3–5)
ALP BLD-CCNC: 87 U/L
ALP SERPL-CCNC: 99 U/L — SIGNIFICANT CHANGE UP (ref 40–120)
ALT FLD-CCNC: 6 U/L — LOW (ref 10–45)
ALT SERPL-CCNC: 11 U/L
ANION GAP SERPL CALC-SCNC: 7 MMOL/L — SIGNIFICANT CHANGE UP (ref 5–17)
ANION GAP SERPL CALC-SCNC: 9 MMOL/L
ANISOCYTOSIS BLD QL: SLIGHT — SIGNIFICANT CHANGE UP
AST SERPL-CCNC: 14 U/L
AST SERPL-CCNC: 20 U/L — SIGNIFICANT CHANGE UP (ref 10–40)
BASOPHILS # BLD AUTO: 0 K/UL — SIGNIFICANT CHANGE UP (ref 0–0.2)
BASOPHILS NFR BLD AUTO: 0 % — SIGNIFICANT CHANGE UP (ref 0–2)
BILIRUB SERPL-MCNC: 0.2 MG/DL — SIGNIFICANT CHANGE UP (ref 0.2–1.2)
BILIRUB SERPL-MCNC: 0.3 MG/DL
BUN SERPL-MCNC: 11 MG/DL
BUN SERPL-MCNC: 12 MG/DL — SIGNIFICANT CHANGE UP (ref 7–23)
CALCIUM SERPL-MCNC: 8.9 MG/DL — SIGNIFICANT CHANGE UP (ref 8.4–10.5)
CALCIUM SERPL-MCNC: 9.2 MG/DL
CHLORIDE SERPL-SCNC: 98 MMOL/L
CHLORIDE SERPL-SCNC: 99 MMOL/L — SIGNIFICANT CHANGE UP (ref 96–108)
CO2 SERPL-SCNC: 31 MMOL/L — SIGNIFICANT CHANGE UP (ref 22–31)
CO2 SERPL-SCNC: 33 MMOL/L
CREAT SERPL-MCNC: 0.43 MG/DL — LOW (ref 0.5–1.3)
CREAT SERPL-MCNC: 0.63 MG/DL
DACRYOCYTES BLD QL SMEAR: SLIGHT — SIGNIFICANT CHANGE UP
DOHLE BOD BLD QL SMEAR: PRESENT — SIGNIFICANT CHANGE UP
EGFR: 106 ML/MIN/1.73M2 — SIGNIFICANT CHANGE UP
EGFR: 94 ML/MIN/1.73M2
ELLIPTOCYTES BLD QL SMEAR: SLIGHT — SIGNIFICANT CHANGE UP
EOSINOPHIL # BLD AUTO: 0 K/UL — SIGNIFICANT CHANGE UP (ref 0–0.5)
EOSINOPHIL NFR BLD AUTO: 0 % — SIGNIFICANT CHANGE UP (ref 0–6)
ERYTHROCYTE [SEDIMENTATION RATE] IN BLOOD: 36 MM/HR — HIGH (ref 0–20)
GLUCOSE SERPL-MCNC: 115 MG/DL
GLUCOSE SERPL-MCNC: 131 MG/DL — HIGH (ref 70–99)
HCT VFR BLD CALC: 30.6 % — LOW (ref 39–50)
HGB BLD-MCNC: 9.5 G/DL — LOW (ref 13–17)
LDH SERPL L TO P-CCNC: 186 U/L — SIGNIFICANT CHANGE UP (ref 50–242)
LYMPHOCYTES # BLD AUTO: 11 % — LOW (ref 13–44)
LYMPHOCYTES # BLD AUTO: 2.09 K/UL — SIGNIFICANT CHANGE UP (ref 1–3.3)
MCHC RBC-ENTMCNC: 30.2 PG — SIGNIFICANT CHANGE UP (ref 27–34)
MCHC RBC-ENTMCNC: 31 G/DL — LOW (ref 32–36)
MCV RBC AUTO: 97.8 FL — SIGNIFICANT CHANGE UP (ref 80–100)
MONOCYTES # BLD AUTO: 1.71 K/UL — HIGH (ref 0–0.9)
MONOCYTES NFR BLD AUTO: 9 % — SIGNIFICANT CHANGE UP (ref 2–14)
MYELOCYTES NFR BLD: 1 % — HIGH (ref 0–0)
NEUTROPHILS # BLD AUTO: 14.98 K/UL — HIGH (ref 1.8–7.4)
NEUTROPHILS NFR BLD AUTO: 79 % — HIGH (ref 43–77)
NRBC # BLD: 1 /100 — HIGH (ref 0–0)
NRBC # BLD: SIGNIFICANT CHANGE UP /100 WBCS (ref 0–0)
PLAT MORPH BLD: NORMAL — SIGNIFICANT CHANGE UP
PLATELET # BLD AUTO: 225 K/UL — SIGNIFICANT CHANGE UP (ref 150–400)
POIKILOCYTOSIS BLD QL AUTO: SLIGHT — SIGNIFICANT CHANGE UP
POLYCHROMASIA BLD QL SMEAR: SLIGHT — SIGNIFICANT CHANGE UP
POTASSIUM SERPL-MCNC: 3.8 MMOL/L — SIGNIFICANT CHANGE UP (ref 3.5–5.3)
POTASSIUM SERPL-SCNC: 3.8 MMOL/L — SIGNIFICANT CHANGE UP (ref 3.5–5.3)
POTASSIUM SERPL-SCNC: 4.6 MMOL/L
PROT SERPL-MCNC: 6.1 G/DL — SIGNIFICANT CHANGE UP (ref 6–8.3)
PROT SERPL-MCNC: 6.5 G/DL
RBC # BLD: 3.15 M/UL — LOW (ref 4.2–5.8)
RBC # FLD: 25.6 % — HIGH (ref 10.3–14.5)
RBC BLD AUTO: ABNORMAL
SODIUM SERPL-SCNC: 137 MMOL/L — SIGNIFICANT CHANGE UP (ref 135–145)
SODIUM SERPL-SCNC: 139 MMOL/L
WBC # BLD: 18.96 K/UL — HIGH (ref 3.8–10.5)
WBC # FLD AUTO: 18.96 K/UL — HIGH (ref 3.8–10.5)

## 2023-08-07 PROCEDURE — 99214 OFFICE O/P EST MOD 30 MIN: CPT

## 2023-08-09 LAB
ALBUMIN SERPL ELPH-MCNC: 3.7 G/DL
ALP BLD-CCNC: 110 U/L
ALT SERPL-CCNC: 15 U/L
ANION GAP SERPL CALC-SCNC: 10 MMOL/L
AST SERPL-CCNC: 14 U/L
BILIRUB SERPL-MCNC: 0.2 MG/DL
BUN SERPL-MCNC: 12 MG/DL
CALCIUM SERPL-MCNC: 9.3 MG/DL
CHLORIDE SERPL-SCNC: 99 MMOL/L
CO2 SERPL-SCNC: 30 MMOL/L
CREAT SERPL-MCNC: 0.51 MG/DL
EGFR: 101 ML/MIN/1.73M2
GLUCOSE SERPL-MCNC: 101 MG/DL
LDH SERPL-CCNC: 195 U/L
POTASSIUM SERPL-SCNC: 4 MMOL/L
PROT SERPL-MCNC: 5.9 G/DL
SODIUM SERPL-SCNC: 139 MMOL/L

## 2023-08-13 ENCOUNTER — EMERGENCY (EMERGENCY)
Facility: HOSPITAL | Age: 83
LOS: 1 days | Discharge: HOME | End: 2023-08-13
Attending: EMERGENCY MEDICINE | Admitting: EMERGENCY MEDICINE
Payer: MEDICARE

## 2023-08-13 VITALS
DIASTOLIC BLOOD PRESSURE: 69 MMHG | WEIGHT: 167.99 LBS | HEART RATE: 95 BPM | OXYGEN SATURATION: 99 % | TEMPERATURE: 98 F | SYSTOLIC BLOOD PRESSURE: 114 MMHG | HEIGHT: 70 IN | RESPIRATION RATE: 19 BRPM

## 2023-08-13 VITALS
HEART RATE: 88 BPM | RESPIRATION RATE: 18 BRPM | DIASTOLIC BLOOD PRESSURE: 70 MMHG | OXYGEN SATURATION: 98 % | SYSTOLIC BLOOD PRESSURE: 114 MMHG

## 2023-08-13 DIAGNOSIS — Z98.890 OTHER SPECIFIED POSTPROCEDURAL STATES: Chronic | ICD-10-CM

## 2023-08-13 PROCEDURE — 99283 EMERGENCY DEPT VISIT LOW MDM: CPT

## 2023-08-13 PROCEDURE — 51702 INSERT TEMP BLADDER CATH: CPT

## 2023-08-13 NOTE — ASSESSMENT
[Curative] : Goals of care discussed with patient: Curative [Palliative Care Plan] : not applicable at this time [FreeTextEntry1] : Stage IV classical Hodgkin lymphoma in an 81 yo man who presented with a large pleural effusion and respiratory failure. He has now been treated 3 times with BV, three weeks apart, with good tolerance, no c/o neuropathy. He had myelosuppression transiently after the first dose which rapidly reversed after receiving filgrastim. Now receiving q2 wk AVD as per  Sunny et. al. JCO 36:3015, 2018. Had  PleurX catheter - now removed PET/CT shows major response, Deauville 2, with resolved pleural effusion, decr pulm infiltrates pulm status improving    Presence of two mutations in bone marrow cells identified by NGS (ASXL1 and TET2) may be bystander mutations or may signify propensity to develop cytopenias related to abnormal clonal hematopoiesis.   Blood counts after therapy have been closely followed.  Will receive total of three full cycles of AVD then two more doses of brentuximab so that total BV dosing will end up being same as in Sunny et al. No neuropathy c/o to date.  Next cycle AVD today Check CMP, LDH RV 2 wks

## 2023-08-13 NOTE — ED ADULT NURSE REASSESSMENT NOTE - NS ED NURSE REASSESS COMMENT FT1
plummer exchanged for new 18 Fr plummer, output 800 mL urine w/ new plummer. pt no longer c/o of bladder discomfort, pt states pain resolved. safety maintained.

## 2023-08-13 NOTE — ED PROVIDER NOTE - PATIENT PORTAL LINK FT
You can access the FollowMyHealth Patient Portal offered by [facility] by registering at the following website: [email or web address]. By joining [facility]’s FollowMyHealth portal, you will also be able to view your health information using other applications (apps) compatible with our system.

## 2023-08-13 NOTE — HISTORY OF PRESENT ILLNESS
[Disease:__________________________] : Disease: [unfilled] [de-identified] : Mr. Jeronimo is a 81 yo man with Hodgkin lymphoma undergoing chemotherapy. He had a h/o HTN, AF and chronic urinary retention. Gradually increasing dyspnea developed starting in early 2023. He was admitted to [facility] on 2/17/23 shortly after an immediately prior admission with CRUZ at [facility], where he was found to have SCV LAD and  a large right pleural effusion, which was drained and was reportedly neg for infection and malignancy. A node bx showed atypical CD30+ cells suspicipus for Hodgkin. He went home on oxygen with further w/u planned, then decompensated and was emergently admitted to [facility], where he was intubated and remained on vent for a few days. Over 3L were drained from the right effusion. He was successfully extubated and was transferred to [facility] for further care on 2/24/23. On 3/3/23, Dr. Nathaniel Galvan performed bronchoscopy, right VATS, pleural bx, mediastinal node dissection and placement of PleurX catheter. Pleural nodules showed chronic inflammation, eosinophils, fibrosis, and a level 7 node showed classical Hodgkin lymphoma. Hodgkin cells were large, anaplastic cells +CD30, CD15, dim PAX5, MUM1, dim OCT2, NEFTALI and (-) for CD3, CD45, CD20, CD79a, BOB1, BCL6. He went to rehab on 3/15/23, prior to which an Ogmxvn-L-Uvvg was placed. Pretherapy imaging included PET CT  3/8/23 which showed likely right cervical FDG+ LAD, FDG+ paratracheal, prevascular, perihilar, retrocrural, aortocaval LAD some > 2 cm, most 1-2 cm with SUV 3.5-9.2. Prior CT chest showed a 3.5 cm precarinal node. Right pleural effusion with heterogeneous uptake was seen. There was suspected involvement of the right hemisacrum. 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 Pretherapy BMA/Bx showed slightly erythroid predominant trilineage hematopoiesis. Cytogenetics was nl. NGS OnkoMyeloid panel showed TET2 and ASXL1 mutations. Pretherapy CBC showed NC/NC anemia, Hgb in 9-10 range, mild leukocytosis, nl creat and LFTs and LDH. Therapy with brentuximab vedotin (BV) 1.8 mg/kg was given iv before the 3/15 d/c from [facility] to [facility]. While at rehab, he developed chest discomfort., low systolic BP and mild tachycardia. EKG showed Afib with rapid ventricular rythmn.  Imaging showed increase in the effusion with loculation and increased atelectasis. He was readmitted to [facility] on 3/25/2. PleurX catheter was flushed with > 2L drainage of pleural fluid; he improved and was able to return to [facility] 4/10/23. Prior to transfer, a second dose of BV with dose reduced to 1.2 mg/kg was given. At Rehab, pt has been bed and wheelchair bound with stable pulm status, no dyspnea or cough at rest. He has had no fevers and has a good appetit. Chest x ray obtained today (4/20/23) showed stable findings. Pt's son says that there has been minimal to no drainage from the PleurX catheter since the last discharge from [facility]. PleurX has since been removed. [de-identified] : IV [FreeTextEntry1] : BV q3 weeks x 4 AVD x2 6/26/23  [de-identified] : D/C from Rehab received 4th  dose BV, at 1.8 mg/kg, on 4/24/23 w/o incident with Onpro support then started q2 wk AVD with good tolerance. Minor dose adjustments.  PET/CT 7/2/23: Decr or resolution of FDG+ dz above and below diaphragm (Deauville 2)' decr  in FDG(-) bilateral reticular opacities in lung, resolved right pleural effusion. pulm status -PleurX catheter d/c'd, pulm status improved constipation post AVD better  no constitutional c/o   Afebrile now has formal care takes on Continous o2 via nasal  3 liters PET/CT scan reviewed . due today for next cycle of AVD with Onpro

## 2023-08-13 NOTE — REASON FOR VISIT
[Follow-Up Visit] : a follow-up visit for [Lymphoma] : lymphoma [Family Member] : family member [Formal Caregiver] : formal caregiver [FreeTextEntry2] : Hodgkin lymphoma

## 2023-08-13 NOTE — ED PROVIDER NOTE - OBJECTIVE STATEMENT
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

## 2023-08-13 NOTE — ED ADULT TRIAGE NOTE - CHIEF COMPLAINT QUOTE
Patient of complaint of no urine output x 8 hours from his plummer cath. Patient complaint of bladder discomfort and feels as if he needs to urinate. Alert and oriented x 4.

## 2023-08-13 NOTE — ED ADULT NURSE NOTE - NSFALLUNIVINTERV_ED_ALL_ED
Bed/Stretcher in lowest position, wheels locked, appropriate side rails in place/Call bell, personal items and telephone in reach/Instruct patient to call for assistance before getting out of bed/chair/stretcher/Non-slip footwear applied when patient is off stretcher/[place] to call system/Physically safe environment - no spills, clutter or unnecessary equipment/Purposeful proactive rounding/Room/bathroom lighting operational, light cord in reach

## 2023-08-13 NOTE — ED PROVIDER NOTE - PATIENT'S PREFERRED PRONOUN
Patient called in today to schedule her follow-up appt w/ Olivier Deutsch  Scheduled first available and added appt to waitlist     SCHEDULED: Thur, 1/26/23 at 10:30am walker Deutsch in CV  Sent appt reminder card to address on file 
Him/He

## 2023-08-13 NOTE — REVIEW OF SYSTEMS
[Fatigue] : fatigue [Shortness Of Breath] : shortness of breath [Constipation] : constipation [Diarrhea: Grade 0] : Diarrhea: Grade 0 [Negative] : Allergic/Immunologic [FreeTextEntry6] : wears continuous oxygen [de-identified] : numbness in b/l toes [FreeTextEntry7] : intermittent, decreased appetite

## 2023-08-13 NOTE — ED PROVIDER NOTE - IV ALTEPLASE EXCL ABS HIDDEN
December 19, 2017      Hieu Monk MD  [address]  [facility] [address]           [facility]  [address]  [facility] [address]  Phone: [phone]  Fax: [phone]          Patient: Randall Strickland Jr.   MR Number: [id number]   YOB: 1928   Date of Visit: 12/19/2017       Dear Dr. Hieu Monk:    Thank you for referring Randall Strickland to me for evaluation. Attached you will find relevant portions of my assessment and plan of care.    If you have questions, please do not hesitate to call me. I look forward to following Randall Strickland along with you.    Sincerely,    Swapna Delarosa MD    Enclosure  CC:  No Recipients    If you would like to receive this communication electronically, please contact [email or web address] or [phone] to request more information on [employer] Link access.    For providers and/or their staff who would like to refer a patient to [facility], please contact us through our one-stop-shop provider referral line, [facility], at [phone].    If you feel you have received this communication in error or would no longer like to receive these types of communications, please e-mail [email or web address]         
show

## 2023-08-13 NOTE — ED ADULT NURSE NOTE - OBJECTIVE STATEMENT
pt axo3, c/o bladder discomfort. pt had plummer placed about a week and a half ago. pt states that today he has not has any urine output for x8 hours from his plummer cath. pt denies chest pain or SOB. pt on chronic 3L O2 NC. safety maintained.

## 2023-08-15 NOTE — CHART NOTE - NSCHARTNOTEFT_GEN_A_CORE
SW placed call to patient to discuss and assist with follow up care.  Patient presented to ED on 8/13/23 due to urinary issues.  Patients wife reports patient has scheduled urology appt and declined needing SW assistance at this time.

## 2023-08-21 ENCOUNTER — RESULT REVIEW (OUTPATIENT)
Age: 83
End: 2023-08-21

## 2023-08-21 ENCOUNTER — APPOINTMENT (OUTPATIENT)
Dept: HEMATOLOGY ONCOLOGY | Facility: CLINIC | Age: 83
End: 2023-08-21
Payer: MEDICARE

## 2023-08-21 ENCOUNTER — APPOINTMENT (OUTPATIENT)
Dept: INFUSION THERAPY | Facility: HOSPITAL | Age: 83
End: 2023-08-21

## 2023-08-21 VITALS
RESPIRATION RATE: 24 BRPM | HEART RATE: 50 BPM | OXYGEN SATURATION: 100 % | BODY MASS INDEX: 24.25 KG/M2 | DIASTOLIC BLOOD PRESSURE: 67 MMHG | WEIGHT: 169 LBS | SYSTOLIC BLOOD PRESSURE: 111 MMHG | TEMPERATURE: 97 F

## 2023-08-21 LAB
ALBUMIN SERPL ELPH-MCNC: 3.6 G/DL — SIGNIFICANT CHANGE UP (ref 3.3–5)
ALP SERPL-CCNC: 90 U/L — SIGNIFICANT CHANGE UP (ref 40–120)
ALT FLD-CCNC: <5 U/L — LOW (ref 10–45)
ANION GAP SERPL CALC-SCNC: 6 MMOL/L — SIGNIFICANT CHANGE UP (ref 5–17)
ANISOCYTOSIS BLD QL: SLIGHT — SIGNIFICANT CHANGE UP
AST SERPL-CCNC: 18 U/L — SIGNIFICANT CHANGE UP (ref 10–40)
BASOPHILS # BLD AUTO: 0.08 K/UL — SIGNIFICANT CHANGE UP (ref 0–0.2)
BASOPHILS NFR BLD AUTO: 0.5 % — SIGNIFICANT CHANGE UP (ref 0–2)
BILIRUB SERPL-MCNC: 0.2 MG/DL — SIGNIFICANT CHANGE UP (ref 0.2–1.2)
BUN SERPL-MCNC: 11 MG/DL — SIGNIFICANT CHANGE UP (ref 7–23)
CALCIUM SERPL-MCNC: 8.9 MG/DL — SIGNIFICANT CHANGE UP (ref 8.4–10.5)
CHLORIDE SERPL-SCNC: 101 MMOL/L — SIGNIFICANT CHANGE UP (ref 96–108)
CO2 SERPL-SCNC: 31 MMOL/L — SIGNIFICANT CHANGE UP (ref 22–31)
CREAT SERPL-MCNC: 0.43 MG/DL — LOW (ref 0.5–1.3)
DACRYOCYTES BLD QL SMEAR: SLIGHT — SIGNIFICANT CHANGE UP
EGFR: 106 ML/MIN/1.73M2 — SIGNIFICANT CHANGE UP
ELLIPTOCYTES BLD QL SMEAR: SLIGHT — SIGNIFICANT CHANGE UP
EOSINOPHIL # BLD AUTO: 0 K/UL — SIGNIFICANT CHANGE UP (ref 0–0.5)
EOSINOPHIL NFR BLD AUTO: 0 % — SIGNIFICANT CHANGE UP (ref 0–6)
GLUCOSE SERPL-MCNC: 115 MG/DL — HIGH (ref 70–99)
HCT VFR BLD CALC: 27.7 % — LOW (ref 39–50)
HGB BLD-MCNC: 8.5 G/DL — LOW (ref 13–17)
HYPOCHROMIA BLD QL: SLIGHT — SIGNIFICANT CHANGE UP
LDH SERPL L TO P-CCNC: 172 U/L — SIGNIFICANT CHANGE UP (ref 50–242)
LYMPHOCYTES # BLD AUTO: 1.52 K/UL — SIGNIFICANT CHANGE UP (ref 1–3.3)
LYMPHOCYTES # BLD AUTO: 9.5 % — LOW (ref 13–44)
MACROCYTES BLD QL: SLIGHT — SIGNIFICANT CHANGE UP
MCHC RBC-ENTMCNC: 30.7 G/DL — LOW (ref 32–36)
MCHC RBC-ENTMCNC: 31.4 PG — SIGNIFICANT CHANGE UP (ref 27–34)
MCV RBC AUTO: 102.2 FL — HIGH (ref 80–100)
METAMYELOCYTES # FLD: 0.5 % — HIGH (ref 0–0)
MONOCYTES # BLD AUTO: 1.76 K/UL — HIGH (ref 0–0.9)
MONOCYTES NFR BLD AUTO: 11 % — SIGNIFICANT CHANGE UP (ref 2–14)
MYELOCYTES NFR BLD: 1 % — HIGH (ref 0–0)
NEUTROPHILS # BLD AUTO: 12.38 K/UL — HIGH (ref 1.8–7.4)
NEUTROPHILS NFR BLD AUTO: 77.5 % — HIGH (ref 43–77)
NRBC # BLD: 2 /100 — HIGH (ref 0–0)
NRBC # BLD: SIGNIFICANT CHANGE UP /100 WBCS (ref 0–0)
PLAT MORPH BLD: NORMAL — SIGNIFICANT CHANGE UP
PLATELET # BLD AUTO: 184 K/UL — SIGNIFICANT CHANGE UP (ref 150–400)
POIKILOCYTOSIS BLD QL AUTO: SLIGHT — SIGNIFICANT CHANGE UP
POLYCHROMASIA BLD QL SMEAR: SLIGHT — SIGNIFICANT CHANGE UP
POTASSIUM SERPL-MCNC: 3.8 MMOL/L — SIGNIFICANT CHANGE UP (ref 3.5–5.3)
POTASSIUM SERPL-SCNC: 3.8 MMOL/L — SIGNIFICANT CHANGE UP (ref 3.5–5.3)
PROT SERPL-MCNC: 5.9 G/DL — LOW (ref 6–8.3)
RBC # BLD: 2.71 M/UL — LOW (ref 4.2–5.8)
RBC # FLD: 24.7 % — HIGH (ref 10.3–14.5)
RBC BLD AUTO: ABNORMAL
SODIUM SERPL-SCNC: 138 MMOL/L — SIGNIFICANT CHANGE UP (ref 135–145)
WBC # BLD: 15.98 K/UL — HIGH (ref 3.8–10.5)
WBC # FLD AUTO: 15.98 K/UL — HIGH (ref 3.8–10.5)

## 2023-08-21 PROCEDURE — 99214 OFFICE O/P EST MOD 30 MIN: CPT

## 2023-08-22 ENCOUNTER — NON-APPOINTMENT (OUTPATIENT)
Age: 83
End: 2023-08-22

## 2023-08-23 ENCOUNTER — APPOINTMENT (OUTPATIENT)
Dept: PULMONOLOGY | Facility: CLINIC | Age: 83
End: 2023-08-23

## 2023-08-23 NOTE — REVIEW OF SYSTEMS
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

## 2023-08-23 NOTE — HISTORY OF PRESENT ILLNESS
[Disease:__________________________] : Disease: [unfilled] [de-identified] : Mr. Jeronimo is a 81 yo man with Hodgkin lymphoma undergoing chemotherapy. He had a h/o HTN, AF and chronic urinary retention. Gradually increasing dyspnea developed starting in early 2023. He was admitted to [facility] on 2/17/23 shortly after an immediately prior admission with CRUZ at [facility], where he was found to have SCV LAD and \par  a large right pleural effusion, which was drained and was reportedly neg for infection and malignancy. A node bx showed atypical CD30+ cells suspicipus for Hodgkin. He went home on oxygen with further w/u planned, then decompensated and was emergently admitted to [facility], where he was intubated and remained on vent for a few days. Over 3L were drained from the right effusion. He was successfully extubated and was transferred to [facility] for further care on 2/24/23. On 3/3/23, Dr. Nathaniel Galvan performed bronchoscopy, right VATS, pleural bx, mediastinal node dissection and placement of PleurX catheter. Pleural nodules showed chronic inflammation, eosinophils, fibrosis, and a level 7 node showed classical Hodgkin lymphoma. Hodgkin cells were large, anaplastic cells +CD30, CD15, dim PAX5, MUM1, dim OCT2, NEFTALI and (-) for CD3, CD45, CD20, CD79a, BOB1, BCL6.\par  He went to rehab on 3/15/23, prior to which an Rjrxso-C-Whet was placed.\par  Pretherapy imaging included PET CT  3/8/23 which showed likely right cervical FDG+ LAD, FDG+ paratracheal, prevascular, perihilar, retrocrural, aortocaval LAD some > 2 cm, most 1-2 cm with SUV 3.5-9.2. Prior CT chest showed a 3.5 cm precarinal node. Right pleural effusion with heterogeneous uptake was seen. There was suspected involvement of the right hemisacrum. 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 Cytogenetics was nl. 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 EKG showed Afib with rapid ventricular rythmn.  Imaging showed increase in the effusion with loculation and increased atelectasis. He was readmitted to [facility] on 3/25/2. PleurX catheter was flushed with > 2L drainage of pleural fluid; he improved and was able to return to [facility] 4/10/23. Prior to transfer, a second dose of BV with dose reduced to 1.2 mg/kg was given.\par  At Rehab, pt has been bed and wheelchair bound with stable pulm status, no dyspnea or cough at rest. He has had no fevers and has a good appetit.\par  Chest x ray obtained today (4/20/23) showed stable findings.\par  Pt's son says that there has been minimal to no drainage from the PleurX catheter since the last discharge from [facility]. [de-identified] : IV [FreeTextEntry1] : BV q3 weeks x 4 AVD x2 6/26/23  [de-identified] : D/C from Rehab received 4th  dose BV, at 1.8 mg/kg, on 4/24/23 w/o incident with Onpro support then started q2 wk AVD with good tolerance. Minor dose adjustments.  PET/CT 7/2/23: Decr or resolution of FDG+ dz above and below diaphragm (Deauville 2)' decr  in FDG(-) bilateral reticular opacities in lung, resolved right pleural effusion. pulm status -PleurX catheter d/c'd, pulm status improved here today for c/o weakness legs and tingling to feet unchanged, feels weakness in legs r/t inactivity . wears Nasal o2 at 3 liters  O2 sat decreases to 90% when oxgen removed . has redness to both lower legs no warmth denies pain. plummer cath in place flushed weekly and changed q 3 weeks otherwise can get clogged secondary sedimente .  Hgb 8.5 denies BRBPR denies black stool  constipation post AVD better  no constitutional c/o   Afebrile maintained   on Continuous o2 via nasal   3 liters, removes when showering o2 sat decrease to 90% . due today for next cycle of AVD with Onpro . HGB 8.5  denies   symptoms of anemia including rectal bleeding  , will recheck CBC next week  neuropathy - tips fingers , and toes no difficulty walking . has weakness to upper legs instructed in importance of daily exercise

## 2023-08-23 NOTE — ASSESSMENT
[FreeTextEntry1] : Stage IV classical Hodgkin lymphoma in an 83 yo man who presented with a large pleural effusion and respiratory failure. He has now been treated 3 times with BV, three weeks apart, with good tolerance, no c/o neuropathy. He had myelosuppression transiently after the first dose which rapidly reversed after receiving filgrastim. He has a PleurX catheter in place which was obstructed a few weeks ago, was cleared, and is now draining minimally now w/o recurrence of respiratory complaints or changes in chest X-ray, incl 4/24, suggesting that to the degree that the effusion is related to Hodgkin lymphoma, pleural implants, he may be responding. There is a suggestion of mild decrease in LAD in recent chest CT. Recent echo shows nl LV function. Monotherapy with BV will not be with curative intent, so intend to shift to cycles of AVD (doxorubicin, vinblastine and dacarbazine) given q2 weeks as per Sunny et. al. JCO 36:3015, 2018. Presence of two mutations in bone marrow cells identified by NGS (ASXL1 and TET2) may be bystander mutations or may signify propensity to develop cytopenias related to abnormal clonal hematopoiesis.   Blood counts after therapy need to be closely followed. cont to need intensive PT., encourage to increase activity :  AVD will be given q2 wk. today is #5    LMW heparin ppx.on Eliquis CBC discussed along with PET/CT  follow up 2 weeks  CBC next week home draw

## 2023-08-23 NOTE — REASON FOR VISIT
[Follow-Up Visit] : a follow-up visit for [Lymphoma] : lymphoma [Formal Caregiver] : formal caregiver [Family Member] : family member [FreeTextEntry2] : hodgkins

## 2023-08-25 ENCOUNTER — OUTPATIENT (OUTPATIENT)
Dept: OUTPATIENT SERVICES | Facility: HOSPITAL | Age: 83
LOS: 1 days | Discharge: HOME | End: 2023-08-25

## 2023-08-25 DIAGNOSIS — C81.90 HODGKIN LYMPHOMA, UNSPECIFIED, UNSPECIFIED SITE: ICD-10-CM

## 2023-08-25 DIAGNOSIS — Z98.890 OTHER SPECIFIED POSTPROCEDURAL STATES: Chronic | ICD-10-CM

## 2023-08-30 ENCOUNTER — LABORATORY RESULT (OUTPATIENT)
Age: 83
End: 2023-08-30

## 2023-08-31 ENCOUNTER — RESULT REVIEW (OUTPATIENT)
Age: 83
End: 2023-08-31

## 2023-08-31 ENCOUNTER — APPOINTMENT (OUTPATIENT)
Dept: HEMATOLOGY ONCOLOGY | Facility: CLINIC | Age: 83
End: 2023-08-31

## 2023-08-31 ENCOUNTER — OUTPATIENT (OUTPATIENT)
Dept: OUTPATIENT SERVICES | Facility: HOSPITAL | Age: 83
LOS: 1 days | End: 2023-08-31
Payer: MEDICARE

## 2023-08-31 DIAGNOSIS — Z98.890 OTHER SPECIFIED POSTPROCEDURAL STATES: Chronic | ICD-10-CM

## 2023-08-31 DIAGNOSIS — D64.9 ANEMIA, UNSPECIFIED: ICD-10-CM

## 2023-08-31 LAB
ANISOCYTOSIS BLD QL: SLIGHT — SIGNIFICANT CHANGE UP
BASOPHILS # BLD AUTO: 0 K/UL — SIGNIFICANT CHANGE UP (ref 0–0.2)
BASOPHILS NFR BLD AUTO: 0 % — SIGNIFICANT CHANGE UP (ref 0–2)
BLASTS # FLD: 1 % — HIGH (ref 0–0)
DACRYOCYTES BLD QL SMEAR: SLIGHT — SIGNIFICANT CHANGE UP
ELLIPTOCYTES BLD QL SMEAR: SLIGHT — SIGNIFICANT CHANGE UP
EOSINOPHIL # BLD AUTO: 0 K/UL — SIGNIFICANT CHANGE UP (ref 0–0.5)
EOSINOPHIL NFR BLD AUTO: 0 % — SIGNIFICANT CHANGE UP (ref 0–6)
HCT VFR BLD CALC: 23 % — LOW (ref 39–50)
HGB BLD-MCNC: 7.2 G/DL — LOW (ref 13–17)
HYPOCHROMIA BLD QL: SLIGHT — SIGNIFICANT CHANGE UP
LYMPHOCYTES # BLD AUTO: 0.88 K/UL — LOW (ref 1–3.3)
LYMPHOCYTES # BLD AUTO: 22 % — SIGNIFICANT CHANGE UP (ref 13–44)
MCHC RBC-ENTMCNC: 31.3 G/DL — LOW (ref 32–36)
MCHC RBC-ENTMCNC: 32.4 PG — SIGNIFICANT CHANGE UP (ref 27–34)
MCV RBC AUTO: 103.6 FL — HIGH (ref 80–100)
METAMYELOCYTES # FLD: 2 % — HIGH (ref 0–0)
MONOCYTES # BLD AUTO: 0.84 K/UL — SIGNIFICANT CHANGE UP (ref 0–0.9)
MONOCYTES NFR BLD AUTO: 21 % — HIGH (ref 2–14)
MYELOCYTES NFR BLD: 2 % — HIGH (ref 0–0)
NEUTROPHILS # BLD AUTO: 2.01 K/UL — SIGNIFICANT CHANGE UP (ref 1.8–7.4)
NEUTROPHILS NFR BLD AUTO: 50 % — SIGNIFICANT CHANGE UP (ref 43–77)
NRBC # BLD: 1 /100 — HIGH (ref 0–0)
NRBC # BLD: SIGNIFICANT CHANGE UP /100 WBCS (ref 0–0)
PLAT MORPH BLD: NORMAL — SIGNIFICANT CHANGE UP
PLATELET # BLD AUTO: 115 K/UL — LOW (ref 150–400)
POIKILOCYTOSIS BLD QL AUTO: SLIGHT — SIGNIFICANT CHANGE UP
POLYCHROMASIA BLD QL SMEAR: SLIGHT — SIGNIFICANT CHANGE UP
PROMYELOCYTES # FLD: 2 % — HIGH (ref 0–0)
RBC # BLD: 2.22 M/UL — LOW (ref 4.2–5.8)
RBC # FLD: 19.9 % — HIGH (ref 10.3–14.5)
RBC BLD AUTO: ABNORMAL
WBC # BLD: 4.01 K/UL — SIGNIFICANT CHANGE UP (ref 3.8–10.5)
WBC # FLD AUTO: 4.01 K/UL — SIGNIFICANT CHANGE UP (ref 3.8–10.5)

## 2023-08-31 PROCEDURE — 86923 COMPATIBILITY TEST ELECTRIC: CPT

## 2023-08-31 PROCEDURE — 86850 RBC ANTIBODY SCREEN: CPT

## 2023-08-31 PROCEDURE — 86900 BLOOD TYPING SEROLOGIC ABO: CPT

## 2023-08-31 PROCEDURE — 86901 BLOOD TYPING SEROLOGIC RH(D): CPT

## 2023-09-01 ENCOUNTER — APPOINTMENT (OUTPATIENT)
Dept: INFUSION THERAPY | Facility: HOSPITAL | Age: 83
End: 2023-09-01

## 2023-09-05 ENCOUNTER — RESULT REVIEW (OUTPATIENT)
Age: 83
End: 2023-09-05

## 2023-09-05 ENCOUNTER — APPOINTMENT (OUTPATIENT)
Dept: HEMATOLOGY ONCOLOGY | Facility: CLINIC | Age: 83
End: 2023-09-05
Payer: MEDICARE

## 2023-09-05 ENCOUNTER — APPOINTMENT (OUTPATIENT)
Dept: INFUSION THERAPY | Facility: HOSPITAL | Age: 83
End: 2023-09-05

## 2023-09-05 VITALS
WEIGHT: 160.06 LBS | SYSTOLIC BLOOD PRESSURE: 126 MMHG | DIASTOLIC BLOOD PRESSURE: 77 MMHG | TEMPERATURE: 97.5 F | RESPIRATION RATE: 16 BRPM | HEART RATE: 63 BPM | BODY MASS INDEX: 22.97 KG/M2 | OXYGEN SATURATION: 99 %

## 2023-09-05 DIAGNOSIS — Z51.89 ENCOUNTER FOR OTHER SPECIFIED AFTERCARE: ICD-10-CM

## 2023-09-05 DIAGNOSIS — D63.0 ANEMIA IN NEOPLASTIC DISEASE: ICD-10-CM

## 2023-09-05 DIAGNOSIS — R11.2 NAUSEA WITH VOMITING, UNSPECIFIED: ICD-10-CM

## 2023-09-05 LAB
ALBUMIN SERPL ELPH-MCNC: 3.7 G/DL — SIGNIFICANT CHANGE UP (ref 3.3–5)
ALP SERPL-CCNC: 92 U/L — SIGNIFICANT CHANGE UP (ref 40–120)
ALT FLD-CCNC: <5 U/L — LOW (ref 10–45)
ANION GAP SERPL CALC-SCNC: 7 MMOL/L — SIGNIFICANT CHANGE UP (ref 5–17)
ANISOCYTOSIS BLD QL: SLIGHT — SIGNIFICANT CHANGE UP
AST SERPL-CCNC: 17 U/L — SIGNIFICANT CHANGE UP (ref 10–40)
BASOPHILS # BLD AUTO: 0 K/UL — SIGNIFICANT CHANGE UP (ref 0–0.2)
BASOPHILS NFR BLD AUTO: 0 % — SIGNIFICANT CHANGE UP (ref 0–2)
BILIRUB SERPL-MCNC: 0.2 MG/DL — SIGNIFICANT CHANGE UP (ref 0.2–1.2)
BUN SERPL-MCNC: 13 MG/DL — SIGNIFICANT CHANGE UP (ref 7–23)
CALCIUM SERPL-MCNC: 9 MG/DL — SIGNIFICANT CHANGE UP (ref 8.4–10.5)
CHLORIDE SERPL-SCNC: 100 MMOL/L — SIGNIFICANT CHANGE UP (ref 96–108)
CO2 SERPL-SCNC: 32 MMOL/L — HIGH (ref 22–31)
CREAT SERPL-MCNC: 0.43 MG/DL — LOW (ref 0.5–1.3)
DACRYOCYTES BLD QL SMEAR: SLIGHT — SIGNIFICANT CHANGE UP
EGFR: 106 ML/MIN/1.73M2 — SIGNIFICANT CHANGE UP
EOSINOPHIL # BLD AUTO: 0.13 K/UL — SIGNIFICANT CHANGE UP (ref 0–0.5)
EOSINOPHIL NFR BLD AUTO: 1 % — SIGNIFICANT CHANGE UP (ref 0–6)
GLUCOSE SERPL-MCNC: 126 MG/DL — HIGH (ref 70–99)
HCT VFR BLD CALC: 35.2 % — LOW (ref 39–50)
HGB BLD-MCNC: 11 G/DL — LOW (ref 13–17)
LDH SERPL L TO P-CCNC: 221 U/L — SIGNIFICANT CHANGE UP (ref 50–242)
LYMPHOCYTES # BLD AUTO: 1.53 K/UL — SIGNIFICANT CHANGE UP (ref 1–3.3)
LYMPHOCYTES # BLD AUTO: 12 % — LOW (ref 13–44)
MACROCYTES BLD QL: SLIGHT — SIGNIFICANT CHANGE UP
MCHC RBC-ENTMCNC: 31.3 G/DL — LOW (ref 32–36)
MCHC RBC-ENTMCNC: 31.7 PG — SIGNIFICANT CHANGE UP (ref 27–34)
MCV RBC AUTO: 101.4 FL — HIGH (ref 80–100)
MONOCYTES # BLD AUTO: 1.27 K/UL — HIGH (ref 0–0.9)
MONOCYTES NFR BLD AUTO: 10 % — SIGNIFICANT CHANGE UP (ref 2–14)
MYELOCYTES NFR BLD: 1 % — HIGH (ref 0–0)
NEUTROPHILS # BLD AUTO: 9.68 K/UL — HIGH (ref 1.8–7.4)
NEUTROPHILS NFR BLD AUTO: 76 % — SIGNIFICANT CHANGE UP (ref 43–77)
NRBC # BLD: 0 /100 — SIGNIFICANT CHANGE UP (ref 0–0)
NRBC # BLD: SIGNIFICANT CHANGE UP /100 WBCS (ref 0–0)
PLAT MORPH BLD: NORMAL — SIGNIFICANT CHANGE UP
PLATELET # BLD AUTO: 177 K/UL — SIGNIFICANT CHANGE UP (ref 150–400)
POIKILOCYTOSIS BLD QL AUTO: SLIGHT — SIGNIFICANT CHANGE UP
POTASSIUM SERPL-MCNC: 3.8 MMOL/L — SIGNIFICANT CHANGE UP (ref 3.5–5.3)
POTASSIUM SERPL-SCNC: 3.8 MMOL/L — SIGNIFICANT CHANGE UP (ref 3.5–5.3)
PROT SERPL-MCNC: 6 G/DL — SIGNIFICANT CHANGE UP (ref 6–8.3)
RBC # BLD: 3.47 M/UL — LOW (ref 4.2–5.8)
RBC # FLD: 21.2 % — HIGH (ref 10.3–14.5)
RBC BLD AUTO: ABNORMAL
SODIUM SERPL-SCNC: 139 MMOL/L — SIGNIFICANT CHANGE UP (ref 135–145)
T4 FREE+ TSH PNL SERPL: 1.9 UIU/ML — SIGNIFICANT CHANGE UP (ref 0.27–4.2)
WBC # BLD: 12.74 K/UL — HIGH (ref 3.8–10.5)
WBC # FLD AUTO: 12.74 K/UL — HIGH (ref 3.8–10.5)

## 2023-09-05 PROCEDURE — 99214 OFFICE O/P EST MOD 30 MIN: CPT

## 2023-09-06 DIAGNOSIS — E86.0 DEHYDRATION: ICD-10-CM

## 2023-09-06 DIAGNOSIS — Z51.11 ENCOUNTER FOR ANTINEOPLASTIC CHEMOTHERAPY: ICD-10-CM

## 2023-09-06 LAB
ALBUMIN SERPL ELPH-MCNC: 3.7 G/DL
ALP BLD-CCNC: 97 U/L
ALT SERPL-CCNC: 11 U/L
ANION GAP SERPL CALC-SCNC: 10 MMOL/L
AST SERPL-CCNC: 17 U/L
BILIRUB SERPL-MCNC: 0.2 MG/DL
BUN SERPL-MCNC: 13 MG/DL
CALCIUM SERPL-MCNC: 9 MG/DL
CHLORIDE SERPL-SCNC: 101 MMOL/L
CO2 SERPL-SCNC: 29 MMOL/L
CREAT SERPL-MCNC: 0.47 MG/DL
EGFR: 103 ML/MIN/1.73M2
GLUCOSE SERPL-MCNC: 112 MG/DL
LDH SERPL-CCNC: 227 U/L
POTASSIUM SERPL-SCNC: 3.7 MMOL/L
PROT SERPL-MCNC: 5.6 G/DL
SODIUM SERPL-SCNC: 141 MMOL/L

## 2023-09-09 NOTE — ASSESSMENT
[Curative] : Goals of care discussed with patient: Curative [FreeTextEntry1] : Stage IV classical Hodgkin lymphoma in an 81 yo man who presented with a large pleural effusion and respiratory failure. He has now been treated 3 times with BV, three weeks apart, with good tolerance, no c/o neuropathy. He had myelosuppression transiently after the first dose which rapidly reversed after receiving filgrastim. He has a PleurX catheter in place which was obstructed a few weeks ago, was cleared, and is now draining minimally now w/o recurrence of respiratory complaints or changes in chest X-ray, incl 4/24, suggesting that to the degree that the effusion is related to Hodgkin lymphoma, pleural implants, he may be responding. There is a suggestion of mild decrease in LAD in recent chest CT. Recent echo shows nl LV function. Monotherapy with BV will not be with curative intent, so intend to shift to cycles of AVD (doxorubicin, vinblastine and dacarbazine) given q2 weeks as per Sunny et. al. JCO 36:3015, 2018. Presence of two mutations in bone marrow cells identified by NGS (ASXL1 and TET2) may be bystander mutations or may signify propensity to develop cytopenias related to abnormal clonal hematopoiesis.   Blood counts after therapy need to be closely followed., sp 1 unit of PRBC cont to need intensive PT., encourage to increase activity :  AVD will be given q2 wk. x 6 doses then 4 more BV  LMW heparin ppx.on Eliquis CBC discussed along with PET/CT  follow up 2 weeks

## 2023-09-09 NOTE — HISTORY OF PRESENT ILLNESS
[de-identified] : Mr. Jeronimo is a 83 yo man with Hodgkin lymphoma undergoing chemotherapy. He had a h/o HTN, AF and chronic urinary retention. Gradually increasing dyspnea developed starting in early 2023. He was admitted to [facility] on 2/17/23 shortly after an immediately prior admission with CRUZ at [facility], where he was found to have SCV LAD and \par  a large right pleural effusion, which was drained and was reportedly neg for infection and malignancy. A node bx showed atypical CD30+ cells suspicipus for Hodgkin. He went home on oxygen with further w/u planned, then decompensated and was emergently admitted to [facility], where he was intubated and remained on vent for a few days. Over 3L were drained from the right effusion. He was successfully extubated and was transferred to [facility] for further care on 2/24/23. On 3/3/23, Dr. Nathaniel Galvan performed bronchoscopy, right VATS, pleural bx, mediastinal node dissection and placement of PleurX catheter. Pleural nodules showed chronic inflammation, eosinophils, fibrosis, and a level 7 node showed classical Hodgkin lymphoma. Hodgkin cells were large, anaplastic cells +CD30, CD15, dim PAX5, MUM1, dim OCT2, NEFTALI and (-) for CD3, CD45, CD20, CD79a, BOB1, BCL6.\par  He went to rehab on 3/15/23, prior to which an Ydubii-D-Uqls was placed.\par  Pretherapy imaging included PET CT  3/8/23 which showed likely right cervical FDG+ LAD, FDG+ paratracheal, prevascular, perihilar, retrocrural, aortocaval LAD some > 2 cm, most 1-2 cm with SUV 3.5-9.2. Prior CT chest showed a 3.5 cm precarinal node. Right pleural effusion with heterogeneous uptake was seen. There was suspected involvement of the right hemisacrum. 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 Cytogenetics was nl. NGS OnkoMyeloid panel showed TET2 and ASXL1 mutations.\par  Pretherapy CBC showed NC/NC anemia, Hgb in 9-10 range, mild leukocytosis, nl creat and LFTs and LDH.\par  Therapy with brentuximab vedotin (BV) 1.8 mg/kg was given iv before the 3/15 d/c from [facility] to [facility].\par  While at rehab, he developed chest discomfort., low systolic BP and mild tachycardia. EKG showed Afib with rapid ventricular rythmn.  Imaging showed increase in the effusion with loculation and increased atelectasis. He was readmitted to [facility] on 3/25/2. PleurX catheter was flushed with > 2L drainage of pleural fluid; he improved and was able to return to [facility] 4/10/23. Prior to transfer, a second dose of BV with dose reduced to 1.2 mg/kg was given.\par  At Rehab, pt has been bed and wheelchair bound with stable pulm status, no dyspnea or cough at rest. He has had no fevers and has a good appetit.\par  Chest x ray obtained today (4/20/23) showed stable findings.\par  Pt's son says that there has been minimal to no drainage from the PleurX catheter since the last discharge from [facility]. [de-identified] : IV [FreeTextEntry1] : BV q3 weeks x 4 AVD x2 6/26/23  [de-identified] : D/C from Rehab received 4th  dose BV, at 1.8 mg/kg, on 4/24/23 w/o incident with Onpro support then started q2 wk AVD with good tolerance. Minor dose adjustments.now to receive BV  q 2weeks as tolerated x 4 more doses  PET/CT 7/2/23: Decr or resolution of FDG+ dz above and below diaphragm (Deauville 2)' decr  in FDG(-) bilateral reticular opacities in lung, resolved right pleural effusion. pulm status -PleurX catheter d/c'd, pulm status improved, wears o2 at 3 liter  complaints weakness to legs and numbness to feet legs hasn't buckled. Appetite fair, encourage to increase fluids   constipation post AVD better Eliquis for chronic Afib Afebrile maintained   on Continuous o2 via nasal   3 liters, removes when showering o2 sat decrease to 90% . due today for next cycle of AVD with Onpro .    symptoms of anemia including rectal bleeding  , will recheck CBC next week  neuropathy - tips fingers , and toes no difficulty walking . has weakness to upper legs instructed in importance of daily exercise.

## 2023-09-09 NOTE — REVIEW OF SYSTEMS
[Fatigue] : fatigue [Diarrhea: Grade 0] : Diarrhea: Grade 0 [Muscle Weakness] : muscle weakness [Negative] : Allergic/Immunologic [de-identified] : feet numbness

## 2023-09-15 DIAGNOSIS — C81.90 HODGKIN LYMPHOMA, UNSPECIFIED, UNSPECIFIED SITE: ICD-10-CM

## 2023-09-18 ENCOUNTER — EMERGENCY (EMERGENCY)
Facility: HOSPITAL | Age: 83
LOS: 1 days | Discharge: HOME | End: 2023-09-18
Attending: EMERGENCY MEDICINE | Admitting: EMERGENCY MEDICINE
Payer: MEDICARE

## 2023-09-18 ENCOUNTER — APPOINTMENT (OUTPATIENT)
Dept: HEMATOLOGY ONCOLOGY | Facility: CLINIC | Age: 83
End: 2023-09-18

## 2023-09-18 VITALS
SYSTOLIC BLOOD PRESSURE: 112 MMHG | OXYGEN SATURATION: 98 % | RESPIRATION RATE: 18 BRPM | DIASTOLIC BLOOD PRESSURE: 67 MMHG | WEIGHT: 166.01 LBS | HEART RATE: 64 BPM | TEMPERATURE: 99 F | HEIGHT: 70 IN

## 2023-09-18 DIAGNOSIS — Z98.890 OTHER SPECIFIED POSTPROCEDURAL STATES: Chronic | ICD-10-CM

## 2023-09-18 PROCEDURE — 99283 EMERGENCY DEPT VISIT LOW MDM: CPT

## 2023-09-18 PROCEDURE — 99282 EMERGENCY DEPT VISIT SF MDM: CPT

## 2023-09-18 NOTE — ED PROVIDER NOTE - OBJECTIVE STATEMENT
Pt has his Choi changed this past Sunday and has been bleeding by penile meatus.  HO from pt and son.  No pain, fever or chills.  Pt noted some blood clot outside the penis.  No other complaints.

## 2023-09-18 NOTE — ED ADULT NURSE NOTE - NSFALLUNIVINTERV_ED_ALL_ED
Bed/Stretcher in lowest position, wheels locked, appropriate side rails in place/Call bell, personal items and telephone in reach/Instruct patient to call for assistance before getting out of bed/chair/stretcher/Non-slip footwear applied when patient is off stretcher/[place] to call system/Physically safe environment - no spills, clutter or unnecessary equipment/Purposeful proactive rounding/Room/bathroom lighting operational, light cord in reach

## 2023-09-18 NOTE — ED ADULT TRIAGE NOTE - ARRIVAL INFO ADDITIONAL COMMENTS
Patient BIB son from home with bleeding from around urinary catheter site. Patient has indwelling chronic plummer cath, last replaced on Sunday. Replacment on Sunday was particularly painful, with bleeding around plummer site since Sunday. Last noted clot. Patient has lymphoma on IV chemo. Patient wears 3L 02 baseline.

## 2023-09-18 NOTE — ED PROVIDER NOTE - PATIENT PORTAL LINK FT
You can access the FollowMyHealth Patient Portal offered by [facility] by registering at the following website: [email or web address]. By joining [employer]’s FollowMyHealth portal, you will also be able to view your health information using other applications (apps) compatible with our system.

## 2023-09-18 NOTE — ED ADULT NURSE NOTE - OBJECTIVE STATEMENT
Patient presents to ED from home complaining of blood from urethral site of plummer catheter. No blood noted within cather tubing or bag. Patient states that aide changed site of stat-lock on Sunday and catheter had been pulling. Site of stat-lock moved closer to urethra today.

## 2023-09-19 ENCOUNTER — EMERGENCY (EMERGENCY)
Facility: HOSPITAL | Age: 83
LOS: 1 days | Discharge: HOME | End: 2023-09-19
Attending: EMERGENCY MEDICINE | Admitting: EMERGENCY MEDICINE
Payer: MEDICARE

## 2023-09-19 VITALS
HEART RATE: 74 BPM | OXYGEN SATURATION: 96 % | DIASTOLIC BLOOD PRESSURE: 82 MMHG | TEMPERATURE: 99 F | RESPIRATION RATE: 16 BRPM | WEIGHT: 166.01 LBS | SYSTOLIC BLOOD PRESSURE: 141 MMHG | HEIGHT: 70 IN

## 2023-09-19 VITALS
RESPIRATION RATE: 16 BRPM | DIASTOLIC BLOOD PRESSURE: 76 MMHG | HEART RATE: 82 BPM | OXYGEN SATURATION: 96 % | SYSTOLIC BLOOD PRESSURE: 137 MMHG

## 2023-09-19 DIAGNOSIS — Z98.890 OTHER SPECIFIED POSTPROCEDURAL STATES: Chronic | ICD-10-CM

## 2023-09-19 LAB
APPEARANCE UR: CLEAR — SIGNIFICANT CHANGE UP
BILIRUB UR-MCNC: NEGATIVE — SIGNIFICANT CHANGE UP
COLOR SPEC: YELLOW — SIGNIFICANT CHANGE UP
DIFF PNL FLD: NEGATIVE — SIGNIFICANT CHANGE UP
GLUCOSE UR QL: NEGATIVE MG/DL — SIGNIFICANT CHANGE UP
KETONES UR-MCNC: NEGATIVE MG/DL — SIGNIFICANT CHANGE UP
LEUKOCYTE ESTERASE UR-ACNC: ABNORMAL
NITRITE UR-MCNC: POSITIVE
PH UR: 7 — SIGNIFICANT CHANGE UP (ref 5–8)
PROT UR-MCNC: 30 MG/DL
SP GR SPEC: 1.01 — SIGNIFICANT CHANGE UP (ref 1–1.03)
UROBILINOGEN FLD QL: 1 MG/DL — SIGNIFICANT CHANGE UP (ref 0.2–1)

## 2023-09-19 PROCEDURE — 81001 URINALYSIS AUTO W/SCOPE: CPT

## 2023-09-19 PROCEDURE — 87077 CULTURE AEROBIC IDENTIFY: CPT

## 2023-09-19 PROCEDURE — 99284 EMERGENCY DEPT VISIT MOD MDM: CPT

## 2023-09-19 PROCEDURE — 99283 EMERGENCY DEPT VISIT LOW MDM: CPT

## 2023-09-19 PROCEDURE — 87186 SC STD MICRODIL/AGAR DIL: CPT

## 2023-09-19 PROCEDURE — 87086 URINE CULTURE/COLONY COUNT: CPT

## 2023-09-19 RX ORDER — LIDOCAINE HCL 20 MG/ML
10 VIAL (ML) INJECTION ONCE
Refills: 0 | Status: COMPLETED | OUTPATIENT
Start: 2023-09-19 | End: 2023-09-19

## 2023-09-19 RX ADMIN — Medication 10 MILLILITER(S): at 20:00

## 2023-09-19 NOTE — ED PROVIDER NOTE - PHYSICAL EXAMINATION
exam:   General: well appearing, NAD.   HEENT: eyes perrl, nose normal, OP no erythema/exudate/swelling.   cor: RRR, s1s2, 2+rad pulses.   lungs: ctabl, no resp distress.   abd: soft, ntnd.   : Penis normal.  Choi in place.  No active output.  Mild suprapubic distention without significant tenderness  neuro: a&ox3, cn2-12 intact, BAUTISTA, 5/5 strength c nl sensation all extremities, nl coordination.   MSK: no extremity swelling.  Skin: normal, no rash

## 2023-09-19 NOTE — ED ADULT NURSE NOTE - NSFALLHARMRISKINTERV_ED_ALL_ED

## 2023-09-19 NOTE — ED PROVIDER NOTE - CARE PROVIDER_API CALL
Aamir Hamm  Urology  [address] [place] [address], [address]  [place], [place] [id number]  Phone: [phone]  Fax: [phone]  Follow Up Time: 4-6 Days

## 2023-09-19 NOTE — ED PROVIDER NOTE - CARE PROVIDERS DIRECT ADDRESSES
[email or web address]

## 2023-09-19 NOTE — ED PROVIDER NOTE - NSFOLLOWUPINSTRUCTIONS_ED_ALL_ED_FT
Follow-up with your urologist this week.  -Return for no urine output, bladder pain, fever, or other concerns      Choi Catheter Placement and Care    WHAT YOU NEED TO KNOW:    A Choi catheter is a sterile tube that is inserted into your bladder to drain urine. It is also called an indwelling urinary catheter.     DISCHARGE INSTRUCTIONS:    Return to the emergency department if:   •Your catheter comes out.       •You suddenly have material that looks like sand in the tubing or drainage bag.      •No urine is draining into the bag and you have checked the system.      •You have pain in your hip, back, pelvis, or lower abdomen.      •You are confused or cannot think clearly.      Call your doctor or urologist if:   •You have a fever.      •You have bladder spasms for more than 1 day after the catheter is placed.      •You see blood in the tubing or drainage bag.      •You have a rash or itching where the catheter tube is secured to your skin.      •Urine leaks from or around the catheter, tubing, or drainage bag.      •The closed drainage system has accidently come open or apart.       •You see a layer of crystals inside the tubing.      •You have questions or concerns about your condition or care.      Care for your catheter and drainage bag: You can reduce your risk for infection and injury by caring for your catheter and drainage bag properly.  •Wash your hands often. Wash before and after you touch your catheter, tubing, or drainage bag. Use soap and water. Wear clean disposable gloves when you care for your catheter or disconnect the drainage bag. Wash your hands before you prepare or eat food.   Handwashing           •Clean your genital area 2 times every day. Clean your catheter area and anal opening after every bowel movement. ?For men: Use a soapy cloth to clean the tip of your penis. Start where the catheter enters. Wipe backward making sure to pull back the foreskin. Then use a cloth with clear water in the same direction to clean away the soap.       ?For women: Use a soapy cloth to clean the area that the catheter enters your body. Make sure to separate your labia and wipe toward the anus. Then use a cloth with clear water and wipe in the same direction.       •Secure the catheter tube so you do not pull or move the catheter. This helps prevent pain and bladder spasms. Healthcare providers will show you how to use medical tape or a strap to secure the catheter tube to your body.       •Keep a closed drainage system. Your catheter should always be attached to the drainage bag to form a closed system. Do not disconnect any part of the closed system unless you need to change the bag.      •Keep the drainage bag below the level of your waist. This helps stop urine from moving back up the tubing and into your bladder. Do not loop or kink the tubing. This can cause urine to back up and collect in your bladder. Do not let the drainage bag touch or lie on the floor.      •Empty the drainage bag when needed. The weight of a full drainage bag can be painful. Empty the drainage bag every 3 to 6 hours or when it is ? full.       •Clean and change the drainage bag as directed. Ask your healthcare provider how often you should change the drainage bag and what cleaning solution to use. Wear disposable gloves when you change the bag. Do not allow the end of the catheter or tubing to touch anything. Clean the ends with an alcohol pad before you reconnect them.      What to do if problems develop:   •No urine is draining into the bag: ?Check for kinks in the tubing and straighten them out.       ?Check the tape or strap used to secure the catheter tube to your skin. Make sure it is not blocking the tube.       ?Make sure you are not sitting or lying on the tubing.      ?Make sure the urine bag is hanging below the level of your waist.      •Urine leaks from or around the catheter, tubing, or drainage bag: Check if the closed drainage system has accidently come open or apart. Clean the catheter and tubing ends with a new alcohol pad and reconnect them.       Follow up with your doctor or urologist as directed: Write down your questions so you remember to ask them during your visits.

## 2023-09-19 NOTE — ED PROVIDER NOTE - PATIENT PORTAL LINK FT
You can access the FollowMyHealth Patient Portal offered by [facility] by registering at the following website: [email or web address]. By joining [employer]’s FollowMyHealth portal, you will also be able to view your health information using other applications (apps) compatible with our system.

## 2023-09-19 NOTE — ED PROVIDER NOTE - CLINICAL SUMMARY MEDICAL DECISION MAKING FREE TEXT BOX
Choi put out 1400 cc clear yellow urine.  Patient feels much better after Choi change.  No clinical signs of UTI currently.  UA urine culture sent per patient request.  Follow-up urology 83-year-old male with history of A-fib, hyperlipidemia, urinary retention, chronic Choi, complaining of no urine output from Choi since about 11 AM today.  Associated with mild bladder discomfort and fullness.  No fever chills.  No nausea vomiting.  Patient had Choi changed 2 days ago.  After which patient had some hematuria.  No hematuria noted earlier this morning.    Patient well-appearing mild suprapubic fullness.  Vitals unremarkable.  Likely Choi catheter obstruction/urinary retention.  Will place Choi and reassess.    Choi put out 1400 cc clear yellow urine.  Patient feels much better after Choi change.  No clinical signs of UTI currently.  UA urine culture sent per patient request.  Advised patient and son given patient without any clinical signs of active UTI, we will not start antibiotics at this time as bacteriuria likely with chronic Choi, Follow-up urology

## 2023-09-19 NOTE — ED ADULT NURSE REASSESSMENT NOTE - NS ED NURSE REASSESS COMMENT FT1
Pts plummer changed as per MD orders. 18 Persian plummer placed at this time. Pt tolerated well. 1000 mL of clear yellow urine drained.

## 2023-09-22 NOTE — CHART NOTE - NSCHARTNOTEFT_GEN_A_CORE
SW placed call to patient to discuss and assist with follow up care.  Patient presented to ED on 9/18 and 9/19 due to urinary complaint.  SW unable to reach patient at this time to assist with urology follow up.

## 2023-09-25 ENCOUNTER — APPOINTMENT (OUTPATIENT)
Dept: INFUSION THERAPY | Facility: HOSPITAL | Age: 83
End: 2023-09-25

## 2023-09-25 ENCOUNTER — RESULT REVIEW (OUTPATIENT)
Age: 83
End: 2023-09-25

## 2023-09-25 ENCOUNTER — APPOINTMENT (OUTPATIENT)
Dept: HEMATOLOGY ONCOLOGY | Facility: CLINIC | Age: 83
End: 2023-09-25

## 2023-09-25 ENCOUNTER — APPOINTMENT (OUTPATIENT)
Dept: HEMATOLOGY ONCOLOGY | Facility: CLINIC | Age: 83
End: 2023-09-25
Payer: MEDICARE

## 2023-09-25 VITALS
DIASTOLIC BLOOD PRESSURE: 64 MMHG | RESPIRATION RATE: 16 BRPM | HEART RATE: 51 BPM | OXYGEN SATURATION: 99 % | WEIGHT: 165.99 LBS | TEMPERATURE: 97.3 F | HEIGHT: 70 IN | SYSTOLIC BLOOD PRESSURE: 123 MMHG | BODY MASS INDEX: 23.76 KG/M2

## 2023-09-25 DIAGNOSIS — Z87.898 PERSONAL HISTORY OF OTHER SPECIFIED CONDITIONS: ICD-10-CM

## 2023-09-25 LAB
ALBUMIN SERPL ELPH-MCNC: 3.9 G/DL — SIGNIFICANT CHANGE UP (ref 3.3–5)
ALP SERPL-CCNC: 96 U/L — SIGNIFICANT CHANGE UP (ref 40–120)
ALT FLD-CCNC: 13 U/L — SIGNIFICANT CHANGE UP (ref 10–45)
ANION GAP SERPL CALC-SCNC: 7 MMOL/L — SIGNIFICANT CHANGE UP (ref 5–17)
ANISOCYTOSIS BLD QL: SLIGHT — SIGNIFICANT CHANGE UP
AST SERPL-CCNC: 32 U/L — SIGNIFICANT CHANGE UP (ref 10–40)
BASOPHILS # BLD AUTO: 0 K/UL — SIGNIFICANT CHANGE UP (ref 0–0.2)
BASOPHILS NFR BLD AUTO: 0 % — SIGNIFICANT CHANGE UP (ref 0–2)
BILIRUB SERPL-MCNC: 0.3 MG/DL — SIGNIFICANT CHANGE UP (ref 0.2–1.2)
BUN SERPL-MCNC: 16 MG/DL — SIGNIFICANT CHANGE UP (ref 7–23)
CALCIUM SERPL-MCNC: 9.3 MG/DL — SIGNIFICANT CHANGE UP (ref 8.4–10.5)
CHLORIDE SERPL-SCNC: 99 MMOL/L — SIGNIFICANT CHANGE UP (ref 96–108)
CO2 SERPL-SCNC: 32 MMOL/L — HIGH (ref 22–31)
CREAT SERPL-MCNC: 0.42 MG/DL — LOW (ref 0.5–1.3)
DACRYOCYTES BLD QL SMEAR: SLIGHT — SIGNIFICANT CHANGE UP
EGFR: 106 ML/MIN/1.73M2 — SIGNIFICANT CHANGE UP
EOSINOPHIL # BLD AUTO: 0.06 K/UL — SIGNIFICANT CHANGE UP (ref 0–0.5)
EOSINOPHIL NFR BLD AUTO: 1 % — SIGNIFICANT CHANGE UP (ref 0–6)
GLUCOSE SERPL-MCNC: 125 MG/DL — HIGH (ref 70–99)
HCT VFR BLD CALC: 38.3 % — LOW (ref 39–50)
HGB BLD-MCNC: 11.7 G/DL — LOW (ref 13–17)
LDH SERPL L TO P-CCNC: 372 U/L — HIGH (ref 50–242)
LYMPHOCYTES # BLD AUTO: 1.37 K/UL — SIGNIFICANT CHANGE UP (ref 1–3.3)
LYMPHOCYTES # BLD AUTO: 22 % — SIGNIFICANT CHANGE UP (ref 13–44)
MACROCYTES BLD QL: SLIGHT — SIGNIFICANT CHANGE UP
MCHC RBC-ENTMCNC: 30.5 G/DL — LOW (ref 32–36)
MCHC RBC-ENTMCNC: 30.8 PG — SIGNIFICANT CHANGE UP (ref 27–34)
MCV RBC AUTO: 100.8 FL — HIGH (ref 80–100)
METAMYELOCYTES # FLD: 0.5 % — HIGH (ref 0–0)
MONOCYTES # BLD AUTO: 0.81 K/UL — SIGNIFICANT CHANGE UP (ref 0–0.9)
MONOCYTES NFR BLD AUTO: 13 % — SIGNIFICANT CHANGE UP (ref 2–14)
NEUTROPHILS # BLD AUTO: 3.95 K/UL — SIGNIFICANT CHANGE UP (ref 1.8–7.4)
NEUTROPHILS NFR BLD AUTO: 63.5 % — SIGNIFICANT CHANGE UP (ref 43–77)
NRBC # BLD: 0 /100 — SIGNIFICANT CHANGE UP (ref 0–0)
NRBC # BLD: SIGNIFICANT CHANGE UP /100 WBCS (ref 0–0)
PLAT MORPH BLD: NORMAL — SIGNIFICANT CHANGE UP
PLATELET # BLD AUTO: 123 K/UL — LOW (ref 150–400)
POIKILOCYTOSIS BLD QL AUTO: SLIGHT — SIGNIFICANT CHANGE UP
POTASSIUM SERPL-MCNC: 3.8 MMOL/L — SIGNIFICANT CHANGE UP (ref 3.5–5.3)
POTASSIUM SERPL-SCNC: 3.8 MMOL/L — SIGNIFICANT CHANGE UP (ref 3.5–5.3)
PROT SERPL-MCNC: 6.4 G/DL — SIGNIFICANT CHANGE UP (ref 6–8.3)
RBC # BLD: 3.8 M/UL — LOW (ref 4.2–5.8)
RBC # FLD: 17.4 % — HIGH (ref 10.3–14.5)
RBC BLD AUTO: ABNORMAL
SODIUM SERPL-SCNC: 138 MMOL/L — SIGNIFICANT CHANGE UP (ref 135–145)
WBC # BLD: 6.22 K/UL — SIGNIFICANT CHANGE UP (ref 3.8–10.5)
WBC # FLD AUTO: 6.22 K/UL — SIGNIFICANT CHANGE UP (ref 3.8–10.5)

## 2023-09-25 PROCEDURE — 99214 OFFICE O/P EST MOD 30 MIN: CPT

## 2023-09-26 DIAGNOSIS — C81.70 OTHER HODGKIN LYMPHOMA, UNSPECIFIED SITE: ICD-10-CM

## 2023-09-27 DIAGNOSIS — Z23 ENCOUNTER FOR IMMUNIZATION: ICD-10-CM

## 2023-10-01 PROBLEM — Z87.898 HISTORY OF URINARY RETENTION: Status: RESOLVED | Noted: 2023-04-24 | Resolved: 2023-10-01

## 2023-10-02 ENCOUNTER — APPOINTMENT (OUTPATIENT)
Dept: HEMATOLOGY ONCOLOGY | Facility: CLINIC | Age: 83
End: 2023-10-02

## 2023-10-02 ENCOUNTER — APPOINTMENT (OUTPATIENT)
Dept: INFUSION THERAPY | Facility: HOSPITAL | Age: 83
End: 2023-10-02

## 2023-10-16 ENCOUNTER — RESULT REVIEW (OUTPATIENT)
Age: 83
End: 2023-10-16

## 2023-10-16 ENCOUNTER — APPOINTMENT (OUTPATIENT)
Dept: HEMATOLOGY ONCOLOGY | Facility: CLINIC | Age: 83
End: 2023-10-16
Payer: MEDICARE

## 2023-10-16 ENCOUNTER — APPOINTMENT (OUTPATIENT)
Dept: INFUSION THERAPY | Facility: HOSPITAL | Age: 83
End: 2023-10-16

## 2023-10-16 VITALS
TEMPERATURE: 97.2 F | HEIGHT: 70 IN | WEIGHT: 164.99 LBS | HEART RATE: 51 BPM | DIASTOLIC BLOOD PRESSURE: 67 MMHG | RESPIRATION RATE: 16 BRPM | BODY MASS INDEX: 23.62 KG/M2 | SYSTOLIC BLOOD PRESSURE: 128 MMHG | OXYGEN SATURATION: 99 %

## 2023-10-16 DIAGNOSIS — Z87.891 PERSONAL HISTORY OF NICOTINE DEPENDENCE: ICD-10-CM

## 2023-10-16 LAB
ALBUMIN SERPL ELPH-MCNC: 3.7 G/DL — SIGNIFICANT CHANGE UP (ref 3.3–5)
ALBUMIN SERPL ELPH-MCNC: 3.9 G/DL
ALP BLD-CCNC: 110 U/L
ALP SERPL-CCNC: 98 U/L — SIGNIFICANT CHANGE UP (ref 40–120)
ALT FLD-CCNC: 11 U/L — SIGNIFICANT CHANGE UP (ref 10–45)
ALT SERPL-CCNC: 19 U/L
ANION GAP SERPL CALC-SCNC: 7 MMOL/L
ANION GAP SERPL CALC-SCNC: 8 MMOL/L — SIGNIFICANT CHANGE UP (ref 5–17)
AST SERPL-CCNC: 17 U/L
AST SERPL-CCNC: 24 U/L — SIGNIFICANT CHANGE UP (ref 10–40)
BASOPHILS # BLD AUTO: 0.07 K/UL — SIGNIFICANT CHANGE UP (ref 0–0.2)
BASOPHILS NFR BLD AUTO: 0.7 % — SIGNIFICANT CHANGE UP (ref 0–2)
BILIRUB SERPL-MCNC: 0.2 MG/DL
BILIRUB SERPL-MCNC: 0.2 MG/DL — SIGNIFICANT CHANGE UP (ref 0.2–1.2)
BUN SERPL-MCNC: 13 MG/DL
BUN SERPL-MCNC: 15 MG/DL — SIGNIFICANT CHANGE UP (ref 7–23)
CALCIUM SERPL-MCNC: 9.3 MG/DL — SIGNIFICANT CHANGE UP (ref 8.4–10.5)
CALCIUM SERPL-MCNC: 9.6 MG/DL
CHLORIDE SERPL-SCNC: 98 MMOL/L — SIGNIFICANT CHANGE UP (ref 96–108)
CHLORIDE SERPL-SCNC: 99 MMOL/L
CO2 SERPL-SCNC: 31 MMOL/L — SIGNIFICANT CHANGE UP (ref 22–31)
CO2 SERPL-SCNC: 34 MMOL/L
CREAT SERPL-MCNC: 0.41 MG/DL — LOW (ref 0.5–1.3)
CREAT SERPL-MCNC: 0.46 MG/DL
EGFR: 103 ML/MIN/1.73M2
EGFR: 107 ML/MIN/1.73M2 — SIGNIFICANT CHANGE UP
EOSINOPHIL # BLD AUTO: 0.03 K/UL — SIGNIFICANT CHANGE UP (ref 0–0.5)
EOSINOPHIL NFR BLD AUTO: 0.3 % — SIGNIFICANT CHANGE UP (ref 0–6)
GLUCOSE SERPL-MCNC: 117 MG/DL — HIGH (ref 70–99)
GLUCOSE SERPL-MCNC: 71 MG/DL
HCT VFR BLD CALC: 40.6 % — SIGNIFICANT CHANGE UP (ref 39–50)
HGB BLD-MCNC: 12.8 G/DL — LOW (ref 13–17)
IMM GRANULOCYTES NFR BLD AUTO: 0.4 % — SIGNIFICANT CHANGE UP (ref 0–0.9)
LDH SERPL L TO P-CCNC: 167 U/L — SIGNIFICANT CHANGE UP (ref 50–242)
LDH SERPL-CCNC: 174 U/L
LYMPHOCYTES # BLD AUTO: 1.15 K/UL — SIGNIFICANT CHANGE UP (ref 1–3.3)
LYMPHOCYTES # BLD AUTO: 11.5 % — LOW (ref 13–44)
MCHC RBC-ENTMCNC: 31.2 PG — SIGNIFICANT CHANGE UP (ref 27–34)
MCHC RBC-ENTMCNC: 31.5 G/DL — LOW (ref 32–36)
MCV RBC AUTO: 99 FL — SIGNIFICANT CHANGE UP (ref 80–100)
MONOCYTES # BLD AUTO: 1.47 K/UL — HIGH (ref 0–0.9)
MONOCYTES NFR BLD AUTO: 14.7 % — HIGH (ref 2–14)
NEUTROPHILS # BLD AUTO: 7.24 K/UL — SIGNIFICANT CHANGE UP (ref 1.8–7.4)
NEUTROPHILS NFR BLD AUTO: 72.4 % — SIGNIFICANT CHANGE UP (ref 43–77)
NRBC # BLD: 0 /100 WBCS — SIGNIFICANT CHANGE UP (ref 0–0)
PHOSPHATE SERPL-MCNC: 3.2 MG/DL
PLATELET # BLD AUTO: 187 K/UL — SIGNIFICANT CHANGE UP (ref 150–400)
POTASSIUM SERPL-MCNC: 4.2 MMOL/L — SIGNIFICANT CHANGE UP (ref 3.5–5.3)
POTASSIUM SERPL-SCNC: 3.9 MMOL/L
POTASSIUM SERPL-SCNC: 4.2 MMOL/L — SIGNIFICANT CHANGE UP (ref 3.5–5.3)
PROT SERPL-MCNC: 6.4 G/DL
PROT SERPL-MCNC: 6.9 G/DL — SIGNIFICANT CHANGE UP (ref 6–8.3)
RBC # BLD: 4.1 M/UL — LOW (ref 4.2–5.8)
RBC # FLD: 16.2 % — HIGH (ref 10.3–14.5)
SODIUM SERPL-SCNC: 138 MMOL/L — SIGNIFICANT CHANGE UP (ref 135–145)
SODIUM SERPL-SCNC: 139 MMOL/L
URATE SERPL-MCNC: 4.7 MG/DL
WBC # BLD: 10 K/UL — SIGNIFICANT CHANGE UP (ref 3.8–10.5)
WBC # FLD AUTO: 10 K/UL — SIGNIFICANT CHANGE UP (ref 3.8–10.5)

## 2023-10-16 PROCEDURE — 99214 OFFICE O/P EST MOD 30 MIN: CPT

## 2023-10-16 RX ORDER — AMOXICILLIN AND CLAVULANATE POTASSIUM 875; 125 MG/1; MG/1
875-125 TABLET, COATED ORAL
Qty: 20 | Refills: 0 | Status: DISCONTINUED | COMMUNITY
Start: 2023-06-25 | End: 2023-10-16

## 2023-10-16 RX ORDER — CLONAZEPAM 0.5 MG/1
0.5 TABLET ORAL
Qty: 1 | Refills: 0 | Status: DISCONTINUED | COMMUNITY
Start: 2023-06-29 | End: 2023-10-16

## 2023-10-16 RX ORDER — ALBUTEROL SULFATE 2.5 MG/3ML
(2.5 MG/3ML) SOLUTION RESPIRATORY (INHALATION) EVERY 6 HOURS
Qty: 150 | Refills: 1 | Status: COMPLETED | COMMUNITY
Start: 2023-06-26 | End: 2023-10-16

## 2023-10-16 RX ORDER — MIDODRINE HYDROCHLORIDE 10 MG/1
10 TABLET ORAL
Refills: 0 | Status: DISCONTINUED | COMMUNITY
Start: 2023-04-22 | End: 2023-10-16

## 2023-10-26 ENCOUNTER — OUTPATIENT (OUTPATIENT)
Dept: OUTPATIENT SERVICES | Facility: HOSPITAL | Age: 83
LOS: 1 days | Discharge: HOME | End: 2023-10-26

## 2023-10-26 DIAGNOSIS — Z98.890 OTHER SPECIFIED POSTPROCEDURAL STATES: Chronic | ICD-10-CM

## 2023-10-26 DIAGNOSIS — C81.90 HODGKIN LYMPHOMA, UNSPECIFIED, UNSPECIFIED SITE: ICD-10-CM

## 2023-10-30 ENCOUNTER — NON-APPOINTMENT (OUTPATIENT)
Age: 83
End: 2023-10-30

## 2023-10-30 ENCOUNTER — APPOINTMENT (OUTPATIENT)
Dept: HEMATOLOGY ONCOLOGY | Facility: CLINIC | Age: 83
End: 2023-10-30

## 2023-11-06 ENCOUNTER — RESULT REVIEW (OUTPATIENT)
Age: 83
End: 2023-11-06

## 2023-11-06 ENCOUNTER — APPOINTMENT (OUTPATIENT)
Dept: HEMATOLOGY ONCOLOGY | Facility: CLINIC | Age: 83
End: 2023-11-06
Payer: MEDICARE

## 2023-11-06 ENCOUNTER — APPOINTMENT (OUTPATIENT)
Dept: INFUSION THERAPY | Facility: HOSPITAL | Age: 83
End: 2023-11-06

## 2023-11-06 VITALS
RESPIRATION RATE: 16 BRPM | SYSTOLIC BLOOD PRESSURE: 136 MMHG | OXYGEN SATURATION: 98 % | TEMPERATURE: 97.2 F | BODY MASS INDEX: 23.82 KG/M2 | WEIGHT: 165.99 LBS | DIASTOLIC BLOOD PRESSURE: 70 MMHG | HEART RATE: 60 BPM

## 2023-11-06 LAB
ALBUMIN SERPL ELPH-MCNC: 3.4 G/DL — SIGNIFICANT CHANGE UP (ref 3.3–5)
ALBUMIN SERPL ELPH-MCNC: 3.4 G/DL — SIGNIFICANT CHANGE UP (ref 3.3–5)
ALP SERPL-CCNC: 99 U/L — SIGNIFICANT CHANGE UP (ref 40–120)
ALP SERPL-CCNC: 99 U/L — SIGNIFICANT CHANGE UP (ref 40–120)
ALT FLD-CCNC: 16 U/L — SIGNIFICANT CHANGE UP (ref 10–45)
ALT FLD-CCNC: 16 U/L — SIGNIFICANT CHANGE UP (ref 10–45)
ANION GAP SERPL CALC-SCNC: 6 MMOL/L — SIGNIFICANT CHANGE UP (ref 5–17)
ANION GAP SERPL CALC-SCNC: 6 MMOL/L — SIGNIFICANT CHANGE UP (ref 5–17)
AST SERPL-CCNC: 26 U/L — SIGNIFICANT CHANGE UP (ref 10–40)
AST SERPL-CCNC: 26 U/L — SIGNIFICANT CHANGE UP (ref 10–40)
BASOPHILS # BLD AUTO: 0 K/UL — SIGNIFICANT CHANGE UP (ref 0–0.2)
BASOPHILS # BLD AUTO: 0 K/UL — SIGNIFICANT CHANGE UP (ref 0–0.2)
BASOPHILS NFR BLD AUTO: 0 % — SIGNIFICANT CHANGE UP (ref 0–2)
BASOPHILS NFR BLD AUTO: 0 % — SIGNIFICANT CHANGE UP (ref 0–2)
BILIRUB SERPL-MCNC: 0.2 MG/DL — SIGNIFICANT CHANGE UP (ref 0.2–1.2)
BILIRUB SERPL-MCNC: 0.2 MG/DL — SIGNIFICANT CHANGE UP (ref 0.2–1.2)
BUN SERPL-MCNC: 10 MG/DL — SIGNIFICANT CHANGE UP (ref 7–23)
BUN SERPL-MCNC: 10 MG/DL — SIGNIFICANT CHANGE UP (ref 7–23)
CALCIUM SERPL-MCNC: 9.2 MG/DL — SIGNIFICANT CHANGE UP (ref 8.4–10.5)
CALCIUM SERPL-MCNC: 9.2 MG/DL — SIGNIFICANT CHANGE UP (ref 8.4–10.5)
CHLORIDE SERPL-SCNC: 98 MMOL/L — SIGNIFICANT CHANGE UP (ref 96–108)
CHLORIDE SERPL-SCNC: 98 MMOL/L — SIGNIFICANT CHANGE UP (ref 96–108)
CO2 SERPL-SCNC: 33 MMOL/L — HIGH (ref 22–31)
CO2 SERPL-SCNC: 33 MMOL/L — HIGH (ref 22–31)
CREAT SERPL-MCNC: 0.38 MG/DL — LOW (ref 0.5–1.3)
CREAT SERPL-MCNC: 0.38 MG/DL — LOW (ref 0.5–1.3)
DACRYOCYTES BLD QL SMEAR: SLIGHT — SIGNIFICANT CHANGE UP
DACRYOCYTES BLD QL SMEAR: SLIGHT — SIGNIFICANT CHANGE UP
EGFR: 109 ML/MIN/1.73M2 — SIGNIFICANT CHANGE UP
EGFR: 109 ML/MIN/1.73M2 — SIGNIFICANT CHANGE UP
EOSINOPHIL # BLD AUTO: 0.11 K/UL — SIGNIFICANT CHANGE UP (ref 0–0.5)
EOSINOPHIL # BLD AUTO: 0.11 K/UL — SIGNIFICANT CHANGE UP (ref 0–0.5)
EOSINOPHIL NFR BLD AUTO: 1 % — SIGNIFICANT CHANGE UP (ref 0–6)
EOSINOPHIL NFR BLD AUTO: 1 % — SIGNIFICANT CHANGE UP (ref 0–6)
GLUCOSE SERPL-MCNC: 115 MG/DL — HIGH (ref 70–99)
GLUCOSE SERPL-MCNC: 115 MG/DL — HIGH (ref 70–99)
HCT VFR BLD CALC: 36.7 % — LOW (ref 39–50)
HCT VFR BLD CALC: 36.7 % — LOW (ref 39–50)
HGB BLD-MCNC: 12.1 G/DL — LOW (ref 13–17)
HGB BLD-MCNC: 12.1 G/DL — LOW (ref 13–17)
LDH SERPL L TO P-CCNC: 174 U/L — SIGNIFICANT CHANGE UP (ref 50–242)
LDH SERPL L TO P-CCNC: 174 U/L — SIGNIFICANT CHANGE UP (ref 50–242)
LYMPHOCYTES # BLD AUTO: 1.62 K/UL — SIGNIFICANT CHANGE UP (ref 1–3.3)
LYMPHOCYTES # BLD AUTO: 1.62 K/UL — SIGNIFICANT CHANGE UP (ref 1–3.3)
LYMPHOCYTES # BLD AUTO: 15 % — SIGNIFICANT CHANGE UP (ref 13–44)
LYMPHOCYTES # BLD AUTO: 15 % — SIGNIFICANT CHANGE UP (ref 13–44)
MCHC RBC-ENTMCNC: 31.8 PG — SIGNIFICANT CHANGE UP (ref 27–34)
MCHC RBC-ENTMCNC: 31.8 PG — SIGNIFICANT CHANGE UP (ref 27–34)
MCHC RBC-ENTMCNC: 33 G/DL — SIGNIFICANT CHANGE UP (ref 32–36)
MCHC RBC-ENTMCNC: 33 G/DL — SIGNIFICANT CHANGE UP (ref 32–36)
MCV RBC AUTO: 96.3 FL — SIGNIFICANT CHANGE UP (ref 80–100)
MCV RBC AUTO: 96.3 FL — SIGNIFICANT CHANGE UP (ref 80–100)
MONOCYTES # BLD AUTO: 1.83 K/UL — HIGH (ref 0–0.9)
MONOCYTES # BLD AUTO: 1.83 K/UL — HIGH (ref 0–0.9)
MONOCYTES NFR BLD AUTO: 17 % — HIGH (ref 2–14)
MONOCYTES NFR BLD AUTO: 17 % — HIGH (ref 2–14)
NEUTROPHILS # BLD AUTO: 7.23 K/UL — SIGNIFICANT CHANGE UP (ref 1.8–7.4)
NEUTROPHILS # BLD AUTO: 7.23 K/UL — SIGNIFICANT CHANGE UP (ref 1.8–7.4)
NEUTROPHILS NFR BLD AUTO: 67 % — SIGNIFICANT CHANGE UP (ref 43–77)
NEUTROPHILS NFR BLD AUTO: 67 % — SIGNIFICANT CHANGE UP (ref 43–77)
NRBC # BLD: 0 /100 — SIGNIFICANT CHANGE UP (ref 0–0)
NRBC # BLD: 0 /100 — SIGNIFICANT CHANGE UP (ref 0–0)
NRBC # BLD: SIGNIFICANT CHANGE UP /100 WBCS (ref 0–0)
NRBC # BLD: SIGNIFICANT CHANGE UP /100 WBCS (ref 0–0)
PLAT MORPH BLD: NORMAL — SIGNIFICANT CHANGE UP
PLAT MORPH BLD: NORMAL — SIGNIFICANT CHANGE UP
PLATELET # BLD AUTO: 188 K/UL — SIGNIFICANT CHANGE UP (ref 150–400)
PLATELET # BLD AUTO: 188 K/UL — SIGNIFICANT CHANGE UP (ref 150–400)
POIKILOCYTOSIS BLD QL AUTO: SLIGHT — SIGNIFICANT CHANGE UP
POIKILOCYTOSIS BLD QL AUTO: SLIGHT — SIGNIFICANT CHANGE UP
POTASSIUM SERPL-MCNC: 4 MMOL/L — SIGNIFICANT CHANGE UP (ref 3.5–5.3)
POTASSIUM SERPL-MCNC: 4 MMOL/L — SIGNIFICANT CHANGE UP (ref 3.5–5.3)
POTASSIUM SERPL-SCNC: 4 MMOL/L — SIGNIFICANT CHANGE UP (ref 3.5–5.3)
POTASSIUM SERPL-SCNC: 4 MMOL/L — SIGNIFICANT CHANGE UP (ref 3.5–5.3)
PROT SERPL-MCNC: 6.8 G/DL — SIGNIFICANT CHANGE UP (ref 6–8.3)
PROT SERPL-MCNC: 6.8 G/DL — SIGNIFICANT CHANGE UP (ref 6–8.3)
RBC # BLD: 3.81 M/UL — LOW (ref 4.2–5.8)
RBC # BLD: 3.81 M/UL — LOW (ref 4.2–5.8)
RBC # FLD: 16.3 % — HIGH (ref 10.3–14.5)
RBC # FLD: 16.3 % — HIGH (ref 10.3–14.5)
RBC BLD AUTO: ABNORMAL
RBC BLD AUTO: ABNORMAL
SODIUM SERPL-SCNC: 137 MMOL/L — SIGNIFICANT CHANGE UP (ref 135–145)
SODIUM SERPL-SCNC: 137 MMOL/L — SIGNIFICANT CHANGE UP (ref 135–145)
WBC # BLD: 10.79 K/UL — HIGH (ref 3.8–10.5)
WBC # BLD: 10.79 K/UL — HIGH (ref 3.8–10.5)
WBC # FLD AUTO: 10.79 K/UL — HIGH (ref 3.8–10.5)
WBC # FLD AUTO: 10.79 K/UL — HIGH (ref 3.8–10.5)

## 2023-11-06 PROCEDURE — 99214 OFFICE O/P EST MOD 30 MIN: CPT

## 2023-11-06 RX ORDER — HYDROCORTISONE 1 %
12 CREAM (GRAM) TOPICAL
Qty: 400 | Refills: 0 | Status: DISCONTINUED | COMMUNITY
Start: 2023-04-22 | End: 2023-11-06

## 2023-11-06 RX ORDER — TRAZODONE HYDROCHLORIDE 50 MG/1
50 TABLET ORAL
Refills: 0 | Status: DISCONTINUED | COMMUNITY
Start: 2023-04-22 | End: 2023-11-06

## 2023-11-06 RX ORDER — TAMSULOSIN HYDROCHLORIDE 0.4 MG/1
0.4 CAPSULE ORAL
Qty: 90 | Refills: 1 | Status: DISCONTINUED | COMMUNITY
Start: 2022-07-01 | End: 2023-11-06

## 2023-11-06 RX ORDER — MAGNESIUM OXIDE 241.3 MG/1000MG
400 TABLET ORAL
Qty: 90 | Refills: 0 | Status: DISCONTINUED | COMMUNITY
Start: 2023-04-20 | End: 2023-11-06

## 2023-11-07 DIAGNOSIS — Z51.89 ENCOUNTER FOR OTHER SPECIFIED AFTERCARE: ICD-10-CM

## 2023-11-07 DIAGNOSIS — E86.0 DEHYDRATION: ICD-10-CM

## 2023-11-07 DIAGNOSIS — C81.70 OTHER HODGKIN LYMPHOMA, UNSPECIFIED SITE: ICD-10-CM

## 2023-11-07 DIAGNOSIS — Z51.11 ENCOUNTER FOR ANTINEOPLASTIC CHEMOTHERAPY: ICD-10-CM

## 2023-11-07 DIAGNOSIS — R11.2 NAUSEA WITH VOMITING, UNSPECIFIED: ICD-10-CM

## 2023-11-25 ENCOUNTER — EMERGENCY (EMERGENCY)
Facility: HOSPITAL | Age: 83
LOS: 1 days | Discharge: HOME | End: 2023-11-25
Attending: STUDENT IN AN ORGANIZED HEALTH CARE EDUCATION/TRAINING PROGRAM | Admitting: STUDENT IN AN ORGANIZED HEALTH CARE EDUCATION/TRAINING PROGRAM
Payer: MEDICARE

## 2023-11-25 VITALS
SYSTOLIC BLOOD PRESSURE: 138 MMHG | TEMPERATURE: 99 F | OXYGEN SATURATION: 99 % | DIASTOLIC BLOOD PRESSURE: 71 MMHG | RESPIRATION RATE: 18 BRPM | HEART RATE: 80 BPM

## 2023-11-25 VITALS
OXYGEN SATURATION: 98 % | SYSTOLIC BLOOD PRESSURE: 122 MMHG | HEART RATE: 52 BPM | WEIGHT: 164.91 LBS | HEIGHT: 70 IN | TEMPERATURE: 98 F | RESPIRATION RATE: 18 BRPM | DIASTOLIC BLOOD PRESSURE: 68 MMHG

## 2023-11-25 DIAGNOSIS — Z98.890 OTHER SPECIFIED POSTPROCEDURAL STATES: Chronic | ICD-10-CM

## 2023-11-25 PROCEDURE — 73110 X-RAY EXAM OF WRIST: CPT | Mod: 26,RT

## 2023-11-25 PROCEDURE — 93971 EXTREMITY STUDY: CPT | Mod: 26,RT

## 2023-11-25 PROCEDURE — 73110 X-RAY EXAM OF WRIST: CPT

## 2023-11-25 PROCEDURE — 73070 X-RAY EXAM OF ELBOW: CPT | Mod: 26,RT

## 2023-11-25 PROCEDURE — 99284 EMERGENCY DEPT VISIT MOD MDM: CPT | Mod: 25

## 2023-11-25 PROCEDURE — 73030 X-RAY EXAM OF SHOULDER: CPT

## 2023-11-25 PROCEDURE — 93971 EXTREMITY STUDY: CPT

## 2023-11-25 PROCEDURE — 73070 X-RAY EXAM OF ELBOW: CPT

## 2023-11-25 PROCEDURE — 99285 EMERGENCY DEPT VISIT HI MDM: CPT

## 2023-11-25 PROCEDURE — 73030 X-RAY EXAM OF SHOULDER: CPT | Mod: 26,RT

## 2023-11-25 RX ORDER — IBUPROFEN 200 MG
600 TABLET ORAL ONCE
Refills: 0 | Status: COMPLETED | OUTPATIENT
Start: 2023-11-25 | End: 2023-11-25

## 2023-11-25 RX ADMIN — Medication 600 MILLIGRAM(S): at 15:30

## 2023-11-25 RX ADMIN — Medication 450 MILLIGRAM(S): at 14:32

## 2023-11-25 RX ADMIN — Medication 600 MILLIGRAM(S): at 14:32

## 2023-11-25 NOTE — ED PROVIDER NOTE - CROS ED ROS STATEMENT
all other ROS negative except as per HPI Glycopyrrolate Pregnancy And Lactation Text: This medication is Pregnancy Category B and is considered safe during pregnancy. It is unknown if it is excreted breast milk.

## 2023-11-25 NOTE — ED ADULT NURSE NOTE - NSFALLRISKINTERV_ED_ALL_ED

## 2023-11-25 NOTE — ED ADULT TRIAGE NOTE - ESI TRIAGE ACUITY LEVEL, MLM
----- Message from Savannah Phan 
Called pt using polish interpretor to notify of result & to f/u with PCP.  Pt verbalized understanding & made f/u appt for 12/5/23
3

## 2023-11-25 NOTE — ED PROVIDER NOTE - CLINICAL SUMMARY MEDICAL DECISION MAKING FREE TEXT BOX
83M PMHx of AFIB, hyperlipidemia, chronic plummer catheter, lymphoma (last treated 2022), left sided port, presenting with right hand swelling x 1 week. Noticed that for the past 1 week, has been having swelling to the right elbow then progressing to the right hand. A/w mild redness to hand dorsum, but no pain or red streaking. No hx of known RA or gout. No known trauma. Thinks he may have caught his hand on a railing in his bed. But no other trauma. Denies any chest pain, abdominal pain, shortness of breath, nausea/vomiting, headaches, fevers, chills,  weakness,      subjective neurological deficits. No recent midline or IV placements on the right arm.  History obtained from independent historian: son and the patient  External note reviewed: prior admits/ed visits  DDx: DVT rule out, cellulitis, doubt traumatic fractures as full ROM on exam without pain, doubt septic joint, about to move all joints without pain.  ED course, interpretation of imaging studies, and consults:   -   Consideration hospitalization vs de-escalation of care:   Disposition: XXX 83M PMHx of AFIB, hyperlipidemia, chronic plummer catheter, lymphoma (last treated 2022), left sided port, presenting with right hand swelling x 1 week. Noticed that for the past 1 week, has been having swelling to the right elbow then progressing to the right hand. A/w mild redness to hand dorsum, but no pain or red streaking. No hx of known RA or gout. No known trauma. Thinks he may have caught his hand on a railing in his bed. But no other trauma. Denies any chest pain, abdominal pain, shortness of breath, nausea/vomiting, headaches, fevers, chills,  weakness,      subjective neurological deficits. No recent midline or IV placements on the right arm.  History obtained from independent historian: son and the patient  External note reviewed: prior admits/ed visits  DDx: DVT rule out, cellulitis, doubt traumatic fractures as full ROM on exam without pain, doubt septic joint, about to move all joints without pain.  ED course, interpretation of imaging studies, and consults:   - doppler neg, XR negative, (+) redness, cellulitis vs RA inflammatory condition. Doubt septic joint. Able FROM.   Consideration hospitalization vs de-escalation of care:   Disposition: dc

## 2023-11-25 NOTE — ED PROVIDER NOTE - PATIENT PORTAL LINK FT
You can access the FollowMyHealth Patient Portal offered by [facility] by registering at the following website: [email or web address]. By joining [employer]’s FollowMyHealth portal, you will also be able to view your health information using other applications (apps) compatible with our system.

## 2023-11-25 NOTE — ED ADULT NURSE NOTE - OBJECTIVE STATEMENT
Pt presents to ED from home for right hand/wrist pain. Pt with pain, redness, and swelling to right hand/wrist x1 week without improvement. Pain worsens when he moves his hand. Denies fever.

## 2023-11-25 NOTE — ED ADULT NURSE NOTE - CAS EDN DISCHARGE ASSESSMENT
Eyes with no visual disturbances.  Ears clean and dry and no hearing difficulties. Nose with pink mucosa and no drainage.  Mouth mucous membranes moist and pink.  No tenderness or swelling to throat or neck.
Alert and oriented to person, place and time

## 2023-11-25 NOTE — ED PROVIDER NOTE - OBJECTIVE STATEMENT
83M PMHx of AFIB, hyperlipidemia, chronic plummer catheter, lymphoma (last treated 2022), left sided port, presenting with right hand swelling x 1 week. Noticed that for the past 1 week, has been having swelling to the right elbow then progressing to the right hand. A/w mild redness to hand dorsum, but no pain or red streaking. No hx of known RA or gout. No known trauma. Thinks he may have caught his hand on a railing in his bed. But no other trauma. Denies any chest pain, abdominal pain, shortness of breath, nausea/vomiting, headaches, fevers, chills,  weakness,      subjective neurological deficits. No recent midline or IV placements on the right arm.

## 2023-11-25 NOTE — ED PROVIDER NOTE - NSFOLLOWUPINSTRUCTIONS_ED_ALL_ED_FT
Rest, drink plenty of fluids.  Advance activity as tolerated.  Continue all previously prescribed medications as directed.  Follow up with your PMD in 48 hours for a wound check.  Return to the ER for worsening symptoms, fevers, chills, increased redness, increased swelling, weakness, increased pain or new concerning symptoms.    Take acetaminophen 650 mg orally every 6-8 hours for pain control as needed. Please do not exceed 4,000 mg of acetaminophen during a 24 hours period. Acetaminophen can be found in many over-the-counter cold medications as well as opioid medications that may be given for pain.    Take ibuprofen (also known as MOTRIN or ADVIL) 400 mg orally every 6-8 hours for pain control as needed with food to avoid an upset stomach. Ibuprofen can be found in many over-the-counter medications. Please do not take ibuprofen if you have a bleeding disorder, stomach or gastrointestinal ulcer, or liver disease.    If needed, you can alternate these medications so that you can take one medication every 3 hours. For example, at noon take ibuprofen, then at 3PM take acetaminophen, then at 6PM take ibuprofen.     Please fill the prescription of the antibiotics and take as directed. Please finish the entire course of the medication as prescribed. Do not use any alcohol or grapefruit juice with any antibiotics.

## 2023-11-25 NOTE — ED PROVIDER NOTE - PHYSICAL EXAMINATION
VITAL SIGNS: I have reviewed nursing notes and confirm.   GEN: Well-developed; well-nourished; in no acute distress. Speaking full sentences. (+) on supplemental O2, at baseline.  SKIN: Warm, pink, no diaphoresis, no cyanosis, well perfused.   HEAD: Normocephalic; atraumatic. No scalp lacerations, no abrasions.  NECK: Supple; non tender.   EYES: Pupils 3mm equal, round, reactive to light and accomodation, conjunctiva and sclera clear.    ENT: No nasal discharge; airway clear. Trachea is midline. Normal dentition.  CV: RRR. S1, S2 normal; no murmurs, gallops, or rubs. Capillary refill < 2 seconds throughout. Distal pulses intact 2+ throughout.  RESP: CTA bilaterally. No wheezes, rales, or rhonchi.   ABD: Normal bowel sounds, soft, non-distended, non-tender, no rebound, no guarding, no rigidity, no hepatosplenomegaly.  RIGHT ARM: (+) mild swelling distal aspect of the right arm, to elbow, mild redness to the dorsum of the hand, cap refill < 2 sec, no discoloration, no ttp, no sensitivity of the erythematous lesion to light touch, no crepitus, no lymphangitis, radial pulse 2+ equal.   NEURO: Alert & oriented x 3, Grossly unremarkable. Sensory and motor intact throughout. No focal deficits. Gait: Fluid. Normal speech and coordination.

## 2023-11-27 ENCOUNTER — EMERGENCY (EMERGENCY)
Facility: HOSPITAL | Age: 83
LOS: 1 days | Discharge: HOME | End: 2023-11-27
Attending: EMERGENCY MEDICINE | Admitting: EMERGENCY MEDICINE
Payer: MEDICARE

## 2023-11-27 ENCOUNTER — APPOINTMENT (OUTPATIENT)
Dept: PULMONOLOGY | Facility: CLINIC | Age: 83
End: 2023-11-27
Payer: MEDICARE

## 2023-11-27 ENCOUNTER — RESULT REVIEW (OUTPATIENT)
Age: 83
End: 2023-11-27

## 2023-11-27 ENCOUNTER — APPOINTMENT (OUTPATIENT)
Dept: HEMATOLOGY ONCOLOGY | Facility: CLINIC | Age: 83
End: 2023-11-27
Payer: MEDICARE

## 2023-11-27 VITALS
SYSTOLIC BLOOD PRESSURE: 128 MMHG | RESPIRATION RATE: 20 BRPM | WEIGHT: 164.91 LBS | DIASTOLIC BLOOD PRESSURE: 84 MMHG | OXYGEN SATURATION: 99 % | BODY MASS INDEX: 23.66 KG/M2 | HEART RATE: 66 BPM | TEMPERATURE: 97.2 F

## 2023-11-27 VITALS
HEART RATE: 85 BPM | WEIGHT: 164.91 LBS | OXYGEN SATURATION: 96 % | SYSTOLIC BLOOD PRESSURE: 137 MMHG | RESPIRATION RATE: 19 BRPM | DIASTOLIC BLOOD PRESSURE: 85 MMHG | TEMPERATURE: 97 F | HEIGHT: 70 IN

## 2023-11-27 VITALS
TEMPERATURE: 97.1 F | WEIGHT: 165 LBS | HEIGHT: 70 IN | SYSTOLIC BLOOD PRESSURE: 138 MMHG | HEART RATE: 67 BPM | RESPIRATION RATE: 16 BRPM | DIASTOLIC BLOOD PRESSURE: 74 MMHG | OXYGEN SATURATION: 94 % | BODY MASS INDEX: 23.62 KG/M2

## 2023-11-27 DIAGNOSIS — Z51.11 ENCOUNTER FOR ANTINEOPLASTIC CHEMOTHERAPY: ICD-10-CM

## 2023-11-27 DIAGNOSIS — J90 PLEURAL EFFUSION, NOT ELSEWHERE CLASSIFIED: ICD-10-CM

## 2023-11-27 DIAGNOSIS — Z98.890 OTHER SPECIFIED POSTPROCEDURAL STATES: Chronic | ICD-10-CM

## 2023-11-27 LAB
BASOPHILS # BLD AUTO: 0.07 K/UL — SIGNIFICANT CHANGE UP (ref 0–0.2)
BASOPHILS # BLD AUTO: 0.07 K/UL — SIGNIFICANT CHANGE UP (ref 0–0.2)
BASOPHILS NFR BLD AUTO: 0.5 % — SIGNIFICANT CHANGE UP (ref 0–2)
BASOPHILS NFR BLD AUTO: 0.5 % — SIGNIFICANT CHANGE UP (ref 0–2)
EOSINOPHIL # BLD AUTO: 0.02 K/UL — SIGNIFICANT CHANGE UP (ref 0–0.5)
EOSINOPHIL # BLD AUTO: 0.02 K/UL — SIGNIFICANT CHANGE UP (ref 0–0.5)
EOSINOPHIL NFR BLD AUTO: 0.2 % — SIGNIFICANT CHANGE UP (ref 0–6)
EOSINOPHIL NFR BLD AUTO: 0.2 % — SIGNIFICANT CHANGE UP (ref 0–6)
HCT VFR BLD CALC: 39.4 % — SIGNIFICANT CHANGE UP (ref 39–50)
HCT VFR BLD CALC: 39.4 % — SIGNIFICANT CHANGE UP (ref 39–50)
HGB BLD-MCNC: 12.8 G/DL — LOW (ref 13–17)
HGB BLD-MCNC: 12.8 G/DL — LOW (ref 13–17)
IMM GRANULOCYTES NFR BLD AUTO: 0.4 % — SIGNIFICANT CHANGE UP (ref 0–0.9)
IMM GRANULOCYTES NFR BLD AUTO: 0.4 % — SIGNIFICANT CHANGE UP (ref 0–0.9)
LYMPHOCYTES # BLD AUTO: 1.81 K/UL — SIGNIFICANT CHANGE UP (ref 1–3.3)
LYMPHOCYTES # BLD AUTO: 1.81 K/UL — SIGNIFICANT CHANGE UP (ref 1–3.3)
LYMPHOCYTES # BLD AUTO: 14 % — SIGNIFICANT CHANGE UP (ref 13–44)
LYMPHOCYTES # BLD AUTO: 14 % — SIGNIFICANT CHANGE UP (ref 13–44)
MCHC RBC-ENTMCNC: 30.5 PG — SIGNIFICANT CHANGE UP (ref 27–34)
MCHC RBC-ENTMCNC: 30.5 PG — SIGNIFICANT CHANGE UP (ref 27–34)
MCHC RBC-ENTMCNC: 32.5 G/DL — SIGNIFICANT CHANGE UP (ref 32–36)
MCHC RBC-ENTMCNC: 32.5 G/DL — SIGNIFICANT CHANGE UP (ref 32–36)
MCV RBC AUTO: 93.8 FL — SIGNIFICANT CHANGE UP (ref 80–100)
MCV RBC AUTO: 93.8 FL — SIGNIFICANT CHANGE UP (ref 80–100)
MONOCYTES # BLD AUTO: 1.67 K/UL — HIGH (ref 0–0.9)
MONOCYTES # BLD AUTO: 1.67 K/UL — HIGH (ref 0–0.9)
MONOCYTES NFR BLD AUTO: 12.9 % — SIGNIFICANT CHANGE UP (ref 2–14)
MONOCYTES NFR BLD AUTO: 12.9 % — SIGNIFICANT CHANGE UP (ref 2–14)
NEUTROPHILS # BLD AUTO: 9.29 K/UL — HIGH (ref 1.8–7.4)
NEUTROPHILS # BLD AUTO: 9.29 K/UL — HIGH (ref 1.8–7.4)
NEUTROPHILS NFR BLD AUTO: 72 % — SIGNIFICANT CHANGE UP (ref 43–77)
NEUTROPHILS NFR BLD AUTO: 72 % — SIGNIFICANT CHANGE UP (ref 43–77)
NRBC # BLD: 0 /100 WBCS — SIGNIFICANT CHANGE UP (ref 0–0)
NRBC # BLD: 0 /100 WBCS — SIGNIFICANT CHANGE UP (ref 0–0)
PLATELET # BLD AUTO: 227 K/UL — SIGNIFICANT CHANGE UP (ref 150–400)
PLATELET # BLD AUTO: 227 K/UL — SIGNIFICANT CHANGE UP (ref 150–400)
RBC # BLD: 4.2 M/UL — SIGNIFICANT CHANGE UP (ref 4.2–5.8)
RBC # BLD: 4.2 M/UL — SIGNIFICANT CHANGE UP (ref 4.2–5.8)
RBC # FLD: 16.8 % — HIGH (ref 10.3–14.5)
RBC # FLD: 16.8 % — HIGH (ref 10.3–14.5)
WBC # BLD: 12.91 K/UL — HIGH (ref 3.8–10.5)
WBC # BLD: 12.91 K/UL — HIGH (ref 3.8–10.5)
WBC # FLD AUTO: 12.91 K/UL — HIGH (ref 3.8–10.5)
WBC # FLD AUTO: 12.91 K/UL — HIGH (ref 3.8–10.5)

## 2023-11-27 PROCEDURE — 94010 BREATHING CAPACITY TEST: CPT

## 2023-11-27 PROCEDURE — ZZZZZ: CPT

## 2023-11-27 PROCEDURE — 94727 GAS DIL/WSHOT DETER LNG VOL: CPT

## 2023-11-27 PROCEDURE — 99283 EMERGENCY DEPT VISIT LOW MDM: CPT

## 2023-11-27 PROCEDURE — 94729 DIFFUSING CAPACITY: CPT

## 2023-11-27 PROCEDURE — 99213 OFFICE O/P EST LOW 20 MIN: CPT

## 2023-11-27 PROCEDURE — 99214 OFFICE O/P EST MOD 30 MIN: CPT | Mod: 25

## 2023-11-27 PROCEDURE — 51702 INSERT TEMP BLADDER CATH: CPT

## 2023-11-27 NOTE — ED PROVIDER NOTE - OBJECTIVE STATEMENT
Impression: Regular astigmatism, bilateral: H52.223. Plan: PT DOES NOT DESIRE ASTIGMATISM CORRECTION SURGICALLY AND IS AWARE HE MAY HAVE TO WEAR GLASSES SOME OF THE TIME. 83-year-old male presents to the ED for catheter problems.  Patient states not draining since yesterday.  No flank pain no fever.  No vomiting.  Patient had his catheter changed 1 month ago.  An 18 Tajik Choi.  Patient reporting progressively worsening suprapubic pain and no urine drainage into bag.

## 2023-11-27 NOTE — ED PROVIDER NOTE - PRINCIPAL DIAGNOSIS
Choi catheter problem Lm for pt to call. Advised her Dr Pavel Andres was only in this morning. Advised her to call to see who may be available to see her.

## 2023-11-27 NOTE — ED PROVIDER NOTE - CLINICAL SUMMARY MEDICAL DECISION MAKING FREE TEXT BOX
83-year-old male presents to the ED for catheter problems.  Patient states not draining since yesterday.  No flank pain no fever.  No vomiting.  Patient had his catheter changed 1 month ago.  An 18 [place]an Plummer.  Patient reporting progressively worsening suprapubic pain and no urine drainage into bag.   Exam as stated. Plan for plummer change (will not flow after attempting to flush).

## 2023-11-27 NOTE — ED ADULT NURSE REASSESSMENT NOTE - NS ED NURSE REASSESS COMMENT FT1
Plummer catheter replaced with 18F plummer catheter using sterile technique, 2,000mL yellow urine drained to bedside drainage. Pt reports feeling much better.

## 2023-11-27 NOTE — ED PROVIDER NOTE - NSFOLLOWUPINSTRUCTIONS_ED_ALL_ED_FT
BUCKLEY CATHETER PLACEMENT AND CARE - Ambulatory Care    Buckley Catheter Placement and Care    AMBULATORY CARE:    A Buckley catheter is a sterile tube that is inserted into your bladder to drain urine. It is also called an indwelling urinary catheter. The tip of the catheter has a small balloon filled with solution that holds the catheter in your bladder.        How a Buckley catheter is placed: Your genital area will be cleaned to prevent infection. The catheter will be inserted into your urethra. When urine begins to flow into the tubing, the balloon is filled to keep the catheter in place. Then, the open end will be attached to a drainage bag.    Seek care immediately if:    Your catheter comes out.    You suddenly have material that looks like sand in the tubing or drainage bag.    No urine is draining into the bag and you have checked the system.    You have pain in your hip, back, pelvis, or lower abdomen.    You are confused or cannot think clearly.  Call your doctor or urologist if:    You have a fever.    You have bladder spasms for more than 1 day after the catheter is placed.    You see blood in the tubing or drainage bag.    You have a rash or itching where the catheter tube is secured to your skin.    Urine leaks from or around the catheter, tubing, or drainage bag.    The closed drainage system has accidently come open or apart.    You see a layer of crystals inside the tubing.    You have questions or concerns about your condition or care.  Care for your catheter and drainage bag: You can reduce your risk for infection and injury by caring for your catheter and drainage bag properly.    Wash your hands often. Wash before and after you touch your catheter, tubing, or drainage bag. Use soap and water. Wear clean disposable gloves when you care for your catheter or disconnect the drainage bag. Wash your hands before you prepare or eat food.  Handwashing      Clean your genital area 2 times every day. Clean your catheter area and anal opening after every bowel movement.  For men: Use a soapy cloth to clean the tip of your penis. Start where the catheter enters. Wipe backward making sure to pull back the foreskin. Then use a cloth with clear water in the same direction to clean away the soap.    For women: Use a soapy cloth to clean the area that the catheter enters your body. Make sure to separate your labia and wipe toward the anus. Then use a cloth with clear water and wipe in the same direction.    Secure the catheter tube so you do not pull or move the catheter. This helps prevent pain and bladder spasms. Healthcare providers will show you how to use medical tape or a strap to secure the catheter tube to your body.    Keep a closed drainage system. Your catheter should always be attached to the drainage bag to form a closed system. Do not disconnect any part of the closed system unless you need to change the bag.    Keep the drainage bag below the level of your waist. This helps stop urine from moving back up the tubing and into your bladder. Do not loop or kink the tubing. This can cause urine to back up and collect in your bladder. Do not let the drainage bag touch or lie on the floor.    Empty the drainage bag when needed. The weight of a full drainage bag can be painful. Empty the drainage bag every 3 to 6 hours or when it is ? full.    Clean and change the drainage bag as directed. Ask your healthcare provider how often you should change the drainage bag and what cleaning solution to use. Wear disposable gloves when you change the bag. Do not allow the end of the catheter or tubing to touch anything. Clean the ends with an alcohol pad before you reconnect them.  What to do if problems develop:    No urine is draining into the bag:  Check for kinks in the tubing and straighten them out.    Check the tape or strap used to secure the catheter tube to your skin. Make sure it is not blocking the tube.    Make sure you are not sitting or lying on the tubing.    Make sure the urine bag is hanging below the level of your waist.    Urine leaks from or around the catheter, tubing, or drainage bag: Check if the closed drainage system has accidently come open or apart. Clean the catheter and tubing ends with a new alcohol pad and reconnect them.  Follow up with your doctor or urologist as directed: Write down your questions so you remember to ask them during your visits.

## 2023-11-27 NOTE — ED ADULT NURSE NOTE - NSFALLUNIVINTERV_ED_ALL_ED
Bed/Stretcher in lowest position, wheels locked, appropriate side rails in place/Call bell, personal items and telephone in reach/Instruct patient to call for assistance before getting out of bed/chair/stretcher/Non-slip footwear applied when patient is off stretcher/[place] to call system/Physically safe environment - no spills, clutter or unnecessary equipment/Purposeful proactive rounding/Room/bathroom lighting operational, light cord in reach

## 2023-11-27 NOTE — ED ADULT NURSE NOTE - OBJECTIVE STATEMENT
Pt presents to ED from home for urinary catheter complication. Pt with 18F plummer catheter in place x1 month, states urine has not been draining into plummer bag since yesterday. He reports feeling lower abdominal discomfort. Denies fever. Attempted to flush plummer without success.

## 2023-11-27 NOTE — ED PROVIDER NOTE - PATIENT PORTAL LINK FT
You can access the FollowMyHealth Patient Portal offered by [facility] by registering at the following website: [email or web address]. By joining [employer]’s FollowMyHealth portal, you will also be able to view your health information using other applications (apps) compatible with our system.

## 2023-11-27 NOTE — ED ADULT TRIAGE NOTE - CHIEF COMPLAINT QUOTE
Patient BIB son for no urinary out put since last night with bladder pain. Patient with a chronic plummer.

## 2023-11-29 NOTE — CHART NOTE - NSCHARTNOTEFT_GEN_A_CORE
83 y o male presenting to the ED on 11/25 complaining of hand pain/injury.  SW made a courtesy call and spoke with daughter, Judith.  The patient's daughter reported that the patient is doing better and declined assistance with scheduling pcp appointment.  Contact information was provided for further assistance, if required.

## 2023-12-02 NOTE — CHART NOTE - NSCHARTNOTEFT_GEN_A_CORE
called the pt to assist with follow up appointments.  Pt is an 84 y/o male presented to ED for urinary catheter complication.  Daughter reported that pt had follow up with his Urologist on 11/30/23.

## 2024-01-08 ENCOUNTER — APPOINTMENT (OUTPATIENT)
Dept: NUCLEAR MEDICINE | Facility: IMAGING CENTER | Age: 84
End: 2024-01-08
Payer: MEDICARE

## 2024-01-08 ENCOUNTER — OUTPATIENT (OUTPATIENT)
Dept: OUTPATIENT SERVICES | Facility: HOSPITAL | Age: 84
LOS: 1 days | End: 2024-01-08
Payer: MEDICARE

## 2024-01-08 ENCOUNTER — NON-APPOINTMENT (OUTPATIENT)
Age: 84
End: 2024-01-08

## 2024-01-08 DIAGNOSIS — Z98.890 OTHER SPECIFIED POSTPROCEDURAL STATES: Chronic | ICD-10-CM

## 2024-01-08 DIAGNOSIS — C81.90 HODGKIN LYMPHOMA, UNSPECIFIED, UNSPECIFIED SITE: ICD-10-CM

## 2024-01-08 PROCEDURE — 78815 PET IMAGE W/CT SKULL-THIGH: CPT | Mod: 26,PS,MH

## 2024-01-08 PROCEDURE — A9552: CPT

## 2024-01-08 PROCEDURE — 78815 PET IMAGE W/CT SKULL-THIGH: CPT

## 2024-02-09 ENCOUNTER — OUTPATIENT (OUTPATIENT)
Dept: OUTPATIENT SERVICES | Facility: HOSPITAL | Age: 84
LOS: 1 days | Discharge: HOME | End: 2024-02-09

## 2024-02-09 DIAGNOSIS — C81.90 HODGKIN LYMPHOMA, UNSPECIFIED, UNSPECIFIED SITE: ICD-10-CM

## 2024-02-09 DIAGNOSIS — Z98.890 OTHER SPECIFIED POSTPROCEDURAL STATES: Chronic | ICD-10-CM

## 2024-02-19 NOTE — PROVIDER CONTACT NOTE (OTHER) - NAME OF MD/NP/PA/DO NOTIFIED:
Monico Leon (:  1975) is a 48 y.o. male,Established patient, here for evaluation of the following chief complaint(s):  Follow-up (F/u for Hep C started Eplclusa)         ASSESSMENT/PLAN:  Chronic active hepatitis C 1, F 0-1, with rapid viral response  Drug abuse, in remission   Alcohol abuse, in remission    Continue Epclusa to finish 12 weeks, stop date is 2024  CBC complete metabolic profile hep C viral load at the end of treatment and 3 months later  Follow-up end of   Advise cigarette smoking cessation  Continue alcohol and drug Abstinence  Subjective   SUBJECTIVE/OBJECTIVE:  HPI  Follow-up chronic active hepatitis C, started Epclusa on 2023.  Patient is tolerating medication well.  Had occasional headache.   Denies any current alcohol or drugs.   Continues to smoke cigarettes.  Finishing hep A B vaccine  Review of Systems   All other systems reviewed and are negative.      Objective   Physical Exam     Vitals:    24 1259   BP: 132/68   Site: Right Upper Arm   Position: Sitting   Cuff Size: Medium Adult   Pulse: 90   Resp: 20   Temp: 98.6 °F (37 °C)   SpO2: 96%   Weight: 85.3 kg (188 lb)   Height: 1.753 m (5' 9\")     General Appearance: alert and oriented to person, place and time, well-developed and well-nourished, in no acute distress  Skin: warm and dry, no rash.   Head: normocephalic and atraumatic  Eyes: anicteric sclerae  ENT: oropharynx clear and moist with normal mucous membranes. No oral thrush  Lungs: normal respiratory effort, clear lungs  Heart normal S1-S2 no murmur  Abdomen: soft, no tenderness, no hepatosplenomegaly  No leg edema  No erythema, no tenderness      Labs were reviewed and discussed with the patient  HCV VL undetectable on 24  Complete metabolic profile is within normal except for elevated globulin of 3.6 and total protein of 8.1  Normalized liver function tests  CBC is within normal  Urine drug screen is negative  Hepatitis B surface 
Sia Torres

## 2024-02-20 NOTE — ASU PREOP CHECKLIST - VIA
2024       Israel Tracy MD  [address] [place] [address]  Via In Basket      Patient: Aubrey Williamson   YOB: 1975   Date of Visit: 2024       Dear Dr. Tracy:    Thank you for referring Marcus Williamson to me for evaluation. Below are my notes for this visit with him.    If you have questions, please do not hesitate to call me. I look forward to following your patient along with you.      Sincerely,        Neymar Stover DO        CC: No Recipients  Neymar Stover DO  2024  4:45 PM  Sign when Signing Visit  Profile:  Aubrey Williamson is a 48 year old was  requested by dr. Ahmadi, Jeff BUTLER MD to evaluate for stage I melanoma      History of Present Illness:  Aubrey Williamson is a 48 year old seen by dermatology and found to have asymptomatic nevi on skull. He had shave resection on   Dec 7 2023 .6mm invasive melanoma    2023-  had WLE, no residual disease.      Subjective:  No new concerns  No pain  Had colonoscopy       Hematology/ Oncologic history:   Melanoma-stage I    Past medical History:  melanoma    Past Surgical History:  skin biopsy scalp, WLE, colonsocopy      Social History:  no tobacco, socially etoh (few/drink), , no kids, teacher math       Family hx:   Dad  (hrt disease 90)  Mom 79 alive  Sister- alive, 61 , hrt  Sister alive 59       Allergies:  ALLERGIES:  No Known Allergies      Medications:  No current outpatient medications on file.     No current facility-administered medications for this visit.         ROS   Complete ros reviewed, see HPI/subjective for positives.    Exam:  Visit Vitals  /76   Pulse (!) 56   Temp 98.1 °F (36.7 °C) (Temporal)   Resp 18   Ht 5' 7\" (1.702 m)   Wt 78 kg (171 lb 13.6 oz)   SpO2 98%   BMI 26.92 kg/m²       Ecog ps 0  Pain: 0    GEN-nad/ao/af  HEENT- Perrl, CN2-12, no icterus, no nystagmus, no oral lesions, no ulcerations  Neck- supple, no masses, no increase in jvd,    Lymph- no adenopathy x 6 celi stations bilateral.  Cv- RRR no murmur, no le edema  Lungs- cta bilaterally, no r,r,w, symmetric breath sounds  Abd- soft,nt,nd, no hsm, no mass, no bruit, no guarding  Extr- no c,c,e, bilateral.  Mskt- no rom deficit bilaterally  Neuro- no focal deficit  Psych- no depression on screening  Skin- no ecchymosis, POST OPERATIVE changes to scalp, nevi on back/abd.       Data    Pathology feb 9 2024  A.  Skin and subcutis, scalp; excision:  -No residual malignant melanoma or melanoma in situ identified  -Minute foci of atypical melanocytic hyperplasia noted  -Residual melanocytic nevus noted  -Focal actinic keratosis and extensive solar elastosis  -Biopsy site changes noted  -Margins negative for melanoma    Dec 7 2-23  A.  Skin, crown of scalp shave biopsy:  -Invasive malignant melanoma, superficial spreading type, 0.6 mm in depth arising in a pre-existing nevus with melanoma in situ extending to a peripheral biopsy edge.     Comment: This is a broad compound melanocytic proliferation with irregular junctional melanocytes that demonstrate poorly formed nests, single cell predominance and irregular distribution.  There is fibrosis and scarring in the background with interspersed irregular melanocytic nests.  There are also bland melanocytes towards the base of the biopsy.  PRAME immunostain supports the above diagnosis.  Mart-1 immunostain supports the above diagnosis.  Case subjected to pathology staff review.     B.  Skin, right sole shave biopsy:  -Compound acral nevus (see comment).     Comment: Mart-1 immunostain supports the above diagnosis.  P16 is negative.  PRAME immunostain is negative and Ki-67 proliferation marker is low at less than 2%.  The specimen was sent to [facility] for additional [facility] molecular testing, and the lesion has a genetic signature consistent with a benign process.      Labs:      Impression:  1) stage I melanoma        Plan:    1) Counseling per  NCCN guidelines:   A) reviewed staging and management.   B) reviewed patient education as to the ABCDe's of self skin exam.   C) reviewed healthy lifestyle.   D) pt ed forms given   E) favor to follow up  w dermatology and he was educated about self skin exams.   F) age based screenin    No orders of the defined types were placed in this encounter.      Patient Instructions   Hi. It was a pleasure to meet you.    ISSUES:  1) stage I melanoma (head)    To do:  1) follow up with dermatology every 3-4 months  2) age based screening (vaccine, colonoscopy)  3) self awareness (any changes -call)      PLEASE CALL OFFICE IF ANY CONCERNS [phone]      wheelchair

## 2024-02-26 ENCOUNTER — RESULT REVIEW (OUTPATIENT)
Age: 84
End: 2024-02-26

## 2024-02-26 ENCOUNTER — APPOINTMENT (OUTPATIENT)
Dept: HEMATOLOGY ONCOLOGY | Facility: CLINIC | Age: 84
End: 2024-02-26
Payer: MEDICARE

## 2024-02-26 VITALS
SYSTOLIC BLOOD PRESSURE: 124 MMHG | BODY MASS INDEX: 24.62 KG/M2 | DIASTOLIC BLOOD PRESSURE: 81 MMHG | TEMPERATURE: 98.3 F | OXYGEN SATURATION: 99 % | WEIGHT: 171.98 LBS | HEIGHT: 70 IN | HEART RATE: 50 BPM | RESPIRATION RATE: 18 BRPM

## 2024-02-26 LAB
BASOPHILS # BLD AUTO: 0.03 K/UL — SIGNIFICANT CHANGE UP (ref 0–0.2)
BASOPHILS NFR BLD AUTO: 0.4 % — SIGNIFICANT CHANGE UP (ref 0–2)
EOSINOPHIL # BLD AUTO: 0.26 K/UL — SIGNIFICANT CHANGE UP (ref 0–0.5)
EOSINOPHIL NFR BLD AUTO: 3.3 % — SIGNIFICANT CHANGE UP (ref 0–6)
ERYTHROCYTE [SEDIMENTATION RATE] IN BLOOD: 25 MM/HR — HIGH (ref 0–20)
HCT VFR BLD CALC: 42.1 % — SIGNIFICANT CHANGE UP (ref 39–50)
HGB BLD-MCNC: 13.5 G/DL — SIGNIFICANT CHANGE UP (ref 13–17)
IMM GRANULOCYTES NFR BLD AUTO: 0.4 % — SIGNIFICANT CHANGE UP (ref 0–0.9)
LYMPHOCYTES # BLD AUTO: 1.34 K/UL — SIGNIFICANT CHANGE UP (ref 1–3.3)
LYMPHOCYTES # BLD AUTO: 16.9 % — SIGNIFICANT CHANGE UP (ref 13–44)
MCHC RBC-ENTMCNC: 30.7 PG — SIGNIFICANT CHANGE UP (ref 27–34)
MCHC RBC-ENTMCNC: 32.1 G/DL — SIGNIFICANT CHANGE UP (ref 32–36)
MCV RBC AUTO: 95.7 FL — SIGNIFICANT CHANGE UP (ref 80–100)
MONOCYTES # BLD AUTO: 0.59 K/UL — SIGNIFICANT CHANGE UP (ref 0–0.9)
MONOCYTES NFR BLD AUTO: 7.4 % — SIGNIFICANT CHANGE UP (ref 2–14)
NEUTROPHILS # BLD AUTO: 5.68 K/UL — SIGNIFICANT CHANGE UP (ref 1.8–7.4)
NEUTROPHILS NFR BLD AUTO: 71.6 % — SIGNIFICANT CHANGE UP (ref 43–77)
NRBC # BLD: 0 /100 WBCS — SIGNIFICANT CHANGE UP (ref 0–0)
PLATELET # BLD AUTO: 179 K/UL — SIGNIFICANT CHANGE UP (ref 150–400)
RBC # BLD: 4.4 M/UL — SIGNIFICANT CHANGE UP (ref 4.2–5.8)
RBC # FLD: 14.9 % — HIGH (ref 10.3–14.5)
WBC # BLD: 7.93 K/UL — SIGNIFICANT CHANGE UP (ref 3.8–10.5)
WBC # FLD AUTO: 7.93 K/UL — SIGNIFICANT CHANGE UP (ref 3.8–10.5)

## 2024-02-26 PROCEDURE — 99214 OFFICE O/P EST MOD 30 MIN: CPT

## 2024-02-27 LAB
ALBUMIN SERPL ELPH-MCNC: 4 G/DL
ALP BLD-CCNC: 99 U/L
ALT SERPL-CCNC: 21 U/L
ANION GAP SERPL CALC-SCNC: 9 MMOL/L
AST SERPL-CCNC: 20 U/L
BILIRUB SERPL-MCNC: 0.3 MG/DL
BUN SERPL-MCNC: 16 MG/DL
CALCIUM SERPL-MCNC: 9.4 MG/DL
CHLORIDE SERPL-SCNC: 100 MMOL/L
CO2 SERPL-SCNC: 30 MMOL/L
CREAT SERPL-MCNC: 0.58 MG/DL
EGFR: 96 ML/MIN/1.73M2
GLUCOSE SERPL-MCNC: 74 MG/DL
LDH SERPL-CCNC: 149 U/L
POTASSIUM SERPL-SCNC: 3.8 MMOL/L
PROT SERPL-MCNC: 7.1 G/DL
SODIUM SERPL-SCNC: 140 MMOL/L

## 2024-03-03 NOTE — HISTORY OF PRESENT ILLNESS
[de-identified] : IV [de-identified] : Mr. Jeronimo is a 83 yo man with Hodgkin lymphoma undergoing chemotherapy. He had a h/o HTN, AF and chronic urinary retention. Gradually increasing dyspnea developed starting in early 2023. He was admitted to [facility] on 2/17/23 shortly after an immediately prior admission with CRUZ at [facility], where he was found to have SCV LAD and \par  a large right pleural effusion, which was drained and was reportedly neg for infection and malignancy. A node bx showed atypical CD30+ cells suspicipus for Hodgkin. He went home on oxygen with further w/u planned, then decompensated and was emergently admitted to [facility], where he was intubated and remained on vent for a few days. Over 3L were drained from the right effusion. He was successfully extubated and was transferred to [facility] for further care on 2/24/23. On 3/3/23, Dr. Nathaniel Galvan performed bronchoscopy, right VATS, pleural bx, mediastinal node dissection and placement of PleurX catheter. Pleural nodules showed chronic inflammation, eosinophils, fibrosis, and a level 7 node showed classical Hodgkin lymphoma. Hodgkin cells were large, anaplastic cells +CD30, CD15, dim PAX5, MUM1, dim OCT2, NEFTALI and (-) for CD3, CD45, CD20, CD79a, BOB1, BCL6.\par  He went to rehab on 3/15/23, prior to which an Sxjlds-N-Ocaq was placed.\par  Pretherapy imaging included PET CT  3/8/23 which showed likely right cervical FDG+ LAD, FDG+ paratracheal, prevascular, perihilar, retrocrural, aortocaval LAD some > 2 cm, most 1-2 cm with SUV 3.5-9.2. Prior CT chest showed a 3.5 cm precarinal node. Right pleural effusion with heterogeneous uptake was seen. There was suspected involvement of the right hemisacrum. 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 Cytogenetics was nl. NGS OnkoMyeloid panel showed TET2 and ASXL1 mutations.\par  Pretherapy CBC showed NC/NC anemia, Hgb in 9-10 range, mild leukocytosis, nl creat and LFTs and LDH.\par  Therapy with brentuximab vedotin (BV) 1.8 mg/kg was given iv before the 3/15 d/c from [facility] to [facility].\par  While at rehab, he developed chest discomfort., low systolic BP and mild tachycardia. EKG showed Afib with rapid ventricular rythmn.  Imaging showed increase in the effusion with loculation and increased atelectasis. He was readmitted to [facility] on 3/25/2. PleurX catheter was flushed with > 2L drainage of pleural fluid; he improved and was able to return to [facility] 4/10/23. Prior to transfer, a second dose of BV with dose reduced to 1.2 mg/kg was given.\par  At Rehab, pt has been bed and wheelchair bound with stable pulm status, no dyspnea or cough at rest. He has had no fevers and has a good appetit.\par  Chest x ray obtained today (4/20/23) showed stable findings.\par  Pt's son says that there has been minimal to no drainage from the PleurX catheter since the last discharge from [facility]. [FreeTextEntry1] : BV q3 weeks x 4 AVD x2 6/26/23  [de-identified] : D/C from Rehab received 4th  dose BV, at 1.8 mg/kg, on 4/24/23 w/o incident with Onpro support then started q2 wk AVD with good tolerance. Minor dose adjustments.now to receive BV  q 3weeks as tolerated x 4 more doses  PET/CT 7/2/23: Decr or resolution of FDG+ dz above and below diaphragm (Deauville 2)' decr  in FDG(-) bilateral reticular opacities in lung, resolved right pleural effusion. pulm status -PleurX catheter d/c'd, pulm status improved, wears o2 at 3 liter  flu vaccine 9/25/23 complaints weakness to legs and numbness to feet legs hasn't buckled. tingling  to fingers no problem carrying out activity Appetite fair, encourage to increase fluids   constipation post treatment  Eliquis for chronic Afib Afebrile maintained   on Continuous o2 via nasal   3 liters, removes when showering o2 sat decrease to 90% .  neuropathy - t fingers , and toes no difficulty walking. has weakness to upper legs instructed in importance of daily exercise. He is seeing PT once a week and using stationary steps for activity. elian SBD in place has issues with cath not draining doi8ng ok now less sediment, maintaining good fluid intake .

## 2024-03-03 NOTE — ASSESSMENT
[FreeTextEntry1] : Stage IV classical Hodgkin lymphoma in an 81 yo man who presented with a large pleural effusion and respiratory failure. He has now been treated 3 times with BV, three weeks apart, with good tolerance, no c/o neuropathy. He had myelosuppression transiently after the first dose which rapidly reversed after receiving filgrastim. He has a PleurX catheter in place which was obstructed a few weeks ago, was cleared, and is now draining minimally now w/o recurrence of respiratory complaints or changes in chest X-ray, incl 4/24, suggesting that to the degree that the effusion is related to Hodgkin lymphoma, pleural implants, he may be responding. There is a suggestion of mild decrease in LAD in recent chest CT. Recent echo shows nl LV function. Monotherapy with BV will not be with curative intent, so intend to shift to cycles of AVD (doxorubicin, vinblastine and dacarbazine) given q2 weeks as per Sunny et. al. JCO 36:3015, 2018. Presence of two mutations in bone marrow cells identified by NGS (ASXL1 and TET2) may be bystander mutations or may signify propensity to develop cytopenias related to abnormal clonal hematopoiesis. Blood counts after therapy need to be closely followed., sp 1 unit of PRBC cont to need intensive PT., encourage to increase activity : AVD will be given q2 wk. x 6 doses then 4 more BV q 3 weeks  LMW heparin ppx.on Eliquis CBC discussed pt and son follow up 3 weeks.     [Curative] : Goals of care discussed with patient: Curative

## 2024-03-03 NOTE — ASSESSMENT
[FreeTextEntry1] : Stage IV classical Hodgkin lymphoma in an 81 yo man who presented with a large pleural effusion and respiratory failure. He has now been treated 3 times with BV, three weeks apart, with good tolerance, no c/o neuropathy. He had myelosuppression transiently after the first dose which rapidly reversed after receiving filgrastim. He has a PleurX catheter in place which was obstructed a few weeks ago, was cleared, and is now draining minimally now w/o recurrence of respiratory complaints or changes in chest X-ray, incl 4/24, suggesting that to the degree that the effusion is related to Hodgkin lymphoma, pleural implants, he may be responding. There is a suggestion of mild decrease in LAD in recent chest CT. Recent echo shows nl LV function. Monotherapy with BV will not be with curative intent, so intend to shift to cycles of AVD (doxorubicin, vinblastine and dacarbazine) given q2 weeks as per Sunny et. al. JCO 36:3015, 2018. Presence of two mutations in bone marrow cells identified by NGS (ASXL1 and TET2) may be bystander mutations or may signify propensity to develop cytopenias related to abnormal clonal hematopoiesis. Blood counts after therapy need to be closely followed., sp 1 unit of PRBC cont to need intensive PT., encourage to increase activity : AVD  given q2 wk. x 6 doses then 4 more BV q 3 weeks  LMW heparin ppx.on Eliquis CBC discussed pt and son follow up 3 months follow CMP, LDH  obtain CT scan of chest abd and pelvis      [Curative] : Goals of care discussed with patient: Curative [Palliative Care Plan] : not applicable at this time

## 2024-03-03 NOTE — REVIEW OF SYSTEMS
[FreeTextEntry8] : plummer less sediment  [FreeTextEntry6] : mild SOB with activity wears Nasal o2  [de-identified] : numbness fingers legs weak PT  allopurinol 100 mg oral tablet: 1 tab(s) orally 2 times a day  aspirin 81 mg oral tablet, chewable: 1 tab(s) orally once a day  Lipitor 80 mg oral tablet: 1 tab(s) orally once a day  lisinopril 10 mg oral tablet: 1 tab(s) orally once a day  metoprolol succinate 100 mg oral tablet, extended release: 1 tab(s) orally once a day  prasugrel 10 mg oral tablet: 1 tab(s) orally once a day

## 2024-03-03 NOTE — HISTORY OF PRESENT ILLNESS
[Disease:__________________________] : Disease: [unfilled] [de-identified] : Mr. Jeronimo is a 83 yo man with Hodgkin lymphoma undergoing chemotherapy. He had a h/o HTN, AF and chronic urinary retention. Gradually increasing dyspnea developed starting in early 2023. He was admitted to [facility] on 2/17/23 shortly after an immediately prior admission with CRUZ at [facility], where he was found to have SCV LAD and \par  a large right pleural effusion, which was drained and was reportedly neg for infection and malignancy. A node bx showed atypical CD30+ cells suspicipus for Hodgkin. He went home on oxygen with further w/u planned, then decompensated and was emergently admitted to [facility], where he was intubated and remained on vent for a few days. Over 3L were drained from the right effusion. He was successfully extubated and was transferred to [facility] for further care on 2/24/23. On 3/3/23, Dr. Nathaniel Galvan performed bronchoscopy, right VATS, pleural bx, mediastinal node dissection and placement of PleurX catheter. Pleural nodules showed chronic inflammation, eosinophils, fibrosis, and a level 7 node showed classical Hodgkin lymphoma. Hodgkin cells were large, anaplastic cells +CD30, CD15, dim PAX5, MUM1, dim OCT2, NEFTALI and (-) for CD3, CD45, CD20, CD79a, BOB1, BCL6.\par  He went to rehab on 3/15/23, prior to which an Dofepu-B-Uefw was placed.\par  Pretherapy imaging included PET CT  3/8/23 which showed likely right cervical FDG+ LAD, FDG+ paratracheal, prevascular, perihilar, retrocrural, aortocaval LAD some > 2 cm, most 1-2 cm with SUV 3.5-9.2. Prior CT chest showed a 3.5 cm precarinal node. Right pleural effusion with heterogeneous uptake was seen. There was suspected involvement of the right hemisacrum. 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 Cytogenetics was nl. NGS OnkoMyeloid panel showed TET2 and ASXL1 mutations.\par  Pretherapy CBC showed NC/NC anemia, Hgb in 9-10 range, mild leukocytosis, nl creat and LFTs and LDH.\par  Therapy with brentuximab vedotin (BV) 1.8 mg/kg was given iv before the 3/15 d/c from [facility] to [facility].\par  While at rehab, he developed chest discomfort., low systolic BP and mild tachycardia. EKG showed Afib with rapid ventricular rythmn.  Imaging showed increase in the effusion with loculation and increased atelectasis. He was readmitted to [facility] on 3/25/2. PleurX catheter was flushed with > 2L drainage of pleural fluid; he improved and was able to return to [facility] 4/10/23. Prior to transfer, a second dose of BV with dose reduced to 1.2 mg/kg was given.\par  At Rehab, pt has been bed and wheelchair bound with stable pulm status, no dyspnea or cough at rest. He has had no fevers and has a good appetit.\par  Chest x ray obtained today (4/20/23) showed stable findings.\par  Pt's son says that there has been minimal to no drainage from the PleurX catheter since the last discharge from [facility]. [de-identified] : IV [FreeTextEntry1] : BV q3 weeks x 4 AVD x2 6/26/23 then AVD x 4 11/23  [de-identified] : received 4th  dose BV, at 1.8 mg/kg, on 4/24/23 w/o incident with Onpro support then started q2 wk AVD with good tolerance. Minor dose adjustments.now to receive BV  q 3weeks as tolerated x 4 more doses  PET/CT 7/2/23: Decr or resolution of FDG+ dz above and below diaphragm (Deauville 2)' decr  in FDG(-) bilateral reticular opacities in lung, resolved right pleural effusion. pulm status -PleurX catheter d/c'd, pulm status improved, wears o2 at 3 liter  flu vaccine 9/25/23 complaints weakness to legs and numbness to feet legs hasn't buckled. tingling  to fingers no problem carrying out activity Appetite fair, encourage to increase fluids   constipation post treatment  Eliquis for chronic Afib Afebrile maintained   on Continuous o2 via nasal   3 liters, removes when showering o2 sat or various time thru out day o2 sat 92  .  neuropathy - t fingers , and toes no difficulty walking. has weakness to upper legs instructed in importance of daily exercise. He is seeing PT once a week and using stationary steps for activity. elian SBD in place has issues with cath not draining doing ok now less sediment, maintaining good fluid intake.

## 2024-03-11 ENCOUNTER — APPOINTMENT (OUTPATIENT)
Dept: PULMONOLOGY | Facility: CLINIC | Age: 84
End: 2024-03-11
Payer: MEDICARE

## 2024-04-29 ENCOUNTER — APPOINTMENT (OUTPATIENT)
Dept: PULMONOLOGY | Facility: CLINIC | Age: 84
End: 2024-04-29
Payer: MEDICARE

## 2024-04-29 VITALS
WEIGHT: 177 LBS | BODY MASS INDEX: 24.78 KG/M2 | SYSTOLIC BLOOD PRESSURE: 114 MMHG | HEIGHT: 71 IN | TEMPERATURE: 97.9 F | OXYGEN SATURATION: 98 % | DIASTOLIC BLOOD PRESSURE: 70 MMHG | HEART RATE: 57 BPM | RESPIRATION RATE: 18 BRPM

## 2024-04-29 DIAGNOSIS — R06.02 SHORTNESS OF BREATH: ICD-10-CM

## 2024-04-29 DIAGNOSIS — J43.9 EMPHYSEMA, UNSPECIFIED: ICD-10-CM

## 2024-04-29 DIAGNOSIS — J98.4 EMPHYSEMA, UNSPECIFIED: ICD-10-CM

## 2024-04-29 PROCEDURE — 99214 OFFICE O/P EST MOD 30 MIN: CPT | Mod: 25

## 2024-04-29 PROCEDURE — 94729 DIFFUSING CAPACITY: CPT

## 2024-04-29 PROCEDURE — 94010 BREATHING CAPACITY TEST: CPT

## 2024-04-29 PROCEDURE — 94727 GAS DIL/WSHOT DETER LNG VOL: CPT

## 2024-04-29 NOTE — REASON FOR VISIT
[Follow-Up] : a follow-up visit [Shortness of Breath] : shortness of breath [TextBox_44] : ARIK TIM is being seen for a follow up visit for SOB, chronic respiratory failure, ILDZ, respiratory muscle weakness.

## 2024-04-29 NOTE — HISTORY OF PRESENT ILLNESS
[TextBox_4] : Mr. DUMONT is a 82 year old male with a history of A-fib, anemia, aortic ulcer, hearing issues, HTN, urinary retention Hodgkins Lymphoma, s/p respiratory failure complicated by right pleural effusion requiring a chest tube placement from april 2023-july 2023 presenting to the office today s/p multiple hospital visits, currently on AVD therapy for Stage 4 Hodgkins-Lymphoma for an initial pulmonary evaluation for SOB, His chief complaint is  -he notes feeling generally well -he notes bowels are regular - he denies SOB in supine position -he notes diet is good -he notes appetite is stable - he denies snoring -he notes good quality of sleep - he notes waking up once a night due to anxiety - he notes tingling sensation in toes  -he notes his senses of smell and taste are stable - he denies PND - he notes sores in nose -he notes energy levels are good - he notes his strength is ok -he notes exercising (dumbbells and foot peddle)  -he denies any headaches, nausea, emesis, fever, chills, sweats, chest pain, chest pressure, coughing, wheezing, palpitations, constipation, diarrhea, vertigo, dysphagia, heartburn, reflux, itchy eyes, itchy ears, leg swelling, leg pain, arthralgias, myalgias, or sour taste in the mouth.  TODAY'S VISIT 4/29/24 Mr. DUMONT is a 82 year old male with a history of A-fib, anemia, aortic ulcer, hearing issues, HTN, urinary retention Hodgkins Lymphoma, s/p respiratory failure complicated by right pleural effusion requiring a chest tube placement from April 2023-july 2023 presenting to the office today s/p multiple hospital visits, currently on AVD therapy for Stage 4 Hodgkins-Lymphoma for a follow up visit for SOB, His chief complaint is  -reports he is overall feeling well -reports his appetite is good has gained 10 lbs -reports he gets good sleep -reports he takes off his O2 during the day for a few hours he monitors his O2 if it drops under 93-94 he puts back on -reports he sleeps with his O2 at night -he has an indwelling plummer catheter home care visits him weekly -[facility] does not [facility] today -reports he has been trying to walk he uses a cane and his aide is by his side   -he denies any headaches, nausea, emesis, fever, chills, sweats, chest pain, chest pressure, coughing, wheezing, palpitations, constipation, diarrhea, vertigo, dysphagia, heartburn, reflux, itchy eyes, itchy ears, leg swelling, leg pain, arthralgias, myalgias, or sour taste in the mouth.

## 2024-04-29 NOTE — ASSESSMENT
[FreeTextEntry1] : Mr. DUMONT is a 83 year old male with a history of A-fib, anemia, aortic ulcer, hearing issues, HTN, urinary retention Hodgkins Lymphoma, s/p respiratory failure complicated by right pleural effusion requiring a chest tube placement from April 2023-july 2023 presenting to the office today s/p multiple hospital visits, currently on AVD therapy for Stage 4 Hodgkins-Lymphoma for a follow up visit  for SOB, chronic respiratory failure respiratory muscle weakness (confirmed) Patient accompanied by family member and formal caregiver. Overall he is stable from a pulmonary standpoint  Problem 1: Obstructive dysfunction (mixed obstructive/restrictive dysfunction) -continue Xopenex 0.63 via nebulizer q6H prn -continue Symbicort 160 2 inhalations BID -continue Spiriva at 2 inhalations QAM  Problem 2: CRF-resp failure -on oxygen currently keep Oxygen above 88% -recommend NasoGel - recommended boroleum ointment -Requires 3 L nasal cannula pulse dose - portable -Tests results (overnight oximetry, 6 minute walk test, exercise study, arterial blood gas, etc.) reveal that oxygen is necessary for daily life- optimally it should be used with any activity and during sleep  Problem 4: Stage 4 Hodgkins-Lymphoma -recommended SaNOtize nasal spray  Problem 5: poor sleep ?MING (elevated mallampati, poor quality sleep) -complete home sleep study - patient refused -Sleep apnea is associated with adverse clinical consequences which can affect most organ systems. Cardiovascular disease risk includes arrhythmias, atrial fibrillation, hypertension, coronary artery disease, and stroke. Metabolic disorders include diabetes type 2, non-alcoholic fatty liver disease. Mood disorder especially depression; and cognitive decline especially in the elderly. Associations with chronic reflux/Sahni's esophagus some but not all inclusive. -Reasons include arousal consistent with hypopnea; respiratory events most prominent in REM sleep or supine position; therefore sleep staging and body position are important for accurate diagnosis and estimation of AHI.  problem 6 : cardiac disease -recommended to continue to follow up with Cardiologist (Raffaele)   health maintenance -recommended yearly flu shot after October 15, 2022 -recommended strep pneumonia vaccines: Prevnar-20 vaccine, followed by Pneumo vaccine 23 one year following after 65 years old. -recommended early intervention for Upper Respiratory Infections (URIs) -recommended regular osteoporosis evaluations -recommended early dermatological evaluations -recommended after the age of 50 to the age of 70, colonoscopy every 5 years  F/P in 6 months He is encouraged to call with any changes, concerns, or questions.

## 2024-04-29 NOTE — PROCEDURE
[FreeTextEntry1] : PFTs done today. There is moderate restrictive lung defect. and moderate decrease diffusing capacity; poor test performance is indicated by volume extrapolation/FVC

## 2024-05-02 ENCOUNTER — OUTPATIENT (OUTPATIENT)
Dept: OUTPATIENT SERVICES | Facility: HOSPITAL | Age: 84
LOS: 1 days | Discharge: HOME | End: 2024-05-02

## 2024-05-02 DIAGNOSIS — Z98.890 OTHER SPECIFIED POSTPROCEDURAL STATES: Chronic | ICD-10-CM

## 2024-05-02 DIAGNOSIS — C81.90 HODGKIN LYMPHOMA, UNSPECIFIED, UNSPECIFIED SITE: ICD-10-CM

## 2024-05-14 ENCOUNTER — RESULT REVIEW (OUTPATIENT)
Age: 84
End: 2024-05-14

## 2024-05-20 ENCOUNTER — RESULT REVIEW (OUTPATIENT)
Age: 84
End: 2024-05-20

## 2024-05-20 ENCOUNTER — APPOINTMENT (OUTPATIENT)
Dept: HEMATOLOGY ONCOLOGY | Facility: CLINIC | Age: 84
End: 2024-05-20
Payer: MEDICARE

## 2024-05-20 ENCOUNTER — APPOINTMENT (OUTPATIENT)
Dept: HEMATOLOGY ONCOLOGY | Facility: CLINIC | Age: 84
End: 2024-05-20

## 2024-05-20 VITALS
DIASTOLIC BLOOD PRESSURE: 67 MMHG | TEMPERATURE: 98.6 F | RESPIRATION RATE: 17 BRPM | BODY MASS INDEX: 24.55 KG/M2 | HEART RATE: 48 BPM | OXYGEN SATURATION: 100 % | WEIGHT: 176 LBS | SYSTOLIC BLOOD PRESSURE: 109 MMHG

## 2024-05-20 DIAGNOSIS — J96.11 CHRONIC RESPIRATORY FAILURE WITH HYPOXIA: ICD-10-CM

## 2024-05-20 DIAGNOSIS — Z86.2 PERSONAL HISTORY OF DISEASES OF THE BLOOD AND BLOOD-FORMING ORGANS AND CERTAIN DISORDERS INVOLVING THE IMMUNE MECHANISM: ICD-10-CM

## 2024-05-20 DIAGNOSIS — M10.9 GOUT, UNSPECIFIED: ICD-10-CM

## 2024-05-20 DIAGNOSIS — I48.20 CHRONIC ATRIAL FIBRILLATION, UNSP: ICD-10-CM

## 2024-05-20 DIAGNOSIS — R33.9 RETENTION OF URINE, UNSPECIFIED: ICD-10-CM

## 2024-05-20 DIAGNOSIS — C81.70 OTHER HODGKIN LYMPHOMA, UNSPECIFIED SITE: ICD-10-CM

## 2024-05-20 DIAGNOSIS — J90 PLEURAL EFFUSION, NOT ELSEWHERE CLASSIFIED: ICD-10-CM

## 2024-05-20 DIAGNOSIS — R93.89 ABNORMAL FINDINGS ON DIAGNOSTIC IMAGING OF OTHER SPECIFIED BODY STRUCTURES: ICD-10-CM

## 2024-05-20 LAB
ALBUMIN SERPL ELPH-MCNC: 4 G/DL
ALP BLD-CCNC: 98 U/L
ALT SERPL-CCNC: 11 U/L
ANION GAP SERPL CALC-SCNC: 8 MMOL/L
AST SERPL-CCNC: 15 U/L
BASOPHILS # BLD AUTO: 0.03 K/UL — SIGNIFICANT CHANGE UP (ref 0–0.2)
BASOPHILS NFR BLD AUTO: 0.4 % — SIGNIFICANT CHANGE UP (ref 0–2)
BILIRUB SERPL-MCNC: 0.4 MG/DL
BUN SERPL-MCNC: 17 MG/DL
CALCIUM SERPL-MCNC: 9.6 MG/DL
CHLORIDE SERPL-SCNC: 100 MMOL/L
CO2 SERPL-SCNC: 32 MMOL/L
CREAT SERPL-MCNC: 0.63 MG/DL
EGFR: 94 ML/MIN/1.73M2
EOSINOPHIL # BLD AUTO: 0.22 K/UL — SIGNIFICANT CHANGE UP (ref 0–0.5)
EOSINOPHIL NFR BLD AUTO: 2.9 % — SIGNIFICANT CHANGE UP (ref 0–6)
ERYTHROCYTE [SEDIMENTATION RATE] IN BLOOD: 24 MM/HR — HIGH (ref 0–20)
GLUCOSE SERPL-MCNC: 116 MG/DL
HCT VFR BLD CALC: 41.6 % — SIGNIFICANT CHANGE UP (ref 39–50)
HGB BLD-MCNC: 13.7 G/DL — SIGNIFICANT CHANGE UP (ref 13–17)
IMM GRANULOCYTES NFR BLD AUTO: 0.3 % — SIGNIFICANT CHANGE UP (ref 0–0.9)
LDH SERPL-CCNC: 141 U/L
LYMPHOCYTES # BLD AUTO: 1.52 K/UL — SIGNIFICANT CHANGE UP (ref 1–3.3)
LYMPHOCYTES # BLD AUTO: 20.1 % — SIGNIFICANT CHANGE UP (ref 13–44)
MCHC RBC-ENTMCNC: 32 PG — SIGNIFICANT CHANGE UP (ref 27–34)
MCHC RBC-ENTMCNC: 32.9 G/DL — SIGNIFICANT CHANGE UP (ref 32–36)
MCV RBC AUTO: 97.2 FL — SIGNIFICANT CHANGE UP (ref 80–100)
MONOCYTES # BLD AUTO: 0.68 K/UL — SIGNIFICANT CHANGE UP (ref 0–0.9)
MONOCYTES NFR BLD AUTO: 9 % — SIGNIFICANT CHANGE UP (ref 2–14)
NEUTROPHILS # BLD AUTO: 5.09 K/UL — SIGNIFICANT CHANGE UP (ref 1.8–7.4)
NEUTROPHILS NFR BLD AUTO: 67.3 % — SIGNIFICANT CHANGE UP (ref 43–77)
NRBC # BLD: 0 /100 WBCS — SIGNIFICANT CHANGE UP (ref 0–0)
PLATELET # BLD AUTO: 185 K/UL — SIGNIFICANT CHANGE UP (ref 150–400)
POTASSIUM SERPL-SCNC: 4.3 MMOL/L
PROT SERPL-MCNC: 7.2 G/DL
RBC # BLD: 4.28 M/UL — SIGNIFICANT CHANGE UP (ref 4.2–5.8)
RBC # FLD: 15 % — HIGH (ref 10.3–14.5)
SODIUM SERPL-SCNC: 139 MMOL/L
WBC # BLD: 7.56 K/UL — SIGNIFICANT CHANGE UP (ref 3.8–10.5)
WBC # FLD AUTO: 7.56 K/UL — SIGNIFICANT CHANGE UP (ref 3.8–10.5)

## 2024-05-20 PROCEDURE — G2211 COMPLEX E/M VISIT ADD ON: CPT

## 2024-05-20 PROCEDURE — 99215 OFFICE O/P EST HI 40 MIN: CPT

## 2024-05-21 PROBLEM — C81.70 CLASSICAL HODGKIN LYMPHOMA: Status: ACTIVE | Noted: 2023-04-22

## 2024-05-21 PROBLEM — I48.20 ATRIAL FIBRILLATION, CHRONIC: Status: ACTIVE | Noted: 2023-04-22

## 2024-05-21 PROBLEM — J96.11 CHRONIC RESPIRATORY FAILURE WITH HYPOXIA: Status: ACTIVE | Noted: 2023-07-14

## 2024-05-21 PROBLEM — J90 PLEURAL EFFUSION, RIGHT: Status: RESOLVED | Noted: 2023-04-21 | Resolved: 2023-07-27

## 2024-05-21 PROBLEM — Z86.2 HISTORY OF ANEMIA: Status: RESOLVED | Noted: 2023-04-24 | Resolved: 2024-05-21

## 2024-05-21 PROBLEM — R93.89 ABNORMAL CT OF THE CHEST: Status: ACTIVE | Noted: 2023-07-24

## 2024-05-21 PROBLEM — R33.9 URINARY RETENTION: Status: ACTIVE | Noted: 2024-05-21

## 2024-05-21 LAB — URATE SERPL-MCNC: 4.8 MG/DL

## 2024-05-21 RX ORDER — ALLOPURINOL 100 MG/1
100 TABLET ORAL
Qty: 90 | Refills: 0 | Status: ACTIVE | COMMUNITY
Start: 2024-03-25

## 2024-05-21 RX ORDER — COLCHICINE 0.6 MG/1
0.6 CAPSULE ORAL
Qty: 30 | Refills: 0 | Status: ACTIVE | COMMUNITY
Start: 2024-04-04

## 2024-05-21 RX ORDER — CLINDAMYCIN HYDROCHLORIDE 150 MG/1
150 CAPSULE ORAL
Qty: 63 | Refills: 0 | Status: COMPLETED | COMMUNITY
Start: 2023-11-25

## 2024-05-21 NOTE — HISTORY OF PRESENT ILLNESS
[Disease:__________________________] : Disease: [unfilled] [de-identified] : Mr. Jeronimo is a 81 yo man with Hodgkin lymphoma undergoing chemotherapy. He had a h/o HTN, AF and chronic urinary retention. Gradually increasing dyspnea developed starting in early 2023. He was admitted to [facility] on 2/17/23 shortly after an immediately prior admission with CRUZ at [facility], where he was found to have SCV LAD and \par  a large right pleural effusion, which was drained and was reportedly neg for infection and malignancy. A node bx showed atypical CD30+ cells suspicipus for Hodgkin. He went home on oxygen with further w/u planned, then decompensated and was emergently admitted to [facility], where he was intubated and remained on vent for a few days. Over 3L were drained from the right effusion. He was successfully extubated and was transferred to [facility] for further care on 2/24/23. On 3/3/23, Dr. Nathaniel Galvan performed bronchoscopy, right VATS, pleural bx, mediastinal node dissection and placement of PleurX catheter. Pleural nodules showed chronic inflammation, eosinophils, fibrosis, and a level 7 node showed classical Hodgkin lymphoma. Hodgkin cells were large, anaplastic cells +CD30, CD15, dim PAX5, MUM1, dim OCT2, NEFTALI and (-) for CD3, CD45, CD20, CD79a, BOB1, BCL6.\par  He went to rehab on 3/15/23, prior to which an Gbtgnb-Y-Ljzl was placed.\par  Pretherapy imaging included PET CT  3/8/23 which showed likely right cervical FDG+ LAD, FDG+ paratracheal, prevascular, perihilar, retrocrural, aortocaval LAD some > 2 cm, most 1-2 cm with SUV 3.5-9.2. Prior CT chest showed a 3.5 cm precarinal node. Right pleural effusion with heterogeneous uptake was seen. There was suspected involvement of the right hemisacrum. 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 Cytogenetics was nl. NGS OnkoMyeloid panel showed TET2 and ASXL1 mutations.\par  Pretherapy CBC showed NC/NC anemia, Hgb in 9-10 range, mild leukocytosis, nl creat and LFTs and LDH.\par  Therapy with brentuximab vedotin (BV) 1.8 mg/kg was given iv before the 3/15 d/c from [facility] to [facility].\par  While at rehab, he developed chest discomfort., low systolic BP and mild tachycardia. EKG showed Afib with rapid ventricular rythmn.  Imaging showed increase in the effusion with loculation and increased atelectasis. He was readmitted to [facility] on 3/25/2. PleurX catheter was flushed with > 2L drainage of pleural fluid; he improved and was able to return to [facility] 4/10/23. Prior to transfer, a second dose of BV with dose reduced to 1.2 mg/kg was given.\par  At Rehab, pt has been bed and wheelchair bound with stable pulm status, no dyspnea or cough at rest. He has had no fevers and has a good appetit.\par  Chest x ray obtained today (4/20/23) showed stable findings.\par  Pt's son says that there has been minimal to no drainage from the PleurX catheter since the last discharge from [facility]. [de-identified] : IV [FreeTextEntry1] : BV q3 weeks x 4,  AVD x2 6/26/23 then BV x 4 11/23  [de-identified] : Completed BV >> AVD >> BV   PET/CT 7/2/23: Decr or resolution of FDG+ dz above and below diaphragm (Deauville 2)' decr  in FDG(-) bilateral reticular opacities in lung, resolved right pleural effusion. PET/CT 1/8/2024: 1. New infiltration of the perivesical fat with questionable droplet of extraluminal air on CT. The urinary bladder is largely collapsed around a Plummer balloon and is not well delineated on CT. In addition, there is nonspecific, increased FDG activity adjacent to the right side of the urinary bladder, mapping to the sigmoid colon, where diverticulosis is noted. These findings raise concern for cystitis and/or diverticulitis, with possible perforation. Clinical correlation and CT urogram recommended for further evaluation.  2. Minimally FDG-avid and non-FDG-avid mediastinal lymphadenopathy is similar, or slightly decreased in size and is unchanged and avidity. Deauville 2.  3. Resolution of diffuse splenic and bone marrow hypermetabolism. No abnormal FDG activity in the sacrum.  4. Non-FDG-avid bilateral reticular opacities are unchanged. pulm status -PleurX catheter d/c'd, pulm status greatly improved PN still a problem, affecting gait, has had PT on and off           Eliquis for chronic Afib Has plummer in place   Declined to go to hosp for assessment of PET CT findings in bladder, had no related symptoms and has remains symptom free

## 2024-05-21 NOTE — ASSESSMENT
[Curative] : Goals of care discussed with patient: Curative [Palliative Care Plan] : not applicable at this time [FreeTextEntry1] : Stage IV classical Hodgkin lymphoma in an 83 yo man who presented with a large pleural effusion and respiratory failure.Treated with sequential BV >> AVD >> BV with CMR. He had a PleurX catheter which drained significant amounts of pleural fluid until drainage abated, ceased, and catheter was removed.   Pulm status improving, using O2 more often on a prn basis, per pt's sonm O2 sat on RA usually > 95%.  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   Despite imaging findings, there are no clinical findings to support a major potentially surgical problem involving the bladder. It's been 4 months since pt declined to go to ED. Will reimage in near future. Needs  urological f/u re mgmt of long term Choi catheter use  Check CMP, LDH, ESR With recent c/o colchicine responsive right hand swelling, check uric acid. Taking only 100 mg allopurinol daily, has normal renal fxn. Consider rheum consult.  Check CT N/C/A/P with contrast late 7/2024  RV 3 mos

## 2024-05-21 NOTE — PHYSICAL EXAM
[Ambulatory and capable of all self care but unable to carry out any work activities] : Status 2- Ambulatory and capable of all self care but unable to carry out any work activities. Up and about more than 50% of waking hours [Normal] : normal spine exam without palpable tenderness, no kyphosis or scoliosis [de-identified] : scattered crackles

## 2024-05-21 NOTE — REVIEW OF SYSTEMS
[Diarrhea: Grade 0] : Diarrhea: Grade 0 [Negative] : Allergic/Immunologic [Joint Pain] : joint pain [FreeTextEntry9] : right hand swelling responsive to colchicine, told that he has gout

## 2024-06-12 NOTE — PROGRESS NOTE ADULT - SUBJECTIVE AND OBJECTIVE BOX
Name of Patient : ARIK DUMONT  MRN: [id number]  Date of visit: 23       Subjective: Patient seen and examined. No new events except as noted.   doing better  improved breathing     REVIEW OF SYSTEMS:    CONSTITUTIONAL: No weakness, fevers or chills  EYES/ENT: No visual changes;  No vertigo or throat pain   NECK: No pain or stiffness  RESPIRATORY: No cough, wheezing, hemoptysis; No shortness of breath  CARDIOVASCULAR: No chest pain or palpitations  GASTROINTESTINAL: No abdominal or epigastric pain. No nausea, vomiting, or hematemesis; No diarrhea or constipation. No melena or hematochezia.  GENITOURINARY: No dysuria, frequency or hematuria  NEUROLOGICAL: No numbness or weakness  SKIN: No itching, burning, rashes, or lesions   All other review of systems is negative unless indicated above.    MEDICATIONS:  MEDICATIONS  (STANDING):  atenolol  Tablet 12.5 milliGRAM(s) Oral daily  cefepime   IVPB      cefepime   IVPB 2000 milliGRAM(s) IV Intermittent every 8 hours  dextrose 5%. 1000 milliLiter(s) (50 mL/Hr) IV Continuous <Continuous>  escitalopram 10 milliGRAM(s) Oral daily  famotidine    Tablet 20 milliGRAM(s) Oral daily  magnesium oxide 400 milliGRAM(s) Oral three times a day with meals  polyethylene glycol 3350 17 Gram(s) Oral daily  senna 2 Tablet(s) Oral at bedtime  simvastatin 40 milliGRAM(s) Oral at bedtime  traZODone 25 milliGRAM(s) Oral at bedtime      PHYSICAL EXAM:  T(C): 37 (23 @ 19:30), Max: 37 (23 @ 19:30)  HR: 93 (23 @ 20:03) (83 - 97)  BP: 110/63 (23 @ 19:30) (101/58 - 112/60)  RR: 17 (23 @ 19:30) (17 - 20)  SpO2: 97% (23 @ 20:03) (95% - 98%)  Wt(kg): --  I&O's Summary    27 Mar 2023 07:01  -  28 Mar 2023 07:00  --------------------------------------------------------  IN: 400 mL / OUT: 1150 mL / NET: -750 mL    28 Mar 2023 07:  -  28 Mar 2023 23:28  --------------------------------------------------------  IN: 0 mL / OUT: 40 mL / NET: -40 mL          Appearance: Normal	  HEENT:  PERRLA   Lymphatic: No lymphadenopathy   Cardiovascular: Normal S1 S2, no JVD  Respiratory: normal effort , clear  Gastrointestinal:  Soft, Non-tender  Skin: No rashes,  warm to touch  Psychiatry:  Mood & affect appropriate  Musculuskeletal: No edema    recent labs, Imaging and EKGs personally reviewed     23 @ 07:01  -  23 @ 07:00  --------------------------------------------------------  IN: 400 mL / OUT: 1150 mL / NET: -750 mL    23 @ 07:01  -  23 @ 23:28  --------------------------------------------------------  IN: 0 mL / OUT: 40 mL / NET: -40 mL                            9.0    6.60  )-----------( 351      ( 28 Mar 2023 05:35 )             29.1                   133<L>  |  96<L>  |  8   ----------------------------<  101<H>  3.9   |  27  |  0.54    Ca    8.5      28 Mar 2023 05:35  Phos  2.2       Mg     1.80           PT/INR - ( 28 Mar 2023 05:35 )   PT: 16.9 sec;   INR: 1.45 ratio         PTT - ( 28 Mar 2023 05:35 )  PTT:27.6 sec                   Urinalysis Basic - ( 27 Mar 2023 23:50 )    Color: Yellow / Appearance: Slightly Turbid / S.019 / pH: x  Gluc: x / Ketone: Trace  / Bili: Negative / Urobili: <2 mg/dL   Blood: x / Protein: 30 mg/dL / Nitrite: Positive   Leuk Esterase: Large / RBC: 5 /HPF /  /HPF   Sq Epi: x / Non Sq Epi: 1 /HPF / Bacteria: Moderate             Yes

## 2024-06-17 ENCOUNTER — OUTPATIENT (OUTPATIENT)
Dept: OUTPATIENT SERVICES | Facility: HOSPITAL | Age: 84
LOS: 1 days | End: 2024-06-17
Payer: MEDICARE

## 2024-06-17 ENCOUNTER — APPOINTMENT (OUTPATIENT)
Dept: CT IMAGING | Facility: HOSPITAL | Age: 84
End: 2024-06-17
Payer: MEDICARE

## 2024-06-17 ENCOUNTER — RESULT REVIEW (OUTPATIENT)
Age: 84
End: 2024-06-17

## 2024-06-17 DIAGNOSIS — C81.70 OTHER HODGKIN LYMPHOMA, UNSPECIFIED SITE: ICD-10-CM

## 2024-06-17 DIAGNOSIS — Z98.890 OTHER SPECIFIED POSTPROCEDURAL STATES: Chronic | ICD-10-CM

## 2024-06-17 PROCEDURE — 74177 CT ABD & PELVIS W/CONTRAST: CPT | Mod: 26,MH

## 2024-06-17 PROCEDURE — 71260 CT THORAX DX C+: CPT | Mod: 26,MH

## 2024-06-17 PROCEDURE — 70491 CT SOFT TISSUE NECK W/DYE: CPT | Mod: 26,MH

## 2024-06-17 PROCEDURE — 70491 CT SOFT TISSUE NECK W/DYE: CPT | Mod: MH

## 2024-06-17 PROCEDURE — 71260 CT THORAX DX C+: CPT | Mod: MH

## 2024-06-17 PROCEDURE — 74177 CT ABD & PELVIS W/CONTRAST: CPT

## 2024-08-11 ENCOUNTER — OUTPATIENT (OUTPATIENT)
Dept: OUTPATIENT SERVICES | Facility: HOSPITAL | Age: 84
LOS: 1 days | Discharge: HOME | End: 2024-08-11

## 2024-08-11 DIAGNOSIS — Z98.890 OTHER SPECIFIED POSTPROCEDURAL STATES: Chronic | ICD-10-CM

## 2024-08-11 DIAGNOSIS — C81.90 HODGKIN LYMPHOMA, UNSPECIFIED, UNSPECIFIED SITE: ICD-10-CM

## 2024-08-19 ENCOUNTER — RESULT REVIEW (OUTPATIENT)
Age: 84
End: 2024-08-19

## 2024-08-19 ENCOUNTER — APPOINTMENT (OUTPATIENT)
Dept: HEMATOLOGY ONCOLOGY | Facility: CLINIC | Age: 84
End: 2024-08-19
Payer: MEDICARE

## 2024-08-19 ENCOUNTER — APPOINTMENT (OUTPATIENT)
Dept: INFUSION THERAPY | Facility: HOSPITAL | Age: 84
End: 2024-08-19

## 2024-08-19 VITALS
RESPIRATION RATE: 16 BRPM | SYSTOLIC BLOOD PRESSURE: 118 MMHG | BODY MASS INDEX: 24.83 KG/M2 | WEIGHT: 178 LBS | OXYGEN SATURATION: 100 % | TEMPERATURE: 98 F | HEART RATE: 57 BPM | DIASTOLIC BLOOD PRESSURE: 80 MMHG

## 2024-08-19 DIAGNOSIS — J98.4 EMPHYSEMA, UNSPECIFIED: ICD-10-CM

## 2024-08-19 DIAGNOSIS — J43.9 EMPHYSEMA, UNSPECIFIED: ICD-10-CM

## 2024-08-19 DIAGNOSIS — Z78.9 OTHER SPECIFIED HEALTH STATUS: ICD-10-CM

## 2024-08-19 DIAGNOSIS — C81.70 OTHER HODGKIN LYMPHOMA, UNSPECIFIED SITE: ICD-10-CM

## 2024-08-19 DIAGNOSIS — J90 PLEURAL EFFUSION, NOT ELSEWHERE CLASSIFIED: ICD-10-CM

## 2024-08-19 DIAGNOSIS — Z92.21 PERSONAL HISTORY OF ANTINEOPLASTIC CHEMOTHERAPY: ICD-10-CM

## 2024-08-19 DIAGNOSIS — Z87.891 PERSONAL HISTORY OF NICOTINE DEPENDENCE: ICD-10-CM

## 2024-08-19 LAB
ALBUMIN SERPL ELPH-MCNC: 3.7 G/DL
ALP BLD-CCNC: 87 U/L
ALT SERPL-CCNC: 13 U/L
ANION GAP SERPL CALC-SCNC: 11 MMOL/L
AST SERPL-CCNC: 17 U/L
BASOPHILS # BLD AUTO: 0.03 K/UL — SIGNIFICANT CHANGE UP (ref 0–0.2)
BASOPHILS NFR BLD AUTO: 0.4 % — SIGNIFICANT CHANGE UP (ref 0–2)
BILIRUB SERPL-MCNC: 0.4 MG/DL
BUN SERPL-MCNC: 17 MG/DL
CALCIUM SERPL-MCNC: 9.7 MG/DL
CHLORIDE SERPL-SCNC: 102 MMOL/L
CO2 SERPL-SCNC: 29 MMOL/L
CREAT SERPL-MCNC: 0.64 MG/DL
EGFR: 93 ML/MIN/1.73M2
EOSINOPHIL # BLD AUTO: 0.28 K/UL — SIGNIFICANT CHANGE UP (ref 0–0.5)
EOSINOPHIL NFR BLD AUTO: 3.6 % — SIGNIFICANT CHANGE UP (ref 0–6)
ERYTHROCYTE [SEDIMENTATION RATE] IN BLOOD: 34 MM/HR — HIGH (ref 0–20)
GLUCOSE SERPL-MCNC: 86 MG/DL
HCT VFR BLD CALC: 40.4 % — SIGNIFICANT CHANGE UP (ref 39–50)
HGB BLD-MCNC: 13.3 G/DL — SIGNIFICANT CHANGE UP (ref 13–17)
IMM GRANULOCYTES NFR BLD AUTO: 0.4 % — SIGNIFICANT CHANGE UP (ref 0–0.9)
LDH SERPL-CCNC: 137 U/L
LYMPHOCYTES # BLD AUTO: 2.02 K/UL — SIGNIFICANT CHANGE UP (ref 1–3.3)
LYMPHOCYTES # BLD AUTO: 26.3 % — SIGNIFICANT CHANGE UP (ref 13–44)
MCHC RBC-ENTMCNC: 32.1 PG — SIGNIFICANT CHANGE UP (ref 27–34)
MCHC RBC-ENTMCNC: 32.9 G/DL — SIGNIFICANT CHANGE UP (ref 32–36)
MCV RBC AUTO: 97.6 FL — SIGNIFICANT CHANGE UP (ref 80–100)
MONOCYTES # BLD AUTO: 0.76 K/UL — SIGNIFICANT CHANGE UP (ref 0–0.9)
MONOCYTES NFR BLD AUTO: 9.9 % — SIGNIFICANT CHANGE UP (ref 2–14)
NEUTROPHILS # BLD AUTO: 4.57 K/UL — SIGNIFICANT CHANGE UP (ref 1.8–7.4)
NEUTROPHILS NFR BLD AUTO: 59.4 % — SIGNIFICANT CHANGE UP (ref 43–77)
NRBC # BLD: 0 /100 WBCS — SIGNIFICANT CHANGE UP (ref 0–0)
PLATELET # BLD AUTO: 175 K/UL — SIGNIFICANT CHANGE UP (ref 150–400)
POTASSIUM SERPL-SCNC: 4 MMOL/L
PROT SERPL-MCNC: 6.8 G/DL
RBC # BLD: 4.14 M/UL — LOW (ref 4.2–5.8)
RBC # FLD: 14.1 % — SIGNIFICANT CHANGE UP (ref 10.3–14.5)
SODIUM SERPL-SCNC: 142 MMOL/L
WBC # BLD: 7.69 K/UL — SIGNIFICANT CHANGE UP (ref 3.8–10.5)
WBC # FLD AUTO: 7.69 K/UL — SIGNIFICANT CHANGE UP (ref 3.8–10.5)

## 2024-08-19 PROCEDURE — 99214 OFFICE O/P EST MOD 30 MIN: CPT

## 2024-08-20 DIAGNOSIS — C85.90 NON-HODGKIN LYMPHOMA, UNSPECIFIED, UNSPECIFIED SITE: ICD-10-CM

## 2024-08-26 NOTE — ASSESSMENT
[Curative] : Goals of care discussed with patient: Curative [Palliative Care Plan] : not applicable at this time [FreeTextEntry1] : Stage IV classical Hodgkin lymphoma in an 81 yo man who presented with a large pleural effusion and respiratory failure. He has now been treated 3 times with BV, three weeks apart, with good tolerance, no c/o neuropathy. He had myelosuppression transiently after the first dose which rapidly reversed after receiving filgrastim. He has a PleurX catheter in place which was obstructed a few weeks ago, was cleared, and is now draining minimally now w/o recurrence of respiratory complaints or changes in chest X-ray, incl 4/24, suggesting that to the degree that the effusion is related to Hodgkin lymphoma, pleural implants, he may be responding. There is a suggestion of mild decrease in LAD in recent chest CT. Recent echo shows nl LV function. Monotherapy with BV will not be with curative intent, so intend to shift to cycles of AVD (doxorubicin, vinblastine and dacarbazine) given q2 weeks as per Sunny et. al. JCO 36:3015, 2018. Presence of two mutations in bone marrow cells identified by NGS (ASXL1 and TET2) may be bystander mutations or may signify propensity to develop cytopenias related to abnormal clonal hematopoiesis. Blood counts after therapy need to be closely followed., sp 1 unit of PRBC cont to need intensive PT., encourage to increase activity results of Cat scan done june Mediastinal lymphadenopathy has not significantly changed.  Infrarenal abdominal aortic aneurysm which would appears to be ulcerated plaque along the right side. This was seen on older contrast examination from 3/29/2023. While the overall size does not appear significantly changed, the degree of contrast entering into the aneurysm appears to have mildly increased. Vascular consultation is recommended.  Air and suspected wall thickening in the bladder which contains a Choi catheter, although limited in evaluation due to marked under distention. Cystitis cannot be excluded. This does not appear significantly changed compared with the previous examination. AVD  given q2 wk. x 6 doses then 4 more BV q 3 weeks  LMW heparin ppx.on Eliquis CBC discussed pt and son Ct scan results discussed to follow up with urology and see Vasular for infrarenal abdominal aortic aneurysm  follow up 3 months follow CMP, LDH

## 2024-08-26 NOTE — HISTORY OF PRESENT ILLNESS
[Disease:__________________________] : Disease: [unfilled] [de-identified] : Mr. Jeronimo is a 83 yo man with Hodgkin lymphoma undergoing chemotherapy. He had a h/o HTN, AF and chronic urinary retention. Gradually increasing dyspnea developed starting in early 2023. He was admitted to [facility] on 2/17/23 shortly after an immediately prior admission with CRUZ at [facility], where he was found to have SCV LAD and \par  a large right pleural effusion, which was drained and was reportedly neg for infection and malignancy. A node bx showed atypical CD30+ cells suspicipus for Hodgkin. He went home on oxygen with further w/u planned, then decompensated and was emergently admitted to [facility], where he was intubated and remained on vent for a few days. Over 3L were drained from the right effusion. He was successfully extubated and was transferred to [facility] for further care on 2/24/23. On 3/3/23, Dr. Nathaniel Galvan performed bronchoscopy, right VATS, pleural bx, mediastinal node dissection and placement of PleurX catheter. Pleural nodules showed chronic inflammation, eosinophils, fibrosis, and a level 7 node showed classical Hodgkin lymphoma. Hodgkin cells were large, anaplastic cells +CD30, CD15, dim PAX5, MUM1, dim OCT2, NEFTALI and (-) for CD3, CD45, CD20, CD79a, BOB1, BCL6.\par  He went to rehab on 3/15/23, prior to which an Bvwiat-X-Xrnj was placed.\par  Pretherapy imaging included PET CT  3/8/23 which showed likely right cervical FDG+ LAD, FDG+ paratracheal, prevascular, perihilar, retrocrural, aortocaval LAD some > 2 cm, most 1-2 cm with SUV 3.5-9.2. Prior CT chest showed a 3.5 cm precarinal node. Right pleural effusion with heterogeneous uptake was seen. There was suspected involvement of the right hemisacrum. 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 Cytogenetics was nl. NGS OnkoMyeloid panel showed TET2 and ASXL1 mutations.\par  Pretherapy CBC showed NC/NC anemia, Hgb in 9-10 range, mild leukocytosis, nl creat and LFTs and LDH.\par  Therapy with brentuximab vedotin (BV) 1.8 mg/kg was given iv before the 3/15 d/c from [facility] to [facility].\par  While at rehab, he developed chest discomfort., low systolic BP and mild tachycardia. EKG showed Afib with rapid ventricular rythmn.  Imaging showed increase in the effusion with loculation and increased atelectasis. He was readmitted to [facility] on 3/25/2. PleurX catheter was flushed with > 2L drainage of pleural fluid; he improved and was able to return to [facility] 4/10/23. Prior to transfer, a second dose of BV with dose reduced to 1.2 mg/kg was given.\par  At Rehab, pt has been bed and wheelchair bound with stable pulm status, no dyspnea or cough at rest. He has had no fevers and has a good appetit.\par  Chest x ray obtained today (4/20/23) showed stable findings.\par  Pt's son says that there has been minimal to no drainage from the PleurX catheter since the last discharge from [facility]. [de-identified] : IV [FreeTextEntry1] : BV q3 weeks x 4 AVD x2 6/26/23 then AVD x 4 11/23  [de-identified] : received 4th  dose BV, at 1.8 mg/kg, on 4/24/23 w/o incident with Onpro support then started q2 wk AVD with good tolerance.  received BV  q 3weeks as tolerated x 4 more doses  PET/CT 7/2/23: Decr or resolution of FDG+ dz above and below diaphragm (Deauville 2)' decr  in FDG(-) bilateral reticular opacities in lung, resolved right pleural effusion. pulm status -PleurX catheter d/c'd, pulm status improved, wears o2 at 3 liter  flu vaccine 9/25/23 complaints weakness to legs and numbness to feet legs hasn't buckled. tingling  to fingers no problem carrying out activity Appetite fair, encourage to increase fluids   constipation post treatment  Eliquis for chronic Afib Afebrile maintained   on Continuous o2 via nasal   3 liters, removes when showering o2 sat or various time thru out day o2 sat 92  .  neuropathy - t fingers , and toes no difficulty walking. has weakness to upper legs instructed in importance of daily exercise. He is seeing PT once a week and using stationary steps for activity. elian SBD in place has issues with cath not draining doing ok now less sediment, maintaining good fluid intake.

## 2024-08-26 NOTE — HISTORY OF PRESENT ILLNESS
[Disease:__________________________] : Disease: [unfilled] [de-identified] : Mr. Jeronimo is a 83 yo man with Hodgkin lymphoma undergoing chemotherapy. He had a h/o HTN, AF and chronic urinary retention. Gradually increasing dyspnea developed starting in early 2023. He was admitted to [facility] on 2/17/23 shortly after an immediately prior admission with CRUZ at [facility], where he was found to have SCV LAD and \par  a large right pleural effusion, which was drained and was reportedly neg for infection and malignancy. A node bx showed atypical CD30+ cells suspicipus for Hodgkin. He went home on oxygen with further w/u planned, then decompensated and was emergently admitted to [facility], where he was intubated and remained on vent for a few days. Over 3L were drained from the right effusion. He was successfully extubated and was transferred to [facility] for further care on 2/24/23. On 3/3/23, Dr. Nathaniel Galvan performed bronchoscopy, right VATS, pleural bx, mediastinal node dissection and placement of PleurX catheter. Pleural nodules showed chronic inflammation, eosinophils, fibrosis, and a level 7 node showed classical Hodgkin lymphoma. Hodgkin cells were large, anaplastic cells +CD30, CD15, dim PAX5, MUM1, dim OCT2, NEFTALI and (-) for CD3, CD45, CD20, CD79a, BOB1, BCL6.\par  He went to rehab on 3/15/23, prior to which an Oacfrz-H-Zzco was placed.\par  Pretherapy imaging included PET CT  3/8/23 which showed likely right cervical FDG+ LAD, FDG+ paratracheal, prevascular, perihilar, retrocrural, aortocaval LAD some > 2 cm, most 1-2 cm with SUV 3.5-9.2. Prior CT chest showed a 3.5 cm precarinal node. Right pleural effusion with heterogeneous uptake was seen. There was suspected involvement of the right hemisacrum. 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 Cytogenetics was nl. NGS OnkoMyeloid panel showed TET2 and ASXL1 mutations.\par  Pretherapy CBC showed NC/NC anemia, Hgb in 9-10 range, mild leukocytosis, nl creat and LFTs and LDH.\par  Therapy with brentuximab vedotin (BV) 1.8 mg/kg was given iv before the 3/15 d/c from [facility] to [facility].\par  While at rehab, he developed chest discomfort., low systolic BP and mild tachycardia. EKG showed Afib with rapid ventricular rythmn.  Imaging showed increase in the effusion with loculation and increased atelectasis. He was readmitted to [facility] on 3/25/2. PleurX catheter was flushed with > 2L drainage of pleural fluid; he improved and was able to return to [facility] 4/10/23. Prior to transfer, a second dose of BV with dose reduced to 1.2 mg/kg was given.\par  At Rehab, pt has been bed and wheelchair bound with stable pulm status, no dyspnea or cough at rest. He has had no fevers and has a good appetit.\par  Chest x ray obtained today (4/20/23) showed stable findings.\par  Pt's son says that there has been minimal to no drainage from the PleurX catheter since the last discharge from [facility]. [de-identified] : IV [FreeTextEntry1] : BV q3 weeks x 4 AVD x2 6/26/23 then AVD x 4 11/23  [de-identified] : received 4th  dose BV, at 1.8 mg/kg, on 4/24/23 w/o incident with Onpro support then started q2 wk AVD with good tolerance.  received BV  q 3weeks as tolerated x 4 more doses  PET/CT 7/2/23: Decr or resolution of FDG+ dz above and below diaphragm (Deauville 2)' decr  in FDG(-) bilateral reticular opacities in lung, resolved right pleural effusion. pulm status -PleurX catheter d/c'd, pulm status improved, wears o2 at 3 liter  flu vaccine 9/25/23 complaints weakness to legs and numbness to feet legs hasn't buckled. tingling  to fingers no problem carrying out activity Appetite fair, encourage to increase fluids   constipation post treatment  Eliquis for chronic Afib Afebrile maintained   on Continuous o2 via nasal   3 liters, removes when showering o2 sat or various time thru out day o2 sat 92  .  neuropathy - t fingers , and toes no difficulty walking. has weakness to upper legs instructed in importance of daily exercise. He is seeing PT once a week and using stationary steps for activity. elian SBD in place has issues with cath not draining doing ok now less sediment, maintaining good fluid intake.

## 2024-08-26 NOTE — ASSESSMENT
[Curative] : Goals of care discussed with patient: Curative [Palliative Care Plan] : not applicable at this time [FreeTextEntry1] : Stage IV classical Hodgkin lymphoma in an 83 yo man who presented with a large pleural effusion and respiratory failure. He has now been treated 3 times with BV, three weeks apart, with good tolerance, no c/o neuropathy. He had myelosuppression transiently after the first dose which rapidly reversed after receiving filgrastim. He has a PleurX catheter in place which was obstructed a few weeks ago, was cleared, and is now draining minimally now w/o recurrence of respiratory complaints or changes in chest X-ray, incl 4/24, suggesting that to the degree that the effusion is related to Hodgkin lymphoma, pleural implants, he may be responding. There is a suggestion of mild decrease in LAD in recent chest CT. Recent echo shows nl LV function. Monotherapy with BV will not be with curative intent, so intend to shift to cycles of AVD (doxorubicin, vinblastine and dacarbazine) given q2 weeks as per Sunny et. al. JCO 36:3015, 2018. Presence of two mutations in bone marrow cells identified by NGS (ASXL1 and TET2) may be bystander mutations or may signify propensity to develop cytopenias related to abnormal clonal hematopoiesis. Blood counts after therapy need to be closely followed., sp 1 unit of PRBC cont to need intensive PT., encourage to increase activity results of Cat scan done june Mediastinal lymphadenopathy has not significantly changed.  Infrarenal abdominal aortic aneurysm which would appears to be ulcerated plaque along the right side. This was seen on older contrast examination from 3/29/2023. While the overall size does not appear significantly changed, the degree of contrast entering into the aneurysm appears to have mildly increased. Vascular consultation is recommended.  Air and suspected wall thickening in the bladder which contains a Choi catheter, although limited in evaluation due to marked under distention. Cystitis cannot be excluded. This does not appear significantly changed compared with the previous examination. AVD  given q2 wk. x 6 doses then 4 more BV q 3 weeks  LMW heparin ppx.on Eliquis CBC discussed pt and son Ct scan results discussed to follow up with urology and see Vasular for infrarenal abdominal aortic aneurysm  follow up 3 months follow CMP, LDH

## 2024-09-20 NOTE — PROGRESS NOTE ADULT - PROBLEM SELECTOR PROBLEM 2
CP with known severe CAD  EKG changes likely precipitated by GI bleeding and anemia.  No CP  
Leukocytosis
Permanent atrial fibrillation
Leukocytosis
Permanent atrial fibrillation
Leukocytosis
Permanent atrial fibrillation
Leukocytosis

## 2024-09-23 RX ORDER — SODIUM CHLORIDE IRRIG SOLUTION 0.9 %
1000 SOLUTION, IRRIGATION IRRIGATION
Refills: 0 | Status: DISCONTINUED | OUTPATIENT
Start: 2024-09-25 | End: 2024-10-09

## 2024-09-24 ENCOUNTER — TRANSCRIPTION ENCOUNTER (OUTPATIENT)
Age: 84
End: 2024-09-24

## 2024-09-24 NOTE — ASU PATIENT PROFILE, ADULT - NSICDXPASTMEDICALHX_GEN_ALL_CORE_FT
PAST MEDICAL HISTORY:  Atrial fibrillation     Depression, major     Gout     H/O urinary retention     History of chemotherapy neuropathy post chemo    Hodgkins lymphoma     Hyperlipidemia     Hypertension     Urinary retention

## 2024-09-24 NOTE — ASU PATIENT PROFILE, ADULT - NSICDXPASTSURGICALHX_GEN_ALL_CORE_FT
PAST SURGICAL HISTORY:  History of infusaport central venous catheter insertion      PAST SURGICAL HISTORY:  History of infusaport central venous catheter insertion     History of lung surgery plebrockx alerter

## 2024-09-25 ENCOUNTER — TRANSCRIPTION ENCOUNTER (OUTPATIENT)
Age: 84
End: 2024-09-25

## 2024-09-25 ENCOUNTER — OUTPATIENT (OUTPATIENT)
Dept: OUTPATIENT SERVICES | Facility: HOSPITAL | Age: 84
LOS: 1 days | End: 2024-09-25
Payer: MEDICARE

## 2024-09-25 VITALS
HEART RATE: 54 BPM | HEIGHT: 70 IN | WEIGHT: 169.09 LBS | SYSTOLIC BLOOD PRESSURE: 121 MMHG | DIASTOLIC BLOOD PRESSURE: 79 MMHG | RESPIRATION RATE: 14 BRPM | OXYGEN SATURATION: 97 % | TEMPERATURE: 98 F

## 2024-09-25 VITALS
SYSTOLIC BLOOD PRESSURE: 120 MMHG | RESPIRATION RATE: 16 BRPM | HEART RATE: 55 BPM | OXYGEN SATURATION: 98 % | DIASTOLIC BLOOD PRESSURE: 68 MMHG | TEMPERATURE: 97 F

## 2024-09-25 DIAGNOSIS — H25.10 AGE-RELATED NUCLEAR CATARACT, UNSPECIFIED EYE: ICD-10-CM

## 2024-09-25 DIAGNOSIS — H25.11 AGE-RELATED NUCLEAR CATARACT, RIGHT EYE: ICD-10-CM

## 2024-09-25 DIAGNOSIS — Z98.890 OTHER SPECIFIED POSTPROCEDURAL STATES: Chronic | ICD-10-CM

## 2024-09-25 PROCEDURE — 66984 XCAPSL CTRC RMVL W/O ECP: CPT | Mod: RT

## 2024-09-25 PROCEDURE — V2632: CPT

## 2024-09-25 DEVICE — LENS IOL ACRYSOF SN60WF 23.0D
Type: IMPLANTABLE DEVICE | Site: RIGHT | Status: NON-FUNCTIONAL
Removed: 2024-09-25

## 2024-09-25 RX ORDER — TROPICAMIDE 1 %
1 DROPS OPHTHALMIC (EYE)
Refills: 0 | Status: COMPLETED | OUTPATIENT
Start: 2024-09-25 | End: 2024-09-25

## 2024-09-25 RX ORDER — ACETAMINOPHEN 325 MG
650 TABLET ORAL EVERY 6 HOURS
Refills: 0 | Status: DISCONTINUED | OUTPATIENT
Start: 2024-09-25 | End: 2024-10-09

## 2024-09-25 RX ORDER — CYCLOPENTOLATE HCL 1 %
1 DROPS OPHTHALMIC (EYE)
Refills: 0 | Status: COMPLETED | OUTPATIENT
Start: 2024-09-25 | End: 2024-09-25

## 2024-09-25 RX ORDER — KETOROLAC TROMETHAMINE 0.5 %
1 DROPS OPHTHALMIC (EYE)
Refills: 0 | Status: COMPLETED | OUTPATIENT
Start: 2024-09-25 | End: 2024-09-25

## 2024-09-25 RX ORDER — LEVALBUTEROL HYDROCHLORIDE 1.25 MG/3ML
3 SOLUTION RESPIRATORY (INHALATION)
Refills: 0 | DISCHARGE

## 2024-09-25 RX ORDER — PHENYLEPHRINE HYDROCHLORIDE 100 MG/ML
1 SOLUTION/ DROPS OPHTHALMIC
Refills: 0 | Status: COMPLETED | OUTPATIENT
Start: 2024-09-25 | End: 2024-09-25

## 2024-09-25 RX ADMIN — Medication 1 DROP(S): at 10:19

## 2024-09-25 RX ADMIN — PHENYLEPHRINE HYDROCHLORIDE 1 DROP(S): 100 SOLUTION/ DROPS OPHTHALMIC at 10:24

## 2024-09-25 RX ADMIN — Medication 1 DROP(S): at 10:20

## 2024-09-25 RX ADMIN — Medication 1 DROP(S): at 10:23

## 2024-09-25 RX ADMIN — Medication 1 DROP(S): at 10:13

## 2024-09-25 RX ADMIN — Medication 1 DROP(S): at 10:14

## 2024-09-25 RX ADMIN — Medication 1 DROP(S): at 10:24

## 2024-09-25 RX ADMIN — PHENYLEPHRINE HYDROCHLORIDE 1 DROP(S): 100 SOLUTION/ DROPS OPHTHALMIC at 10:20

## 2024-09-25 RX ADMIN — Medication 40 MILLILITER(S): at 10:26

## 2024-09-25 RX ADMIN — PHENYLEPHRINE HYDROCHLORIDE 1 DROP(S): 100 SOLUTION/ DROPS OPHTHALMIC at 10:14

## 2024-09-25 NOTE — ASU DISCHARGE PLAN (ADULT/PEDIATRIC) - NS MD DC FALL RISK RISK
For information on Fall & Injury Prevention, visit: [email or web address] OR  [email or web address] OR  https://www.cdc.gov/steadi/patient.html

## 2024-09-25 NOTE — ASU PREOP CHECKLIST - AS BP NONINV METHOD
[FreeTextEntry1] : PROBLEM ASSESSMENT RECOMMENDATION\par \par [id number]\par COUGH DYSPNEA WHEEZE \par [id number]\par \par 11/4/2019 Pt to find out if any of following is covered and wade back to me breo asmanex pulmicort advair \par 11/4/2019 Check CXR labs\par 11/4/2019 Course Zpak\par 11/4/2019 Course pred taper \par 11/4/2019 RTC 2 w \par \par \par \par XXXXXX\par SMOKER \par XXXXXXX\par 11/4/2019 Strongly counseled to stop \par 
electronic

## 2024-09-25 NOTE — ASU DISCHARGE PLAN (ADULT/PEDIATRIC) - ASU DC SPECIAL INSTRUCTIONSFT
Keep shield on eye until office visit today at 2 pm  Any problems call office at [phone]    OFFICE VISIT TODAY AT 2 pm  Bring Drops

## 2024-09-25 NOTE — BRIEF OPERATIVE NOTE - NSICDXBRIEFPROCEDURE_GEN_ALL_CORE_FT
INSURANCE COVERAGE REGARDING PAYMENT  FOR YOUR COLONOSCOPY      Colon Cancer is the second leading cause of death among cancers, per the American [employer] Society. It is preventable. Early detection is the key. Your doctor will determine which tests need to be done for prevention and/or treatment.    If during the course of a screening colonoscopy, our physician finds an abnormality, performs a biopsy or polypectomy (removal of polyp), your insurance company may consider the procedure to be a diagnostic exam and no longer a screening procedure.    Every insurance company is different. We encourage you to call your insurance company and ask them \"if during the course of a screening colonoscopy, an abnormality is discovered and the physician performs a biopsy or polypectomy, will the procedure fall under your screening benefits or under diagnostic benefits\". Generally, screening benefits and diagnostic benefits are paid at different levels. Charges associated with anesthesia may also be processed differently. This varies with each insurance company, so we want you to be aware of this prior to your procedure. You do not have to call your insurance company if you have Medicare.    The authorization staff at [facility] will precertify your colonoscopy. However, precertification, which serves as notification is never a guarantee of payment. If you have questions regarding precertification for your procedure please contact your insurance company. If you need further assistance call our authorization department at [phone].   PROCEDURES:  Single stage extracapsular removal of cataract with insertion of intraocular lens prosthesis by phacoemulsification 25-Sep-2024 11:50:35  Francisco Cuenca

## 2024-09-25 NOTE — ASU DISCHARGE PLAN (ADULT/PEDIATRIC) - DIET/FLUID RESTRICTION
Patient: Toya Escalera    Procedure(s):  BILATERAL UPPER LID INTERNAL PTOSIS REPAIR, BILATERAL BLEPHAROPLASTY  COSMETIC BILATERAL  LOWER LID  BLEPHAROPLASTY REPAIR    Diagnosis: Ptosis of eyelid, unspecified laterality [H02.409]  Diagnosis Additional Information: No value filed.    Anesthesia Type:   MAC     Note:  Airway :Face Mask  Patient transferred to:PACU  Handoff Report: Identifed the Patient, Identified the Reponsible Provider, Reviewed the pertinent medical history, Discussed the surgical course, Reviewed Intra-OP anesthesia mangement and issues during anesthesia, Set expectations for post-procedure period and Allowed opportunity for questions and acknowledgement of understanding      Vitals: (Last set prior to Anesthesia Care Transfer)    CRNA VITALS  11/5/2019 1216 - 11/5/2019 1252      11/5/2019             Pulse:  97    SpO2:  100 %    Resp Rate (set):  10                Electronically Signed By: OLE Tierney CRNA  November 5, 2019  12:52 PM   No change

## 2024-10-03 NOTE — ED ADULT NURSE NOTE - DRUG PRE-SCREENING (DAST -1)
Medical Nutrition Therapy for Metabolic and Bariatric Surgery  Annual Post-Op Gastric Sleeve Nutrition Follow-Up      Nutrition Assessment:  Patient reports:  tolerating diet, always eating small, frequent meals.  sometimes eating protein first,  sometimes eating protein portions with meals and snacks.  Drinking at least 48 oz of fluid and sugar free beverages daily. Patient typically drinking water and gatorade zero.  consistently taking vitamins and minerals.   not adding physical activity to daily life to remain active.     Discussed switching to bariatric MVI, increase calcium to 1000 mg.  Increasing water intake, focusing on good quality snacks. Provided Snack Ideas List Handout(s).    All questions answered. Patient aware of how to contact RD if needs arise. RD to remain available.    24-hr diet recall scanned into chart.    Multivitamin/Mineral Intake: Yes, over the counter multivitamin from [employer], addition B12 or B Complex, Vitamin D, Vitamin A     Calcium Intake: Yes, 500 mg    Vitals:   Vitals:    10/03/24 1143   BP: 110/74   Pulse: 51   SpO2: 98%   Weight: 95.3 kg (210 lb)   Height: 1.664 m (5' 5.5\")      Body mass index is 34.41 kg/m².    Nutrition Diagnosis:   Inadequate food and beverage intake r/t WLS as evidenced by loss of excess body weight lost 94 lbs over  4 Years    Nutrition Intervention:  Diet: Bariatric Diet, 60-80 gm of protein, and 48-64 oz of fluid    Nutrition Education: Bariatric Binder (diet guidelines and recipes)    Goal:   Continue bariatric diet and continue to add variety to diet as tolerated.   Sip on water or sugar-free fluid throughout the day. Aiming for a minimum of 64 oz per day.   Eat small, frequent meals containing protein, as tolerated.   Consistently take MVIs and minerals to prevent nutrient deficiencies.   Discuss activity with physician and determine a plan for remaining active.    Monitor/Evaluate:  Diet tolerance, protein intake, vitamin adherence, fluid intake, 
Statement Selected
10/3/2025     Order Specific Question:   Is Patient Fasting?     Answer:   yes     Order Specific Question:   No of Hours?     Answer:   12    Lipid Panel     Standing Status:   Future     Standing Expiration Date:   10/3/2025     Order Specific Question:   Is Patient Fasting?/# of Hours     Answer:   12    Magnesium     Standing Status:   Future     Standing Expiration Date:   10/3/2025    PTH, Intact     Standing Status:   Future     Standing Expiration Date:   10/3/2025    TSH     Standing Status:   Future     Standing Expiration Date:   10/3/2025    Vitamin A     Standing Status:   Future     Standing Expiration Date:   10/3/2025    Vitamin B1     Standing Status:   Future     Standing Expiration Date:   10/3/2025    Vitamin B12 & Folate     Standing Status:   Future     Standing Expiration Date:   10/3/2025    Vitamin D 25 Hydroxy     Standing Status:   Future     Standing Expiration Date:   10/3/2025    Zinc     Standing Status:   Future     Standing Expiration Date:   10/3/2025       Prescriptions this encounter:  Orders Placed This Encounter   Medications    famotidine (PEPCID) 20 MG tablet     Sig: Take 1 tablet by mouth nightly     Dispense:  30 tablet     Refill:  3       Electronically signed by:  Margaret Baca CNP

## 2024-10-14 NOTE — PROCEDURE NOTE - PLAN
- F/u cytopathology results.  - HOLD IV heparin for 6 hours. May resume IV heparin after 6 pm WITHOUT a bolus dose, if there are no signs of bleeding.  - Global care as per the primary team. independent

## 2024-11-06 ENCOUNTER — EMERGENCY (EMERGENCY)
Facility: HOSPITAL | Age: 84
LOS: 1 days | Discharge: HOME | End: 2024-11-06
Attending: EMERGENCY MEDICINE | Admitting: EMERGENCY MEDICINE
Payer: MEDICARE

## 2024-11-06 VITALS
HEART RATE: 82 BPM | SYSTOLIC BLOOD PRESSURE: 126 MMHG | OXYGEN SATURATION: 100 % | TEMPERATURE: 99 F | DIASTOLIC BLOOD PRESSURE: 76 MMHG | RESPIRATION RATE: 20 BRPM

## 2024-11-06 VITALS
HEIGHT: 70 IN | RESPIRATION RATE: 18 BRPM | OXYGEN SATURATION: 96 % | HEART RATE: 80 BPM | WEIGHT: 179.9 LBS | TEMPERATURE: 99 F | DIASTOLIC BLOOD PRESSURE: 87 MMHG | SYSTOLIC BLOOD PRESSURE: 156 MMHG

## 2024-11-06 DIAGNOSIS — Z98.890 OTHER SPECIFIED POSTPROCEDURAL STATES: Chronic | ICD-10-CM

## 2024-11-06 PROCEDURE — 99284 EMERGENCY DEPT VISIT MOD MDM: CPT

## 2024-11-06 RX ORDER — LIDOCAINE HCL/PF 10 MG/ML
10 VIAL (ML) INJECTION ONCE
Refills: 0 | Status: COMPLETED | OUTPATIENT
Start: 2024-11-06 | End: 2024-11-06

## 2024-11-06 RX ADMIN — Medication 10 MILLILITER(S): at 22:02

## 2024-11-07 PROBLEM — C81.90 HODGKIN LYMPHOMA, UNSPECIFIED, UNSPECIFIED SITE: Chronic | Status: ACTIVE | Noted: 2024-09-24

## 2024-11-07 PROBLEM — Z92.21 PERSONAL HISTORY OF ANTINEOPLASTIC CHEMOTHERAPY: Chronic | Status: ACTIVE | Noted: 2024-09-24

## 2024-11-07 PROBLEM — M10.9 GOUT, UNSPECIFIED: Chronic | Status: ACTIVE | Noted: 2024-09-24

## 2024-11-07 PROBLEM — Z87.898 PERSONAL HISTORY OF OTHER SPECIFIED CONDITIONS: Chronic | Status: ACTIVE | Noted: 2024-09-24

## 2024-11-11 ENCOUNTER — OUTPATIENT (OUTPATIENT)
Dept: OUTPATIENT SERVICES | Facility: HOSPITAL | Age: 84
LOS: 1 days | Discharge: HOME | End: 2024-11-11

## 2024-11-11 DIAGNOSIS — Z98.890 OTHER SPECIFIED POSTPROCEDURAL STATES: Chronic | ICD-10-CM

## 2024-11-11 DIAGNOSIS — C81.90 HODGKIN LYMPHOMA, UNSPECIFIED, UNSPECIFIED SITE: ICD-10-CM

## 2024-11-18 ENCOUNTER — APPOINTMENT (OUTPATIENT)
Dept: HEMATOLOGY ONCOLOGY | Facility: CLINIC | Age: 84
End: 2024-11-18
Payer: MEDICARE

## 2024-11-18 ENCOUNTER — RESULT REVIEW (OUTPATIENT)
Age: 84
End: 2024-11-18

## 2024-11-18 ENCOUNTER — APPOINTMENT (OUTPATIENT)
Dept: INFUSION THERAPY | Facility: HOSPITAL | Age: 84
End: 2024-11-18

## 2024-11-18 DIAGNOSIS — C81.70 OTHER HODGKIN LYMPHOMA, UNSPECIFIED SITE: ICD-10-CM

## 2024-11-18 DIAGNOSIS — J96.11 CHRONIC RESPIRATORY FAILURE WITH HYPOXIA: ICD-10-CM

## 2024-11-18 DIAGNOSIS — I48.20 CHRONIC ATRIAL FIBRILLATION, UNSP: ICD-10-CM

## 2024-11-18 DIAGNOSIS — Z92.21 PERSONAL HISTORY OF ANTINEOPLASTIC CHEMOTHERAPY: ICD-10-CM

## 2024-11-18 LAB
ALBUMIN SERPL ELPH-MCNC: 3.6 G/DL — SIGNIFICANT CHANGE UP (ref 3.3–5)
ALP SERPL-CCNC: 81 U/L — SIGNIFICANT CHANGE UP (ref 40–120)
ALT FLD-CCNC: 9 U/L — LOW (ref 10–45)
ANION GAP SERPL CALC-SCNC: 9 MMOL/L — SIGNIFICANT CHANGE UP (ref 5–17)
AST SERPL-CCNC: 21 U/L — SIGNIFICANT CHANGE UP (ref 10–40)
BASOPHILS # BLD AUTO: 0.03 K/UL — SIGNIFICANT CHANGE UP (ref 0–0.2)
BASOPHILS NFR BLD AUTO: 0.3 % — SIGNIFICANT CHANGE UP (ref 0–2)
BILIRUB SERPL-MCNC: 0.4 MG/DL — SIGNIFICANT CHANGE UP (ref 0.2–1.2)
BUN SERPL-MCNC: 18 MG/DL — SIGNIFICANT CHANGE UP (ref 7–23)
CALCIUM SERPL-MCNC: 9.6 MG/DL — SIGNIFICANT CHANGE UP (ref 8.4–10.5)
CHLORIDE SERPL-SCNC: 99 MMOL/L — SIGNIFICANT CHANGE UP (ref 96–108)
CO2 SERPL-SCNC: 30 MMOL/L — SIGNIFICANT CHANGE UP (ref 22–31)
CREAT SERPL-MCNC: 0.66 MG/DL — SIGNIFICANT CHANGE UP (ref 0.5–1.3)
EGFR: 92 ML/MIN/1.73M2 — SIGNIFICANT CHANGE UP
EOSINOPHIL # BLD AUTO: 0.4 K/UL — SIGNIFICANT CHANGE UP (ref 0–0.5)
EOSINOPHIL NFR BLD AUTO: 4.4 % — SIGNIFICANT CHANGE UP (ref 0–6)
ERYTHROCYTE [SEDIMENTATION RATE] IN BLOOD: 44 MM/HR — HIGH (ref 0–20)
GLUCOSE SERPL-MCNC: 101 MG/DL — HIGH (ref 70–99)
HCT VFR BLD CALC: 40.3 % — SIGNIFICANT CHANGE UP (ref 39–50)
HGB BLD-MCNC: 13.4 G/DL — SIGNIFICANT CHANGE UP (ref 13–17)
IGA FLD-MCNC: 347 MG/DL — SIGNIFICANT CHANGE UP (ref 84–499)
IGG FLD-MCNC: 1536 MG/DL — SIGNIFICANT CHANGE UP (ref 610–1660)
IGM SERPL-MCNC: 66 MG/DL — SIGNIFICANT CHANGE UP (ref 35–242)
IMM GRANULOCYTES NFR BLD AUTO: 0.3 % — SIGNIFICANT CHANGE UP (ref 0–0.9)
KAPPA LC SER QL IFE: 3.7 MG/DL — HIGH (ref 0.33–1.94)
KAPPA/LAMBDA FREE LIGHT CHAIN RATIO, SERUM: 1.66 RATIO — HIGH (ref 0.26–1.65)
LAMBDA LC SER QL IFE: 2.23 MG/DL — SIGNIFICANT CHANGE UP (ref 0.57–2.63)
LDH SERPL L TO P-CCNC: 187 U/L — SIGNIFICANT CHANGE UP (ref 50–242)
LYMPHOCYTES # BLD AUTO: 1.98 K/UL — SIGNIFICANT CHANGE UP (ref 1–3.3)
LYMPHOCYTES # BLD AUTO: 21.9 % — SIGNIFICANT CHANGE UP (ref 13–44)
MAGNESIUM SERPL-MCNC: 1.7 MG/DL — SIGNIFICANT CHANGE UP (ref 1.6–2.6)
MCHC RBC-ENTMCNC: 31.6 PG — SIGNIFICANT CHANGE UP (ref 27–34)
MCHC RBC-ENTMCNC: 33.3 G/DL — SIGNIFICANT CHANGE UP (ref 32–36)
MCV RBC AUTO: 95 FL — SIGNIFICANT CHANGE UP (ref 80–100)
MONOCYTES # BLD AUTO: 0.98 K/UL — HIGH (ref 0–0.9)
MONOCYTES NFR BLD AUTO: 10.8 % — SIGNIFICANT CHANGE UP (ref 2–14)
NEUTROPHILS # BLD AUTO: 5.64 K/UL — SIGNIFICANT CHANGE UP (ref 1.8–7.4)
NEUTROPHILS NFR BLD AUTO: 62.3 % — SIGNIFICANT CHANGE UP (ref 43–77)
NRBC # BLD: 0 /100 WBCS — SIGNIFICANT CHANGE UP (ref 0–0)
NRBC BLD-RTO: 0 /100 WBCS — SIGNIFICANT CHANGE UP (ref 0–0)
PHOSPHATE SERPL-MCNC: 3.5 MG/DL — SIGNIFICANT CHANGE UP (ref 2.5–4.5)
PLATELET # BLD AUTO: 214 K/UL — SIGNIFICANT CHANGE UP (ref 150–400)
POTASSIUM SERPL-MCNC: 4.2 MMOL/L — SIGNIFICANT CHANGE UP (ref 3.5–5.3)
POTASSIUM SERPL-SCNC: 4.2 MMOL/L — SIGNIFICANT CHANGE UP (ref 3.5–5.3)
PROT SERPL-MCNC: 7.4 G/DL — SIGNIFICANT CHANGE UP (ref 6–8.3)
RBC # BLD: 4.24 M/UL — SIGNIFICANT CHANGE UP (ref 4.2–5.8)
RBC # FLD: 14.9 % — HIGH (ref 10.3–14.5)
SODIUM SERPL-SCNC: 138 MMOL/L — SIGNIFICANT CHANGE UP (ref 135–145)
URATE SERPL-MCNC: 4.3 MG/DL — SIGNIFICANT CHANGE UP (ref 3.4–8.8)
WBC # BLD: 9.06 K/UL — SIGNIFICANT CHANGE UP (ref 3.8–10.5)
WBC # FLD AUTO: 9.06 K/UL — SIGNIFICANT CHANGE UP (ref 3.8–10.5)

## 2024-11-18 PROCEDURE — 99214 OFFICE O/P EST MOD 30 MIN: CPT

## 2024-11-19 DIAGNOSIS — C85.90 NON-HODGKIN LYMPHOMA, UNSPECIFIED, UNSPECIFIED SITE: ICD-10-CM

## 2024-11-19 RX ORDER — ACETAZOLAMIDE 500 MG/1
500 CAPSULE ORAL
Qty: 1 | Refills: 0 | Status: ACTIVE | COMMUNITY
Start: 2024-09-16

## 2024-11-20 PROCEDURE — 51702 INSERT TEMP BLADDER CATH: CPT

## 2024-11-20 PROCEDURE — 99283 EMERGENCY DEPT VISIT LOW MDM: CPT

## 2024-12-11 NOTE — ASU DISCHARGE PLAN (ADULT/PEDIATRIC) - NO EXERCISE DURATION
no lesions,  no deformities,  no traumatic injuries,  no significant scars are present, no masses present, breathing is unlabored without accessory muscle use, clear to auscultation bilaterally SOB Today

## 2024-12-16 ENCOUNTER — OUTPATIENT (OUTPATIENT)
Dept: OUTPATIENT SERVICES | Facility: HOSPITAL | Age: 84
LOS: 1 days | End: 2024-12-16
Payer: MEDICARE

## 2024-12-16 ENCOUNTER — APPOINTMENT (OUTPATIENT)
Dept: CT IMAGING | Facility: HOSPITAL | Age: 84
End: 2024-12-16
Payer: MEDICARE

## 2024-12-16 DIAGNOSIS — Z98.890 OTHER SPECIFIED POSTPROCEDURAL STATES: Chronic | ICD-10-CM

## 2024-12-16 DIAGNOSIS — C81.70 OTHER HODGKIN LYMPHOMA, UNSPECIFIED SITE: ICD-10-CM

## 2024-12-16 PROCEDURE — 71260 CT THORAX DX C+: CPT | Mod: 26,MH

## 2024-12-16 PROCEDURE — 70491 CT SOFT TISSUE NECK W/DYE: CPT | Mod: 26,MH

## 2024-12-16 PROCEDURE — 71260 CT THORAX DX C+: CPT

## 2024-12-16 PROCEDURE — 74177 CT ABD & PELVIS W/CONTRAST: CPT | Mod: 26,MH

## 2024-12-16 PROCEDURE — 74177 CT ABD & PELVIS W/CONTRAST: CPT

## 2024-12-16 PROCEDURE — 70491 CT SOFT TISSUE NECK W/DYE: CPT

## 2025-02-24 ENCOUNTER — APPOINTMENT (OUTPATIENT)
Dept: INFUSION THERAPY | Facility: HOSPITAL | Age: 85
End: 2025-02-24

## 2025-02-24 ENCOUNTER — RESULT REVIEW (OUTPATIENT)
Age: 85
End: 2025-02-24

## 2025-02-24 ENCOUNTER — APPOINTMENT (OUTPATIENT)
Dept: HEMATOLOGY ONCOLOGY | Facility: CLINIC | Age: 85
End: 2025-02-24
Payer: MEDICARE

## 2025-02-24 ENCOUNTER — OUTPATIENT (OUTPATIENT)
Dept: OUTPATIENT SERVICES | Facility: HOSPITAL | Age: 85
LOS: 1 days | Discharge: HOME | End: 2025-02-24

## 2025-02-24 ENCOUNTER — NON-APPOINTMENT (OUTPATIENT)
Age: 85
End: 2025-02-24

## 2025-02-24 VITALS
OXYGEN SATURATION: 99 % | DIASTOLIC BLOOD PRESSURE: 81 MMHG | BODY MASS INDEX: 26.04 KG/M2 | RESPIRATION RATE: 16 BRPM | WEIGHT: 186 LBS | HEIGHT: 71 IN | HEART RATE: 56 BPM | SYSTOLIC BLOOD PRESSURE: 143 MMHG | TEMPERATURE: 98.2 F

## 2025-02-24 DIAGNOSIS — C81.90 HODGKIN LYMPHOMA, UNSPECIFIED, UNSPECIFIED SITE: ICD-10-CM

## 2025-02-24 DIAGNOSIS — C81.70 OTHER HODGKIN LYMPHOMA, UNSPECIFIED SITE: ICD-10-CM

## 2025-02-24 DIAGNOSIS — Z98.890 OTHER SPECIFIED POSTPROCEDURAL STATES: Chronic | ICD-10-CM

## 2025-02-24 LAB
ALBUMIN SERPL ELPH-MCNC: 3.8 G/DL — SIGNIFICANT CHANGE UP (ref 3.3–5)
ALP SERPL-CCNC: 85 U/L — SIGNIFICANT CHANGE UP (ref 40–120)
ALT FLD-CCNC: 11 U/L — SIGNIFICANT CHANGE UP (ref 10–45)
ANION GAP SERPL CALC-SCNC: 6 MMOL/L — SIGNIFICANT CHANGE UP (ref 5–17)
APTT BLD: 37.4 SEC — HIGH (ref 24.5–35.6)
AST SERPL-CCNC: 17 U/L — SIGNIFICANT CHANGE UP (ref 10–40)
BILIRUB SERPL-MCNC: 0.3 MG/DL — SIGNIFICANT CHANGE UP (ref 0.2–1.2)
BUN SERPL-MCNC: 13 MG/DL — SIGNIFICANT CHANGE UP (ref 7–23)
CALCIUM SERPL-MCNC: 9.8 MG/DL — SIGNIFICANT CHANGE UP (ref 8.4–10.5)
CHLORIDE SERPL-SCNC: 99 MMOL/L — SIGNIFICANT CHANGE UP (ref 96–108)
CO2 SERPL-SCNC: 34 MMOL/L — HIGH (ref 22–31)
CREAT SERPL-MCNC: 0.69 MG/DL — SIGNIFICANT CHANGE UP (ref 0.5–1.3)
EGFR: 91 ML/MIN/1.73M2 — SIGNIFICANT CHANGE UP
EGFR: 91 ML/MIN/1.73M2 — SIGNIFICANT CHANGE UP
GLUCOSE SERPL-MCNC: 77 MG/DL — SIGNIFICANT CHANGE UP (ref 70–99)
HCT VFR BLD CALC: 41.5 % — SIGNIFICANT CHANGE UP (ref 39–50)
HGB BLD-MCNC: 13.9 G/DL — SIGNIFICANT CHANGE UP (ref 13–17)
INR BLD: 1.38 RATIO — HIGH (ref 0.85–1.16)
LDH SERPL L TO P-CCNC: 142 U/L — SIGNIFICANT CHANGE UP (ref 50–242)
MAGNESIUM SERPL-MCNC: 1.8 MG/DL — SIGNIFICANT CHANGE UP (ref 1.6–2.6)
MCHC RBC-ENTMCNC: 32.2 PG — SIGNIFICANT CHANGE UP (ref 27–34)
MCHC RBC-ENTMCNC: 33.5 G/DL — SIGNIFICANT CHANGE UP (ref 32–36)
MCV RBC AUTO: 96.1 FL — SIGNIFICANT CHANGE UP (ref 80–100)
PHOSPHATE SERPL-MCNC: 2.8 MG/DL — SIGNIFICANT CHANGE UP (ref 2.5–4.5)
PLATELET # BLD AUTO: 191 K/UL — SIGNIFICANT CHANGE UP (ref 150–400)
POTASSIUM SERPL-MCNC: 4.3 MMOL/L — SIGNIFICANT CHANGE UP (ref 3.5–5.3)
POTASSIUM SERPL-SCNC: 4.3 MMOL/L — SIGNIFICANT CHANGE UP (ref 3.5–5.3)
PROT SERPL-MCNC: 7.8 G/DL — SIGNIFICANT CHANGE UP (ref 6–8.3)
PROTHROM AB SERPL-ACNC: 16.2 SEC — HIGH (ref 9.9–13.4)
RBC # BLD: 4.32 M/UL — SIGNIFICANT CHANGE UP (ref 4.2–5.8)
RBC # FLD: 14 % — SIGNIFICANT CHANGE UP (ref 10.3–14.5)
SODIUM SERPL-SCNC: 139 MMOL/L — SIGNIFICANT CHANGE UP (ref 135–145)
URATE SERPL-MCNC: 4 MG/DL — SIGNIFICANT CHANGE UP (ref 3.4–8.8)
WBC # BLD: 9.65 K/UL — SIGNIFICANT CHANGE UP (ref 3.8–10.5)
WBC # FLD AUTO: 9.65 K/UL — SIGNIFICANT CHANGE UP (ref 3.8–10.5)

## 2025-02-24 PROCEDURE — 99213 OFFICE O/P EST LOW 20 MIN: CPT

## 2025-02-25 DIAGNOSIS — R11.2 NAUSEA WITH VOMITING, UNSPECIFIED: ICD-10-CM

## 2025-02-25 DIAGNOSIS — C85.90 NON-HODGKIN LYMPHOMA, UNSPECIFIED, UNSPECIFIED SITE: ICD-10-CM

## 2025-02-25 LAB
IGA FLD-MCNC: 331 MG/DL — SIGNIFICANT CHANGE UP (ref 84–499)
IGG FLD-MCNC: 1350 MG/DL — SIGNIFICANT CHANGE UP (ref 610–1660)
IGM SERPL-MCNC: 67 MG/DL — SIGNIFICANT CHANGE UP (ref 35–242)
KAPPA LC SER QL IFE: 3.74 MG/DL — HIGH (ref 0.33–1.94)
KAPPA/LAMBDA FREE LIGHT CHAIN RATIO, SERUM: 1.62 RATIO — SIGNIFICANT CHANGE UP (ref 0.26–1.65)
LAMBDA LC SER QL IFE: 2.31 MG/DL — SIGNIFICANT CHANGE UP (ref 0.57–2.63)

## 2025-02-26 ENCOUNTER — NON-APPOINTMENT (OUTPATIENT)
Age: 85
End: 2025-02-26

## 2025-02-26 DIAGNOSIS — D68.9 COAGULATION DEFECT, UNSPECIFIED: ICD-10-CM

## 2025-02-26 LAB
RAPID RVP RESULT: NOT DETECTED
SARS-COV-2 RNA RESP QL NAA+PROBE: NOT DETECTED

## 2025-02-28 ENCOUNTER — LABORATORY RESULT (OUTPATIENT)
Age: 85
End: 2025-02-28

## 2025-03-03 ENCOUNTER — LABORATORY RESULT (OUTPATIENT)
Age: 85
End: 2025-03-03

## 2025-03-05 ENCOUNTER — LABORATORY RESULT (OUTPATIENT)
Age: 85
End: 2025-03-05

## 2025-04-07 ENCOUNTER — APPOINTMENT (OUTPATIENT)
Dept: ENDOVASCULAR SURGERY | Facility: CLINIC | Age: 85
End: 2025-04-07
Payer: MEDICARE

## 2025-04-07 ENCOUNTER — RESULT REVIEW (OUTPATIENT)
Age: 85
End: 2025-04-07

## 2025-04-07 VITALS
HEIGHT: 60 IN | WEIGHT: 186 LBS | DIASTOLIC BLOOD PRESSURE: 83 MMHG | BODY MASS INDEX: 36.52 KG/M2 | TEMPERATURE: 97.6 F | SYSTOLIC BLOOD PRESSURE: 134 MMHG | RESPIRATION RATE: 16 BRPM | OXYGEN SATURATION: 99 % | HEART RATE: 53 BPM

## 2025-04-07 DIAGNOSIS — C81.70 OTHER HODGKIN LYMPHOMA, UNSPECIFIED SITE: ICD-10-CM

## 2025-04-07 PROCEDURE — 77001 FLUOROGUIDE FOR VEIN DEVICE: CPT

## 2025-04-07 PROCEDURE — 36590 REMOVAL TUNNELED CV CATH: CPT

## 2025-04-09 ENCOUNTER — APPOINTMENT (OUTPATIENT)
Dept: PULMONOLOGY | Facility: CLINIC | Age: 85
End: 2025-04-09

## 2025-04-15 NOTE — DIETITIAN INITIAL EVALUATION ADULT - PROBLEM SELECTOR PROBLEM 4
Patient resting in bed. Respirations easy and unlabored. No distress noted. Call light within reach.     Need for prophylactic measure

## 2025-05-30 ENCOUNTER — OUTPATIENT (OUTPATIENT)
Dept: OUTPATIENT SERVICES | Facility: HOSPITAL | Age: 85
LOS: 1 days | Discharge: HOME | End: 2025-05-30

## 2025-05-30 DIAGNOSIS — C81.90 HODGKIN LYMPHOMA, UNSPECIFIED, UNSPECIFIED SITE: ICD-10-CM

## 2025-05-30 DIAGNOSIS — Z98.890 OTHER SPECIFIED POSTPROCEDURAL STATES: Chronic | ICD-10-CM

## 2025-06-02 ENCOUNTER — APPOINTMENT (OUTPATIENT)
Dept: HEMATOLOGY ONCOLOGY | Facility: CLINIC | Age: 85
End: 2025-06-02
Payer: MEDICARE

## 2025-06-02 ENCOUNTER — RESULT REVIEW (OUTPATIENT)
Age: 85
End: 2025-06-02

## 2025-06-02 VITALS
DIASTOLIC BLOOD PRESSURE: 69 MMHG | RESPIRATION RATE: 16 BRPM | BODY MASS INDEX: 1086.28 KG/M2 | SYSTOLIC BLOOD PRESSURE: 114 MMHG | TEMPERATURE: 97.3 F | WEIGHT: 186.95 LBS | HEART RATE: 52 BPM | OXYGEN SATURATION: 99 %

## 2025-06-02 DIAGNOSIS — C81.70 OTHER HODGKIN LYMPHOMA, UNSPECIFIED SITE: ICD-10-CM

## 2025-06-02 DIAGNOSIS — J90 PLEURAL EFFUSION, NOT ELSEWHERE CLASSIFIED: ICD-10-CM

## 2025-06-02 DIAGNOSIS — Z92.21 PERSONAL HISTORY OF ANTINEOPLASTIC CHEMOTHERAPY: ICD-10-CM

## 2025-06-02 DIAGNOSIS — I48.20 CHRONIC ATRIAL FIBRILLATION, UNSP: ICD-10-CM

## 2025-06-02 DIAGNOSIS — R33.9 RETENTION OF URINE, UNSPECIFIED: ICD-10-CM

## 2025-06-02 DIAGNOSIS — Z87.09 PERSONAL HISTORY OF OTHER DISEASES OF THE RESPIRATORY SYSTEM: ICD-10-CM

## 2025-06-02 LAB
BASOPHILS # BLD AUTO: 0.03 K/UL — SIGNIFICANT CHANGE UP (ref 0–0.2)
BASOPHILS NFR BLD AUTO: 0.3 % — SIGNIFICANT CHANGE UP (ref 0–2)
EOSINOPHIL # BLD AUTO: 0.21 K/UL — SIGNIFICANT CHANGE UP (ref 0–0.5)
EOSINOPHIL NFR BLD AUTO: 2.2 % — SIGNIFICANT CHANGE UP (ref 0–6)
ERYTHROCYTE [SEDIMENTATION RATE] IN BLOOD: 28 MM/HR — HIGH (ref 0–15)
HCT VFR BLD CALC: 41 % — SIGNIFICANT CHANGE UP (ref 39–50)
HGB BLD-MCNC: 13.2 G/DL — SIGNIFICANT CHANGE UP (ref 13–17)
IMM GRANULOCYTES NFR BLD AUTO: 0.3 % — SIGNIFICANT CHANGE UP (ref 0–0.9)
LYMPHOCYTES # BLD AUTO: 2.58 K/UL — SIGNIFICANT CHANGE UP (ref 1–3.3)
LYMPHOCYTES # BLD AUTO: 27.6 % — SIGNIFICANT CHANGE UP (ref 13–44)
MCHC RBC-ENTMCNC: 31 PG — SIGNIFICANT CHANGE UP (ref 27–34)
MCHC RBC-ENTMCNC: 32.2 G/DL — SIGNIFICANT CHANGE UP (ref 32–36)
MCV RBC AUTO: 96.2 FL — SIGNIFICANT CHANGE UP (ref 80–100)
MONOCYTES # BLD AUTO: 1.03 K/UL — HIGH (ref 0–0.9)
MONOCYTES NFR BLD AUTO: 11 % — SIGNIFICANT CHANGE UP (ref 2–14)
NEUTROPHILS # BLD AUTO: 5.47 K/UL — SIGNIFICANT CHANGE UP (ref 1.8–7.4)
NEUTROPHILS NFR BLD AUTO: 58.6 % — SIGNIFICANT CHANGE UP (ref 43–77)
NRBC BLD AUTO-RTO: 0 /100 WBCS — SIGNIFICANT CHANGE UP (ref 0–0)
PLATELET # BLD AUTO: 187 K/UL — SIGNIFICANT CHANGE UP (ref 150–400)
RBC # BLD: 4.26 M/UL — SIGNIFICANT CHANGE UP (ref 4.2–5.8)
RBC # FLD: 14.6 % — HIGH (ref 10.3–14.5)
WBC # BLD: 9.35 K/UL — SIGNIFICANT CHANGE UP (ref 3.8–10.5)
WBC # FLD AUTO: 9.35 K/UL — SIGNIFICANT CHANGE UP (ref 3.8–10.5)

## 2025-06-02 PROCEDURE — 99214 OFFICE O/P EST MOD 30 MIN: CPT

## 2025-06-02 PROCEDURE — G2211 COMPLEX E/M VISIT ADD ON: CPT

## 2025-06-03 LAB
ALBUMIN SERPL ELPH-MCNC: 4.1 G/DL
ALP BLD-CCNC: 84 U/L
ALT SERPL-CCNC: 13 U/L
ANION GAP SERPL CALC-SCNC: 13 MMOL/L
AST SERPL-CCNC: 17 U/L
BILIRUB SERPL-MCNC: 0.3 MG/DL
BUN SERPL-MCNC: 17 MG/DL
CALCIUM SERPL-MCNC: 9.4 MG/DL
CHLORIDE SERPL-SCNC: 100 MMOL/L
CO2 SERPL-SCNC: 28 MMOL/L
CREAT SERPL-MCNC: 0.67 MG/DL
EGFRCR SERPLBLD CKD-EPI 2021: 92 ML/MIN/1.73M2
GLUCOSE SERPL-MCNC: 75 MG/DL
LDH SERPL-CCNC: 151 U/L
POTASSIUM SERPL-SCNC: 4.5 MMOL/L
PROT SERPL-MCNC: 7 G/DL
SODIUM SERPL-SCNC: 140 MMOL/L

## 2025-06-03 NOTE — PROGRESS NOTE ADULT - SUBJECTIVE AND OBJECTIVE BOX
Burn/Hand Clinic   Follow-Up Progress Note      CHIEF COMPLAINT:    Chief Complaint   Patient presents with    AMPUTATION       SUBJECTIVE:    The patient is a 69 y.o. 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  Today the patient states that they are doing well although sore.  The patient states that they have not taken the dressing down since time of surgery.  On examination, the patient does not have any drainage expressible from the site of amputation.    Review of Systems   Constitutional: Negative for fever and chills.   HENT: Negative for congestion.    Eyes: Negative for blurred vision and double vision.   Respiratory: Negative for cough, shortness of breath and wheezing.    Cardiovascular: Negative for chest pain and palpitations.   Gastrointestinal: Negative for nausea. Negative for vomiting.   Musculoskeletal: Positive for myalgias and joint pain.   Skin: Negative for itching and rash.   Neurological: Negative for dizziness, sensory change and headaches.   Psychiatric/Behavioral: Negative for depression and suicidal ideas.     OBJECTIVE:  Vitals:    06/03/25 0909   BP: 117/73   Pulse: 83   SpO2: 98%     General: AAOx3, NAD.  Neuro: Alert. Oriented.  Eyes: Extra-ocular muscles intact.  Mouth: Oral mucosa moist. No perioral lesions.  Pulm: Respirations unlabored and regular.  Skin: Warm, well perfused.  Psych: Patient has good fund of knowledge and displays understanding of exam, diagnosis, and plan.  MSK:   Sutures remain in place to the patient's right middle residual digit with the surgical incision well-approximated.  No expressible drainage or palpable fluctuance in the immediate vicinity of the surgical incision.  Compartments of the right hand are soft and easily compressible.  The dorsal aspects of the index and little fingers do have Integra skin graft  Follow-up Critical Care Progress Note  Chief Complaint : Acute respiratory failure with hypoxia    patient had sedation vacation and initially not tolerating due to severe agitation  required high dose precedex and fentanyl  pt alert, responsive  ABG shows compensated ph with elevated pco2   patient extubated to aerosol mask   patient verbal and following commands        Allergies :pertussis vaccines (Rash)  shellfish (Rash)      PAST MEDICAL & SURGICAL HISTORY:  Atrial fibrillation    Hypertension    Urinary retention    No significant past surgical history        Medications:  MEDICATIONS  (STANDING):  albuterol/ipratropium for Nebulization 3 milliLiter(s) Nebulizer every 6 hours  apixaban 5 milliGRAM(s) Oral every 12 hours  cefepime   IVPB 2000 milliGRAM(s) IV Intermittent every 8 hours  chlorhexidine 2% Cloths 1 Application(s) Topical <User Schedule>  methylPREDNISolone sodium succinate Injectable 40 milliGRAM(s) IV Push every 6 hours  simvastatin 40 milliGRAM(s) Oral at bedtime    MEDICATIONS  (PRN):      Antibiotics History  cefepime   IVPB 2000 milliGRAM(s) IV Intermittent every 8 hours, 02-17-23 @ 17:05  cefepime   IVPB 2000 milliGRAM(s) IV Intermittent once, 02-17-23 @ 10:24, Stop order after: 1 Doses  vancomycin  IVPB. 1000 milliGRAM(s) IV Intermittent once, 02-17-23 @ 10:24, Stop order after: 1 Doses      Heme Medications   apixaban 5 milliGRAM(s) Oral every 12 hours, 02-18-23 @ 08:20      GI Medications      COVID  02-17-23 @ 10:50  COVID -   [facility]      COVID Biomarkers    02-17-23 @ 16:25 ESR --  ---  CRP --  ---  DDimer  --   ---   <H>   ---   Ferritin --         Trend Cardiac Enzymes  02-18-23 @ 05:00  DRM-LAMWD-MDMQH-CPKMM/Trop I - -- - --  - --  -  --  /  14.3  02-17-23 @ 14:40  QJH-ULGKW-OMWMV-CPKMM/Trop I - -- - --  - --  -  --  /  20.5    Trend BNP  02-18-23 @ 05:00   -  1116<H>  02-17-23 @ 10:05   -  1960<H>    Procalcitonin Trend    WBC Trend  02-19-23 @ 06:00   -  25.25<H>  02-18-23 @ 05:00   -  20.28<H>  02-17-23 @ 10:05   -  37.44<H>    H/H Trend  02-19-23 @ 06:00   -   11.9<L>/ 36.4<L>  02-18-23 @ 05:00   -   11.7<L>/ 36.3<L>  02-17-23 @ 10:05   -   12.9<L>/ 42.9    Stool Occult Blood    Platelet Trend  02-19-23 @ 06:00   -  281  02-18-23 @ 05:00   -  236  02-17-23 @ 10:05   -  318    Trend Sodium  02-19-23 @ 06:00   -  139  02-18-23 @ 05:00   -  138  02-17-23 @ 14:40   -  137    Trend Potassium  02-19-23 @ 06:00   -  4.1  02-18-23 @ 05:00   -  4.6  02-17-23 @ 14:40   -  5.4<H>    Trend Bun/Cr  02-19-23 @ 06:00  BUN/CR -  24<H> / 0.78  02-18-23 @ 05:00  BUN/CR -  20 / 0.76  02-17-23 @ 14:40  BUN/CR -  23 / 0.77    Lactic Acid Trend  02-17-23 @ 10:50   -   2.0    ABG Trend  02-19-23 @ 10:05   - 7.37/56<H>/106/98.2<H>  02-18-23 @ 08:35   - 7.29<L>/62<H>/66<L>/90.8<L>  02-18-23 @ 04:50   - 7.53<H>/36/105/99.0<H>  02-17-23 @ 10:48   - 7.22<L>/81<HH>/164<H>/98.9<H>    Trend AST/ALT/ALK Phos/Bili  02-18-23 @ 05:00   32/56<H>/97/0.7  02-17-23 @ 14:40   45<H>/71<H>/106/0.6       Albumin Trend  02-18-23 @ 05:00   -   1.8<L>  02-17-23 @ 14:40   -   1.5<L>      PTT - PT - INR Trend  02-17-23 @ 10:05   -   33.5 - 18.5<H> - 1.59<H>    Glucose Trend  02-19-23 @ 06:00   -  -- 191<H> --  02-18-23 @ 05:00   -  -- 164<H> --  02-17-23 @ 23:10   -  -- -- 126<H>  02-17-23 @ 14:40   -  -- 104<H> --  02-17-23 @ 10:01   -  -- -- 183<H>        LABS:                        11.9   25.25 )-----------( 281      ( 19 Feb 2023 06:00 )             36.4     02-19    139  |  104  |  24<H>  ----------------------------<  191<H>  4.1   |  30  |  0.78    Ca    8.8      19 Feb 2023 06:00  Phos  2.7     02-19  Mg     1.7     02-19    TPro  6.0  /  Alb  1.8<L>  /  TBili  0.7  /  DBili  x   /  AST  32  /  ALT  56<H>  /  AlkPhos  97  02-18    Fluid characteristics  -- 02-17 @ 14:20  pH 7.6    tprot 3.7            CULTURES: (if applicable)    Culture - Body Fluid with Gram Stain (collected 02-17-23 @ 14:20)  Source: .Body Fluid Other, Pleural Fluid  Gram Stain (02-18-23 @ 02:54):    polymorphonuclear leukocytes seen    No organisms seen    by cytocentrifuge  Preliminary Report (02-18-23 @ 21:13):    No growth    Culture - Fungal, Body Fluid (collected 02-17-23 @ 14:20)  Source: Pleural Fl Pleural Fluid  Preliminary Report (02-18-23 @ 07:29):    Testing in progress    Culture - Blood (collected 02-17-23 @ 10:50)  Source: .Blood Blood-Peripheral  Preliminary Report (02-18-23 @ 14:02):    No growth to date.    Culture - Blood (collected 02-17-23 @ 10:50)  Source: .Blood Blood-Peripheral  Preliminary Report (02-18-23 @ 14:02):    No growth to date.             VITALS:  T(C): 36.6 (02-19-23 @ 04:00), Max: 36.9 (02-18-23 @ 12:00)  T(F): 97.8 (02-19-23 @ 04:00), Max: 98.5 (02-18-23 @ 12:00)  HR: 120 (02-19-23 @ 10:00) (45 - 128)  BP: 136/102 (02-19-23 @ 10:00) (75/60 - 181/109)  BP(mean): 111 (02-19-23 @ 10:00) (63 - 130)  ABP: --  ABP(mean): --  RR: 27 (02-19-23 @ 10:00) (17 - 27)  SpO2: 100% (02-19-23 @ 10:00) (83% - 100%)  CVP(mm Hg): --  CVP(cm H2O): --    Ins and Outs     02-18-23 @ 07:01  -  02-19-23 @ 07:00  --------------------------------------------------------  IN: 4318.3 mL / OUT: 1005 mL / NET: 3313.3 mL    02-19-23 @ 07:01  -  02-19-23 @ 10:26  --------------------------------------------------------  IN: 176 mL / OUT: 250 mL / NET: -74 mL        Height (cm): 177.8 (02-17-23 @ 12:00)  Weight (kg): 92.9 (02-17-23 @ 12:00)  BMI (kg/m2): 29.4 (02-17-23 @ 12:00)    Device: Avea, Mode: CPAP with PS, RR (patient): 20, TV (patient): 220, FiO2: 40, PEEP: 5, PS: 5, PIP: 16    I&O's Detail    18 Feb 2023 07:01  -  19 Feb 2023 07:00  --------------------------------------------------------  IN:    Enteral Tube Flush: 50 mL    IV PiggyBack: 100 mL    IV PiggyBack: 250 mL    IV PiggyBack: 100 mL    Jevity: 560 mL    Lactated Ringers: 3000 mL    Propofol: 258.3 mL  Total IN: 4318.3 mL    OUT:    Indwelling Catheter - Urethral (mL): 1005 mL  Total OUT: 1005 mL    Total NET: 3313.3 mL      19 Feb 2023 07:01  -  19 Feb 2023 10:26  --------------------------------------------------------  IN:    Dexmedetomidine: 11 mL    Jevity: 40 mL    Lactated Ringers: 125 mL  Total IN: 176 mL    OUT:    Chest Tube (mL): 250 mL  Total OUT: 250 mL    Total NET: -74 mL

## 2025-06-30 ENCOUNTER — OUTPATIENT (OUTPATIENT)
Dept: OUTPATIENT SERVICES | Facility: HOSPITAL | Age: 85
LOS: 1 days | End: 2025-06-30
Payer: MEDICARE

## 2025-06-30 ENCOUNTER — APPOINTMENT (OUTPATIENT)
Dept: CT IMAGING | Facility: HOSPITAL | Age: 85
End: 2025-06-30
Payer: MEDICARE

## 2025-06-30 DIAGNOSIS — Z98.890 OTHER SPECIFIED POSTPROCEDURAL STATES: Chronic | ICD-10-CM

## 2025-06-30 DIAGNOSIS — C81.90 HODGKIN LYMPHOMA, UNSPECIFIED, UNSPECIFIED SITE: ICD-10-CM

## 2025-06-30 PROCEDURE — 71260 CT THORAX DX C+: CPT | Mod: 26

## 2025-06-30 PROCEDURE — 70491 CT SOFT TISSUE NECK W/DYE: CPT | Mod: 26

## 2025-06-30 PROCEDURE — 74177 CT ABD & PELVIS W/CONTRAST: CPT | Mod: 26

## 2025-06-30 PROCEDURE — 74177 CT ABD & PELVIS W/CONTRAST: CPT

## 2025-06-30 PROCEDURE — 70491 CT SOFT TISSUE NECK W/DYE: CPT

## 2025-06-30 PROCEDURE — 71260 CT THORAX DX C+: CPT

## 2025-08-04 ENCOUNTER — APPOINTMENT (OUTPATIENT)
Dept: PULMONOLOGY | Facility: CLINIC | Age: 85
End: 2025-08-04

## (undated) DEVICE — Device

## (undated) DEVICE — DRSG STERISTRIPS 0.5 X 4"

## (undated) DEVICE — SUT VICRYL 1 27" CTX

## (undated) DEVICE — WARMING BLANKET LOWER ADULT

## (undated) DEVICE — DISSECTOR ENDOSCOPIC KITTNER SINGLE TIP

## (undated) DEVICE — DRSG TEGADERM 2.5X3"

## (undated) DEVICE — SUT SILK 2-0 30" TIES

## (undated) DEVICE — SUT VICRYL 3-0 27" SH UNDYED

## (undated) DEVICE — DRAPE IOBAN 23" X 23"

## (undated) DEVICE — PACK MAJOR ABDOMINAL W ENDO DRAPE

## (undated) DEVICE — CARTRIDGE ALCON MONARCH II "B"

## (undated) DEVICE — AID SURG OPTH OCUCOAT 20MG/ML

## (undated) DEVICE — POSITIONER FOAM EGG CRATE ULNAR 2PCS (PINK)

## (undated) DEVICE — SOL IRR POUR H2O 250ML

## (undated) DEVICE — TUBING SUCTION 20FT

## (undated) DEVICE — SOL IRR POUR H2O 1000ML

## (undated) DEVICE — DRAPE INSTRUMENT POUCH 6.75" X 11"

## (undated) DEVICE — PACK MINOR

## (undated) DEVICE — SPECIMEN CONTAINER 100ML

## (undated) DEVICE — BLADE SCALPEL SAFETYLOCK #15

## (undated) DEVICE — DRAPE IOBAN 33" X 23"

## (undated) DEVICE — TAPE SILK 3"

## (undated) DEVICE — SUT SILK 0 18" TIES

## (undated) DEVICE — CANNULA BD & CO SUBTENONS

## (undated) DEVICE — GLV 7 PROTEXIS (WHITE)

## (undated) DEVICE — TUBING SUCTION NONCONDUCTIVE 6MM X 12FT

## (undated) DEVICE — GLV 6.5 PROTEXIS (WHITE)

## (undated) DEVICE — KNIFE FULL HANDLE ANGLE 2.75MM

## (undated) DEVICE — SUT VICRYL 10-0 12" CS160-8 DA

## (undated) DEVICE — SUCTION YANKAUER NO CONTROL VENT

## (undated) DEVICE — DRAPE MAGNETIC INSTRUMENT MEDIUM

## (undated) DEVICE — KNIFE SIDEPORT ANG W/ SAFETY 20G

## (undated) DEVICE — SYR LUER LOK 20CC

## (undated) DEVICE — DRSG BENZOIN 0.6CC

## (undated) DEVICE — DISSECTOR ENDO PEANUT 5MM

## (undated) DEVICE — DRAIN PLEURX KIT WITH 500ML VACUUM BOTTLE

## (undated) DEVICE — VENODYNE/SCD SLEEVE CALF MEDIUM

## (undated) DEVICE — ELCTR BOVIE TIP BLADE INSULATED 6.5" EDGE

## (undated) DEVICE — NUCLEUS HYDRODISSECTOR SAUTER 27G X 22MM

## (undated) DEVICE — DRSG TELFA 3 X 8

## (undated) DEVICE — GLV 8 PROTEXIS (WHITE)

## (undated) DEVICE — SUT PROLENE 0 30" CT-1

## (undated) DEVICE — POSITIONER STRAP ARMBOARD VELCRO TS-30

## (undated) DEVICE — SUT SILK 2-0 24" TIES

## (undated) DEVICE — ELCTR EXTENSION STRAIGHT

## (undated) DEVICE — FOLEY TRAY 16FR 5CC LF UMETER CLOSED

## (undated) DEVICE — CHEST DRAIN PLEUR-EVAC DRY/DRY ADULT-PEDS SINGLE (QUICK)

## (undated) DEVICE — CAPSULE POLISHER 23GX7/8"

## (undated) DEVICE — SUT MONOCRYL 4-0 27" PS-2 UNDYED

## (undated) DEVICE — SOL ANTI FOG

## (undated) DEVICE — DRSG TEGADERM 2.5 X 3"

## (undated) DEVICE — VISITEC 4X4

## (undated) DEVICE — ENDOCATCH II 15MM

## (undated) DEVICE — MEDICATION LABELS W MARKER

## (undated) DEVICE — NDL HYPO SAFE 18G X 1.5" (PINK)

## (undated) DEVICE — STAPLER SKIN VISI-STAT 35 WIDE

## (undated) DEVICE — GLV 7.5 PROTEXIS (WHITE)

## (undated) DEVICE — ELCTR BOVIE TIP BLADE INSULATED 2.75" EDGE

## (undated) DEVICE — ADAPTER FIBEROPTIC BRONCHOSCOPE DUAL AXIS SWIVEL

## (undated) DEVICE — DRSG TEGADERM 4X4.75"

## (undated) DEVICE — HAND-AID ARTERIAL WRIST SUPPORT

## (undated) DEVICE — ENDOCATCH 10MM SPECIMEN POUCH

## (undated) DEVICE — CONNECTOR REDUCING STRAIGHT 3/8X0.25"

## (undated) DEVICE — DRAPE TOWEL BLUE 17" X 24"

## (undated) DEVICE — DRAPE LARGE SHEET 72X85"

## (undated) DEVICE — ELCTR GROUNDING PAD ADULT COVIDIEN

## (undated) DEVICE — STAPLER ECHELON FLEX POWERED PLUS 340MM

## (undated) DEVICE — SUT VICRYL 0 27" CT-1 UNDYED

## (undated) DEVICE — CHEST DRAIN PLEUR-EVAC DRY/WET ADULT-PEDS SINGLE (QUICK)

## (undated) DEVICE — PREP CHLORAPREP HI-LITE ORANGE 26ML

## (undated) DEVICE — SOL IRR POUR NS 0.9% 500ML

## (undated) DEVICE — GOWN TRIMAX LG

## (undated) DEVICE — LAP PAD 18 X 18"

## (undated) DEVICE — SUT VICRYL 2-0 27" UR-6

## (undated) DEVICE — DRSG TEGADERM 6"X8"

## (undated) DEVICE — SYR SLIP 10CC